# Patient Record
Sex: MALE | Race: WHITE | NOT HISPANIC OR LATINO | Employment: FULL TIME | URBAN - METROPOLITAN AREA
[De-identification: names, ages, dates, MRNs, and addresses within clinical notes are randomized per-mention and may not be internally consistent; named-entity substitution may affect disease eponyms.]

---

## 2018-09-20 ENCOUNTER — OFFICE VISIT (OUTPATIENT)
Dept: INTERNAL MEDICINE CLINIC | Facility: CLINIC | Age: 58
End: 2018-09-20
Payer: COMMERCIAL

## 2018-09-20 VITALS
OXYGEN SATURATION: 98 % | BODY MASS INDEX: 24.75 KG/M2 | WEIGHT: 145 LBS | DIASTOLIC BLOOD PRESSURE: 86 MMHG | HEART RATE: 74 BPM | RESPIRATION RATE: 18 BRPM | SYSTOLIC BLOOD PRESSURE: 150 MMHG | HEIGHT: 64 IN | TEMPERATURE: 98.4 F

## 2018-09-20 DIAGNOSIS — M62.838 MUSCLE SPASM: Primary | ICD-10-CM

## 2018-09-20 PROCEDURE — 99213 OFFICE O/P EST LOW 20 MIN: CPT | Performed by: INTERNAL MEDICINE

## 2018-09-20 PROCEDURE — 3008F BODY MASS INDEX DOCD: CPT | Performed by: INTERNAL MEDICINE

## 2018-09-20 RX ORDER — CYCLOBENZAPRINE HCL 10 MG
10 TABLET ORAL 3 TIMES DAILY PRN
Qty: 30 TABLET | Refills: 0 | Status: SHIPPED | OUTPATIENT
Start: 2018-09-20 | End: 2022-06-18

## 2018-09-20 RX ORDER — MELOXICAM 15 MG/1
15 TABLET ORAL DAILY
Qty: 14 TABLET | Refills: 0 | Status: SHIPPED | OUTPATIENT
Start: 2018-09-20 | End: 2022-06-18

## 2018-09-24 NOTE — PROGRESS NOTES
Assessment/Plan:       Diagnoses and all orders for this visit:    Muscle spasm  -     cyclobenzaprine (FLEXERIL) 10 mg tablet; Take 1 tablet (10 mg total) by mouth 3 (three) times a day as needed for muscle spasms  -     meloxicam (MOBIC) 15 mg tablet; Take 1 tablet (15 mg total) by mouth daily        No problem-specific Assessment & Plan notes found for this encounter  WE will see how the patient does in the next several weeks  Subjective:      Patient ID: Darleen Gaytan is a 59 y o  male  The patient was seen and examined and noted to have pain in the left shoulder  He notes no injury to the area of his shoulder  He states no change of activity and notes no cause of the pain  The patient states that there are no other issues at this time  The following portions of the patient's history were reviewed and updated as appropriate:   He has a past medical history of Benign essential hypertension (5/8/2014)  ,   does not have any pertinent problems on file  ,   has a past surgical history that includes Hernia repair  ,  family history includes No Known Problems in his father and mother  ,   reports that he has been smoking  He has never used smokeless tobacco  He reports that he drinks alcohol  He reports that he does not use drugs  ,  has No Known Allergies     Current Outpatient Prescriptions   Medication Sig Dispense Refill    cyclobenzaprine (FLEXERIL) 10 mg tablet Take 1 tablet (10 mg total) by mouth 3 (three) times a day as needed for muscle spasms 30 tablet 0    meloxicam (MOBIC) 15 mg tablet Take 1 tablet (15 mg total) by mouth daily 14 tablet 0     No current facility-administered medications for this visit  Review of Systems   Constitutional: Negative for chills, fatigue and fever  HENT: Negative for ear pain, postnasal drip, rhinorrhea, sinus pain and sinus pressure  Respiratory: Negative for choking and shortness of breath      Cardiovascular: Negative for chest pain and palpitations  Gastrointestinal: Negative for abdominal pain, constipation, diarrhea, nausea and vomiting  Musculoskeletal: Positive for back pain  Neurological: Negative  Psychiatric/Behavioral: Negative  Objective:  Vitals:    09/20/18 1149   BP: 150/86   BP Location: Right arm   Patient Position: Sitting   Cuff Size: Large   Pulse: 74   Resp: 18   Temp: 98 4 °F (36 9 °C)   SpO2: 98%   Weight: 65 8 kg (145 lb)   Height: 5' 4" (1 626 m)     Body mass index is 24 89 kg/m²  Physical Exam   Constitutional: He appears well-developed and well-nourished  HENT:   Head: Normocephalic and atraumatic  Neck: Normal range of motion  Neck supple  Cardiovascular: Normal rate, regular rhythm and normal heart sounds  Pulmonary/Chest: No respiratory distress  Abdominal: Soft  Bowel sounds are normal    Musculoskeletal: He exhibits tenderness  Arms:  Nursing note and vitals reviewed

## 2020-02-07 ENCOUNTER — OFFICE VISIT (OUTPATIENT)
Dept: URGENT CARE | Facility: CLINIC | Age: 60
End: 2020-02-07
Payer: COMMERCIAL

## 2020-02-07 VITALS
HEIGHT: 64 IN | OXYGEN SATURATION: 97 % | DIASTOLIC BLOOD PRESSURE: 80 MMHG | BODY MASS INDEX: 25.27 KG/M2 | WEIGHT: 148 LBS | HEART RATE: 79 BPM | RESPIRATION RATE: 16 BRPM | SYSTOLIC BLOOD PRESSURE: 160 MMHG | TEMPERATURE: 97 F

## 2020-02-07 DIAGNOSIS — L25.9 CONTACT DERMATITIS, UNSPECIFIED CONTACT DERMATITIS TYPE, UNSPECIFIED TRIGGER: Primary | ICD-10-CM

## 2020-02-07 PROCEDURE — 99213 OFFICE O/P EST LOW 20 MIN: CPT | Performed by: PHYSICIAN ASSISTANT

## 2020-02-07 PROCEDURE — S9088 SERVICES PROVIDED IN URGENT: HCPCS | Performed by: PHYSICIAN ASSISTANT

## 2020-02-07 RX ORDER — PREDNISONE 10 MG/1
TABLET ORAL
Qty: 26 TABLET | Refills: 0 | Status: SHIPPED | OUTPATIENT
Start: 2020-02-07 | End: 2022-06-18

## 2020-02-08 ENCOUNTER — HOSPITAL ENCOUNTER (EMERGENCY)
Facility: HOSPITAL | Age: 60
Discharge: HOME | End: 2020-02-08
Attending: EMERGENCY MEDICINE
Payer: COMMERCIAL

## 2020-02-08 VITALS
OXYGEN SATURATION: 96 % | TEMPERATURE: 97.5 F | WEIGHT: 150 LBS | BODY MASS INDEX: 25.75 KG/M2 | RESPIRATION RATE: 16 BRPM | DIASTOLIC BLOOD PRESSURE: 87 MMHG | SYSTOLIC BLOOD PRESSURE: 159 MMHG | HEART RATE: 83 BPM

## 2020-02-08 DIAGNOSIS — L42 PITYRIASIS ROSEA: Primary | ICD-10-CM

## 2020-02-08 PROCEDURE — 99283 EMERGENCY DEPT VISIT LOW MDM: CPT | Performed by: EMERGENCY MEDICINE

## 2020-02-08 PROCEDURE — 99282 EMERGENCY DEPT VISIT SF MDM: CPT

## 2020-02-08 NOTE — PROGRESS NOTES
[facility]    NAME: Shoaib Jackson is a 62 y o  male  : 1960    MRN: [id number]  DATE: 2020  TIME: 7:08 PM    Assessment and Plan   Contact dermatitis, unspecified contact dermatitis type, unspecified trigger [L25 9]  1  Contact dermatitis, unspecified contact dermatitis type, unspecified trigger  predniSONE 10 mg tablet       Patient Instructions     Patient Instructions   Prednisone as directed  If not improving over the next 7-10 days, follow up with pcp  Chief Complaint     Chief Complaint   Patient presents with    Rash     bilateral arms x 2 days       History of Present Illness   12-year-old male here with complaint of rash on bilateral arms, upper back  Is not itchy  Denies any new lotions, soaps or detergents  No upper respiratory complaints or cold symptoms  No fever chills  Rash does not itch  Review of Systems   Review of Systems   Constitutional: Negative for chills, fatigue and fever  HENT: Negative for congestion, ear pain, postnasal drip, sinus pressure and sore throat  Respiratory: Negative for cough, shortness of breath and wheezing  Skin: Positive for rash  Neurological: Negative for headaches  All other systems reviewed and are negative        Current Medications     Current Outpatient Medications:     cyclobenzaprine (FLEXERIL) 10 mg tablet, Take 1 tablet (10 mg total) by mouth 3 (three) times a day as needed for muscle spasms (Patient not taking: Reported on 2020), Disp: 30 tablet, Rfl: 0    meloxicam (MOBIC) 15 mg tablet, Take 1 tablet (15 mg total) by mouth daily (Patient not taking: Reported on 2020), Disp: 14 tablet, Rfl: 0    predniSONE 10 mg tablet, Take 3 tabs BID X 2 days, 2 tabs BID X 2 days, 1 tab BID X 2 days, 1 tab daily X 2 days, Disp: 26 tablet, Rfl: 0    Current Allergies     Allergies as of 2020    (No Known Allergies)          The following portions of the patient's history were reviewed and updated as appropriate: allergies, current medications, past family history, past medical history, past social history, past surgical history and problem list    Past Medical History:   Diagnosis Date    Benign essential hypertension 5/8/2014     Past Surgical History:   Procedure Laterality Date    HERNIA REPAIR       Family History   Problem Relation Age of Onset    No Known Problems Mother     No Known Problems Father      Social History     Socioeconomic History    Marital status: /Civil Union     Spouse name: Not on file    Number of children: Not on file    Years of education: Not on file    Highest education level: Not on file   Occupational History    Not on file   Social Needs    Financial resource strain: Not on file    Food insecurity:     Worry: Not on file     Inability: Not on file    Transportation needs:     Medical: Not on file     Non-medical: Not on file   Tobacco Use    Smoking status: Current Every Day Smoker    Smokeless tobacco: Never Used   Substance and Sexual Activity    Alcohol use: Yes    Drug use: No    Sexual activity: Not on file   Lifestyle    Physical activity:     Days per week: Not on file     Minutes per session: Not on file    Stress: Not on file   Relationships    Social connections:     Talks on phone: Not on file     Gets together: Not on file     Attends Islam service: Not on file     Active member of club or organization: Not on file     Attends meetings of clubs or organizations: Not on file     Relationship status: Not on file    Intimate partner violence:     Fear of current or ex partner: Not on file     Emotionally abused: Not on file     Physically abused: Not on file     Forced sexual activity: Not on file   Other Topics Concern    Not on file   Social History Narrative    EMPLOYED         Medications have been verified      Objective   /80   Pulse 79   Temp (!) 97 °F (36 1 °C)   Resp 16   Ht 5' 4" (1 626 m)   Wt 67 1 kg (148 lb)   SpO2 97%   BMI 25 40 kg/m²      Physical Exam   Physical Exam   Constitutional: He appears well-developed and well-nourished  No distress  HENT:   Head: Normocephalic and atraumatic  Right Ear: Tympanic membrane normal    Left Ear: Tympanic membrane normal    Nose: No mucosal edema  Mouth/Throat: Oropharynx is clear and moist    Cardiovascular: Normal rate, regular rhythm and normal heart sounds  Pulmonary/Chest: Effort normal and breath sounds normal  No respiratory distress  Skin: Rash (Erythematous papular rash on bilateral arms and upper back ) noted  Nursing note and vitals reviewed

## 2020-02-09 NOTE — ED PROVIDER NOTES
History  Chief Complaint   Patient presents with    Rash     HPI     Pt presents from home, hx of HTN, c/o a generalized rash that began 2 days ago when the patient first noticed a rash on his b/l arms  The rash was maculopapular, redish, oval and round shaped, blanchable, and not painful or pruritic  He was seen yesterday at an Urgent Care and was diagnosed with contact dermatitis and started on oral prednisone  He states that the rash has since spread to his trunk, b/l arms and b/l legs  The rash spares his face, palms and soles  No other symptoms  Pt denies ha, fevers, cough, cp, sob, n/v/d/c, abd pain, dysuria, focal def or syncope  Prior to Admission Medications   Prescriptions Last Dose Informant Patient Reported? Taking? cyclobenzaprine (FLEXERIL) 10 mg tablet   No No   Sig: Take 1 tablet (10 mg total) by mouth 3 (three) times a day as needed for muscle spasms   Patient not taking: Reported on 2/7/2020   meloxicam (MOBIC) 15 mg tablet   No No   Sig: Take 1 tablet (15 mg total) by mouth daily   Patient not taking: Reported on 2/7/2020   predniSONE 10 mg tablet   No Yes   Sig: Take 3 tabs BID X 2 days, 2 tabs BID X 2 days, 1 tab BID X 2 days, 1 tab daily X 2 days      Facility-Administered Medications: None       Past Medical History:   Diagnosis Date    Benign essential hypertension 5/8/2014       Past Surgical History:   Procedure Laterality Date    HERNIA REPAIR         Family History   Problem Relation Age of Onset    No Known Problems Mother     No Known Problems Father      I have reviewed and agree with the history as documented  Social History     Tobacco Use    Smoking status: Current Every Day Smoker     Packs/day: 0 50    Smokeless tobacco: Never Used   Substance Use Topics    Alcohol use: Yes    Drug use: No        Review of Systems   Constitutional: Negative for activity change, appetite change and fever  HENT: Negative for congestion, nosebleeds and sore throat      Eyes: Negative for photophobia and discharge  Respiratory: Negative for cough, shortness of breath, wheezing and stridor  Cardiovascular: Negative for chest pain  Gastrointestinal: Negative for abdominal pain, constipation, diarrhea, nausea and vomiting  Endocrine: Negative for cold intolerance and polydipsia  Genitourinary: Negative for discharge, hematuria and penile pain  Musculoskeletal: Negative for arthralgias, myalgias and neck stiffness  Skin: Positive for rash  Negative for color change  Allergic/Immunologic: Negative for immunocompromised state  Neurological: Negative for dizziness, seizures and headaches  Hematological: Negative for adenopathy  Psychiatric/Behavioral: Negative for confusion and self-injury  The patient is not nervous/anxious  Physical Exam  Physical Exam   Constitutional: He is oriented to person, place, and time  He appears well-developed and well-nourished  No distress  Well appearing, smiling and pleasant in NAD  HENT:   Head: Normocephalic and atraumatic  Right Ear: External ear normal    Left Ear: External ear normal    Nose: Nose normal    Mouth/Throat: Oropharynx is clear and moist  No oropharyngeal exudate  Eyes: Pupils are equal, round, and reactive to light  Conjunctivae and EOM are normal  Right eye exhibits no discharge  Left eye exhibits no discharge  No scleral icterus  Neck: Normal range of motion  Neck supple  No JVD present  No tracheal deviation present  No thyromegaly present  Cardiovascular: Normal rate, regular rhythm, normal heart sounds and intact distal pulses  Exam reveals no gallop and no friction rub  No murmur heard  Pulmonary/Chest: Breath sounds normal  No stridor  No respiratory distress  He has no wheezes  He has no rales  He exhibits no tenderness  Abdominal: Soft  Bowel sounds are normal  He exhibits no distension and no mass  There is no tenderness  There is no rebound and no guarding     Musculoskeletal: Normal range of motion  He exhibits no edema, tenderness or deformity  Lymphadenopathy:     He has no cervical adenopathy  Neurological: He is alert and oriented to person, place, and time  He has normal reflexes  He displays normal reflexes  No cranial nerve deficit  He exhibits normal muscle tone  Coordination normal    Skin: Skin is warm and dry  Rash noted  He is not diaphoretic  No erythema  No pallor  Diffuse, maculopapular, erythematous rash, oval and round shaped of various sizes  Scaling seen on some of the lesions  There is a single larger lesion on the patient's back  The rash is blanchable  Not tender or pruritic  Spares the mucous membranes, face, palms and soles  Psychiatric: He has a normal mood and affect  His behavior is normal  Judgment and thought content normal    Nursing note and vitals reviewed  Vital Signs  ED Triage Vitals [02/08/20 2141]   Temperature Pulse Respirations Blood Pressure SpO2   97 5 °F (36 4 °C) 83 16 159/87 96 %      Temp src Heart Rate Source Patient Position - Orthostatic VS BP Location FiO2 (%)   -- -- -- -- --      Pain Score       No Pain           Vitals:    02/08/20 2141   BP: 159/87   Pulse: 83         Visual Acuity      ED Medications  Medications - No data to display    Diagnostic Studies  Results Reviewed     None                 No orders to display              Procedures  Procedures         ED Course                               MDM  Number of Diagnoses or Management Options  Pityriasis rosea:   Diagnosis management comments: IMP: pityriasis rosacea versus allergic reaction, post-viral exanthem  Doubt SJS, infectious rash, nec fascitis  Plan: supportive care prn  Pt will stop the prednisone  Pt will f/up w/ his pcp and will see dermatology if symptoms persist   He will return immediately for any worsening         Amount and/or Complexity of Data Reviewed  Decide to obtain previous medical records or to obtain history from someone other than the patient: yes  Review and summarize past medical records: yes  Independent visualization of images, tracings, or specimens: yes    Risk of Complications, Morbidity, and/or Mortality  Presenting problems: high  Diagnostic procedures: low  Management options: low    Patient Progress  Patient progress: improved        Disposition  Final diagnoses:   Pityriasis rosea     Time reflects when diagnosis was documented in both MDM as applicable and the Disposition within this note     Time User Action Codes Description Comment    2/8/2020 10:21 PM Robert Erickson Add [L42] Pityriasis rosea       ED Disposition     ED Disposition Condition Date/Time Comment    Discharge Stable Sat Feb 8, 2020 10:20 PM Selma Kentsubhash discharge to home/self care  Follow-up Information     Follow up With Specialties Details Why [address], DO Internal Medicine Schedule an appointment as soon as possible for a visit in 2 days As needed  Return immediately, If symptoms worsen Priscilla  [phone]            Discharge Medication List as of 2/8/2020 10:26 PM      CONTINUE these medications which have NOT CHANGED    Details   predniSONE 10 mg tablet Take 3 tabs BID X 2 days, 2 tabs BID X 2 days, 1 tab BID X 2 days, 1 tab daily X 2 days, Normal      cyclobenzaprine (FLEXERIL) 10 mg tablet Take 1 tablet (10 mg total) by mouth 3 (three) times a day as needed for muscle spasms, Starting Thu 9/20/2018, Normal      meloxicam (MOBIC) 15 mg tablet Take 1 tablet (15 mg total) by mouth daily, Starting Thu 9/20/2018, Normal           No discharge procedures on file      ED Provider  Electronically Signed by           Yang Mann DO  02/08/20 7361

## 2020-02-09 NOTE — DISCHARGE INSTRUCTIONS
What is pityriasis rosea? Pityriasis rosea is a scaly, reddish-pink skin rash  It is most common in people between the ages of 8and 28years old  However, this rash can affect any age group  Symptoms of pityriasis rosea  In most cases, pityriasis rosea begins with cold-like symptoms  These may include sore throat, fever, aches, nausea, and fatigue  Then, a single, large spot may appear on your chest, stomach, or back  This is called a herald patch or mother patch   It has a raised border and hollow center  Smaller spots will continue to appear in the same area  The spots may be red, scaly, and itchy  What causes pityriasis rosea? Doctors dont know the exact cause of pityriasis rosea  Some doctors believe that a viral infection can cause it  Certain medicines may also be the cause  Pityriasis rosea is not contagious, so people who have this rash cannot spread it to others  How is pityriasis rosea diagnosed? Pityriasis rosea can look like other common skin rashes  Examples are eczema, ringworm, and psoriasis  Your doctor will look for a herald patch and review your symptoms  They may do a blood test or skin biopsy to rule out other issues  Can pityriasis rosea be prevented or avoided? You cannot prevent or avoid this condition  Pityriasis rosea treatment  Treatment cannot cure pityriasis rosea, but medicine can help relieve the itching  Your doctor may suggest antihistamine pills, such as Benadryl  Steroid cream, calamine lotion, or zinc oxide cream can also relieve the itching  Some people may need to take steroid pills to clear up their rash  Let your doctor know if you are pregnant  This may affect your treatment options and require more in-depth monitoring  Living with pityriasis rosea  In most cases, pityriasis rosea goes away on its own and does not come back  It could last 1 to 3 months  Heat can worsen the rash and itching  Try to avoid hot water and temperatures    Contact your doctor if the rash lasts longer than 3 months  Questions to ask your doctor  What is causing my rash? What treatment is best for me? How long do I need to take drgus or use cream?   Is there anything else I can do to relieve my symptoms? How long will the rash last and will it come back?

## 2020-06-12 ENCOUNTER — OFFICE VISIT (OUTPATIENT)
Dept: FAMILY MEDICINE CLINIC | Facility: CLINIC | Age: 60
End: 2020-06-12
Payer: COMMERCIAL

## 2020-06-12 VITALS
OXYGEN SATURATION: 97 % | BODY MASS INDEX: 25.69 KG/M2 | HEIGHT: 63 IN | DIASTOLIC BLOOD PRESSURE: 76 MMHG | TEMPERATURE: 97.9 F | WEIGHT: 145 LBS | SYSTOLIC BLOOD PRESSURE: 139 MMHG | HEART RATE: 86 BPM

## 2020-06-12 DIAGNOSIS — Z11.59 ENCOUNTER FOR HEPATITIS C SCREENING TEST FOR LOW RISK PATIENT: ICD-10-CM

## 2020-06-12 DIAGNOSIS — Z76.89 ENCOUNTER TO ESTABLISH CARE WITH NEW DOCTOR: Primary | ICD-10-CM

## 2020-06-12 DIAGNOSIS — Z11.4 SCREENING FOR HIV WITHOUT PRESENCE OF RISK FACTORS: ICD-10-CM

## 2020-06-12 DIAGNOSIS — R01.1 HEART MURMUR: ICD-10-CM

## 2020-06-12 DIAGNOSIS — Z12.5 SCREENING FOR PROSTATE CANCER: ICD-10-CM

## 2020-06-12 DIAGNOSIS — I10 BENIGN ESSENTIAL HYPERTENSION: ICD-10-CM

## 2020-06-12 DIAGNOSIS — E66.3 OVERWEIGHT: ICD-10-CM

## 2020-06-12 DIAGNOSIS — Z13.31 NEGATIVE DEPRESSION SCREENING: ICD-10-CM

## 2020-06-12 DIAGNOSIS — E78.00 HYPERCHOLESTEROLEMIA: ICD-10-CM

## 2020-06-12 DIAGNOSIS — Z23 ENCOUNTER FOR IMMUNIZATION: ICD-10-CM

## 2020-06-12 PROCEDURE — 99203 OFFICE O/P NEW LOW 30 MIN: CPT | Performed by: FAMILY MEDICINE

## 2020-06-12 PROCEDURE — 90670 PCV13 VACCINE IM: CPT

## 2020-06-12 PROCEDURE — 3075F SYST BP GE 130 - 139MM HG: CPT | Performed by: FAMILY MEDICINE

## 2020-06-12 PROCEDURE — 3078F DIAST BP <80 MM HG: CPT | Performed by: FAMILY MEDICINE

## 2020-06-12 PROCEDURE — 90471 IMMUNIZATION ADMIN: CPT

## 2020-06-12 PROCEDURE — 3008F BODY MASS INDEX DOCD: CPT | Performed by: FAMILY MEDICINE

## 2020-06-24 ENCOUNTER — APPOINTMENT (OUTPATIENT)
Dept: LAB | Facility: HOSPITAL | Age: 60
End: 2020-06-24
Attending: FAMILY MEDICINE
Payer: COMMERCIAL

## 2020-06-24 ENCOUNTER — HOSPITAL ENCOUNTER (OUTPATIENT)
Dept: NON INVASIVE DIAGNOSTICS | Facility: HOSPITAL | Age: 60
Discharge: HOME | End: 2020-06-24
Attending: FAMILY MEDICINE
Payer: COMMERCIAL

## 2020-06-24 DIAGNOSIS — Z11.59 ENCOUNTER FOR HEPATITIS C SCREENING TEST FOR LOW RISK PATIENT: ICD-10-CM

## 2020-06-24 DIAGNOSIS — I10 BENIGN ESSENTIAL HYPERTENSION: ICD-10-CM

## 2020-06-24 DIAGNOSIS — E78.00 HYPERCHOLESTEROLEMIA: ICD-10-CM

## 2020-06-24 DIAGNOSIS — Z12.5 SCREENING FOR PROSTATE CANCER: ICD-10-CM

## 2020-06-24 DIAGNOSIS — R01.1 HEART MURMUR: ICD-10-CM

## 2020-06-24 DIAGNOSIS — Z11.4 SCREENING FOR HIV WITHOUT PRESENCE OF RISK FACTORS: ICD-10-CM

## 2020-06-24 LAB
ALBUMIN SERPL BCP-MCNC: 4.3 G/DL (ref 3.5–5.7)
ALP SERPL-CCNC: 66 U/L (ref 55–165)
ALT SERPL W P-5'-P-CCNC: 7 U/L (ref 7–52)
ANION GAP SERPL CALCULATED.3IONS-SCNC: 7 MMOL/L (ref 4–13)
AST SERPL W P-5'-P-CCNC: 11 U/L (ref 13–39)
BASOPHILS # BLD AUTO: 0 THOUSANDS/ΜL (ref 0–0.1)
BASOPHILS NFR BLD AUTO: 1 % (ref 0–2)
BILIRUB SERPL-MCNC: 0.5 MG/DL (ref 0.2–1)
BUN SERPL-MCNC: 13 MG/DL (ref 7–25)
CALCIUM SERPL-MCNC: 9 MG/DL (ref 8.6–10.5)
CHLORIDE SERPL-SCNC: 106 MMOL/L (ref 98–107)
CHOLEST SERPL-MCNC: 210 MG/DL (ref 0–200)
CO2 SERPL-SCNC: 29 MMOL/L (ref 21–31)
CREAT SERPL-MCNC: 0.73 MG/DL (ref 0.7–1.3)
EOSINOPHIL # BLD AUTO: 0.1 THOUSAND/ΜL (ref 0–0.61)
EOSINOPHIL NFR BLD AUTO: 1 % (ref 0–5)
ERYTHROCYTE [DISTWIDTH] IN BLOOD BY AUTOMATED COUNT: 14.1 % (ref 11.5–14.5)
GFR SERPL CREATININE-BSD FRML MDRD: 100 ML/MIN/1.73SQ M
GLUCOSE P FAST SERPL-MCNC: 83 MG/DL (ref 65–99)
HCT VFR BLD AUTO: 46 % (ref 42–47)
HCV AB SER QL: NORMAL
HDLC SERPL-MCNC: 30 MG/DL
HGB BLD-MCNC: 15.5 G/DL (ref 14–18)
LDLC SERPL CALC-MCNC: 152 MG/DL (ref 0–100)
LYMPHOCYTES # BLD AUTO: 1.5 THOUSANDS/ΜL (ref 0.6–4.47)
LYMPHOCYTES NFR BLD AUTO: 18 % (ref 21–51)
MCH RBC QN AUTO: 28.7 PG (ref 26–34)
MCHC RBC AUTO-ENTMCNC: 33.6 G/DL (ref 31–37)
MCV RBC AUTO: 85 FL (ref 81–99)
MONOCYTES # BLD AUTO: 0.8 THOUSAND/ΜL (ref 0.17–1.22)
MONOCYTES NFR BLD AUTO: 9 % (ref 2–12)
NEUTROPHILS # BLD AUTO: 6.2 THOUSANDS/ΜL (ref 1.4–6.5)
NEUTS SEG NFR BLD AUTO: 71 % (ref 42–75)
NONHDLC SERPL-MCNC: 180 MG/DL
PLATELET # BLD AUTO: 296 THOUSANDS/UL (ref 149–390)
PMV BLD AUTO: 8.2 FL (ref 8.6–11.7)
POTASSIUM SERPL-SCNC: 4.3 MMOL/L (ref 3.5–5.5)
PROT SERPL-MCNC: 6.5 G/DL (ref 6.4–8.9)
PSA SERPL-MCNC: 3.9 NG/ML (ref 0–4)
RBC # BLD AUTO: 5.39 MILLION/UL (ref 4.3–5.9)
SODIUM SERPL-SCNC: 142 MMOL/L (ref 134–143)
TRIGL SERPL-MCNC: 140 MG/DL (ref 44–166)
TSH SERPL DL<=0.05 MIU/L-ACNC: 1.89 UIU/ML (ref 0.45–5.33)
WBC # BLD AUTO: 8.7 THOUSAND/UL (ref 4.8–10.8)

## 2020-06-24 PROCEDURE — 36415 COLL VENOUS BLD VENIPUNCTURE: CPT

## 2020-06-24 PROCEDURE — 93306 TTE W/DOPPLER COMPLETE: CPT | Performed by: INTERNAL MEDICINE

## 2020-06-24 PROCEDURE — 86803 HEPATITIS C AB TEST: CPT

## 2020-06-24 PROCEDURE — 85025 COMPLETE CBC W/AUTO DIFF WBC: CPT

## 2020-06-24 PROCEDURE — 93306 TTE W/DOPPLER COMPLETE: CPT

## 2020-06-24 PROCEDURE — 84443 ASSAY THYROID STIM HORMONE: CPT

## 2020-06-24 PROCEDURE — 80053 COMPREHEN METABOLIC PANEL: CPT

## 2020-06-24 PROCEDURE — G0103 PSA SCREENING: HCPCS

## 2020-06-24 PROCEDURE — 80061 LIPID PANEL: CPT

## 2020-06-24 PROCEDURE — 87389 HIV-1 AG W/HIV-1&-2 AB AG IA: CPT

## 2020-06-26 LAB — HIV 1+2 AB+HIV1 P24 AG SERPL QL IA: NORMAL

## 2020-07-17 ENCOUNTER — OFFICE VISIT (OUTPATIENT)
Dept: FAMILY MEDICINE CLINIC | Facility: CLINIC | Age: 60
End: 2020-07-17
Payer: COMMERCIAL

## 2020-07-17 VITALS
HEIGHT: 63 IN | HEART RATE: 62 BPM | BODY MASS INDEX: 25.91 KG/M2 | WEIGHT: 146.2 LBS | TEMPERATURE: 97.4 F | DIASTOLIC BLOOD PRESSURE: 64 MMHG | OXYGEN SATURATION: 98 % | SYSTOLIC BLOOD PRESSURE: 139 MMHG

## 2020-07-17 DIAGNOSIS — E66.3 OVERWEIGHT WITH BODY MASS INDEX (BMI) OF 26 TO 26.9 IN ADULT: ICD-10-CM

## 2020-07-17 DIAGNOSIS — Z00.00 ANNUAL PHYSICAL EXAM: Primary | ICD-10-CM

## 2020-07-17 DIAGNOSIS — I35.0 AORTIC VALVE STENOSIS, ETIOLOGY OF CARDIAC VALVE DISEASE UNSPECIFIED: ICD-10-CM

## 2020-07-17 DIAGNOSIS — E78.00 HYPERCHOLESTEROLEMIA: ICD-10-CM

## 2020-07-17 DIAGNOSIS — Z13.31 NEGATIVE DEPRESSION SCREENING: ICD-10-CM

## 2020-07-17 PROCEDURE — 99396 PREV VISIT EST AGE 40-64: CPT | Performed by: FAMILY MEDICINE

## 2020-07-17 PROCEDURE — 3075F SYST BP GE 130 - 139MM HG: CPT | Performed by: FAMILY MEDICINE

## 2020-07-17 PROCEDURE — 3008F BODY MASS INDEX DOCD: CPT | Performed by: FAMILY MEDICINE

## 2020-07-17 PROCEDURE — 3078F DIAST BP <80 MM HG: CPT | Performed by: FAMILY MEDICINE

## 2020-07-17 NOTE — PROGRESS NOTES
Assessment/Plan:    No problem-specific Assessment & Plan notes found for this encounter  Diagnoses and all orders for this visit:    Annual physical exam    Hypercholesterolemia  Comments:  pt counseled on diet and exercise  Orders:  -     Lipid panel; Future    Overweight with body mass index (BMI) of 26 to 26 9 in adult    Negative depression screening    Aortic valve stenosis, etiology of cardiac valve disease unspecified  Comments:  mild          PHQ-9 Depression Screening    PHQ-9:    Frequency of the following problems over the past two weeks:       Little interest or pleasure in doing things:  0 - not at all  Feeling down, depressed, or hopeless:  0 - not at all  PHQ-2 Score:  0        BMI Counseling: Body mass index is 26 31 kg/m²  The BMI is above normal  Nutrition recommendations include reducing portion sizes and 3-5 servings of fruits/vegetables daily  Exercise recommendations include moderate aerobic physical activity for 150 minutes/week and exercising 3-5 times per week  Subjective:      Patient ID: Uche Bueno is a 62 y o  male  Pt here for annual physical      The following portions of the patient's history were reviewed and updated as appropriate: allergies, current medications, past family history, past medical history, past social history, past surgical history and problem list     Review of Systems   Constitutional: Negative  Negative for chills, fatigue and fever  HENT: Negative  Eyes: Negative  Respiratory: Negative for shortness of breath and wheezing  Cardiovascular: Negative for chest pain and palpitations  Gastrointestinal: Negative for abdominal pain, blood in stool, constipation, diarrhea, nausea and vomiting  Endocrine: Negative  Genitourinary: Negative for difficulty urinating and dysuria  Musculoskeletal: Negative for arthralgias and myalgias  Skin: Negative  Allergic/Immunologic: Negative      Neurological: Negative for seizures and syncope  Hematological: Negative for adenopathy  Psychiatric/Behavioral: Negative  Objective:    /64   Pulse 62   Temp (!) 97 4 °F (36 3 °C)   Ht 5' 2 5" (1 588 m)   Wt 66 3 kg (146 lb 3 2 oz)   SpO2 98%   BMI 26 31 kg/m²      Physical Exam   Constitutional: He is oriented to person, place, and time  He appears well-developed and well-nourished  No distress  HENT:   Head: Normocephalic and atraumatic  Right Ear: External ear normal    Left Ear: External ear normal    Nose: Nose normal    Mouth/Throat: Oropharynx is clear and moist    Eyes: Pupils are equal, round, and reactive to light  Conjunctivae and EOM are normal  No scleral icterus  Neck: Normal range of motion  Neck supple  Cardiovascular: Normal rate, regular rhythm and normal heart sounds  Exam reveals no gallop and no friction rub  No murmur heard  Pulmonary/Chest: Effort normal and breath sounds normal  No respiratory distress  He has no wheezes  He has no rales  Abdominal: Soft  Bowel sounds are normal  He exhibits no distension and no mass  There is no tenderness  There is no rebound and no guarding  Musculoskeletal: Normal range of motion  He exhibits no edema  Lymphadenopathy:     He has no cervical adenopathy  Neurological: He is alert and oriented to person, place, and time  He has normal reflexes  Skin: Skin is warm and dry  He is not diaphoretic  Psychiatric: He has a normal mood and affect   His behavior is normal  Judgment and thought content normal

## 2021-04-06 ENCOUNTER — IMMUNIZATIONS (OUTPATIENT)
Dept: FAMILY MEDICINE CLINIC | Facility: HOSPITAL | Age: 61
End: 2021-04-06

## 2021-04-06 DIAGNOSIS — Z23 ENCOUNTER FOR IMMUNIZATION: Primary | ICD-10-CM

## 2021-04-06 PROCEDURE — 91301 SARS-COV-2 / COVID-19 MRNA VACCINE (MODERNA) 100 MCG: CPT

## 2021-04-06 PROCEDURE — 0011A SARS-COV-2 / COVID-19 MRNA VACCINE (MODERNA) 100 MCG: CPT

## 2021-05-06 ENCOUNTER — IMMUNIZATIONS (OUTPATIENT)
Dept: FAMILY MEDICINE CLINIC | Facility: HOSPITAL | Age: 61
End: 2021-05-06

## 2021-05-06 DIAGNOSIS — Z23 ENCOUNTER FOR IMMUNIZATION: Primary | ICD-10-CM

## 2021-05-06 PROCEDURE — 0012A SARS-COV-2 / COVID-19 MRNA VACCINE (MODERNA) 100 MCG: CPT

## 2021-05-06 PROCEDURE — 91301 SARS-COV-2 / COVID-19 MRNA VACCINE (MODERNA) 100 MCG: CPT

## 2022-06-15 ENCOUNTER — HOSPITAL ENCOUNTER (EMERGENCY)
Facility: HOSPITAL | Age: 62
Discharge: HOME | End: 2022-06-16
Attending: EMERGENCY MEDICINE
Payer: COMMERCIAL

## 2022-06-15 DIAGNOSIS — R07.89 CHEST PRESSURE: ICD-10-CM

## 2022-06-15 DIAGNOSIS — R00.2 PALPITATIONS: Primary | ICD-10-CM

## 2022-06-15 DIAGNOSIS — R68.89 FLU-LIKE SYMPTOMS: ICD-10-CM

## 2022-06-15 DIAGNOSIS — R00.2 HEART PALPITATIONS: ICD-10-CM

## 2022-06-15 PROCEDURE — 99285 EMERGENCY DEPT VISIT HI MDM: CPT

## 2022-06-15 PROCEDURE — 99285 EMERGENCY DEPT VISIT HI MDM: CPT | Performed by: EMERGENCY MEDICINE

## 2022-06-15 PROCEDURE — 93005 ELECTROCARDIOGRAM TRACING: CPT

## 2022-06-16 ENCOUNTER — APPOINTMENT (EMERGENCY)
Dept: RADIOLOGY | Facility: HOSPITAL | Age: 62
End: 2022-06-16
Payer: COMMERCIAL

## 2022-06-16 VITALS
SYSTOLIC BLOOD PRESSURE: 158 MMHG | TEMPERATURE: 99 F | HEART RATE: 75 BPM | DIASTOLIC BLOOD PRESSURE: 78 MMHG | BODY MASS INDEX: 25.61 KG/M2 | WEIGHT: 150 LBS | OXYGEN SATURATION: 97 % | HEIGHT: 64 IN | RESPIRATION RATE: 18 BRPM

## 2022-06-16 LAB
2HR DELTA HS TROPONIN: -1 NG/L
ALBUMIN SERPL BCP-MCNC: 4.4 G/DL (ref 3.5–5)
ALP SERPL-CCNC: 83 U/L (ref 34–104)
ALT SERPL W P-5'-P-CCNC: 10 U/L (ref 7–52)
ANION GAP SERPL CALCULATED.3IONS-SCNC: 10 MMOL/L (ref 4–13)
AST SERPL W P-5'-P-CCNC: 11 U/L (ref 13–39)
ATRIAL RATE: 74 BPM
ATRIAL RATE: 87 BPM
BACTERIA UR QL AUTO: NORMAL /HPF
BASOPHILS # BLD AUTO: 0.06 THOUSANDS/ΜL (ref 0–0.1)
BASOPHILS NFR BLD AUTO: 1 % (ref 0–1)
BILIRUB SERPL-MCNC: 0.28 MG/DL (ref 0.2–1)
BILIRUB UR QL STRIP: NEGATIVE
BUN SERPL-MCNC: 24 MG/DL (ref 5–25)
CALCIUM SERPL-MCNC: 9.5 MG/DL (ref 8.4–10.2)
CARDIAC TROPONIN I PNL SERPL HS: 10 NG/L
CARDIAC TROPONIN I PNL SERPL HS: 9 NG/L
CHLORIDE SERPL-SCNC: 103 MMOL/L (ref 96–108)
CK SERPL-CCNC: 42 U/L (ref 39–308)
CLARITY UR: CLEAR
CO2 SERPL-SCNC: 23 MMOL/L (ref 21–32)
COLOR UR: ABNORMAL
CREAT SERPL-MCNC: 0.81 MG/DL (ref 0.6–1.3)
EOSINOPHIL # BLD AUTO: 0.29 THOUSAND/ΜL (ref 0–0.61)
EOSINOPHIL NFR BLD AUTO: 3 % (ref 0–6)
ERYTHROCYTE [DISTWIDTH] IN BLOOD BY AUTOMATED COUNT: 13.9 % (ref 11.6–15.1)
FLUAV RNA RESP QL NAA+PROBE: NEGATIVE
FLUBV RNA RESP QL NAA+PROBE: NEGATIVE
GFR SERPL CREATININE-BSD FRML MDRD: 94 ML/MIN/1.73SQ M
GLUCOSE SERPL-MCNC: 92 MG/DL (ref 65–140)
GLUCOSE UR STRIP-MCNC: NEGATIVE MG/DL
HCT VFR BLD AUTO: 45.6 % (ref 36.5–49.3)
HGB BLD-MCNC: 15 G/DL (ref 12–17)
HGB UR QL STRIP.AUTO: ABNORMAL
IMM GRANULOCYTES # BLD AUTO: 0.04 THOUSAND/UL (ref 0–0.2)
IMM GRANULOCYTES NFR BLD AUTO: 0 % (ref 0–2)
KETONES UR STRIP-MCNC: NEGATIVE MG/DL
LACTATE SERPL-SCNC: 0.7 MMOL/L (ref 0.5–2)
LEUKOCYTE ESTERASE UR QL STRIP: NEGATIVE
LIPASE SERPL-CCNC: 12 U/L (ref 11–82)
LYMPHOCYTES # BLD AUTO: 2.82 THOUSANDS/ΜL (ref 0.6–4.47)
LYMPHOCYTES NFR BLD AUTO: 29 % (ref 14–44)
MCH RBC QN AUTO: 28.6 PG (ref 26.8–34.3)
MCHC RBC AUTO-ENTMCNC: 32.9 G/DL (ref 31.4–37.4)
MCV RBC AUTO: 87 FL (ref 82–98)
MONOCYTES # BLD AUTO: 0.88 THOUSAND/ΜL (ref 0.17–1.22)
MONOCYTES NFR BLD AUTO: 9 % (ref 4–12)
NEUTROPHILS # BLD AUTO: 5.49 THOUSANDS/ΜL (ref 1.85–7.62)
NEUTS SEG NFR BLD AUTO: 58 % (ref 43–75)
NITRITE UR QL STRIP: NEGATIVE
NON-SQ EPI CELLS URNS QL MICRO: NORMAL /HPF
NRBC BLD AUTO-RTO: 0 /100 WBCS
P AXIS: 50 DEGREES
P AXIS: 69 DEGREES
PH UR STRIP.AUTO: 6 [PH]
PLATELET # BLD AUTO: 382 THOUSANDS/UL (ref 149–390)
PMV BLD AUTO: 9.1 FL (ref 8.9–12.7)
POTASSIUM SERPL-SCNC: 3.8 MMOL/L (ref 3.5–5.3)
PR INTERVAL: 178 MS
PR INTERVAL: 190 MS
PROCALCITONIN SERPL-MCNC: <0.05 NG/ML
PROT SERPL-MCNC: 6.9 G/DL (ref 6.4–8.4)
PROT UR STRIP-MCNC: NEGATIVE MG/DL
QRS AXIS: -38 DEGREES
QRS AXIS: -53 DEGREES
QRSD INTERVAL: 88 MS
QRSD INTERVAL: 96 MS
QT INTERVAL: 344 MS
QT INTERVAL: 364 MS
QTC INTERVAL: 404 MS
QTC INTERVAL: 413 MS
RBC # BLD AUTO: 5.25 MILLION/UL (ref 3.88–5.62)
RBC #/AREA URNS AUTO: NORMAL /HPF
RSV RNA RESP QL NAA+PROBE: NEGATIVE
SARS-COV-2 RNA RESP QL NAA+PROBE: NEGATIVE
SODIUM SERPL-SCNC: 136 MMOL/L (ref 135–147)
SP GR UR STRIP.AUTO: 1.01 (ref 1–1.03)
T WAVE AXIS: 34 DEGREES
T WAVE AXIS: 56 DEGREES
UROBILINOGEN UR QL STRIP.AUTO: 0.2 E.U./DL
VENTRICULAR RATE: 74 BPM
VENTRICULAR RATE: 87 BPM
WBC # BLD AUTO: 9.58 THOUSAND/UL (ref 4.31–10.16)
WBC #/AREA URNS AUTO: NORMAL /HPF

## 2022-06-16 PROCEDURE — 83605 ASSAY OF LACTIC ACID: CPT | Performed by: EMERGENCY MEDICINE

## 2022-06-16 PROCEDURE — 83690 ASSAY OF LIPASE: CPT | Performed by: EMERGENCY MEDICINE

## 2022-06-16 PROCEDURE — 84145 PROCALCITONIN (PCT): CPT | Performed by: EMERGENCY MEDICINE

## 2022-06-16 PROCEDURE — 87040 BLOOD CULTURE FOR BACTERIA: CPT | Performed by: EMERGENCY MEDICINE

## 2022-06-16 PROCEDURE — 82550 ASSAY OF CK (CPK): CPT | Performed by: EMERGENCY MEDICINE

## 2022-06-16 PROCEDURE — 93005 ELECTROCARDIOGRAM TRACING: CPT

## 2022-06-16 PROCEDURE — 0241U HB NFCT DS VIR RESP RNA 4 TRGT: CPT | Performed by: EMERGENCY MEDICINE

## 2022-06-16 PROCEDURE — 85025 COMPLETE CBC W/AUTO DIFF WBC: CPT | Performed by: EMERGENCY MEDICINE

## 2022-06-16 PROCEDURE — 36415 COLL VENOUS BLD VENIPUNCTURE: CPT | Performed by: EMERGENCY MEDICINE

## 2022-06-16 PROCEDURE — 93010 ELECTROCARDIOGRAM REPORT: CPT | Performed by: INTERNAL MEDICINE

## 2022-06-16 PROCEDURE — 71045 X-RAY EXAM CHEST 1 VIEW: CPT

## 2022-06-16 PROCEDURE — 84484 ASSAY OF TROPONIN QUANT: CPT | Performed by: EMERGENCY MEDICINE

## 2022-06-16 PROCEDURE — 81001 URINALYSIS AUTO W/SCOPE: CPT | Performed by: EMERGENCY MEDICINE

## 2022-06-16 PROCEDURE — 96360 HYDRATION IV INFUSION INIT: CPT

## 2022-06-16 PROCEDURE — 80053 COMPREHEN METABOLIC PANEL: CPT | Performed by: EMERGENCY MEDICINE

## 2022-06-16 PROCEDURE — 96361 HYDRATE IV INFUSION ADD-ON: CPT

## 2022-06-16 RX ADMIN — SODIUM CHLORIDE 1000 ML: 0.9 INJECTION, SOLUTION INTRAVENOUS at 00:08

## 2022-06-16 NOTE — ED PROVIDER NOTES
History  Chief Complaint   Patient presents with    Palpitations     About 2 hours prior to bed pt was was laying down and felt palpatations, no dizziness, and no cardiac hx       59-YEAR-OLD MALE     PMH:  Raynaud's disease  HTN  HLD    Pt came by private car    Chief complaint:    "Mariza Barrera"  "felt my chest jump once and a while"    symptoms started around 9:30, slight associated chest discomfort           SHE HAS NO SOB, PLEURISY, NO DIAPHORESIS    NO RECENT LONG CAR RIDES OR PLAN RIDES  NO H/O PE OR DVT  NO BIRTH CONTROL USE  NO RECENT TRAUMA OR SURGERY  NO HEMOPTYSIS      Reports 1-2 beers daily  DENIES DRUG USE  DENIES STIMULANT USE, DENIES EXCESSIVE CAFFEINE USE      ASSOCIATED SYMPTOMS:  URINARY  SYMPTOMS: THERE IS NO DYSURIA, NO HEMATURIA, NO FREQUENCY  HE DENIES NAUSEA,  DENIES ANY VOMITING  DENIES FEVERS, BUT DOES REPORT CHILLS    DENIES LOOSE STOOLS, NO DIARRHEA  NO BLOODY STOOLS - NOT BLACK OR BLOODY      ALLEVIATING OR EXACERBATING FACTORS:  UNCERTAIN      INTERVENTIONS: NONE      NO OTHER COMPLAINTS       History provided by:  Patient  Palpitations  Palpitations quality:  Regular  Onset quality:  Sudden  Context: not anxiety, not appetite suppressants, not blood loss, not bronchodilators, not caffeine, not dehydration, not illicit drugs, not nicotine and not stimulant use    Relieved by:  Nothing  Worsened by:  Nothing  Ineffective treatments:  None tried  Associated symptoms: chest pain (TIGHTNESS AT TIMES ) and chest pressure    Associated symptoms: no back pain, no cough, no diaphoresis, no dizziness, no hemoptysis, no leg pain, no lower extremity edema, no malaise/fatigue, no nausea, no near-syncope, no numbness, no orthopnea, no shortness of breath, no syncope, no vomiting and no weakness    Risk factors: no hx of PE, no hx of thyroid disease and no hypercoagulable state        Prior to Admission Medications   Prescriptions Last Dose Informant Patient Reported? Taking? cyclobenzaprine (FLEXERIL) 10 mg tablet   No No   Sig: Take 1 tablet (10 mg total) by mouth 3 (three) times a day as needed for muscle spasms   Patient not taking: Reported on 2/7/2020   meloxicam (MOBIC) 15 mg tablet   No No   Sig: Take 1 tablet (15 mg total) by mouth daily   Patient not taking: Reported on 2/7/2020   predniSONE 10 mg tablet   No No   Sig: Take 3 tabs BID X 2 days, 2 tabs BID X 2 days, 1 tab BID X 2 days, 1 tab daily X 2 days   Patient not taking: Reported on 6/12/2020      Facility-Administered Medications: None       Past Medical History:   Diagnosis Date    Benign essential hypertension 5/8/2014       Past Surgical History:   Procedure Laterality Date    HERNIA REPAIR         Family History   Problem Relation Age of Onset    No Known Problems Mother     No Known Problems Father      I have reviewed and agree with the history as documented  E-Cigarette/Vaping     E-Cigarette/Vaping Substances     Social History     Tobacco Use    Smoking status: Current Every Day Smoker     Packs/day: 0 50    Smokeless tobacco: Never Used   Substance Use Topics    Alcohol use: Yes    Drug use: No       Review of Systems   Constitutional: Negative for chills, diaphoresis, fatigue, fever and malaise/fatigue  HENT: Negative for congestion, drooling, ear discharge, ear pain, facial swelling, postnasal drip, rhinorrhea, sinus pressure, sinus pain, sneezing, sore throat, trouble swallowing and voice change  Respiratory: Negative for cough, hemoptysis, shortness of breath, wheezing and stridor  Cardiovascular: Positive for chest pain (TIGHTNESS AT TIMES )  Negative for palpitations, orthopnea, leg swelling, syncope and near-syncope  Gastrointestinal: Negative for abdominal pain, blood in stool, diarrhea, nausea and vomiting  Genitourinary: Negative for difficulty urinating, dysuria, flank pain, frequency and hematuria     Musculoskeletal: Negative for arthralgias, back pain, gait problem, joint swelling, myalgias, neck pain and neck stiffness  Skin: Negative for rash and wound  Neurological: Negative for dizziness, syncope, speech difficulty, weakness, light-headedness, numbness and headaches  Hematological: Negative for adenopathy  Does not bruise/bleed easily  All other systems reviewed and are negative  Physical Exam  Physical Exam  Constitutional:       General: He is not in acute distress  Appearance: Normal appearance  He is well-developed  He is not ill-appearing, toxic-appearing or diaphoretic  HENT:      Head: Normocephalic and atraumatic  Right Ear: External ear normal       Left Ear: External ear normal       Nose: Nose normal  No congestion or rhinorrhea  Mouth/Throat:      Pharynx: No oropharyngeal exudate or posterior oropharyngeal erythema  Eyes:      General: No scleral icterus  Right eye: No discharge  Left eye: No discharge  Extraocular Movements: Extraocular movements intact  Conjunctiva/sclera: Conjunctivae normal       Pupils: Pupils are equal, round, and reactive to light  Neck:      Vascular: No JVD  Trachea: No tracheal deviation  Cardiovascular:      Rate and Rhythm: Normal rate and regular rhythm  Pulses: Normal pulses  Heart sounds: Normal heart sounds  No murmur heard  No friction rub  No gallop  Pulmonary:      Effort: Pulmonary effort is normal  No respiratory distress  Breath sounds: Normal breath sounds  No stridor  No wheezing, rhonchi or rales  Chest:      Chest wall: No tenderness  Abdominal:      General: Bowel sounds are normal  There is no distension  Palpations: Abdomen is soft  There is no mass  Tenderness: There is no abdominal tenderness  There is no right CVA tenderness, left CVA tenderness, guarding or rebound  Hernia: No hernia is present  Musculoskeletal:         General: No swelling, tenderness, deformity or signs of injury  Normal range of motion  Cervical back: Normal range of motion and neck supple  No rigidity or tenderness  Right lower leg: No edema  Left lower leg: No edema  Lymphadenopathy:      Cervical: No cervical adenopathy  Skin:     General: Skin is warm  Capillary Refill: Capillary refill takes less than 2 seconds  Coloration: Skin is not jaundiced or pale  Findings: No bruising, erythema, lesion or rash  Neurological:      General: No focal deficit present  Mental Status: He is alert and oriented to person, place, and time  Mental status is at baseline  Cranial Nerves: No cranial nerve deficit  Sensory: No sensory deficit  Motor: No weakness or abnormal muscle tone  Coordination: Coordination normal       Gait: Gait normal    Psychiatric:         Mood and Affect: Mood normal          Behavior: Behavior normal          Thought Content:  Thought content normal          Judgment: Judgment normal          Vital Signs  ED Triage Vitals [06/15/22 2331]   Temperature Pulse Respirations Blood Pressure SpO2   99 °F (37 2 °C) 87 18 (!) 175/86 98 %      Temp Source Heart Rate Source Patient Position - Orthostatic VS BP Location FiO2 (%)   Temporal Monitor Lying Right arm --      Pain Score       No Pain           Vitals:    06/15/22 2331 06/16/22 0243   BP: (!) 175/86 158/78   Pulse: 87 75   Patient Position - Orthostatic VS: Lying Lying         Visual Acuity      ED Medications  Medications   sodium chloride 0 9 % bolus 1,000 mL (0 mL Intravenous Stopped 6/16/22 0317)       Diagnostic Studies  Results Reviewed     Procedure Component Value Units Date/Time    HS Troponin I 2hr [[id number]]  (Normal) Collected: 06/16/22 0236    Lab Status: Final result Specimen: Blood from Arm, Left Updated: 06/16/22 0313     hs TnI 2hr 9 ng/L      Delta 2hr hsTnI -1 ng/L     HS Troponin I 4hr [[id number]]     Lab Status: No result Specimen: Blood     Urine Microscopic [[id number]]  (Normal) Collected: 06/16/22 0115    Lab Status: Final result Specimen: Urine, Clean Catch Updated: 06/16/22 0211     RBC, UA 0-1 /hpf      WBC, UA None Seen /hpf      Epithelial Cells Occasional /hpf      Bacteria, UA None Seen /hpf     UA w Reflex to Microscopic w Reflex to Culture [[id number]]  (Abnormal) Collected: 06/16/22 0115    Lab Status: Final result Specimen: Urine, Clean Catch Updated: 06/16/22 0148     Color, UA Straw     Clarity, UA Clear     Specific Gravity, UA 1 010     pH, UA 6 0     Leukocytes, UA Negative     Nitrite, UA Negative     Protein, UA Negative mg/dl      Glucose, UA Negative mg/dl      Ketones, UA Negative mg/dl      Urobilinogen, UA 0 2 E U /dl      Bilirubin, UA Negative     Blood, UA Trace-Intact    HS Troponin 0hr (reflex protocol) [[id number]]  (Normal) Collected: 06/16/22 0029    Lab Status: Final result Specimen: Blood from Arm, Right Updated: 06/16/22 0128     hs TnI 0hr 10 ng/L     CMP [[id number]]  (Abnormal) Collected: 06/16/22 0003    Lab Status: Final result Specimen: Blood from Arm, Right Updated: 06/16/22 0118     Sodium 136 mmol/L      Potassium 3 8 mmol/L      Chloride 103 mmol/L      CO2 23 mmol/L      ANION GAP 10 mmol/L      BUN 24 mg/dL      Creatinine 0 81 mg/dL      Glucose 92 mg/dL      Calcium 9 5 mg/dL      AST 11 U/L      ALT 10 U/L      Alkaline Phosphatase 83 U/L      Total Protein 6 9 g/dL      Albumin 4 4 g/dL      Total Bilirubin 0 28 mg/dL      eGFR 94 ml/min/1 73sq m     Narrative:      Lucy guidelines for Chronic Kidney Disease (CKD):     Stage 1 with normal or high GFR (GFR > 90 mL/min/1 73 square meters)    Stage 2 Mild CKD (GFR = 60-89 mL/min/1 73 square meters)    Stage 3A Moderate CKD (GFR = 45-59 mL/min/1 73 square meters)    Stage 3B Moderate CKD (GFR = 30-44 mL/min/1 73 square meters)    Stage 4 Severe CKD (GFR = 15-29 mL/min/1 73 square meters)    Stage 5 End Stage CKD (GFR <15 mL/min/1 73 square meters)  Note: GFR calculation is accurate only with a steady state creatinine    Lipase [[id number]]  (Normal) Collected: 06/16/22 0003    Lab Status: Final result Specimen: Blood from Arm, Right Updated: 06/16/22 0118     Lipase 12 u/L     CK Total with Reflex CKMB [[id number]]  (Normal) Collected: 06/16/22 0003    Lab Status: Final result Specimen: Blood from Arm, Right Updated: 06/16/22 0116     Total CK 42 U/L     COVID/FLU/RSV - 2 hour TAT [[id number]]  (Normal) Collected: 06/16/22 0003    Lab Status: Final result Specimen: Nares from Nose Updated: 06/16/22 0116     SARS-CoV-2 Negative     INFLUENZA A PCR Negative     INFLUENZA B PCR Negative     RSV PCR Negative    Narrative:      FOR PEDIATRIC PATIENTS - copy/paste COVID Guidelines URL to browser: [email or web address]  [email or web address]    SARS-CoV-2 assay is a Nucleic Acid Amplification assay intended for the  qualitative detection of nucleic acid from SARS-CoV-2 in nasopharyngeal  swabs  Results are for the presumptive identification of SARS-CoV-2 RNA  Positive results are indicative of infection with SARS-CoV-2, the virus  causing COVID-19, but do not rule out bacterial infection or co-infection  with other viruses  Laboratories within the [place] and its  territories are required to report all positive results to the appropriate  public health authorities  Negative results do not preclude SARS-CoV-2  infection and should not be used as the sole basis for treatment or other  patient management decisions  Negative results must be combined with  clinical observations, patient history, and epidemiological information  This test has not been FDA cleared or approved  This test has been authorized by FDA under an Emergency Use Authorization  (EUA)   This test is only authorized for the duration of time the  declaration that circumstances exist justifying the authorization of the  emergency use of an in vitro diagnostic tests for detection of SARS-CoV-2  virus and/or diagnosis of COVID-19 infection under section 564(b)(1) of  the Act, 21 U  S C  167QIJ-2(X)(7), unless the authorization is terminated  or revoked sooner  The test has been validated but independent review by FDA  and CLIA is pending  Test performed using [employer]pert: This RT-PCR assay targets N2,  a region unique to SARS-CoV-2  A conserved region in the E-gene was chosen  for pan-Sarbecovirus detection which includes SARS-CoV-2  Lactic acid, plasma [[id number]]  (Normal) Collected: 06/16/22 0003    Lab Status: Final result Specimen: Blood from Arm, Right Updated: 06/16/22 0116     LACTIC ACID 0 7 mmol/L     Narrative:      Result may be elevated if tourniquet was used during collection  Procalcitonin [[id number]]  (Normal) Collected: 06/16/22 0003    Lab Status: Final result Specimen: Blood from Arm, Right Updated: 06/16/22 0109     Procalcitonin <0 05 ng/ml     Blood culture #1 [[id number]] Collected: 06/16/22 0003    Lab Status: In process Specimen: Blood from Arm, Right Updated: 06/16/22 0030    Blood culture #2 [[id number]] Collected: 06/16/22 0003    Lab Status:  In process Specimen: Blood from Hand, Right Updated: 06/16/22 0030    CBC and differential [[id number]] Collected: 06/16/22 0003    Lab Status: Final result Specimen: Blood from Arm, Right Updated: 06/16/22 0030     WBC 9 58 Thousand/uL      RBC 5 25 Million/uL      Hemoglobin 15 0 g/dL      Hematocrit 45 6 %      MCV 87 fL      MCH 28 6 pg      MCHC 32 9 g/dL      RDW 13 9 %      MPV 9 1 fL      Platelets 708 Thousands/uL      nRBC 0 /100 WBCs      Neutrophils Relative 58 %      Immat GRANS % 0 %      Lymphocytes Relative 29 %      Monocytes Relative 9 %      Eosinophils Relative 3 %      Basophils Relative 1 %      Neutrophils Absolute 5 49 Thousands/µL      Immature Grans Absolute 0 04 Thousand/uL      Lymphocytes Absolute 2 82 Thousands/µL      Monocytes Absolute 0 88 Thousand/µL      Eosinophils Absolute 0 29 Thousand/µL      Basophils Absolute 0 06 Thousands/µL                  XR chest portable   ED Interpretation by Alison Partida MD (06/16 0037)   COMPARISON: 11 10 11    EXAM PERFORMED/VIEWS:  XR CHEST PA/LAT        FINDINGS:     Cardiomediastinal silhouette appears unremarkable      The lungs are clear  No pneumothorax or pleural effusion      Visualized osseous structures appear unremarkable for the patient's age      IMPRESSION:     No focal consolidation, pleural effusion, or pneumothorax                             Procedures  Procedures         ED Course  ED Course as of 06/16/22 0318   Thu Jun 16, 2022   0036 CBC and differential   0127 Total CK: 42   0127 Procalcitonin: <0 05   0127 LACTIC ACID: 0 7   0127 COVID/FLU/RSV - 2 hour TAT   0127 Lipase: 12   0127 CMP(!)   0151 UA w Reflex to Microscopic w Reflex to Culture(!)   0151 hs TnI 0hr: 10   0239 Patient remains clinically stable  Awaiting delta troponin as well as repeat EKG for further assessment   0239 Repeat EKG is unremarkable   0249 Blood pressure improved  EKG is nonischemic  Delta troponin in progress  If this is normal I will send the patient home with close follow-up   0251 PT Looks quite good  He understands if second troponin is wnl he will be safe to go home    0314 Delta 2hr hsTnI: -1  Pt feels much much better    he has no signs of sepsis, nor life or limb threatening process    I offered further tx, I offered admission, if he felt ill, or her pain was too great, but she declined    he is ready to manage from home    he is very appreciative of her ED care and is ready to manage from home  he understands return precautions    he will f/u w/ PCP tomorrow                                 SBIRT 22yo+    Flowsheet Row Most Recent Value   SBIRT (25 yo +)    In order to provide better care to our patients, we are screening all of our patients for alcohol and drug use  Would it be okay to ask you these screening questions?  Yes Filed at: 06/16/2022 0029   Initial Alcohol Screen: US AUDIT-C     1  How often do you have a drink containing alcohol? 6 Filed at: 06/16/2022 0029   2  How many drinks containing alcohol do you have on a typical day you are drinking? 1 Filed at: 06/16/2022 0029   3a  Male UNDER 65: How often do you have five or more drinks on one occasion? 0 Filed at: 06/16/2022 0029   3b  FEMALE Any Age, or MALE 65+: How often do you have 4 or more drinks on one occassion? 0 Filed at: 06/16/2022 0029   Audit-C Score 7 Filed at: 06/16/2022 5836   Full Alcohol Screen: US AUDIT    4  How often during the last year have you found that you were not able to stop drinking once you had started? 0 Filed at: 06/16/2022 0029   5  How often during past year have you failed to do what was normally expected of you because of drinking? 0 Filed at: 06/16/2022 0029   6  How often in past year have you needed a first drink in the morning to get yourself going after a heavy drinking session? 0 Filed at: 06/16/2022 0029   7  How often in past year have you had feeling of guilt or remorse after drinking? 0 Filed at: 06/16/2022 0029   8  How often in past year have you been unable to remember what happened night before because you had been drinking? 0 Filed at: 06/16/2022 0029   9  Have you or someone else been injured as a result of your drinking? 0 Filed at: 06/16/2022 0029   10  Has a relative, friend, doctor or other health worker been concerned about your drinking and suggested you cut down?  0 Filed at: 06/16/2022 0029   AUDIT Total Score 7 Filed at: 06/16/2022 9024   SONG: How many times in the past year have you    Used an illegal drug or used a prescription medication for non-medical reasons?  Never Filed at: 06/16/2022 0029                    MDM    Disposition  Final diagnoses:   Palpitations   Heart palpitations   Flu-like symptoms   Chest pressure     Time reflects when diagnosis was documented in both MDM as applicable and the Disposition within this note     Time User Action Codes Description Comment    6/16/2022  1:00 AM Satira Jeana Add [R00 2] Palpitations     6/16/2022  1:00 AM Satira Jeana Add [R00 2] Heart palpitations     6/16/2022  1:00 AM Satira Jeana Add [R68 89] Flu-like symptoms     6/16/2022  3:15 AM Satira Jeana Add [R07 89] Chest pressure       ED Disposition     ED Disposition   Discharge    Condition   Stable    Date/Time   Thu Jun 16, 2022  3:15 AM    Comment   Rikki Kramer discharge to home/self care  Follow-up Information     Follow up With Specialties Details Why Contact Info    Cesar Jo DO Family Medicine Call today  [address] [place] [address]  [phone]            Patient's Medications   Discharge Prescriptions    No medications on file       No discharge procedures on file      PDMP Review     None          ED Provider  Electronically Signed by           Mahogany Funk MD  06/16/22 8920

## 2022-06-16 NOTE — ED PROCEDURE NOTE
PROCEDURE  ECG 12 Lead Documentation Only    Date/Time: 6/16/2022 2:40 AM  Performed by: Scar Welsh MD  Authorized by: Scar Welsh MD     Indications / Diagnosis:  Cp   ECG reviewed by me, the ED Provider: yes    Patient location:  ED  Previous ECG:     Comparison to cardiac monitor: Yes    Interpretation:     Interpretation: non-specific    Rate:     ECG rate:  74    ECG rate assessment: normal    Rhythm:     Rhythm: sinus rhythm    Ectopy:     Ectopy: none    QRS:     QRS axis:  Left    QRS intervals:  Normal  Conduction:     Conduction: normal    ST segments:     ST segments:  Non-specific  T waves:     T waves: non-specific           Scar Welsh MD  06/16/22 0240

## 2022-06-18 ENCOUNTER — APPOINTMENT (EMERGENCY)
Dept: RADIOLOGY | Facility: HOSPITAL | Age: 62
End: 2022-06-18
Payer: COMMERCIAL

## 2022-06-18 ENCOUNTER — HOSPITAL ENCOUNTER (EMERGENCY)
Facility: HOSPITAL | Age: 62
Discharge: HOME | End: 2022-06-18
Attending: EMERGENCY MEDICINE
Payer: COMMERCIAL

## 2022-06-18 VITALS
HEART RATE: 71 BPM | OXYGEN SATURATION: 96 % | SYSTOLIC BLOOD PRESSURE: 168 MMHG | HEIGHT: 64 IN | RESPIRATION RATE: 18 BRPM | BODY MASS INDEX: 25.61 KG/M2 | TEMPERATURE: 98.2 F | DIASTOLIC BLOOD PRESSURE: 84 MMHG | WEIGHT: 150 LBS

## 2022-06-18 DIAGNOSIS — R00.2 PALPITATIONS: Primary | ICD-10-CM

## 2022-06-18 LAB
ANION GAP SERPL CALCULATED.3IONS-SCNC: 7 MMOL/L (ref 4–13)
BASOPHILS # BLD AUTO: 0.06 THOUSANDS/ΜL (ref 0–0.1)
BASOPHILS NFR BLD AUTO: 1 % (ref 0–1)
BUN SERPL-MCNC: 17 MG/DL (ref 5–25)
CALCIUM SERPL-MCNC: 8.9 MG/DL (ref 8.4–10.2)
CARDIAC TROPONIN I PNL SERPL HS: 4 NG/L
CHLORIDE SERPL-SCNC: 106 MMOL/L (ref 96–108)
CO2 SERPL-SCNC: 25 MMOL/L (ref 21–32)
CREAT SERPL-MCNC: 0.72 MG/DL (ref 0.6–1.3)
D DIMER PPP FEU-MCNC: 0.31 UG/ML FEU
EOSINOPHIL # BLD AUTO: 0.31 THOUSAND/ΜL (ref 0–0.61)
EOSINOPHIL NFR BLD AUTO: 3 % (ref 0–6)
ERYTHROCYTE [DISTWIDTH] IN BLOOD BY AUTOMATED COUNT: 13.7 % (ref 11.6–15.1)
GFR SERPL CREATININE-BSD FRML MDRD: 99 ML/MIN/1.73SQ M
GLUCOSE SERPL-MCNC: 96 MG/DL (ref 65–140)
HCT VFR BLD AUTO: 44.6 % (ref 36.5–49.3)
HGB BLD-MCNC: 14.5 G/DL (ref 12–17)
IMM GRANULOCYTES # BLD AUTO: 0.02 THOUSAND/UL (ref 0–0.2)
IMM GRANULOCYTES NFR BLD AUTO: 0 % (ref 0–2)
LYMPHOCYTES # BLD AUTO: 2.32 THOUSANDS/ΜL (ref 0.6–4.47)
LYMPHOCYTES NFR BLD AUTO: 24 % (ref 14–44)
MCH RBC QN AUTO: 28.2 PG (ref 26.8–34.3)
MCHC RBC AUTO-ENTMCNC: 32.5 G/DL (ref 31.4–37.4)
MCV RBC AUTO: 87 FL (ref 82–98)
MONOCYTES # BLD AUTO: 0.89 THOUSAND/ΜL (ref 0.17–1.22)
MONOCYTES NFR BLD AUTO: 9 % (ref 4–12)
NEUTROPHILS # BLD AUTO: 5.94 THOUSANDS/ΜL (ref 1.85–7.62)
NEUTS SEG NFR BLD AUTO: 63 % (ref 43–75)
NRBC BLD AUTO-RTO: 0 /100 WBCS
PLATELET # BLD AUTO: 364 THOUSANDS/UL (ref 149–390)
PMV BLD AUTO: 9 FL (ref 8.9–12.7)
POTASSIUM SERPL-SCNC: 3.5 MMOL/L (ref 3.5–5.3)
RBC # BLD AUTO: 5.14 MILLION/UL (ref 3.88–5.62)
SODIUM SERPL-SCNC: 138 MMOL/L (ref 135–147)
WBC # BLD AUTO: 9.54 THOUSAND/UL (ref 4.31–10.16)

## 2022-06-18 PROCEDURE — 80048 BASIC METABOLIC PNL TOTAL CA: CPT | Performed by: EMERGENCY MEDICINE

## 2022-06-18 PROCEDURE — 93005 ELECTROCARDIOGRAM TRACING: CPT

## 2022-06-18 PROCEDURE — 85379 FIBRIN DEGRADATION QUANT: CPT | Performed by: EMERGENCY MEDICINE

## 2022-06-18 PROCEDURE — 99285 EMERGENCY DEPT VISIT HI MDM: CPT | Performed by: EMERGENCY MEDICINE

## 2022-06-18 PROCEDURE — 71045 X-RAY EXAM CHEST 1 VIEW: CPT

## 2022-06-18 PROCEDURE — 99285 EMERGENCY DEPT VISIT HI MDM: CPT

## 2022-06-18 PROCEDURE — 85025 COMPLETE CBC W/AUTO DIFF WBC: CPT | Performed by: EMERGENCY MEDICINE

## 2022-06-18 PROCEDURE — 36415 COLL VENOUS BLD VENIPUNCTURE: CPT | Performed by: EMERGENCY MEDICINE

## 2022-06-18 PROCEDURE — 84484 ASSAY OF TROPONIN QUANT: CPT | Performed by: EMERGENCY MEDICINE

## 2022-06-18 RX ORDER — KETOROLAC TROMETHAMINE 30 MG/ML
15 INJECTION, SOLUTION INTRAMUSCULAR; INTRAVENOUS ONCE
Status: DISCONTINUED | OUTPATIENT
Start: 2022-06-18 | End: 2022-06-19 | Stop reason: HOSPADM

## 2022-06-18 RX ORDER — SODIUM CHLORIDE 9 MG/ML
3 INJECTION INTRAVENOUS
Status: DISCONTINUED | OUTPATIENT
Start: 2022-06-18 | End: 2022-06-19 | Stop reason: HOSPADM

## 2022-06-18 NOTE — Clinical Note
Tam Purdy was seen and treated in our emergency department on 6/18/2022  Diagnosis:     Lee Irene  may return to work on return date  He may return on this date: 06/22/2022         If you have any questions or concerns, please don't hesitate to call        Sonny Francisco MD    ______________________________           _______________          _______________  Hospital Representative                              Date                                Time

## 2022-06-19 LAB
ATRIAL RATE: 68 BPM
P AXIS: 55 DEGREES
PR INTERVAL: 184 MS
QRS AXIS: -43 DEGREES
QRSD INTERVAL: 94 MS
QT INTERVAL: 384 MS
QTC INTERVAL: 408 MS
T WAVE AXIS: 51 DEGREES
VENTRICULAR RATE: 68 BPM

## 2022-06-19 PROCEDURE — 93010 ELECTROCARDIOGRAM REPORT: CPT | Performed by: INTERNAL MEDICINE

## 2022-06-20 ENCOUNTER — HOSPITAL ENCOUNTER (OUTPATIENT)
Dept: NON INVASIVE DIAGNOSTICS | Facility: CLINIC | Age: 62
Discharge: HOME | End: 2022-06-20
Payer: COMMERCIAL

## 2022-06-20 VITALS
DIASTOLIC BLOOD PRESSURE: 84 MMHG | HEART RATE: 71 BPM | RESPIRATION RATE: 16 BRPM | HEIGHT: 64 IN | BODY MASS INDEX: 25.61 KG/M2 | SYSTOLIC BLOOD PRESSURE: 144 MMHG | WEIGHT: 150 LBS | OXYGEN SATURATION: 98 %

## 2022-06-20 DIAGNOSIS — R00.2 PALPITATIONS: ICD-10-CM

## 2022-06-20 PROBLEM — R07.89 OTHER CHEST PAIN: Status: ACTIVE | Noted: 2022-06-20

## 2022-06-20 LAB
MAX HR PERCENT: 87 %
MAX HR: 137 BPM
RATE PRESSURE PRODUCT: NORMAL
SL CV STRESS RECOVERY BP: NORMAL MMHG
SL CV STRESS RECOVERY HR: 88 BPM
SL CV STRESS RECOVERY O2 SAT: 97 %
SL CV STRESS STAGE REACHED: 3
STRESS ANGINA INDEX: 0
STRESS BASELINE BP: NORMAL MMHG
STRESS BASELINE HR: 71 BPM
STRESS O2 SAT REST: 98 %
STRESS PEAK HR: 137 BPM
STRESS POST ESTIMATED WORKLOAD: 10.1 METS
STRESS POST EXERCISE DUR MIN: 9 MIN
STRESS POST EXERCISE DUR SEC: 0 SEC
STRESS POST O2 SAT PEAK: 95 %
STRESS POST PEAK BP: 154 MMHG

## 2022-06-20 PROCEDURE — 93225 XTRNL ECG REC<48 HRS REC: CPT

## 2022-06-20 PROCEDURE — 93016 CV STRESS TEST SUPVJ ONLY: CPT | Performed by: INTERNAL MEDICINE

## 2022-06-20 PROCEDURE — 93018 CV STRESS TEST I&R ONLY: CPT | Performed by: INTERNAL MEDICINE

## 2022-06-20 PROCEDURE — 93226 XTRNL ECG REC<48 HR SCAN A/R: CPT

## 2022-06-20 PROCEDURE — 93017 CV STRESS TEST TRACING ONLY: CPT

## 2022-06-21 LAB
BACTERIA BLD CULT: NORMAL
BACTERIA BLD CULT: NORMAL

## 2022-06-21 NOTE — ED PROVIDER NOTES
History  Chief Complaint   Patient presents with    Palpitations     Can feel skipped beat on Radial pulse check      Patient is a 26-year-old male with history of hypertension that presents for evaluation of palpitations and chest pain  Patient was seen here couple days ago with negative cardiac workup  Patient says that about 5 hours ago he began having constant left-sided chest pain that radiates into his left shoulder blade  It is mild, constant without alleviating exacerbating factors  He also feels palpitations and his pulses skipping around  He denies dyspnea, nausea vomiting or diaphoresis  Exactly similar to the symptoms that had negative workup several days ago  Patient denies PE or DVT risk factors  Denies recent infectious symptoms including cough, rhinorrhea congestion fevers, chills rigors  He has not taken anything for symptoms  None       Past Medical History:   Diagnosis Date    Benign essential hypertension 05/08/2014    Other chest pain     Palpitations        Past Surgical History:   Procedure Laterality Date    HERNIA REPAIR         Family History   Problem Relation Age of Onset    No Known Problems Mother     No Known Problems Father      I have reviewed and agree with the history as documented  E-Cigarette/Vaping    E-Cigarette Use Current Every Day User      E-Cigarette/Vaping Substances     Social History     Tobacco Use    Smoking status: Current Every Day Smoker     Packs/day: 0 25    Smokeless tobacco: Never Used   Vaping Use    Vaping Use: Every day   Substance Use Topics    Alcohol use: Yes    Drug use: No       Review of Systems   Constitutional: Negative for fever  HENT: Negative for sore throat  Eyes: Negative for photophobia  Respiratory: Negative for shortness of breath  Cardiovascular: Positive for chest pain  Gastrointestinal: Negative for abdominal pain  Genitourinary: Negative for dysuria     Musculoskeletal: Negative for back pain    Skin: Negative for rash  Neurological: Negative for light-headedness  Hematological: Negative for adenopathy  Psychiatric/Behavioral: Negative for agitation  All other systems reviewed and are negative  Physical Exam  Physical Exam  Vitals reviewed  Constitutional:       General: He is not in acute distress  Appearance: He is well-developed  HENT:      Head: Normocephalic  Eyes:      Pupils: Pupils are equal, round, and reactive to light  Cardiovascular:      Rate and Rhythm: Normal rate and regular rhythm  Heart sounds: Normal heart sounds  No murmur heard  No friction rub  No gallop  Pulmonary:      Effort: Pulmonary effort is normal       Breath sounds: Normal breath sounds  Abdominal:      General: Bowel sounds are normal  There is no distension  Palpations: Abdomen is soft  Tenderness: There is no abdominal tenderness  There is no guarding  Musculoskeletal:         General: Normal range of motion  Cervical back: Normal range of motion and neck supple  Skin:     Capillary Refill: Capillary refill takes less than 2 seconds  Neurological:      Mental Status: He is alert and oriented to person, place, and time  Cranial Nerves: No cranial nerve deficit  Sensory: No sensory deficit  Motor: No abnormal muscle tone  Psychiatric:         Behavior: Behavior normal          Thought Content:  Thought content normal          Judgment: Judgment normal          Vital Signs  ED Triage Vitals [06/18/22 2125]   Temperature Pulse Respirations Blood Pressure SpO2   98 2 °F (36 8 °C) 79 21 (!) 180/85 97 %      Temp Source Heart Rate Source Patient Position - Orthostatic VS BP Location FiO2 (%)   Oral Monitor Sitting Right arm --      Pain Score       5           Vitals:    06/18/22 2125 06/18/22 2244   BP: (!) 180/85 168/84   Pulse: 79 71   Patient Position - Orthostatic VS: Sitting Sitting         Visual Acuity      ED Medications  Medications - No data to display    Diagnostic Studies  Results Reviewed     Procedure Component Value Units Date/Time    HS Troponin 0hr (reflex protocol) [[id number]]  (Normal) Collected: 06/18/22 2155    Lab Status: Final result Specimen: Blood from Hand, Right Updated: 06/18/22 2229     hs TnI 0hr 4 ng/L     D-dimer, quantitative [[id number]]  (Normal) Collected: 06/18/22 2155    Lab Status: Final result Specimen: Blood from Arm, Right Updated: 06/18/22 2221     D-Dimer, Quant 0 31 ug/ml FEU     Narrative: In the evaluation for possible pulmonary embolism, in the appropriate (Well's Score of 4 or less) patient, the age adjusted d-dimer cutoff for this patient can be calculated as:    Age x 0 01 (in ug/mL) for Age-adjusted D-dimer exclusion threshold for a patient over 50 years      Basic metabolic panel [[id number]] Collected: 06/18/22 2155    Lab Status: Final result Specimen: Blood from Hand, Right Updated: 06/18/22 2220     Sodium 138 mmol/L      Potassium 3 5 mmol/L      Chloride 106 mmol/L      CO2 25 mmol/L      ANION GAP 7 mmol/L      BUN 17 mg/dL      Creatinine 0 72 mg/dL      Glucose 96 mg/dL      Calcium 8 9 mg/dL      eGFR 99 ml/min/1 73sq m     Narrative:      [place] guidelines for Chronic Kidney Disease (CKD):     Stage 1 with normal or high GFR (GFR > 90 mL/min/1 73 square meters)    Stage 2 Mild CKD (GFR = 60-89 mL/min/1 73 square meters)    Stage 3A Moderate CKD (GFR = 45-59 mL/min/1 73 square meters)    Stage 3B Moderate CKD (GFR = 30-44 mL/min/1 73 square meters)    Stage 4 Severe CKD (GFR = 15-29 mL/min/1 73 square meters)    Stage 5 End Stage CKD (GFR <15 mL/min/1 73 square meters)  Note: GFR calculation is accurate only with a steady state creatinine    CBC and differential [[id number]] Collected: 06/18/22 2155    Lab Status: Final result Specimen: Blood from Hand, Right Updated: 06/18/22 2202     WBC 9 54 Thousand/uL      RBC 5 14 Million/uL      Hemoglobin 14 5 g/dL Hematocrit 44 6 %      MCV 87 fL      MCH 28 2 pg      MCHC 32 5 g/dL      RDW 13 7 %      MPV 9 0 fL      Platelets 439 Thousands/uL      nRBC 0 /100 WBCs      Neutrophils Relative 63 %      Immat GRANS % 0 %      Lymphocytes Relative 24 %      Monocytes Relative 9 %      Eosinophils Relative 3 %      Basophils Relative 1 %      Neutrophils Absolute 5 94 Thousands/µL      Immature Grans Absolute 0 02 Thousand/uL      Lymphocytes Absolute 2 32 Thousands/µL      Monocytes Absolute 0 89 Thousand/µL      Eosinophils Absolute 0 31 Thousand/µL      Basophils Absolute 0 06 Thousands/µL                  X-ray chest 1 view portable   Final Result by Landon Luevano MD (06/19 7714)      No acute cardiopulmonary disease  Workstation performed: [id number]                    Procedures  ECG 12 Lead Documentation Only    Date/Time: 6/18/2022 10:19 PM  Performed by: Avel Castillo MD  Authorized by: Avel Castillo MD     ECG reviewed by me, the ED Provider: yes    Patient location:  ED  Previous ECG:     Previous ECG:  Compared to current    Similarity:  No change    Comparison to cardiac monitor: Yes    Interpretation:     Interpretation: normal    Rate:     ECG rate assessment: normal    Rhythm:     Rhythm: sinus rhythm    Ectopy:     Ectopy: none    QRS:     QRS axis:  Left    QRS intervals:  Normal  Conduction:     Conduction: normal    ST segments:     ST segments:  Normal  T waves:     T waves: normal               ED Course                                             MDM  Number of Diagnoses or Management Options  Palpitations  Diagnosis management comments: Patient is a 57-year-old male who presents for evaluation palpitations and chest pain  Patient with again negative workup here  Given follow-up with stress test and Holter monitor was strict return precautions        Disposition  Final diagnoses:   Palpitations     Time reflects when diagnosis was documented in both MDM as applicable and the Disposition within this note     Time User Action Codes Description Comment    6/18/2022 10:42 PM Carlos Sport Add [R00 2] Palpitations       ED Disposition     ED Disposition   Discharge    Condition   Stable    Date/Time   Sat Jun 18, 2022 10:42 PM    Comment   Julianne Kramer discharge to home/self care  Follow-up Information     Follow up With Specialties Details Why Contact Info Additional [address] Emergency Department Emergency Medicine  If symptoms worsen [address]  [place]  [place] [id number]  [facility] Emergency Department, [address] [place] [address] , [facility]    Kaitlynn Tolliver MD Cardiology Schedule an appointment as soon as possible for a visit   [facility]  [address] [place] [address] 3  [address] [facility] [address] [facility]             There are no discharge medications for this patient  Outpatient Discharge Orders   Ambulatory Referral to Cardiology   Standing Status: Future Standing Exp  Date: 06/18/23      Holter monitor   Standing Status: Future Number of Occurrences: 1 Standing Exp  Date: 06/18/26     Stress test only, exercise   Standing Status: Future Number of Occurrences: 1 Standing Exp   Date: 06/18/26       PDMP Review     None          ED Provider  Electronically Signed by           Yue Briones MD  06/20/22 0908

## 2022-06-22 LAB
ARRHY DURING EX: NORMAL
CHEST PAIN STATEMENT: NORMAL
MAX DIASTOLIC BP: 94 MMHG
MAX HEART RATE: 137 BPM
MAX PREDICTED HEART RATE: 157 BPM
MAX. SYSTOLIC BP: 176 MMHG
PROTOCOL NAME: NORMAL
TARGET HR FORMULA: NORMAL
TEST INDICATION: NORMAL
TIME IN EXERCISE PHASE: NORMAL

## 2022-06-27 PROCEDURE — 93227 XTRNL ECG REC<48 HR R&I: CPT | Performed by: INTERNAL MEDICINE

## 2022-06-30 ENCOUNTER — HOSPITAL ENCOUNTER (EMERGENCY)
Facility: HOSPITAL | Age: 62
Discharge: HOME | End: 2022-06-30
Attending: FAMILY MEDICINE
Payer: COMMERCIAL

## 2022-06-30 ENCOUNTER — APPOINTMENT (EMERGENCY)
Dept: RADIOLOGY | Facility: HOSPITAL | Age: 62
End: 2022-06-30
Payer: COMMERCIAL

## 2022-06-30 ENCOUNTER — APPOINTMENT (EMERGENCY)
Dept: NON INVASIVE DIAGNOSTICS | Facility: HOSPITAL | Age: 62
End: 2022-06-30
Payer: COMMERCIAL

## 2022-06-30 VITALS
SYSTOLIC BLOOD PRESSURE: 154 MMHG | RESPIRATION RATE: 18 BRPM | OXYGEN SATURATION: 97 % | HEIGHT: 64 IN | DIASTOLIC BLOOD PRESSURE: 84 MMHG | WEIGHT: 155 LBS | BODY MASS INDEX: 26.46 KG/M2 | HEART RATE: 70 BPM | TEMPERATURE: 98.2 F

## 2022-06-30 DIAGNOSIS — R07.9 CHEST PAIN: Primary | ICD-10-CM

## 2022-06-30 LAB
2HR DELTA HS TROPONIN: 0 NG/L
4HR DELTA HS TROPONIN: 0 NG/L
ALBUMIN SERPL BCP-MCNC: 4.4 G/DL (ref 3.5–5)
ALP SERPL-CCNC: 84 U/L (ref 34–104)
ALT SERPL W P-5'-P-CCNC: 13 U/L (ref 7–52)
ANION GAP SERPL CALCULATED.3IONS-SCNC: 9 MMOL/L (ref 4–13)
AORTIC ROOT: 2.9 CM
AORTIC VALVE MEAN VELOCITY: 22.1 M/S
APICAL FOUR CHAMBER EJECTION FRACTION: 79 %
ASCENDING AORTA: 3.2 CM
AST SERPL W P-5'-P-CCNC: 15 U/L (ref 13–39)
AV AREA BY CONTINUOUS VTI: 1.3 CM2
AV AREA PEAK VELOCITY: 1.2 CM2
AV LVOT MEAN GRADIENT: 4 MMHG
AV LVOT PEAK GRADIENT: 5 MMHG
AV MEAN GRADIENT: 21 MMHG
AV PEAK GRADIENT: 34 MMHG
AV REGURGITATION PRESSURE HALF TIME: 538 MS
AV VALVE AREA: 1.29 CM2
AV VELOCITY RATIO: 0.39
BASOPHILS # BLD AUTO: 0.04 THOUSANDS/ΜL (ref 0–0.1)
BASOPHILS NFR BLD AUTO: 0 % (ref 0–1)
BILIRUB SERPL-MCNC: 0.7 MG/DL (ref 0.2–1)
BNP SERPL-MCNC: 51 PG/ML (ref 0–100)
BUN SERPL-MCNC: 14 MG/DL (ref 5–25)
CALCIUM SERPL-MCNC: 9.2 MG/DL (ref 8.4–10.2)
CARDIAC TROPONIN I PNL SERPL HS: 3 NG/L
CHLORIDE SERPL-SCNC: 102 MMOL/L (ref 96–108)
CO2 SERPL-SCNC: 25 MMOL/L (ref 21–32)
CREAT SERPL-MCNC: 0.79 MG/DL (ref 0.6–1.3)
DOP CALC AO PEAK VEL: 2.93 M/S
DOP CALC AO VTI: 68.15 CM
DOP CALC LVOT AREA: 3.14 CM2
DOP CALC LVOT DIAMETER: 2 CM
DOP CALC LVOT PEAK VEL VTI: 28.03 CM
DOP CALC LVOT PEAK VEL: 1.14 M/S
DOP CALC LVOT STROKE INDEX: 52.3 ML/M2
DOP CALC LVOT STROKE VOLUME: 88.01 CM3
E WAVE DECELERATION TIME: 200 MS
EOSINOPHIL # BLD AUTO: 0.21 THOUSAND/ΜL (ref 0–0.61)
EOSINOPHIL NFR BLD AUTO: 2 % (ref 0–6)
ERYTHROCYTE [DISTWIDTH] IN BLOOD BY AUTOMATED COUNT: 14 % (ref 11.6–15.1)
FRACTIONAL SHORTENING: 44 % (ref 28–44)
GFR SERPL CREATININE-BSD FRML MDRD: 95 ML/MIN/1.73SQ M
GLUCOSE SERPL-MCNC: 91 MG/DL (ref 65–140)
HCT VFR BLD AUTO: 45.6 % (ref 36.5–49.3)
HGB BLD-MCNC: 15.1 G/DL (ref 12–17)
IMM GRANULOCYTES # BLD AUTO: 0.02 THOUSAND/UL (ref 0–0.2)
IMM GRANULOCYTES NFR BLD AUTO: 0 % (ref 0–2)
INTERVENTRICULAR SEPTUM IN DIASTOLE (PARASTERNAL SHORT AXIS VIEW): 1.3 CM
INTERVENTRICULAR SEPTUM: 1.3 CM (ref 0.6–1.1)
LAAS-AP4: 13.6 CM2
LEFT ATRIUM SIZE: 3.9 CM
LEFT INTERNAL DIMENSION IN SYSTOLE: 2.4 CM (ref 2.1–4)
LEFT VENTRICULAR INTERNAL DIMENSION IN DIASTOLE: 4.3 CM (ref 3.5–6)
LEFT VENTRICULAR POSTERIOR WALL IN END DIASTOLE: 0.9 CM
LEFT VENTRICULAR STROKE VOLUME: 66 ML
LVSV (TEICH): 66 ML
LYMPHOCYTES # BLD AUTO: 2.09 THOUSANDS/ΜL (ref 0.6–4.47)
LYMPHOCYTES NFR BLD AUTO: 21 % (ref 14–44)
MCH RBC QN AUTO: 28.3 PG (ref 26.8–34.3)
MCHC RBC AUTO-ENTMCNC: 33.1 G/DL (ref 31.4–37.4)
MCV RBC AUTO: 86 FL (ref 82–98)
MONOCYTES # BLD AUTO: 0.96 THOUSAND/ΜL (ref 0.17–1.22)
MONOCYTES NFR BLD AUTO: 10 % (ref 4–12)
MV E'TISSUE VEL-SEP: 8 CM/S
MV PEAK A VEL: 1.03 M/S
MV PEAK E VEL: 87 CM/S
MV STENOSIS PRESSURE HALF TIME: 58 MS
MV VALVE AREA P 1/2 METHOD: 3.79 CM2
NEUTROPHILS # BLD AUTO: 6.46 THOUSANDS/ΜL (ref 1.85–7.62)
NEUTS SEG NFR BLD AUTO: 67 % (ref 43–75)
NRBC BLD AUTO-RTO: 0 /100 WBCS
PLATELET # BLD AUTO: 316 THOUSANDS/UL (ref 149–390)
PMV BLD AUTO: 9.2 FL (ref 8.9–12.7)
POTASSIUM SERPL-SCNC: 4.2 MMOL/L (ref 3.5–5.3)
PROT SERPL-MCNC: 7.2 G/DL (ref 6.4–8.4)
RBC # BLD AUTO: 5.33 MILLION/UL (ref 3.88–5.62)
RIGHT VENTRICLE ID DIMENSION: 3.5 CM
SL CV AV DECELERATION TIME RETROGRADE: 1856 MS
SL CV AV PEAK GRADIENT RETROGRADE: 94 MMHG
SL CV LV EF: 65
SL CV PED ECHO LEFT VENTRICLE DIASTOLIC VOLUME (MOD BIPLANE) 2D: 85 ML
SL CV PED ECHO LEFT VENTRICLE SYSTOLIC VOLUME (MOD BIPLANE) 2D: 19 ML
SODIUM SERPL-SCNC: 136 MMOL/L (ref 135–147)
WBC # BLD AUTO: 9.78 THOUSAND/UL (ref 4.31–10.16)

## 2022-06-30 PROCEDURE — 99284 EMERGENCY DEPT VISIT MOD MDM: CPT | Performed by: PHYSICIAN ASSISTANT

## 2022-06-30 PROCEDURE — 71045 X-RAY EXAM CHEST 1 VIEW: CPT

## 2022-06-30 PROCEDURE — 85025 COMPLETE CBC W/AUTO DIFF WBC: CPT

## 2022-06-30 PROCEDURE — 93005 ELECTROCARDIOGRAM TRACING: CPT

## 2022-06-30 PROCEDURE — 93306 TTE W/DOPPLER COMPLETE: CPT

## 2022-06-30 PROCEDURE — 93306 TTE W/DOPPLER COMPLETE: CPT | Performed by: INTERNAL MEDICINE

## 2022-06-30 PROCEDURE — 83880 ASSAY OF NATRIURETIC PEPTIDE: CPT | Performed by: PHYSICIAN ASSISTANT

## 2022-06-30 PROCEDURE — 80053 COMPREHEN METABOLIC PANEL: CPT

## 2022-06-30 PROCEDURE — 99285 EMERGENCY DEPT VISIT HI MDM: CPT

## 2022-06-30 PROCEDURE — 36415 COLL VENOUS BLD VENIPUNCTURE: CPT

## 2022-06-30 PROCEDURE — 84484 ASSAY OF TROPONIN QUANT: CPT

## 2022-06-30 RX ORDER — ASPIRIN 325 MG
325 TABLET ORAL ONCE
Status: COMPLETED | OUTPATIENT
Start: 2022-06-30 | End: 2022-06-30

## 2022-06-30 RX ORDER — AMLODIPINE BESYLATE 5 MG/1
5 TABLET ORAL DAILY
Qty: 30 TABLET | Refills: 0 | Status: SHIPPED | OUTPATIENT
Start: 2022-06-30 | End: 2022-07-05

## 2022-06-30 RX ORDER — NITROGLYCERIN 0.4 MG/1
0.4 TABLET SUBLINGUAL ONCE
Status: COMPLETED | OUTPATIENT
Start: 2022-06-30 | End: 2022-06-30

## 2022-06-30 RX ADMIN — NITROGLYCERIN 0.4 MG: 0.4 TABLET SUBLINGUAL at 12:06

## 2022-06-30 RX ADMIN — ASPIRIN 325 MG: 325 TABLET ORAL at 12:05

## 2022-06-30 NOTE — ED PROVIDER NOTES
History  Chief Complaint   Patient presents with    Chest Pain     Has been coming and going since 0130 last night with radiation down left shoulder and elbow  73-year-old male presents emerged department seeing evaluation for intermittent chest pains that been ongoing since last night  Patient states today he had a sudden onset chest pain while at work  Works as a   He states that he was not doing anything requiring significant exertion  He reports chest pain as a heaviness that made it feel lightheaded  He denies diaphoresis or shortness of breath  He states that he sat down when the chest pain began  He states that some of the pain does radiate down the left shoulder and arm  He is otherwise well-appearing in no acute distress  He states he has ongoing chest discomfort while in the emergency department at time of evaluation  He is accompanied by family  He has not taken any medications prior to arrival   Patient was recently seen and evaluated for chest pain in the emergency department and recently had a stress test and Holter monitor study which were unremarkable for dangerous arrhythmias  Allergies reviewed          None       Past Medical History:   Diagnosis Date    Benign essential hypertension 05/08/2014    Other chest pain     Palpitations        Past Surgical History:   Procedure Laterality Date    HERNIA REPAIR         Family History   Problem Relation Age of Onset    No Known Problems Mother     No Known Problems Father      I have reviewed and agree with the history as documented  E-Cigarette/Vaping    E-Cigarette Use Current Every Day User      E-Cigarette/Vaping Substances     Social History     Tobacco Use    Smoking status: Current Every Day Smoker     Packs/day: 0 25    Smokeless tobacco: Never Used   Vaping Use    Vaping Use: Every day   Substance Use Topics    Alcohol use:  Yes    Drug use: No       Review of Systems   Constitutional: Negative for chills, fatigue and fever  HENT: Negative for congestion, ear pain, rhinorrhea, sinus pressure, sneezing and sore throat  Eyes: Negative for pain and discharge  Respiratory: Negative for cough, choking, chest tightness, shortness of breath and wheezing  Cardiovascular: Positive for chest pain  Negative for palpitations  Gastrointestinal: Negative for abdominal pain, constipation, diarrhea, nausea and vomiting  Genitourinary: Negative for difficulty urinating and dysuria  Musculoskeletal: Negative for back pain, gait problem, neck pain and neck stiffness  Neurological: Negative for dizziness, light-headedness and headaches  All other systems reviewed and are negative  Physical Exam  Physical Exam  Vitals and nursing note reviewed  Constitutional:       General: He is not in acute distress  Appearance: He is well-developed  He is not ill-appearing  HENT:      Head: Normocephalic and atraumatic  Right Ear: External ear normal       Left Ear: External ear normal       Nose: Nose normal    Eyes:      Pupils: Pupils are equal, round, and reactive to light  Cardiovascular:      Rate and Rhythm: Normal rate and regular rhythm  Heart sounds: Normal heart sounds  No murmur heard  No friction rub  No gallop  Pulmonary:      Effort: Pulmonary effort is normal  No respiratory distress  Breath sounds: Normal breath sounds  No stridor  No wheezing or rales  Abdominal:      General: Bowel sounds are normal  There is no distension  Palpations: Abdomen is soft  Tenderness: There is no abdominal tenderness  There is no guarding  Musculoskeletal:         General: No tenderness  Normal range of motion  Cervical back: Normal range of motion and neck supple  Skin:     General: Skin is warm  Capillary Refill: Capillary refill takes less than 2 seconds  Neurological:      Mental Status: He is alert and oriented to person, place, and time     Psychiatric: Behavior: Behavior is cooperative           Vital Signs  ED Triage Vitals [06/30/22 1126]   Temperature Pulse Respirations Blood Pressure SpO2   98 2 °F (36 8 °C) 72 18 166/81 97 %      Temp Source Heart Rate Source Patient Position - Orthostatic VS BP Location FiO2 (%)   Oral Monitor Sitting Left arm --      Pain Score       7           Vitals:    06/30/22 1545 06/30/22 1600 06/30/22 1615 06/30/22 1630   BP: 161/86 166/83 160/86 154/84   Pulse: 66 66 67 70   Patient Position - Orthostatic VS:             Visual Acuity  Visual Acuity    Flowsheet Row Most Recent Value   L Pupil Size (mm) 3   R Pupil Size (mm) 3          ED Medications  Medications   aspirin tablet 325 mg (325 mg Oral Given 6/30/22 1205)   nitroglycerin (NITROSTAT) SL tablet 0 4 mg (0 4 mg Sublingual Given 6/30/22 1206)       Diagnostic Studies  Results Reviewed     Procedure Component Value Units Date/Time    HS Troponin I 4hr [[id number]]  (Normal) Collected: 06/30/22 1556    Lab Status: Final result Specimen: Blood from Arm, Left Updated: 06/30/22 1630     hs TnI 4hr 3 ng/L      Delta 4hr hsTnI 0 ng/L     HS Troponin I 2hr [[id number]]  (Normal) Collected: 06/30/22 1334    Lab Status: Final result Specimen: Blood from Arm, Left Updated: 06/30/22 1407     hs TnI 2hr 3 ng/L      Delta 2hr hsTnI 0 ng/L     B-Type Natriuretic Peptide(BNP), AN, CA, EA Campuses Only [[id number]]  (Normal) Collected: 06/30/22 1137    Lab Status: Final result Specimen: Blood from Arm, Right Updated: 06/30/22 1328     BNP 51 pg/mL     HS Troponin 0hr (reflex protocol) [[id number]]  (Normal) Collected: 06/30/22 1137    Lab Status: Final result Specimen: Blood from Arm, Right Updated: 06/30/22 1208     hs TnI 0hr 3 ng/L     Comprehensive metabolic panel [[id number]] Collected: 06/30/22 1137    Lab Status: Final result Specimen: Blood from Arm, Right Updated: 06/30/22 1200     Sodium 136 mmol/L      Potassium 4 2 mmol/L      Chloride 102 mmol/L      CO2 25 mmol/L      ANION GAP 9 mmol/L      BUN 14 mg/dL      Creatinine 0 79 mg/dL      Glucose 91 mg/dL      Calcium 9 2 mg/dL      AST 15 U/L      ALT 13 U/L      Alkaline Phosphatase 84 U/L      Total Protein 7 2 g/dL      Albumin 4 4 g/dL      Total Bilirubin 0 70 mg/dL      eGFR 95 ml/min/1 73sq m     Narrative:      National Kid[employer] Disease Foundation guidelines for Chronic Kidney Disease (CKD):     Stage 1 with normal or high GFR (GFR > 90 mL/min/1 73 square meters)    Stage 2 Mild CKD (GFR = 60-89 mL/min/1 73 square meters)    Stage 3A Moderate CKD (GFR = 45-59 mL/min/1 73 square meters)    Stage 3B Moderate CKD (GFR = 30-44 mL/min/1 73 square meters)    Stage 4 Severe CKD (GFR = 15-29 mL/min/1 73 square meters)    Stage 5 End Stage CKD (GFR <15 mL/min/1 73 square meters)  Note: GFR calculation is accurate only with a steady state creatinine    CBC and differential [[id number]] Collected: 06/30/22 1137    Lab Status: Final result Specimen: Blood from Arm, Right Updated: 06/30/22 1143     WBC 9 78 Thousand/uL      RBC 5 33 Million/uL      Hemoglobin 15 1 g/dL      Hematocrit 45 6 %      MCV 86 fL      MCH 28 3 pg      MCHC 33 1 g/dL      RDW 14 0 %      MPV 9 2 fL      Platelets 165 Thousands/uL      nRBC 0 /100 WBCs      Neutrophils Relative 67 %      Immat GRANS % 0 %      Lymphocytes Relative 21 %      Monocytes Relative 10 %      Eosinophils Relative 2 %      Basophils Relative 0 %      Neutrophils Absolute 6 46 Thousands/µL      Immature Grans Absolute 0 02 Thousand/uL      Lymphocytes Absolute 2 09 Thousands/µL      Monocytes Absolute 0 96 Thousand/µL      Eosinophils Absolute 0 21 Thousand/µL      Basophils Absolute 0 04 Thousands/µL                  XR chest 1 view portable   Final Result by Kaitlin Mcqueen MD (06/30 1237)      No acute cardiopulmonary disease                    Workstation performed: [id number]                    Procedures  Procedures         ED Course  ED Course as of 06/30/22 1731   Thu Jun 30, 2022   1239 Pain and htn resolved with nitro               HEART Risk Score    Flowsheet Row Most Recent Value   Heart Score Risk Calculator    History 1 Filed at: 06/30/2022 1602   ECG 0 Filed at: 06/30/2022 1602   Age 1 Filed at: 06/30/2022 1602   Risk Factors 1 Filed at: 06/30/2022 1602   Troponin 0 Filed at: 06/30/2022 1602   HEART Score 3 Filed at: 06/30/2022 1602                        SBIRT 20yo+    Flowsheet Row Most Recent Value   SBIRT (23 yo +)    In order to provide better care to our patients, we are screening all of our patients for alcohol and drug use  Would it be okay to ask you these screening questions? Yes Filed at: 06/30/2022 1228   Initial Alcohol Screen: US AUDIT-C     1  How often do you have a drink containing alcohol? 3 Filed at: 06/30/2022 1228   2  How many drinks containing alcohol do you have on a typical day you are drinking? 1 Filed at: 06/30/2022 1228   3a  Male UNDER 65: How often do you have five or more drinks on one occasion? 0 Filed at: 06/30/2022 1228   3b  FEMALE Any Age, or MALE 65+: How often do you have 4 or more drinks on one occassion? 0 Filed at: 06/30/2022 1228   Audit-C Score 4 Filed at: 06/30/2022 1228   SONG: How many times in the past year have you    Used an illegal drug or used a prescription medication for non-medical reasons? Never Filed at: 06/30/2022 1228                    MDM  Number of Diagnoses or Management Options  Chest pain  Diagnosis management comments: Case discussed with cardiology  Some concern for recurrent chest pain  Recent workups reviewed with on-call cardiologist Dr Deborah Prater  Patient chest pain did improve and resolve while in the emergency department  Possibly related to nitro use however the patient's hypertension did return after nitro without return of chest pain  Multiple delta troponins were negative  No EKG changes observed  Amlodipine was initiated at the recommendation of Cardiology    Patient does have an upcoming follow-up appointment with his PCP  Cardiology referral placed  Patient educated regarding their diagnosis and given return and follow-up instructions  Patient was advised to returned to the ED with worsening symptoms or concerns  Patient is understanding of and in agreement with the treatment plan  There are no questions at the time of discharge  Amount and/or Complexity of Data Reviewed  Clinical lab tests: ordered and reviewed  Tests in the radiology section of CPT®: ordered and reviewed    Risk of Complications, Morbidity, and/or Mortality  Presenting problems: moderate  Diagnostic procedures: low  Management options: low    Patient Progress  Patient progress: stable      Disposition  Final diagnoses:   Chest pain     Time reflects when diagnosis was documented in both MDM as applicable and the Disposition within this note     Time User Action Codes Description Comment    6/30/2022  4:31 PM Caden Monk Add [R07 9] Chest pain       ED Disposition     ED Disposition   Discharge    Condition   Stable    Date/Time   Thu Jun 30, 2022  4:31 PM    Comment   True Kramer discharge to home/self care  Follow-up Information     Follow up With Specialties Details Why Contact Alicja Bassett DO Family Medicine   [address] [place] [address]  [phone]            Discharge Medication List as of 6/30/2022  4:37 PM      START taking these medications    Details   amLODIPine (NORVASC) 5 mg tablet Take 1 tablet (5 mg total) by mouth daily, Starting Thu 6/30/2022, Normal                 PDMP Review     None          ED Provider  Electronically Signed by           Kaleigh Mcclain PA-C  06/30/22 1822

## 2022-07-01 LAB
ATRIAL RATE: 65 BPM
ATRIAL RATE: 70 BPM
ATRIAL RATE: 70 BPM
P AXIS: 48 DEGREES
P AXIS: 51 DEGREES
P AXIS: 57 DEGREES
PR INTERVAL: 158 MS
PR INTERVAL: 166 MS
PR INTERVAL: 174 MS
QRS AXIS: -39 DEGREES
QRS AXIS: -39 DEGREES
QRS AXIS: -52 DEGREES
QRSD INTERVAL: 84 MS
QRSD INTERVAL: 88 MS
QRSD INTERVAL: 98 MS
QT INTERVAL: 384 MS
QT INTERVAL: 396 MS
QT INTERVAL: 400 MS
QTC INTERVAL: 414 MS
QTC INTERVAL: 416 MS
QTC INTERVAL: 427 MS
T WAVE AXIS: 36 DEGREES
T WAVE AXIS: 48 DEGREES
T WAVE AXIS: 56 DEGREES
VENTRICULAR RATE: 65 BPM
VENTRICULAR RATE: 70 BPM
VENTRICULAR RATE: 70 BPM

## 2022-07-01 PROCEDURE — 93010 ELECTROCARDIOGRAM REPORT: CPT | Performed by: INTERNAL MEDICINE

## 2022-07-05 ENCOUNTER — OFFICE VISIT (OUTPATIENT)
Dept: FAMILY MEDICINE CLINIC | Facility: CLINIC | Age: 62
End: 2022-07-05
Payer: COMMERCIAL

## 2022-07-05 VITALS
SYSTOLIC BLOOD PRESSURE: 140 MMHG | HEART RATE: 90 BPM | DIASTOLIC BLOOD PRESSURE: 64 MMHG | TEMPERATURE: 97.7 F | BODY MASS INDEX: 25.57 KG/M2 | WEIGHT: 149.8 LBS | OXYGEN SATURATION: 98 % | HEIGHT: 64 IN

## 2022-07-05 DIAGNOSIS — E78.00 HYPERCHOLESTEROLEMIA: ICD-10-CM

## 2022-07-05 DIAGNOSIS — E66.3 OVERWEIGHT WITH BODY MASS INDEX (BMI) OF 26 TO 26.9 IN ADULT: ICD-10-CM

## 2022-07-05 DIAGNOSIS — I35.0 AORTIC VALVE STENOSIS, ETIOLOGY OF CARDIAC VALVE DISEASE UNSPECIFIED: ICD-10-CM

## 2022-07-05 DIAGNOSIS — I10 BENIGN ESSENTIAL HYPERTENSION: Chronic | ICD-10-CM

## 2022-07-05 DIAGNOSIS — R07.89 OTHER CHEST PAIN: Primary | ICD-10-CM

## 2022-07-05 PROCEDURE — 99213 OFFICE O/P EST LOW 20 MIN: CPT

## 2022-07-05 RX ORDER — LISINOPRIL 20 MG/1
20 TABLET ORAL DAILY
Qty: 30 TABLET | Refills: 0 | Status: SHIPPED | OUTPATIENT
Start: 2022-07-05 | End: 2022-08-06

## 2022-07-05 RX ORDER — ASPIRIN 81 MG/1
81 TABLET, CHEWABLE ORAL DAILY
COMMUNITY

## 2022-07-05 RX ORDER — OMEPRAZOLE 20 MG/1
20 CAPSULE, DELAYED RELEASE ORAL DAILY
Qty: 30 CAPSULE | Refills: 0 | Status: SHIPPED | OUTPATIENT
Start: 2022-07-05 | End: 2022-07-28

## 2022-07-05 RX ORDER — OMEPRAZOLE 40 MG/1
40 CAPSULE, DELAYED RELEASE ORAL DAILY
Qty: 30 CAPSULE | Refills: 0 | Status: SHIPPED | OUTPATIENT
Start: 2022-07-05 | End: 2022-07-05

## 2022-07-05 NOTE — PROGRESS NOTES
Assessment/Plan:  60 Y/O M with a PMh of HTN presented to the office today f/u regarding chronic LT substernal CP for the past few months  Stress/holter negative  Patient to be seen by cardiology in 3 wks  anti htn med switched to acei , and initiated low dose omeprazole to f/u in 2 wks with labs for annual and recheck  Otherwise await Cardiology recs regarding AS   No problem-specific Assessment & Plan notes found for this encounter  Diagnoses and all orders for this visit:    Other chest pain  Comments:  ED visit 6/30/22: Trop,cxr,bnp (-)   stress test/holter 6/21-22 : ( - )  f/u with labs in 2 wks   annual in 2wks  Orders:  -     omeprazole (PriLOSEC) 20 mg delayed release capsule; Take 1 capsule (20 mg total) by mouth daily    Benign essential hypertension  Comments:  stable  medications changed  f/u with labs in 2 wks  Orders:  -     TSH, 3rd generation with Free T4 reflex; Future  -     Lipid panel; Future  -     PSA, Total Screen; Future  -     Discontinue: omeprazole (PriLOSEC) 40 MG capsule; Take 1 capsule (40 mg total) by mouth daily  -     lisinopril (ZESTRIL) 20 mg tablet; Take 1 tablet (20 mg total) by mouth daily    Aortic valve stenosis, etiology of cardiac valve disease unspecified  Comments:  echo 6/30: mixed reg/sten  mild-mod  case to be discussed with cardio     Hypercholesterolemia  Comments:  f/u with labs  Orders:  -     TSH, 3rd generation with Free T4 reflex; Future  -     Lipid panel; Future  -     PSA, Total Screen; Future  -     Discontinue: omeprazole (PriLOSEC) 40 MG capsule; Take 1 capsule (40 mg total) by mouth daily  -     omeprazole (PriLOSEC) 20 mg delayed release capsule; Take 1 capsule (20 mg total) by mouth daily    Overweight with body mass index (BMI) of 26 to 26 9 in adult  Comments:  diet and exercise counseling  Orders:  -     omeprazole (PriLOSEC) 20 mg delayed release capsule;  Take 1 capsule (20 mg total) by mouth daily    Other orders  -     aspirin 81 mg chewable tablet; Chew 81 mg daily          PHQ-2/9 Depression Screening    Little interest or pleasure in doing things: 0 - not at all  Feeling down, depressed, or hopeless: 0 - not at all  PHQ-2 Score: 0  PHQ-2 Interpretation: Negative depression screen            Subjective:      Patient ID: Uche Bueno is a 59 y o  male  58 Y/O M with a PMh of HTN presented to the office today f/u regarding chronic LT substernal CP for the past few months  Ofnote: holter monitor/Stress test on 6/20/22: negative, no acute changes  Echo: G1DD, mild-mod AS   EF: 65 %         Chest Pain   This is a recurrent problem  Episode onset: about 1 month  The onset quality is sudden  The problem occurs intermittently  The problem has been unchanged  The pain is at a severity of 3/10  The pain is mild  The quality of the pain is described as dull  The pain does not radiate  Associated symptoms include a cough and dizziness  Pertinent negatives include no abdominal pain, back pain, headaches, nausea, palpitations, PND, shortness of breath, sputum production, syncope, vomiting or weakness  The cough has no precipitants  The cough is non-productive  The cough is relieved by rest  The cough is worsened by activity  He has tried analgesics and nitroglycerin for the symptoms  Risk factors include alcohol intake, being elderly, lack of exercise, male gender, obesity, sedentary lifestyle and smoking/tobacco exposure  Prior diagnostic workup includes stress echo  The following portions of the patient's history were reviewed and updated as appropriate: allergies  Review of Systems   Constitutional: Negative for activity change and appetite change  HENT: Negative for congestion, postnasal drip, rhinorrhea, sinus pressure and sinus pain  Respiratory: Positive for cough  Negative for sputum production and shortness of breath  Cardiovascular: Positive for chest pain  Negative for palpitations, syncope and PND  Gastrointestinal: Negative for abdominal pain, nausea and vomiting  Genitourinary: Negative for dysuria, flank pain and frequency  Musculoskeletal: Negative for back pain  Neurological: Positive for dizziness and light-headedness  Negative for weakness and headaches  Objective:    /64 (BP Location: Left arm, Patient Position: Sitting)   Pulse 90   Temp 97 7 °F (36 5 °C) (Tympanic)   Ht 5' 4" (1 626 m)   Wt 67 9 kg (149 lb 12 8 oz)   SpO2 98%   BMI 25 71 kg/m²      Physical Exam  Constitutional:       Appearance: Normal appearance  HENT:      Head: Normocephalic and atraumatic  Mouth/Throat:      Mouth: Mucous membranes are moist       Pharynx: Oropharynx is clear  Eyes:      Conjunctiva/sclera: Conjunctivae normal       Pupils: Pupils are equal, round, and reactive to light  Cardiovascular:      Rate and Rhythm: Normal rate and regular rhythm  Pulses: Normal pulses  Carotid pulses are 2+ on the right side and 2+ on the left side  Radial pulses are 2+ on the right side and 2+ on the left side  Heart sounds: S1 normal and S2 normal  Murmur (aortic radiating to the Carotid area) heard  Systolic murmur is present with a grade of 3/6  Gallop present  S3 sounds present  Pulmonary:      Effort: Pulmonary effort is normal  No respiratory distress  Breath sounds: Normal breath sounds  No wheezing, rhonchi or rales  Musculoskeletal:      Right lower leg: No edema  Left lower leg: No edema  Skin:     Capillary Refill: Capillary refill takes less than 2 seconds  Neurological:      General: No focal deficit present  Mental Status: He is alert  Mental status is at baseline     Psychiatric:         Mood and Affect: Mood normal          Behavior: Behavior normal

## 2022-07-11 ENCOUNTER — CONSULT (OUTPATIENT)
Dept: CARDIOLOGY CLINIC | Facility: CLINIC | Age: 62
End: 2022-07-11
Payer: COMMERCIAL

## 2022-07-11 VITALS
DIASTOLIC BLOOD PRESSURE: 70 MMHG | BODY MASS INDEX: 25.27 KG/M2 | SYSTOLIC BLOOD PRESSURE: 130 MMHG | HEART RATE: 77 BPM | HEIGHT: 64 IN | WEIGHT: 148 LBS

## 2022-07-11 DIAGNOSIS — R06.09 DYSPNEA ON EXERTION: ICD-10-CM

## 2022-07-11 DIAGNOSIS — I35.0 AORTIC VALVE STENOSIS, ETIOLOGY OF CARDIAC VALVE DISEASE UNSPECIFIED: ICD-10-CM

## 2022-07-11 DIAGNOSIS — R07.9 CHEST PAIN, UNSPECIFIED TYPE: Primary | ICD-10-CM

## 2022-07-11 DIAGNOSIS — I10 BENIGN ESSENTIAL HYPERTENSION: ICD-10-CM

## 2022-07-11 DIAGNOSIS — Z72.0 TOBACCO ABUSE: ICD-10-CM

## 2022-07-11 DIAGNOSIS — E78.5 HYPERLIPIDEMIA, UNSPECIFIED HYPERLIPIDEMIA TYPE: ICD-10-CM

## 2022-07-11 PROCEDURE — 99244 OFF/OP CNSLTJ NEW/EST MOD 40: CPT | Performed by: INTERNAL MEDICINE

## 2022-07-11 PROCEDURE — 93000 ELECTROCARDIOGRAM COMPLETE: CPT | Performed by: INTERNAL MEDICINE

## 2022-07-11 RX ORDER — ATORVASTATIN CALCIUM 20 MG/1
20 TABLET, FILM COATED ORAL DAILY
Qty: 30 TABLET | Refills: 3 | Status: SHIPPED | OUTPATIENT
Start: 2022-07-11

## 2022-07-11 NOTE — H&P (VIEW-ONLY)
Cardiology Consultation     Clara Miranda  [id number]  1960  [facility] [address] [address]      1  Chest pain, unspecified type  POCT ECG   2  Tobacco abuse     3  Aortic valve stenosis, etiology of cardiac valve disease unspecified     4  Benign essential hypertension         Discussion/Summary:  1  Chest pain on exertion  2  Dyspnea on exertion  3  Hypertension  4  Hyperlipidemia  5  Current tobacco use  6  Aortic stenosis    -EKG performed in the office today shows sinus rhythm heart rate 77 beats per minute with inferior infarct age indeterminate  -transthoracic echocardiogram 06/30/2022 showing left ventricular systolic function normal, estimated LVEF 65% with grade 1 diastolic dysfunction, mild to moderate aortic regurgitation, mild-to-moderate aortic stenosis with aortic velocity increased in the setting of stenosis increased flow with mild mitral regurgitation and mild pulmonic regurgitation   -in the setting of progressive exertional symptoms along with multiple cardiac risk factors despite negative stress testing will have patient undergo cardiac catheterization for definitive diagnosis    -patient on lisinopril 20 mg daily  -will follow-up most recent lipid panel which is pending however based on prior results in elevated ASCVD risk will initiate patient on atorvastatin 20 mg daily at this time with uptitration as patient tolerates  -patient will monitor home blood pressure readings and bring log with him to next office visit  -patient counseled on dietary lifestyle modifications including following a low-salt, low-fat, heart healthy diet with tobacco cessation  -patient will follow-up with Dr Zuri Anton post cardiac cath  -patient counseled if he were to have any warning or alarm type symptoms he is to seek emergency medical care immediately  History of Present Illness:  - patient is a 23-year-old male with current tobacco use, hypertension, hyperlipidemia, and documented aortic stenosis who presents today after multiple emergency room visits for chest discomfort most recently 06/30/2022   -the patient notes that he has had issues in the past with musculoskeletal pain from work however states that these symptoms are different  He states they have been slowly progressive over the last several weeks and now can happen with only mild exertion  He notes that they are relieved with rest and that he has become too much more fatigued with physical activity than previous  While currently in the office he denies chest pain, palpitations, lightheadedness or dizziness, loss of consciousness or shortness of breath he states that even doing something as simple doing yd work which couple of months ago would not have caused him to have the symptoms he has occurrence of them     -patient currently smokes 5 cigarettes per day, 6-7 beer per week alcohol use, and denies any illicit drug use   -patient denies any known family history of heart disease that he is aware of      Patient Active Problem List   Diagnosis    Benign essential hypertension    ED (erectile dysfunction) of non-organic origin    Hypercholesterolemia    Raynaud's phenomenon    Peripheral vascular disease ([facility] [address] )    Peripheral neuropathy    Pityriasis rosea    Negative depression screening    Overweight with body mass index (BMI) of 26 to 26 9 in adult    Aortic valve stenosis    Other chest pain    Palpitations     Past Medical History:   Diagnosis Date    Benign essential hypertension 05/08/2014    Other chest pain     Palpitations      Social History     Socioeconomic History    Marital status: /Civil Union     Spouse name: Not on file    Number of children: Not on file    Years of education: Not on file    Highest education level: Not on file   Occupational History    Not on file   Tobacco Use    Smoking status: Current Every Day Smoker     Packs/day: 0 25    Smokeless tobacco: Never Used   Vaping Use    Vaping Use: Every day   Substance and Sexual Activity    Alcohol use:  Yes    Drug use: No    Sexual activity: Not on file   Other Topics Concern    Not on file   Social History Narrative    EMPLOYED         Social Determinants of Health     Financial Resource Strain: Not on file   Food Insecurity: Not on file   Transportation Needs: Not on file   Physical Activity: Not on file   Stress: Not on file   Social Connections: Not on file   Intimate Partner Violence: Not on file   Housing Stability: Not on file      Family History   Problem Relation Age of Onset    No Known Problems Mother     No Known Problems Father      Past Surgical History:   Procedure Laterality Date    HERNIA REPAIR         Current Outpatient Medications:     aspirin 81 mg chewable tablet, Chew 81 mg daily, Disp: , Rfl:     lisinopril (ZESTRIL) 20 mg tablet, Take 1 tablet (20 mg total) by mouth daily, Disp: 30 tablet, Rfl: 0    omeprazole (PriLOSEC) 20 mg delayed release capsule, Take 1 capsule (20 mg total) by mouth daily, Disp: 30 capsule, Rfl: 0  No Known Allergies      Labs:  Admission on 06/30/2022, Discharged on 06/30/2022   Component Date Value    WBC 06/30/2022 9 78     RBC 06/30/2022 5 33     Hemoglobin 06/30/2022 15 1     Hematocrit 06/30/2022 45 6     MCV 06/30/2022 86     MCH 06/30/2022 28 3     MCHC 06/30/2022 33 1     RDW 06/30/2022 14 0     MPV 06/30/2022 9 2     Platelets 40/23/9561 316     nRBC 06/30/2022 0     Neutrophils Relative 06/30/2022 67     Immat GRANS % 06/30/2022 0     Lymphocytes Relative 06/30/2022 21     Monocytes Relative 06/30/2022 10     Eosinophils Relative 06/30/2022 2     Basophils Relative 06/30/2022 0     Neutrophils Absolute 06/30/2022 6 46     Immature Grans Absolute 06/30/2022 0 02     Lymphocytes Absolute 06/30/2022 2 09     Monocytes Absolute 06/30/2022 0 96     Eosinophils Absolute 06/30/2022 0 21     Basophils Absolute 06/30/2022 0 04     Sodium 06/30/2022 136     Potassium 06/30/2022 4 2     Chloride 06/30/2022 102     CO2 06/30/2022 25     ANION GAP 06/30/2022 9     BUN 06/30/2022 14     Creatinine 06/30/2022 0 79     Glucose 06/30/2022 91     Calcium 06/30/2022 9 2     AST 06/30/2022 15     ALT 06/30/2022 13     Alkaline Phosphatase 06/30/2022 84     Total Protein 06/30/2022 7 2     Albumin 06/30/2022 4 4     Total Bilirubin 06/30/2022 0 70     eGFR 06/30/2022 95     hs TnI 0hr 06/30/2022 3     hs TnI 2hr 06/30/2022 3     Delta 2hr hsTnI 06/30/2022 0     hs TnI 4hr 06/30/2022 3     Delta 4hr hsTnI 06/30/2022 0     AV area peak michoacano 06/30/2022 1 2     AV peak gradient 06/30/2022 94     LA size 06/30/2022 3 9     Aortic valve mean veloci* 06/30/2022 22 10     LVPWd 06/30/2022 0 90     Left Atrium Area-systoli* 06/30/2022 13 6     MV E' Tissue Velocity Se* 06/30/2022 8     IVSd 06/30/2022 3 77     LV DIASTOLIC VOLUME (MOD* 99/94/2031 85     LEFT VENTRICLE SYSTOLIC * 84/03/3244 19     Left ventricular stroke * 06/30/2022 66 00     AV Deceleration Time 06/30/2022 1,856     A4C EF 06/30/2022 79     LVIDd 06/30/2022 4 30     IVS 06/30/2022 1 3     LVIDS 06/30/2022 2 40     FS 06/30/2022 44     Asc Ao 06/30/2022 3 2     Ao root 06/30/2022 2 90     RVID d 06/30/2022 3 5     LVOT mn grad 06/30/2022 4 0     AV area by cont VTI 06/30/2022 1 3     AV regurgitation pressur* 06/30/2022 538     AV mean gradient 06/30/2022 21     AV LVOT peak gradient 06/30/2022 5     MV valve area p 1/2 meth* 06/30/2022 3 79     E wave deceleration time 06/30/2022 200     LVOT diameter 06/30/2022 2 0     LVOT peak michoacano 06/30/2022 1 14     LVOT peak VTI 06/30/2022 28 03     Ao peak [place] retrograde 06/30/2022 2 93     Ao VTI 06/30/2022 68 15     LVOT stroke volume 06/30/2022 88 01     AV peak gradient 06/30/2022 34     MV Peak E Kevin 06/30/2022 87     MV Peak A Kevin 06/30/2022 1 03     MV stenosis pressure 1/2* 06/30/2022 58     LVOT SI 06/30/2022 52 30     LVSV, 2D 06/30/2022 66     LVOT area 06/30/2022 3 14     AV Velocity Ratio 06/30/2022 0 39     AV valve area 06/30/2022 1 29     LV EF 06/30/2022 65     BNP 06/30/2022 51     Ventricular Rate 06/30/2022 70     Atrial Rate 06/30/2022 70     NV Interval 06/30/2022 158     QRSD Interval 06/30/2022 98     QT Interval 06/30/2022 384     QTC Interval 06/30/2022 414     P Axis 06/30/2022 51     QRS Axis 06/30/2022 -52     T Wave Axis 06/30/2022 56     Ventricular Rate 06/30/2022 65     Atrial Rate 06/30/2022 65     NV Interval 06/30/2022 174     QRSD Interval 06/30/2022 84     QT Interval 06/30/2022 400     QTC Interval 06/30/2022 416     P Axis 06/30/2022 57     QRS Axis 06/30/2022 -39     T Wave Axis 06/30/2022 48     Ventricular Rate 06/30/2022 70     Atrial Rate 06/30/2022 70     NV Interval 06/30/2022 166     QRSD Interval 06/30/2022 88     QT Interval 06/30/2022 396     QTC Interval 06/30/2022 427     P Axis 06/30/2022 48     QRS Axis 06/30/2022 -44     T Wave [place] 06/30/2022 [place] [facility] Visit on 06/20/2022   Component Date Value    Baseline HR 06/20/2022 71     Baseline BP 06/20/2022 144/84     O2 sat rest 06/20/2022 98     Stress peak HR 06/20/2022 137     Post peak BP 06/20/2022 154     Rate Pressure Product 06/20/2022 21,098 0     O2 sat peak 06/20/2022 95     Recovery HR 06/20/2022 88     Recovery BP 06/20/2022 130/74     O2 sat recovery 06/20/2022 97     Max HR 06/20/2022 137     Max HR Percent 06/20/2022 87     Exercise duration (min) 06/20/2022 9     Exercise duration (sec) 06/20/2022 0     Estimated workload 06/20/2022 10 1     Angina Index 06/20/2022 0     Stress Stage Reached 06/20/2022 3 0     Protocol Name 06/20/2022 AMPARO     Time In Exercise Phase 06/20/2022 00:09:00     MAX  SYSTOLIC BP 36/58/6962 435     Max Diastolic Bp 48/31/7249 94     Max Heart Rate 06/20/2022 137     Max Predicted Heart Rate 06/20/2022 157     Reason for Termination 06/20/2022                      Value:Target Heart Rate Achieved  Fatigue  Chest discomfort      Test Indication 06/20/2022 Screening for CAD     Target Hr Formular 06/20/2022 (220 - Age)*85%     Arrhy During Ex 06/20/2022 ventricular premature beats-isolated     Chest Pain Statement 06/20/2022 none    Admission on 06/18/2022, Discharged on 06/18/2022   Component Date Value    WBC 06/18/2022 9 54     RBC 06/18/2022 5 14     Hemoglobin 06/18/2022 14 5     Hematocrit 06/18/2022 44 6     MCV 06/18/2022 87     MCH 06/18/2022 28 2     MCHC 06/18/2022 32 5     RDW 06/18/2022 13 7     MPV 06/18/2022 9 0     Platelets 76/19/0903 364     nRBC 06/18/2022 0     Neutrophils Relative 06/18/2022 63     Immat GRANS % 06/18/2022 0     Lymphocytes Relative 06/18/2022 24     Monocytes Relative 06/18/2022 9     Eosinophils Relative 06/18/2022 3     Basophils Relative 06/18/2022 1     Neutrophils Absolute 06/18/2022 5 94     Immature Grans Absolute 06/18/2022 0 02     Lymphocytes Absolute 06/18/2022 2 32     Monocytes Absolute 06/18/2022 0 89     Eosinophils Absolute 06/18/2022 0 31     Basophils Absolute 06/18/2022 0 06     Sodium 06/18/2022 138     Potassium 06/18/2022 3 5     Chloride 06/18/2022 106     CO2 06/18/2022 25     ANION GAP 06/18/2022 7     BUN 06/18/2022 17     Creatinine 06/18/2022 0 72     Glucose 06/18/2022 96     Calcium 06/18/2022 8 9     eGFR 06/18/2022 99     hs TnI 0hr 06/18/2022 4     D-Dimer, Quant 06/18/2022 0 31     Ventricular Rate 06/18/2022 68     Atrial Rate 06/18/2022 68     TX Interval 06/18/2022 184     QRSD Interval 06/18/2022 94     QT Interval 06/18/2022 384     QTC Interval 06/18/2022 408     P Axis 06/18/2022 55     QRS Axis 06/18/2022 -37     T Wave Axis 06/18/2022 51    Admission on 06/15/2022, Discharged on 06/16/2022   Component Date Value    SARS-CoV-2 06/16/2022 Negative     INFLUENZA A PCR 06/16/2022 Negative     INFLUENZA B PCR 06/16/2022 Negative     RSV PCR 06/16/2022 Negative     Blood Culture 06/16/2022 No Growth After 5 Days   Blood Culture 06/16/2022 No Growth After 5 Days       Procalcitonin 06/16/2022 <0 05     LACTIC ACID 06/16/2022 0 7     Color, UA 06/16/2022 Straw     Clarity, UA 06/16/2022 Clear     Specific Gravity, UA 06/16/2022 1 010     pH, UA 06/16/2022 6 0     Leukocytes, UA 06/16/2022 Negative     Nitrite, UA 06/16/2022 Negative     Protein, UA 06/16/2022 Negative     Glucose, UA 06/16/2022 Negative     Ketones, UA 06/16/2022 Negative     Urobilinogen, UA 06/16/2022 0 2     Bilirubin, UA 06/16/2022 Negative     Occult Blood, UA 06/16/2022 Trace-Intact (A)    Total CK 06/16/2022 42     WBC 06/16/2022 9 58     RBC 06/16/2022 5 25     Hemoglobin 06/16/2022 15 0     Hematocrit 06/16/2022 45 6     MCV 06/16/2022 87     MCH 06/16/2022 28 6     MCHC 06/16/2022 32 9     RDW 06/16/2022 13 9     MPV 06/16/2022 9 1     Platelets 03/20/5857 382     nRBC 06/16/2022 0     Neutrophils Relative 06/16/2022 58     Immat GRANS % 06/16/2022 0     Lymphocytes Relative 06/16/2022 29     Monocytes Relative 06/16/2022 9     Eosinophils Relative 06/16/2022 3     Basophils Relative 06/16/2022 1     Neutrophils Absolute 06/16/2022 5 49     Immature Grans Absolute 06/16/2022 0 04     Lymphocytes Absolute 06/16/2022 2 82     Monocytes Absolute 06/16/2022 0 88     Eosinophils Absolute 06/16/2022 0 29     Basophils Absolute 06/16/2022 0 06     Sodium 06/16/2022 136     Potassium 06/16/2022 3 8     Chloride 06/16/2022 103     CO2 06/16/2022 23     ANION GAP 06/16/2022 10     BUN 06/16/2022 24     Creatinine 06/16/2022 0 81     Glucose 06/16/2022 92     Calcium 06/16/2022 9 5     AST 06/16/2022 11 (A)    ALT 06/16/2022 10     Alkaline Phosphatase 06/16/2022 83     Total Protein 06/16/2022 6 9     Albumin 06/16/2022 4 4     Total Bilirubin 06/16/2022 0 28     eGFR 06/16/2022 94     Lipase 06/16/2022 12     hs TnI 0hr 06/16/2022 10     hs TnI 2hr 06/16/2022 9     Delta 2hr hsTnI 06/16/2022 -1     RBC, UA 06/16/2022 0-1     WBC, UA 06/16/2022 None Seen     Epithelial Cells 06/16/2022 Occasional     Bacteria, UA 06/16/2022 None Seen     Ventricular Rate 06/16/2022 74     Atrial Rate 06/16/2022 74     HI Interval 06/16/2022 190     QRSD Interval 06/16/2022 88     QT Interval 06/16/2022 364     QTC Interval 06/16/2022 404     P Axis 06/16/2022 50     QRS Axis 06/16/2022 -38     T Wave Axis 06/16/2022 34     Ventricular Rate 06/15/2022 87     Atrial Rate 06/15/2022 87     HI Interval 06/15/2022 178     QRSD Interval 06/15/2022 96     QT Interval 06/15/2022 344     QTC Interval 06/15/2022 413     P Axis 06/15/2022 69     QRS Axis 06/15/2022 -48     T Wave Axis 06/15/2022 56         Imaging: XR chest 1 view portable    Result Date: 6/30/2022  Narrative: CHEST INDICATION:   chest pain  COMPARISON:  None EXAM PERFORMED/VIEWS:  XR CHEST PORTABLE FINDINGS: Cardiomediastinal silhouette appears unremarkable  The lungs are clear  No pneumothorax or pleural effusion  Osseous structures appear within normal limits for patient age  Impression: No acute cardiopulmonary disease  Workstation performed: [id number]     X-ray chest 1 view portable    Result Date: 6/19/2022  Narrative: CHEST INDICATION:   chest pain  COMPARISON:  6/16/2022 EXAM PERFORMED/VIEWS:  XR CHEST PORTABLE FINDINGS: Cardiomediastinal silhouette appears unremarkable  The lungs are clear  No pneumothorax or pleural effusion  Osseous structures appear within normal limits for patient age  Impression: No acute cardiopulmonary disease  Workstation performed: [id number]     XR chest portable    Result Date: 6/16/2022  Narrative: CHEST INDICATION:   CHILLS  COMPARISON:  11/10/2011 EXAM PERFORMED/VIEWS:  XR CHEST PORTABLE FINDINGS: Cardiomediastinal silhouette appears unremarkable  The lungs are clear  No pneumothorax or pleural effusion  Osseous structures appear within normal limits for patient age  Impression: No acute cardiopulmonary disease  Findings concur with the preliminary report by the referring clinician already in PACS and/or our electronic record EPIC  Workstation performed: [id number]     Stress test only, exercise    Result Date: 6/20/2022  Narrative: Shreyas Tian  Stress ECG: Arrhythmias during recovery: rare PVCs  The stress ECG is negative for ischemia after maximal exercise, without reproduction of symptoms  Normal study  Results reviewed with  patient  Stress strip    Result Date: 6/22/2022  Narrative: Confirmed by URIAH BOUCHER ([phone]),  Aniya Beth ([id number]) on 6/22/2022 2:52:54 PM    Echo complete w/ contrast if indicated    Result Date: 6/30/2022  Narrative: Shreyas Tian  Left Ventricle: Left ventricular cavity size is normal  Wall thickness is mildly increased  The left ventricular ejection fraction is 65%  Systolic function is normal  Wall motion is normal  Diastolic function is mildly abnormal, consistent with grade I (abnormal) relaxation    Aortic Valve: There is mild to moderate regurgitation  There is mild to moderate stenosis  The aortic valve velocity is increased in the setting of stenosis and increased flow    Mitral Valve: There is annular calcification  There is mild regurgitation    Pulmonic Valve: There is mild regurgitation  Holter monitor    Result Date: 6/27/2022  Narrative: Indications: Palpitations A Holter Monitor was performed for  48 hours  Impression: The basic mechanism was sinus with rates ranging from  49 beats per minute to 140 beats per minute  The average heart rate was 74 beats per minute  Supraventricular ectopic beats were common totaling 275 beats but there was no atrial fibrillation or significant runs  Ventricular ectopic beats were uncommon totaling 76 beats but there were no runs  There were no significant pauses  No symptoms were noted during the monitoring phase  Jenny Aldana MD       Review of Systems:  Review of Systems   Constitutional: Negative for chills, diaphoresis, fatigue, fever and unexpected weight change  HENT: Negative for trouble swallowing and voice change  Eyes: Negative for pain and redness  Respiratory: Negative for shortness of breath and wheezing  Cardiovascular: Negative for chest pain, palpitations and leg swelling  Gastrointestinal: Negative for abdominal pain, blood in stool, constipation, diarrhea, nausea and vomiting  Genitourinary: Negative for dysuria  Musculoskeletal: Negative for neck pain and neck stiffness  Skin: Negative for rash  Neurological: Negative for dizziness, syncope, light-headedness and headaches  Psychiatric/Behavioral: Negative for agitation and confusion  All other systems reviewed and are negative  Vitals:    07/11/22 1252   BP: 130/70   Pulse: 77   Weight: 67 1 kg (148 lb)   Height: 5' 4" (1 626 m)     Vitals:    07/11/22 1252   Weight: 67 1 kg (148 lb)     Height: 5' 4" (162 6 cm)     Physical Exam:  Physical Exam  Vitals reviewed  Constitutional:       General: He is not in acute distress  Appearance: He is well-developed  He is not diaphoretic  HENT:      Head: Normocephalic and atraumatic  Neck:      Vascular: No JVD  Trachea: No tracheal deviation  Cardiovascular:      Rate and Rhythm: Normal rate and regular rhythm  Heart sounds: Murmur heard  No friction rub  Pulmonary:      Effort: Pulmonary effort is normal  No tachypnea or respiratory distress  Breath sounds: No wheezing or rhonchi  Abdominal:      General: Bowel sounds are normal       Palpations: Abdomen is soft  Tenderness: There is no abdominal tenderness  There is no rebound     Musculoskeletal:      Right lower leg: No edema  Left lower leg: No edema  Skin:     General: Skin is warm and dry  Neurological:      Mental Status: He is alert  Comments: Awake, alert, able to answer questions appropriately, able move extremities bilaterally     Psychiatric:         Mood and Affect: Mood normal          Behavior: Behavior normal

## 2022-07-11 NOTE — PROGRESS NOTES
Cardiology Consultation     Chanel Luciano  [id number]  1960  [facility] [address] [address]      1  Chest pain, unspecified type  POCT ECG   2  Tobacco abuse     3  Aortic valve stenosis, etiology of cardiac valve disease unspecified     4  Benign essential hypertension         Discussion/Summary:  1  Chest pain on exertion  2  Dyspnea on exertion  3  Hypertension  4  Hyperlipidemia  5  Current tobacco use  6  Aortic stenosis    -EKG performed in the office today shows sinus rhythm heart rate 77 beats per minute with inferior infarct age indeterminate  -transthoracic echocardiogram 06/30/2022 showing left ventricular systolic function normal, estimated LVEF 65% with grade 1 diastolic dysfunction, mild to moderate aortic regurgitation, mild-to-moderate aortic stenosis with aortic velocity increased in the setting of stenosis increased flow with mild mitral regurgitation and mild pulmonic regurgitation   -in the setting of progressive exertional symptoms along with multiple cardiac risk factors despite negative stress testing will have patient undergo cardiac catheterization for definitive diagnosis    -patient on lisinopril 20 mg daily  -will follow-up most recent lipid panel which is pending however based on prior results in elevated ASCVD risk will initiate patient on atorvastatin 20 mg daily at this time with uptitration as patient tolerates  -patient will monitor home blood pressure readings and bring log with him to next office visit  -patient counseled on dietary lifestyle modifications including following a low-salt, low-fat, heart healthy diet with tobacco cessation  -patient will follow-up with Dr Mary Veliz post cardiac cath  -patient counseled if he were to have any warning or alarm type symptoms he is to seek emergency medical care immediately  History of Present Illness:  - patient is a 57-year-old male with current tobacco use, hypertension, hyperlipidemia, and documented aortic stenosis who presents today after multiple emergency room visits for chest discomfort most recently 06/30/2022   -the patient notes that he has had issues in the past with musculoskeletal pain from work however states that these symptoms are different  He states they have been slowly progressive over the last several weeks and now can happen with only mild exertion  He notes that they are relieved with rest and that he has become too much more fatigued with physical activity than previous  While currently in the office he denies chest pain, palpitations, lightheadedness or dizziness, loss of consciousness or shortness of breath he states that even doing something as simple doing yd work which couple of months ago would not have caused him to have the symptoms he has occurrence of them     -patient currently smokes 5 cigarettes per day, 6-7 beer per week alcohol use, and denies any illicit drug use   -patient denies any known family history of heart disease that he is aware of      Patient Active Problem List   Diagnosis    Benign essential hypertension    ED (erectile dysfunction) of non-organic origin    Hypercholesterolemia    Raynaud's phenomenon    Peripheral vascular disease ([facility] [address] )    Peripheral neuropathy    Pityriasis rosea    Negative depression screening    Overweight with body mass index (BMI) of 26 to 26 9 in adult    Aortic valve stenosis    Other chest pain    Palpitations     Past Medical History:   Diagnosis Date    Benign essential hypertension 05/08/2014    Other chest pain     Palpitations      Social History     Socioeconomic History    Marital status: /Civil Union     Spouse name: Not on file    Number of children: Not on file    Years of education: Not on file    Highest education level: Not on file   Occupational History    Not on file   Tobacco Use    Smoking status: Current Every Day Smoker     Packs/day: 0 25    Smokeless tobacco: Never Used   Vaping Use    Vaping Use: Every day   Substance and Sexual Activity    Alcohol use:  Yes    Drug use: No    Sexual activity: Not on file   Other Topics Concern    Not on file   Social History Narrative    EMPLOYED         Social Determinants of Health     Financial Resource Strain: Not on file   Food Insecurity: Not on file   Transportation Needs: Not on file   Physical Activity: Not on file   Stress: Not on file   Social Connections: Not on file   Intimate Partner Violence: Not on file   Housing Stability: Not on file      Family History   Problem Relation Age of Onset    No Known Problems Mother     No Known Problems Father      Past Surgical History:   Procedure Laterality Date    HERNIA REPAIR         Current Outpatient Medications:     aspirin 81 mg chewable tablet, Chew 81 mg daily, Disp: , Rfl:     lisinopril (ZESTRIL) 20 mg tablet, Take 1 tablet (20 mg total) by mouth daily, Disp: 30 tablet, Rfl: 0    omeprazole (PriLOSEC) 20 mg delayed release capsule, Take 1 capsule (20 mg total) by mouth daily, Disp: 30 capsule, Rfl: 0  No Known Allergies      Labs:  Admission on 06/30/2022, Discharged on 06/30/2022   Component Date Value    WBC 06/30/2022 9 78     RBC 06/30/2022 5 33     Hemoglobin 06/30/2022 15 1     Hematocrit 06/30/2022 45 6     MCV 06/30/2022 86     MCH 06/30/2022 28 3     MCHC 06/30/2022 33 1     RDW 06/30/2022 14 0     MPV 06/30/2022 9 2     Platelets 37/46/1258 316     nRBC 06/30/2022 0     Neutrophils Relative 06/30/2022 67     Immat GRANS % 06/30/2022 0     Lymphocytes Relative 06/30/2022 21     Monocytes Relative 06/30/2022 10     Eosinophils Relative 06/30/2022 2     Basophils Relative 06/30/2022 0     Neutrophils Absolute 06/30/2022 6 46     Immature Grans Absolute 06/30/2022 0 02     Lymphocytes Absolute 06/30/2022 2 09     Monocytes Absolute 06/30/2022 0 96     Eosinophils Absolute 06/30/2022 0 21     Basophils Absolute 06/30/2022 0 04     Sodium 06/30/2022 136     Potassium 06/30/2022 4 2     Chloride 06/30/2022 102     CO2 06/30/2022 25     ANION GAP 06/30/2022 9     BUN 06/30/2022 14     Creatinine 06/30/2022 0 79     Glucose 06/30/2022 91     Calcium 06/30/2022 9 2     AST 06/30/2022 15     ALT 06/30/2022 13     Alkaline Phosphatase 06/30/2022 84     Total Protein 06/30/2022 7 2     Albumin 06/30/2022 4 4     Total Bilirubin 06/30/2022 0 70     eGFR 06/30/2022 95     hs TnI 0hr 06/30/2022 3     hs TnI 2hr 06/30/2022 3     Delta 2hr hsTnI 06/30/2022 0     hs TnI 4hr 06/30/2022 3     Delta 4hr hsTnI 06/30/2022 0     AV area peak [place]oac[place] 06/30/2022 1 2     AV peak gradient 06/30/2022 94     LA size 06/30/2022 3 9     Aortic valve mean veloci* 06/30/2022 22 10     LVPWd 06/30/2022 0 90     Left Atrium Area-systoli* 06/30/2022 13 6     MV E' Tissue Velocity Se* 06/30/2022 8     IVSd 06/30/2022 4 75     LV DIASTOLIC VOLUME (MOD* 40/42/0119 85     LEFT VENTRICLE SYSTOLIC * 61/22/8709 19     Left ventricular stroke * 06/30/2022 66 00     AV Deceleration Time 06/30/2022 1,856     A4C EF 06/30/2022 79     LVIDd 06/30/2022 4 30     IVS 06/30/2022 1 3     LVIDS 06/30/2022 2 40     FS 06/30/2022 44     Asc Ao 06/30/2022 3 2     Ao root 06/30/2022 2 90     RVID d 06/30/2022 3 5     LVOT mn grad 06/30/2022 4 0     AV area by cont VTI 06/30/2022 1 3     AV regurgitation pressur* 06/30/2022 538     AV mean gradient 06/30/2022 21     AV LVOT peak gradient 06/30/2022 5     MV valve area p 1/2 meth* 06/30/2022 3 79     E wave deceleration time 06/30/2022 200     LVOT diameter 06/30/2022 2 0     LVOT peak michoacano 06/30/2022 1 14     LVOT peak VTI 06/30/2022 28 03     Ao peak [place] retrograde 06/30/2022 2 93     Ao VTI 06/30/2022 68 15     LVOT stroke volume 06/30/2022 88 01     AV peak gradient 06/30/2022 34     MV Peak E Kevin 06/30/2022 87     MV Peak A Kevin 06/30/2022 1 03     MV stenosis pressure 1/2* 06/30/2022 58     LVOT SI 06/30/2022 52 30     LVSV, 2D 06/30/2022 66     LVOT area 06/30/2022 3 14     AV Velocity Ratio 06/30/2022 0 39     AV valve area 06/30/2022 1 29     LV EF 06/30/2022 65     BNP 06/30/2022 51     Ventricular Rate 06/30/2022 70     Atrial Rate 06/30/2022 70     OH Interval 06/30/2022 158     QRSD Interval 06/30/2022 98     QT Interval 06/30/2022 384     QTC Interval 06/30/2022 414     P Axis 06/30/2022 51     QRS Axis 06/30/2022 -52     T Wave Axis 06/30/2022 56     Ventricular Rate 06/30/2022 65     Atrial Rate 06/30/2022 65     OH Interval 06/30/2022 174     QRSD Interval 06/30/2022 84     QT Interval 06/30/2022 400     QTC Interval 06/30/2022 416     P Axis 06/30/2022 57     QRS Axis 06/30/2022 -39     T Wave Axis 06/30/2022 48     Ventricular Rate 06/30/2022 70     Atrial Rate 06/30/2022 70     OH Interval 06/30/2022 166     QRSD Interval 06/30/2022 88     QT Interval 06/30/2022 396     QTC Interval 06/30/2022 427     P Axis 06/30/2022 48     QRS Axis 06/30/2022 -44     T Wave [place] 06/30/2022 [place] [facility] Visit on 06/20/2022   Component Date Value    Baseline HR 06/20/2022 71     Baseline BP 06/20/2022 144/84     O2 sat rest 06/20/2022 98     Stress peak HR 06/20/2022 137     Post peak BP 06/20/2022 154     Rate Pressure Product 06/20/2022 21,098 0     O2 sat peak 06/20/2022 95     Recovery HR 06/20/2022 88     Recovery BP 06/20/2022 130/74     O2 sat recovery 06/20/2022 97     Max HR 06/20/2022 137     Max HR Percent 06/20/2022 87     Exercise duration (min) 06/20/2022 9     Exercise duration (sec) 06/20/2022 0     Estimated workload 06/20/2022 10 1     Angina Index 06/20/2022 0     Stress Stage Reached 06/20/2022 3 0     Protocol Name 06/20/2022 AMPARO     Time In Exercise Phase 06/20/2022 00:09:00     MAX  SYSTOLIC BP 12/77/7986 305     Max Diastolic Bp 41/29/7598 94     Max Heart Rate 06/20/2022 137     Max Predicted Heart Rate 06/20/2022 157     Reason for Termination 06/20/2022                      Value:Target Heart Rate Achieved  Fatigue  Chest discomfort      Test Indication 06/20/2022 Screening for CAD     Target Hr Formular 06/20/2022 (220 - Age)*85%     Arrhy During Ex 06/20/2022 ventricular premature beats-isolated     Chest Pain Statement 06/20/2022 none    Admission on 06/18/2022, Discharged on 06/18/2022   Component Date Value    WBC 06/18/2022 9 54     RBC 06/18/2022 5 14     Hemoglobin 06/18/2022 14 5     Hematocrit 06/18/2022 44 6     MCV 06/18/2022 87     MCH 06/18/2022 28 2     MCHC 06/18/2022 32 5     RDW 06/18/2022 13 7     MPV 06/18/2022 9 0     Platelets 87/57/1728 364     nRBC 06/18/2022 0     Neutrophils Relative 06/18/2022 63     Immat GRANS % 06/18/2022 0     Lymphocytes Relative 06/18/2022 24     Monocytes Relative 06/18/2022 9     Eosinophils Relative 06/18/2022 3     Basophils Relative 06/18/2022 1     Neutrophils Absolute 06/18/2022 5 94     Immature Grans Absolute 06/18/2022 0 02     Lymphocytes Absolute 06/18/2022 2 32     Monocytes Absolute 06/18/2022 0 89     Eosinophils Absolute 06/18/2022 0 31     Basophils Absolute 06/18/2022 0 06     Sodium 06/18/2022 138     Potassium 06/18/2022 3 5     Chloride 06/18/2022 106     CO2 06/18/2022 25     ANION GAP 06/18/2022 7     BUN 06/18/2022 17     Creatinine 06/18/2022 0 72     Glucose 06/18/2022 96     Calcium 06/18/2022 8 9     eGFR 06/18/2022 99     hs TnI 0hr 06/18/2022 4     D-Dimer, Quant 06/18/2022 0 31     Ventricular Rate 06/18/2022 68     Atrial Rate 06/18/2022 68     NC Interval 06/18/2022 184     QRSD Interval 06/18/2022 94     QT Interval 06/18/2022 384     QTC Interval 06/18/2022 408     P Axis 06/18/2022 55     QRS Axis 06/18/2022 -37     T Wave Axis 06/18/2022 51    Admission on 06/15/2022, Discharged on 06/16/2022   Component Date Value    SARS-CoV-2 06/16/2022 Negative     INFLUENZA A PCR 06/16/2022 Negative     INFLUENZA B PCR 06/16/2022 Negative     RSV PCR 06/16/2022 Negative     Blood Culture 06/16/2022 No Growth After 5 Days   Blood Culture 06/16/2022 No Growth After 5 Days       Procalcitonin 06/16/2022 <0 05     LACTIC ACID 06/16/2022 0 7     Color, UA 06/16/2022 Straw     Clarity, UA 06/16/2022 Clear     Specific Gravity, UA 06/16/2022 1 010     pH, UA 06/16/2022 6 0     Leukocytes, UA 06/16/2022 Negative     Nitrite, UA 06/16/2022 Negative     Protein, UA 06/16/2022 Negative     Glucose, UA 06/16/2022 Negative     Ketones, UA 06/16/2022 Negative     Urobilinogen, UA 06/16/2022 0 2     Bilirubin, UA 06/16/2022 Negative     Occult Blood, UA 06/16/2022 Trace-Intact (A)    Total CK 06/16/2022 42     WBC 06/16/2022 9 58     RBC 06/16/2022 5 25     Hemoglobin 06/16/2022 15 0     Hematocrit 06/16/2022 45 6     MCV 06/16/2022 87     MCH 06/16/2022 28 6     MCHC 06/16/2022 32 9     RDW 06/16/2022 13 9     MPV 06/16/2022 9 1     Platelets 23/59/5631 382     nRBC 06/16/2022 0     Neutrophils Relative 06/16/2022 58     Immat GRANS % 06/16/2022 0     Lymphocytes Relative 06/16/2022 29     Monocytes Relative 06/16/2022 9     Eosinophils Relative 06/16/2022 3     Basophils Relative 06/16/2022 1     Neutrophils Absolute 06/16/2022 5 49     Immature Grans Absolute 06/16/2022 0 04     Lymphocytes Absolute 06/16/2022 2 82     Monocytes Absolute 06/16/2022 0 88     Eosinophils Absolute 06/16/2022 0 29     Basophils Absolute 06/16/2022 0 06     Sodium 06/16/2022 136     Potassium 06/16/2022 3 8     Chloride 06/16/2022 103     CO2 06/16/2022 23     ANION GAP 06/16/2022 10     BUN 06/16/2022 24     Creatinine 06/16/2022 0 81     Glucose 06/16/2022 92     Calcium 06/16/2022 9 5     AST 06/16/2022 11 (A)    ALT 06/16/2022 10     Alkaline Phosphatase 06/16/2022 83     Total Protein 06/16/2022 6 9     Albumin 06/16/2022 4 4     Total Bilirubin 06/16/2022 0 28     eGFR 06/16/2022 94     Lipase 06/16/2022 12     hs TnI 0hr 06/16/2022 10     hs TnI 2hr 06/16/2022 9     Delta 2hr hsTnI 06/16/2022 -1     RBC, UA 06/16/2022 0-1     WBC, UA 06/16/2022 None Seen     Epithelial Cells 06/16/2022 Occasional     Bacteria, UA 06/16/2022 None Seen     Ventricular Rate 06/16/2022 74     Atrial Rate 06/16/2022 74     CA Interval 06/16/2022 190     QRSD Interval 06/16/2022 88     QT Interval 06/16/2022 364     QTC Interval 06/16/2022 404     P Axis 06/16/2022 50     QRS Axis 06/16/2022 -38     T Wave Axis 06/16/2022 34     Ventricular Rate 06/15/2022 87     Atrial Rate 06/15/2022 87     CA Interval 06/15/2022 178     QRSD Interval 06/15/2022 96     QT Interval 06/15/2022 344     QTC Interval 06/15/2022 413     P Axis 06/15/2022 69     QRS Axis 06/15/2022 -48     T Wave Axis 06/15/2022 56         Imaging: XR chest 1 view portable    Result Date: 6/30/2022  Narrative: CHEST INDICATION:   chest pain  COMPARISON:  None EXAM PERFORMED/VIEWS:  XR CHEST PORTABLE FINDINGS: Cardiomediastinal silhouette appears unremarkable  The lungs are clear  No pneumothorax or pleural effusion  Osseous structures appear within normal limits for patient age  Impression: No acute cardiopulmonary disease  Workstation performed: [id number]     X-ray chest 1 view portable    Result Date: 6/19/2022  Narrative: CHEST INDICATION:   chest pain  COMPARISON:  6/16/2022 EXAM PERFORMED/VIEWS:  XR CHEST PORTABLE FINDINGS: Cardiomediastinal silhouette appears unremarkable  The lungs are clear  No pneumothorax or pleural effusion  Osseous structures appear within normal limits for patient age  Impression: No acute cardiopulmonary disease  Workstation performed: [id number]     XR chest portable    Result Date: 6/16/2022  Narrative: CHEST INDICATION:   CHILLS  COMPARISON:  11/10/2011 EXAM PERFORMED/VIEWS:  XR CHEST PORTABLE FINDINGS: Cardiomediastinal silhouette appears unremarkable  The lungs are clear  No pneumothorax or pleural effusion  Osseous structures appear within normal limits for patient age  Impression: No acute cardiopulmonary disease  Findings concur with the preliminary report by the referring clinician already in PACS and/or our electronic record EPIC  Workstation performed: [id number]     Stress test only, exercise    Result Date: 6/20/2022  Narrative: [facility]  Stress ECG: Arrhythmias during recovery: rare PVCs  The stress ECG is negative for ischemia after maximal exercise, without reproduction of symptoms  Normal study  Results reviewed with  patient  Stress strip    Result Date: 6/22/2022  Narrative: Confirmed by URIAH BOUCHER ([phone]),  Hilary Phipps ([id number]) on 6/22/2022 2:52:54 PM    Echo complete w/ contrast if indicated    Result Date: 6/30/2022  Narrative: [facility]  Left Ventricle: Left ventricular cavity size is normal  Wall thickness is mildly increased  The left ventricular ejection fraction is 65%  Systolic function is normal  Wall motion is normal  Diastolic function is mildly abnormal, consistent with grade I (abnormal) relaxation    Aortic Valve: There is mild to moderate regurgitation  There is mild to moderate stenosis  The aortic valve velocity is increased in the setting of stenosis and increased flow    Mitral Valve: There is annular calcification  There is mild regurgitation    Pulmonic Valve: There is mild regurgitation  Holter monitor    Result Date: 6/27/2022  Narrative: Indications: Palpitations A Holter Monitor was performed for  48 hours  Impression: The basic mechanism was sinus with rates ranging from  49 beats per minute to 140 beats per minute  The average heart rate was 74 beats per minute  Supraventricular ectopic beats were common totaling 275 beats but there was no atrial fibrillation or significant runs  Ventricular ectopic beats were uncommon totaling 76 beats but there were no runs  There were no significant pauses  No symptoms were noted during the monitoring phase  Tho Brooks MD       Review of Systems:  Review of Systems   Constitutional: Negative for chills, diaphoresis, fatigue, fever and unexpected weight change  HENT: Negative for trouble swallowing and voice change  Eyes: Negative for pain and redness  Respiratory: Negative for shortness of breath and wheezing  Cardiovascular: Negative for chest pain, palpitations and leg swelling  Gastrointestinal: Negative for abdominal pain, blood in stool, constipation, diarrhea, nausea and vomiting  Genitourinary: Negative for dysuria  Musculoskeletal: Negative for neck pain and neck stiffness  Skin: Negative for rash  Neurological: Negative for dizziness, syncope, light-headedness and headaches  Psychiatric/Behavioral: Negative for agitation and confusion  All other systems reviewed and are negative  Vitals:    07/11/22 1252   BP: 130/70   Pulse: 77   Weight: 67 1 kg (148 lb)   Height: 5' 4" (1 626 m)     Vitals:    07/11/22 1252   Weight: 67 1 kg (148 lb)     Height: 5' 4" (162 6 cm)     Physical Exam:  Physical Exam  Vitals reviewed  Constitutional:       General: He is not in acute distress  Appearance: He is well-developed  He is not diaphoretic  HENT:      Head: Normocephalic and atraumatic  Neck:      Vascular: No JVD  Trachea: No tracheal deviation  Cardiovascular:      Rate and Rhythm: Normal rate and regular rhythm  Heart sounds: Murmur heard  No friction rub  Pulmonary:      Effort: Pulmonary effort is normal  No tachypnea or respiratory distress  Breath sounds: No wheezing or rhonchi  Abdominal:      General: Bowel sounds are normal       Palpations: Abdomen is soft  Tenderness: There is no abdominal tenderness  There is no rebound     Musculoskeletal:      Right lower leg: No edema  Left lower leg: No edema  Skin:     General: Skin is warm and dry  Neurological:      Mental Status: He is alert  Comments: Awake, alert, able to answer questions appropriately, able move extremities bilaterally     Psychiatric:         Mood and Affect: Mood normal          Behavior: Behavior normal

## 2022-07-13 ENCOUNTER — TELEPHONE (OUTPATIENT)
Dept: CARDIOLOGY CLINIC | Facility: CLINIC | Age: 62
End: 2022-07-13

## 2022-07-13 NOTE — TELEPHONE ENCOUNTER
Patient scheduled for [facility] on 7/28/22 in [facility] with Dr Geetha Mario  Instructions sent to patient through [address]  Patient had labs drawn 6/30/22  Can I have auth?

## 2022-07-18 ENCOUNTER — APPOINTMENT (OUTPATIENT)
Dept: LAB | Facility: CLINIC | Age: 62
End: 2022-07-18
Payer: COMMERCIAL

## 2022-07-18 DIAGNOSIS — R07.9 CHEST PAIN, UNSPECIFIED TYPE: ICD-10-CM

## 2022-07-18 DIAGNOSIS — I10 BENIGN ESSENTIAL HYPERTENSION: Chronic | ICD-10-CM

## 2022-07-18 DIAGNOSIS — E78.00 HYPERCHOLESTEROLEMIA: ICD-10-CM

## 2022-07-18 DIAGNOSIS — R06.09 DYSPNEA ON EXERTION: ICD-10-CM

## 2022-07-18 LAB
CHOLEST SERPL-MCNC: 162 MG/DL
HDLC SERPL-MCNC: 28 MG/DL
INR PPP: 0.97 (ref 0.84–1.19)
LDLC SERPL CALC-MCNC: 111 MG/DL (ref 0–100)
NONHDLC SERPL-MCNC: 134 MG/DL
PROTHROMBIN TIME: 12.5 SECONDS (ref 11.6–14.5)
PSA SERPL-MCNC: 6.6 NG/ML (ref 0–4)
TRIGL SERPL-MCNC: 117 MG/DL
TSH SERPL DL<=0.05 MIU/L-ACNC: 2.48 UIU/ML (ref 0.45–4.5)

## 2022-07-18 PROCEDURE — 80061 LIPID PANEL: CPT

## 2022-07-18 PROCEDURE — 36415 COLL VENOUS BLD VENIPUNCTURE: CPT

## 2022-07-18 PROCEDURE — 84443 ASSAY THYROID STIM HORMONE: CPT

## 2022-07-18 PROCEDURE — 85610 PROTHROMBIN TIME: CPT

## 2022-07-18 PROCEDURE — G0103 PSA SCREENING: HCPCS

## 2022-07-20 ENCOUNTER — OFFICE VISIT (OUTPATIENT)
Dept: FAMILY MEDICINE CLINIC | Facility: CLINIC | Age: 62
End: 2022-07-20
Payer: COMMERCIAL

## 2022-07-20 VITALS
OXYGEN SATURATION: 98 % | HEART RATE: 74 BPM | BODY MASS INDEX: 25.18 KG/M2 | HEIGHT: 64 IN | DIASTOLIC BLOOD PRESSURE: 68 MMHG | TEMPERATURE: 97.6 F | WEIGHT: 147.5 LBS | SYSTOLIC BLOOD PRESSURE: 110 MMHG

## 2022-07-20 DIAGNOSIS — Z23 ENCOUNTER FOR IMMUNIZATION: ICD-10-CM

## 2022-07-20 DIAGNOSIS — R97.20 ELEVATED PSA: ICD-10-CM

## 2022-07-20 DIAGNOSIS — I35.0 AORTIC VALVE STENOSIS, ETIOLOGY OF CARDIAC VALVE DISEASE UNSPECIFIED: ICD-10-CM

## 2022-07-20 DIAGNOSIS — Z12.11 SCREENING FOR COLON CANCER: ICD-10-CM

## 2022-07-20 DIAGNOSIS — Z00.00 ANNUAL PHYSICAL EXAM: Primary | ICD-10-CM

## 2022-07-20 DIAGNOSIS — I20.9 ANGINA PECTORIS (HCC): ICD-10-CM

## 2022-07-20 DIAGNOSIS — Z71.6 ENCOUNTER FOR SMOKING CESSATION COUNSELING: ICD-10-CM

## 2022-07-20 PROCEDURE — 99396 PREV VISIT EST AGE 40-64: CPT

## 2022-07-20 PROCEDURE — 3074F SYST BP LT 130 MM HG: CPT

## 2022-07-20 PROCEDURE — 3725F SCREEN DEPRESSION PERFORMED: CPT

## 2022-07-20 PROCEDURE — 3078F DIAST BP <80 MM HG: CPT

## 2022-07-20 NOTE — PATIENT INSTRUCTIONS
Wellness Visit for Adults   AMBULATORY CARE:   A wellness visit  is when you see your healthcare provider to get screened for health problems  Your healthcare provider will also give you advice on how to stay healthy  Write down your questions so you remember to ask them  Ask your healthcare provider how often you should have a wellness visit  What happens at a wellness visit:  Your healthcare provider will ask about your health, and your family history of health problems  This includes high blood pressure, heart disease, and cancer  He or she will ask if you have symptoms that concern you, if you smoke, and about your mood  You may also be asked about your intake of medicines, supplements, food, and alcohol  Any of the following may be done:  · Your weight  will be checked  Your height may also be checked so your body mass index (BMI) can be calculated  Your BMI shows if you are at a healthy weight  · Your blood pressure  and heart rate will be checked  Your temperature may also be checked  · Blood and urine tests  may be done  Blood tests may be done to check your cholesterol levels  Abnormal cholesterol levels increase your risk for heart disease and stroke  You may also need a blood or urine test to check for diabetes if you are at increased risk  Urine tests may be done to look for signs of an infection or kidney disease  · A physical exam  includes checking your heartbeat and lungs with a stethoscope  Your healthcare provider may also check your skin to look for sun damage  · Screening tests  may be recommended  A screening test is done to check for diseases that may not cause symptoms  The screening tests you may need depend on your age, gender, family history, and lifestyle habits  For example, colorectal screening may be recommended if you are 48years old or older  Screening tests you need if you are a woman:   · A Pap smear  is used to screen for cervical cancer   Pap smears are usually done every 3 to 5 years depending on your age  You may need them more often if you have had abnormal Pap smear test results in the past  Ask your healthcare provider how often you should have a Pap smear  · A mammogram  is an x-ray of your breasts to screen for breast cancer  Experts recommend mammograms every 2 years starting at age 48 years  You may need a mammogram at age 52 years or younger if you have an increased risk for breast cancer  Talk to your healthcare provider about when you should start having mammograms and how often you need them  Vaccines you may need:   · Get an influenza vaccine  every year  The influenza vaccine protects you from the flu  Several types of viruses cause the flu  The viruses change over time, so new vaccines are made each year  · Get a tetanus-diphtheria (Td) booster vaccine  every 10 years  This vaccine protects you against tetanus and diphtheria  Tetanus is a severe infection that may cause painful muscle spasms and lockjaw  Diphtheria is a severe bacterial infection that causes a thick covering in the back of your mouth and throat  · Get a human papillomavirus (HPV) vaccine  if you are female and aged 23 to 32 or male 23 to 24 and never received it  This vaccine protects you from HPV infection  HPV is the most common infection spread by sexual contact  HPV may also cause vaginal, penile, and anal cancers  · Get a pneumococcal vaccine  if you are aged 72 years or older  The pneumococcal vaccine is an injection given to protect you from pneumococcal disease  Pneumococcal disease is an infection caused by pneumococcal bacteria  The infection may cause pneumonia, meningitis, or an ear infection  · Get a shingles vaccine  if you are 60 or older, even if you have had shingles before  The shingles vaccine is an injection to protect you from the varicella-zoster virus  This is the same virus that causes chickenpox   Shingles is a painful rash that develops in people who had chickenpox or have been exposed to the virus  How to eat healthy:  My Plate is a model for planning healthy meals  It shows the types and amounts of foods that should go on your plate  Fruits and vegetables make up about half of your plate, and grains and protein make up the other half  A serving of dairy is included on the side of your plate  The amount of calories and serving sizes you need depends on your age, gender, weight, and height  Examples of healthy foods are listed below:  · Eat a variety of vegetables  such as dark green, red, and orange vegetables  You can also include canned vegetables low in sodium (salt) and frozen vegetables without added butter or sauces  · Eat a variety of fresh fruits , canned fruit in 100% juice, frozen fruit, and dried fruit  · Include whole grains  At least half of the grains you eat should be whole grains  Examples include whole-wheat bread, wheat pasta, brown rice, and whole-grain cereals such as oatmeal     · Eat a variety of protein foods such as seafood (fish and shellfish), lean meat, and poultry without skin (turkey and chicken)  Examples of lean meats include pork leg, shoulder, or tenderloin, and beef round, sirloin, tenderloin, and extra lean ground beef  Other protein foods include eggs and egg substitutes, beans, peas, soy products, nuts, and seeds  · Choose low-fat dairy products such as skim or 1% milk or low-fat yogurt, cheese, and cottage cheese  · Limit unhealthy fats  such as butter, hard margarine, and shortening  Exercise:  Exercise at least 30 minutes per day on most days of the week  Some examples of exercise include walking, biking, dancing, and swimming  You can also fit in more physical activity by taking the stairs instead of the elevator or parking farther away from stores  Include muscle strengthening activities 2 days each week  Regular exercise provides many health benefits   It helps you manage your weight, and decreases your risk for type 2 diabetes, heart disease, stroke, and high blood pressure  Exercise can also help improve your mood  Ask your healthcare provider about the best exercise plan for you  General health and safety guidelines:   · Do not smoke  Nicotine and other chemicals in cigarettes and cigars can cause lung damage  Ask your healthcare provider for information if you currently smoke and need help to quit  E-cigarettes or smokeless tobacco still contain nicotine  Talk to your healthcare provider before you use these products  · Limit alcohol  A drink of alcohol is 12 ounces of beer, 5 ounces of wine, or 1½ ounces of liquor  · Lose weight, if needed  Being overweight increases your risk of certain health conditions  These include heart disease, high blood pressure, type 2 diabetes, and certain types of cancer  · Protect your skin  Do not sunbathe or use tanning beds  Use sunscreen with a SPF 15 or higher  Apply sunscreen at least 15 minutes before you go outside  Reapply sunscreen every 2 hours  Wear protective clothing, hats, and sunglasses when you are outside  · Drive safely  Always wear your seatbelt  Make sure everyone in your car wears a seatbelt  A seatbelt can save your life if you are in an accident  Do not use your cell phone when you are driving  This could distract you and cause an accident  Pull over if you need to make a call or send a text message  · Practice safe sex  Use latex condoms if are sexually active and have more than one partner  Your healthcare provider may recommend screening tests for sexually transmitted infections (STIs)  · Wear helmets, lifejackets, and protective gear  Always wear a helmet when you ride a bike or motorcycle, go skiing, or play sports that could cause a head injury  Wear protective equipment when you play sports  Wear a lifejacket when you are on a boat or doing water sports      © Copyright [address] 2022 Information is for End User's use only and may not be sold, redistributed or otherwise used for commercial purposes  All illustrations and images included in CareNotes® are the copyrighted property of A D A M [employer]  or [employer]  The above information is an  only  It is not intended as medical advice for individual conditions or treatments  Talk to your doctor, nurse or pharmacist before following any medical regimen to see if it is safe and effective for you  Heart Healthy Diet   AMBULATORY CARE:   A heart healthy diet  is an eating plan low in unhealthy fats and sodium (salt)  The plan is high in healthy fats and fiber  A heart healthy diet helps improve your cholesterol levels and lowers your risk for heart disease and stroke  A dietitian will teach you how to read and understand food labels  Heart healthy diet guidelines to follow:   · Choose foods that contain healthy fats  ? Unsaturated fats  include monounsaturated and polyunsaturated fats  Unsaturated fat is found in foods such as soybean, canola, olive, corn, and safflower oils  It is also found in soft tub margarine that is made with liquid vegetable oil  ? Omega-3 fat  is found in certain fish, such as salmon, tuna, and trout, and in walnuts and flaxseed  Eat fish high in omega-3 fats at least 2 times a week  · Get 20 to 30 grams of fiber each day  Fruits, vegetables, whole-grain foods, and legumes (cooked beans) are good sources of fiber  · Limit or do not have unhealthy fats  ? Cholesterol  is found in animal foods, such as eggs and lobster, and in dairy products made from whole milk  Limit cholesterol to less than 200 mg each day  ? Saturated fat  is found in meats, such as baires and hamburger  It is also found in chicken or turkey skin, whole milk, and butter  Limit saturated fat to less than 7% of your total daily calories  ? Trans fat  is found in packaged foods, such as potato chips and cookies   It is also in hard margarine, some fried foods, and shortening  Do not eat foods that contain trans fats  · Limit sodium as directed  You may be told to limit sodium to 2,000 to 2,300 mg each day  Choose low-sodium or no-salt-added foods  Add little or no salt to food you prepare  Use herbs and spices in place of salt  Include the following in your heart healthy plan:  Ask your dietitian or healthcare provider how many servings to have from each of the following food groups:  · Grains:      ? Whole-wheat breads, cereals, and pastas, and brown rice    ? Low-fat, low-sodium crackers and chips    · Vegetables:      ? Broccoli, green beans, green peas, and spinach    ? Collards, kale, and lima beans    ? Carrots, sweet potatoes, tomatoes, and peppers    ? Canned vegetables with no salt added    · Fruits:      ? Bananas, peaches, pears, and pineapple    ? Grapes, raisins, and dates    ? Oranges, tangerines, grapefruit, orange juice, and grapefruit juice    ? Apricots, mangoes, melons, and papaya    ? Raspberries and strawberries    ? Canned fruit with no added sugar    · Low-fat dairy:      ? Nonfat (skim) milk, 1% milk, and low-fat almond, cashew, or soy milks fortified with calcium    ? Low-fat cheese, regular or frozen yogurt, and cottage cheese    · Meats and proteins:      ? Lean cuts of beef and pork (loin, leg, round), skinless chicken and turkey    ? Legumes, soy products, egg whites, or nuts    Limit or do not include the following in your heart healthy plan:   · Unhealthy fats and oils:      ? Whole or 2% milk, cream cheese, sour cream, or cheese    ? High-fat cuts of beef (T-bone steaks, ribs), chicken or turkey with skin, and organ meats such as liver    ?  Butter, stick margarine, shortening, and cooking oils such as coconut or palm oil    · Foods and liquids high in sodium:      ? Packaged foods, such as frozen dinners, cookies, macaroni and cheese, and cereals with more than 300 mg of sodium per serving    ? Vegetables with added sodium, such as instant potatoes, vegetables with added sauces, or regular canned vegetables    ? Cured or smoked meats, such as hot dogs, baires, and sausage    ? High-sodium ketchup, barbecue sauce, salad dressing, pickles, olives, soy sauce, or miso    · Foods and liquids high in sugar:      ? Candy, cake, cookies, pies, or doughnuts    ? Soft drinks (soda), sports drinks, or sweetened tea    ? Canned or dry mixes for cakes, soups, sauces, or gravies    Other healthy heart guidelines:   · Do not smoke  Nicotine and other chemicals in cigarettes and cigars can cause lung and heart damage  Ask your healthcare provider for information if you currently smoke and need help to quit  E-cigarettes or smokeless tobacco still contain nicotine  Talk to your healthcare provider before you use these products  · Limit or do not drink alcohol as directed  Alcohol can damage your heart and raise your blood pressure  Your healthcare provider may give you specific daily and weekly limits  The general recommended limit is 1 drink a day for women 21 or older and for men 72 or older  Do not have more than 3 drinks in a day or 7 in a week  The recommended limit is 2 drinks a day for men 24to 59years of age  Do not have more than 4 drinks in a day or 14 in a week  A drink of alcohol is 12 ounces of beer, 5 ounces of wine, or 1½ ounces of liquor  · Exercise regularly  Exercise can help you maintain a healthy weight and improve your blood pressure and cholesterol levels  Regular exercise can also decrease your risk for heart problems  Ask your healthcare provider about the best exercise plan for you  Do not start an exercise program without asking your healthcare provider  Follow up with your doctor or cardiologist as directed:  Write down your questions so you remember to ask them during your visits    © Copyright [employer] 2022 Information is for End User's use only and may not be sold, redistributed or otherwise used for commercial purposes  All illustrations and images included in CareNotes® are the copyrighted property of A [employer] A M [employer] Inc  or Salud Stein  The above information is an  only  It is not intended as medical advice for individual conditions or treatments  Talk to your doctor, nurse or pharmacist before following any medical regimen to see if it is safe and effective for you

## 2022-07-20 NOTE — PROGRESS NOTES
ADULT ANNUAL [address] [facility]    NAME: Alvin Quintana  AGE: 59 y o  SEX: male  : 1960     DATE: 2022     Assessment and Plan:     Problem List Items Addressed This Visit        Cardiovascular and Mediastinum    Aortic valve stenosis      Other Visit Diagnoses     Annual physical exam    -  Primary    Angina pectoris [facility])        h/o angina; CAD  f/u with cardiology  neg holter/stress test  planned card cath on    f/u accordingly    Screening for colon cancer        10 year f/u and screening   prior Col  negative 10 yrs ago      Relevant Orders    Cologuard    BMI 25 0-25 9,adult        diet nad exercise counsling    Elevated PSA        PSA 6 6  f/u with urology  imaging order placed     Relevant Orders    US kidney and bladder with pvr    Ambulatory Referral to Urology    PSA, total and free    Encounter for immunization        Encounter for smoking cessation counseling        in the process  down from 1 PD to 4-5 cig  a day  consider starting med on next visit  pt agreed          Immunizations and preventive care screenings were discussed with patient today  Appropriate education was printed on patient's after visit summary  Counseling:  Alcohol/drug use: discussed moderation in alcohol intake, the recommendations for healthy alcohol use, and avoidance of illicit drug use  Dental Health: discussed importance of regular tooth brushing, flossing, and dental visits  · Exercise: the importance of regular exercise/physical activity was discussed  Recommend exercise 3-5 times per week for at least 30 minutes  BMI Counseling: Body mass index is 25 32 kg/m²  The BMI is above normal  Nutrition recommendations include decreasing portion sizes, encouraging healthy choices of fruits and vegetables, consuming healthier snacks and moderation in carbohydrate intake  Exercise recommendations include exercising 3-5 times per week  No pharmacotherapy was ordered  Rationale for BMI follow-up plan is due to patient being overweight or obese  Depression Screening and Follow-up Plan: Patient was screened for depression during today's encounter  They screened negative with a PHQ-2 score of 0  Tobacco Cessation Counseling: Tobacco cessation counseling was provided  Medication options discussed  5 ciggaretes a day for the past 3 wks  Down from 1 pack a day  Started smoking 38 yrs  15 years ago tried to stop smoking ; however failed trial   No meds tried  Will follow up on meds initiation to aid in smoking cessation on next visit      No follow-ups on file  Chief Complaint:     Chief Complaint   Patient presents with    Physical Exam     Annual       History of Present Illness:     Adult Annual Physical   Patient here for a comprehensive physical exam  The patient reports no problems  Diet and Physical Activity  · Diet/Nutrition: well balanced diet  · Exercise: no formal exercise  Depression Screening  PHQ-2/9 Depression Screening    Little interest or pleasure in doing things: 0 - not at all  Feeling down, depressed, or hopeless: 0 - not at all  PHQ-2 Score: 0  PHQ-2 Interpretation: Negative depression screen       General Health  · Sleep: gets 7-8 hours of sleep on average  · Hearing: normal - bilateral   · Vision: no vision problems  · Dental: regular dental visits   Health  · Symptoms include: urinary frequency     Review of Systems:     Review of Systems   Constitutional: Negative for appetite change, chills, fatigue and fever  HENT: Negative for sinus pressure, sinus pain, sore throat and trouble swallowing  Respiratory: Negative for cough, chest tightness, shortness of breath and wheezing  Cardiovascular: Negative for chest pain, palpitations and leg swelling  Gastrointestinal: Negative for abdominal pain, constipation, diarrhea and vomiting  Genitourinary: Positive for frequency   Negative for dysuria, flank pain and hematuria  Neurological: Positive for dizziness and light-headedness  Negative for syncope and weakness  Psychiatric/Behavioral: Negative for agitation and behavioral problems  Past Medical History:     Past Medical History:   Diagnosis Date    Benign essential hypertension 05/08/2014    Other chest pain     Palpitations       Past Surgical History:     Past Surgical History:   Procedure Laterality Date    HERNIA REPAIR        Family History:     Family History   Problem Relation Age of Onset    No Known Problems Mother     No Known Problems Father       Social History:     Social History     Socioeconomic History    Marital status: /Civil Union     Spouse name: None    Number of children: None    Years of education: None    Highest education level: None   Occupational History    None   Tobacco Use    Smoking status: Current Every Day Smoker     Packs/day: 0 25    Smokeless tobacco: Never Used   Vaping Use    Vaping Use: Every day   Substance and Sexual Activity    Alcohol use:  Yes    Drug use: No    Sexual activity: None   Other Topics Concern    None   Social History Narrative    EMPLOYED         Social Determinants of Health     Financial Resource Strain: Not on file   Food Insecurity: Not on file   Transportation Needs: Not on file   Physical Activity: Not on file   Stress: Not on file   Social Connections: Not on file   Intimate Partner Violence: Not on file   Housing Stability: Not on file      Current Medications:     Current Outpatient Medications   Medication Sig Dispense Refill    aspirin 81 mg chewable tablet Chew 81 mg daily      atorvastatin (LIPITOR) 20 mg tablet Take 1 tablet (20 mg total) by mouth daily 30 tablet 3    lisinopril (ZESTRIL) 20 mg tablet Take 1 tablet (20 mg total) by mouth daily 30 tablet 0    omeprazole (PriLOSEC) 20 mg delayed release capsule Take 1 capsule (20 mg total) by mouth daily 30 capsule 0     No current facility-administered medications for this visit  Allergies:     No Known Allergies   Physical Exam:     /68 (BP Location: Left arm, Patient Position: Sitting)   Pulse 74   Temp 97 6 °F (36 4 °C)   Ht 5' 4" (1 626 m)   Wt 66 9 kg (147 lb 8 oz)   SpO2 98%   BMI 25 32 kg/m²      Physical Exam  Constitutional:       Appearance: Normal appearance  HENT:      Head: Normocephalic and atraumatic  Mouth/Throat:      Mouth: Mucous membranes are moist       Pharynx: Oropharynx is clear  Eyes:      Conjunctiva/sclera: Conjunctivae normal       Pupils: Pupils are equal, round, and reactive to light  Cardiovascular:      Rate and Rhythm: Normal rate and regular rhythm  Pulses: Normal pulses  Carotid pulses are 2+ on the right side and 2+ on the left side  Radial pulses are 2+ on the right side and 2+ on the left side  Heart sounds: S1 normal and S2 normal  Murmur (aortic radiating to the Carotid area) heard  Systolic murmur is present with a grade of 3/6  Gallop present  S3 sounds present  Pulmonary:      Effort: Pulmonary effort is normal  No respiratory distress  Breath sounds: Normal breath sounds  No wheezing, rhonchi or rales  Musculoskeletal:      Right lower leg: No edema  Left lower leg: No edema  Skin:     Capillary Refill: Capillary refill takes less than 2 seconds  Neurological:      General: No focal deficit present  Mental Status: He is alert  Mental status is at baseline  Psychiatric:         Mood and Affect: Mood normal          Behavior: Behavior normal    Maureen Pinedo MD  [facility] 10 BMI Counseling: Body mass index is 25 32 kg/m²  The BMI is above normal  Nutrition recommendations include reducing portion sizes, decreasing overall calorie intake, 3-5 servings of fruits/vegetables daily and reducing fast food intake   Exercise recommendations include moderate aerobic physical activity for 150 minutes/week

## 2022-07-28 ENCOUNTER — HOSPITAL ENCOUNTER (OUTPATIENT)
Facility: HOSPITAL | Age: 62
Setting detail: OUTPATIENT SURGERY
Discharge: HOME | End: 2022-07-28
Attending: INTERNAL MEDICINE | Admitting: INTERNAL MEDICINE
Payer: COMMERCIAL

## 2022-07-28 VITALS
RESPIRATION RATE: 22 BRPM | HEART RATE: 64 BPM | HEIGHT: 64 IN | TEMPERATURE: 97.6 F | SYSTOLIC BLOOD PRESSURE: 123 MMHG | OXYGEN SATURATION: 97 % | DIASTOLIC BLOOD PRESSURE: 76 MMHG | WEIGHT: 147.05 LBS | BODY MASS INDEX: 25.1 KG/M2

## 2022-07-28 DIAGNOSIS — R06.09 DYSPNEA ON EXERTION: ICD-10-CM

## 2022-07-28 DIAGNOSIS — R07.9 CHEST PAIN, UNSPECIFIED TYPE: ICD-10-CM

## 2022-07-28 DIAGNOSIS — E66.3 OVERWEIGHT WITH BODY MASS INDEX (BMI) OF 26 TO 26.9 IN ADULT: Primary | ICD-10-CM

## 2022-07-28 DIAGNOSIS — I73.9 PERIPHERAL VASCULAR DISEASE (HCC): Primary | ICD-10-CM

## 2022-07-28 LAB
ANION GAP SERPL CALCULATED.3IONS-SCNC: 9 MMOL/L (ref 4–13)
ATRIAL RATE: 63 BPM
BUN SERPL-MCNC: 16 MG/DL (ref 5–25)
CALCIUM SERPL-MCNC: 8.4 MG/DL (ref 8.3–10.1)
CHLORIDE SERPL-SCNC: 107 MMOL/L (ref 96–108)
CO2 SERPL-SCNC: 25 MMOL/L (ref 21–32)
CREAT SERPL-MCNC: 0.73 MG/DL (ref 0.6–1.3)
GFR SERPL CREATININE-BSD FRML MDRD: 98 ML/MIN/1.73SQ M
GLUCOSE P FAST SERPL-MCNC: 102 MG/DL (ref 65–99)
GLUCOSE SERPL-MCNC: 102 MG/DL (ref 65–140)
KCT BLD-ACNC: 263 SEC (ref 89–137)
P AXIS: 33 DEGREES
POTASSIUM SERPL-SCNC: 3.9 MMOL/L (ref 3.5–5.3)
PR INTERVAL: 180 MS
QRS AXIS: -30 DEGREES
QRSD INTERVAL: 96 MS
QT INTERVAL: 424 MS
QTC INTERVAL: 433 MS
SODIUM SERPL-SCNC: 141 MMOL/L (ref 135–147)
SPECIMEN SOURCE: ABNORMAL
T WAVE AXIS: 27 DEGREES
VENTRICULAR RATE: 63 BPM

## 2022-07-28 PROCEDURE — C1887 CATHETER, GUIDING: HCPCS | Performed by: INTERNAL MEDICINE

## 2022-07-28 PROCEDURE — 93454 CORONARY ARTERY ANGIO S&I: CPT | Performed by: INTERNAL MEDICINE

## 2022-07-28 PROCEDURE — 93571 IV DOP VEL&/PRESS C FLO 1ST: CPT | Performed by: INTERNAL MEDICINE

## 2022-07-28 PROCEDURE — C1874 STENT, COATED/COV W/DEL SYS: HCPCS | Performed by: INTERNAL MEDICINE

## 2022-07-28 PROCEDURE — 93458 L HRT ARTERY/VENTRICLE ANGIO: CPT | Performed by: INTERNAL MEDICINE

## 2022-07-28 PROCEDURE — C1894 INTRO/SHEATH, NON-LASER: HCPCS | Performed by: INTERNAL MEDICINE

## 2022-07-28 PROCEDURE — 93005 ELECTROCARDIOGRAM TRACING: CPT

## 2022-07-28 PROCEDURE — 99152 MOD SED SAME PHYS/QHP 5/>YRS: CPT | Performed by: INTERNAL MEDICINE

## 2022-07-28 PROCEDURE — C1769 GUIDE WIRE: HCPCS | Performed by: INTERNAL MEDICINE

## 2022-07-28 PROCEDURE — 99153 MOD SED SAME PHYS/QHP EA: CPT | Performed by: INTERNAL MEDICINE

## 2022-07-28 PROCEDURE — 93010 ELECTROCARDIOGRAM REPORT: CPT | Performed by: INTERNAL MEDICINE

## 2022-07-28 PROCEDURE — 80048 BASIC METABOLIC PNL TOTAL CA: CPT | Performed by: INTERNAL MEDICINE

## 2022-07-28 PROCEDURE — 93572 IV DOP VEL&/PRESS C FLO EA: CPT | Performed by: INTERNAL MEDICINE

## 2022-07-28 PROCEDURE — 92928 PRQ TCAT PLMT NTRAC ST 1 LES: CPT | Performed by: INTERNAL MEDICINE

## 2022-07-28 PROCEDURE — NC001 PR NO CHARGE: Performed by: INTERNAL MEDICINE

## 2022-07-28 PROCEDURE — C9601 PERC DRUG-EL COR STENT BRAN: HCPCS | Performed by: INTERNAL MEDICINE

## 2022-07-28 PROCEDURE — 85347 COAGULATION TIME ACTIVATED: CPT

## 2022-07-28 PROCEDURE — C9600 PERC DRUG-EL COR STENT SING: HCPCS | Performed by: INTERNAL MEDICINE

## 2022-07-28 DEVICE — IMPLANTABLE DEVICE: Type: IMPLANTABLE DEVICE | Status: FUNCTIONAL

## 2022-07-28 DEVICE — IMPLANTABLE DEVICE: Type: IMPLANTABLE DEVICE | Site: CORONARY | Status: FUNCTIONAL

## 2022-07-28 RX ORDER — ASPIRIN 325 MG
325 TABLET ORAL ONCE
Status: COMPLETED | OUTPATIENT
Start: 2022-07-28 | End: 2022-07-28

## 2022-07-28 RX ORDER — NITROGLYCERIN 20 MG/100ML
INJECTION INTRAVENOUS AS NEEDED
Status: DISCONTINUED | OUTPATIENT
Start: 2022-07-28 | End: 2022-07-28 | Stop reason: HOSPADM

## 2022-07-28 RX ORDER — ASPIRIN 81 MG/1
81 TABLET, CHEWABLE ORAL DAILY
Status: DISCONTINUED | OUTPATIENT
Start: 2022-07-28 | End: 2022-07-28 | Stop reason: HOSPADM

## 2022-07-28 RX ORDER — PRASUGREL 10 MG/1
TABLET, FILM COATED ORAL AS NEEDED
Status: DISCONTINUED | OUTPATIENT
Start: 2022-07-28 | End: 2022-07-28 | Stop reason: HOSPADM

## 2022-07-28 RX ORDER — FENTANYL CITRATE 50 UG/ML
INJECTION, SOLUTION INTRAMUSCULAR; INTRAVENOUS AS NEEDED
Status: DISCONTINUED | OUTPATIENT
Start: 2022-07-28 | End: 2022-07-28 | Stop reason: HOSPADM

## 2022-07-28 RX ORDER — LIDOCAINE HYDROCHLORIDE 10 MG/ML
INJECTION, SOLUTION EPIDURAL; INFILTRATION; INTRACAUDAL; PERINEURAL AS NEEDED
Status: DISCONTINUED | OUTPATIENT
Start: 2022-07-28 | End: 2022-07-28 | Stop reason: HOSPADM

## 2022-07-28 RX ORDER — SODIUM CHLORIDE 9 MG/ML
100 INJECTION, SOLUTION INTRAVENOUS ONCE
Status: COMPLETED | OUTPATIENT
Start: 2022-07-28 | End: 2022-07-28

## 2022-07-28 RX ORDER — PANTOPRAZOLE SODIUM 20 MG/1
20 TABLET, DELAYED RELEASE ORAL DAILY
Qty: 30 TABLET | Refills: 1 | Status: SHIPPED | OUTPATIENT
Start: 2022-07-28 | End: 2022-09-29 | Stop reason: SDUPTHER

## 2022-07-28 RX ORDER — CLOPIDOGREL BISULFATE 75 MG/1
75 TABLET ORAL DAILY
Status: DISCONTINUED | OUTPATIENT
Start: 2022-07-28 | End: 2022-07-28 | Stop reason: HOSPADM

## 2022-07-28 RX ORDER — NITROGLYCERIN 0.4 MG/1
0.4 TABLET SUBLINGUAL
Status: DISCONTINUED | OUTPATIENT
Start: 2022-07-28 | End: 2022-07-28 | Stop reason: HOSPADM

## 2022-07-28 RX ORDER — VERAPAMIL HCL 2.5 MG/ML
AMPUL (ML) INTRAVENOUS AS NEEDED
Status: DISCONTINUED | OUTPATIENT
Start: 2022-07-28 | End: 2022-07-28 | Stop reason: HOSPADM

## 2022-07-28 RX ORDER — HEPARIN SODIUM 1000 [USP'U]/ML
INJECTION, SOLUTION INTRAVENOUS; SUBCUTANEOUS AS NEEDED
Status: DISCONTINUED | OUTPATIENT
Start: 2022-07-28 | End: 2022-07-28 | Stop reason: HOSPADM

## 2022-07-28 RX ORDER — ACETAMINOPHEN 325 MG/1
650 TABLET ORAL EVERY 4 HOURS PRN
Status: DISCONTINUED | OUTPATIENT
Start: 2022-07-28 | End: 2022-07-28 | Stop reason: HOSPADM

## 2022-07-28 RX ORDER — MIDAZOLAM HYDROCHLORIDE 2 MG/2ML
INJECTION, SOLUTION INTRAMUSCULAR; INTRAVENOUS AS NEEDED
Status: DISCONTINUED | OUTPATIENT
Start: 2022-07-28 | End: 2022-07-28 | Stop reason: HOSPADM

## 2022-07-28 RX ORDER — ONDANSETRON 2 MG/ML
4 INJECTION INTRAMUSCULAR; INTRAVENOUS EVERY 6 HOURS PRN
Status: DISCONTINUED | OUTPATIENT
Start: 2022-07-28 | End: 2022-07-28 | Stop reason: HOSPADM

## 2022-07-28 RX ORDER — CLOPIDOGREL BISULFATE 75 MG/1
75 TABLET ORAL DAILY
Qty: 90 TABLET | Refills: 0 | Status: SHIPPED | OUTPATIENT
Start: 2022-07-28 | End: 2022-10-26

## 2022-07-28 RX ADMIN — SODIUM CHLORIDE 100 ML/HR: 0.9 INJECTION, SOLUTION INTRAVENOUS at 06:59

## 2022-07-28 RX ADMIN — CLOPIDOGREL BISULFATE 75 MG: 75 TABLET ORAL at 13:25

## 2022-07-28 RX ADMIN — ASPIRIN 325 MG ORAL TABLET 325 MG: 325 PILL ORAL at 06:37

## 2022-07-28 NOTE — DISCHARGE SUMMARY
INTERNAL MEDICINE RESIDENCY DISCHARGE SUMMARY  Francisco Lambert   59 y o  male  MRN: [id number]  Room/Bed: [facility] CATH *     [address]    Encounter: [id number]    Active Problems:    * No active hospital problems  *      No new Assessment & Plan notes have been filed under this hospital service since the last note was generated  Service: Cardiology      DISCHARGE INFORMATION       Admitting Provider: Ana Ceja MD  Discharge Provider: No att  providers found    Discharge To:  home    Admission Date: 7/28/2022     Discharge Date: 7/28/2022  1:30 PM    Primary Discharge Diagnosis  Active Problems:    * No active hospital problems  *  Resolved Problems:    * No resolved hospital problems  *      Medications   Discharge Medication List as of 7/28/2022  3:53 PM      STOP taking these medications       omeprazole (PriLOSEC) 20 mg delayed release capsule Comments:   Reason for Stopping:             Discharge Medication List as of 7/28/2022  3:53 PM      START taking these medications    Details   clopidogrel (PLAVIX) 75 mg tablet Take 1 tablet (75 mg total) by mouth daily, Starting Thu 7/28/2022, Normal      pantoprazole (PROTONIX) 20 mg tablet Take 1 tablet (20 mg total) by mouth daily, Starting Thu 7/28/2022, Normal           Discharge Medication List as of 7/28/2022  3:53 PM      CONTINUE these medications which have NOT CHANGED    Details   aspirin 81 mg chewable tablet Chew 81 mg daily, Historical Med      atorvastatin (LIPITOR) 20 mg tablet Take 1 tablet (20 mg total) by mouth daily, Starting Mon 7/11/2022, Normal      lisinopril (ZESTRIL) 20 mg tablet Take 1 tablet (20 mg total) by mouth daily, Starting Tue 7/5/2022, Normal             Allergies  No Known Allergies        [address] Course  The patient was admitted for a cardiac cath  He underwent cardiac catheterization with Dr Elizabeth Exon   He was found to have the following:   · 1st Mrg lesion is 50% stenosed  · Prox LAD lesion is 50% stenosed  · 1st Diag lesion is 75% stenosed  · Ost LAD lesion is 30% stenosed  · Prox Cx lesion is 40% stenosed  · Mid RCA lesion is 40% stenosed  · RPDA lesion is 40% stenosed  3v CAD  Moderate AS       The patient received NAVA to the diag  And the LAD lesion  The patient was started on DAPT and the Prilosec was changed to Protonix due to an interaction with the Plavix  The patient was not started on beta blocker and his outpatient cardiologist is slowly up titration his medicines and he will continue to follow up with him  He will get a BMP in two days  Presenting Problem/History of Present Illness  Active Problems:    * No active hospital problems  *  Resolved Problems:    * No resolved hospital problems   *

## 2022-07-28 NOTE — INTERVAL H&P NOTE
/79   Pulse 73   Temp (!) 97 °F (36 1 °C) (Temporal)   Resp 16   Ht 5' 4" (1 626 m)   Wt 66 7 kg (147 lb 0 8 oz)   SpO2 96%   BMI 25 24 kg/m²    Patient re-evaluated   Accept as history and physical     Romelle Gaucher, MD/July 28, 2022/7:25 AM

## 2022-08-03 LAB — COLOGUARD RESULT REPORTABLE: NEGATIVE

## 2022-08-04 ENCOUNTER — TELEPHONE (OUTPATIENT)
Dept: SURGERY | Facility: CLINIC | Age: 62
End: 2022-08-04

## 2022-08-06 DIAGNOSIS — I10 BENIGN ESSENTIAL HYPERTENSION: Chronic | ICD-10-CM

## 2022-08-06 RX ORDER — LISINOPRIL 20 MG/1
TABLET ORAL
Qty: 30 TABLET | Refills: 0 | Status: SHIPPED | OUTPATIENT
Start: 2022-08-06 | End: 2022-09-06

## 2022-08-09 ENCOUNTER — CONSULT (OUTPATIENT)
Dept: CARDIOLOGY CLINIC | Facility: CLINIC | Age: 62
End: 2022-08-09
Payer: COMMERCIAL

## 2022-08-09 VITALS
BODY MASS INDEX: 24.92 KG/M2 | HEART RATE: 80 BPM | WEIGHT: 146 LBS | DIASTOLIC BLOOD PRESSURE: 70 MMHG | HEIGHT: 64 IN | SYSTOLIC BLOOD PRESSURE: 114 MMHG

## 2022-08-09 DIAGNOSIS — I25.10 CORONARY ARTERY DISEASE INVOLVING NATIVE CORONARY ARTERY OF NATIVE HEART WITHOUT ANGINA PECTORIS: Primary | ICD-10-CM

## 2022-08-09 DIAGNOSIS — I10 BENIGN ESSENTIAL HYPERTENSION: ICD-10-CM

## 2022-08-09 DIAGNOSIS — I35.0 NONRHEUMATIC AORTIC VALVE STENOSIS: ICD-10-CM

## 2022-08-09 DIAGNOSIS — R07.9 CHEST PAIN: ICD-10-CM

## 2022-08-09 DIAGNOSIS — R00.2 PALPITATIONS: ICD-10-CM

## 2022-08-09 PROCEDURE — 3074F SYST BP LT 130 MM HG: CPT | Performed by: INTERNAL MEDICINE

## 2022-08-09 PROCEDURE — 3078F DIAST BP <80 MM HG: CPT | Performed by: INTERNAL MEDICINE

## 2022-08-09 PROCEDURE — 93000 ELECTROCARDIOGRAM COMPLETE: CPT | Performed by: INTERNAL MEDICINE

## 2022-08-09 PROCEDURE — 99214 OFFICE O/P EST MOD 30 MIN: CPT | Performed by: INTERNAL MEDICINE

## 2022-08-09 NOTE — PROGRESS NOTES
Patient ID: Salma Ivory is a 59 y o  male  Plan:      Chest pain  Constant  Not improved from stenting  Not related to his CAD  Coronary artery disease involving native coronary artery of native heart without angina pectoris  Stenting of LAD and DIag 7/28/2022  Will continue plavix till next July  Aortic valve stenosis  Mild by exam and echo  Benign essential hypertension  Well controlled  Follow up Plan/Other summary comments:  Return in about 1 year (around 8/9/2023)  Mainly I reassured the patient today that the persistent symptoms are non coronary  We emphasized smoking cessation  HPI:  Patient is seen in follow-up today  He had been seeing 1 of my associates  Because of exertional dyspnea as well  as persistent sense of chest pain he underwent heart catheterization on 07/28/2022  There was a borderline stenosis of the proximal LAD and more significant stenosis of the 1st diagonal   Both vessels were stented  He did not really feel very different when compared to prior  Results for orders placed or performed in visit on 08/09/22   POCT ECG    Impression    NSR  Possible prior inferior wall MI  Left axis  Most recent or relevant cardiac/vascular testing:    TTE 06/30/2022:  Normal LV systolic function  Mild aortic stenosis  Past Surgical History:   Procedure Laterality Date    CARDIAC CATHETERIZATION Left 7/28/2022    Procedure: Cardiac catheterization-left heart catheterization;  Surgeon: Guillermina Gil MD;  Location: [facility]; Service: Cardiology    CARDIAC CATHETERIZATION N/A 7/28/2022    Procedure: Cardiac pci;  Surgeon: Guillermina Gil MD;  Location: [facility];   Service: Cardiology    HERNIA REPAIR       CMP:   Lab Results   Component Value Date    K 3 9 07/28/2022     07/28/2022    CO2 25 07/28/2022    BUN 16 07/28/2022    CREATININE 0 73 07/28/2022    EGFR 98 07/28/2022       Lipid Profile:   Lab Results   Component Value Date    TRIG 117 07/18/2022    HDL 28 (L) 07/18/2022         Review of Systems   10  point ROS  was otherwise non pertinent or negative except as per HPI or as below  Gait: Normal          Objective:     /70   Pulse 80   Ht 5' 4" (1 626 m)   Wt 66 2 kg (146 lb)   BMI 25 06 kg/m²     PHYSICAL EXAM:    General:  Normal appearance in no distress  Eyes:  Anicteric  Oral mucosa:  Moist   Neck:  No JVD  Carotid upstrokes are brisk without bruits  No masses  Chest:  Clear to auscultation  Cardiac:  No palpable PMI  Normal S1 and S2   Grade 1-2 systolic murmur loudest at the base  No gallop or rub  Abdomen:  Soft and nontender  No palpable organomegaly or aortic enlargement  Extremities:  No peripheral edema  Musculoskeletal:  Symmetric  Vascular:  Femoral pulses are brisk without bruits  Popliteal pulses are intact bilaterally  Pedal pulses are intact  Neuro:  Grossly symmetric  Psych:  Alert and oriented x3          Current Outpatient Medications:     aspirin 81 mg chewable tablet, Chew 81 mg daily, Disp: , Rfl:     atorvastatin (LIPITOR) 20 mg tablet, Take 1 tablet (20 mg total) by mouth daily, Disp: 30 tablet, Rfl: 3    clopidogrel (PLAVIX) 75 mg tablet, Take 1 tablet (75 mg total) by mouth daily, Disp: 90 tablet, Rfl: 0    lisinopril (ZESTRIL) 20 mg tablet, take 1 tablet by mouth once daily, Disp: 30 tablet, Rfl: 0    pantoprazole (PROTONIX) 20 mg tablet, Take 1 tablet (20 mg total) by mouth daily, Disp: 30 tablet, Rfl: 1  No Known Allergies  Past Medical History:   Diagnosis Date    Benign essential hypertension 05/08/2014    CAD (coronary artery disease)     s/p NAVA prox LAD and D1 7/28/2022    Coronary artery disease involving native coronary artery of native heart without angina pectoris 8/9/2022    Other chest pain     Palpitations            Social History     Tobacco Use   Smoking Status Current Every Day Smoker    Packs/day: 0 25   Smokeless Tobacco Never Used

## 2022-09-06 DIAGNOSIS — I10 BENIGN ESSENTIAL HYPERTENSION: Chronic | ICD-10-CM

## 2022-09-06 RX ORDER — LISINOPRIL 20 MG/1
TABLET ORAL
Qty: 30 TABLET | Refills: 0 | Status: SHIPPED | OUTPATIENT
Start: 2022-09-06 | End: 2022-09-08 | Stop reason: SDUPTHER

## 2022-09-08 ENCOUNTER — OFFICE VISIT (OUTPATIENT)
Dept: FAMILY MEDICINE CLINIC | Facility: CLINIC | Age: 62
End: 2022-09-08
Payer: COMMERCIAL

## 2022-09-08 VITALS
OXYGEN SATURATION: 99 % | HEART RATE: 85 BPM | BODY MASS INDEX: 24.82 KG/M2 | SYSTOLIC BLOOD PRESSURE: 124 MMHG | HEIGHT: 64 IN | TEMPERATURE: 96.3 F | DIASTOLIC BLOOD PRESSURE: 58 MMHG | WEIGHT: 145.38 LBS

## 2022-09-08 DIAGNOSIS — F17.200 SMOKING: ICD-10-CM

## 2022-09-08 DIAGNOSIS — Z23 ENCOUNTER FOR IMMUNIZATION: ICD-10-CM

## 2022-09-08 DIAGNOSIS — I35.0 AORTIC VALVE STENOSIS, ETIOLOGY OF CARDIAC VALVE DISEASE UNSPECIFIED: ICD-10-CM

## 2022-09-08 DIAGNOSIS — I25.10 CORONARY ARTERY DISEASE INVOLVING NATIVE CORONARY ARTERY OF NATIVE HEART WITHOUT ANGINA PECTORIS: ICD-10-CM

## 2022-09-08 DIAGNOSIS — R07.89 OTHER CHEST PAIN: Primary | ICD-10-CM

## 2022-09-08 DIAGNOSIS — R97.20 ELEVATED PSA: ICD-10-CM

## 2022-09-08 DIAGNOSIS — I10 BENIGN ESSENTIAL HYPERTENSION: Chronic | ICD-10-CM

## 2022-09-08 PROCEDURE — 99214 OFFICE O/P EST MOD 30 MIN: CPT | Performed by: FAMILY MEDICINE

## 2022-09-08 PROCEDURE — 3078F DIAST BP <80 MM HG: CPT | Performed by: FAMILY MEDICINE

## 2022-09-08 PROCEDURE — 90471 IMMUNIZATION ADMIN: CPT

## 2022-09-08 PROCEDURE — 3074F SYST BP LT 130 MM HG: CPT | Performed by: FAMILY MEDICINE

## 2022-09-08 PROCEDURE — 3725F SCREEN DEPRESSION PERFORMED: CPT | Performed by: FAMILY MEDICINE

## 2022-09-08 PROCEDURE — 90677 PCV20 VACCINE IM: CPT

## 2022-09-08 RX ORDER — LISINOPRIL 20 MG/1
20 TABLET ORAL DAILY
Qty: 90 TABLET | Refills: 1 | Status: SHIPPED | OUTPATIENT
Start: 2022-09-08

## 2022-09-08 NOTE — PROGRESS NOTES
Assessment/Plan:    No problem-specific Assessment & Plan notes found for this encounter  Diagnoses and all orders for this visit:    Other chest pain  Comments:  resolved    Benign essential hypertension  Comments:  stable  medications changed  f/u with labs in 2 wks  Orders:  -     lisinopril (ZESTRIL) 20 mg tablet; Take 1 tablet (20 mg total) by mouth daily    Aortic valve stenosis, etiology of cardiac valve disease unspecified    Encounter for immunization  -     Pneumococcal Conjugate Vaccine 20-valent (PCV20)  -     Zoster Vaccine Recombinant IM    Elevated PSA  Comments:  pt counseled on the possibility of prostate cancer  Orders:  -     Ambulatory Referral to Urology; Future    Coronary artery disease involving native coronary artery of native heart without angina pectoris  Comments:  stable follow up with cardiology    Smoking  Comments:  pt counseled to quit smoking          PHQ-2/9 Depression Screening    Little interest or pleasure in doing things: 0 - not at all  Feeling down, depressed, or hopeless: 0 - not at all  PHQ-2 Score: 0  PHQ-2 Interpretation: Negative depression screen            Subjective:      Patient ID: Maggie Lujan is a 59 y o  male  Follow up for CAD, pt had 2 stents placed, pt has no current chest pain, pt is still smoking 4 cig/day and is trying to quit      The following portions of the patient's history were reviewed and updated as appropriate: allergies, current medications, past family history, past medical history, past social history, past surgical history and problem list     Review of Systems   Constitutional: Negative for fatigue  Respiratory: Negative for shortness of breath and wheezing  Cardiovascular: Negative for chest pain and palpitations           Objective:    /58 (BP Location: Left arm, Patient Position: Sitting, Cuff Size: Standard)   Pulse 85   Temp (!) 96 3 °F (35 7 °C) (Tympanic)   Ht 5' 4" (1 626 m)   Wt 65 9 kg (145 lb 6 oz) SpO2 99%   BMI 24 95 kg/m²      Physical Exam  Vitals and nursing note reviewed  Constitutional:       General: He is not in acute distress  Appearance: Normal appearance  He is not ill-appearing, toxic-appearing or diaphoretic  HENT:      Head: Normocephalic and atraumatic  Eyes:      Conjunctiva/sclera: Conjunctivae normal    Cardiovascular:      Rate and Rhythm: Normal rate and regular rhythm  Heart sounds: Normal heart sounds  No murmur heard  Pulmonary:      Effort: Pulmonary effort is normal  No respiratory distress  Breath sounds: Normal breath sounds  No wheezing, rhonchi or rales  Abdominal:      General: There is no distension  Palpations: Abdomen is soft  There is no mass  Tenderness: There is no abdominal tenderness  There is no guarding or rebound  Musculoskeletal:      Cervical back: Normal range of motion and neck supple  No rigidity  Right lower leg: No edema  Left lower leg: No edema  Lymphadenopathy:      Cervical: No cervical adenopathy  Neurological:      Mental Status: He is alert and oriented to person, place, and time  Psychiatric:         Mood and Affect: Mood normal          Behavior: Behavior normal          Thought Content:  Thought content normal          Judgment: Judgment normal

## 2022-09-26 ENCOUNTER — TELEPHONE (OUTPATIENT)
Dept: UROLOGY | Facility: AMBULATORY SURGERY CENTER | Age: 62
End: 2022-09-26

## 2022-09-26 NOTE — TELEPHONE ENCOUNTER
Hello,    Patient is going to be seen at [address] [place] [address] Urology (NPI: [id number]) on 10/10, and is in need of a insurance referral  The diagnosis needed for the visit is R97 20, and the procedure code needed is [id number]  That is everything needed, thank you for your time and help

## 2022-09-29 DIAGNOSIS — E66.3 OVERWEIGHT WITH BODY MASS INDEX (BMI) OF 26 TO 26.9 IN ADULT: ICD-10-CM

## 2022-09-29 RX ORDER — PANTOPRAZOLE SODIUM 20 MG/1
20 TABLET, DELAYED RELEASE ORAL DAILY
Qty: 90 TABLET | Refills: 3 | Status: SHIPPED | OUTPATIENT
Start: 2022-09-29

## 2022-10-10 ENCOUNTER — CONSULT (OUTPATIENT)
Dept: UROLOGY | Facility: CLINIC | Age: 62
End: 2022-10-10
Payer: COMMERCIAL

## 2022-10-10 ENCOUNTER — APPOINTMENT (OUTPATIENT)
Dept: LAB | Facility: HOSPITAL | Age: 62
End: 2022-10-10
Payer: COMMERCIAL

## 2022-10-10 VITALS
DIASTOLIC BLOOD PRESSURE: 60 MMHG | BODY MASS INDEX: 24.75 KG/M2 | HEART RATE: 85 BPM | HEIGHT: 64 IN | WEIGHT: 145 LBS | SYSTOLIC BLOOD PRESSURE: 120 MMHG

## 2022-10-10 DIAGNOSIS — R97.20 ELEVATED PSA: Primary | ICD-10-CM

## 2022-10-10 DIAGNOSIS — N40.0 BENIGN PROSTATIC HYPERPLASIA, UNSPECIFIED WHETHER LOWER URINARY TRACT SYMPTOMS PRESENT: ICD-10-CM

## 2022-10-10 DIAGNOSIS — R31.29 MICROSCOPIC HEMATURIA: ICD-10-CM

## 2022-10-10 LAB
SL AMB  POCT GLUCOSE, UA: ABNORMAL
SL AMB LEUKOCYTE ESTERASE,UA: ABNORMAL
SL AMB POCT BILIRUBIN,UA: ABNORMAL
SL AMB POCT BLOOD,UA: ABNORMAL
SL AMB POCT CLARITY,UA: CLEAR
SL AMB POCT COLOR,UA: YELLOW
SL AMB POCT KETONES,UA: ABNORMAL
SL AMB POCT NITRITE,UA: ABNORMAL
SL AMB POCT PH,UA: 5
SL AMB POCT SPECIFIC GRAVITY,UA: 1.01
SL AMB POCT URINE PROTEIN: ABNORMAL
SL AMB POCT UROBILINOGEN: 0.2

## 2022-10-10 PROCEDURE — 99243 OFF/OP CNSLTJ NEW/EST LOW 30: CPT | Performed by: NURSE PRACTITIONER

## 2022-10-10 PROCEDURE — 81002 URINALYSIS NONAUTO W/O SCOPE: CPT | Performed by: NURSE PRACTITIONER

## 2022-10-10 PROCEDURE — 81001 URINALYSIS AUTO W/SCOPE: CPT | Performed by: NURSE PRACTITIONER

## 2022-10-10 NOTE — PATIENT INSTRUCTIONS
Repeat your PSA blood test in 4 weeks  Do not ride a bicycle, motorcycle, tractor ejaculate for 3 days prior to going for your blood test     Prostate specific antigen measurement   GENERAL INFORMATION:  What is this test?  This test measures the amount of prostate specific antigen (PSA) in blood  This test may be used when benign prostatic hyperplasia (BPH) is suspected  It may also be used to screen for prostate cancer, to help diagnose prostate cancer when it is suspected, and to monitor prostate cancer after treatment  What are other names for this test?  PSA measurement  PSA - Prostate-specific antigen level  PSA - Serum prostate specific antigen level  tPSA measurement - Total prostate specific antigen measurement  What are related tests? Rectal examination  Transrectal biopsy of prostate  Why do I need this test?  Laboratory tests may be done for many reasons  Tests are performed for routine health screenings or if a disease or toxicity is suspected  Lab tests may be used to determine if a medical condition is improving or worsening  Lab tests may also be used to measure the success or failure of a medication or treatment plan  Lab tests may be ordered for professional or legal reasons  You may need this test if you have:   BPH - Benign prostatic hypertrophy  Prostate cancer  When and how often should I have this test?  When and how often laboratory tests are done may depend on many factors  The timing of laboratory tests may rely on the results or completion of other tests, procedures, or treatments  Lab tests may be performed immediately in an emergency, or tests may be delayed as a condition is treated or monitored  A test may be suggested or become necessary when certain signs or symptoms appear  Due to changes in the way your body naturally functions through the course of a day, lab tests may need to be performed at a certain time of day   If you have prepared for a test by changing your food or fluid intake, lab tests may be timed in accordance with those changes  Timing of tests may be based on increased and decreased levels of medications, drugs or other substances in the body  The age or gender of the person being tested may affect when and how often a lab test is required  Chronic or progressive conditions may need ongoing monitoring through the use of lab tests  Conditions that worsen and improve may also need frequent monitoring  Certain tests may be repeated to obtain a series of results, or tests may need to be repeated to confirm or disprove results  Timing and frequency of lab tests may vary if they are performed for professional or legal reasons  How should I get ready for the test?  Before having blood collected, tell the person drawing your blood if you are allergic to latex  Tell the healthcare worker if you have a medical condition or are using a medication or supplement that causes excessive bleeding  Also tell the healthcare worker if you have felt nauseated, lightheaded, or have fainted while having blood drawn in the past   How is the test done? When a blood sample from a vein is needed, a vein in your arm is usually selected  A tourniquet (large rubber strap) may be secured above the vein  The skin over the vein will be cleaned, and a needle will be inserted  You will be asked to hold very still while your blood is collected  Blood will be collected into one or more tubes, and the tourniquet will be removed  When enough blood has been collected, the healthcare worker will take the needle out  How will the test feel? The amount of discomfort you feel will depend on many factors, including your sensitivity to pain  Communicate how you are feeling with the person doing the test  Inform the person doing the test if you feel that you cannot continue with the test   During a blood draw, you may feel mild discomfort at the location where the blood sample is being collected    What should I do after the test?  After a blood sample is collected from your vein, a bandage, cotton ball, or gauze may be placed on the area where the needle was inserted  You may be asked to apply pressure to the area  Avoid strenuous exercise immediately after your blood draw  Contact your healthcare worker if you feel pain or see redness, swelling, or discharge from the puncture site  What are the risks? Blood: During a blood draw, a hematoma (blood-filled bump under the skin) or slight bleeding from the puncture site may occur  After a blood draw, a bruise or infection may occur at the puncture site  The person doing this test may need to perform it more than once  Talk to your healthcare worker if you have any concerns about the risks of this test   What are normal results for this test?  Laboratory test results may vary depending on your age, gender, health history, the method used for the test, and many other factors  If your results are different from the results suggested below, this may not mean that you have a disease  Contact your healthcare worker if you have any questions  The following are considered to be normal results for this test:  Males, ?36years of age: 0-2 ng/mL (0-2 mcg/L) :  Males, >36years of age: 0-4 ng/mL (0-4 mcg/L)  Females: <0 5 ng/mL (<0 5 mcg/L) : What might affect my test results? Drug Therapy Use:  Some medications may affect the results of the test  These medications include:  Finasteride (Oral route, Tablet)  Indium In 111 Capromab Pendetide (Intravenous route, Kit)  Ejaculation and digital rectal exam can cause an insignificant rise in PSA levels while prostate biopsy and cystoscopy can cause substantial increases; PSA testing should be delayed until 3 to 4 weeks after these procedures  Exercise, infection, and some medications can also cause a rise in PSA levels  Finasteride will lower PSA by approximately 50%     What follow up should I do after this test?  Ask your healthcare worker how you will be informed of the test results  You may be asked to call for results, schedule an appointment to discuss results, or notified of results by mail  Follow up care varies depending on many factors related to your test  Sometimes there is no follow up after you have been notified of test results  At other times follow up may be suggested or necessary  Some examples of follow up care include changes to medication or treatment plans, referral to a specialist, more or less frequent monitoring, and additional tests or procedures  Talk with your healthcare worker about any concerns or questions you have regarding follow up care or instructions  Where can I get more information? Related Companies   American U[employer] Association - https://www magana org/  org  National Kidney and Urologic Diseases Information Clearinghouse - http://kidney  niddk nih gov/    CARE AGREEMENT:   You have the right to help plan your care  To help with this plan, you must learn about your health condition and how it may be treated  You can then discuss treatment options with your caregivers  Work with them to decide what care may be used to treat you  You always have the right to refuse treatment  © Copyright [employer] 2021  Information is for End User's use only and may not be sold, redistributed or otherwise used for commercial purposes  The above information is an  only  It is not intended as a substitute for medical advice for your individual conditions or treatments  Talk to your doctor, nurse or pharmacist before following any medical regimen to see if it is safe and effective for you

## 2022-10-10 NOTE — PROGRESS NOTES
Assessment and plan:     Microscopic hematuria  · Send urine microscopic  · Recommend micro hematuria work up for any true microscopic hematuria  · Follow up 1 year, sooner pending results    Elevated PSA  · PSA 6 6  · Denies family history of prostate cancer  · Recheck PSA 4 weeks  · Follow-up 6 months, sooner pending results of PSA    BPH (benign prostatic hyperplasia)  · AUA 13  · Would like to continue watchful waiting at this time  · Follow-up 6 months for recheck          Malou Fuentes    History of Present Illness     Salma Ivory is a 59 y o  new patient presents for elevated PSA  He reports he was having cardiac stents placed and had a prior PSA which was elevated  His prior PSA was 3 92 years ago  His most recent PSA was 6 6  He reports urinary frequency which is dependent on his oral intake  Denies family history of prostate cancer  Gross hematuria:  Denies    Smoking history:  Current daily smoker trying to quit; reports smoking half pack a day for the past    30 years at least    Chemical exposure:   Works as a  in a [employer]    Agent orange exposure:  Denies    Urine dip:  Trace blood    History of kidney stones:  Denies    Prior urological procedures:  Denies    History of pelvic radiation:  Denies    Family history of urothelial or renal cell carcinoma:  Denies    History of recurrent UTI:  Denies      Laboratory     Lab Results   Component Value Date    BUN 16 07/28/2022    CREATININE 0 73 07/28/2022       No components found for: GFR    Lab Results   Component Value Date    CALCIUM 8 4 07/28/2022    K 3 9 07/28/2022    CO2 25 07/28/2022     07/28/2022       Lab Results   Component Value Date    WBC 9 78 06/30/2022    HGB 15 1 06/30/2022    HCT 45 6 06/30/2022    MCV 86 06/30/2022     06/30/2022       Lab Results   Component Value Date    PSA 6 6 (H) 07/18/2022    PSA 3 9 06/24/2020       Recent Results (from the past 1 hour(s))   POCT urine dip    Collection Time: 10/10/22  3:34 PM   Result Value Ref Range    LEUKOCYTE ESTERASE,UA neg     NITRITE,UA neg     SL AMB POCT UROBILINOGEN 0 2     POCT URINE PROTEIN neg      PH,UA 5 0     BLOOD,UA trace     SPECIFIC GRAVITY,UA 1 015     KETONES,UA neg     BILIRUBIN,UA neg     GLUCOSE, UA neg      COLOR,UA yellow     CLARITY,UA clear        @RESULT(URINEMICROSCOPIC)@    @RESULT(URINECULTURE)@    Radiology       Review of Systems     Review of Systems   Constitutional: Negative for activity change, appetite change, chills, fatigue, fever and unexpected weight change  HENT: Negative for facial swelling  Eyes: Negative for discharge  Respiratory: Negative  Negative for cough and shortness of breath  Cardiovascular: Negative for chest pain and leg swelling  Gastrointestinal: Negative  Negative for abdominal distention, abdominal pain, constipation, diarrhea, nausea and vomiting  Endocrine: Negative  Genitourinary: Negative  Negative for decreased urine volume, difficulty urinating, dysuria, enuresis, flank pain, frequency, genital sores, hematuria and urgency  Musculoskeletal: Negative for back pain and myalgias  Skin: Negative for pallor and rash  Allergic/Immunologic: Negative  Negative for immunocompromised state  Neurological: Negative for facial asymmetry and speech difficulty  Psychiatric/Behavioral: Negative for agitation and confusion  AUA SYMPTOM SCORE    Flowsheet Row Most Recent Value   AUA SYMPTOM SCORE    How often have you had a sensation of not emptying your bladder completely after you finished urinating? 0   How often have you had to urinate again less than two hours after you finished urinating? 2   How often have you found you stopped and started again several times when you urinate? 4   How often have you found it difficult to postpone urination? 2   How often have you had a weak urinary stream? 3   How often have you had to push or strain to begin urination?  0   How many times did you most typically get up to urinate from the time you went to bed at night until the time you got up in the morning? 2   Quality of Life: If you were to spend the rest of your life with your urinary condition just the way it is now, how would you feel about that? 3   AUA SYMPTOM SCORE 13                Allergies     No Known Allergies    Physical Exam     Physical Exam  Vitals reviewed  Constitutional:       General: He is not in acute distress  Appearance: Normal appearance  He is normal weight  He is not ill-appearing, toxic-appearing or diaphoretic  HENT:      Head: Normocephalic and atraumatic  Eyes:      General: No scleral icterus  Cardiovascular:      Rate and Rhythm: Normal rate  Pulmonary:      Effort: Pulmonary effort is normal  No respiratory distress  Abdominal:      General: Abdomen is flat  There is no distension  Palpations: Abdomen is soft  Tenderness: There is no abdominal tenderness  There is no guarding or rebound  Genitourinary:     Penis: Circumcised  No hypospadias, erythema, tenderness, discharge, swelling or lesions  Testes:         Right: Mass, tenderness or swelling not present  Right testis is descended  Left: Mass, tenderness or swelling not present  Left testis is descended  Epididymis:      Right: Not inflamed  No tenderness  Left: Not inflamed  No tenderness  Comments: Prostate smooth nontender without appreciable nodules or induration proximally 40 g  Musculoskeletal:         General: No swelling  Cervical back: Normal range of motion  Skin:     General: Skin is warm and dry  Coloration: Skin is not jaundiced or pale  Findings: No rash  Neurological:      General: No focal deficit present  Mental Status: He is alert and oriented to person, place, and time        Gait: Gait normal    Psychiatric:         Mood and Affect: Mood normal          Behavior: Behavior normal          Thought Content: Thought content normal          Judgment: Judgment normal          Vital Signs     Vitals:    10/10/22 1527   BP: 120/60   Pulse: 85   Weight: 65 8 kg (145 lb)   Height: 5' 4" (1 626 m)       Current Medications       Current Outpatient Medications:   •  aspirin 81 mg chewable tablet, Chew 81 mg daily, Disp: , Rfl:   •  atorvastatin (LIPITOR) 20 mg tablet, Take 1 tablet (20 mg total) by mouth daily, Disp: 30 tablet, Rfl: 3  •  clopidogrel (PLAVIX) 75 mg tablet, Take 1 tablet (75 mg total) by mouth daily, Disp: 90 tablet, Rfl: 0  •  lisinopril (ZESTRIL) 20 mg tablet, Take 1 tablet (20 mg total) by mouth daily, Disp: 90 tablet, Rfl: 1  •  pantoprazole (PROTONIX) 20 mg tablet, Take 1 tablet (20 mg total) by mouth daily, Disp: 90 tablet, Rfl: 3    Active Problems     Patient Active Problem List   Diagnosis   • Benign essential hypertension   • ED (erectile dysfunction) of non-organic origin   • Hypercholesterolemia   • Raynaud's phenomenon   • Peripheral vascular disease (HCC)   • Peripheral neuropathy   • Pityriasis rosea   • Negative depression screening   • Overweight with body mass index (BMI) of 26 to 26 9 in adult   • Aortic valve stenosis   • Chest pain   • Palpitations   • Coronary artery disease involving native coronary artery of native heart without angina pectoris   • Elevated PSA   • Microscopic hematuria   • BPH (benign prostatic hyperplasia)       Past Medical History     Past Medical History:   Diagnosis Date   • Benign essential hypertension 05/08/2014   • CAD (coronary artery disease)     s/p NAVA prox LAD and D1 7/28/2022   • Coronary artery disease involving native coronary artery of native heart without angina pectoris 8/9/2022   • Other chest pain    • Palpitations        Surgical History     Past Surgical History:   Procedure Laterality Date   • CARDIAC CATHETERIZATION Left 7/28/2022    Procedure: Cardiac catheterization-left heart catheterization;  Surgeon: Uir Tinsley MD;  Location: [facility]; Service: Cardiology   • CARDIAC CATHETERIZATION N/A 7/28/2022    Procedure: Cardiac pci;  Surgeon: Chalino Stafford MD;  Location: [facility]; Service: Cardiology   • HERNIA REPAIR         Family History     Family History   Problem Relation Age of Onset   • No Known Problems Mother    • No Known Problems Father        Social History     Social History     Social History     Tobacco Use   Smoking Status Current Every Day Smoker   • Packs/day: 0 25   Smokeless Tobacco Never Used       Past Surgical History:   Procedure Laterality Date   • CARDIAC CATHETERIZATION Left 7/28/2022    Procedure: Cardiac catheterization-left heart catheterization;  Surgeon: Chalino Stafford MD;  Location: [facility]; Service: Cardiology   • CARDIAC CATHETERIZATION N/A 7/28/2022    Procedure: Cardiac pci;  Surgeon: Chalino Stafford MD;  Location: [facility]; Service: Cardiology   • HERNIA REPAIR           The following portions of the patient's history were reviewed and updated as appropriate: allergies, current medications, past family history, past medical history, past social history, past surgical history and problem list    Please note :  Voice dictation software has been used to create this document  There may be inadvertent transcription errors      [id number] [id number] Ravi Junior

## 2022-10-10 NOTE — ASSESSMENT & PLAN NOTE
· Send urine microscopic  · Recommend micro hematuria work up for any true microscopic hematuria  · Follow up 1 year, sooner pending results

## 2022-10-10 NOTE — ASSESSMENT & PLAN NOTE
· PSA 6 6  · Denies family history of prostate cancer  · Recheck PSA 4 weeks  · Follow-up 6 months, sooner pending results of PSA

## 2022-10-12 ENCOUNTER — TELEPHONE (OUTPATIENT)
Dept: UROLOGY | Facility: AMBULATORY SURGERY CENTER | Age: 62
End: 2022-10-12

## 2022-10-12 DIAGNOSIS — R31.29 MICROSCOPIC HEMATURIA: Primary | ICD-10-CM

## 2022-10-12 LAB
BACTERIA UR QL AUTO: ABNORMAL /HPF
BILIRUB UR QL STRIP: NEGATIVE
CLARITY UR: CLEAR
COLOR UR: ABNORMAL
GLUCOSE UR STRIP-MCNC: NEGATIVE MG/DL
HGB UR QL STRIP.AUTO: NEGATIVE
KETONES UR STRIP-MCNC: NEGATIVE MG/DL
LEUKOCYTE ESTERASE UR QL STRIP: NEGATIVE
MUCOUS THREADS UR QL AUTO: ABNORMAL
NITRITE UR QL STRIP: NEGATIVE
NON-SQ EPI CELLS URNS QL MICRO: ABNORMAL /HPF
PH UR STRIP.AUTO: 6 [PH]
PROT UR STRIP-MCNC: NEGATIVE MG/DL
RBC #/AREA URNS AUTO: ABNORMAL /HPF
SP GR UR STRIP.AUTO: 1.02 (ref 1–1.03)
UROBILINOGEN UR STRIP-ACNC: <2 MG/DL
WBC #/AREA URNS AUTO: ABNORMAL /HPF

## 2022-10-12 NOTE — TELEPHONE ENCOUNTER
Giovanny Caldera from [facility] have a question about specimen that was not received at the lab     Giovanny Caldera can be reached at [phone] option 1

## 2022-11-08 ENCOUNTER — APPOINTMENT (OUTPATIENT)
Dept: LAB | Facility: CLINIC | Age: 62
End: 2022-11-08

## 2022-11-08 DIAGNOSIS — R31.29 MICROSCOPIC HEMATURIA: ICD-10-CM

## 2022-11-08 DIAGNOSIS — R97.20 ELEVATED PSA: ICD-10-CM

## 2022-11-08 LAB
BACTERIA UR QL AUTO: ABNORMAL /HPF
BILIRUB UR QL STRIP: NEGATIVE
CLARITY UR: CLEAR
COLOR UR: YELLOW
GLUCOSE UR STRIP-MCNC: NEGATIVE MG/DL
HGB UR QL STRIP.AUTO: NEGATIVE
HYALINE CASTS #/AREA URNS LPF: ABNORMAL /LPF
KETONES UR STRIP-MCNC: NEGATIVE MG/DL
LEUKOCYTE ESTERASE UR QL STRIP: NEGATIVE
NITRITE UR QL STRIP: NEGATIVE
NON-SQ EPI CELLS URNS QL MICRO: ABNORMAL /HPF
PH UR STRIP.AUTO: 6.5 [PH]
PROT UR STRIP-MCNC: ABNORMAL MG/DL
PSA SERPL-MCNC: 5.2 NG/ML (ref 0–4)
RBC #/AREA URNS AUTO: ABNORMAL /HPF
SP GR UR STRIP.AUTO: 1.02 (ref 1–1.03)
UROBILINOGEN UR STRIP-ACNC: <2 MG/DL
WBC #/AREA URNS AUTO: ABNORMAL /HPF

## 2022-11-09 DIAGNOSIS — R97.20 ELEVATED PSA: Primary | ICD-10-CM

## 2022-11-09 LAB — BACTERIA UR CULT: ABNORMAL

## 2022-11-14 ENCOUNTER — TELEPHONE (OUTPATIENT)
Dept: UROLOGY | Facility: CLINIC | Age: 62
End: 2022-11-14

## 2022-11-14 NOTE — RESULT ENCOUNTER NOTE
Please call patient and let him know that an MRI was ordered due to his ongoing elevated PSA    I sent a [employer] message last week but he did not read yet

## 2022-11-14 NOTE — TELEPHONE ENCOUNTER
----- Message from [address] [place] [address] sent at 11/14/2022  1:42 PM EST -----  Please call patient and let him know that an MRI was ordered due to his ongoing elevated PSA    I sent a [employer] message last week but he did not read yet

## 2022-11-14 NOTE — TELEPHONE ENCOUNTER
[facility] regarding Zelda's note  Urology number provided for patient

## 2022-12-13 ENCOUNTER — OFFICE VISIT (OUTPATIENT)
Dept: FAMILY MEDICINE CLINIC | Facility: CLINIC | Age: 62
End: 2022-12-13

## 2022-12-13 VITALS
HEART RATE: 78 BPM | SYSTOLIC BLOOD PRESSURE: 120 MMHG | BODY MASS INDEX: 24.75 KG/M2 | OXYGEN SATURATION: 98 % | WEIGHT: 145 LBS | DIASTOLIC BLOOD PRESSURE: 62 MMHG | TEMPERATURE: 97.5 F | HEIGHT: 64 IN

## 2022-12-13 DIAGNOSIS — I25.10 CORONARY ARTERY DISEASE INVOLVING NATIVE CORONARY ARTERY OF NATIVE HEART WITHOUT ANGINA PECTORIS: ICD-10-CM

## 2022-12-13 DIAGNOSIS — Z23 ENCOUNTER FOR IMMUNIZATION: ICD-10-CM

## 2022-12-13 DIAGNOSIS — Z71.6 ENCOUNTER FOR SMOKING CESSATION COUNSELING: Primary | ICD-10-CM

## 2022-12-13 DIAGNOSIS — I10 BENIGN ESSENTIAL HYPERTENSION: ICD-10-CM

## 2022-12-13 DIAGNOSIS — R97.20 ELEVATED PSA: ICD-10-CM

## 2022-12-13 RX ORDER — NICOTINE 21 MG/24HR
1 PATCH, TRANSDERMAL 24 HOURS TRANSDERMAL EVERY 24 HOURS
Qty: 28 PATCH | Refills: 0 | Status: SHIPPED | OUTPATIENT
Start: 2022-12-13

## 2022-12-13 NOTE — PROGRESS NOTES
Assessment/Plan:    No problem-specific Assessment & Plan notes found for this encounter  Diagnoses and all orders for this visit:    Encounter for smoking cessation counseling  Comments:  Darted nicotine patch 14mg   FUP 4 weeks  Hopefully will decrease to 7 mg  Orders:  -     nicotine (NICODERM CQ) 14 mg/24hr TD 24 hr patch; Place 1 patch on the skin every 24 hours    Encounter for immunization    Elevated PSA  Comments: Follows up with urology  Have an MRI prostate scheduled    Benign essential hypertension  Comments:  Vitals in the clinic normal   Follows up with cardiology  Continue current    Coronary artery disease involving native coronary artery of native heart without angina pectoris  Comments: Follows up with cardiology continue current aspirin 81 mg Plavix 75 mg, Lipitor 20 mg, denies CP, SOB, palpitaion HA, syncope, COHEN, near syncope             Subjective:      Patient ID: Mile Umanzor is a 57 y o  male  HPI     Here today for routine follow-up  Patient sees cardiology 8/9/2022 for hypertension, aortic stenosis and coronary artery disease  S/p 2 stents placed on July/28 2022  Currently denies chest pain, shortness of breath, headache, palpitation,  Smokes 4 cigarettes/day trying to quit  Will start nicotine patch  Previously tried medication for smoking cessation, unfortunately he had GI side effect  He is interested to do patch  Patient reports that he lost 10 pounds since July  Additional patient reports that he drinks  2-3 beers on weekends  The following portions of the patient's history were reviewed and updated as appropriate: allergies, current medications, past family history, past medical history, past social history, past surgical history and problem list     Review of Systems   Constitutional: Negative for chills and fever  HENT: Negative for ear pain and sore throat  Eyes: Negative for pain and visual disturbance     Respiratory: Negative for cough and shortness of breath  Cardiovascular: Negative for chest pain and palpitations  Gastrointestinal: Negative for abdominal pain and vomiting  Genitourinary: Negative for dysuria and hematuria  Musculoskeletal: Negative for arthralgias and back pain  Skin: Negative for color change and rash  Neurological: Negative for seizures and syncope  All other systems reviewed and are negative  Objective:      /62 (BP Location: Left arm, Patient Position: Sitting, Cuff Size: Standard)   Pulse 78   Temp 97 5 °F (36 4 °C) (Tympanic)   Ht 5' 4" (1 626 m)   Wt 65 8 kg (145 lb)   SpO2 98%   BMI 24 89 kg/m²          Physical Exam  Vitals reviewed  Constitutional:       General: He is not in acute distress  Appearance: He is not toxic-appearing  HENT:      Head: Normocephalic and atraumatic  Right Ear: Tympanic membrane normal       Left Ear: Tympanic membrane normal       Nose: Nose normal       Mouth/Throat:      Mouth: Mucous membranes are moist    Eyes:      Conjunctiva/sclera: Conjunctivae normal       Pupils: Pupils are equal, round, and reactive to light  Cardiovascular:      Rate and Rhythm: Normal rate and regular rhythm  Pulses: Normal pulses  Heart sounds: Normal heart sounds  Pulmonary:      Effort: Pulmonary effort is normal  No respiratory distress  Breath sounds: Normal breath sounds  No wheezing  Abdominal:      General: Abdomen is flat  Bowel sounds are normal  There is no distension  Palpations: Abdomen is soft  Musculoskeletal:      Right lower leg: No edema  Left lower leg: No edema  Skin:     General: Skin is warm  Capillary Refill: Capillary refill takes less than 2 seconds  Neurological:      Mental Status: He is alert     Psychiatric:         Mood and Affect: Mood normal

## 2022-12-28 ENCOUNTER — HOSPITAL ENCOUNTER (OUTPATIENT)
Dept: RADIOLOGY | Age: 62
Discharge: HOME | End: 2022-12-28

## 2022-12-28 DIAGNOSIS — R97.20 ELEVATED PSA: ICD-10-CM

## 2022-12-28 RX ADMIN — GADOBUTROL 7 ML: 604.72 INJECTION INTRAVENOUS at 09:07

## 2022-12-30 ENCOUNTER — TELEPHONE (OUTPATIENT)
Dept: OTHER | Facility: OTHER | Age: 62
End: 2022-12-30

## 2022-12-30 NOTE — TELEPHONE ENCOUNTER
Called and spoke with patient  Advised that results were not back yet and that we will call once they are back

## 2023-01-03 ENCOUNTER — TELEPHONE (OUTPATIENT)
Dept: UROLOGY | Facility: CLINIC | Age: 63
End: 2023-01-03

## 2023-01-03 NOTE — TELEPHONE ENCOUNTER
Called patient on his primary number to discuss results of his MRI  Got his voicemail and attempted to call his cell phone again I received his voicemail    2 MRI PI-RADS 4 I recommend an MRI fusion biopsy, I asked patient to call back to the office to discuss

## 2023-01-03 NOTE — TELEPHONE ENCOUNTER
Significant Findings  PT managed by [facility]   Radiology Test Results -   Radiology Calling with report update: MRI Prostate    has significant findings please review

## 2023-01-03 NOTE — TELEPHONE ENCOUNTER
IMPRESSION:     1  PI-RADSv2 1 Category 4 -High (clinically significant cancer is likely to be present)  0 5 cm possible lesion in the posterior right peripheral zone at base      2  No extraprostatic tumor, seminal vesicle invasion, pelvic lymphadenopathy, or pelvic osseous metastatic disease      3  Moderate BPH with calculated prostate volume of 69 cc      Prostate gland boundaries and areas of concern for significant prostate cancer were segmented using 3D advanced post-processing on an independent [employer] system workstation with active physician participation  The segmentation was performed should   MR-ultrasound fusion biopsy be required

## 2023-01-04 NOTE — TELEPHONE ENCOUNTER
Per Christian Finders patient on his primary number to discuss results of his MRI  Got his voicemail and attempted to call his cell phone again I received his voicemail    2 MRI PI-RADS 4 I recommend an MRI fusion biopsy, I asked patient to call back to the office to discuss

## 2023-01-04 NOTE — TELEPHONE ENCOUNTER
Patient is aware of MRI results and that an MRI fusion biopsy is recommended  Please contact him at his home number of [phone] or you can reach him at his work number of [phone]

## 2023-01-05 ENCOUNTER — TELEPHONE (OUTPATIENT)
Dept: UROLOGY | Facility: CLINIC | Age: 63
End: 2023-01-05

## 2023-01-05 NOTE — TELEPHONE ENCOUNTER
Called patient again to discuss the results of his MRI of the prostate  He answered his phone today and we reviewed his category for MRI and my recommendations for proceeding with a transperineal prostate biopsy  He is agreeable to proceed with the same  He was already previously requested  he is aware to wait for surgery scheduler to call and set this up

## 2023-01-10 ENCOUNTER — OFFICE VISIT (OUTPATIENT)
Dept: FAMILY MEDICINE CLINIC | Facility: CLINIC | Age: 63
End: 2023-01-10

## 2023-01-10 VITALS
SYSTOLIC BLOOD PRESSURE: 112 MMHG | BODY MASS INDEX: 25.1 KG/M2 | DIASTOLIC BLOOD PRESSURE: 62 MMHG | HEIGHT: 64 IN | TEMPERATURE: 97 F | OXYGEN SATURATION: 97 % | HEART RATE: 83 BPM | WEIGHT: 147 LBS

## 2023-01-10 DIAGNOSIS — Z71.6 ENCOUNTER FOR SMOKING CESSATION COUNSELING: ICD-10-CM

## 2023-01-10 NOTE — PROGRESS NOTES
Assessment/Plan:    No problem-specific Assessment & Plan notes found for this encounter  Diagnoses and all orders for this visit:    Encounter for smoking cessation counseling  Comments:  Darted nicotine patch 14mg   FUP 4 weeks  Hopefully will decrease to 7 mg  Orders:  -     nicotine (NICODERM CQ) 7 mg/24hr TD 24 hr patch; Place 1 patch on the skin every 24 hours          Subjective:      Patient ID: Anna Oro is a 57 y o  male  HPI  Patient here today for smoking cessation follow-up  We started 14 mg nicotine patch  Reports for 2 weeks did not smoke  Will decrease to 7 mg nicotine patch follow-up with patient in 4 weeks  Patient sees cardiology 8/9/2022 for hypertension, aortic stenosis and coronary artery disease  S/p 2 stents placed on July/28 2022  Currently denies chest pain, shortness of breath, headache, palpitation,   use to Smoke 4 cigarettes/day , started nicotine patch    Previously tried medication for smoking cessation, unfortunately he had GI side effect  He is interested to do patch      Patient reports that he lost 10 pounds since July  Additional patient reports that he drinks  2-3 beers on weekends    The following portions of the patient's history were reviewed and updated as appropriate: allergies, current medications, past family history, past medical history, past social history, past surgical history and problem list     Review of Systems   Constitutional: Negative for chills and fever  HENT: Negative for congestion, ear pain and sore throat  Eyes: Negative for pain, itching and visual disturbance  Respiratory: Negative for cough, choking, shortness of breath, wheezing and stridor  Cardiovascular: Negative for chest pain and palpitations  Gastrointestinal: Negative for abdominal pain, constipation, diarrhea, nausea and vomiting  Genitourinary: Negative for decreased urine volume, difficulty urinating, dysuria, flank pain, hematuria and urgency  Skin: Negative for color change and rash  Neurological: Negative for dizziness, seizures, syncope, weakness, numbness and headaches  All other systems reviewed and are negative  Objective:      /62   Pulse 83   Temp (!) 97 °F (36 1 °C) (Tympanic)   Ht 5' 4" (1 626 m)   Wt 66 7 kg (147 lb)   SpO2 97%   BMI 25 23 kg/m²          Physical Exam  Vitals reviewed  Constitutional:       General: He is not in acute distress  Appearance: Normal appearance  He is not toxic-appearing  HENT:      Head: Normocephalic and atraumatic  Right Ear: Tympanic membrane normal       Left Ear: Tympanic membrane normal       Nose: Nose normal       Mouth/Throat:      Mouth: Mucous membranes are moist       Pharynx: No oropharyngeal exudate or posterior oropharyngeal erythema  Eyes:      Extraocular Movements: Extraocular movements intact  Conjunctiva/sclera: Conjunctivae normal       Pupils: Pupils are equal, round, and reactive to light  Cardiovascular:      Rate and Rhythm: Normal rate and regular rhythm  Pulses: Normal pulses  Heart sounds: Normal heart sounds  Pulmonary:      Effort: Pulmonary effort is normal       Breath sounds: Normal breath sounds  Abdominal:      General: Abdomen is flat  Bowel sounds are normal  There is no distension  Palpations: Abdomen is soft  Musculoskeletal:      Right lower leg: No edema  Left lower leg: No edema  Skin:     General: Skin is warm  Capillary Refill: Capillary refill takes less than 2 seconds  Neurological:      General: No focal deficit present  Mental Status: He is alert and oriented to person, place, and time  Psychiatric:         Mood and Affect: Mood normal          Behavior: Behavior normal          Thought Content:  Thought content normal

## 2023-01-19 ENCOUNTER — TELEPHONE (OUTPATIENT)
Dept: OTHER | Facility: OTHER | Age: 63
End: 2023-01-19

## 2023-01-19 NOTE — TELEPHONE ENCOUNTER
Pt wife calling again and this is the second request    patient is still awaiting phone call to schedule biopsy  Gwenette Carrier Gwenette Carrier Please call back to discuss

## 2023-01-20 NOTE — TELEPHONE ENCOUNTER
I spoke to the patient and scheduled his procedure for 2/28/2023 at [facility] with Dr Axel Healy     -instructions given verbally and mailed  -patient aware to be NPO, needs a  and use an enema 1 hour prior to leaving the house morning of procedure  -patient aware to avoid any potentially blood thinning medications 7 days prior  -CBC, CMP, Urine C&S and EKG 2 weeks prior  -PT/PTT 1 day prior  -Nitza BARON - on auth tracker 1/20/2023

## 2023-02-13 ENCOUNTER — TELEPHONE (OUTPATIENT)
Dept: UROLOGY | Facility: MEDICAL CENTER | Age: 63
End: 2023-02-13

## 2023-02-13 NOTE — DISCHARGE INSTRUCTIONS
Vigorous oral fluid intake and limited activity for the next 24 to 48 hours  Report any unusual or excessive bleeding difficulty voiding or fevers  Prostate Biopsy   WHAT YOU NEED TO KNOW:   A prostate biopsy is a procedure to remove samples of tissue from your prostate gland  The prostate is a male sex gland that makes fluid found in semen  It is located just below the bladder  After the samples are removed, they are sent to a lab and tested for cancer  DISCHARGE INSTRUCTIONS:   Seek care immediately if:   You have heavy bleeding from your rectum  You urinate very little or not at all  You have pain from your procedure that gets worse, even after you take pain medicine  Call your urologist or doctor if:   You have a fever or chills  You feel pain or burning when you urinate  Your urine is cloudy or smells bad  You have questions or concerns about your condition or care  Medicines: You may need any of the following:  Antibiotics  help prevent or treat a bacterial infection  Acetaminophen  decreases pain and fever  It is available without a doctor's order  Ask how much to take and how often to take it  Follow directions  Read the labels of all other medicines you are using to see if they also contain acetaminophen, or ask your doctor or pharmacist  Acetaminophen can cause liver damage if not taken correctly  Do not use more than 4 grams (4,000 milligrams) total of acetaminophen in one day  Prescription pain medicine  may be given  Ask your healthcare provider how to take this medicine safely  Some prescription pain medicines contain acetaminophen  Do not take other medicines that contain acetaminophen without talking to your healthcare provider  Too much acetaminophen may cause liver damage  Prescription pain medicine may cause constipation  Ask your healthcare provider how to prevent or treat constipation  Take your medicine as directed    Contact your healthcare provider if you think your medicine is not helping or if you have side effects  Tell him or her if you are allergic to any medicine  Keep a list of the medicines, vitamins, and herbs you take  Include the amounts, and when and why you take them  Bring the list or the pill bottles to follow-up visits  Carry your medicine list with you in case of an emergency  Follow up with your urologist or doctor as directed: You may need to return for more tests or procedures  Write down your questions so you remember to ask them during your visits  © Copyright [employer] 2022 Information is for End User's use only and may not be sold, redistributed or otherwise used for commercial purposes  All illustrations and images included in CareNotes® are the copyrighted property of [employer]  or [employer]  The above information is an  only  It is not intended as medical advice for individual conditions or treatments  Talk to your doctor, nurse or pharmacist before following any medical regimen to see if it is safe and effective for you

## 2023-02-13 NOTE — H&P
H&P Exam - Urology   Leyla Henson 57 y o  male MRN: [id number]  Unit/Bed#:  Encounter: [id number]    Assessment/Plan     Assessment:  Elevated PSA 5 2  PI-RADS 4 prostate lesion on multi parametric MRI  Plan:  More fusion guided transrectal sound guided transperineal needle biopsies of the prostate    History of Present Illness   HPI:  Leyla Henson is a 57 y o  male who presents with an elevated PSA of 6 6 in July 2022 and on repeat in November of 5 2   Multiparametric MRI of the prostate revealed a PI-RADS 4 lesion measuring 0 5 x 0 5 x 0 4 cm in the posterior right peripheral zone at the base  I met the patient in the holding area and discussed the proposed procedure step-by-step and answered all questions and discussed potential complications of false negative biopsies bleeding infection need for catheter possible urinary retention possible need for operative intervention  I performed history and physical and the patient agreed to the procedure providing both verbal and signed informed consent  Review of Systems    Historical Information   Past Medical History:   Diagnosis Date   • Benign essential hypertension 05/08/2014   • CAD (coronary artery disease)     s/p NAVA prox LAD and D1 7/28/2022   • Coronary artery disease involving native coronary artery of native heart without angina pectoris 8/9/2022   • Other chest pain    • Palpitations      Past Surgical History:   Procedure Laterality Date   • CARDIAC CATHETERIZATION Left 7/28/2022    Procedure: Cardiac catheterization-left heart catheterization;  Surgeon: Mohinder Hines MD;  Location: [facility]; Service: Cardiology   • CARDIAC CATHETERIZATION N/A 7/28/2022    Procedure: Cardiac pci;  Surgeon: Mohinder Hines MD;  Location: [facility];   Service: Cardiology   • HERNIA REPAIR       Social History   Social History     Substance and Sexual Activity   Alcohol Use Yes   • Alcohol/week: 2 0 standard drinks   • Types: 2 Cans of beer per week    Comment: 6-8 /week     Social History     Substance and Sexual Activity   Drug Use No     Social History     Tobacco Use   Smoking Status Former   • Packs/day: 0 00   • Types: Cigarettes   • Quit date: 2022   • Years since quittin 1   Smokeless Tobacco Never     Family History:   Family History   Problem Relation Age of Onset   • No Known Problems Mother    • No Known Problems Father        Meds/Allergies   all medications and allergies reviewed  No Known Allergies    Objective   Vitals: There were no vitals taken for this visit  No intake/output data recorded  Invasive Devices     None                 Physical Exam  Vitals reviewed  Constitutional:       General: He is not in acute distress  Appearance: Normal appearance  He is not ill-appearing, toxic-appearing or diaphoretic  HENT:      Head: Normocephalic and atraumatic  Nose: Nose normal    Eyes:      Extraocular Movements: Extraocular movements intact  Cardiovascular:      Rate and Rhythm: Normal rate and regular rhythm  Pulses: Normal pulses  Heart sounds: Normal heart sounds  Pulmonary:      Effort: Pulmonary effort is normal  No respiratory distress  Breath sounds: Normal breath sounds  No wheezing, rhonchi or rales  Abdominal:      General: Bowel sounds are normal  There is no distension  Palpations: Abdomen is soft  Tenderness: There is no abdominal tenderness  There is no guarding or rebound  Musculoskeletal:      Cervical back: Neck supple  Neurological:      Mental Status: He is alert and oriented to person, place, and time  Psychiatric:         Mood and Affect: Mood normal          Behavior: Behavior normal          Judgment: Judgment normal          Lab Results: I have personally reviewed pertinent reports  Imaging: I have personally reviewed pertinent reports     and I have personally reviewed pertinent films in PACS  EKG, Pathology, and Other Studies: I have personally reviewed pertinent reports  and I have personally reviewed pertinent films in PACS  VTE Prophylaxis: Sequential compression device Jerrod Salcedo)     Code Status: Prior  Advance Directive and Living Will:      Power of :    POLST:      Counseling / Coordination of Care  Total floor / unit time spent today 30 minutes  Greater than 50% of total time was spent with the patient and / or family counseling and / or coordination of care  A description of the counseling / coordination of care: Elysia Andre

## 2023-02-14 ENCOUNTER — APPOINTMENT (OUTPATIENT)
Dept: LAB | Facility: CLINIC | Age: 63
End: 2023-02-14

## 2023-02-14 DIAGNOSIS — R93.89 ABNORMAL MRI: ICD-10-CM

## 2023-02-14 DIAGNOSIS — R97.20 ELEVATED PSA: ICD-10-CM

## 2023-02-14 LAB
ALBUMIN SERPL BCP-MCNC: 3.7 G/DL (ref 3.5–5)
ALP SERPL-CCNC: 73 U/L (ref 46–116)
ALT SERPL W P-5'-P-CCNC: 30 U/L (ref 12–78)
ANION GAP SERPL CALCULATED.3IONS-SCNC: 4 MMOL/L (ref 4–13)
AST SERPL W P-5'-P-CCNC: 17 U/L (ref 5–45)
BASOPHILS # BLD AUTO: 0.09 THOUSANDS/ÂΜL (ref 0–0.1)
BASOPHILS NFR BLD AUTO: 1 % (ref 0–1)
BILIRUB SERPL-MCNC: 0.38 MG/DL (ref 0.2–1)
BUN SERPL-MCNC: 19 MG/DL (ref 5–25)
CALCIUM SERPL-MCNC: 8.6 MG/DL (ref 8.3–10.1)
CHLORIDE SERPL-SCNC: 107 MMOL/L (ref 96–108)
CO2 SERPL-SCNC: 28 MMOL/L (ref 21–32)
CREAT SERPL-MCNC: 0.72 MG/DL (ref 0.6–1.3)
EOSINOPHIL # BLD AUTO: 0.28 THOUSAND/ÂΜL (ref 0–0.61)
EOSINOPHIL NFR BLD AUTO: 3 % (ref 0–6)
ERYTHROCYTE [DISTWIDTH] IN BLOOD BY AUTOMATED COUNT: 13.5 % (ref 11.6–15.1)
GFR SERPL CREATININE-BSD FRML MDRD: 98 ML/MIN/1.73SQ M
GLUCOSE P FAST SERPL-MCNC: 91 MG/DL (ref 65–99)
HCT VFR BLD AUTO: 42.9 % (ref 36.5–49.3)
HGB BLD-MCNC: 13.6 G/DL (ref 12–17)
IMM GRANULOCYTES # BLD AUTO: 0.02 THOUSAND/UL (ref 0–0.2)
IMM GRANULOCYTES NFR BLD AUTO: 0 % (ref 0–2)
LYMPHOCYTES # BLD AUTO: 1.83 THOUSANDS/ÂΜL (ref 0.6–4.47)
LYMPHOCYTES NFR BLD AUTO: 20 % (ref 14–44)
MCH RBC QN AUTO: 27.7 PG (ref 26.8–34.3)
MCHC RBC AUTO-ENTMCNC: 31.7 G/DL (ref 31.4–37.4)
MCV RBC AUTO: 87 FL (ref 82–98)
MONOCYTES # BLD AUTO: 0.78 THOUSAND/ÂΜL (ref 0.17–1.22)
MONOCYTES NFR BLD AUTO: 9 % (ref 4–12)
NEUTROPHILS # BLD AUTO: 6.13 THOUSANDS/ÂΜL (ref 1.85–7.62)
NEUTS SEG NFR BLD AUTO: 67 % (ref 43–75)
NRBC BLD AUTO-RTO: 0 /100 WBCS
PLATELET # BLD AUTO: 303 THOUSANDS/UL (ref 149–390)
PMV BLD AUTO: 9.1 FL (ref 8.9–12.7)
POTASSIUM SERPL-SCNC: 3.8 MMOL/L (ref 3.5–5.3)
PROT SERPL-MCNC: 6.4 G/DL (ref 6.4–8.4)
RBC # BLD AUTO: 4.91 MILLION/UL (ref 3.88–5.62)
SODIUM SERPL-SCNC: 139 MMOL/L (ref 135–147)
WBC # BLD AUTO: 9.13 THOUSAND/UL (ref 4.31–10.16)

## 2023-02-15 LAB — BACTERIA UR CULT: NORMAL

## 2023-02-21 ENCOUNTER — TELEPHONE (OUTPATIENT)
Dept: UROLOGY | Facility: AMBULATORY SURGERY CENTER | Age: 63
End: 2023-02-21

## 2023-02-21 NOTE — TELEPHONE ENCOUNTER
Called and spoke to pt stating          I am calling in regards to your prep instructions for your appointment at the [facility] [address] [facility] [address] [place] [address] 20  on 2/28/23 with Dr De Los Santos Mention  under IV Se  We need you to please make sure to stop all blood thinners (including multivitamins) ( Eliquis or Warfarin should be clarified with Cardiology before stopping) 7 day prior to you appointment  Pt should have nothing to eat after midnight the day before the procedure  The pt will need to make sure they have a   Finally the Pt will need to use an enema at least 2 hour prior to the appointment  If you have any questions please call [phone] and ask for Wesley Garcia "    Patient expressed understanding of instructions and has no further questions at this time

## 2023-02-27 ENCOUNTER — APPOINTMENT (OUTPATIENT)
Dept: LAB | Facility: CLINIC | Age: 63
End: 2023-02-27

## 2023-02-27 LAB
APTT PPP: 29 SECONDS (ref 23–37)
INR PPP: 0.9 (ref 0.84–1.19)
PROTHROMBIN TIME: 12.3 SECONDS (ref 11.6–14.5)

## 2023-02-28 ENCOUNTER — ANESTHESIA (OUTPATIENT)
Dept: GASTROENTEROLOGY | Facility: HOSPITAL | Age: 63
End: 2023-02-28

## 2023-02-28 ENCOUNTER — HOSPITAL ENCOUNTER (OUTPATIENT)
Facility: HOSPITAL | Age: 63
Setting detail: OUTPATIENT SURGERY
Discharge: HOME | End: 2023-02-28
Attending: UROLOGY | Admitting: UROLOGY

## 2023-02-28 ENCOUNTER — ANESTHESIA EVENT (OUTPATIENT)
Dept: GASTROENTEROLOGY | Facility: HOSPITAL | Age: 63
End: 2023-02-28

## 2023-02-28 VITALS
OXYGEN SATURATION: 95 % | HEART RATE: 79 BPM | RESPIRATION RATE: 17 BRPM | DIASTOLIC BLOOD PRESSURE: 54 MMHG | SYSTOLIC BLOOD PRESSURE: 103 MMHG | TEMPERATURE: 96.1 F

## 2023-02-28 DIAGNOSIS — R97.20 ELEVATED PSA: ICD-10-CM

## 2023-02-28 DIAGNOSIS — R93.89 ABNORMAL MRI: ICD-10-CM

## 2023-02-28 DIAGNOSIS — R07.9 CHEST PAIN, UNSPECIFIED TYPE: ICD-10-CM

## 2023-02-28 RX ORDER — LIDOCAINE HYDROCHLORIDE AND EPINEPHRINE BITARTRATE 20; .01 MG/ML; MG/ML
INJECTION, SOLUTION SUBCUTANEOUS AS NEEDED
Status: DISCONTINUED | OUTPATIENT
Start: 2023-02-28 | End: 2023-02-28 | Stop reason: HOSPADM

## 2023-02-28 RX ORDER — SODIUM CHLORIDE 9 MG/ML
INJECTION, SOLUTION INTRAVENOUS CONTINUOUS PRN
Status: DISCONTINUED | OUTPATIENT
Start: 2023-02-28 | End: 2023-02-28

## 2023-02-28 RX ORDER — HYDROCODONE BITARTRATE AND ACETAMINOPHEN 5; 325 MG/1; MG/1
1 TABLET ORAL EVERY 6 HOURS PRN
Status: CANCELLED | OUTPATIENT
Start: 2023-02-28

## 2023-02-28 RX ORDER — BUPIVACAINE HYDROCHLORIDE 5 MG/ML
INJECTION, SOLUTION EPIDURAL; INTRACAUDAL AS NEEDED
Status: DISCONTINUED | OUTPATIENT
Start: 2023-02-28 | End: 2023-02-28 | Stop reason: HOSPADM

## 2023-02-28 RX ORDER — PROPOFOL 10 MG/ML
INJECTION, EMULSION INTRAVENOUS AS NEEDED
Status: DISCONTINUED | OUTPATIENT
Start: 2023-02-28 | End: 2023-02-28

## 2023-02-28 RX ADMIN — PROPOFOL 50 MG: 10 INJECTION, EMULSION INTRAVENOUS at 11:50

## 2023-02-28 RX ADMIN — PROPOFOL 50 MG: 10 INJECTION, EMULSION INTRAVENOUS at 11:47

## 2023-02-28 RX ADMIN — PROPOFOL 50 MG: 10 INJECTION, EMULSION INTRAVENOUS at 11:44

## 2023-02-28 RX ADMIN — SODIUM CHLORIDE: 0.9 INJECTION, SOLUTION INTRAVENOUS at 11:56

## 2023-02-28 RX ADMIN — PROPOFOL 50 MG: 10 INJECTION, EMULSION INTRAVENOUS at 11:54

## 2023-02-28 RX ADMIN — SODIUM CHLORIDE: 0.9 INJECTION, SOLUTION INTRAVENOUS at 11:35

## 2023-02-28 RX ADMIN — PROPOFOL 150 MG: 10 INJECTION, EMULSION INTRAVENOUS at 11:41

## 2023-02-28 RX ADMIN — Medication 1000 MG: at 11:14

## 2023-02-28 NOTE — INTERVAL H&P NOTE
H&P reviewed  After examining the patient I find no changes in the patients condition since the H&P had been written      Vitals:    02/28/23 1036   BP: 124/65   Pulse: 71   Resp: 18   Temp: 97 9 °F (36 6 °C)   SpO2: 97%

## 2023-02-28 NOTE — OP NOTE
OPERATIVE REPORT  PATIENT NAME: Jasmina Arce    :  1960  MRN: [id number]  Pt Location: [facility] ROOM 01    SURGERY DATE: 2023    Surgeon(s) and Role:     * Dory De La Fuente MD - Primary    Preop Diagnosis:  Elevated PSA [R97 20]  Abnormal MRI [R93 89]    Post-Op Diagnosis Codes:     * Elevated PSA [R97 20]     * Abnormal MRI [R93 89]    Procedure(s):  TRANSRECTAL MRI FUSION BIOPSY PROSTATE transrectal ultrasound-guided transrectal needle biopsies of the prostate    Specimen(s):  ID Type Source Tests Collected by Time Destination   1 : ECHO #1 Tissue Prostate TISSUE EXAM Dory De La Fuente MD 2023 1147    2 : Right Lateral and Medial Base Tissue Prostate TISSUE EXAM Dory De La Fuente MD 2023 1147    3 : Right Mid Lateral Tissue Prostate TISSUE EXAM Dory De La Fuente MD 2023 1147    4 : Right Mid Medial Tissue Prostate TISSUE EXAM Dory De La Fuente MD 2023 1147    5 : Right Lateral [place] Tissue Prostate TISSUE EXAM Dory De La Fuente MD 2023 1147    6 : Right Medial [place] Tissue Prostate TISSUE EXAM Dory De La Fuente MD 2023 1147    7 : Left Lateral Base Tissue Prostate TISSUE EXAM Dory De La Fuente MD 2023 1147    8 : Left Medial Base Tissue Prostate TISSUE EXAM Dory De La Fuente MD 2023 1147    9 : Left Mid Lateral Tissue Prostate TISSUE EXAM Dory De La Fuente MD 2023 1147    10 : Left Mid Medial Tissue Prostate TISSUE EXAM Dory De La Fuente MD 2023 1147    11 : Left Lateral [place] Tissue Prostate TISSUE EXAM Dory De La Fuente MD 2023 1159    12 : Left Medial [place] Tissue Prostate TISSUE EXAM Dory De La Fuente MD 2023 1159        Estimated Blood Loss:   Minimal    Drains:  * No LDAs found *    Anesthesia Type:   Epidural w/ IV Sedation    Operative Indications:  Elevated PSA [R97 20]  Abnormal MRI [R93 89]       Operative Findings:  See operative note below    Complications:   None    Procedure and Technique:  I met the patient in the holding area and reviewed the procedure step-by-step with him  The patient's insurance will not cover transperineal biopsies so the patient acknowledged that he has decided to proceed with transrectal ultrasound-guided transrectal needle biopsies of the prostate understanding risks and complications of bleeding infection rectal injury false negative biopsies urinary retention potential need for a catheter or operative intervention  The patient had all questions answered and underwent physical and history and provided both verbal and signed informed consent  The patient was taken to the GI unit placed on the examining table in the left lateral decubitus position with knees to chest and after appropriate timeout intravenous sedation was induced and a transrectal ultrasound probe condom covered and lubricated was inserted per anus into the rectal vault with sagittal and longitudinal imaging of the prostate and seminal vesicles taking place  Lidocaine 2% with epinephrine and Marcaine 0 5% in equal amounts was injected at the tips of both seminal vesicles for bilateral pelvic plexus block and at the apices of the prostate for periapical blocks bilaterally  Using a transverse ultrasound sweep MR fusion was accomplished and then 5 biopsy cores were obtained from the region of interest   After biopsies of the region of interest took place biopsies in a sextant fashion were obtained with 2 cores 1 medial and 1 lateral from each sextant for a total of an additional 12 cores  Total number of cores was 17  Pressure was held using the ultrasound probe up against the prostate for 3 minutes for hemostasis and then the probe was removed without any sign of blood  The patient was transferred to the recovery room having tolerated the procedure well in good condition  He recovered uneventfully and was discharged in good condition  He will follow-up with his urologic provider for discussion of biopsy results as scheduled  I was present for the entire procedure and I was present for all critical portions of the procedure    Patient Disposition:  PACU         SIGNATURE: Katelynn Reveles MD  DATE: February 28, 2023  TIME: 12:02 PM

## 2023-03-03 ENCOUNTER — TELEPHONE (OUTPATIENT)
Dept: UROLOGY | Facility: CLINIC | Age: 63
End: 2023-03-03

## 2023-03-03 NOTE — TELEPHONE ENCOUNTER
----- Message from Lashay Soriano MD sent at 3/2/2023 10:00 AM EST -----  Positive biopsy results    Patient should probably be seen a little earlier than scheduled and should be scheduled with a MD for discussion of results  ----- Message -----  From: Lab, Background User  Sent: 3/2/2023   9:51 AM EST  To: Lashay Soriano MD

## 2023-03-03 NOTE — TELEPHONE ENCOUNTER
I spoke with the patient and scheduled him for a discussion with Dr Biggs on 3/16/23 for bx results

## 2023-03-15 ENCOUNTER — TELEPHONE (OUTPATIENT)
Dept: UROLOGY | Facility: MEDICAL CENTER | Age: 63
End: 2023-03-15

## 2023-03-15 DIAGNOSIS — C61 PROSTATE CANCER (HCC): Primary | ICD-10-CM

## 2023-03-15 NOTE — TELEPHONE ENCOUNTER
I spoke to patient, discussed the results of prostate biopsy, Llano score 7 and 6 cancer, see full report  He was aware of the report on my chart  We have appointment tomorrow, but I have decided to get his bone scan before our follow-up appointment  The  pelvis MRI did not show any lymph nodes, so at this point I do not think we need a CT scan, but that may change  So, bone scan ordered, cancel tomorrow's appointment    I will call him with results of bone scan and then decide when we bring him in the office

## 2023-03-15 NOTE — TELEPHONE ENCOUNTER
Called pt to schedule bone scan: no answer  I left message with phone # for [facility] ([phone]) and advised pt to contact them directly to set this up  His appt for tomorrow 3-16 with dr Laura Cheema has been cancelled

## 2023-03-15 NOTE — TELEPHONE ENCOUNTER
----- Message from Gladys Andres MD sent at 3/15/2023  3:20 PM EDT -----   I spoke to patient and discussed his prostate biopsy showing cancer  schedule bone scan at first available  Cancel appointment I have with him for March 16  I will call him with bone scan results, and then we will decide when next appointment is  Malina Alicia, as I discussed, my last appoint will be 215 tomorrow  I spoke to the other elise too

## 2023-03-15 NOTE — TELEPHONE ENCOUNTER
I called wife and left voicemail, I want to speak to patient this afternoon about results and x-ray testing, before our appointment tomorrow    I will call them back

## 2023-03-27 ENCOUNTER — HOSPITAL ENCOUNTER (OUTPATIENT)
Dept: NUCLEAR MEDICINE | Facility: HOSPITAL | Age: 63
Discharge: HOME | End: 2023-03-27

## 2023-03-27 DIAGNOSIS — C61 PROSTATE CANCER (HCC): ICD-10-CM

## 2023-03-29 ENCOUNTER — TELEPHONE (OUTPATIENT)
Dept: UROLOGY | Facility: MEDICAL CENTER | Age: 63
End: 2023-03-29

## 2023-03-29 NOTE — TELEPHONE ENCOUNTER
I left voicemail, bone scan was negative  We will get him in the office in 10 days or so to discuss the treatment options

## 2023-04-05 DIAGNOSIS — I10 BENIGN ESSENTIAL HYPERTENSION: Chronic | ICD-10-CM

## 2023-04-05 RX ORDER — LISINOPRIL 20 MG/1
TABLET ORAL
Qty: 90 TABLET | Refills: 1 | Status: SHIPPED | OUTPATIENT
Start: 2023-04-05

## 2023-04-06 ENCOUNTER — OFFICE VISIT (OUTPATIENT)
Dept: UROLOGY | Facility: MEDICAL CENTER | Age: 63
End: 2023-04-06

## 2023-04-06 VITALS
HEART RATE: 68 BPM | SYSTOLIC BLOOD PRESSURE: 136 MMHG | DIASTOLIC BLOOD PRESSURE: 70 MMHG | OXYGEN SATURATION: 96 % | HEIGHT: 64 IN | BODY MASS INDEX: 25.27 KG/M2 | WEIGHT: 148 LBS

## 2023-04-06 DIAGNOSIS — C61 PROSTATE CANCER (HCC): Primary | ICD-10-CM

## 2023-04-06 NOTE — PROGRESS NOTES
HISTORY:    1 prostate biopsy recently showed adequate with the prostate:  Hal[place] score 6 in 1 area  Jihan 3+4 equal 7 in 3 areas  Jihan 4+3 equal 7 in 1 area  MRI showed nothing outside the prostate and negative lymph nodes    Bone scan negative  PSA was 6 6 July 2022    Minimal voiding symptoms, good stream and control  prostate 69 mL volume         ASSESSMENT / PLAN:    Long talk regarding prostate cancer, his Jihan score, and the  need for definitive treatment  Does not qualify for active surveillance  Prostatectomy, surgical technique, pros and cons discussed    Radiation oncology with [facility], possible hormone therapy, length of treatment, possible side effects  Overall patient is leaning towards radiation, we will start with a consult there  If he still wants further info regarding surgery, we will arrange consult with robotic surgeon  The following portions of the patient's history were reviewed and updated as appropriate: allergies, current medications, past family history, past medical history, past social history, past surgical history and problem list     Review of Systems   All other systems reviewed and are negative  Objective:     Physical Exam  Constitutional:       Appearance: Normal appearance  Neurological:      Mental Status: He is alert             0   Lab Value Date/Time    PSA 5 2 (H) 11/08/2022 0736    PSA 6 6 (H) 07/18/2022 0747    PSA 3 9 06/24/2020 0856   ]  BUN   Date Value Ref Range Status   02/14/2023 19 5 - 25 mg/dL Final     Creatinine   Date Value Ref Range Status   02/14/2023 0 72 0 60 - 1 30 mg/dL Final     Comment:     Standardized to IDMS reference method     No components found for: CBC      Patient Active Problem List   Diagnosis   • Benign essential hypertension   • ED (erectile dysfunction) of non-organic origin   • Hypercholesterolemia   • Raynaud's phenomenon   • Peripheral vascular disease ([address] )   • Peripheral neuropathy   • Pityriasis rosea   • Negative depression screening   • Overweight with body mass index (BMI) of 26 to 26 9 in adult   • Aortic valve stenosis   • Chest pain   • Palpitations   • Coronary artery disease involving native coronary artery of native heart without angina pectoris   • Elevated PSA   • Microscopic hematuria   • BPH (benign prostatic hyperplasia)        Diagnoses and all orders for this visit:    Prostate cancer [facility])  -     Ambulatory referral to Radiation Oncology; Future           Patient ID: Farooq Wilburn is a 57 y o  male  Current Outpatient Medications:   •  aspirin 81 mg chewable tablet, Chew 81 mg daily, Disp: , Rfl:   •  atorvastatin (LIPITOR) 20 mg tablet, take 1 tablet by mouth once daily, Disp: 30 tablet, Rfl: 5  •  clopidogrel (PLAVIX) 75 mg tablet, take 1 tablet by mouth once daily, Disp: 90 tablet, Rfl: 3  •  lisinopril (ZESTRIL) 20 mg tablet, take 1 tablet by mouth once daily, Disp: 90 tablet, Rfl: 1  •  pantoprazole (PROTONIX) 20 mg tablet, Take 1 tablet (20 mg total) by mouth daily, Disp: 90 tablet, Rfl: 3  •  nicotine (NICODERM CQ) 7 mg/24hr TD 24 hr patch, Place 1 patch on the skin every 24 hours (Patient not taking: Reported on 4/6/2023), Disp: 28 patch, Rfl: 1    Past Medical History:   Diagnosis Date   • Benign essential hypertension 05/08/2014   • CAD (coronary artery disease)     s/p NAVA prox LAD and D1 7/28/2022   • Coronary artery disease involving native coronary artery of native heart without angina pectoris 8/9/2022   • Other chest pain    • Palpitations        Past Surgical History:   Procedure Laterality Date   • CARDIAC CATHETERIZATION Left 7/28/2022    Procedure: Cardiac catheterization-left heart catheterization;  Surgeon: Nelson Padilla MD;  Location: [facility]; Service: Cardiology   • CARDIAC CATHETERIZATION N/A 7/28/2022    Procedure: Cardiac pci;  Surgeon: Nelson Padilla MD;  Location: [facility];   Service: Cardiology   • HERNIA REPAIR     • [place] PROSTATE NEEDLE BIOPSY ANY APPROACH N/A 2/28/2023    Procedure: TRANSRECTAL MRI FUSION BIOPSY PROSTATE;  Surgeon: Barbara Butler MD;  Location: [facility];  Service: Urology       Social History

## 2023-04-18 PROBLEM — C61 PROSTATE CANCER (HCC): Status: ACTIVE | Noted: 2023-04-18

## 2023-04-24 ENCOUNTER — TELEPHONE (OUTPATIENT)
Dept: INFUSION CENTER | Facility: CLINIC | Age: 63
End: 2023-04-24

## 2023-05-01 ENCOUNTER — TELEPHONE (OUTPATIENT)
Dept: UROLOGY | Facility: AMBULATORY SURGERY CENTER | Age: 63
End: 2023-05-01

## 2023-05-01 NOTE — TELEPHONE ENCOUNTER
-Spoke to the patient, he scheduled his Surgical Procedure on 05/22/23 at [facility] w/Dr Ila Mckeon  Will call him later in the week with surgical information

## 2023-05-03 ENCOUNTER — PREP FOR PROCEDURE (OUTPATIENT)
Dept: UROLOGY | Facility: AMBULATORY SURGERY CENTER | Age: 63
End: 2023-05-03

## 2023-05-03 DIAGNOSIS — Z01.812 PRE-OPERATIVE LABORATORY EXAMINATION: ICD-10-CM

## 2023-05-03 DIAGNOSIS — Z79.01 LONG TERM (CURRENT) USE OF ANTICOAGULANTS: ICD-10-CM

## 2023-05-03 DIAGNOSIS — Z01.818 PREOPERATIVE EXAMINATION, UNSPECIFIED: ICD-10-CM

## 2023-05-03 DIAGNOSIS — R39.89 SUSPECTED UTI: ICD-10-CM

## 2023-05-03 DIAGNOSIS — C61 PROSTATE CANCER (HCC): Primary | ICD-10-CM

## 2023-05-03 NOTE — TELEPHONE ENCOUNTER
lmom that I am trying to reach pt to go over his surgical packet, to give the office a call and ask for me

## 2023-05-03 NOTE — TELEPHONE ENCOUNTER
Patient called stating he is returning 's call  Please call him after 12 30 pm at his work number below        Patient can be reached at [phone]

## 2023-05-08 ENCOUNTER — CONSULT (OUTPATIENT)
Dept: CARDIOLOGY CLINIC | Facility: CLINIC | Age: 63
End: 2023-05-08

## 2023-05-08 VITALS
HEIGHT: 64 IN | HEART RATE: 80 BPM | BODY MASS INDEX: 25.61 KG/M2 | SYSTOLIC BLOOD PRESSURE: 122 MMHG | DIASTOLIC BLOOD PRESSURE: 66 MMHG | WEIGHT: 150 LBS

## 2023-05-08 DIAGNOSIS — Z01.810 PREOP CARDIOVASCULAR EXAM: ICD-10-CM

## 2023-05-08 DIAGNOSIS — I10 BENIGN ESSENTIAL HYPERTENSION: ICD-10-CM

## 2023-05-08 DIAGNOSIS — I25.10 CORONARY ARTERY DISEASE INVOLVING NATIVE CORONARY ARTERY OF NATIVE HEART WITHOUT ANGINA PECTORIS: ICD-10-CM

## 2023-05-08 DIAGNOSIS — I35.0 NONRHEUMATIC AORTIC VALVE STENOSIS: Primary | ICD-10-CM

## 2023-05-08 NOTE — PROGRESS NOTES
Cardiology Follow up    Alberto Nash  [id number]  1960   [facility] [address] [address]      1  Nonrheumatic aortic valve stenosis        2  Benign essential hypertension        3  Coronary artery disease involving native coronary artery of native heart without angina pectoris        4  Preop cardiovascular exam            Discussion/Summary:  1  Perioperative risk stratification  2  Coronary artery disease with drug-eluting stent placement to proximal LAD and first diagonal branch 7/28/2022  3  Hypertension  4  Hyperlipidemia  5  Former tobacco use    -Transthoracic echocardiogram 6/30/2022 showing left ventricular systolic function normal at 5% with grade 1 diastolic dysfunction, mild to moderate aortic regurgitation with mild to moderate aortic stenosis regurgitation   -Patient can continue aspirin 81 mg daily, atorvastatin 20 mg daily, Plavix 75 mg daily, lisinopril 20 mg daily  -According to the revised cardiac risk index patient is intermediate to high risk for planned operative procedure  Patient understands and is excepting worry as planned  In the setting of revascularization and adequate function patient is appropriate to proceed with procedure at this time   -After discussion with patient he wishes to have procedure performed as soon as possible and understands risks of holding antiplatelet regimen    He wishes to proceed with scheduled prostatectomy for 5/22/2023 and I would recommend that to be reinitiated once cleared by urology shortly after procedure   -We will have patient schedule follow-up with his primary car Dr Sonia Sctot in August 2023  -Patient counseled on the importance of continued tobacco cessation along with monitoring blood pressures at home and continuing a low-sodium and low-fat heart healthy diet with continued physical  -Patient counseled if he were to have any warning or alarm type symptoms he is to seek emergency medical care            History of Present Illness:  -Patient is a 57-year-old male with hypertension, hyperlipidemia, former tobacco use quit January 2023 and aortic stenosis who presents to the office today for perioperative risk stratification prior to laparoscopic prostatectomy for prostate cancer  Patient has known coronary artery disease and underwent drug-eluting stent placement on July 28, 2022 with drug-eluting stent placement to proximal LAD and first diagonal branch  He has been 100% compliant with dual antiplatelet strategy along with antihypertensive regimen since that time has been able to improve his already along with tobacco cessation   -He denies any chest pain, palpitations, lightheadedness or dizziness, loss of consciousness, shortness of breath, lower extremity edema, orthopnea or bendopnea  He states that he can easily walk 4 city blocks on flat surface or up 2 flights of stairs without any chest pain, shortness of breath or anginal equivalent symptoms  He notes blood pressures at home are well controlled on current medical regimen states that overall he feels well      Patient Active Problem List   Diagnosis   • Benign essential hypertension   • ED (erectile dysfunction) of non-organic origin   • Hypercholesterolemia   • Raynaud's phenomenon   • Peripheral vascular disease (HCC)   • Peripheral neuropathy   • Pityriasis rosea   • Negative depression screening   • Overweight with body mass index (BMI) of 26 to 26 9 in adult   • Aortic valve stenosis   • Chest pain   • Palpitations   • Coronary artery disease involving native coronary artery of native heart without angina pectoris   • Elevated PSA   • Microscopic hematuria   • BPH (benign prostatic hyperplasia)   • Prostate cancer [facility])     Past Medical History:   Diagnosis Date   • Benign essential hypertension 05/08/2014   • CAD (coronary artery disease)     s/p NAVA prox LAD and D1 2022   • Coronary artery disease involving native coronary artery of native heart without angina pectoris 2022   • Other chest pain    • Palpitations    • Prostate cancer ([facility] [address] ) 2023     Social History     Socioeconomic History   • Marital status: /Civil Union     Spouse name: Not on file   • Number of children: Not on file   • Years of education: Not on file   • Highest education level: Not on file   Occupational History   • Not on file   Tobacco Use   • Smoking status: Former     Packs/day: 0 50     Types: Cigarettes     Quit date: 2022     Years since quittin 3   • Smokeless tobacco: Never   Vaping Use   • Vaping Use: Never used   Substance and Sexual Activity   • Alcohol use: Yes     Alcohol/week: 2 0 standard drinks     Types: 2 Cans of beer per week     Comment: 6-8 /week   • Drug use: No   • Sexual activity: Not on file   Other Topics Concern   • Not on file   Social History Narrative    EMPLOYED         Social Determinants of Health     Financial Resource Strain: Not on file   Food Insecurity: Not on file   Transportation Needs: Not on file   Physical Activity: Not on file   Stress: Not on file   Social Connections: Not on file   Intimate Partner Violence: Not on file   Housing Stability: Not on file      Family History   Problem Relation Age of Onset   • No Known Problems Mother    • No Known Problems Father      Past Surgical History:   Procedure Laterality Date   • CARDIAC CATHETERIZATION Left 2022    Procedure: Cardiac catheterization-left heart catheterization;  Surgeon: Jigar Ribeiro MD;  Location: [facility]; Service: Cardiology   • CARDIAC CATHETERIZATION N/A 2022    Procedure: Cardiac pci;  Surgeon: Jigar Ribeiro MD;  Location: [facility];   Service: Cardiology   • HERNIA REPAIR     • CO PROSTATE NEEDLE BIOPSY ANY APPROACH N/A 2023    Procedure: TRANSRECTAL MRI FUSION BIOPSY PROSTATE;  Surgeon: Aneudy Estevez MD;  Location: [facility];  Service: Urology       Current Outpatient Medications:   •  aspirin 81 mg chewable tablet, Chew 81 mg daily, Disp: , Rfl:   •  atorvastatin (LIPITOR) 20 mg tablet, take 1 tablet by mouth once daily, Disp: 30 tablet, Rfl: 5  •  clopidogrel (PLAVIX) 75 mg tablet, take 1 tablet by mouth once daily, Disp: 90 tablet, Rfl: 3  •  lisinopril (ZESTRIL) 20 mg tablet, take 1 tablet by mouth once daily, Disp: 90 tablet, Rfl: 1  •  pantoprazole (PROTONIX) 20 mg tablet, Take 1 tablet (20 mg total) by mouth daily, Disp: 90 tablet, Rfl: 3  No Known Allergies      Labs:  No visits with results within 2 Month(s) from this visit     Latest known visit with results is:   Admission on 02/28/2023, Discharged on 02/28/2023   Component Date Value   • Case Report 02/28/2023                      Value:Surgical Pathology Report                         Case: [id number]                                   Authorizing Provider:  Aneudy Estevez MD      Collected:           02/28/2023 1147              Ordering Location:     [facility]      Received:            02/28/2023 [facility]                                                           Pathologist:           Edu Craven MD                                                          Specimens:   A) - Prostate, ECHO #1                                                                               B) - Prostate, Right Lateral and Medial Base                                                        C) - Prostate, Right Mid Lateral                                                                    D) - Prostate, Right Mid Medial                                                                     E) - Prostate, Right Lateral [place]                                                                                             F) - Prostate, Right Medial [place] G) - Prostate, Left Lateral [place]                                                                    H) - Prostate, Left Medial [place]                                                                     I) - Prostate, Left Mid Lateral                                                                     J) - Prostate, Left Mid Medial                                                                      K) - Prostate, Left Lateral [place]                                                                    L) - Prostate, Left Medial [place]                                                           • Final Diagnosis 02/28/2023                      Value: This result contains rich text formatting which cannot be displayed here  • Note 02/28/2023                      Value: This result contains rich text formatting which cannot be displayed here  • Additional Information 02/28/2023                      Value: This result contains rich text formatting which cannot be displayed here  • Gross Description 02/28/2023                      Value: This result contains rich text formatting which cannot be displayed here  Imaging: No results found  Review of Systems:  Review of Systems   Constitutional: Negative for chills, diaphoresis, fatigue and fever  HENT: Negative for trouble swallowing and voice change  Eyes: Negative for pain and redness  Respiratory: Negative for shortness of breath and wheezing  Cardiovascular: Negative for chest pain, palpitations and leg swelling  Gastrointestinal: Negative for abdominal pain, blood in stool, constipation, diarrhea, nausea and vomiting  Genitourinary: Negative for dysuria  Musculoskeletal: Negative for neck pain and neck stiffness  Skin: Negative for rash  Neurological: Negative for dizziness, syncope, light-headedness and headaches  Psychiatric/Behavioral: Negative for agitation and confusion     All other systems "reviewed and are negative  Vitals:    05/08/23 1451   BP: 122/66   Pulse: 80   Weight: 68 kg (150 lb)   Height: 5' 4\" (1 626 m)     Vitals:    05/08/23 1451   Weight: 68 kg (150 lb)     Height: 5' 4\" (162 6 cm)     Physical Exam:  Physical Exam  Vitals reviewed  Constitutional:       General: He is not in acute distress  Appearance: Normal appearance  He is not diaphoretic  HENT:      Head: Normocephalic and atraumatic  Eyes:      General:         Right eye: No discharge  Left eye: No discharge  Neck:      Comments: Trachea midline, No JVD  Cardiovascular:      Rate and Rhythm: Normal rate and regular rhythm  Heart sounds: Murmur (AMANDA) heard  Pulmonary:      Effort: Pulmonary effort is normal  No respiratory distress  Breath sounds: No wheezing  Chest:      Chest wall: No tenderness  Abdominal:      General: Bowel sounds are normal       Palpations: Abdomen is soft  Tenderness: There is no abdominal tenderness  There is no rebound  Musculoskeletal:      Right lower leg: No edema  Left lower leg: No edema  Skin:     General: Skin is warm and dry  Neurological:      Mental Status: He is alert  Comments: Awake, alert, able to answer questions appropriately, able to move extremities bilaterally      Psychiatric:         Mood and Affect: Mood normal          Behavior: Behavior normal        "

## 2023-05-08 NOTE — TELEPHONE ENCOUNTER
-Spoke to the patient, he scheduled his/her Surgical Procedure on 05/22/23 at [facility] w/Dr Jatin Pierce  -He is aware the hospital will call the day prior with the arrival time, that he/she will need a  to & from the hospital, the hospital will call the day prior with the arrival time and to go over information  Hold blood thinning Medications/Aspirin/Ibuprofen/Vitamins/Fish Oil/Phentermine for 7 days prior to surgery, unless otherwise told differently  Nothing to eat or drink after midnight  If you did not receive a Surgical bag from the Urology Office you will need to stop by one of the Urology offices for one, in it will be your special Soap Hibiclens and Spirometer  Bloodwork, Urine Culture and EKG will need to be done at any [facility], [address] [place] [address], [address] [place] [address] or [facility] on 05/08 or 05/09/23        -Bowl Prep:  Purchase A Few Days Prior To Procedure  1 bottle of Miralax (238 g); no prescription needed  4 Dulcolax Laxative Tablets; no prescription needed  Fleets Enema; no prescription needed  64 oz bottle of Gatorade or Crystal Light or another clear liquid of your choice (NOT RED)    -He/She will go for urine culture/labwork/EKG 2 weeks prior      -Emailed surgical packet per patient request  [email or web address]    -Placed on [facility] 7627 05/01/23    -Surgical Consent faxed to PAT/scanned into chart    -Surgical bag with Hibiclens/Spirometer, he will stop at a Urology office to obtain    -cardiac Clearance Faxed to [phone]

## 2023-05-08 NOTE — H&P (VIEW-ONLY)
Cardiology Follow up    Jessica Kay  [id number]  1960  [facility] [address] [address]      1  Nonrheumatic aortic valve stenosis        2  Benign essential hypertension        3  Coronary artery disease involving native coronary artery of native heart without angina pectoris        4  Preop cardiovascular exam            Discussion/Summary:  1  Perioperative risk stratification  2  Coronary artery disease with drug-eluting stent placement to proximal LAD and first diagonal branch 7/28/2022  3  Hypertension  4  Hyperlipidemia  5  Former tobacco use    -Transthoracic echocardiogram 6/30/2022 showing left ventricular systolic function normal at 5% with grade 1 diastolic dysfunction, mild to moderate aortic regurgitation with mild to moderate aortic stenosis regurgitation   -Patient can continue aspirin 81 mg daily, atorvastatin 20 mg daily, Plavix 75 mg daily, lisinopril 20 mg daily  -According to the revised cardiac risk index patient is intermediate to high risk for planned operative procedure  Patient understands and is excepting worry as planned  In the setting of revascularization and adequate function patient is appropriate to proceed with procedure at this time   -After discussion with patient he wishes to have procedure performed as soon as possible and understands risks of holding antiplatelet regimen    He wishes to proceed with scheduled prostatectomy for 5/22/2023 and I would recommend that to be reinitiated once cleared by urology shortly after procedure   -We will have patient schedule follow-up with his primary car Dr Marianne Marx in August 2023  -Patient counseled on the importance of continued tobacco cessation along with monitoring blood pressures at home and continuing a low-sodium and low-fat heart healthy diet with continued physical  -Patient counseled if he were to have any warning or alarm type symptoms he is to seek emergency medical care            History of Present Illness:  -Patient is a 68-year-old male with hypertension, hyperlipidemia, former tobacco use quit January 2023 and aortic stenosis who presents to the office today for perioperative risk stratification prior to laparoscopic prostatectomy for prostate cancer  Patient has known coronary artery disease and underwent drug-eluting stent placement on July 28, 2022 with drug-eluting stent placement to proximal LAD and first diagonal branch  He has been 100% compliant with dual antiplatelet strategy along with antihypertensive regimen since that time has been able to improve his already along with tobacco cessation   -He denies any chest pain, palpitations, lightheadedness or dizziness, loss of consciousness, shortness of breath, lower extremity edema, orthopnea or bendopnea  He states that he can easily walk 4 city blocks on flat surface or up 2 flights of stairs without any chest pain, shortness of breath or anginal equivalent symptoms  He notes blood pressures at home are well controlled on current medical regimen states that overall he feels well      Patient Active Problem List   Diagnosis   • Benign essential hypertension   • ED (erectile dysfunction) of non-organic origin   • Hypercholesterolemia   • Raynaud's phenomenon   • Peripheral vascular disease (HCC)   • Peripheral neuropathy   • Pityriasis rosea   • Negative depression screening   • Overweight with body mass index (BMI) of 26 to 26 9 in adult   • Aortic valve stenosis   • Chest pain   • Palpitations   • Coronary artery disease involving native coronary artery of native heart without angina pectoris   • Elevated PSA   • Microscopic hematuria   • BPH (benign prostatic hyperplasia)   • Prostate cancer [facility])     Past Medical History:   Diagnosis Date   • Benign essential hypertension 05/08/2014   • CAD (coronary artery disease)     s/p NAVA prox LAD and D1 2022   • Coronary artery disease involving native coronary artery of native heart without angina pectoris 2022   • Other chest pain    • Palpitations    • Prostate cancer ([facility] [address] ) 2023     Social History     Socioeconomic History   • Marital status: /Civil Union     Spouse name: Not on file   • Number of children: Not on file   • Years of education: Not on file   • Highest education level: Not on file   Occupational History   • Not on file   Tobacco Use   • Smoking status: Former     Packs/day: 0 50     Types: Cigarettes     Quit date: 2022     Years since quittin 3   • Smokeless tobacco: Never   Vaping Use   • Vaping Use: Never used   Substance and Sexual Activity   • Alcohol use: Yes     Alcohol/week: 2 0 standard drinks     Types: 2 Cans of beer per week     Comment: 6-8 /week   • Drug use: No   • Sexual activity: Not on file   Other Topics Concern   • Not on file   Social History Narrative    EMPLOYED         Social Determinants of Health     Financial Resource Strain: Not on file   Food Insecurity: Not on file   Transportation Needs: Not on file   Physical Activity: Not on file   Stress: Not on file   Social Connections: Not on file   Intimate Partner Violence: Not on file   Housing Stability: Not on file      Family History   Problem Relation Age of Onset   • No Known Problems Mother    • No Known Problems Father      Past Surgical History:   Procedure Laterality Date   • CARDIAC CATHETERIZATION Left 2022    Procedure: Cardiac catheterization-left heart catheterization;  Surgeon: Bhumi Arndt MD;  Location: [facility]; Service: Cardiology   • CARDIAC CATHETERIZATION N/A 2022    Procedure: Cardiac pci;  Surgeon: Bhumi Arndt MD;  Location: [facility];   Service: Cardiology   • HERNIA REPAIR     • CA PROSTATE NEEDLE BIOPSY ANY APPROACH N/A 2023    Procedure: TRANSRECTAL MRI FUSION BIOPSY PROSTATE;  Surgeon: Cris Kuo MD;  Location: [facility];  Service: Urology       Current Outpatient Medications:   •  aspirin 81 mg chewable tablet, Chew 81 mg daily, Disp: , Rfl:   •  atorvastatin (LIPITOR) 20 mg tablet, take 1 tablet by mouth once daily, Disp: 30 tablet, Rfl: 5  •  clopidogrel (PLAVIX) 75 mg tablet, take 1 tablet by mouth once daily, Disp: 90 tablet, Rfl: 3  •  lisinopril (ZESTRIL) 20 mg tablet, take 1 tablet by mouth once daily, Disp: 90 tablet, Rfl: 1  •  pantoprazole (PROTONIX) 20 mg tablet, Take 1 tablet (20 mg total) by mouth daily, Disp: 90 tablet, Rfl: 3  No Known Allergies      Labs:  No visits with results within 2 Month(s) from this visit     Latest known visit with results is:   Admission on 02/28/2023, Discharged on 02/28/2023   Component Date Value   • Case Report 02/28/2023                      Value:Surgical Pathology Report                         Case: [id number]                                   Authorizing Provider:  Cris Kuo MD      Collected:           02/28/2023 1147              Ordering Location:     [address] [place] [address]      Received:            02/28/2023 [facility] Endoscopy                                                           Pathologist:           Penny Prasad MD                                                          Specimens:   A) - Prostate, ECHO #1                                                                               B) - Prostate, Right Lateral and Medial Base                                                        C) - Prostate, Right Mid Lateral                                                                    D) - Prostate, Right Mid Medial                                                                     E) - Prostate, Right Lateral [place]                                                                                             F) - Prostate, Right Medial [place] G) - Prostate, Left Lateral [place]                                                                    H) - Prostate, Left Medial [place]                                                                     I) - Prostate, Left Mid Lateral                                                                     J) - Prostate, Left Mid Medial                                                                      K) - Prostate, Left Lateral [place]                                                                    L) - Prostate, Left Medial [place]                                                           • Final Diagnosis 02/28/2023                      Value: This result contains rich text formatting which cannot be displayed here  • Note 02/28/2023                      Value: This result contains rich text formatting which cannot be displayed here  • Additional Information 02/28/2023                      Value: This result contains rich text formatting which cannot be displayed here  • Gross Description 02/28/2023                      Value: This result contains rich text formatting which cannot be displayed here  Imaging: No results found  Review of Systems:  Review of Systems   Constitutional: Negative for chills, diaphoresis, fatigue and fever  HENT: Negative for trouble swallowing and voice change  Eyes: Negative for pain and redness  Respiratory: Negative for shortness of breath and wheezing  Cardiovascular: Negative for chest pain, palpitations and leg swelling  Gastrointestinal: Negative for abdominal pain, blood in stool, constipation, diarrhea, nausea and vomiting  Genitourinary: Negative for dysuria  Musculoskeletal: Negative for neck pain and neck stiffness  Skin: Negative for rash  Neurological: Negative for dizziness, syncope, light-headedness and headaches  Psychiatric/Behavioral: Negative for agitation and confusion     All other systems "reviewed and are negative  Vitals:    05/08/23 1451   BP: 122/66   Pulse: 80   Weight: 68 kg (150 lb)   Height: 5' 4\" (1 626 m)     Vitals:    05/08/23 1451   Weight: 68 kg (150 lb)     Height: 5' 4\" (162 6 cm)     Physical Exam:  Physical Exam  Vitals reviewed  Constitutional:       General: He is not in acute distress  Appearance: Normal appearance  He is not diaphoretic  HENT:      Head: Normocephalic and atraumatic  Eyes:      General:         Right eye: No discharge  Left eye: No discharge  Neck:      Comments: Trachea midline, No JVD  Cardiovascular:      Rate and Rhythm: Normal rate and regular rhythm  Heart sounds: Murmur (AMANDA) heard  Pulmonary:      Effort: Pulmonary effort is normal  No respiratory distress  Breath sounds: No wheezing  Chest:      Chest wall: No tenderness  Abdominal:      General: Bowel sounds are normal       Palpations: Abdomen is soft  Tenderness: There is no abdominal tenderness  There is no rebound  Musculoskeletal:      Right lower leg: No edema  Left lower leg: No edema  Skin:     General: Skin is warm and dry  Neurological:      Mental Status: He is alert  Comments: Awake, alert, able to answer questions appropriately, able to move extremities bilaterally      Psychiatric:         Mood and Affect: Mood normal          Behavior: Behavior normal        "

## 2023-05-09 ENCOUNTER — ANESTHESIA EVENT (OUTPATIENT)
Dept: PERIOP | Facility: HOSPITAL | Age: 63
End: 2023-05-09

## 2023-05-09 ENCOUNTER — PREP FOR PROCEDURE (OUTPATIENT)
Dept: UROLOGY | Facility: AMBULATORY SURGERY CENTER | Age: 63
End: 2023-05-09

## 2023-05-09 NOTE — TELEPHONE ENCOUNTER
lmom letting pt know he needs to hold his plavix starting 10 days prior to surgery (05/12/23) and to continue his aspirin

## 2023-05-09 NOTE — TELEPHONE ENCOUNTER
MD Enzo Gunter, LYNNE; Tru Elias; P Soc Md/Np    Evon Dumont and Preop team - can we please hold plavix for ten days   Patient can continue ASA thru surgery

## 2023-05-10 ENCOUNTER — LAB REQUISITION (OUTPATIENT)
Dept: LAB | Facility: HOSPITAL | Age: 63
End: 2023-05-10

## 2023-05-10 ENCOUNTER — APPOINTMENT (OUTPATIENT)
Dept: LAB | Facility: CLINIC | Age: 63
End: 2023-05-10

## 2023-05-10 DIAGNOSIS — Z79.01 LONG TERM (CURRENT) USE OF ANTICOAGULANTS: ICD-10-CM

## 2023-05-10 DIAGNOSIS — C61 MALIGNANT NEOPLASM OF PROSTATE (HCC): ICD-10-CM

## 2023-05-10 DIAGNOSIS — R39.89 OTHER SYMPTOMS AND SIGNS INVOLVING THE GENITOURINARY SYSTEM: ICD-10-CM

## 2023-05-10 DIAGNOSIS — Z01.818 ENCOUNTER FOR OTHER PREPROCEDURAL EXAMINATION: ICD-10-CM

## 2023-05-10 DIAGNOSIS — C61 PROSTATE CANCER (HCC): ICD-10-CM

## 2023-05-10 DIAGNOSIS — Z01.818 PREOP TESTING: ICD-10-CM

## 2023-05-10 DIAGNOSIS — R39.89 SUSPECTED UTI: ICD-10-CM

## 2023-05-10 DIAGNOSIS — Z01.818 PREOPERATIVE EXAMINATION, UNSPECIFIED: ICD-10-CM

## 2023-05-10 DIAGNOSIS — Z01.812 PRE-OPERATIVE LABORATORY EXAMINATION: ICD-10-CM

## 2023-05-10 DIAGNOSIS — Z01.812 ENCOUNTER FOR PREPROCEDURAL LABORATORY EXAMINATION: ICD-10-CM

## 2023-05-10 LAB
ABO GROUP BLD: NORMAL
ALBUMIN SERPL BCP-MCNC: 4 G/DL (ref 3.5–5)
ALP SERPL-CCNC: 95 U/L (ref 46–116)
ALT SERPL W P-5'-P-CCNC: 23 U/L (ref 12–78)
ANION GAP SERPL CALCULATED.3IONS-SCNC: 0 MMOL/L (ref 4–13)
APTT PPP: 30 SECONDS (ref 23–37)
AST SERPL W P-5'-P-CCNC: 14 U/L (ref 5–45)
BASOPHILS # BLD AUTO: 0.07 THOUSANDS/ÂΜL (ref 0–0.1)
BASOPHILS NFR BLD AUTO: 1 % (ref 0–1)
BILIRUB SERPL-MCNC: 0.44 MG/DL (ref 0.2–1)
BLD GP AB SCN SERPL QL: NEGATIVE
BUN SERPL-MCNC: 24 MG/DL (ref 5–25)
CALCIUM SERPL-MCNC: 9.1 MG/DL (ref 8.3–10.1)
CHLORIDE SERPL-SCNC: 111 MMOL/L (ref 96–108)
CO2 SERPL-SCNC: 26 MMOL/L (ref 21–32)
CREAT SERPL-MCNC: 0.83 MG/DL (ref 0.6–1.3)
EOSINOPHIL # BLD AUTO: 0.21 THOUSAND/ÂΜL (ref 0–0.61)
EOSINOPHIL NFR BLD AUTO: 3 % (ref 0–6)
ERYTHROCYTE [DISTWIDTH] IN BLOOD BY AUTOMATED COUNT: 13.4 % (ref 11.6–15.1)
EST. AVERAGE GLUCOSE BLD GHB EST-MCNC: 108 MG/DL
GFR SERPL CREATININE-BSD FRML MDRD: 92 ML/MIN/1.73SQ M
GLUCOSE P FAST SERPL-MCNC: 101 MG/DL (ref 65–99)
HBA1C MFR BLD: 5.4 %
HCT VFR BLD AUTO: 43.1 % (ref 36.5–49.3)
HGB BLD-MCNC: 14.2 G/DL (ref 12–17)
IMM GRANULOCYTES # BLD AUTO: 0.03 THOUSAND/UL (ref 0–0.2)
IMM GRANULOCYTES NFR BLD AUTO: 0 % (ref 0–2)
INR PPP: 1.01 (ref 0.84–1.19)
LYMPHOCYTES # BLD AUTO: 1.71 THOUSANDS/ÂΜL (ref 0.6–4.47)
LYMPHOCYTES NFR BLD AUTO: 22 % (ref 14–44)
MCH RBC QN AUTO: 28.4 PG (ref 26.8–34.3)
MCHC RBC AUTO-ENTMCNC: 32.9 G/DL (ref 31.4–37.4)
MCV RBC AUTO: 86 FL (ref 82–98)
MONOCYTES # BLD AUTO: 0.72 THOUSAND/ÂΜL (ref 0.17–1.22)
MONOCYTES NFR BLD AUTO: 9 % (ref 4–12)
NEUTROPHILS # BLD AUTO: 4.89 THOUSANDS/ÂΜL (ref 1.85–7.62)
NEUTS SEG NFR BLD AUTO: 65 % (ref 43–75)
NRBC BLD AUTO-RTO: 0 /100 WBCS
PLATELET # BLD AUTO: 350 THOUSANDS/UL (ref 149–390)
PMV BLD AUTO: 9.5 FL (ref 8.9–12.7)
POTASSIUM SERPL-SCNC: 5.4 MMOL/L (ref 3.5–5.3)
PROT SERPL-MCNC: 7.3 G/DL (ref 6.4–8.4)
PROTHROMBIN TIME: 13.5 SECONDS (ref 11.6–14.5)
RBC # BLD AUTO: 5 MILLION/UL (ref 3.88–5.62)
RH BLD: POSITIVE
SODIUM SERPL-SCNC: 137 MMOL/L (ref 135–147)
SPECIMEN EXPIRATION DATE: NORMAL
WBC # BLD AUTO: 7.63 THOUSAND/UL (ref 4.31–10.16)

## 2023-05-11 LAB — BACTERIA UR CULT: NORMAL

## 2023-05-12 NOTE — PRE-PROCEDURE INSTRUCTIONS
Pre-Surgery Instructions:   Medication Instructions   • aspirin 81 mg chewable tablet Hold day of surgery  • atorvastatin (LIPITOR) 20 mg tablet Take day of surgery  • clopidogrel (PLAVIX) 75 mg tablet Instructions provided by MD Instructed to stop 10 days prior to surgery  • lisinopril (ZESTRIL) 20 mg tablet Hold day of surgery  • pantoprazole (PROTONIX) 20 mg tablet Hold day of surgery  Medication instructions for day surgery reviewed  Please use only a sip of water to take your instructed medications  Avoid all over the counter vitamins, supplements and NSAIDS for one week prior to surgery per anesthesia guidelines  Tylenol is ok to take as needed  You will receive a call one business day prior to surgery with an arrival time and hospital directions  If your surgery is scheduled on a Monday, the hospital will be calling you on the Friday prior to your surgery  If you have not heard from anyone by 8pm, please call the hospital supervisor through the hospital  at [phone]  Olivia Serum [phone])  Do not eat or drink anything after midnight the night before your surgery, including candy, mints, lifesavers, or chewing gum  Do not drink alcohol 24hrs before your surgery  Try not to smoke at least 24hrs before your surgery  Follow the pre surgery showering instructions as listed in the [facility] Surgical Experience Booklet” or otherwise provided by your surgeon's office  Do not shave the surgical area 24 hours before surgery  Do not apply any lotions, creams, including makeup, cologne, deodorant, or perfumes after showering on the day of your surgery  No contact lenses, eye make-up, or artificial eyelashes  Remove nail polish, including gel polish, and any artificial, gel, or acrylic nails if possible  Remove all jewelry including rings and body piercing jewelry  Wear causal clothing that is easy to take on and off  Consider your type of surgery      Keep any valuables, jewelry, piercings at home  Please bring any specially ordered equipment (sling, braces) if indicated  Arrange for a responsible person to drive you to and from the hospital on the day of your surgery  Visitor Guidelines discussed  Call the surgeon's office with any new illnesses, exposures, or additional questions prior to surgery  Please reference your [facility] Surgical Experience Booklet” for additional information to prepare for your upcoming surgery  Patient stated that he has not reviewed the paper work from office  He stated that he will read it over and call if he has any questions  Informed him that there is a bowel prep he needs to take day prior to surgery  Patient was rushing to get off of call  He did say that he will call if he has any questions

## 2023-05-22 ENCOUNTER — ANESTHESIA (OUTPATIENT)
Dept: PERIOP | Facility: HOSPITAL | Age: 63
End: 2023-05-22

## 2023-05-22 ENCOUNTER — HOSPITAL ENCOUNTER (INPATIENT)
Facility: HOSPITAL | Age: 63
LOS: 1 days | Discharge: HOME | End: 2023-05-24
Attending: UROLOGY | Admitting: UROLOGY

## 2023-05-22 DIAGNOSIS — C61 PROSTATE CANCER (HCC): Primary | ICD-10-CM

## 2023-05-22 LAB
ABO GROUP BLD: NORMAL
ANION GAP SERPL CALCULATED.3IONS-SCNC: 7 MMOL/L (ref 4–13)
BUN SERPL-MCNC: 14 MG/DL (ref 5–25)
CALCIUM SERPL-MCNC: 8.8 MG/DL (ref 8.4–10.2)
CHLORIDE SERPL-SCNC: 108 MMOL/L (ref 96–108)
CO2 SERPL-SCNC: 25 MMOL/L (ref 21–32)
CREAT SERPL-MCNC: 0.72 MG/DL (ref 0.6–1.3)
GFR SERPL CREATININE-BSD FRML MDRD: 98 ML/MIN/1.73SQ M
GLUCOSE P FAST SERPL-MCNC: 92 MG/DL (ref 65–99)
GLUCOSE SERPL-MCNC: 92 MG/DL (ref 65–140)
POTASSIUM SERPL-SCNC: 4.3 MMOL/L (ref 3.5–5.3)
RH BLD: POSITIVE
SODIUM SERPL-SCNC: 140 MMOL/L (ref 135–147)

## 2023-05-22 PROCEDURE — 0VT04ZZ RESECTION OF PROSTATE, PERCUTANEOUS ENDOSCOPIC APPROACH: ICD-10-PCS | Performed by: UROLOGY

## 2023-05-22 PROCEDURE — 07BC4ZX EXCISION OF PELVIS LYMPHATIC, PERCUTANEOUS ENDOSCOPIC APPROACH, DIAGNOSTIC: ICD-10-PCS | Performed by: UROLOGY

## 2023-05-22 PROCEDURE — 0VT34ZZ RESECTION OF BILATERAL SEMINAL VESICLES, PERCUTANEOUS ENDOSCOPIC APPROACH: ICD-10-PCS | Performed by: UROLOGY

## 2023-05-22 PROCEDURE — 8E0W4CZ ROBOTIC ASSISTED PROCEDURE OF TRUNK REGION, PERCUTANEOUS ENDOSCOPIC APPROACH: ICD-10-PCS | Performed by: UROLOGY

## 2023-05-22 RX ORDER — ONDANSETRON 2 MG/ML
4 INJECTION INTRAMUSCULAR; INTRAVENOUS EVERY 6 HOURS PRN
Status: DISCONTINUED | OUTPATIENT
Start: 2023-05-22 | End: 2023-05-24 | Stop reason: HOSPADM

## 2023-05-22 RX ORDER — SODIUM CHLORIDE, SODIUM LACTATE, POTASSIUM CHLORIDE, CALCIUM CHLORIDE 600; 310; 30; 20 MG/100ML; MG/100ML; MG/100ML; MG/100ML
75 INJECTION, SOLUTION INTRAVENOUS CONTINUOUS
Status: DISCONTINUED | OUTPATIENT
Start: 2023-05-22 | End: 2023-05-24

## 2023-05-22 RX ORDER — ACETAMINOPHEN 325 MG/1
650 TABLET ORAL EVERY 6 HOURS SCHEDULED
Status: DISCONTINUED | OUTPATIENT
Start: 2023-05-22 | End: 2023-05-24 | Stop reason: HOSPADM

## 2023-05-22 RX ORDER — ATORVASTATIN CALCIUM 20 MG/1
20 TABLET, FILM COATED ORAL DAILY
Status: DISCONTINUED | OUTPATIENT
Start: 2023-05-23 | End: 2023-05-24 | Stop reason: HOSPADM

## 2023-05-22 RX ORDER — PANTOPRAZOLE SODIUM 20 MG/1
20 TABLET, DELAYED RELEASE ORAL DAILY
Status: DISCONTINUED | OUTPATIENT
Start: 2023-05-23 | End: 2023-05-24 | Stop reason: HOSPADM

## 2023-05-22 RX ORDER — GLYCOPYRROLATE 0.2 MG/ML
INJECTION INTRAMUSCULAR; INTRAVENOUS AS NEEDED
Status: DISCONTINUED | OUTPATIENT
Start: 2023-05-22 | End: 2023-05-22

## 2023-05-22 RX ORDER — HYDROMORPHONE HCL/PF 1 MG/ML
0.5 SYRINGE (ML) INJECTION EVERY 2 HOUR PRN
Status: DISCONTINUED | OUTPATIENT
Start: 2023-05-22 | End: 2023-05-24 | Stop reason: HOSPADM

## 2023-05-22 RX ORDER — MEPERIDINE HYDROCHLORIDE 25 MG/ML
12.5 INJECTION INTRAMUSCULAR; INTRAVENOUS; SUBCUTANEOUS
Status: DISCONTINUED | OUTPATIENT
Start: 2023-05-22 | End: 2023-05-22 | Stop reason: HOSPADM

## 2023-05-22 RX ORDER — PROPOFOL 10 MG/ML
INJECTION, EMULSION INTRAVENOUS AS NEEDED
Status: DISCONTINUED | OUTPATIENT
Start: 2023-05-22 | End: 2023-05-22

## 2023-05-22 RX ORDER — MAGNESIUM HYDROXIDE 1200 MG/15ML
LIQUID ORAL AS NEEDED
Status: DISCONTINUED | OUTPATIENT
Start: 2023-05-22 | End: 2023-05-22 | Stop reason: HOSPADM

## 2023-05-22 RX ORDER — BUPIVACAINE HYDROCHLORIDE 5 MG/ML
INJECTION, SOLUTION PERINEURAL AS NEEDED
Status: DISCONTINUED | OUTPATIENT
Start: 2023-05-22 | End: 2023-05-22 | Stop reason: HOSPADM

## 2023-05-22 RX ORDER — LIDOCAINE HYDROCHLORIDE 20 MG/ML
INJECTION, SOLUTION EPIDURAL; INFILTRATION; INTRACAUDAL; PERINEURAL AS NEEDED
Status: DISCONTINUED | OUTPATIENT
Start: 2023-05-22 | End: 2023-05-22

## 2023-05-22 RX ORDER — ONDANSETRON 2 MG/ML
INJECTION INTRAMUSCULAR; INTRAVENOUS AS NEEDED
Status: DISCONTINUED | OUTPATIENT
Start: 2023-05-22 | End: 2023-05-22

## 2023-05-22 RX ORDER — DEXAMETHASONE SODIUM PHOSPHATE 10 MG/ML
INJECTION, SOLUTION INTRAMUSCULAR; INTRAVENOUS AS NEEDED
Status: DISCONTINUED | OUTPATIENT
Start: 2023-05-22 | End: 2023-05-22

## 2023-05-22 RX ORDER — OXYCODONE HYDROCHLORIDE 5 MG/1
5 TABLET ORAL EVERY 4 HOURS PRN
Status: DISCONTINUED | OUTPATIENT
Start: 2023-05-22 | End: 2023-05-24 | Stop reason: HOSPADM

## 2023-05-22 RX ORDER — SENNOSIDES 8.6 MG
1 TABLET ORAL DAILY
Status: DISCONTINUED | OUTPATIENT
Start: 2023-05-23 | End: 2023-05-24 | Stop reason: HOSPADM

## 2023-05-22 RX ORDER — ONDANSETRON 2 MG/ML
4 INJECTION INTRAMUSCULAR; INTRAVENOUS ONCE AS NEEDED
Status: DISCONTINUED | OUTPATIENT
Start: 2023-05-22 | End: 2023-05-22 | Stop reason: HOSPADM

## 2023-05-22 RX ORDER — ENOXAPARIN SODIUM 100 MG/ML
40 INJECTION SUBCUTANEOUS DAILY
Status: DISCONTINUED | OUTPATIENT
Start: 2023-05-23 | End: 2023-05-24 | Stop reason: HOSPADM

## 2023-05-22 RX ORDER — SIMETHICONE 80 MG
80 TABLET,CHEWABLE ORAL 4 TIMES DAILY PRN
Status: DISCONTINUED | OUTPATIENT
Start: 2023-05-22 | End: 2023-05-24 | Stop reason: HOSPADM

## 2023-05-22 RX ORDER — HYDROMORPHONE HCL/PF 1 MG/ML
0.5 SYRINGE (ML) INJECTION
Status: DISCONTINUED | OUTPATIENT
Start: 2023-05-22 | End: 2023-05-22 | Stop reason: HOSPADM

## 2023-05-22 RX ORDER — FENTANYL CITRATE 50 UG/ML
INJECTION, SOLUTION INTRAMUSCULAR; INTRAVENOUS AS NEEDED
Status: DISCONTINUED | OUTPATIENT
Start: 2023-05-22 | End: 2023-05-22

## 2023-05-22 RX ORDER — SODIUM CHLORIDE, SODIUM LACTATE, POTASSIUM CHLORIDE, CALCIUM CHLORIDE 600; 310; 30; 20 MG/100ML; MG/100ML; MG/100ML; MG/100ML
125 INJECTION, SOLUTION INTRAVENOUS CONTINUOUS
Status: DISCONTINUED | OUTPATIENT
Start: 2023-05-22 | End: 2023-05-22

## 2023-05-22 RX ORDER — EPHEDRINE SULFATE 50 MG/ML
INJECTION INTRAVENOUS AS NEEDED
Status: DISCONTINUED | OUTPATIENT
Start: 2023-05-22 | End: 2023-05-22

## 2023-05-22 RX ORDER — FENTANYL CITRATE/PF 50 MCG/ML
25 SYRINGE (ML) INJECTION
Status: DISCONTINUED | OUTPATIENT
Start: 2023-05-22 | End: 2023-05-22 | Stop reason: HOSPADM

## 2023-05-22 RX ORDER — ASPIRIN 81 MG/1
81 TABLET, CHEWABLE ORAL DAILY
Status: DISCONTINUED | OUTPATIENT
Start: 2023-05-23 | End: 2023-05-24 | Stop reason: HOSPADM

## 2023-05-22 RX ORDER — CEFAZOLIN SODIUM 1 G/50ML
1000 SOLUTION INTRAVENOUS ONCE
Status: COMPLETED | OUTPATIENT
Start: 2023-05-22 | End: 2023-05-22

## 2023-05-22 RX ORDER — CEFAZOLIN SODIUM 1 G/50ML
1000 SOLUTION INTRAVENOUS EVERY 8 HOURS
Status: COMPLETED | OUTPATIENT
Start: 2023-05-22 | End: 2023-05-23

## 2023-05-22 RX ORDER — ROCURONIUM BROMIDE 10 MG/ML
INJECTION, SOLUTION INTRAVENOUS AS NEEDED
Status: DISCONTINUED | OUTPATIENT
Start: 2023-05-22 | End: 2023-05-22

## 2023-05-22 RX ORDER — OXYCODONE HYDROCHLORIDE 10 MG/1
10 TABLET ORAL EVERY 4 HOURS PRN
Status: DISCONTINUED | OUTPATIENT
Start: 2023-05-22 | End: 2023-05-24 | Stop reason: HOSPADM

## 2023-05-22 RX ORDER — OXYBUTYNIN CHLORIDE 5 MG/1
5 TABLET, EXTENDED RELEASE ORAL DAILY
Status: DISCONTINUED | OUTPATIENT
Start: 2023-05-23 | End: 2023-05-24 | Stop reason: HOSPADM

## 2023-05-22 RX ORDER — MIDAZOLAM HYDROCHLORIDE 2 MG/2ML
INJECTION, SOLUTION INTRAMUSCULAR; INTRAVENOUS AS NEEDED
Status: DISCONTINUED | OUTPATIENT
Start: 2023-05-22 | End: 2023-05-22

## 2023-05-22 RX ORDER — SODIUM CHLORIDE 9 MG/ML
INJECTION, SOLUTION INTRAVENOUS CONTINUOUS PRN
Status: DISCONTINUED | OUTPATIENT
Start: 2023-05-22 | End: 2023-05-22

## 2023-05-22 RX ADMIN — OXYCODONE HYDROCHLORIDE 10 MG: 10 TABLET ORAL at 18:58

## 2023-05-22 RX ADMIN — EPHEDRINE SULFATE 5 MG: 50 INJECTION, SOLUTION INTRAVENOUS at 13:25

## 2023-05-22 RX ADMIN — MIDAZOLAM 2 MG: 1 INJECTION INTRAMUSCULAR; INTRAVENOUS at 12:46

## 2023-05-22 RX ADMIN — ROCURONIUM BROMIDE 20 MG: 10 INJECTION, SOLUTION INTRAVENOUS at 14:09

## 2023-05-22 RX ADMIN — DEXAMETHASONE SODIUM PHOSPHATE 10 MG: 10 INJECTION INTRAMUSCULAR; INTRAVENOUS at 13:30

## 2023-05-22 RX ADMIN — FENTANYL CITRATE 25 MCG: 50 INJECTION INTRAMUSCULAR; INTRAVENOUS at 16:37

## 2023-05-22 RX ADMIN — FENTANYL CITRATE 25 MCG: 50 INJECTION INTRAMUSCULAR; INTRAVENOUS at 16:48

## 2023-05-22 RX ADMIN — ONDANSETRON 4 MG: 2 INJECTION INTRAMUSCULAR; INTRAVENOUS at 16:26

## 2023-05-22 RX ADMIN — ACETAMINOPHEN 650 MG: 325 TABLET ORAL at 23:07

## 2023-05-22 RX ADMIN — ROCURONIUM BROMIDE 10 MG: 10 INJECTION, SOLUTION INTRAVENOUS at 15:05

## 2023-05-22 RX ADMIN — FENTANYL CITRATE 25 MCG: 50 INJECTION INTRAMUSCULAR; INTRAVENOUS at 15:05

## 2023-05-22 RX ADMIN — LIDOCAINE HYDROCHLORIDE 5 ML: 20 INJECTION, SOLUTION EPIDURAL; INFILTRATION; INTRACAUDAL at 12:56

## 2023-05-22 RX ADMIN — GLYCOPYRROLATE 0.2 MG: 0.2 INJECTION INTRAMUSCULAR; INTRAVENOUS at 13:26

## 2023-05-22 RX ADMIN — ROCURONIUM BROMIDE 50 MG: 10 INJECTION, SOLUTION INTRAVENOUS at 12:56

## 2023-05-22 RX ADMIN — CEFAZOLIN SODIUM 1000 MG: 1 SOLUTION INTRAVENOUS at 16:35

## 2023-05-22 RX ADMIN — FENTANYL CITRATE 25 MCG: 50 INJECTION INTRAMUSCULAR; INTRAVENOUS at 17:48

## 2023-05-22 RX ADMIN — CEFAZOLIN SODIUM 1000 MG: 1 SOLUTION INTRAVENOUS at 23:07

## 2023-05-22 RX ADMIN — ACETAMINOPHEN 650 MG: 325 TABLET ORAL at 18:58

## 2023-05-22 RX ADMIN — CEFAZOLIN SODIUM 1000 MG: 1 SOLUTION INTRAVENOUS at 12:45

## 2023-05-22 RX ADMIN — SODIUM CHLORIDE: 0.9 INJECTION, SOLUTION INTRAVENOUS at 13:03

## 2023-05-22 RX ADMIN — PROPOFOL 200 MG: 10 INJECTION, EMULSION INTRAVENOUS at 12:56

## 2023-05-22 RX ADMIN — FENTANYL CITRATE 25 MCG: 50 INJECTION INTRAMUSCULAR; INTRAVENOUS at 13:47

## 2023-05-22 RX ADMIN — FENTANYL CITRATE 25 MCG: 50 INJECTION INTRAMUSCULAR; INTRAVENOUS at 17:33

## 2023-05-22 RX ADMIN — ROCURONIUM BROMIDE 10 MG: 10 INJECTION, SOLUTION INTRAVENOUS at 15:35

## 2023-05-22 RX ADMIN — FENTANYL CITRATE 25 MCG: 50 INJECTION INTRAMUSCULAR; INTRAVENOUS at 16:29

## 2023-05-22 RX ADMIN — FENTANYL CITRATE 25 MCG: 50 INJECTION INTRAMUSCULAR; INTRAVENOUS at 14:31

## 2023-05-22 RX ADMIN — FENTANYL CITRATE 25 MCG: 50 INJECTION INTRAMUSCULAR; INTRAVENOUS at 16:34

## 2023-05-22 RX ADMIN — SODIUM CHLORIDE, SODIUM LACTATE, POTASSIUM CHLORIDE, AND CALCIUM CHLORIDE 125 ML/HR: .6; .31; .03; .02 INJECTION, SOLUTION INTRAVENOUS at 18:53

## 2023-05-22 RX ADMIN — OXYCODONE HYDROCHLORIDE 10 MG: 10 TABLET ORAL at 23:11

## 2023-05-22 RX ADMIN — FENTANYL CITRATE 25 MCG: 50 INJECTION INTRAMUSCULAR; INTRAVENOUS at 16:23

## 2023-05-22 RX ADMIN — SUGAMMADEX 130 MG: 100 INJECTION, SOLUTION INTRAVENOUS at 16:42

## 2023-05-22 RX ADMIN — PHENYLEPHRINE HYDROCHLORIDE 10 MCG/MIN: 10 INJECTION INTRAVENOUS at 15:58

## 2023-05-22 RX ADMIN — FENTANYL CITRATE 25 MCG: 50 INJECTION INTRAMUSCULAR; INTRAVENOUS at 17:26

## 2023-05-22 RX ADMIN — SODIUM CHLORIDE, SODIUM LACTATE, POTASSIUM CHLORIDE, AND CALCIUM CHLORIDE 125 ML/HR: .6; .31; .03; .02 INJECTION, SOLUTION INTRAVENOUS at 10:51

## 2023-05-22 NOTE — INTERVAL H&P NOTE
H&P reviewed  After examining the patient I find no changes in the patients condition since the H&P had been written  Proceed as scheduled      Vitals:    05/22/23 1022   BP: 139/71   Pulse: 73   Resp: 16   Temp: 97 7 °F (36 5 °C)   SpO2: 99%

## 2023-05-22 NOTE — ANESTHESIA PREPROCEDURE EVALUATION
Procedure:  PROSTATECTOMY RADICAL & PELVIC LYMPH NODE DISSECTION  W/ ROBOT (Abdomen)    Relevant Problems   CARDIO   (+) Aortic valve stenosis   (+) Benign essential hypertension   (+) Chest pain   (+) Coronary artery disease involving native coronary artery of native heart without angina pectoris   (+) Hypercholesterolemia      /RENAL   (+) BPH (benign prostatic hyperplasia)   (+) Prostate cancer (HCC)        Physical Exam    Airway    Mallampati score: II  TM Distance: >3 FB  Neck ROM: full     Dental   No notable dental hx     Cardiovascular  Rhythm: regular, Rate: normal, Murmur,     Pulmonary  Pulmonary exam normal     Other Findings        Anesthesia Plan  ASA Score- 2     Anesthesia Type- general with ASA Monitors  Additional Monitors:   Airway Plan: ETT  Comment: 2D Echo 06/2022    Interpretation Summary       •  Left Ventricle: Left ventricular cavity size is normal  Wall thickness is mildly increased  The left ventricular ejection fraction is 65%  Systolic function is normal  Wall motion is normal  Diastolic function is mildly abnormal, consistent with grade I (abnormal) relaxation  •  Aortic Valve: There is mild to moderate regurgitation  There is mild to moderate stenosis  The aortic valve velocity is increased in the setting of stenosis and increased flow  •  Mitral Valve: There is annular calcification  There is mild regurgitation  •  Pulmonic Valve: There is mild regurgitation      Ao  Calve area 1 29 cm2  Plan Factors-Exercise tolerance (METS): >4 METS  Chart reviewed  EKG reviewed  Existing labs reviewed  Patient summary reviewed  Patient is not a current smoker  Obstructive sleep apnea risk education given perioperatively  Induction- intravenous  Postoperative Plan-     Informed Consent- Anesthetic plan and risks discussed with patient

## 2023-05-22 NOTE — ANESTHESIA POSTPROCEDURE EVALUATION
Post-Op Assessment Note    CV Status:  Stable  Pain Score: 1    Pain management: adequate     Mental Status:  Alert and awake   Hydration Status:  Euvolemic   PONV Controlled:  Controlled   Airway Patency:  Patent      Post Op Vitals Reviewed: Yes      Staff: Anesthesiologist         No notable events documented      /61 (05/22/23 1748)    Temp      Pulse 72 (05/22/23 1748)   Resp 18 (05/22/23 1748)    SpO2 99 % (05/22/23 1748)

## 2023-05-22 NOTE — DISCHARGE INSTR - AVS FIRST PAGE
After surgery, you should be active when at home  You will need to take plenty of rest  No heavy lifting (weight limit is approximately a gallon jug of milk held in each hand ) You can shower but do not submerge in water; no tubs/pools/baths  You can walk up and down stairs  Your incision has disolvable sutures and surgical glue  If there is any concern about the incision (redness, drainage, bleeding) please call your surgeon's office  You can eat and drink whatever you like, but monitor for signs of constipation  You should be having daily bowel movements  Take your stool softeners until your bowel begin to function  If you are still constipated, call your surgeon's office to discuss additional medication  Take oxybutynin as needed for discomfort/bladder spasms with lugo catheter in place    You should hold this medication 24 hours prior to lugo catheter removal

## 2023-05-22 NOTE — OP NOTE
OPERATIVE REPORT  PATIENT NAME: Adalid Hunter    :  1960  MRN: [id number]  Pt Location: [facility] OR ROOM 07    SURGERY DATE: 2023    Surgeon(s) and Role:     * Kristen Reaves MD - Primary     * Bhavesh Zamora PA-C - Assisting    Preop Diagnosis:  Prostate cancer ([facility] [address] ) Mildred Puentes    Post-Op Diagnosis Codes:     * Prostate cancer ([facility] [address] ) Mildred Puentes    Procedure(s):  PROSTATECTOMY RADICAL & PELVIC LYMPH NODE DISSECTION  W/ ROBOT    Specimen(s):  ID Type Source Tests Collected by Time Destination   1 : prostate and seminal vesicles Tissue Prostate TISSUE EXAM Kristen Reaves MD 2023 1356    2 : periprostatic fat and lymph nodes Tissue Soft Tissue, Other TISSUE EXAM Kristen Reaves MD 2023 1506    3 : pelvic lymph nodes Tissue Lymph Node TISSUE EXAM Kristen Reaves MD 2023 1356    4 : left anterior margin of prostate Tissue Soft Tissue, Other TISSUE EXAM Kristen Reaves MD 2023 1557        Estimated Blood Loss:   250 mL    Drains:  Closed/Suction Drain Left Abdomen Bulb 19 Fr  (Active)   Number of days: 0       Urethral Catheter Latex 20 Fr  (Active)   Number of days: 0       [REMOVED] Urethral Catheter Straight-tip;Latex 18 Fr  (Removed)   Number of days: 0       Anesthesia Type:   General    Operative Indications:  Prostate cancer ([facility] [address] ) [C61]      Operative Findings:  1  Large prostate, challenging non-nerve sparing pedicle dissection  2  LEFT pedicle suture ligated with two figure-8 Vicryl  3  Early V-Lock closure of DVC  4  Silk suture in vascular bed of rectourethralis after negative rectal exam  5  Water tight closure 150cc  6  906OF EBL    Complications:   None    Procedure and Technique: Adalid Hunter is a 57 y o  y o  male with a history of Jihan 4+3=7 prostate cancer identified in the prostate  Risk and benefits of da Alis robot-assisted laparoscopic prostatectomy with bilateral pelvic lymph node dissection were discussed and reviewed   Informed consent was obtained and the patient was returned to the operating room on 5/22/2023  After the smooth induction of general endotracheal anesthesia, the patient was placed in a low lithotomy position  Venodyne boots were applied  Pressure points were padded  The patient was secured to the bed with padding, towels, and tape  Intravenous antibiotics were administered  A timeout was performed with all members of the operative team confirming the patient's identity and procedure to be performed  An 8mm supra-umbilical skin incision was made  The skin was elevated  A Veress needle was utilized to create a pneumoperitoneum  A 8 mm Xi camera port was then placed  The patient was placed into steep Trendelenburg  2 additional working robotic ports were placed at the level of the umbilicus and approximately 4 fingerbreadths lateral to the umbilicus  An additional left lower quadrant robotic port was placed and a right mid quadrant 12 mm assistant port was placed  A 5 mm assistant port was placed in the right upper quadrant  The robot was docked  We identified the bladder neck by manipulating the Carr catheter  A posterior peritoneotomy was created behind the bladder and beneath the prostate until denonvilles fascia was identified  I then identified the vasa deferentia  Bilateral vasa deferentia were dissected proximally and transected  I then used each vas as a handle to provide traction to dissect out the ipsilateral seminal vesicle  Both seminal vesicles were dissected to their tip  I then performed exclusive sharp dissection beneath the prostate creating a plane between the prostate and the rectum  The urachal structures were taken down  The bladder was dropped and the space of Retzius was developed  Fibrofatty tissue was cleaned from the dorsum of the prostate and the endopelvic fascia  A large superficial venous complex was identified on the dorsum of the prostate    This was taken with bipolar electro dissection  I then opened the endopelvic fascia bilaterally from base to apex first on the right and then on the left  I dropped the puboprostatic ligaments  I exposed the dorsal venous complex  Muscular fibers were swept off the dorsal venous complex to expose the notch between the DVC and the urethra  The dorsal venous complex was ligated with 0 Vicryl  Next a focused my attention on the prostatic vesical junction  Hot and cold scissor dissection was utilized to create a plane between the bladder and prostate until the Carr catheter was identified in the midline  The balloon was deflated area the catheter was brought into the surgical field and placed on tension  The posterior bladder neck was taken with hot scissor dissection  At this point time, the previously dissected vas deferens and seminal vesicles were elevated anteriorly into the field  Attention was now focused on the vascular pedicles  Both side was taken with bipolar dissection using vessel sealer device along with hot and cold scissor dissection  I discussed with the patient performing a non-nerve sparing approach on both sides  Dissection was then performed from base to apex  On the RIGHT, there was a capsular breach, tissue plane reclaimed laterally  On the LEFT posterior, bleeding from the pedicle was noted but given proximity to rectum, could not be controlled with cautery  Two Vicryl figure-8 sutures ligated  More towards apex, LEFT capsular breach noted and tissue plane reclaimed  The last remaining attachments were the dorsal venous complex and the urethra  The dorsal venous complex was taken with hot monopolar scissor  Due to bleeding, an early 2-0 V-lock was used to further ligate DVC  I then placed the prostate on traction and identified the urethra  The urethra was taken sharply with scissors  The Carr catheter was pulled back and the posterior urethra was taken with sharp scissors as well    The last remaining attachments of the rectourethralis muscle were taken with sharp scissors  At this point this specimen was completely free within the pelvis and placed into an Endo Catch bag  Because of bleeding around the posterior urethra/rectourethralis, a careful rectal exam was performed  No blood noted on the glove and no Blue glove noted in the field  A Silk suture was used to imbricate the rectourethralis above the rectal wall to control bleeding  Attention was focused on performing the pelvic lymph node dissection  I first focused on the right side  The external iliac vein was identified and skeletonized  The pelvic lymph node packet adjacent to the pelvic sidewall was elevated off of the sidewall and dissected free from the surrounding tissue  The obturator nerve was identified and preserved  The lymph node packet was completely dissected and removed from the surgical field and sent for permanent specimen  The same procedure was then repeated on the left side  Lymph node packets were ligated with vessel sealer  I then performed the anastomosis between the bladder and urethra  Jake stitch performed with Vlock  I then started the anastomosis with two 3-0 stratafix sutures placed in an outside in fashion on the bladder neck posteriorly  The anastomosis was performed in an inside out fashion on the urethra and an outside in fashion on the bladder  Once the 2 sutures were placed in either side tension was carefully taken off of the sutures until there was excellent reapproximation of the posterior urethra to the posterior bladder neck  I then completed approximately two thirds of the anastomosis in this fashion  I ultimately locked suture on the bladder side and completed the anastomosis in an outside in fashion on the urethra and in an inside out fashion on the bladder  Prior to completing the anastomosis a new Carr catheter was placed under vision    The anastomosis was completed and the sutures were secured  15 cc were placed into the balloon  The catheter was placed on gentle traction and the bladder was irrigated with over 150 cc of sterile saline with minimal extravasation  A Ty drain was then brought out through the left lower quadrant robotic port under vision  The Endo Catch bag string was brought out through the supra umbilical 8 mm port  The supraumbilical 8 mm port was opened to allow for easy removal of the prostate and seminal vesicles within the Endo Catch bag  The fascia was then reapproximated with 0 Prolene suture  All incisions were irrigated and the skin was closed with 4-0 Monocryl and history  The drain was secured in place with a drain stitch and a drain sponge was placed  The catheter was placed onto gentle traction and secured  Overall the patient tolerated the procedure well and there were no complications  The patient was extubated in the operating room and transferred to the PACU in stable condition at the conclusion of the case  I was present for the entire procedure , A qualified resident physician was not available  and A physician assistant was required during the procedure for retraction, tissue handling, dissection and suturing      Patient Disposition:  PACU         SIGNATURE: Pia Augustine MD  DATE: May 22, 2023  TIME: 4:37 PM

## 2023-05-23 LAB
ANION GAP SERPL CALCULATED.3IONS-SCNC: 6 MMOL/L (ref 4–13)
BUN SERPL-MCNC: 13 MG/DL (ref 5–25)
CALCIUM SERPL-MCNC: 7.8 MG/DL (ref 8.4–10.2)
CHLORIDE SERPL-SCNC: 106 MMOL/L (ref 96–108)
CO2 SERPL-SCNC: 24 MMOL/L (ref 21–32)
CREAT SERPL-MCNC: 0.72 MG/DL (ref 0.6–1.3)
ERYTHROCYTE [DISTWIDTH] IN BLOOD BY AUTOMATED COUNT: 13.4 % (ref 11.6–15.1)
GFR SERPL CREATININE-BSD FRML MDRD: 98 ML/MIN/1.73SQ M
GLUCOSE SERPL-MCNC: 118 MG/DL (ref 65–140)
HCT VFR BLD AUTO: 37.6 % (ref 36.5–49.3)
HGB BLD-MCNC: 12.1 G/DL (ref 12–17)
MCH RBC QN AUTO: 28.2 PG (ref 26.8–34.3)
MCHC RBC AUTO-ENTMCNC: 32.2 G/DL (ref 31.4–37.4)
MCV RBC AUTO: 88 FL (ref 82–98)
PLATELET # BLD AUTO: 263 THOUSANDS/UL (ref 149–390)
PMV BLD AUTO: 10.2 FL (ref 8.9–12.7)
POTASSIUM SERPL-SCNC: 4.2 MMOL/L (ref 3.5–5.3)
RBC # BLD AUTO: 4.29 MILLION/UL (ref 3.88–5.62)
SODIUM SERPL-SCNC: 136 MMOL/L (ref 135–147)
WBC # BLD AUTO: 13.03 THOUSAND/UL (ref 4.31–10.16)

## 2023-05-23 RX ORDER — KETOROLAC TROMETHAMINE 30 MG/ML
15 INJECTION, SOLUTION INTRAMUSCULAR; INTRAVENOUS ONCE
Status: COMPLETED | OUTPATIENT
Start: 2023-05-23 | End: 2023-05-23

## 2023-05-23 RX ORDER — KETOROLAC TROMETHAMINE 30 MG/ML
15 INJECTION, SOLUTION INTRAMUSCULAR; INTRAVENOUS EVERY 6 HOURS SCHEDULED
Status: DISCONTINUED | OUTPATIENT
Start: 2023-05-23 | End: 2023-05-23

## 2023-05-23 RX ADMIN — ACETAMINOPHEN 650 MG: 325 TABLET ORAL at 17:23

## 2023-05-23 RX ADMIN — SENNOSIDES 8.6 MG: 8.6 TABLET, FILM COATED ORAL at 08:54

## 2023-05-23 RX ADMIN — ACETAMINOPHEN 650 MG: 325 TABLET ORAL at 05:45

## 2023-05-23 RX ADMIN — ATORVASTATIN CALCIUM 20 MG: 20 TABLET, FILM COATED ORAL at 08:54

## 2023-05-23 RX ADMIN — SODIUM CHLORIDE, SODIUM LACTATE, POTASSIUM CHLORIDE, AND CALCIUM CHLORIDE 75 ML/HR: .6; .31; .03; .02 INJECTION, SOLUTION INTRAVENOUS at 11:01

## 2023-05-23 RX ADMIN — SODIUM CHLORIDE, SODIUM LACTATE, POTASSIUM CHLORIDE, AND CALCIUM CHLORIDE 125 ML/HR: .6; .31; .03; .02 INJECTION, SOLUTION INTRAVENOUS at 00:10

## 2023-05-23 RX ADMIN — OXYCODONE HYDROCHLORIDE 10 MG: 10 TABLET ORAL at 19:27

## 2023-05-23 RX ADMIN — KETOROLAC TROMETHAMINE 15 MG: 30 INJECTION, SOLUTION INTRAMUSCULAR; INTRAVENOUS at 11:52

## 2023-05-23 RX ADMIN — ACETAMINOPHEN 650 MG: 325 TABLET ORAL at 23:15

## 2023-05-23 RX ADMIN — ASPIRIN 81 MG 81 MG: 81 TABLET ORAL at 08:54

## 2023-05-23 RX ADMIN — ENOXAPARIN SODIUM 40 MG: 100 INJECTION SUBCUTANEOUS at 08:54

## 2023-05-23 RX ADMIN — SODIUM CHLORIDE, SODIUM LACTATE, POTASSIUM CHLORIDE, AND CALCIUM CHLORIDE 75 ML/HR: .6; .31; .03; .02 INJECTION, SOLUTION INTRAVENOUS at 21:04

## 2023-05-23 RX ADMIN — OXYBUTYNIN CHLORIDE 5 MG: 5 TABLET, EXTENDED RELEASE ORAL at 08:54

## 2023-05-23 RX ADMIN — PANTOPRAZOLE SODIUM 20 MG: 20 TABLET, DELAYED RELEASE ORAL at 08:54

## 2023-05-23 RX ADMIN — CEFAZOLIN SODIUM 1000 MG: 1 SOLUTION INTRAVENOUS at 07:45

## 2023-05-23 RX ADMIN — OXYCODONE HYDROCHLORIDE 5 MG: 5 TABLET ORAL at 09:04

## 2023-05-23 RX ADMIN — ACETAMINOPHEN 650 MG: 325 TABLET ORAL at 11:08

## 2023-05-23 RX ADMIN — OXYCODONE HYDROCHLORIDE 10 MG: 10 TABLET ORAL at 13:49

## 2023-05-23 RX ADMIN — SIMETHICONE 80 MG: 80 TABLET, CHEWABLE ORAL at 11:52

## 2023-05-23 NOTE — PLAN OF CARE
Problem: PAIN - ADULT  Goal: Verbalizes/displays adequate comfort level or baseline comfort level  Description: Interventions:  - Encourage patient to monitor pain and request assistance  - Assess pain using appropriate pain scale  - Administer analgesics based on type and severity of pain and evaluate response  - Implement non-pharmacological measures as appropriate and evaluate response  - Consider cultural and social influences on pain and pain management  - Notify physician/advanced practitioner if interventions unsuccessful or patient reports new pain  Outcome: Progressing     Problem: INFECTION - ADULT  Goal: Absence or prevention of progression during hospitalization  Description: INTERVENTIONS:  - Assess and monitor for signs and symptoms of infection  - Monitor lab/diagnostic results  - Monitor all insertion sites, i e  indwelling lines, tubes, and drains  - Monitor endotracheal if appropriate and nasal secretions for changes in amount and color  - [place] appropriate cooling/warming therapies per order  - Administer medications as ordered  - Instruct and encourage patient and family to use good hand hygiene technique  - Identify and instruct in appropriate isolation precautions for identified infection/condition  Outcome: Progressing     Problem: SKIN/TISSUE INTEGRITY - ADULT  Goal: Incision(s), wounds(s) or drain site(s) healing without S/S of infection  Description: INTERVENTIONS  - Assess and document dressing, incision, wound bed, drain sites and surrounding tissue  - Provide patient and family education  Outcome: Progressing     Problem: GENITOURINARY - ADULT  Goal: Urinary catheter remains patent  Description: INTERVENTIONS:  - Assess patency of urinary catheter  - If patient has a chronic lugo, consider changing catheter if non-functioning  - Follow guidelines for intermittent irrigation of non-functioning urinary catheter  Outcome: Progressing

## 2023-05-23 NOTE — UTILIZATION REVIEW
Initial Clinical Review    Elective    OP    surgical procedure    OP PROCEDURE  5/22 CHANGED TO IP ADMISSION 5/23  NOT MEETING PT GOALS    Age/Sex: 57 y o  male     Surgery Date:    5/22/23    Procedure: PROSTATECTOMY RADICAL & PELVIC LYMPH NODE DISSECTION  W/ ROBOT    Anesthesia:    general    Operative Findings: Large prostate, challenging non-nerve sparing pedicle dissection  2  LEFT pedicle suture ligated with two figure-8 Vicryl  3  Early V-Lock closure of DVC  4  Silk suture in vascular bed of rectourethralis after negative rectal exam  5  Water tight closure 150cc  6  250cc EBL    POD#1 Progress Note:   Continue post op care  Still  With post op pain and given  1 dose  IV  Toradol  On aspirin  For cardiac  Co morbidities and adding lovenox  Needs PT  Has not yet been OOB  Continue  IVF and rate decreased  LLQ  Drain intact, drained 105  Cc overnight, sero sanguinous  Drainage  Carr catheter intact and maintain for  14  Days  5/24  POD  #  2  Continue post op care  Still with moderate abdominal discomfort  SEGUN drain intact  LLQ, draining  35  Cc overnight  serosanguinous drainage  Carr catheter intact and draining clear yellow  Urine  Ambulation encouraged  Has not been OOB  Wait PT  Consult  Monitor labs, hemoglobin  10 9 and  WBC  10 1   = flatus          Admission Orders: Date/Time/Statement:   Admission Orders (From admission, onward)     Ordered        05/23/23 1442  Inpatient Admission  Once                      Orders Placed This Encounter   Procedures   • Inpatient Admission     Standing Status:   Standing     Number of Occurrences:   1     Order Specific Question:   Level of Care     Answer:   Med Surg [16]     Order Specific Question:   Estimated length of stay     Answer:   More than 2 Midnights     Order Specific Question:   Certification     Answer:   I certify that inpatient services are medically necessary for this patient for a duration of greater than two midnights   See "H&P and MD Progress Notes for additional information about the patient's course of treatment       Vital Signs: /59 (BP Location: Left arm)   Pulse 67   Temp 97 9 °F (36 6 °C) (Temporal)   Resp 20   Ht 5' 4\" (1 626 m)   Wt 67 2 kg (148 lb 2 4 oz)   SpO2 95%   BMI 25 43 kg/m²     Pertinent Labs/Diagnostic Test Results:         Results from last 7 days   Lab Units 05/23/23  0459   WBC Thousand/uL 13 03*   HEMOGLOBIN g/dL 12 1   HEMATOCRIT % 37 6   PLATELETS Thousands/uL 263         Results from last 7 days   Lab Units 05/23/23  0459 05/22/23  1203   SODIUM mmol/L 136 140   POTASSIUM mmol/L 4 2 4 3   CHLORIDE mmol/L 106 108   CO2 mmol/L 24 25   ANION GAP mmol/L 6 7   BUN mg/dL 13 14   CREATININE mg/dL 0 72 0 72   EGFR ml/min/1 73sq m 98 98   CALCIUM mg/dL 7 8* 8 8             Results from last 7 days   Lab Units 05/23/23  0459 05/22/23  1203   GLUCOSE RANDOM mg/dL 118 92               Results from last 7 days   Lab Units 05/22/23  1142   UNIT PRODUCT CODE  [id number]  [id number]   UNIT NUMBER  [id number]  [id number]   UNITABO  O  O   UNITRH  POS  POS   CROSSMATCH  Compatible  Compatible   UNIT DISPENSE STATUS  Crossmatched  Crossmatched   UNIT PRODUCT VOL ml 350  350             Diet:    Surgical soft    Mobility:  OOB as  tolerated    DVT Prophylaxis:   SCD'S    Medications/Pain Control:   Scheduled Medications:  acetaminophen, 650 mg, Oral, Q6H BETITO  aspirin, 81 mg, Oral, Daily  atorvastatin, 20 mg, Oral, Daily  enoxaparin, 40 mg, Subcutaneous, Daily  oxybutynin, 5 mg, Oral, Daily  pantoprazole, 20 mg, Oral, Daily  senna, 1 tablet, Oral, Daily      Continuous IV Infusions:  lactated ringers, 75 mL/hr, Intravenous, Continuous      PRN Meds:  HYDROmorphone, 0 5 mg, Intravenous, Q2H PRN  lactated ringers, 1,000 mL, Intravenous, Once PRN   And  lactated ringers, 1,000 mL, Intravenous, Once PRN  ondansetron, 4 mg, Intravenous, Q6H PRN  oxyCODONE, 5 mg, Oral, Q4H PRN   Or  oxyCODONE, 10 mg, Oral, " Q4H PRN  simethicone, 80 mg, Oral, 4x Daily PRN  sodium chloride, 1,000 mL, Intravenous, Once PRN   And  sodium chloride, 1,000 mL, Intravenous, Once PRN        Network Utilization Review Department  ATTENTION: Please call with any questions or concerns to [phone] and carefully listen to the prompts so that you are directed to the right person  All voicemails are confidential   Kimani Pitts all requests for admission clinical reviews, approved or denied determinations and any other requests to dedicated fax number below belonging to the campus where the patient is receiving treatment   List of dedicated fax numbers for the Facilities:  [address] DENIALS (Administrative/Medical Necessity) [phone]   [address] (Maternity/NICU/Pediatrics) [phone]   [address] [phone]   Mónica Khan 77 [phone]   [address] [place] [address] Mar[facility]ndtEstelle [facility] [phone]   [address] [facility] [place] [facility] 134 815 [facility] [phone]

## 2023-05-23 NOTE — PLAN OF CARE
Problem: PAIN - ADULT  Goal: Verbalizes/displays adequate comfort level or baseline comfort level  Description: Interventions:  - Encourage patient to monitor pain and request assistance  - Assess pain using appropriate pain scale  - Administer analgesics based on type and severity of pain and evaluate response  - Implement non-pharmacological measures as appropriate and evaluate response  - Consider cultural and social influences on pain and pain management  - Notify physician/advanced practitioner if interventions unsuccessful or patient reports new pain  Outcome: Progressing       Problem: SAFETY ADULT  Goal: Maintain or return to baseline ADL function  Description: INTERVENTIONS:  -  Assess patient's ability to carry out ADLs; assess patient's baseline for ADL function and identify physical deficits which impact ability to perform ADLs (bathing, care of mouth/teeth, toileting, grooming, dressing, etc )  - Assess/evaluate cause of self-care deficits   - Assess range of motion  - Assess patient's mobility; develop plan if impaired  - Assess patient's need for assistive devices and provide as appropriate  - Encourage maximum independence but intervene and supervise when necessary  - Involve family in performance of ADLs  - Assess for home care needs following discharge   - Consider OT consult to assist with ADL evaluation and planning for discharge  - Provide patient education as appropriate  Outcome: Progressing     Problem: GENITOURINARY - ADULT  Goal: Urinary catheter remains patent  Description: INTERVENTIONS:  - Assess patency of urinary catheter  - If patient has a chronic lugo, consider changing catheter if non-functioning  - Follow guidelines for intermittent irrigation of non-functioning urinary catheter  Outcome: Progressing     Problem: Knowledge Deficit  Goal: Patient/family/caregiver demonstrates understanding of disease process, treatment plan, medications, and discharge instructions  Description: Complete learning assessment and assess knowledge base    Interventions:  - Provide teaching at level of understanding  - Provide teaching via preferred learning methods  Outcome: Progressing     Problem: DISCHARGE PLANNING  Goal: Discharge to home or other facility with appropriate resources  Description: INTERVENTIONS:  - Identify barriers to discharge w/patient and caregiver  - Arrange for needed discharge resources and transportation as appropriate  - Identify discharge learning needs (meds, wound care, etc )  - Arrange for interpretive services to assist at discharge as needed  - Refer to Case Management Department for coordinating discharge planning if the patient needs post-hospital services based on physician/advanced practitioner order or complex needs related to functional status, cognitive ability, or social support system  Outcome: Progressing

## 2023-05-23 NOTE — PHYSICIAN ADVISOR
Current patient class: Outpatient Surgery  The patient is currently on Hospital Day: 2 at [facility]          After review of the relevant documentation, labs, vital signs and test results, the patient is appropriate for INPATIENT ADMISSION  Rationale is as follows: The patient is a 57 yrs Male who presented 5/22/23 for PROSTATECTOMY RADICAL & PELVIC LYMPH NODE DISSECTION  W/ ROBOT  Patient tolerated procedure well  He is awaiting return of bowel function and has not yet been out of bed  PT consult is pending and he remains on IVF  He has required IV Toradol for analgesia  IP seems appropriate given continued management  The patient’s vitals on arrival were   ED Triage Vitals   Temperature Pulse Respirations Blood Pressure SpO2   05/22/23 1022 05/22/23 1022 05/22/23 1022 05/22/23 1022 05/22/23 1022   97 7 °F (36 5 °C) 73 16 139/71 99 %      Temp Source Heart Rate Source Patient Position - Orthostatic VS BP Location FiO2 (%)   05/22/23 1022 05/22/23 1022 05/22/23 1851 05/23/23 0740 --   Temporal Monitor Lying Left arm       Pain Score       05/22/23 1027       No Pain           Past Medical History:   Diagnosis Date   • Benign essential hypertension 05/08/2014   • CAD (coronary artery disease)     s/p NAVA prox LAD and D1 7/28/2022   • Cancer ([facility] [address] )     Prostate   • Coronary artery disease involving native coronary artery of native heart without angina pectoris 08/09/2022   • GERD (gastroesophageal reflux disease)    • Hyperlipidemia    • Hypertension    • Other chest pain    • Palpitations    • Prostate cancer ([facility] [address] ) 04/18/2023     Past Surgical History:   Procedure Laterality Date   • CARDIAC CATHETERIZATION Left 7/28/2022    Procedure: Cardiac catheterization-left heart catheterization;  Surgeon: Dionisio Irby MD;  [facility] CARD[facility] C[facility] LAB;   Service: Cardiology   • CARDIAC CATHETERIZATION N/A 7/28/2022    Procedure: Cardiac pci;  Surgeon: Dionisio Irby MD;  [facility] CARDIAC CATH LAB;   Service: Cardiology   • HERNIA REPAIR     • VT LAPS SURG [id number] RPBIC RAD W/NRV SPARING ROBOT N/A 5/22/2023    Procedure: PROSTATECTOMY RADICAL & PELVIC LYMPH NODE DISSECTION  W/ ROBOT;  Surgeon: Marlen Foley MD;  Location: [facility] Main OR;  Service: Urology   • VT PROSTATE NEEDLE BIOPSY ANY APPROACH N/A 2/28/2023    Procedure: TRANSRECTAL MRI FUSION BIOPSY PROSTATE;  Surgeon: Gregg Burciaga MD;  Location: [facility] Endo;  Service: Urology       The patient was admitted to the hospital at N/A on N/A for the following diagnosis:  Prostate cancer [facility]) Pricilla Grand have been placed to:   None    Vitals:    05/22/23 2151 05/23/23 0300 05/23/23 0740 05/23/23 1108   BP: 142/79 110/56 151/68 120/59   BP Location:   Left arm Left arm   Pulse: 82 87 71 67   Resp: 18 20 20 20   Temp: 98 1 °F (36 7 °C) 97 6 °F (36 4 °C) 97 7 °F (36 5 °C) 97 9 °F (36 6 °C)   TempSrc: Temporal Temporal Temporal Temporal   SpO2: 95% 94% 95% 95%   Weight:       Height:           Most recent labs:    Recent Labs     05/23/23  0459   WBC 13 03*   HGB 12 1   HCT 37 6      K 4 2   CALCIUM 7 8*   BUN 13   CREATININE 0 72       Scheduled Meds:  Current Facility-Administered Medications   Medication Dose Route Frequency Provider Last Rate   • acetaminophen  650 mg Oral Q6H [address] Marlen Foley MD     • aspirin  81 mg Oral Daily Marlen Foley MD     • atorvastatin  20 mg Oral Daily Marlen Foley MD     • enoxaparin  40 mg Subcutaneous Daily Marlen Foley MD     • HYDROmorphone  0 5 mg Intravenous Q2H PRN Marlen Foley MD     • lactated ringers  1,000 mL Intravenous Once PRN Marlen Foley MD      And   • lactated ringers  1,000 mL Intravenous Once PRN Marlen Foley MD     • lactated ringers  75 mL/hr Intravenous Continuous Marlen Foley MD 75 mL/hr (05/23/23 1101)   • ondansetron  4 mg Intravenous Q6H PRN Marlen Foley MD     • oxybutynin  5 mg Oral Daily Lauren Awan MD     • oxyCODONE  5 mg Oral Q4H PRN Lauren Awan MD      Or   • oxyCODONE  10 mg Oral Q4H PRN Lauren Awan MD     • pantoprazole  20 mg Oral Daily Lauren Awan MD     • senna  1 tablet Oral Daily Lauren Awan MD     • simethicone  80 mg Oral 4x Daily PRN Lauren Awan MD     • sodium chloride  1,000 mL Intravenous Once PRN Lauren Awan MD      And   • sodium chloride  1,000 mL Intravenous Once PRN Lauren Awan MD       Continuous Infusions:lactated ringers, 75 mL/hr, Last Rate: 75 mL/hr (05/23/23 1101)      PRN Meds: •  HYDROmorphone  •  lactated ringers **AND** lactated ringers  •  ondansetron  •  oxyCODONE **OR** oxyCODONE  •  simethicone  •  sodium chloride **AND** sodium chloride    Surgical procedures (if appropriate):  Procedure(s):  PROSTATECTOMY RADICAL & PELVIC LYMPH NODE DISSECTION  W/ ROBOT

## 2023-05-23 NOTE — ASSESSMENT & PLAN NOTE
· POD#1 RALP and PLND  · Surgical incision sites clean dry and intact  Abdomen soft  · Carr catheter patent draining clear yellow urine  · Patient will maintain Carr catheter upon discharge x14 days  · LLQ drain patent with serosanguineous drainage  · 105 cc output overnight  · No plans for SEGUN creatinine at this time  · We will remove prior to discharge  · Patient is afebrile and VSS  Leukocytosis of 13 likely reactive secondary to surgical intervention  Creatinine stable at 0 72  Hemoglobin stable at 12  1  · Diet advanced, fluids decreased  · Instructed patient to be up and ambulating as much as possible today  · Continue incentive spirometry  · We will reevaluate this afternoon to determine disposition planning; this afternoon vs tomorrow

## 2023-05-23 NOTE — PROGRESS NOTES
"Progress Note - Urology  Adalid Hunter 1960, 57 y o  male MRN: [id number]    Unit/Bed#: E2 -01 Encounter: [id number]    * Prostate cancer [facility])  Assessment & Plan  · POD#1 RALP and PLND  · Surgical incision sites clean dry and intact  Abdomen soft  · Carr catheter patent draining clear yellow urine  · Patient will maintain Carr catheter upon discharge x14 days  · LLQ drain patent with serosanguineous drainage  · 105 cc output overnight  · No plans for SEGUN creatinine at this time  · We will remove prior to discharge  · Patient is afebrile and VSS  Leukocytosis of 13 likely reactive secondary to surgical intervention  Creatinine stable at 0 72  Hemoglobin stable at 12  1  · Diet advanced, fluids decreased  · Instructed patient to be up and ambulating as much as possible today  · Continue incentive spirometry  · We will reevaluate this afternoon to determine disposition planning; this afternoon vs tomorrow  Subjective:   HPI: Patient reports mild diffuse abdominal pain this morning but feels well  He has not yet ambulated since his procedure  He is tolerating oral diet but denies any flatus or bowel movements  Review of Systems   Constitutional: Negative for chills and fever  HENT: Negative  Respiratory: Negative for cough and shortness of breath  Cardiovascular: Negative for chest pain and palpitations  Gastrointestinal: Positive for abdominal pain  Negative for nausea and vomiting  Genitourinary: Negative for flank pain, hematuria, scrotal swelling, testicular pain and urgency  Musculoskeletal: Negative  Skin: Negative  Neurological: Negative for dizziness and light-headedness  Objective:  Nursing Rounds: Update sent to Heike Krishnamurthy RN   Vitals: Blood pressure 151/68, pulse 71, temperature 97 7 °F (36 5 °C), temperature source Temporal, resp  rate 20, height 5' 4\" (1 626 m), weight 67 2 kg (148 lb 2 4 oz), SpO2 95 %  ,Body mass index is 25 43 " kg/m²  Physical Exam  Vitals reviewed  Constitutional:       General: He is not in acute distress  Appearance: Normal appearance  He is not ill-appearing, toxic-appearing or diaphoretic  HENT:      Head: Normocephalic and atraumatic  Eyes:      Conjunctiva/sclera: Conjunctivae normal    Pulmonary:      Effort: Pulmonary effort is normal  No respiratory distress  Abdominal:      General: There is no distension  Palpations: Abdomen is soft  Tenderness: There is no abdominal tenderness  There is no right CVA tenderness, left CVA tenderness, guarding or rebound  Comments: Surgical incision sites clean dry and intact  LLQ SEGUN drain patent with serosanguinous output  Genitourinary:     Comments: Carr catheter patent draining clear yellow urine  Musculoskeletal:         General: Normal range of motion  Cervical back: Normal range of motion  Skin:     General: Skin is warm and dry  Neurological:      General: No focal deficit present  Mental Status: He is alert and oriented to person, place, and time  Psychiatric:         Mood and Affect: Mood normal          Behavior: Behavior normal          Thought Content: Thought content normal          Judgment: Judgment normal          Imaging:  No new imaging       Labs:  Recent Labs     05/23/23  0459   WBC 13 03*       Recent Labs     05/23/23  0459   HGB 12 1     Recent Labs     05/23/23  0459   HCT 37 6     Recent Labs     05/22/23  1203 05/23/23  0459   CREATININE 0 72 0 72     Urine Culture No Growth <1000 cfu/mL                  Specimen Collected: 05/10/23 07:41 Last Resulted: 05/11/23 08:55              History:    Past Medical History:   Diagnosis Date   • Benign essential hypertension 05/08/2014   • CAD (coronary artery disease)     s/p NAVA prox LAD and D1 7/28/2022   • Cancer [facility])     Prostate   • Coronary artery disease involving native coronary artery of native heart without angina pectoris 08/09/2022   • GERD (gastroesophageal reflux disease)    • Hyperlipidemia    • Hypertension    • Other chest pain    • Palpitations    • Prostate cancer ([facility] [address] ) 2023     Social History     Socioeconomic History   • Marital status: /Civil Union     Spouse name: None   • Number of children: None   • Years of education: None   • Highest education level: None   Occupational History   • None   Tobacco Use   • Smoking status: Former     Packs/day: 0 50     Types: Cigarettes     Quit date: 2022     Years since quittin 4   • Smokeless tobacco: Never   Vaping Use   • Vaping Use: Never used   Substance and Sexual Activity   • Alcohol use: Yes     Alcohol/week: 16 0 standard drinks     Types: 2 Cans of beer, 14 Standard drinks or equivalent per week     Comment: 6-8 /week-   last drank    • Drug use: No   • Sexual activity: None   Other Topics Concern   • None   Social History Narrative    EMPLOYED         Social Determinants of Health     Financial Resource Strain: Not on file   Food Insecurity: Not on file   Transportation Needs: Not on file   Physical Activity: Not on file   Stress: Not on file   Social Connections: Not on file   Intimate Partner Violence: Not on file   Housing Stability: Not on file     Past Surgical History:   Procedure Laterality Date   • CARDIAC CATHETERIZATION Left 2022    Procedure: Cardiac catheterization-left heart catheterization;  Surgeon: Wisam Willard MD;  Location: [facility] CATH LAB; Service: Cardiology   • CARDIAC CATHETERIZATION N/A 2022    Procedure: Cardiac pci;  Surgeon: Wisam Willard MD;  [facility] CARDIAC C[facility];   Service: Cardiology   • HERNIA REPAIR     • WI LAPS SURG [id number] RPBIC RAD W/NRV SPARING ROBOT N/A 2023    Procedure: PROSTATECTOMY RADICAL & PELVIC LYMPH NODE DISSECTION  W/ ROBOT;  Surgeon: Gabriel Chau MD;  Location: [facility];  Service: Urology   • WI PROSTATE NEEDLE BIOPSY ANY APPROACH N/A 2023    Procedure: TRANSRECTAL MRI FUSION BIOPSY PROSTATE;  Surgeon: Mehdi Watkins MD;  Location: [facility] End[facility];  Service: Urology     Family History   Problem Relation Age of Onset   • No Known Problems Mother    • No Known Problems Father        Anuja Rodriguez [place]  Date: 5/23/2023 Time: 11:13 AM

## 2023-05-24 ENCOUNTER — TRANSITIONAL CARE MANAGEMENT (OUTPATIENT)
Dept: FAMILY MEDICINE CLINIC | Facility: CLINIC | Age: 63
End: 2023-05-24

## 2023-05-24 VITALS
RESPIRATION RATE: 20 BRPM | WEIGHT: 148.15 LBS | BODY MASS INDEX: 25.29 KG/M2 | OXYGEN SATURATION: 90 % | HEIGHT: 64 IN | SYSTOLIC BLOOD PRESSURE: 151 MMHG | DIASTOLIC BLOOD PRESSURE: 77 MMHG | HEART RATE: 88 BPM | TEMPERATURE: 98.6 F

## 2023-05-24 DIAGNOSIS — E78.5 HYPERLIPIDEMIA, UNSPECIFIED HYPERLIPIDEMIA TYPE: ICD-10-CM

## 2023-05-24 LAB
ANION GAP SERPL CALCULATED.3IONS-SCNC: 4 MMOL/L (ref 4–13)
BUN SERPL-MCNC: 14 MG/DL (ref 5–25)
CALCIUM SERPL-MCNC: 7.8 MG/DL (ref 8.4–10.2)
CHLORIDE SERPL-SCNC: 106 MMOL/L (ref 96–108)
CO2 SERPL-SCNC: 27 MMOL/L (ref 21–32)
CREAT SERPL-MCNC: 0.8 MG/DL (ref 0.6–1.3)
ERYTHROCYTE [DISTWIDTH] IN BLOOD BY AUTOMATED COUNT: 13.8 % (ref 11.6–15.1)
GFR SERPL CREATININE-BSD FRML MDRD: 94 ML/MIN/1.73SQ M
GLUCOSE SERPL-MCNC: 98 MG/DL (ref 65–140)
HCT VFR BLD AUTO: 33.6 % (ref 36.5–49.3)
HGB BLD-MCNC: 10.9 G/DL (ref 12–17)
MAGNESIUM SERPL-MCNC: 1.9 MG/DL (ref 1.9–2.7)
MCH RBC QN AUTO: 28.5 PG (ref 26.8–34.3)
MCHC RBC AUTO-ENTMCNC: 32.4 G/DL (ref 31.4–37.4)
MCV RBC AUTO: 88 FL (ref 82–98)
PLATELET # BLD AUTO: 240 THOUSANDS/UL (ref 149–390)
PMV BLD AUTO: 9.7 FL (ref 8.9–12.7)
POTASSIUM SERPL-SCNC: 3.7 MMOL/L (ref 3.5–5.3)
RBC # BLD AUTO: 3.82 MILLION/UL (ref 3.88–5.62)
SODIUM SERPL-SCNC: 137 MMOL/L (ref 135–147)
WBC # BLD AUTO: 10.19 THOUSAND/UL (ref 4.31–10.16)

## 2023-05-24 RX ORDER — ATORVASTATIN CALCIUM 20 MG/1
TABLET, FILM COATED ORAL
Qty: 30 TABLET | Refills: 5 | Status: SHIPPED | OUTPATIENT
Start: 2023-05-24

## 2023-05-24 RX ORDER — SENNOSIDES 8.6 MG
8.6 TABLET ORAL DAILY
Qty: 30 TABLET | Refills: 0 | Status: SHIPPED | OUTPATIENT
Start: 2023-05-25

## 2023-05-24 RX ORDER — OXYBUTYNIN CHLORIDE 5 MG/1
5 TABLET, EXTENDED RELEASE ORAL 3 TIMES DAILY PRN
Qty: 30 TABLET | Refills: 0 | Status: SHIPPED | OUTPATIENT
Start: 2023-05-24 | End: 2023-06-08

## 2023-05-24 RX ADMIN — OXYBUTYNIN CHLORIDE 5 MG: 5 TABLET, EXTENDED RELEASE ORAL at 08:11

## 2023-05-24 RX ADMIN — PANTOPRAZOLE SODIUM 20 MG: 20 TABLET, DELAYED RELEASE ORAL at 08:11

## 2023-05-24 RX ADMIN — ACETAMINOPHEN 650 MG: 325 TABLET ORAL at 05:49

## 2023-05-24 RX ADMIN — ENOXAPARIN SODIUM 40 MG: 100 INJECTION SUBCUTANEOUS at 08:11

## 2023-05-24 RX ADMIN — ASPIRIN 81 MG 81 MG: 81 TABLET ORAL at 08:11

## 2023-05-24 RX ADMIN — ACETAMINOPHEN 650 MG: 325 TABLET ORAL at 11:41

## 2023-05-24 RX ADMIN — ATORVASTATIN CALCIUM 20 MG: 20 TABLET, FILM COATED ORAL at 08:11

## 2023-05-24 RX ADMIN — SENNOSIDES 8.6 MG: 8.6 TABLET, FILM COATED ORAL at 08:10

## 2023-05-24 NOTE — NURSING NOTE
Discharge instructions discussed with patient  Patient discharged via wheelchair accompanied by Sapna bermudez

## 2023-05-24 NOTE — DISCHARGE SUMMARY
Discharge Summary    Conor Jenkins 57 y o  male MRN: [id number]  Unit/Bed#: E2 -01 Encounter: [id number]    Admission Date:   Admission Orders (From admission, onward)     Ordered        05/23/23 1442  Inpatient Admission  Once                         Discharge Date: 5/24/2023    Admitting Diagnosis: Prostate cancer [facility]) [facility]    Discharge Diagnosis: Prostate cancer    Medical Problems     Resolved Problems  Date Reviewed: 5/8/2023   None         Attending: Dr Sidney Pereyra      Procedures Performed: Robot-assisted radical prostatectomy and lymph node dissection    Pathology: Pending    Hospital Course: 1 day as patient reported abdominal discomfort and ambulation/physical activity delayed  Condition at Discharge: good patient continues to report abdominal discomfort and mild distention  However, he is ambulating and cleared by physical therapy  Discharge instructions/Information to patient and family:   See after visit summary for information provided to patient and family  Patient encouraged to increase ambulation, maintain Carr catheter to gravity drainage, and continue bowel regimen  Provisions for Follow-Up Care:  See after visit summary for information related to follow-up care and any pertinent home health orders  Disposition: Home          Planned Readmission: No    Discharge Statement   I spent 20 minutes discharging the patient  This time was spent on the day of discharge  I had direct contact with the patient on the day of discharge  Additional documentation is required if more than 30 minutes were spent on discharge  Discharge Medications:  See after visit summary for reconciled discharge medications provided to patient and family

## 2023-05-24 NOTE — UTILIZATION REVIEW
NOTIFICATION OF INPATIENT ADMISSION   AUTHORIZATION REQUEST   SERVICING FACILITY:   [address] [place] [address] [facility]  Tax ID: [id number]  NPI: [id number] ATTENDING PROVIDER:  Attending Name and NPI#: Dangelo Valera Md [[id number]]  Address: [address] [place] [address] [facility]  Phone: [phone]     ADMISSION INFORMATION:  Place of Service: Inpatient [address] [facility]  [address]  Place of Service Code: 21  Inpatient Admission Date/Time: 5/23/23  2:42 PM  Discharge Date/Time: No discharge date for patient encounter  Admitting Diagnosis Code/Description:  Prostate cancer ([facility] [address] ) Josef Palacios     UTILIZATION REVIEW CONTACT:  Chinyere Funes Utilization   Network Utilization Review Department  Phone: [phone]  Fax [phone]  Email: [email or web address]  Contact for approvals/pending authorizations, clinical reviews, and discharge  PHYSICIAN ADVISORY SERVICES:  Medical Necessity Denial & [employer] Review  Phone: [phone]  Fax: [phone]  Email: [email or web address]

## 2023-05-24 NOTE — PLAN OF CARE
Problem: PAIN - ADULT  Goal: Verbalizes/displays adequate comfort level or baseline comfort level  Description: Interventions:  - Encourage patient to monitor pain and request assistance  - Assess pain using appropriate pain scale  - Administer analgesics based on type and severity of pain and evaluate response  - Implement non-pharmacological measures as appropriate and evaluate response  - Consider cultural and social influences on pain and pain management  - Notify physician/advanced practitioner if interventions unsuccessful or patient reports new pain  Outcome: Progressing     Problem: INFECTION - ADULT  Goal: Absence or prevention of progression during hospitalization  Description: INTERVENTIONS:  - Assess and monitor for signs and symptoms of infection  - Monitor lab/diagnostic results  - Monitor all insertion sites, i e  indwelling lines, tubes, and drains  - Monitor endotracheal if appropriate and nasal secretions for changes in amount and color  - [place] appropriate cooling/warming therapies per order  - Administer medications as ordered  - Instruct and encourage patient and family to use good hand hygiene technique  - Identify and instruct in appropriate isolation precautions for identified infection/condition  Outcome: Progressing     Problem: GENITOURINARY - ADULT  Goal: Maintains or returns to baseline urinary function  Description: INTERVENTIONS:  - Assess urinary function  - Encourage oral fluids to ensure adequate hydration if ordered  - Administer IV fluids as ordered to ensure adequate hydration  - Administer ordered medications as needed  - Offer frequent toileting  - Follow urinary retention protocol if ordered  Outcome: Progressing     Problem: SKIN/TISSUE INTEGRITY - ADULT  Goal: Incision(s), wounds(s) or drain site(s) healing without S/S of infection  Description: INTERVENTIONS  - Assess and document dressing, incision, wound bed, drain sites and surrounding tissue  - Provide patient and family education  Outcome: Progressing

## 2023-05-24 NOTE — PROGRESS NOTES
UROLOGY PROGRESS NOTE   Patient Identifiers: Radha Sahni (MRN [id number])  Date of Service: 5/24/2023    Subjective:     24 HR EVENTS:   no significant events  Patient has  complaints of moderate abdominal discomfort          Objective:     VITALS:    Vitals:    05/24/23 0732   BP: 151/77   Pulse: 88   Resp: 20   Temp: 98 6 °F (37 °C)   SpO2: 90%       INS & OUTS:  I/O last 24 hours: In: 2616 7 [P O :720;  I V :1896 7]  Out: 2550 [Urine:2500; Drains:50]    LABS:  Lab Results   Component Value Date    HCT 33 6 (L) 05/24/2023    HGB 10 9 (L) 05/24/2023     05/24/2023    WBC 10 19 (H) 05/24/2023   ]    Lab Results   Component Value Date    BUN 14 05/24/2023    CALCIUM 7 8 (L) 05/24/2023     05/24/2023    CO2 27 05/24/2023    CREATININE 0 80 05/24/2023    K 3 7 05/24/2023   ]    INPATIENT MEDS:    Current Facility-Administered Medications:   •  acetaminophen (TYLENOL) tablet 650 mg, 650 mg, Oral, Q6H [address], Silvia Butler MD, 650 mg at 05/24/23 0549  •  aspirin chewable tablet 81 mg, 81 mg, Oral, Daily, Silvia Butler MD, 81 mg at 05/24/23 1710  •  atorvastatin (LIPITOR) tablet 20 mg, 20 mg, Oral, Daily, Silvia Butler MD, 20 mg at 05/24/23 9677  •  enoxaparin (LOVENOX) subcutaneous injection 40 mg, 40 mg, Subcutaneous, Daily, Silvia Butler MD, 40 mg at 05/24/23 5569  •  HYDROmorphone (DILAUDID) injection 0 5 mg, 0 5 mg, Intravenous, Q2H PRN, Silvia Butler MD  •  ondansetron [facility]) injection 4 mg, 4 mg, Intravenous, Q6H PRN, Silvia Butler MD  •  oxybutynin (DITROPAN-XL) 24 hr tablet 5 mg, 5 mg, Oral, Daily, Silvia Butler MD, 5 mg at 05/24/23 5689  •  oxyCODONE (ROXICODONE) IR tablet 5 mg, 5 mg, Oral, Q4H PRN, 5 mg at 05/23/23 0904 **OR** oxyCODONE (ROXICODONE) immediate release tablet 10 mg, 10 mg, Oral, Q4H PRN, Silvia Butler MD, 10 mg at 05/23/23 1927  •  pantoprazole (PROTONIX) EC tablet 20 mg, 20 mg, Oral, Daily, Silvia Butler MD, 20 mg at 05/24/23 3299  •  senna (SENOKOT) tablet 8 6 mg, 1 tablet, Oral, Daily, Damaris Lima MD, 8 6 mg at 05/24/23 0810  •  simethicone (MYLICON) chewable tablet 80 mg, 80 mg, Oral, 4x Daily PRN, Damaris Lima MD, 80 mg at 05/23/23 1152      Physical Exam:     GEN: alert and oriented x 3    RESP: breathing comfortably with no accessory muscle use, no respiratory distress  CV: pulses palpable, RRR  ABD: softly distended, appropriately tender on palpation, hypoactive bs, -flatus   INCISION: no erythema, induration, or drainage mid abdominal incision and port sites well approximated   EXT: no significant peripheral edema   DRAINS: LLQ SEGUN, serosanguineous  : no suprapubic discomfort or distension  WARD: in place draining clear yellow urine  no clots        Assessment:   58 y/o male POD #2 RALP and PLND  He is reporting moderate abdominal discomfort, abdomen softly distended, hypoactive bs, no flatus, denies any nausea or vomiting, minimal appetite  Ward catheter in place draining clear, yellow urine  LLQ SEGUN drain in place with 35 cc serosanguineous drainage overnight  Afebrile, VSS, WBC trending down, now 10 19, Hgb 10 9, Cr 0 80  Plan:   -Encouraged and reiterated the importance of ambulation  Patient states he has not been out of bed since yesterday afternoon  PT consult pending  Discussed getting OOB and mobilizing with nurse     -Discharge home with ward catheter in place to gravity drainage   -Reviewed post operative care, continue incentive spirometry, pain control   -Remove SEGUN drain prior to discharge home   -Patient scheduled to follow up in the office in 2 weeks  Await PT consult, remove SEGUN drain, and discharge home today  RN, Hannah Jiménez, participated in rounds with AP  Plan of care discussed with patient and RN

## 2023-05-24 NOTE — PLAN OF CARE
Problem: PAIN - ADULT  Goal: Verbalizes/displays adequate comfort level or baseline comfort level  Description: Interventions:  - Encourage patient to monitor pain and request assistance  - Assess pain using appropriate pain scale  - Administer analgesics based on type and severity of pain and evaluate response  - Implement non-pharmacological measures as appropriate and evaluate response  - Consider cultural and social influences on pain and pain management  - Notify physician/advanced practitioner if interventions unsuccessful or patient reports new pain  Outcome: Progressing     Problem: INFECTION - ADULT  Goal: Absence or prevention of progression during hospitalization  Description: INTERVENTIONS:  - Assess and monitor for signs and symptoms of infection  - Monitor lab/diagnostic results  - Monitor all insertion sites, i e  indwelling lines, tubes, and drains  - Monitor endotracheal if appropriate and nasal secretions for changes in amount and color  - [place] appropriate cooling/warming therapies per order  - Administer medications as ordered  - Instruct and encourage patient and family to use good hand hygiene technique  - Identify and instruct in appropriate isolation precautions for identified infection/condition  Outcome: Progressing     Problem: DISCHARGE PLANNING  Goal: Discharge to home or other facility with appropriate resources  Description: INTERVENTIONS:  - Identify barriers to discharge w/patient and caregiver  - Arrange for needed discharge resources and transportation as appropriate  - Identify discharge learning needs (meds, wound care, etc )  - Arrange for interpretive services to assist at discharge as needed  - Refer to Case Management Department for coordinating discharge planning if the patient needs post-hospital services based on physician/advanced practitioner order or complex needs related to functional status, cognitive ability, or social support system  Outcome: Progressing Problem: Knowledge Deficit  Goal: Patient/family/caregiver demonstrates understanding of disease process, treatment plan, medications, and discharge instructions  Description: Complete learning assessment and assess knowledge base    Interventions:  - Provide teaching at level of understanding  - Provide teaching via preferred learning methods  Outcome: Progressing

## 2023-05-24 NOTE — QUICK NOTE
Patient evaluated and cleared by physical therapy  He is OOB and sitting in chair  Abdomen remains softly distended  He tolerated lunch  SEGUN drain removed without any difficulties  Reviewed postoperative care and expectations with patient and family at bedside  Plan for discharge today  Discussed with nurse

## 2023-05-24 NOTE — PHYSICAL THERAPY NOTE
PHYSICAL THERAPY EVALUATION          Patient Name: Dolly ADAMS Date: 5/24/2023    PT EVALUATION    57 y o     [id number]    Prostate cancer ([facility] ) Mindy Zavala    Past Medical History:   Diagnosis Date    Benign essential hypertension 05/08/2014    CAD (coronary artery disease)     s/p NAVA prox LAD and D1 7/28/2022    Cancer ([place] [id number] )     Prostate    Coronary artery disease involving native coronary artery of native heart without angina pectoris 08/09/2022    GERD (gastroesophageal reflux disease)     Hyperlipidemia     Hypertension     Other chest pain     Palpitations     Prostate cancer ([facility] ) 04/18/2023     Past Surgical History:   Procedure Laterality Date    CARDIAC CATHETERIZATION Left 7/28/2022    Procedure: Cardiac catheterization-left heart catheterization;  Surgeon: Wisam Willard MD;  Location: [facility] C[facility]; Service: Cardiology    CARDIAC CATHETERIZATION N/A 7/28/2022    Procedure: Cardiac pci;  Surgeon: Wisam Willard MD;  [facility]: [facility] CARD[facility]AC [facility]; Service: Cardiology    HERNIA REPAIR      SD LAPS SURG [id number] RPBIC RAD W/NRV SPARING ROBOT N/A 5/22/2023    Procedure: PROSTATECTOMY RADICAL & PELVIC LYMPH NODE DISSECTION  W/ ROBOT;  Surgeon: Gabriel Chau MD;  Location: [facility];  Service: Urology    SD PROSTATE NEEDLE BIOPSY ANY APPROACH N/A 2/28/2023    Procedure: TRANSRECTAL MRI FUSION BIOPSY PROSTATE;  Surgeon: Marc Moran MD;  Location: [facility];  Service: Urology        05/24/23 1019   PT Last Visit   PT Visit Date 05/24/23   Note Type   Note type Evaluation   Pain Assessment   Pain Assessment Tool 0-10   Pain Score 6   Pain Location/Orientation Location: Abdomen   Hospital Pain Intervention(s) Repositioned; Ambulation/increased activity; Emotional support   Restrictions/Precautions   Other Precautions Multiple lines;Pain   Home Living   Type of Home Mobile home   Home Layout One level;Stairs to enter without rails   Bathroom Shower/Tub Tub/shower unit Bathroom Toilet Standard   Bathroom Equipment Grab bars in shower; Shower chair;Commode   Home Equipment Walker;Cane   Additional Comments 1 SHILPA   Prior Function   Level of [place] Independent with ADLs; Independent with functional mobility; Independent with IADLS   Lives With Spouse; Son   Edenilson Lessen Help From Family   IADLs Independent with driving; Independent with medication management   Falls in the last 6 months 0   Vocational Full time employment   Comments indep at baseline without an AD  spouse home and able to assist   General   Additional Pertinent History pt admitted 5/22/23 for prostate CA  POD#2 RALP and PLND  up as tolerated orders  PMHx significant for CAD, CA   Cognition   Overall Cognitive Status WFL   Arousal/Participation Cooperative   Orientation Level Oriented X4   Memory Within functional limits   Following Commands Follows all commands and directions without difficulty   RLE Assessment   RLE Assessment WFL   LLE Assessment   LLE Assessment WFL   Coordination   Sensation WFL   Bed Mobility   Supine to Sit 6  Modified independent   Additional items Bedrails; Increased time required   Transfers   Sit to Stand 6  Modified independent   Additional items Increased time required; Other  (RW)   Stand to Sit 6  Modified independent   Additional items Armrests; Increased time required; Other  (RW)   Ambulation/Elevation   Gait pattern Excessively slow   Gait Assistance 6  Modified independent   Additional items Assist x 1   Assistive Device Rolling walker   Distance 200'   Balance   Static Standing Fair +   Dynamic Standing Fair   Ambulatory Fair   Endurance Deficit   Endurance Deficit No   Activity Tolerance   Activity Tolerance Patient tolerated treatment well   Nurse Made Aware yes   Assessment   Prognosis Good   Assessment Yue Barnhart is a 57 y o  male admitted to [facility] on 5/22/2023 for Prostate cancer ([facility] )  POD#1 RALP and PLND   PT was consulted and pt was seen on 5/24/2023 for mobility assessment and d/c planning  Pt presents medium fall risk, multiple lines, acute post op pain  At baseline is indep for ADLs, IADLs and ambulation without an AD  Pt is currently functioning at a modified independent assistance level for bed mobility, transfers and ambulation w RW for comfort  Currently ambulating community distances without difficulty  Pt demonstrated no significant deficits of strength, balance, or endurance  Does not demonstrate need for skilled PT services at this time  Would recommend continued mobilization w nsg/ restorative  At this time PT recommendations for d/c are home  Barriers to Discharge None   Plan   PT Frequency   (d/c PT: maintain on restorative)   Recommendation   PT Discharge Recommendation No rehabilitation needs   AM-PAC Basic Mobility Inpatient   Turning in Flat Bed Without Bedrails 4   Lying on Back to Sitting on Edge of Flat Bed Without Bedrails 4   Moving Bed to Chair 4   Standing Up From Chair Using Arms 4   Walk in Room 4   Climb 3-5 Stairs With Railing 4   Basic Mobility Inpatient Raw Score 24   Basic Mobility Standardized Score 57 68   Highest Level Of Mobility   JH-HLM Goal 8: Walk 250 feet or more   JH-HLM Achieved 8: Walk 250 feet ot more   End of Consult   Patient Position at End of Consult Bedside chair; All needs within reach   History: co - morbidities, multiple lines  Exam: impairments in systems including strength (observed), neuromuscular (balance, gait, transfers, motor function and sensation), am-pac, cognition  Clinical: stable/unpredictable  Complexity: moderate      Enrrique Speaks, PT

## 2023-05-25 NOTE — UTILIZATION REVIEW
NOTIFICATION OF ADMISSION DISCHARGE   This is a Notification of Discharge from [facility]  Please be advised that this patient has been discharge from our facility  Below you will find the admission and discharge date and time including the patient’s disposition  UTILIZATION REVIEW CONTACT:  Namita Maciel MA  Utilization   Network Utilization Review Department  Phone: [phone] x carefully listen to the prompts  All voicemails are confidential   Email: [email or web address]     ADMISSION INFORMATION  PRESENTATION DATE: 5/22/2023  9:57 AM  OBERVATION ADMISSION DATE:   INPATIENT ADMISSION DATE: 5/23/23  2:42 PM   DISCHARGE DATE: 5/24/2023  1:48 PM   DISPOSITION:Home/Self Care    IMPORTANT INFORMATION:  Send all requests for admission clinical reviews, approved or denied determinations and any other requests to dedicated fax number below belonging to the campus where the patient is receiving treatment   List of dedicated fax numbers:  [address] DENIALS (Administrative/Medical Necessity) [phone]   [address] (Maternity/NICU/Pediatrics) [phone]   [facility] [phone]   [place] [address] [phone]   [address] [phone]   [address] [facility] [phone] [address] [facility] [phone]   [address] [place], [place] [address] [address] [phone]   [facility]  [phone]   [address] [facility] [phone]   [address] [facility] [address] [facility] [phone]

## 2023-05-26 ENCOUNTER — NURSE TRIAGE (OUTPATIENT)
Dept: OTHER | Facility: OTHER | Age: 63
End: 2023-05-26

## 2023-05-26 NOTE — TELEPHONE ENCOUNTER
Agree with colace and/or miralax  Limit oxybutynin if no significant catheter issue as this will contribute more constipation  Lots of fluids and fiber diet  Please ensure/eduate do not put anything into the rectum I e  NO suppositories or enemas

## 2023-05-26 NOTE — TELEPHONE ENCOUNTER
"  Reason for Disposition  • Leakage of urine around catheter    Answer Assessment - Initial Assessment Questions  1  SYMPTOMS: \"What symptoms are you concerned about? \"      Patient had prostatectomy, last night started with urge to urinate and having possible spasms  Catheter is draining, but when she had the cramp, it leaks and is leaking through patient's clothing  2  ONSET:  \"When did the symptoms start? \"      Denies  3  FEVER: \"Is there a fever? \" If Yes, ask: \"What is the temperature, how was it measured, and when did it start? \"      Denies  4  ABDOMINAL PAIN: \"Is there any abdominal pain? \" (e g , Scale 1-10; or mild, moderate, severe)      Lower abdomen, rates then as 9/10 when the happen; happens for a couple minutes  5  URINE COLOR: \"What color is the urine? \"  \"Is there blood present in the urine? \" (e g , clear, yellow, cloudy, tea-colored, blood streaks, bright red)      Dark yellow, denies blood  6  ONSET: \"When was the catheter inserted? \"      5/22  7  OTHER SYMPTOMS: \"Do you have any other symptoms? \" (e g , back pain, bad urine odor)       Denies back pain, Denies    Protocols used: URINARY CATHETER SYMPTOMS AND QUESTIONS-ADULT-[place]    "

## 2023-05-26 NOTE — TELEPHONE ENCOUNTER
"Regarding: cramps/ leaking catheter  ----- Message from Kamila Mckee sent at 5/26/2023  8:49 AM EDT -----  \" I had surgery on Monday and now I'm beginning to have cramps and my catheter is leaking  \"    "

## 2023-05-26 NOTE — TELEPHONE ENCOUNTER
Post Op Note    Adalid Hunter is a 57 y o  male s/p PROSTATECTOMY RADICAL & PELVIC LYMPH NODE DISSECTION  W/ ROBOT (Abdomen) performed 5/22/23  Adalid Hunter is a patient of Dr Bhumi Lane  and is seen at the [facility]  office  How would you rate your pain on a scale from 1 to 10, 10 being the worst pain ever? none     Have you had a fever? No     Have your bowel movements been regular? None   Patient has not have bowel movement since before surgery   Taking senna     Do you have any difficulty urinating?  lugo is draining   Dark yellow in color     If the patient has an incision- do you have any redness around the incision or any drainage from the incision? Yes Drain incision   Still has gauze in place     If the patient has a drain- are you having any issues with the drain?  drain was removed before discharge     If the patient has a lugo- are you comfortable caring for your lugo? Yes , patient states was reviewed in hospital before discharge   Is it draining urine?  yes  Patient has the large drainage bag  Do you have any other questions or concerns at this time   Patient is having some urinary leakage  He is going to  oxybutynin today at the pharmacy   Advised patient that oxybutynin can be constipating  Advised to take colace 2 times a day   Can take mirlax if colace is not helping   Reviewed post-op  instructions with patient advised no heavy lifting and straining  Patient is scheduled for appt for fu with Dr Bhumi Lnae on 6/8/23

## 2023-05-26 NOTE — TELEPHONE ENCOUNTER
Called and spoke with Marina Izquierdo  Reviewed provider's recommendations and patient verbalized understanding

## 2023-05-26 NOTE — TELEPHONE ENCOUNTER
Patient called in regarding bladder spasms  Started last when he has the cramping, his cathter leaks  Outside of the spasms, is draining well  Asked patient if he was taking the oxybutynin, stated he never picked it up from the pharmacy  Advised patient to call pharmacy now, since that medication will help with symptoms; advised office will also call back with any further recommendations

## 2023-05-28 ENCOUNTER — NURSE TRIAGE (OUTPATIENT)
Dept: OTHER | Facility: OTHER | Age: 63
End: 2023-05-28

## 2023-05-28 ENCOUNTER — HOSPITAL ENCOUNTER (EMERGENCY)
Facility: HOSPITAL | Age: 63
Discharge: HOME | End: 2023-05-28
Attending: EMERGENCY MEDICINE
Payer: COMMERCIAL

## 2023-05-28 ENCOUNTER — APPOINTMENT (EMERGENCY)
Dept: CT IMAGING | Facility: HOSPITAL | Age: 63
End: 2023-05-28
Payer: COMMERCIAL

## 2023-05-28 VITALS
HEART RATE: 83 BPM | DIASTOLIC BLOOD PRESSURE: 77 MMHG | BODY MASS INDEX: 26.3 KG/M2 | SYSTOLIC BLOOD PRESSURE: 156 MMHG | WEIGHT: 153.22 LBS | TEMPERATURE: 98.6 F | OXYGEN SATURATION: 93 % | RESPIRATION RATE: 16 BRPM

## 2023-05-28 DIAGNOSIS — T83.9XXA FOLEY CATHETER PROBLEM, INITIAL ENCOUNTER (HCC): ICD-10-CM

## 2023-05-28 DIAGNOSIS — G89.18 ACUTE POST-OPERATIVE PAIN: ICD-10-CM

## 2023-05-28 DIAGNOSIS — R31.9 HEMATURIA: Primary | ICD-10-CM

## 2023-05-28 LAB
ABO GROUP BLD BPU: NORMAL
ABO GROUP BLD BPU: NORMAL
ALBUMIN SERPL BCP-MCNC: 3.7 G/DL (ref 3.5–5)
ALP SERPL-CCNC: 71 U/L (ref 34–104)
ALT SERPL W P-5'-P-CCNC: 22 U/L (ref 7–52)
AMORPH URATE CRY URNS QL MICRO: ABNORMAL
ANION GAP SERPL CALCULATED.3IONS-SCNC: 6 MMOL/L (ref 4–13)
APTT PPP: 33 SECONDS (ref 23–37)
AST SERPL W P-5'-P-CCNC: 28 U/L (ref 13–39)
ATRIAL RATE: 84 BPM
BACTERIA UR QL AUTO: ABNORMAL /HPF
BASOPHILS # BLD AUTO: 0.04 THOUSANDS/ÂΜL (ref 0–0.1)
BASOPHILS NFR BLD AUTO: 0 % (ref 0–1)
BILIRUB DIRECT SERPL-MCNC: 0.07 MG/DL (ref 0–0.2)
BILIRUB SERPL-MCNC: 0.96 MG/DL (ref 0.2–1)
BILIRUB UR QL STRIP: NEGATIVE
BPU ID: NORMAL
BPU ID: NORMAL
BUN SERPL-MCNC: 9 MG/DL (ref 5–25)
CALCIUM SERPL-MCNC: 8.6 MG/DL (ref 8.4–10.2)
CARDIAC TROPONIN I PNL SERPL HS: 3 NG/L
CHLORIDE SERPL-SCNC: 103 MMOL/L (ref 96–108)
CLARITY UR: ABNORMAL
CO2 SERPL-SCNC: 27 MMOL/L (ref 21–32)
COLOR UR: ABNORMAL
CREAT SERPL-MCNC: 0.67 MG/DL (ref 0.6–1.3)
CROSSMATCH: NORMAL
CROSSMATCH: NORMAL
EOSINOPHIL # BLD AUTO: 0.17 THOUSAND/ÂΜL (ref 0–0.61)
EOSINOPHIL NFR BLD AUTO: 2 % (ref 0–6)
ERYTHROCYTE [DISTWIDTH] IN BLOOD BY AUTOMATED COUNT: 13.6 % (ref 11.6–15.1)
GFR SERPL CREATININE-BSD FRML MDRD: 101 ML/MIN/1.73SQ M
GLUCOSE SERPL-MCNC: 95 MG/DL (ref 65–140)
GLUCOSE UR STRIP-MCNC: NEGATIVE MG/DL
HCT VFR BLD AUTO: 33.9 % (ref 36.5–49.3)
HGB BLD-MCNC: 10.8 G/DL (ref 12–17)
HGB UR QL STRIP.AUTO: ABNORMAL
IMM GRANULOCYTES # BLD AUTO: 0.06 THOUSAND/UL (ref 0–0.2)
IMM GRANULOCYTES NFR BLD AUTO: 1 % (ref 0–2)
INR PPP: 1.12 (ref 0.84–1.19)
KETONES UR STRIP-MCNC: NEGATIVE MG/DL
LACTATE SERPL-SCNC: 1.3 MMOL/L (ref 0.5–2)
LEUKOCYTE ESTERASE UR QL STRIP: ABNORMAL
LYMPHOCYTES # BLD AUTO: 1.44 THOUSANDS/ÂΜL (ref 0.6–4.47)
LYMPHOCYTES NFR BLD AUTO: 13 % (ref 14–44)
MCH RBC QN AUTO: 27.9 PG (ref 26.8–34.3)
MCHC RBC AUTO-ENTMCNC: 31.9 G/DL (ref 31.4–37.4)
MCV RBC AUTO: 88 FL (ref 82–98)
MONOCYTES # BLD AUTO: 1.05 THOUSAND/ÂΜL (ref 0.17–1.22)
MONOCYTES NFR BLD AUTO: 10 % (ref 4–12)
MUCOUS THREADS UR QL AUTO: ABNORMAL
NEUTROPHILS # BLD AUTO: 8.28 THOUSANDS/ÂΜL (ref 1.85–7.62)
NEUTS SEG NFR BLD AUTO: 74 % (ref 43–75)
NITRITE UR QL STRIP: NEGATIVE
NON-SQ EPI CELLS URNS QL MICRO: ABNORMAL /HPF
NRBC BLD AUTO-RTO: 0 /100 WBCS
P AXIS: 44 DEGREES
PH UR STRIP.AUTO: 7 [PH] (ref 4.5–8)
PLATELET # BLD AUTO: 344 THOUSANDS/UL (ref 149–390)
PMV BLD AUTO: 9 FL (ref 8.9–12.7)
POTASSIUM SERPL-SCNC: 4.4 MMOL/L (ref 3.5–5.3)
PR INTERVAL: 170 MS
PROT SERPL-MCNC: 6.4 G/DL (ref 6.4–8.4)
PROT UR STRIP-MCNC: ABNORMAL MG/DL
PROTHROMBIN TIME: 14.4 SECONDS (ref 11.6–14.5)
QRS AXIS: -6 DEGREES
QRSD INTERVAL: 82 MS
QT INTERVAL: 344 MS
QTC INTERVAL: 406 MS
RBC # BLD AUTO: 3.87 MILLION/UL (ref 3.88–5.62)
RBC #/AREA URNS AUTO: ABNORMAL /HPF
SODIUM SERPL-SCNC: 136 MMOL/L (ref 135–147)
SP GR UR STRIP.AUTO: 1.01 (ref 1–1.03)
T WAVE AXIS: 21 DEGREES
UNIT DISPENSE STATUS: NORMAL
UNIT DISPENSE STATUS: NORMAL
UNIT PRODUCT CODE: NORMAL
UNIT PRODUCT CODE: NORMAL
UNIT PRODUCT VOLUME: 350 ML
UNIT PRODUCT VOLUME: 350 ML
UNIT RH: NORMAL
UNIT RH: NORMAL
UROBILINOGEN UR QL STRIP.AUTO: 0.2 E.U./DL
VENTRICULAR RATE: 84 BPM
WBC # BLD AUTO: 11.04 THOUSAND/UL (ref 4.31–10.16)
WBC #/AREA URNS AUTO: ABNORMAL /HPF

## 2023-05-28 PROCEDURE — 85730 THROMBOPLASTIN TIME PARTIAL: CPT | Performed by: EMERGENCY MEDICINE

## 2023-05-28 PROCEDURE — 99285 EMERGENCY DEPT VISIT HI MDM: CPT | Performed by: EMERGENCY MEDICINE

## 2023-05-28 PROCEDURE — 93010 ELECTROCARDIOGRAM REPORT: CPT | Performed by: INTERNAL MEDICINE

## 2023-05-28 PROCEDURE — 96360 HYDRATION IV INFUSION INIT: CPT

## 2023-05-28 PROCEDURE — 96361 HYDRATE IV INFUSION ADD-ON: CPT

## 2023-05-28 PROCEDURE — 93005 ELECTROCARDIOGRAM TRACING: CPT

## 2023-05-28 PROCEDURE — 36415 COLL VENOUS BLD VENIPUNCTURE: CPT | Performed by: EMERGENCY MEDICINE

## 2023-05-28 PROCEDURE — 80076 HEPATIC FUNCTION PANEL: CPT | Performed by: EMERGENCY MEDICINE

## 2023-05-28 PROCEDURE — 81001 URINALYSIS AUTO W/SCOPE: CPT

## 2023-05-28 PROCEDURE — 84484 ASSAY OF TROPONIN QUANT: CPT | Performed by: EMERGENCY MEDICINE

## 2023-05-28 PROCEDURE — 80048 BASIC METABOLIC PNL TOTAL CA: CPT | Performed by: EMERGENCY MEDICINE

## 2023-05-28 PROCEDURE — 85610 PROTHROMBIN TIME: CPT | Performed by: EMERGENCY MEDICINE

## 2023-05-28 PROCEDURE — 83605 ASSAY OF LACTIC ACID: CPT | Performed by: EMERGENCY MEDICINE

## 2023-05-28 PROCEDURE — 99285 EMERGENCY DEPT VISIT HI MDM: CPT

## 2023-05-28 PROCEDURE — 74177 CT ABD & PELVIS W/CONTRAST: CPT

## 2023-05-28 PROCEDURE — 85025 COMPLETE CBC W/AUTO DIFF WBC: CPT | Performed by: EMERGENCY MEDICINE

## 2023-05-28 RX ORDER — OXYCODONE HYDROCHLORIDE AND ACETAMINOPHEN 5; 325 MG/1; MG/1
1-2 TABLET ORAL EVERY 6 HOURS PRN
Qty: 12 TABLET | Refills: 0 | Status: SHIPPED | OUTPATIENT
Start: 2023-05-28

## 2023-05-28 RX ORDER — OXYCODONE HYDROCHLORIDE AND ACETAMINOPHEN 5; 325 MG/1; MG/1
2 TABLET ORAL ONCE
Status: COMPLETED | OUTPATIENT
Start: 2023-05-28 | End: 2023-05-28

## 2023-05-28 RX ADMIN — OXYCODONE AND ACETAMINOPHEN 2 TABLET: 5; 325 TABLET ORAL at 18:23

## 2023-05-28 RX ADMIN — SODIUM CHLORIDE 1000 ML: 0.9 INJECTION, SOLUTION INTRAVENOUS at 15:13

## 2023-05-28 RX ADMIN — IOHEXOL 100 ML: 350 INJECTION, SOLUTION INTRAVENOUS at 15:57

## 2023-05-28 NOTE — TELEPHONE ENCOUNTER
"Regarding: post - prostatectomy , passing blood clots  ----- Message from Ruth Suero sent at 5/28/2023 11:10 AM EDT -----  \" I had a prostatectomy last Monday and I am still passing blood clots   \"    "

## 2023-05-28 NOTE — ED PROVIDER NOTES
History  Chief Complaint   Patient presents with   • Urinary Catheter Problem     Pt had catheter placed on Monday  Pt noticed bloody urine today  HPI    Prior to Admission Medications   Prescriptions Last Dose Informant Patient Reported? Taking?   aspirin 81 mg chewable tablet  Self Yes No   Sig: Chew 81 mg daily   atorvastatin (LIPITOR) 20 mg tablet   No No   Sig: take 1 tablet by mouth once daily   clopidogrel (PLAVIX) 75 mg tablet  Self No No   Sig: take 1 tablet by mouth once daily   lisinopril (ZESTRIL) 20 mg tablet  Self No No   Sig: take 1 tablet by mouth once daily   oxybutynin (DITROPAN-XL) 5 mg 24 hr tablet   No No   Sig: Take 1 tablet (5 mg total) by mouth 3 (three) times a day as needed (as needed for bladder spasms)   pantoprazole (PROTONIX) 20 mg tablet  Self No No   Sig: Take 1 tablet (20 mg total) by mouth daily   senna (SENOKOT) 8 6 mg   No No   Sig: Take 1 tablet (8 6 mg total) by mouth daily Do not start before May 25, 2023  Facility-Administered Medications: None       Past Medical History:   Diagnosis Date   • Benign essential hypertension 05/08/2014   • CAD (coronary artery disease)     s/p NAVA prox LAD and D1 7/28/2022   • [facility] ([facility] )     Prostate   • Coronary artery disease involving native coronary artery of native heart without angina pectoris 08/09/2022   • GERD (gastroesophageal reflux disease)    • Hyperlipidemia    • Hypertension    • Other chest pain    • Palpitations    • Prostate cancer ([facility] ) 04/18/2023       Past Surgical History:   Procedure Laterality Date   • CARDIAC CATHETERIZATION Left 7/28/2022    Procedure: Cardiac catheterization-left heart catheterization;  Surgeon: Syeda Dickinson MD;  Location: [facility] CARD[facility]ATH LAB; Service: Cardiology   • CARDIAC CATHETERIZATION N/A 7/28/2022    Procedure: Cardiac pci;  Surgeon: Syeda Dickinson MD;  Location: [facility] CARDIAC CATH [facility];   Service: Cardiology   • HERNIA REPAIR     • FL LAPS SURG [id number] RPBIC RAD W/NRV SPARING ROBOT N/A 2023    Procedure: PROSTATECTOMY RADICAL & PELVIC LYMPH NODE DISSECTION  W/ ROBOT;  Surgeon: Pia Augustine MD;  Location: [facility];  Service: Urology   • OH PROSTATE NEEDLE BIOPSY ANY APPROACH N/A 2023    Procedure: TRANSRECTAL MRI FUSION BIOPSY PROSTATE;  Surgeon: Makenna Dumont MD;  Location: [facility];  Service: Urology       Family History   Problem Relation Age of Onset   • No Known Problems Mother    • No Known Problems Father      I have reviewed and agree with the history as documented  E-Cigarette/Vaping   • E-Cigarette Use Never User      E-Cigarette/Vaping Substances   • Nicotine No    • THC No    • CBD No    • Flavoring No    • Other No    • Unknown No      Social History     Tobacco Use   • Smoking status: Former     Packs/day: 0 50     Types: Cigarettes     Quit date: 2022     Years since quittin 4   • Smokeless tobacco: Never   Vaping Use   • Vaping Use: Never used   Substance Use Topics   • Alcohol use:  Yes     Alcohol/week: 16 0 standard drinks of alcohol     Types: 2 Cans of beer, 14 Standard drinks or equivalent per week     Comment: 6-8 /week-   last drank    • Drug use: No       Review of Systems    Physical Exam  Physical Exam    Vital Signs  ED Triage Vitals [23 1336]   Temperature Pulse Respirations Blood Pressure SpO2   98 6 °F (37 °C) (!) 106 16 120/71 96 %      Temp Source Heart Rate Source Patient Position - Orthostatic VS BP Location FiO2 (%)   Oral Monitor Sitting Right arm --      Pain Score       --           Vitals:    23 1336   BP: 120/71   Pulse: (!) 106   Patient Position - Orthostatic VS: Sitting         Visual Acuity      ED Medications  Medications   sodium chloride 0 9 % bolus 1,000 mL (has no administration in time range)       Diagnostic Studies  Results Reviewed     Procedure Component Value Units Date/Time    Basic metabolic panel [[id number]]     Lab Status: No result Specimen: Blood     Hepatic function panel [[id number]]     Lab Status: No result Specimen: Blood     CBC and differential [[id number]]     Lab Status: No result Specimen: Blood     Lactic acid, plasma (w/reflex if result > 2 0) [[id number]]     Lab Status: No result Specimen: Blood     POCT urinalysis dipstick [[id number]]     Lab Status: No result Specimen: Urine     Protime-INR [[id number]]     Lab Status: No result Specimen: Blood     APTT [[id number]]     Lab Status: No result Specimen: Blood     HS Troponin 0hr (reflex protocol) [[id number]]     Lab Status: No result Specimen: Blood                  CT abdomen pelvis with contrast    (Results Pending)              Procedures  Procedures         ED Course                                             MDM    Disposition  Final diagnoses:   None     ED Disposition     None      Follow-up Information    None         Patient's Medications   Discharge Prescriptions    No medications on file       No discharge procedures on file      PDMP Review     None          ED Provider  Electronically Signed by Genitourinary:     Penis: Normal        Testes: Normal       Comments: Some post surgical ecchymosis, circumcised phallus with lugo catheter in place  No sig testicular swelling or tenderness  Musculoskeletal:         General: No swelling  Cervical back: Neck supple  Skin:     General: Skin is warm and dry  Capillary Refill: Capillary refill takes less than 2 seconds  Neurological:      General: No focal deficit present  Mental Status: He is alert and oriented to person, place, and time     Psychiatric:         Mood and Affect: Mood normal          Vital Signs  ED Triage Vitals   Temperature Pulse Respirations Blood Pressure SpO2   05/28/23 1336 05/28/23 1336 05/28/23 1336 05/28/23 1336 05/28/23 1336   98 6 °F (37 °C) (!) 106 16 120/71 96 %      Temp Source Heart Rate Source Patient Position - Orthostatic VS BP Location FiO2 (%)   05/28/23 1336 05/28/23 1336 05/28/23 1336 05/28/23 1336 --   Oral Monitor Sitting Right arm       Pain Score       05/28/23 1515       No Pain           Vitals:    05/28/23 1336 05/28/23 1515 05/28/23 1718   BP: 120/71 151/80 156/77   Pulse: (!) 106 82 83   Patient Position - Orthostatic VS: Sitting  Lying         Visual Acuity      ED Medications  Medications   sodium chloride 0 9 % bolus 1,000 mL (0 mL Intravenous Stopped 5/28/23 1745)   iohexol (OMNIPAQUE) 350 MG/ML injection (SINGLE-DOSE) 100 mL (100 mL Intravenous Given 5/28/23 1557)   oxyCODONE-acetaminophen (PERCOCET) 5-325 mg per tablet 2 tablet (2 tablets Oral Given 5/28/23 1823)       Diagnostic Studies  Results Reviewed     Procedure Component Value Units Date/Time    Urine Microscopic [[id number]]  (Abnormal) Collected: 05/28/23 1526    Lab Status: Final result Specimen: Urine, Indwelling Lugo Catheter Updated: 05/28/23 1604     RBC, UA Innumerable /hpf      WBC, UA 4-10 /hpf      Epithelial Cells None Seen /hpf      Bacteria, UA Occasional /hpf      MUCUS THREADS Occasional     Amorphous Crystals, UA Moderate    Urine Macroscopic, POC [[id number]]  (Abnormal) Collected: 05/28/23 1526    Lab Status: Final result Specimen: Urine Updated: 05/28/23 1527     Color, UA Kamila     Clarity, UA Slightly Cloudy     pH, UA 7 0     Leukocytes, UA Trace     Nitrite, UA Negative     Protein, UA 30 (1+) mg/dl      Glucose, UA Negative mg/dl      Ketones, UA Negative mg/dl      Urobilinogen, UA 0 2 E U /dl      Bilirubin, UA Negative     Occult Blood, UA Large     Specific Stella, UA 1 015    Narrative:      CLINITEK RESULT    Basic metabolic panel [[id number]] Collected: 05/28/23 1440    Lab Status: Final result Specimen: Blood from Arm, Right Updated: 05/28/23 1519     Sodium 136 mmol/L      Potassium 4 4 mmol/L      Chloride 103 mmol/L      CO2 27 mmol/L      ANION GAP 6 mmol/L      BUN 9 mg/dL      Creatinine 0 67 mg/dL      Glucose 95 mg/dL      Calcium 8 6 mg/dL      eGFR 101 ml/min/1 73sq m     Narrative:      [facility] guidelines for Chronic Kidney Disease (CKD):   •  Stage 1 with normal or high GFR (GFR > 90 mL/min/1 73 square meters)  •  Stage 2 Mild CKD (GFR = 60-89 mL/min/1 73 square meters)  •  Stage 3A Moderate CKD (GFR = 45-59 mL/min/1 73 square meters)  •  Stage 3B Moderate CKD (GFR = 30-44 mL/min/1 73 square meters)  •  Stage 4 Severe CKD (GFR = 15-29 mL/min/1 73 square meters)  •  Stage 5 End Stage CKD (GFR <15 mL/min/1 73 square meters)  Note: GFR calculation is accurate only with a steady state creatinine    Hepatic function panel [[id number]]  (Normal) Collected: 05/28/23 1440    Lab Status: Final result Specimen: Blood from Arm, Right Updated: 05/28/23 1519     Total Bilirubin 0 96 mg/dL      Bilirubin, Direct 0 07 mg/dL      Alkaline Phosphatase 71 U/L      AST 28 U/L      ALT 22 U/L      Total Protein 6 4 g/dL      Albumin 3 7 g/dL     HS Troponin 0hr (reflex protocol) [[id number]]  (Normal) Collected: 05/28/23 1440    Lab Status: Final result Specimen: Blood from Arm, Right Updated: 05/28/23 1511     hs TnI 0hr 3 ng/L     Protime-INR [[id number]]  (Normal) Collected: 05/28/23 1440    Lab Status: Final result Specimen: Blood from Arm, Right Updated: 05/28/23 1508     Protime 14 4 seconds      INR 1 12    APTT [[id number]]  (Normal) Collected: 05/28/23 1440    Lab Status: Final result Specimen: Blood from Arm, Right Updated: 05/28/23 1508     PTT 33 seconds     Lactic acid, plasma (w/reflex if result > 2 0) [[id number]]  (Normal) Collected: 05/28/23 1440    Lab Status: Final result Specimen: Blood from Arm, Right Updated: 05/28/23 1508     LACTIC ACID 1 3 mmol/L     Narrative:      Result may be elevated if tourniquet was used during collection  CBC and differential [[id number]]  (Abnormal) Collected: 05/28/23 1440    Lab Status: Final result Specimen: Blood from Arm, Right Updated: 05/28/23 1449     WBC 11 04 Thousand/uL      RBC 3 87 Million/uL      Hemoglobin 10 8 g/dL      Hematocrit 33 9 %      MCV 88 fL      MCH 27 9 pg      MCHC 31 9 g/dL      RDW 13 6 %      MPV 9 0 fL      Platelets 333 Thousands/uL      nRBC 0 /100 WBCs      Neutrophils Relative 74 %      Immat GRANS % 1 %      Lymphocytes Relative 13 %      Monocytes Relative 10 %      Eosinophils Relative 2 %      Basophils Relative 0 %      Neutrophils Absolute 8 28 Thousands/µL      Immature Grans Absolute 0 06 Thousand/uL      Lymphocytes Absolute 1 44 Thousands/µL      Monocytes Absolute 1 05 Thousand/µL      Eosinophils Absolute 0 17 Thousand/µL      Basophils Absolute 0 04 Thousands/µL                  CT abdomen pelvis with contrast   Final Result by Luis Rodriguez MD (05/28 1716)   Addendum (preliminary) 1 of 1 by Luis Rodriguez MD (05/28 1716)   ADDENDUM:      After the initial interpretation of the examination, further clinical    history was provided including that of a lymph node dissection procedure    and some extra surgical manipulation to control bleeding during the    procedure   This extra surgical intervention    above and beyond standard TURP likely accounts for the presence of the    soft tissue gas  Infection is therefore less likely  The patient is    reportedly not unstable or septic appearing on examination by the ER    physician  Final      Moderate sized hematoma in the space of Retzius anterior to the bladder, but also extending probably slightly into the intraperitoneal compartment with some blood seen just inferior to the tip of the cecum  Fairly extensive soft tissue gas in the scrotum and perineal area and thighs and abdominal wall and chest wall  This is more extensive than expected for surgery 6 days ago and is concerning for possible infection  Correlate for any signs or symptoms of    Hossein's gangrene  Carr catheter seems satisfactory in position although the tip of the catheter impinges on the bladder wall and that area of the bladder wall appears slightly thickened and enhanced  The prostate resection bed seems grossly satisfactory  Small fat-containing umbilical hernia and some edema of the abdominal wall nearby  Trace pleural effusions and dependent atelectasis  Numerous renal cysts  Aortic valvular calcification  I personally discussed this study with Sekou Payton on 5/28/2023 4:45 PM                Workstation performed: [id number]                    Procedures  Procedures         ED Course                                             Medical Decision Making  58-year-old male with hematuria status post TURP with lymph node biopsy  He was having fairly extensive hematuria earlier today but it seems to be improving in the emergency department  His Carr catheter is now draining light pink with a few small, scattered clots  Carr was reirrigated and is now putting out fairly clear urine  He is not having significant abdominal pain    He has some mild tenderness around the surgical incisions but otherwise his abdominal exam is nontender/reassuring  Discussed CT findings with patient and with urology  There is some subcutaneous air noted although based on my conversation with the urology team, this is fairly typical at this point in the postop period given the procedure that the patient had done  He is not having fevers  His inflammatory markers are reassuring  His clinical exam and laboratory findings are not consistent with Hossein's gangrene or other NSTIs  Per urology will plan to discharge with outpatient follow-up  Be seen in the outpatient clinic in about a week  We discussed return precautions  Acute post-operative pain: acute illness or injury  Carr catheter problem, initial encounter [facility]): acute illness or injury  Hematuria: acute illness or injury  Amount and/or Complexity of Data Reviewed  External Data Reviewed: notes  Labs: ordered  Decision-making details documented in ED Course  Radiology: ordered  Decision-making details documented in ED Course  Risk  Prescription drug management  Decision regarding hospitalization  Disposition  Final diagnoses:   Hematuria   Carr catheter problem, initial encounter ([facility] )   Acute post-operative pain     Time reflects when diagnosis was documented in both MDM as applicable and the Disposition within this note     Time User Action Codes Description Comment    5/28/2023  6:07 PM Pugh  Add [R31 9] Hematuria     5/28/2023  6:07 PM Pugh  Add Valarie Monteiro  9XXA] Carr catheter problem, [facility] )     5/28/2023  6:08 PM Pugh  Add [G89 18] Acute post-operative pain       ED Disposition     ED Disposition   Discharge    Condition   Stable    Date/Time   Sun May 28, 2023  6:07 PM    Comment   Yuly Kramer discharge to home/self care  Follow-up Information     Follow up With Specialties Details Why Contact Info    Ely Padilla,  Family Medicine   [address] [place] [id number]  [phone]      Carlton Carvalho Marilyn Pedraza MD Urology  Please call office Tuesday morning to set up an appointment this week   [address] [facility]  [phone]            Discharge Medication List as of 5/28/2023  6:20 PM      START taking these medications    Details   oxyCODONE-acetaminophen (Percocet) 5-325 mg per tablet Take 1-2 tablets by mouth every 6 (six) hours as needed for moderate pain for up to 12 doses Max Daily Amount: 8 tablets, Starting Sun 5/28/2023, Normal         CONTINUE these medications which have NOT CHANGED    Details   aspirin 81 mg chewable tablet Chew 81 mg daily, Historical Med      atorvastatin (LIPITOR) 20 mg tablet take 1 tablet by mouth once daily, Normal      clopidogrel (PLAVIX) 75 mg tablet take 1 tablet by mouth once daily, Normal      lisinopril (ZESTRIL) 20 mg tablet take 1 tablet by mouth once daily, Normal      pantoprazole (PROTONIX) 20 mg tablet Take 1 tablet (20 mg total) by mouth daily, Starting u 9/29/2022, Normal      senna (SENOKOT) 8 6 mg Take 1 tablet (8 6 mg total) by mouth daily Do not start before May 25, 2023 , Starting Thu 5/25/2023, Normal      oxybutynin (DITROPAN-XL) 5 mg 24 hr tablet Take 1 tablet (5 mg total) by mouth 3 (three) times a day as needed (as needed for bladder spasms), Starting Wed 5/24/2023, Normal                 PDMP Review     None          ED Provider  Electronically Signed by           Liz Massey MD  06/21/23 [id number]

## 2023-05-28 NOTE — Clinical Note
Radha Sahni was seen and treated in our emergency department on 5/28/2023  Diagnosis:     Bryan Austin  may return to work on return date  He may return on this date: 06/05/2023         If you have any questions or concerns, please don't hesitate to call        Samaria Waters MD    ______________________________           _______________          _______________  Hospital Representative                              Date                                Time

## 2023-05-28 NOTE — DISCHARGE INSTRUCTIONS
Please stop taking your Plavix for a few days until you can be seen by urology  Also please flush your catheter several times a day  If you have any new or worsening symptoms please call your doctor immediately or return to the emergency department

## 2023-05-28 NOTE — TELEPHONE ENCOUNTER
"Spoke with DULCE Gates, she advised patient go to ED  Patient made aware and verbalized understanding  He will go to [facility] CLOS[facility] ED  Placed on arrival board  Reason for Disposition  • [1] Caller has URGENT question AND [2] triager unable to answer question    Answer Assessment - Initial Assessment Questions  1  SYMPTOM: \"What's the main symptom you're concerned about? \" (e g , pain, fever, vomiting)      Blood clots in urine  2  ONSET: \"When did symptoms start? \"      Started yesterday  3  SURGERY: \"What surgery was performed? \"     Prostatectomy  4  DATE of SURGERY: \"When was surgery performed?\"       5 22 23  6  PAIN: \"Is there any pain? \" If Yes, ask: \"How bad is it? \"  (Scale 1-10; or mild, moderate, severe)      8 or 9 /10 occasional bladder spasms  7  FEVER: \"Do you have a fever? \" If Yes, ask: \"What is your temperature, how was it measured, and when did it start? \"      Denies  8  VOMITING: \"Is there any vomiting? \" If yes, ask: \"How many times? \"     Denies  9  BLEEDING: \"Is there any bleeding? \" If Yes, ask: \"How much? \" and \"Where? \"     Blood in urine with clots  10  OTHER SYMPTOMS: \"Do you have any other symptoms? \" (e g , drainage from wound, painful urination, constipation)       Left side of lower abdomen drainage from previous drain site was changed yesterday and was saturated with blood  But, it was not changed until yesterday since Wednesday 5 24 the day the drain was pulled  On Plavix 75mg daily and ASA 81mg daily  Was cleared to restart after surgery  Feels a little nauseous daily, decreased appetite      Protocols used: POST-OP SYMPTOMS AND QUESTIONS-ADULT-    "

## 2023-05-28 NOTE — Clinical Note
Mick Mitchell was seen and treated in our emergency department on 5/28/2023  Diagnosis:     Sofi Gonzalez  may return to work on return date  He may return on this date: 06/05/2023         If you have any questions or concerns, please don't hesitate to call        Floresita Kent MD    ______________________________           _______________          _______________  Hospital Representative                              Date                                Time

## 2023-06-08 ENCOUNTER — OFFICE VISIT (OUTPATIENT)
Dept: UROLOGY | Facility: CLINIC | Age: 63
End: 2023-06-08

## 2023-06-08 VITALS
HEART RATE: 80 BPM | SYSTOLIC BLOOD PRESSURE: 122 MMHG | BODY MASS INDEX: 24.59 KG/M2 | DIASTOLIC BLOOD PRESSURE: 78 MMHG | WEIGHT: 144 LBS | HEIGHT: 64 IN

## 2023-06-08 DIAGNOSIS — N50.1 PELVIC HEMATOMA IN MALE: ICD-10-CM

## 2023-06-08 DIAGNOSIS — G89.18 ACUTE POST-OPERATIVE PAIN: ICD-10-CM

## 2023-06-08 DIAGNOSIS — T83.9XXA FOLEY CATHETER PROBLEM, INITIAL ENCOUNTER (HCC): ICD-10-CM

## 2023-06-08 DIAGNOSIS — C61 PROSTATE CANCER (HCC): Primary | ICD-10-CM

## 2023-06-08 DIAGNOSIS — N39.3 STRESS INCONTINENCE: ICD-10-CM

## 2023-06-08 PROCEDURE — 99024 POSTOP FOLLOW-UP VISIT: CPT | Performed by: UROLOGY

## 2023-06-08 NOTE — PROGRESS NOTES
UROLOGY POSTOP NOTE     CHIEF COMPLAINT   Yoseph Allen is a 57 y o  male with a complaint of   Chief Complaint   Patient presents with   • Follow-up   • Prostate Cancer     Path review       History of Present Illness: Yoseph Allen is a 57 y o  male here for evaluation of recent diagnosis of prostate cancer  Patient known to Dr Otoniel Salazar  Elevated PSA led to multiparametric MRI, PI-RADS level 4 lesion  Patient underwent transperineal fusion biopsy  This demonstrates highest Kyle score 4+3 equal 7 with 5 out of 12 cores positive  Patient presents today with his wife and son for surgical discussion  Patient does have a history of coronary artery disease status post stent placement x2 last July  He is not quite 1 year out from this procedure  On dual antiplatelet therapy  Prior hernia surgery  No other abdominal procedures  Patient is not sexually active, Weston score is 1  Lab Results   Component Value Date    PSA 5 2 (H) 11/08/2022    PSA 6 6 (H) 07/18/2022    PSA 3 9 06/24/2020     Patient returns after surgery, RALP/PLND 5/22/23  Was seen in ER 5/28 with hematuria  Large hematoma noted above bladder, not surprising given some of the intraoperative issues with DVC and rectourethralis vasculature and need for ASA/plavix  Hgb was reasonably stable at time of ER visit  Urinary Score(s)         AUA SYMPTOM SCORE    Flowsheet Row Most Recent Value   AUA SYMPTOM SCORE    How often have you had a sensation of not emptying your bladder completely after you finished urinating? 1   How often have you had to urinate again less than two hours after you finished urinating? 3   How often have you found you stopped and started again several times when you urinate? 1   How often have you found it difficult to postpone urination? 1   How often have you had a weak urinary stream? 0   How often have you had to push or strain to begin urination?  0   How many times did you most typically get up to urinate from the time you went to bed at night until the time you got up in the morning? 5   Quality of Life: If you were to spend the rest of your life with your urinary condition just the way it is now, how would you feel about that? 3   AUA SYMPTOM SCORE 11             Past Medical History:     Past Medical History:   Diagnosis Date   • Benign essential hypertension 05/08/2014   • CAD (coronary artery disease)     s/p NAVA prox LAD and D1 7/28/2022   • Cancer ([facility] )     Prostate   • Coronary artery disease involving native coronary artery of native heart without angina pectoris 08/09/2022   • GERD (gastroesophageal reflux disease)    • Hyperlipidemia    • Hypertension    • Other chest pain    • Palpitations    • [facility]state [facility] ([facility] ) 04/18/2023       PAST SURGICAL HISTORY:     Past Surgical History:   Procedure Laterality Date   • CARDIAC CATHETERIZATION Left 7/28/2022    Procedure: Cardiac catheterization-left heart catheterization;  Surgeon: Deana Burk MD;  Location: [facility]; Service: Cardiology   • CARDIAC CATHETERIZATION N/A 7/28/2022    Procedure: Cardiac pci;  Surgeon: Deana Burk MD;  Location: [facility] CARDIAC [facility];   Service: Cardiology   • HERNIA REPAIR     • MT LAPS SURG [id number] RPBIC RAD W/NRV SPARING ROBOT N/A 5/22/2023    Procedure: PROSTATECTOMY RADICAL & PELVIC LYMPH NODE DISSECTION  W/ ROBOT;  Surgeon: Brett Mena MD;  Location: [facility] OR;  Service: Urology   • MT PROSTATE NEEDLE BIOPSY ANY APPROACH N/A 2/28/2023    Procedure: TRANSRECTAL MRI FUSION BIOPSY PROSTATE;  Surgeon: Sarai Dash MD;  Location: [facility];  Service: Urology       CURRENT MEDICATIONS:     Current Outpatient Medications   Medication Sig Dispense Refill   • aspirin 81 mg chewable tablet Chew 81 mg daily     • atorvastatin (LIPITOR) 20 mg tablet take 1 tablet by mouth once daily 30 tablet 5   • clopidogrel (PLAVIX) 75 mg tablet take 1 tablet by mouth once daily 90 tablet 3   • lisinopril (ZESTRIL) 20 mg tablet take 1 tablet by mouth once daily 90 tablet 1   • oxyCODONE-acetaminophen (Percocet) 5-325 mg per tablet Take 1-2 tablets by mouth every 6 (six) hours as needed for moderate pain for up to 12 doses Max Daily Amount: 8 tablets 12 tablet 0   • pantoprazole (PROTONIX) 20 mg tablet Take 1 tablet (20 mg total) by mouth daily 90 tablet 3   • senna (SENOKOT) 8 6 mg Take 1 tablet (8 6 mg total) by mouth daily Do not start before May 25, 2023  30 tablet 0   • oxybutynin (DITROPAN-XL) 5 mg 24 hr tablet Take 1 tablet (5 mg total) by mouth 3 (three) times a day as needed (as needed for bladder spasms) (Patient not taking: Reported on 2023) 30 tablet 0     No current facility-administered medications for this visit  ALLERGIES:   No Known Allergies    SOCIAL HISTORY:     Social History     Socioeconomic History   • Marital status: /Civil Union     Spouse name: None   • Number of children: None   • Years of education: None   • Highest education level: None   Occupational History   • None   Tobacco Use   • Smoking status: Former     Packs/day: 0 50     Types: Cigarettes     Quit date: 2022     Years since quittin 4   • Smokeless tobacco: Never   Vaping Use   • Vaping Use: Never used   Substance and Sexual Activity   • Alcohol use:  Yes     Alcohol/week: 16 0 standard drinks of alcohol     Types: 2 Cans of beer, 14 Standard drinks or equivalent per week     Comment: 6-8 /week-   last drank    • Drug use: No   • Sexual activity: None   Other Topics Concern   • None   Social History Narrative    EMPLOYED         Social Determinants of Health     Financial Resource Strain: Not on file   Food Insecurity: Not on file   Transportation Needs: Not on file   Physical Activity: Not on file   Stress: Not on file   Social Connections: Not on file   Intimate Partner Violence: Not on file   Housing Stability: Not on file       SOCIAL HISTORY:     Family History   Problem Relation Age of "Onset   • No Known Problems Mother    • No Known Problems Father        REVIEW OF SYSTEMS:     Review of Systems   Constitutional: Negative  HENT: Positive for dental problem  Respiratory: Negative  Cardiovascular: Negative  Gastrointestinal: Negative  Genitourinary: Negative  Musculoskeletal: Negative  Skin: Negative  Psychiatric/Behavioral: Negative  PHYSICAL EXAM:     /78 (BP Location: Left arm, Patient Position: Sitting, Cuff Size: Adult)   Pulse 80   Ht 5' 4\" (1 626 m)   Wt 65 3 kg (144 lb)   BMI 24 72 kg/m²     Physical Exam  Vitals reviewed  HENT:      Head: Normocephalic  Nose: Nose normal       Mouth/Throat:      Mouth: Mucous membranes are moist    Cardiovascular:      Rate and Rhythm: Normal rate  Pulmonary:      Effort: Pulmonary effort is normal    Abdominal:      General: Abdomen is flat  Comments: Hernia incision healed, healing prostatectomy incisions   Genitourinary:     Comments: Urine clear  Musculoskeletal:         General: Normal range of motion  Cervical back: Normal range of motion  Skin:     General: Skin is warm  Neurological:      General: No focal deficit present  Mental Status: He is alert  Psychiatric:         Mood and Affect: Mood normal          LABS:     CBC:   Lab Results   Component Value Date    HCT 33 9 (L) 2023    HGB 10 8 (L) 2023    MCV 88 2023     2023    WBC 11 04 (H) 2023       BMP:   Lab Results   Component Value Date    BUN 9 2023    CALCIUM 8 6 2023     2023    CO2 27 2023    CREATININE 0 67 2023    K 4 4 2023         IMAGIN/28/23  ADDENDUM:     After the initial interpretation of the examination, further clinical history was provided including that of a lymph node dissection procedure and some extra surgical manipulation to control bleeding during the procedure   This extra surgical intervention   above and beyond " standard TURP likely accounts for the presence of the soft tissue gas  Infection is therefore less likely  The patient is reportedly not unstable or septic appearing on examination by the ER physician  Addended by Elvis Sheldon MD on 5/28/2023  5:16 PM     Study Result    Narrative & Impression   CT ABDOMEN AND PELVIS WITH IV CONTRAST     INDICATION:   hematuria s/p TURP      COMPARISON: Prostate MRI from 12/28/2022     TECHNIQUE:  CT examination of the abdomen and pelvis was performed  Multiplanar 2D reformatted images were created from the source data      This examination, like all CT scans performed in the [facility], was performed utilizing techniques to minimize radiation dose exposure, including the use of iterative reconstruction and automated exposure control  Radiation dose length   product (DLP) for this visit:  532 mGy-cm     IV Contrast:  100 mL of iohexol (OMNIPAQUE)  Enteric Contrast:  Enteric contrast was not administered      FINDINGS:     ABDOMEN     LOWER CHEST: There is mild dependent atelectasis in the lung bases  Trace amount of pleural effusion on the right and perhaps also on the left  There is calcification at the aortic valve  There is gas in the wall of the chest     LIVER/BILIARY TREE:  Unremarkable      GALLBLADDER:  No calcified gallstones  No pericholecystic inflammatory change      SPLEEN: Spleen appears within normal limits  There appears to be a small accessory splenule near the tip of the pancreatic tail      PANCREAS:  Unremarkable      ADRENAL GLANDS:  Unremarkable      KIDNEYS/URETERS: There are numerous bilateral benign-appearing renal cysts  There is no hydronephrosis or kidney stone on either side   There is no perinephric fluid         STOMACH AND BOWEL:  Unremarkable      APPENDIX: A normal appendix was visualized      ABDOMINOPELVIC CAVITY: There is a moderate sized hematoma in the space of Retzius as evidenced by slightly lobulated moderate to high density collection measuring 4 4 x 9 9 cm on image 139 series 2 with internal attenuation 56 Hounsfield units  Surrounding this area there is fatty stranding and perhaps small amount of wispy low-density fluid which could be some edema  There seems to be a small amount of blood just inferior to the tip of the cecum as well most evident on image 130 series 2  This   would suggest that the hematoma has traversed the tissue planes from the extraperitoneal compartment to the intraperitoneal compartment  I do not see any blush to suggest active arterial hemorrhage  There is no free air seen within the abdominal cavity      VESSELS:  Unremarkable for patient's age      PELVIS     REPRODUCTIVE ORGANS: Prostate resection has been performed  The prostate bed does not appear to have significant hematoma or other worrisome collection  The penis is unremarkable      There is gas in the scrotum and tracking around the inguinal canals bilaterally and into the soft tissues of the groin and also into the perineal region and the thighs and the lower abdominal wall and upwards to the chest wall, all predominantly along   the interface between the subcutaneous fat and the muscle layer, but also tracking along the intermuscular planes of the left flank  The amount of gas in the soft tissues is unexpected and I would recommend correlating for any evidence of infection      URINARY BLADDER: There is a Carr catheter within the bladder  There appears to be some enhancement of the bladder wall where the tip of the catheter touches the wall  The bladder wall also appears slightly thickened in that region  There is air in the   bladder lumen as well as some urine  There does not seem to be any significant high density fluid in the bladder lumen      ABDOMINAL WALL/INGUINAL REGIONS: As above, moderately extensive soft tissue gas   There is also some edema noted in the anterior abdominal wall midline just above the umbilicus and there is a fat-containing umbilical hernia      OSSEOUS STRUCTURES:  No acute fracture or destructive osseous lesion      IMPRESSION:     Moderate sized hematoma in the space of Retzius anterior to the bladder, but also extending probably slightly into the intraperitoneal compartment with some blood seen just inferior to the tip of the cecum      Fairly extensive soft tissue gas in the scrotum and perineal area and thighs and abdominal wall and chest wall  This is more extensive than expected for surgery 6 days ago and is concerning for possible infection  Correlate for any signs or symptoms of   Hossein's gangrene      Carr catheter seems satisfactory in position although the tip of the catheter impinges on the bladder wall and that area of the bladder wall appears slightly thickened and enhanced      The prostate resection bed seems grossly satisfactory      Small fat-containing umbilical hernia and some edema of the abdominal wall nearby      Trace pleural effusions and dependent atelectasis      Numerous renal cysts  Aortic valvular calcification  PATHOLOGY:     2/28/23  Final Diagnosis   A  Prostate, region of interest #1, needle biopsy:  - Prostatic adenocarcinoma, acinar, not otherwise specified; Jihan grade 4 +3= score of 7 (grade group 3) in 4 of 5 cores involving 20% of needle core tissue and measuring up to 5 mm in length (score 4=50-60%)  - Periprostatic fat invasion: Not identified   - Seminal vesicle invasion: Not identified  - Lymph-vascular invasion: Not identified   - Perineural invasion: Not identified   - Additional Pathologic Findings:  None      B  Prostate, right lateral and medial base, needle biopsy:  - Benign prostate tissue, negative for malignancy      C  Prostate, right mid lateral, needle biopsy:  - Benign prostate tissue, negative for malignancy      D  Prostate, right mid medial, needle biopsy:  - Benign prostate tissue, negative for malignancy      E    Prostate, right lateral apex, needle biopsy:   - Benign prostate tissue, negative for malignancy      F  Prostate, right medial apex, needle biopsy:  - Benign prostate tissue, negative for malignancy      G  Prostate, left lateral base, needle biopsy:  - Prostatic adenocarcinoma, acinar, not otherwise specified; [place] grade 3 +3= score of 6 (grade group 1) in 1 of 1 cores involving 10% of needle core tissue and measuring 2 mm in length  - Periprostatic fat invasion: Not identified   - Seminal vesicle invasion: Not identified  - Lymph-vascular invasion: Not identified   - Perineural invasion: Not identified   - Additional Pathologic Findings:  None      H  Prostate, left medial base, needle biopsy:  - Prostatic adenocarcinoma, acinar, not otherwise specified; [place] grade 3 +4= score of 7 (grade group 2) in 1 of 1 cores involving 50% of needle core tissue and measuring 8 mm in length (score 4 = 5-10%)  - Periprostatic fat invasion: Not identified   - Seminal vesicle invasion: Not identified  - Lymph-vascular invasion: Not identified   - Perineural invasion: Present   - Additional Pathologic Findings:  None      I  Prostate, left mid lateral, needle biopsy:  - Prostatic adenocarcinoma, acinar, not otherwise specified; Jihan grade 3 +4= score of 7 (grade group 2) in 1 of 1 cores involving 40-50% of needle core tissue and measuring 5 mm in length (score 4 = 5-10%)  - Periprostatic fat invasion: Not identified   - Seminal vesicle invasion: Not identified  - Lymph-vascular invasion: Not identified   - Perineural invasion: Not identified   - Additional Pathologic Findings:  High-grade prostatic intraepithelial neoplasia (HGPIN)      J   Prostate, left mid medial, needle biopsy:  - Prostatic adenocarcinoma, acinar, not otherwise specified; Jihan grade 3 +4= score of 7 (grade group 2) in 1 of 1 cores involving 40-50% of needle core tissue and measuring 7 mm in length (score 4 = 5-10%)    - Periprostatic fat invasion: Not "identified   - Seminal vesicle invasion: Not identified  - Lymph-vascular invasion: Not identified   - Perineural invasion: Not identified   - Additional Pathologic Findings:  High-grade prostatic intraepithelial neoplasia (HGPIN)      K  Prostate, left lateral apex, needle biopsy:  -Focal atypical small acinar proliferation, cannot exclude limited low-grade carcinoma      L  Prostate, left medial apex, needle biopsy:  - Benign prostate tissue, negative for malignancy  NOMOGRAM:       PROSTATECTOMY PATHOLOGY:     5/22/23  Final Diagnosis   A  Prostate and seminal vesicles; prostatectomy:       - Prostatic acinar adenocarcinoma, [place] Score 3 + 4 = 7, Grade group 2 (5% pattern 4), see note       - Estimated percentage of prostate involved by carcinoma: 6 - 10%      - Extraprostatic extension (EPE): Not identified      - Urinary bladder neck invasion: Not identified      - Seminal vesicle invasion: Not identified      - Lymphovascular invasion: Not identified      - Perineural invasion: Present      - All margins negative for carcinoma      - Additional findings: High-grade prostatic intraepithelial neoplasia (PIN) and nodular prostatic hyperplasia       - See synoptic report     B  Fibroadipose tissue, \"periprostatic fat and lymph nodes\"; excision:       - Benign fibroadipose tissue with no significant histopathologic abnormalities       - No lymph node tissue identified       - Negative for malignancy      C  Lymph nodes (6), \"pelvic lymph nodes\"; dissection:       - Six lymph nodes, negative for malignancy (0/6)     D  Prostatic tissue, \"left anterior margin of prostate\"; biopsy:       - Benign prostatic tissue       - Negative for malignancy       ASSESSMENT:     57 y o  male  with [id number] (Jihan 3+4=7) negative surgical margins    PLAN:     Pathology review, PSA 3mos  PFPT and continence rehab    "

## 2023-06-25 NOTE — PROGRESS NOTES
"PT Evaluation     Today's date: 2023  Patient name: Cinthia Fay  : 1960  MRN: [id number]  Referring provider: Aleisha Hagen, *  Dx:   Encounter Diagnosis     ICD-10-CM    1  Prostate cancer ([facility] )  C61       2  Stress incontinence  N39 3           Start Time: 1700  Stop Time: 3890  Total time in clinic (min): 55 minutes    Assessment  Assessment details: Nafisa Mcclain is a 57year old male s/p prostatectomy who presents with pelvic floor muscle weakness  These impairments may contribute to current urinary symptoms  Time spent in patient education regarding toileting body mechanics, PFM anatomy, bladder health  Patient could benefit from a trial of PFPT to address presenting impairments and functional limitations and improve overall quality of life  HEP instruction this date  Verbalized and demonstrated understanding  Written handouts provided  Impairments: abnormal or restricted ROM, activity intolerance, impaired physical strength and lacks appropriate home exercise program  Understanding of Dx/Px/POC: good   Prognosis: good    Goals  ST  Patient will demonstrate independence in an ongoing home exercise program that will be ongoing throughout episode of care  2  Patient will complete a two day bladder diary within two weeks  3   Patient will demonstrate proper posture for best voiding within two weeks  LT  Patient will demonstrate use of a functional PFM contraction by performing a pre-contraction (\"knack\") to reduce UI   2   Patient will perform 30 minutes of exercise without urinary leakage  3   Patient will have increased time of 2 hours between voids for increased participation in social and recreational activities within twelve weeks  4   Increased PFM strength to  3/5 at 7 second hold for 10 repetitions within 12 weeks  5  Decrease nocturia to 1 time per night  6  Decrease use of bladder protection by 50% or greater      Plan  Patient would benefit from: skilled physical " therapy  Planned therapy interventions: manual therapy, motor coordination training, patient education, behavior modification, therapeutic activities, therapeutic exercise and home exercise program  Frequency: 1x week  Duration in weeks: 10  Treatment plan discussed with: patient        PT Pelvic Floor Subjective:   History of Present Illness:   5/22/23 prostatectomy  Catheter removed at 17 days 6/8/23  Notes occasional bladder spasm 2-3 times per day but lessening  Otherwise getting better  Walking daily  No other exercise  Employed as  and wishes to RTW  Notes urinary frequency of every 1-1 5 hours and up to 4 times per night  Using 4-5 bladder protective pads in 24 hours  Notes leakage not consistent but seems to be lessening  Quality of life: good    Social Support:     Lives with:  Spouse (child)    Relationship status: /committed    Work status: employed full time ()  Diet and Exercise:      Exercise type: walking    Exercise frequency: daily  Bladder Function:     Voiding Difficulties positive for: urgency and frequent urination      Voiding Difficulties comments:     Voiding frequency: every 1-2 hours    Urinary leakage: urine leakage    Urinary leakage aggravated by: standing up, walking to the bathroom and unknown    Nocturia (episodes per night): 4    Fluid Intake Type: Water, sports drinks and coffee    Intake (ounces): Intake (ounces) comment: 2 cups coffee 20 ounces  Water/gatorade 32ounces  Bowel Function:     Bowel Function comments:  Denies bowel issue    Bowel frequency: daily  Sexual Function:     Sexually Active:  Not sexually active (history ED)  Pain:     No pain reported by patient    Patient Goals:     Patient goals for therapy:  Return to work, improved quality of life and improved bladder or bowel function      Objective     Postural Observations  Seated posture: fair  Standing posture: fair        Neurological Testing     Sensation     Lumbar   Left "  Intact: light touch    Right   Intact: light touch    Active Range of Motion     Lumbar   Flexion:  Restriction level: moderate  Extension:  Restriction level: moderate  Left lateral flexion:  Restriction level: minimal  Right lateral flexion:  Restriction level: minimal    General Comments:      Hip Comments   General tightness with imited flexibility hip musculature  Strength gross 4-/5  Pelvic Floor Exam   Abdominal assessment: Deferred direct PFM examination   PFM activation appreciated at ischial tuberosity  Denies perirenal tenderness      Pelvic Floor Muscle Exam       PERFECT Score   Power right: 3/5  Power left: 3/5  Endurance (seconds to max): 5  Repetitions (before fatigue): 5  Fast flicks (in 10 seconds): 5               Precautions: 5/22/23 prostate surgery, HTN, CAD      Manuals 6/26                                                                Neuro Re-Ed                                       Ther Ex             endurance 5\"/5\"/5x HEP                         Quick contraction 1'/10x  HEP                         Ball with Kegel 3\"/10x  HEP            TB with ER BTB  10  HEP            TB with ER with Kegel nv                         Ther Activity             Bladder health HO            Post prostatectomy ed PP            Gait Training                                       Modalities                                          "

## 2023-06-26 ENCOUNTER — EVALUATION (OUTPATIENT)
Dept: PHYSICAL THERAPY | Age: 63
End: 2023-06-26
Payer: COMMERCIAL

## 2023-06-26 DIAGNOSIS — N39.3 STRESS INCONTINENCE: ICD-10-CM

## 2023-06-26 DIAGNOSIS — C61 PROSTATE CANCER (HCC): Primary | ICD-10-CM

## 2023-06-26 PROCEDURE — 97161 PT EVAL LOW COMPLEX 20 MIN: CPT | Performed by: PHYSICAL THERAPIST

## 2023-06-26 PROCEDURE — 97110 THERAPEUTIC EXERCISES: CPT | Performed by: PHYSICAL THERAPIST

## 2023-07-02 NOTE — PROGRESS NOTES
Daily Note     Today's date: 7/3/2023  Patient name: Maycol Colon  : 1960  MRN: [id number]  Referring provider: Hayder Mackenzie, *  Dx:   Encounter Diagnosis     ICD-10-CM    1. Prostate cancer ([address])  C61       2. Stress incontinence  N39.3                      Subjective: Notes has been doing exercises in the morning. Notes no real change in urinary symptoms. Notes one liner per day. No pad at night and remains dry. Notes he has been doing more at home around the house and yard work. Notes mild low back pain with increased activity. Objective: See treatment diary below      Assessment: Tolerated treatment well. Patient would benefit from continued PT Verbal cues for breath sequencing and exercise technique. Decreased use of bladder protective pads. Increased exercise with good tolerance. Plan: Continue per plan of care.       Precautions: 23 prostate surgery, HTN, CAD      Manuals 6/26 7/3                                                               Neuro Re-Ed                                       Ther Ex             endurance 5"/5"/5x HEP 5"/5"/  10x           LE stretch  10'           Quick contraction 1'/10x  HEP 1"/  2x10                        Ball with Kegel 3"/10x  HEP 3"/  2x10           TB with ER BTB  10  HEP 10x           TB with ER with Kegel nv 10x  HEP                        Segmental bridge  10x  HEP           clam  10x  HEP           Ball heel slides  10x           Core ball press  5"/10x                        Sit to stand Kegel                                                    Ther Activity             Bladder health HO            Post prostatectomy ed PP            Gait Training                                       Modalities

## 2023-07-03 ENCOUNTER — OFFICE VISIT (OUTPATIENT)
Dept: PHYSICAL THERAPY | Age: 63
End: 2023-07-03
Payer: COMMERCIAL

## 2023-07-03 DIAGNOSIS — C61 PROSTATE CANCER (HCC): Primary | ICD-10-CM

## 2023-07-03 DIAGNOSIS — N39.3 STRESS INCONTINENCE: ICD-10-CM

## 2023-07-03 PROCEDURE — 97110 THERAPEUTIC EXERCISES: CPT | Performed by: PHYSICAL THERAPIST

## 2023-07-10 ENCOUNTER — TELEPHONE (OUTPATIENT)
Dept: UROLOGY | Facility: AMBULATORY SURGERY CENTER | Age: 63
End: 2023-07-10

## 2023-07-10 NOTE — TELEPHONE ENCOUNTER
Received incoming call from patient who is asking when he can return to work. Patient works as a . Today, July 10th is 7 weeks post op . Patient able to return to light duty this week and then return next week full time without restrictions.

## 2023-07-10 NOTE — TELEPHONE ENCOUNTER
Patient calling in requesting to speak with clinical staff regarding his release date back to work. Patient had surgical procedure with SERINA on 5/22/23. Call was transferred to office nurse.

## 2023-07-17 ENCOUNTER — OFFICE VISIT (OUTPATIENT)
Dept: PHYSICAL THERAPY | Age: 63
End: 2023-07-17
Payer: COMMERCIAL

## 2023-07-17 DIAGNOSIS — N39.3 STRESS INCONTINENCE: ICD-10-CM

## 2023-07-17 DIAGNOSIS — C61 PROSTATE CANCER (HCC): Primary | ICD-10-CM

## 2023-07-17 PROCEDURE — 97110 THERAPEUTIC EXERCISES: CPT | Performed by: PHYSICAL THERAPIST

## 2023-07-17 NOTE — PROGRESS NOTES
Daily Note     Today's date: 2023  Patient name: Santana Cleveland  : 1960  MRN: [id number]  Referring provider: Brandi Díaz, *  Dx:   Encounter Diagnosis     ICD-10-CM    1. Prostate cancer ([address])  C61       2. Stress incontinence  N39.3                      Subjective: Patient  Denies pain. Notes independent in home exercises. No urinary leakage for over the past week. No longer wearing pads. Returned to work today with 20# lift restriction. Reported fatigue       Objective: See treatment diary below      Assessment: Tolerated treatment well. Patient exhibited good technique with therapeutic exercises Patient relates he feels comfortable with stopping formal PT and continuing with an ongoing HEP. Reviewed HEP with patient. At this time, patient has achieved their maximum functional benefit from skilled physical therapy services and will be discharged to their HEP. Patient is in agreement with the plan of care. As a result, patient is discharged from physical therapy. PT goals met. Reviewed lift and breathing technique to best protect pelvic floor. Encouraged adhering too lift restriction at work.       Plan: Discharge PT     Precautions: 23 prostate surgery, HTN, CAD      Manuals 6/26 7/3 7/17                                                              Neuro Re-Ed                                       Ther Ex             endurance 5"/5"/5x HEP 5"/5"/  10x 10x          LE stretch  10' 10'          Quick contraction 1'/10x  HEP 1"/  2x10 2x10                       Ball with Kegel 3"/10x  HEP 3"/  2x10 3"/  2x10          TB with ER BTB  10  HEP 10x 10x          TB with ER with Kegel nv 10x  HEP 10x                       Segmental bridge  10x  HEP 10x          clam  10x  HEP 10x          Ball heel slides  10x 10x          Core ball press  5"/10x 10x                       Sit to stand Kegel   10x  HEP                                                 Ther Activity             Bladder health HO            Post prostatectomy ed PP            Gait Training                                       Modalities

## 2023-07-24 ENCOUNTER — APPOINTMENT (OUTPATIENT)
Dept: PHYSICAL THERAPY | Age: 63
End: 2023-07-24
Payer: COMMERCIAL

## 2023-07-31 ENCOUNTER — APPOINTMENT (OUTPATIENT)
Dept: PHYSICAL THERAPY | Age: 63
End: 2023-07-31
Payer: COMMERCIAL

## 2023-08-08 ENCOUNTER — OFFICE VISIT (OUTPATIENT)
Dept: CARDIOLOGY CLINIC | Facility: CLINIC | Age: 63
End: 2023-08-08
Payer: COMMERCIAL

## 2023-08-08 VITALS
HEART RATE: 82 BPM | BODY MASS INDEX: 25.61 KG/M2 | DIASTOLIC BLOOD PRESSURE: 80 MMHG | WEIGHT: 150 LBS | SYSTOLIC BLOOD PRESSURE: 140 MMHG | HEIGHT: 64 IN

## 2023-08-08 DIAGNOSIS — E78.00 HYPERCHOLESTEROLEMIA: ICD-10-CM

## 2023-08-08 DIAGNOSIS — I35.0 NONRHEUMATIC AORTIC VALVE STENOSIS: Primary | ICD-10-CM

## 2023-08-08 PROCEDURE — 99214 OFFICE O/P EST MOD 30 MIN: CPT | Performed by: INTERNAL MEDICINE

## 2023-08-08 NOTE — ASSESSMENT & PLAN NOTE
More than 1 year post coronary stenting. No recurrence of symptoms. Okay to discontinue Plavix at this point.

## 2023-08-08 NOTE — PROGRESS NOTES
Patient ID: Leandra Hogue is a 57 y.o. male. Plan:      Coronary artery disease involving native coronary artery of native heart without angina pectoris  More than 1 year post coronary stenting. No recurrence of symptoms. Okay to discontinue Plavix at this point. Hypercholesterolemia  Tolerating statin therapy and this should be continued. Aortic valve stenosis  Moderate by exam.  This will be reassessed by echo and exam in 1 year. Follow up Plan/Other summary comments:  1 year return visit preceded by an echocardiogram unless there are interval symptoms. HPI: Patient seen in follow-up today regarding the above. Since last visit he had prostate surgery for low-grade cancer. He has had no chest pain. There is mild stable exertional dyspnea. He continues to work vigorously. No syncope or near syncope. Because of exertional dyspnea as well  as persistent sense of chest pain he underwent heart catheterization on 07/28/2022. There was a borderline stenosis of the proximal LAD and more significant stenosis of the 1st diagonal.  Both vessels were stented. He did not really feel very different when compared to prior. Most recent or relevant cardiac/vascular testing:     TTE 06/30/2022:  Normal LV systolic function. Mild aortic stenosis. Past Surgical History:   Procedure Laterality Date   • CARDIAC CATHETERIZATION Left 7/28/2022    Procedure: Cardiac catheterization-left heart catheterization;  Surgeon: Elsa Soto MD;  Location: [facility]; Service: Cardiology   • CARDIAC CATHETERIZATION N/A 7/28/2022    Procedure: Cardiac pci;  Surgeon: Elsa Soto MD;  Location: [facility];   Service: Cardiology   • HERNIA REPAIR     • WY LAPS SURG [id number] RPBIC RAD W/NRV SPARING ROBOT N/A 5/22/2023    Procedure: PROSTATECTOMY RADICAL & PELVIC LYMPH NODE DISSECTION  W/ ROBOT;  Surgeon: Heather Hardy MD;  Location: [facility];  Service: Urology   • [place] PROSTATE NEEDLE BIOPSY ANY APPROACH N/A 2/28/2023    Procedure: TRANSRECTAL MRI FUSION BIOPSY PROSTATE;  Surgeon: Sarah Almaraz MD;  Location: [facility];  Service: Urology       Lipid Profile:   Lab Results   Component Value Date    TRIG 117 07/18/2022    HDL 28 (L) 07/18/2022         Review of Systems   10  point ROS  was otherwise non pertinent or negative except as per HPI or as below. Gait: Normal.        Objective:     /80   Pulse 82   Ht 5' 4" (1.626 m)   Wt 68 kg (150 lb)   BMI 25.75 kg/m²     PHYSICAL EXAM:    General:  Normal appearance in no distress. Eyes:  Anicteric. Oral mucosa:  Moist.  Neck:  No JVD. Carotid upstrokes are brisk with transmitted murmur. No masses. Chest:  Clear to auscultation. Cardiac:  No palpable PMI. Normal S1 and S2.  Grade 2-3 systolic murmur loudest at the base. No gallop or rub. Abdomen:  Soft and nontender. No palpable organomegaly or aortic enlargement. Extremities:  No peripheral edema. Musculoskeletal:  Symmetric. Vascular:  Femoral pulses are brisk without bruits. Popliteal pulses are intact bilaterally. Pedal pulses are intact. Neuro:  Grossly symmetric. Psych:  Alert and oriented x3.         Current Outpatient Medications:   •  aspirin 81 mg chewable tablet, Chew 81 mg daily, Disp: , Rfl:   •  atorvastatin (LIPITOR) 20 mg tablet, take 1 tablet by mouth once daily, Disp: 30 tablet, Rfl: 5  •  lisinopril (ZESTRIL) 20 mg tablet, take 1 tablet by mouth once daily, Disp: 90 tablet, Rfl: 1  •  oxyCODONE-acetaminophen (Percocet) 5-325 mg per tablet, Take 1-2 tablets by mouth every 6 (six) hours as needed for moderate pain for up to 12 doses Max Daily Amount: 8 tablets, Disp: 12 tablet, Rfl: 0  •  senna (SENOKOT) 8.6 mg, Take 1 tablet (8.6 mg total) by mouth daily Do not start before May 25, 2023., Disp: 30 tablet, Rfl: 0  No Known Allergies  Past Medical History:   Diagnosis Date   • Benign essential hypertension 05/08/2014   • CAD (coronary artery disease)     s/p NAVA prox LAD and D1 2022   • Cancer [facility])     Prostate   • Coronary artery disease involving native coronary artery of native heart without angina pectoris 2022   • GERD (gastroesophageal reflux disease)    • Hyperlipidemia    • Hypertension    • Other chest pain    • Palpitations    • Prostate cancer ([address] [facility]) 2023           Social History     Tobacco Use   Smoking Status Former   • Packs/day: 0.50   • Types: Cigarettes   • Quit date: 2022   • Years since quittin.6   Smokeless Tobacco Never

## 2023-08-28 ENCOUNTER — TELEPHONE (OUTPATIENT)
Dept: UROLOGY | Facility: CLINIC | Age: 63
End: 2023-08-28

## 2023-08-28 NOTE — TELEPHONE ENCOUNTER
Called patient and left a VM. I informed the patient that his apt on 9/8/23 at 3:15 with Dr. Alysa Dukes needs to be rescheduled due to the provider not being in the office. Informed pt to call our office back to reschedule. none

## 2023-08-30 ENCOUNTER — HOSPITAL ENCOUNTER (EMERGENCY)
Facility: HOSPITAL | Age: 63
Discharge: HOME | End: 2023-08-30
Attending: EMERGENCY MEDICINE | Admitting: EMERGENCY MEDICINE
Payer: OTHER MISCELLANEOUS

## 2023-08-30 ENCOUNTER — APPOINTMENT (EMERGENCY)
Dept: RADIOLOGY | Facility: HOSPITAL | Age: 63
End: 2023-08-30
Payer: OTHER MISCELLANEOUS

## 2023-08-30 VITALS
BODY MASS INDEX: 25.61 KG/M2 | HEART RATE: 65 BPM | TEMPERATURE: 98.2 F | DIASTOLIC BLOOD PRESSURE: 79 MMHG | SYSTOLIC BLOOD PRESSURE: 148 MMHG | RESPIRATION RATE: 22 BRPM | WEIGHT: 150 LBS | HEIGHT: 64 IN | OXYGEN SATURATION: 94 %

## 2023-08-30 DIAGNOSIS — S61.419A HAND LACERATION: Primary | ICD-10-CM

## 2023-08-30 DIAGNOSIS — S60.559A FOREIGN BODY IN HAND: ICD-10-CM

## 2023-08-30 DIAGNOSIS — Z23 NEED FOR TDAP VACCINATION: ICD-10-CM

## 2023-08-30 LAB
ATRIAL RATE: 69 BPM
GLUCOSE SERPL-MCNC: 128 MG/DL (ref 65–140)
P AXIS: 56 DEGREES
PR INTERVAL: 174 MS
QRS AXIS: -37 DEGREES
QRSD INTERVAL: 88 MS
QT INTERVAL: 400 MS
QTC INTERVAL: 428 MS
T WAVE AXIS: 24 DEGREES
VENTRICULAR RATE: 69 BPM

## 2023-08-30 PROCEDURE — 93005 ELECTROCARDIOGRAM TRACING: CPT

## 2023-08-30 PROCEDURE — 99284 EMERGENCY DEPT VISIT MOD MDM: CPT

## 2023-08-30 PROCEDURE — 12001 RPR S/N/AX/GEN/TRNK 2.5CM/<: CPT

## 2023-08-30 PROCEDURE — 90471 IMMUNIZATION ADMIN: CPT

## 2023-08-30 PROCEDURE — 82948 REAGENT STRIP/BLOOD GLUCOSE: CPT

## 2023-08-30 PROCEDURE — 90715 TDAP VACCINE 7 YRS/> IM: CPT

## 2023-08-30 PROCEDURE — 73130 X-RAY EXAM OF HAND: CPT

## 2023-08-30 RX ORDER — CEPHALEXIN 500 MG/1
500 CAPSULE ORAL ONCE
Status: COMPLETED | OUTPATIENT
Start: 2023-08-30 | End: 2023-08-30

## 2023-08-30 RX ORDER — LIDOCAINE HYDROCHLORIDE 10 MG/ML
5 INJECTION, SOLUTION EPIDURAL; INFILTRATION; INTRACAUDAL; PERINEURAL ONCE
Status: COMPLETED | OUTPATIENT
Start: 2023-08-30 | End: 2023-08-30

## 2023-08-30 RX ORDER — CEPHALEXIN 500 MG/1
500 CAPSULE ORAL EVERY 6 HOURS SCHEDULED
Qty: 28 CAPSULE | Refills: 0 | Status: SHIPPED | OUTPATIENT
Start: 2023-08-30 | End: 2023-09-06

## 2023-08-30 RX ORDER — BACITRACIN, NEOMYCIN, POLYMYXIN B 400; 3.5; 5 [USP'U]/G; MG/G; [USP'U]/G
1 OINTMENT TOPICAL ONCE
Status: COMPLETED | OUTPATIENT
Start: 2023-08-30 | End: 2023-08-30

## 2023-08-30 RX ADMIN — CEPHALEXIN 500 MG: 500 CAPSULE ORAL at 12:09

## 2023-08-30 RX ADMIN — BACITRACIN ZINC, NEOMYCIN, POLYMYXIN B 1 SMALL APPLICATION: 400; 3.5; 5 OINTMENT TOPICAL at 11:31

## 2023-08-30 RX ADMIN — TETANUS TOXOID, REDUCED DIPHTHERIA TOXOID AND ACELLULAR PERTUSSIS VACCINE, ADSORBED 0.5 ML: 5; 2.5; 8; 8; 2.5 SUSPENSION INTRAMUSCULAR at 11:31

## 2023-08-30 RX ADMIN — LIDOCAINE HYDROCHLORIDE 5 ML: 10 INJECTION, SOLUTION EPIDURAL; INFILTRATION; INTRACAUDAL; PERINEURAL at 12:06

## 2023-08-30 NOTE — DISCHARGE INSTRUCTIONS
Please try not to get the area wet for approximately 24 hours. When cleaning the area after that you can use soap and water just please do not scrub it clean and do not scrub it dry. Please pat while drying. Please have sutures removed in approximately 7 days. Follow up with Hand Surgery for the foreign body in your hand. Antibiotics were sent to your pharmacy for the foreign body. Return for redness and swelling.

## 2023-08-30 NOTE — ED PROVIDER NOTES
History  Chief Complaint   Patient presents with   • Hand Laceration     Patient injured left hand on air chisel. Unknown on tetanus up to date. Bleeding controlled  / takes aspirin     Patient is a 58-year-old male with past medical history of CAD arriving for evaluation of left hand injury. Patient reports that he was using an air chisel when he accidentally struck his left hand between his thumb and index metacarpals. Patient has full ROM of his thumb and index finger. Patient unsure of last tetanus vaccine. Patient denies ac/ap. Confirmed "air chisel" does not inject air, only uses an air mechanism to power tool. Patient has full ROM of his CMC, MCP, IP, when isolated and in conjunction with each other. Patient has full ROM of index finger of his MCP, PIP, and DIP isolated and in conjunction with each other. Patient arrives with 2 cm laceration to first interdigital space, dorsal aspect. Prior to Admission Medications   Prescriptions Last Dose Informant Patient Reported? Taking?   aspirin 81 mg chewable tablet  Self Yes Yes   Sig: Chew 81 mg daily   atorvastatin (LIPITOR) 20 mg tablet  Self No Yes   Sig: take 1 tablet by mouth once daily   lisinopril (ZESTRIL) 20 mg tablet  Self No Yes   Sig: take 1 tablet by mouth once daily   oxyCODONE-acetaminophen (Percocet) 5-325 mg per tablet Not Taking Self No No   Sig: Take 1-2 tablets by mouth every 6 (six) hours as needed for moderate pain for up to 12 doses Max Daily Amount: 8 tablets   Patient not taking: Reported on 8/30/2023   senna (SENOKOT) 8.6 mg Not Taking Self No No   Sig: Take 1 tablet (8.6 mg total) by mouth daily Do not start before May 25, 2023.    Patient not taking: Reported on 8/30/2023      Facility-Administered Medications: None       Past Medical History:   Diagnosis Date   • Benign essential hypertension 05/08/2014   • CAD (coronary artery disease)     s/p NAVA prox LAD and D1 7/28/2022   • Cancer [facility])     Prostate   • Coronary artery disease involving native coronary artery of native heart without angina pectoris 2022   • GERD (gastroesophageal reflux disease)    • Hyperlipidemia    • Hypertension    • Other chest pain    • Palpitations    • Prostate cancer ([address]) 2023       Past Surgical History:   Procedure Laterality Date   • CARDIAC CATHETERIZATION Left 2022    Procedure: Cardiac catheterization-left heart catheterization;  Surgeon: Ariane Carmichael MD;  [facility]; Service: Cardiology   • CARDIAC CATHETERIZATION N/A 2022    Procedure: Cardiac pci;  Surgeon: Ariane Carmichael MD;  [facility]; Service: Cardiology   • HERNIA REPAIR     • KS LAPS SURG [id number] RPBIC RAD W/NRV SPARING ROBOT N/A 2023    Procedure: PROSTATECTOMY RADICAL & PELVIC LYMPH NODE DISSECTION  W/ ROBOT;  Surgeon: Ayaz Padron MD;  Location: [facility];  Service: Urology   • KS PROSTATE NEEDLE BIOPSY ANY APPROACH N/A 2023    Procedure: TRANSRECTAL MRI FUSION BIOPSY PROSTATE;  Surgeon: Tian Laird MD;  Location: [facility];  Service: Urology       Family History   Problem Relation Age of Onset   • No Known Problems Mother    • No Known Problems Father      I have reviewed and agree with the history as documented. E-Cigarette/Vaping   • E-Cigarette Use Never User      E-Cigarette/Vaping Substances   • Nicotine No    • THC No    • CBD No    • Flavoring No    • Other No    • Unknown No      Social History     Tobacco Use   • Smoking status: Former     Packs/day: 0.50     Types: Cigarettes     Quit date: 2022     Years since quittin.6   • Smokeless tobacco: Never   Vaping Use   • Vaping Use: Never used   Substance Use Topics   • Alcohol use: Yes     Alcohol/week: 16.0 standard drinks of alcohol     Types: 2 Cans of beer, 14 Standard drinks or equivalent per week     Comment: 6-8 /week-   last drank    • Drug use: No       Review of Systems   Constitutional: Negative.     HENT: Negative. Eyes: Negative. Respiratory: Negative. Cardiovascular: Negative. Gastrointestinal: Negative. Endocrine: Negative. Genitourinary: Negative. Musculoskeletal: Negative. Skin: Negative. Allergic/Immunologic: Negative. Neurological: Negative. Hematological: Negative. Psychiatric/Behavioral: Negative. All other systems reviewed and are negative. Physical Exam  Physical Exam  Vitals and nursing note reviewed. Constitutional:       General: He is not in acute distress. Appearance: Normal appearance. He is normal weight. He is not ill-appearing or toxic-appearing. HENT:      Right Ear: External ear normal.      Left Ear: External ear normal.      Nose: Nose normal.      Mouth/Throat:      Pharynx: Oropharynx is clear. Eyes:      Extraocular Movements: Extraocular movements intact. Conjunctiva/sclera: Conjunctivae normal.      Pupils: Pupils are equal, round, and reactive to light. Cardiovascular:      Rate and Rhythm: Normal rate and regular rhythm. Pulses: Normal pulses. Pulmonary:      Effort: Pulmonary effort is normal.   Abdominal:      General: Abdomen is flat. Bowel sounds are normal. There is no distension. Tenderness: There is no left CVA tenderness. Musculoskeletal:      Right wrist: Normal.      Left wrist: Normal.      Right hand: Normal.      Left hand: Laceration and tenderness present. No swelling, deformity or bony tenderness. Normal range of motion. Normal strength. Normal sensation. There is no disruption of two-point discrimination. Normal capillary refill. Normal pulse. Hands:       Cervical back: Normal range of motion and neck supple. Skin:     General: Skin is warm. Capillary Refill: Capillary refill takes less than 2 seconds. Neurological:      General: No focal deficit present. Mental Status: He is alert and oriented to person, place, and time. Mental status is at baseline.    Psychiatric: Mood and Affect: Mood normal.         Behavior: Behavior normal.         Thought Content: Thought content normal.         Judgment: Judgment normal.         Vital Signs  ED Triage Vitals [08/30/23 1113]   Temperature Pulse Respirations Blood Pressure SpO2   98.2 °F (36.8 °C) 89 18 152/75 95 %      Temp Source Heart Rate Source Patient Position - Orthostatic VS BP Location FiO2 (%)   Temporal Monitor Sitting Left arm --      Pain Score       --           Vitals:    08/30/23 1113 08/30/23 1300   BP: 152/75 148/79   Pulse: 89 65   Patient Position - Orthostatic VS: Sitting          Visual Acuity      ED Medications  Medications   tetanus-diphtheria-acellular pertussis (BOOSTRIX) IM injection 0.5 mL (0.5 mL Intramuscular Given 8/30/23 1131)   neomycin-bacitracin-polymyxin b (NEOSPORIN) ointment 1 small application (1 small application Topical Given 8/30/23 1131)   lidocaine (PF) (XYLOCAINE-MPF) 1 % injection 5 mL (5 mL Infiltration Given by Other 8/30/23 1206)   cephalexin (KEFLEX) capsule 500 mg (500 mg Oral Given 8/30/23 1209)       Diagnostic Studies  Results Reviewed     Procedure Component Value Units Date/Time    Fingerstick Glucose (POCT) [[id number]]  (Normal) Collected: 08/30/23 1221    Lab Status: Final result Updated: 08/30/23 1223     POC Glucose 128 mg/dl                  XR hand 3+ views LEFT   Final Result by Brian Asencio MD (08/30 1208)      No acute osseous abnormality. Multiple small metallic foreign bodies at the level of the fifth metacarpal medially and first interdigital space.             Workstation performed: [id number]                    Procedures  Universal Protocol:  Risks and benefits: risks, benefits and alternatives were discussed  Consent given by: patient  Patient understanding: patient states understanding of the procedure being performed  Patient consent: the patient's understanding of the procedure matches consent given  Procedure consent: procedure consent matches procedure scheduled  Required items: required blood products, implants, devices, and special equipment available  Patient identity confirmed: verbally with patient    Laceration repair    Date/Time: 8/30/2023 12:49 PM    Performed by: DULCE Hodge  Authorized by: DULCE Hodge  Body area: upper extremity  Location details: left hand  Laceration length: 2 cm  Foreign body present: foreign body visualized on xray. Tendon involvement: none  Nerve involvement: none  Vascular damage: no  Anesthesia: local infiltration    Anesthesia:  Local Anesthetic: lidocaine 1% without epinephrine  Anesthetic total: 4 mL    Wound Dehiscence:  Superficial Wound Dehiscence: simple closure      Procedure Details:  Preparation: Patient was prepped and draped in the usual sterile fashion. Irrigation solution: saline (sure cleanse)  Irrigation method: syringe  Debridement: none  Degree of undermining: none  Skin closure: Ethilon (4-0)  Number of sutures: 3  Technique: simple  Approximation: close  Approximation difficulty: simple  Patient tolerance: patient tolerated the procedure well with no immediate complications  Comments: Patient's wound was irrigated extensively, no foreign bodies were appreciated and irrigation. Discussed with patient's foreign bodies seen and aware to follow up with hand surgery.      ECG 12 Lead Documentation Only    Date/Time: 8/30/2023 12:20 PM    Performed by: DULCE Hodge  Authorized by: DULCE Hodge    Indications / Diagnosis:  Felt dizzy while irrigating wound  Patient location:  ED  Interpretation:     Interpretation: abnormal    Rate:     ECG rate:  69    ECG rate assessment: normal    Rhythm:     Rhythm: sinus rhythm    Ectopy:     Ectopy: none    QRS:     QRS axis:  Left  Conduction:     Conduction: normal    ST segments:     ST segments:  Normal  T waves:     T waves: normal                       ED Course  ED Course as of 08/30/23 1344   Wed Aug 30, 2023   1241 While irrigating patient's hand wound he became lightheaded and diaphoretic stating that he fell dizzy. Patient laid down. EKG and blood pressure check. BP stable. Patient states did not eat today either. After patient was laid down he said he felt improved. Remainder of suture repair occurred without issue. Discussed with patient that his left hand had a foreign body. Discussed that his wound was probed and irrigated extensively. Discussed that he will need to follow-up with hand surgery. SBIRT 20yo+    Flowsheet Row Most Recent Value   Initial Alcohol Screen: US AUDIT-C     1. How often do you have a drink containing alcohol? 6 Filed at: 08/30/2023 1156   2. How many drinks containing alcohol do you have on a typical day you are drinking? 1 Filed at: 08/30/2023 1156   3a. Male UNDER 65: How often do you have five or more drinks on one occasion? 0 Filed at: 08/30/2023 1156   3b. FEMALE Any Age, or MALE 65+: How often do you have 4 or more drinks on one occassion? 0 Filed at: 08/30/2023 1156   Audit-C Score 7 Filed at: 08/30/2023 1156   Full Alcohol Screen: US AUDIT    4. How often during the last year have you found that you were not able to stop drinking once you had started? 0 Filed at: 08/30/2023 1156   5. How often during past year have you failed to do what was normally expected of you because of drinking? 0 Filed at: 08/30/2023 1156   6. How often in past year have you needed a first drink in the morning to get yourself going after a heavy drinking session? 0 Filed at: 08/30/2023 1156   7. How often in past year have you had feeling of guilt or remorse after drinking? 0 Filed at: 08/30/2023 1156   8. How often in past year have you been unable to remember what happened night before because you had been drinking? 0 Filed at: 08/30/2023 1156   9. Have you or someone else been injured as a result of your drinking? 0 Filed at: 08/30/2023 1156   10.  Has a relative, friend, doctor or other health worker been concerned about your drinking and suggested you cut down?  0 Filed at: 08/30/2023 2100   AUDIT Total Score 7 Filed at: 08/30/2023 1321   SONG: How many times in the past year have you. .. Used an illegal drug or used a prescription medication for non-medical reasons? Never Filed at: 08/30/2023 1669                    Medical Decision Making  DDx: hand fracture, hand laceration, need for updated tetanus   Patient neurovascularly intact at his distal thumb and index finger. Patient has full range of motion of his thumb, and his index finger. Patient denies any pain. Patient has no excessive swelling. Confirm that this is not a tube that causes a concentrated amount of air, but rather uses an air compressor to move the parts of the tool he was using. No concern for pneumatic injury based on his description. Hand xray: Multiple small metallic foreign bodies at the level of the fifth metacarpal medially and first interdigital space. Patient has no injury at the fifth metacarpal, no pain, crepitus, laceration, erythema. First interdigital space foreign body in area of laceration. Patient denies tool breaking, or pieces of part he was working on breaking. Wound explored extensively and irrigated with 500 mL NSS, and with Sure cleanse. No obvious foreign body was irrigated out. Wound explored under procedure light with no obvious foreign body. During this process of irrigating and cleaning his wound, where blood was present, patient had an apparent near vasovagal syncope, but did not actually have syncope during wound cleaning. Patient reporting did not eat to day as well. BG and EKG WNL. Patient reports feels improved and back to baseline. Confirmed with patient the tool slipped and hit his hand and he had no prodrome prior to striking his hand with his tool, and denies any prodrome. Reviewed with patient extensively about the retained foreign body.   Discussed extensively that he needs to follow-up with hand surgery. Patient placed on antibiotics with first dose provided in department. Patient's tetanus updated. Discussed return precautions with the patient. Reviewed reasons to return to ed. Patient verbalized understanding of diagnosis and agreement with discharge plan of care as well as understanding of reasons to return to ed. Amount and/or Complexity of Data Reviewed  Labs: ordered. Radiology: ordered. Risk  OTC drugs. Prescription drug management. Disposition  Final diagnoses:   Hand laceration   Need for Tdap vaccination   Foreign body in hand     Time reflects when diagnosis was documented in both MDM as applicable and the Disposition within this note     Time User Action Codes Description Comment    8/30/2023 11:33 AM Jonathan Brandon Add [X02.772Y] Hand laceration     8/30/2023 11:33 AM Jonathan Brandon Add [Z23] Need for Tdap vaccination     8/30/2023 12:59 PM Artur Bower, [address] [place] [address] Foreign body in hand       ED Disposition     ED Disposition   Discharge    Condition   Stable    Date/Time   Wed Aug 30, 2023 12:57 PM    Comment   Sherren Rule Schleicher discharge to home/self care.                Follow-up Information     Follow up With Specialties Details Why Contact Info Additional Information    Joleen Espino MD Orthopedic Surgery, Hand Surgery Schedule an appointment as soon as possible for a visit  For further evaluation of symptoms [address].  [address] [facility] [address] [facility]  [facility] Emergency Department Emergency Medicine Go to  If symptoms worsen [address] [place] [id number] [phone] [address] [facility] Emergency Department, [address] [facility], [address] [place] [address]          Discharge Medication List as of 8/30/2023  1:03 PM      START taking these medications    Details   cephalexin (KEFLEX) 500 mg capsule Take 1 capsule (500 mg total) by mouth every 6 (six) hours for 7 days, Starting Wed 8/30/2023, Until Wed 9/6/2023, Normal         CONTINUE these medications which have NOT CHANGED    Details   aspirin 81 mg chewable tablet Chew 81 mg daily, Historical Med      atorvastatin (LIPITOR) 20 mg tablet take 1 tablet by mouth once daily, Normal      lisinopril (ZESTRIL) 20 mg tablet take 1 tablet by mouth once daily, Normal      oxyCODONE-acetaminophen (Percocet) 5-325 mg per tablet Take 1-2 tablets by mouth every 6 (six) hours as needed for moderate pain for up to 12 doses Max Daily Amount: 8 tablets, Starting Sun 5/28/2023, Normal      senna (SENOKOT) 8.6 mg Take 1 tablet (8.6 mg total) by mouth daily Do not start before May 25, 2023., Starting Thu 5/25/2023, Normal                 PDMP Review     None          ED Provider  Electronically Signed by           DULCE Merrill  08/30/23 5901

## 2023-08-30 NOTE — ED NOTES
Delay in charting due to patient care. While having wound cleansed by NP, patient became diaphoretic and started to pass out. Patient placed on cardiac monitor, BP and blood sugar checked. Patient resting at this time and wound care initiated.       Santiago Peralta RN  08/30/23 6278

## 2023-09-08 ENCOUNTER — OFFICE VISIT (OUTPATIENT)
Dept: URGENT CARE | Facility: CLINIC | Age: 63
End: 2023-09-08
Payer: COMMERCIAL

## 2023-09-08 VITALS
DIASTOLIC BLOOD PRESSURE: 63 MMHG | RESPIRATION RATE: 18 BRPM | OXYGEN SATURATION: 96 % | HEART RATE: 82 BPM | SYSTOLIC BLOOD PRESSURE: 133 MMHG | TEMPERATURE: 98.4 F

## 2023-09-08 DIAGNOSIS — S61.412A LACERATION OF LEFT HAND WITHOUT FOREIGN BODY, INITIAL ENCOUNTER: Primary | ICD-10-CM

## 2023-09-08 PROCEDURE — S9088 SERVICES PROVIDED IN URGENT: HCPCS | Performed by: PHYSICIAN ASSISTANT

## 2023-09-08 PROCEDURE — 99212 OFFICE O/P EST SF 10 MIN: CPT | Performed by: PHYSICIAN ASSISTANT

## 2023-09-08 NOTE — PROGRESS NOTES
[facility]    NAME: Leo Ribeiro is a 57 y.o. male  : 1960    MRN: [id number]  DATE: 2023  TIME: 4:35 PM    Assessment and Plan   Laceration of left hand without foreign body, initial encounter [S61.412A]  1. Laceration of left hand without foreign body, initial encounter            Patient Instructions     Patient Instructions   Cerave cream/eucerin cream      Chief Complaint     Chief Complaint   Patient presents with   • Suture / Staple Removal     Patient had sutures placed to left hand in between thumb and pointer finger. 3 sutures. No signs of infection. Placed 9 days ago       History of Present Illness   57year old male here for suture removal from left hand. Sutures placed 9 days ago. Edges are approximated. No drainage. No redness. Skin is a little dry. Review of Systems   Review of Systems   Skin: Positive for wound (healing laceration left interdigital space between thumb and index finger). All other systems reviewed and are negative. Current Medications     Current Outpatient Medications:   •  aspirin 81 mg chewable tablet, Chew 81 mg daily, Disp: , Rfl:   •  atorvastatin (LIPITOR) 20 mg tablet, take 1 tablet by mouth once daily, Disp: 30 tablet, Rfl: 5  •  lisinopril (ZESTRIL) 20 mg tablet, take 1 tablet by mouth once daily, Disp: 90 tablet, Rfl: 1  •  oxyCODONE-acetaminophen (Percocet) 5-325 mg per tablet, Take 1-2 tablets by mouth every 6 (six) hours as needed for moderate pain for up to 12 doses Max Daily Amount: 8 tablets (Patient not taking: Reported on 2023), Disp: 12 tablet, Rfl: 0  •  senna (SENOKOT) 8.6 mg, Take 1 tablet (8.6 mg total) by mouth daily Do not start before May 25, 2023.  (Patient not taking: Reported on 2023), Disp: 30 tablet, Rfl: 0    Current Allergies     Allergies as of 2023   • (No Known Allergies)          The following portions of the patient's history were reviewed and updated as appropriate: allergies, current medications, past family history, past medical history, past social history, past surgical history and problem list.   Past Medical History:   Diagnosis Date   • Benign essential hypertension 2014   • CAD (coronary artery disease)     s/p NAVA prox LAD and D1 2022   • Cancer ([address])     Prostate   • Coronary artery disease involving native coronary artery of native heart without angina pectoris 2022   • GERD (gastroesophageal reflux disease)    • Hyperlipidemia    • Hypertension    • Other chest pain    • Palpitations    • Prostate cancer ([address]) 2023     Past Surgical History:   Procedure Laterality Date   • CARDIAC CATHETERIZATION Left 2022    Procedure: Cardiac catheterization-left heart catheterization;  Surgeon: Carlos Longo MD;  [facility]; Service: Cardiology   • CARDIAC CATHETERIZATION N/A 2022    Procedure: Cardiac pci;  Surgeon: Carlos Longo MD;  [facility];   Service: Cardiology   • HERNIA REPAIR     • AR LAPS SURG [id number] RPBIC RAD W/NRV SPARING ROBOT N/A 2023    Procedure: PROSTATECTOMY RADICAL & PELVIC LYMPH NODE DISSECTION  W/ ROBOT;  Surgeon: Noman Vaughan MD;  Location[facility];  Service: Urology   • AR PROSTATE NEEDLE BIOPSY ANY APPROACH N/A 2023    Procedure: TRANSRECTAL MRI FUSION BIOPSY PROSTATE;  Surgeon: Shama Ch MD;  Location:  Endo;  Service: Urology     Family History   Problem Relation Age of Onset   • No Known Problems Mother    • No Known Problems Father      Social History     Socioeconomic History   • Marital status: /Civil Union     Spouse name: Not on file   • Number of children: Not on file   • Years of education: Not on file   • Highest education level: Not on file   Occupational History   • Not on file   Tobacco Use   • Smoking status: Former     Packs/day: 0.50     Types: Cigarettes     Quit date: 2022     Years since quittin.7   • Smokeless tobacco: Never   Vaping Use   • Vaping Use: Never used   Substance and Sexual Activity   • Alcohol use: Yes     Alcohol/week: 16.0 standard drinks of alcohol     Types: 2 Cans of beer, 14 Standard drinks or equivalent per week     Comment: 6-8 /week-   last drank 5/20   • Drug use: No   • Sexual activity: Not on file   Other Topics Concern   • Not on file   Social History Narrative    EMPLOYED         Social Determinants of Health     Financial Resource Strain: Not on file   Food Insecurity: Not on file   Transportation Needs: Not on file   Physical Activity: Not on file   Stress: Not on file   Social Connections: Not on file   Intimate Partner Violence: Not on file   Housing Stability: Not on file     Medications have been verified. Objective   /63   Pulse 82   Temp 98.4 °F (36.9 °C)   Resp 18   SpO2 96%      Physical Exam   Physical Exam  Vitals and nursing note reviewed. Constitutional:       Appearance: Normal appearance. Cardiovascular:      Rate and Rhythm: Normal rate and regular rhythm. Pulses: Normal pulses. Heart sounds: Normal heart sounds. Pulmonary:      Effort: Pulmonary effort is normal.      Breath sounds: Normal breath sounds. Musculoskeletal:      Cervical back: Normal range of motion. Skin:     Comments: Laceration between thumb and index finger in interdigital space. Healing well. Edges approximated. No erythema. No drainage or discharge. Neurological:      Mental Status: He is alert.      3 sutures removed

## 2023-10-04 DIAGNOSIS — I10 BENIGN ESSENTIAL HYPERTENSION: Chronic | ICD-10-CM

## 2023-10-04 RX ORDER — LISINOPRIL 20 MG/1
20 TABLET ORAL DAILY
Qty: 90 TABLET | Refills: 1 | Status: SHIPPED | OUTPATIENT
Start: 2023-10-04

## 2023-11-04 ENCOUNTER — APPOINTMENT (OUTPATIENT)
Dept: LAB | Facility: CLINIC | Age: 63
End: 2023-11-04
Payer: COMMERCIAL

## 2023-11-04 DIAGNOSIS — C61 PROSTATE CANCER (HCC): ICD-10-CM

## 2023-11-04 DIAGNOSIS — N50.1 PELVIC HEMATOMA IN MALE: ICD-10-CM

## 2023-11-04 LAB
ERYTHROCYTE [DISTWIDTH] IN BLOOD BY AUTOMATED COUNT: 14.2 % (ref 11.6–15.1)
HCT VFR BLD AUTO: 41.2 % (ref 36.5–49.3)
HGB BLD-MCNC: 13.8 G/DL (ref 12–17)
MCH RBC QN AUTO: 29.6 PG (ref 26.8–34.3)
MCHC RBC AUTO-ENTMCNC: 33.5 G/DL (ref 31.4–37.4)
MCV RBC AUTO: 88 FL (ref 82–98)
PLATELET # BLD AUTO: 415 THOUSANDS/UL (ref 149–390)
PMV BLD AUTO: 9.6 FL (ref 8.9–12.7)
PSA SERPL-MCNC: 0.01 NG/ML (ref 0–4)
RBC # BLD AUTO: 4.66 MILLION/UL (ref 3.88–5.62)
WBC # BLD AUTO: 11.67 THOUSAND/UL (ref 4.31–10.16)

## 2023-11-04 PROCEDURE — 36415 COLL VENOUS BLD VENIPUNCTURE: CPT

## 2023-11-04 PROCEDURE — 84153 ASSAY OF PSA TOTAL: CPT

## 2023-11-04 PROCEDURE — 85027 COMPLETE CBC AUTOMATED: CPT

## 2023-11-09 ENCOUNTER — TELEPHONE (OUTPATIENT)
Dept: UROLOGY | Facility: CLINIC | Age: 63
End: 2023-11-09

## 2023-11-09 ENCOUNTER — OFFICE VISIT (OUTPATIENT)
Dept: UROLOGY | Facility: CLINIC | Age: 63
End: 2023-11-09
Payer: COMMERCIAL

## 2023-11-09 VITALS
BODY MASS INDEX: 25.1 KG/M2 | SYSTOLIC BLOOD PRESSURE: 142 MMHG | HEIGHT: 64 IN | HEART RATE: 76 BPM | WEIGHT: 147 LBS | DIASTOLIC BLOOD PRESSURE: 76 MMHG

## 2023-11-09 DIAGNOSIS — C61 PROSTATE CANCER (HCC): Primary | ICD-10-CM

## 2023-11-09 PROCEDURE — 99213 OFFICE O/P EST LOW 20 MIN: CPT | Performed by: UROLOGY

## 2023-11-09 NOTE — TELEPHONE ENCOUNTER
Left messages on home and cell phone to call office back and let us know if he could be here for a 2:15 arrival today instead.

## 2023-11-09 NOTE — PROGRESS NOTES
UROLOGY POSTOP NOTE     CHIEF COMPLAINT   Sarah Britton is a 57 y.o. male with a complaint of   Chief Complaint   Patient presents with    Follow-up    Prostate Cancer       History of Present Illness: Sarah Britton is a 57 y.o. male here for evaluation of recent diagnosis of prostate cancer. Patient known to Dr. Rosanna Brooks. Elevated PSA led to multiparametric MRI, PI-RADS level 4 lesion. Patient underwent transperineal fusion biopsy. This demonstrates highest [place] score 4+3 equal 7 with 5 out of 12 cores positive. Patient presents today with his wife and son for surgical discussion. Patient does have a history of coronary artery disease status post stent placement x2 last July. He is not quite 1 year out from this procedure. On dual antiplatelet therapy. Prior hernia surgery. No other abdominal procedures. Patient is not sexually active, [place] score is 1. Lab Results   Component Value Date    PSA 0.01 11/04/2023    PSA 5.2 (H) 11/08/2022    PSA 6.6 (H) 07/18/2022     Patient returns after surgery, RALP/PLND 5/22/23. Was seen in ER 5/28 with hematuria. Large hematoma noted above bladder, not surprising given some of the intraoperative issues with DVC and rectourethralis vasculature and need for ASA/plavix. Patient returns at 6-month interval.  PSA undetectable. Patient has regained complete continence. No urinary complaints. Not interested in discussion of sexual function at this time. Urinary Score(s)     AUA SYMPTOM SCORE      Flowsheet Row Most Recent Value   AUA SYMPTOM SCORE    How often have you had a sensation of not emptying your bladder completely after you finished urinating? 1   How often have you had to urinate again less than two hours after you finished urinating? 3   How often have you found you stopped and started again several times when you urinate? 1   How often have you found it difficult to postpone urination?  1   How often have you had a weak urinary stream? 0   How often have you had to push or strain to begin urination? 0   How many times did you most typically get up to urinate from the time you went to bed at night until the time you got up in the morning? 5   Quality of Life: If you were to spend the rest of your life with your urinary condition just the way it is now, how would you feel about that? 3   AUA SYMPTOM SCORE 11               Past Medical History:     Past Medical History:   Diagnosis Date    Benign essential hypertension 05/08/2014    CAD (coronary artery disease)     s/p NAVA prox LAD and D1 7/28/2022    Cancer ([address] )     Prostate    Coronary artery disease involving native coronary artery of native heart without angina pectoris 08/09/2022    GERD (gastroesophageal reflux disease)     Hyperlipidemia     Hypertension     Other chest pain     Palpitations     Prostate cancer ([address] ) 04/18/2023       PAST SURGICAL HISTORY:     Past Surgical History:   Procedure Laterality Date    CARDIAC CATHETERIZATION Left 7/28/2022    Procedure: Cardiac catheterization-left heart catheterization;  Surgeon: Saira Mckenna MD;  Location: [facility]; Service: Cardiology    CARDIAC CATHETERIZATION N/A 7/28/2022    Procedure: Cardiac pci;  Surgeon: Saira Mckenna MD;  Location: [facility];   Service: Cardiology    HERNIA REPAIR      MS LAPS SURG [id number] RPBIC RAD W/NRV SPARING ROBOT N/A 5/22/2023    Procedure: PROSTATECTOMY RADICAL & PELVIC LYMPH NODE DISSECTION  W/ ROBOT;  Surgeon: Felipa Klein MD;  Location: [facility];  Service: Urology    MS PROSTATE NEEDLE BIOPSY ANY APPROACH N/A 2/28/2023    Procedure: TRANSRECTAL MRI FUSION BIOPSY PROSTATE;  Surgeon: Carlos Varner MD;  Location: [facility];  Service: Urology       CURRENT MEDICATIONS:     Current Outpatient Medications   Medication Sig Dispense Refill    aspirin 81 mg chewable tablet Chew 81 mg daily      atorvastatin (LIPITOR) 20 mg tablet take 1 tablet by mouth once daily 30 tablet 5    lisinopril (ZESTRIL) 20 mg tablet Take 1 tablet (20 mg total) by mouth daily 90 tablet 1    oxyCODONE-acetaminophen (Percocet) 5-325 mg per tablet Take 1-2 tablets by mouth every 6 (six) hours as needed for moderate pain for up to 12 doses Max Daily Amount: 8 tablets (Patient not taking: Reported on 2023) 12 tablet 0    senna (SENOKOT) 8.6 mg Take 1 tablet (8.6 mg total) by mouth daily Do not start before May 25, 2023. (Patient not taking: Reported on 2023) 30 tablet 0     No current facility-administered medications for this visit. ALLERGIES:   No Known Allergies    SOCIAL HISTORY:     Social History     Socioeconomic History    Marital status: /Civil Union     Spouse name: None    Number of children: None    Years of education: None    Highest education level: None   Occupational History    None   Tobacco Use    Smoking status: Former     Packs/day: 0.50     Types: Cigarettes     Quit date: 2022     Years since quittin.8    Smokeless tobacco: Never   Vaping Use    Vaping Use: Never used   Substance and Sexual Activity    Alcohol use: Yes     Alcohol/week: 16.0 standard drinks of alcohol     Types: 2 Cans of beer, 14 Standard drinks or equivalent per week     Comment: 6-8 /week-   last drank     Drug use: No    Sexual activity: None   Other Topics Concern    None   Social History Narrative    EMPLOYED         Social Determinants of Health     Financial Resource Strain: Not on file   Food Insecurity: Not on file   Transportation Needs: Not on file   Physical Activity: Not on file   Stress: Not on file   Social Connections: Not on file   Intimate Partner Violence: Not on file   Housing Stability: Not on file       SOCIAL HISTORY:     Family History   Problem Relation Age of Onset    No Known Problems Mother     No Known Problems Father        REVIEW OF SYSTEMS:     Review of Systems   Constitutional: Negative. Negative for chills and fever.    HENT:  Positive for dental problem. Negative for ear pain and sore throat. Eyes:  Negative for pain and visual disturbance. Respiratory: Negative. Negative for cough and shortness of breath. Cardiovascular: Negative. Negative for chest pain and palpitations. Gastrointestinal: Negative. Negative for abdominal pain and vomiting. Genitourinary: Negative. Negative for dysuria, enuresis and hematuria. Musculoskeletal: Negative. Negative for arthralgias and back pain. Skin: Negative. Negative for color change and rash. Neurological:  Negative for seizures and syncope. Psychiatric/Behavioral: Negative. All other systems reviewed and are negative. PHYSICAL EXAM:     /76 (BP Location: Left arm, Patient Position: Sitting, Cuff Size: Adult)   Pulse 76   Ht 5' 4" (1.626 m)   Wt 66.7 kg (147 lb)   BMI 25.23 kg/m²     Physical Exam  Vitals reviewed. HENT:      Head: Normocephalic. Nose: Nose normal.      Mouth/Throat:      Mouth: Mucous membranes are moist.   Cardiovascular:      Rate and Rhythm: Normal rate. Pulmonary:      Effort: Pulmonary effort is normal.   Abdominal:      General: Abdomen is flat. Comments: Hernia incision healed, healing prostatectomy incisions   Genitourinary:     Comments: Urine clear  Musculoskeletal:         General: Normal range of motion. Cervical back: Normal range of motion. Skin:     General: Skin is warm. Neurological:      General: No focal deficit present. Mental Status: He is alert.    Psychiatric:         Mood and Affect: Mood normal.         LABS:     CBC:   Lab Results   Component Value Date    WBC 11.67 (H) 2023    HGB 13.8 2023    HCT 41.2 2023    MCV 88 2023     (H) 2023       BMP:   Lab Results   Component Value Date    CALCIUM 8.6 2023    K 4.4 2023    CO2 27 2023     2023    BUN 9 2023    CREATININE 0.67 2023         IMAGIN/28/23  ADDENDUM: After the initial interpretation of the examination, further clinical history was provided including that of a lymph node dissection procedure and some extra surgical manipulation to control bleeding during the procedure. This extra surgical intervention   above and beyond standard TURP likely accounts for the presence of the soft tissue gas. Infection is therefore less likely. The patient is reportedly not unstable or septic appearing on examination by the ER physician. Addended by Sky Weeks MD on 5/28/2023  5:16 PM     Study Result    Narrative & Impression   CT ABDOMEN AND PELVIS WITH IV CONTRAST     INDICATION:   hematuria s/p TURP. COMPARISON: Prostate MRI from 12/28/2022     TECHNIQUE:  CT examination of the abdomen and pelvis was performed. Multiplanar 2D reformatted images were created from the source data. This examination, like all CT scans performed in the [facility], was performed utilizing techniques to minimize radiation dose exposure, including the use of iterative reconstruction and automated exposure control. Radiation dose length   product (DLP) for this visit:  532 mGy-cm     IV Contrast:  100 mL of iohexol (OMNIPAQUE)  Enteric Contrast:  Enteric contrast was not administered. FINDINGS:     ABDOMEN     LOWER CHEST: There is mild dependent atelectasis in the lung bases. Trace amount of pleural effusion on the right and perhaps also on the left. There is calcification at the aortic valve. There is gas in the wall of the chest     LIVER/BILIARY TREE:  Unremarkable. GALLBLADDER:  No calcified gallstones. No pericholecystic inflammatory change. SPLEEN: Spleen appears within normal limits. There appears to be a small accessory splenule near the tip of the pancreatic tail. PANCREAS:  Unremarkable. ADRENAL GLANDS:  Unremarkable. KIDNEYS/URETERS: There are numerous bilateral benign-appearing renal cysts.  There is no hydronephrosis or kidney stone on either side. There is no perinephric fluid. STOMACH AND BOWEL:  Unremarkable. APPENDIX: A normal appendix was visualized. ABDOMINOPELVIC CAVITY: There is a moderate sized hematoma in the space of Retzius as evidenced by slightly lobulated moderate to high density collection measuring 4.4 x 9.9 cm on image 139 series 2 with internal attenuation 56 Hounsfield units. Surrounding this area there is fatty stranding and perhaps small amount of wispy low-density fluid which could be some edema. There seems to be a small amount of blood just inferior to the tip of the cecum as well most evident on image 130 series 2. This   would suggest that the hematoma has traversed the tissue planes from the extraperitoneal compartment to the intraperitoneal compartment. I do not see any blush to suggest active arterial hemorrhage. There is no free air seen within the abdominal cavity. VESSELS:  Unremarkable for patient's age. PELVIS     REPRODUCTIVE ORGANS: Prostate resection has been performed. The prostate bed does not appear to have significant hematoma or other worrisome collection. The penis is unremarkable. There is gas in the scrotum and tracking around the inguinal canals bilaterally and into the soft tissues of the groin and also into the perineal region and the thighs and the lower abdominal wall and upwards to the chest wall, all predominantly along   the interface between the subcutaneous fat and the muscle layer, but also tracking along the intermuscular planes of the left flank. The amount of gas in the soft tissues is unexpected and I would recommend correlating for any evidence of infection. URINARY BLADDER: There is a Carr catheter within the bladder. There appears to be some enhancement of the bladder wall where the tip of the catheter touches the wall. The bladder wall also appears slightly thickened in that region.  There is air in the   bladder lumen as well as some urine. There does not seem to be any significant high density fluid in the bladder lumen. ABDOMINAL WALL/INGUINAL REGIONS: As above, moderately extensive soft tissue gas. There is also some edema noted in the anterior abdominal wall midline just above the umbilicus and there is a fat-containing umbilical hernia. OSSEOUS STRUCTURES:  No acute fracture or destructive osseous lesion. IMPRESSION:     Moderate sized hematoma in the space of Retzius anterior to the bladder, but also extending probably slightly into the intraperitoneal compartment with some blood seen just inferior to the tip of the cecum. Fairly extensive soft tissue gas in the scrotum and perineal area and thighs and abdominal wall and chest wall. This is more extensive than expected for surgery 6 days ago and is concerning for possible infection. Correlate for any signs or symptoms of   Hossein's gangrene. Carr catheter seems satisfactory in position although the tip of the catheter impinges on the bladder wall and that area of the bladder wall appears slightly thickened and enhanced. The prostate resection bed seems grossly satisfactory. Small fat-containing umbilical hernia and some edema of the abdominal wall nearby. Trace pleural effusions and dependent atelectasis. Numerous renal cysts. Aortic valvular calcification. PATHOLOGY:     2/28/23  Final Diagnosis   A. Prostate, region of interest #1, needle biopsy:  - Prostatic adenocarcinoma, acinar, not otherwise specified; [place] grade 4 +3= score of 7 (grade group 3) in 4 of 5 cores involving 20% of needle core tissue and measuring up to 5 mm in length (score 4=50-60%). - Periprostatic fat invasion: Not identified.  - Seminal vesicle invasion: Not identified. - Lymph-vascular invasion: Not identified.  - Perineural invasion: Not identified.  - Additional Pathologic Findings:  None.      B.  Prostate, right lateral and medial base, needle biopsy:  - Benign prostate tissue, negative for malignancy. C.  Prostate, right mid lateral, needle biopsy:  - Benign prostate tissue, negative for malignancy. D.  Prostate, right mid medial, needle biopsy:  - Benign prostate tissue, negative for malignancy. E.  Prostate, right lateral apex, needle biopsy:   - Benign prostate tissue, negative for malignancy. F. Prostate, right medial apex, needle biopsy:  - Benign prostate tissue, negative for malignancy. G.  Prostate, left lateral base, needle biopsy:  - Prostatic adenocarcinoma, acinar, not otherwise specified; Ji[place] grade 3 +3= score of 6 (grade group 1) in 1 of 1 cores involving 10% of needle core tissue and measuring 2 mm in length. - Periprostatic fat invasion: Not identified.  - Seminal vesicle invasion: Not identified. - Lymph-vascular invasion: Not identified.  - Perineural invasion: Not identified.  - Additional Pathologic Findings:  None. H.  Prostate, left medial base, needle biopsy:  - Prostatic adenocarcinoma, acinar, not otherwise specified; Jihan grade 3 +4= score of 7 (grade group 2) in 1 of 1 cores involving 50% of needle core tissue and measuring 8 mm in length (score 4 = 5-10%). - Periprostatic fat invasion: Not identified.  - Seminal vesicle invasion: Not identified. - Lymph-vascular invasion: Not identified.  - Perineural invasion: Present.  - Additional Pathologic Findings:  None. I.  Prostate, left mid lateral, needle biopsy:  - Prostatic adenocarcinoma, acinar, not otherwise specified; [place] grade 3 +4= score of 7 (grade group 2) in 1 of 1 cores involving 40-50% of needle core tissue and measuring 5 mm in length (score 4 = 5-10%). - Periprostatic fat invasion: Not identified.  - Seminal vesicle invasion: Not identified. - Lymph-vascular invasion: Not identified.  - Perineural invasion: Not identified.  - Additional Pathologic Findings:  High-grade prostatic intraepithelial neoplasia (HGPIN).      J.  Prostate, left mid medial, needle biopsy:  - Prostatic adenocarcinoma, acinar, not otherwise specified; Jihan grade 3 +4= score of 7 (grade group 2) in 1 of 1 cores involving 40-50% of needle core tissue and measuring 7 mm in length (score 4 = 5-10%). - Periprostatic fat invasion: Not identified.  - Seminal vesicle invasion: Not identified. - Lymph-vascular invasion: Not identified.  - Perineural invasion: Not identified.  - Additional Pathologic Findings:  High-grade prostatic intraepithelial neoplasia (HGPIN). K.  Prostate, left lateral apex, needle biopsy:  -Focal atypical small acinar proliferation, cannot exclude limited low-grade carcinoma. L.  Prostate, left medial apex, needle biopsy:  - Benign prostate tissue, negative for malignancy. NOMOGRAM:       PROSTATECTOMY PATHOLOGY:     5/22/23  Final Diagnosis   A. Prostate and seminal vesicles; prostatectomy:       - Prostatic acinar adenocarcinoma, [place] Score 3 + 4 = 7, Grade group 2 (5% pattern 4), see note       - Estimated percentage of prostate involved by carcinoma: 6 - 10%      - Extraprostatic extension (EPE): Not identified      - Urinary bladder neck invasion: Not identified      - Seminal vesicle invasion: Not identified      - Lymphovascular invasion: Not identified      - Perineural invasion: Present      - All margins negative for carcinoma      - Additional findings: High-grade prostatic intraepithelial neoplasia (PIN) and nodular prostatic hyperplasia       - See synoptic report     B. Fibroadipose tissue, "periprostatic fat and lymph nodes"; excision:       - Benign fibroadipose tissue with no significant histopathologic abnormalities       - No lymph node tissue identified       - Negative for malignancy      C. Lymph nodes (6), "pelvic lymph nodes"; dissection:       - Six lymph nodes, negative for malignancy (0/6)     D.  Prostatic tissue, "left anterior margin of prostate"; biopsy:       - Benign prostatic tissue       - Negative for malignancy       ASSESSMENT:     57 y.o. male  with [id number] (Jihan 3+4=7) negative surgical margins    PLAN:     Stage II disease. Recommend patient get additional PSA in 3 months and again in 6 months. This will be 1 year out from his prostatectomy. Patient would like to be seen locally if there is available office space in the [facility] location. Patient has regained complete continence. No complaints. Patient is not interested in discussion of his erections or erectile function treatment.

## 2024-02-24 ENCOUNTER — APPOINTMENT (OUTPATIENT)
Dept: LAB | Facility: CLINIC | Age: 64
End: 2024-02-24
Payer: COMMERCIAL

## 2024-02-24 DIAGNOSIS — C61 PROSTATE CANCER (HCC): ICD-10-CM

## 2024-02-24 LAB — PSA SERPL-MCNC: 0.02 NG/ML (ref 0–4)

## 2024-02-24 PROCEDURE — 36415 COLL VENOUS BLD VENIPUNCTURE: CPT

## 2024-02-24 PROCEDURE — 84153 ASSAY OF PSA TOTAL: CPT

## 2024-05-11 ENCOUNTER — APPOINTMENT (OUTPATIENT)
Dept: LAB | Facility: CLINIC | Age: 64
End: 2024-05-11
Payer: COMMERCIAL

## 2024-05-11 LAB — PSA SERPL-MCNC: 0.01 NG/ML (ref 0–4)

## 2024-05-11 PROCEDURE — 84153 ASSAY OF PSA TOTAL: CPT

## 2024-05-20 ENCOUNTER — OFFICE VISIT (OUTPATIENT)
Dept: UROLOGY | Facility: CLINIC | Age: 64
End: 2024-05-20
Payer: COMMERCIAL

## 2024-05-20 VITALS
OXYGEN SATURATION: 98 % | BODY MASS INDEX: 24.52 KG/M2 | HEIGHT: 64 IN | TEMPERATURE: 98.4 F | SYSTOLIC BLOOD PRESSURE: 140 MMHG | RESPIRATION RATE: 16 BRPM | HEART RATE: 88 BPM | DIASTOLIC BLOOD PRESSURE: 70 MMHG | WEIGHT: 143.6 LBS

## 2024-05-20 DIAGNOSIS — C61 PROSTATE CANCER (HCC): Primary | ICD-10-CM

## 2024-05-20 PROCEDURE — 99212 OFFICE O/P EST SF 10 MIN: CPT

## 2024-05-20 NOTE — PROGRESS NOTES
"5/20/2024    Chief Complaint   Patient presents with    Follow-up       Assessment and Plan    63 y.o. male manage by Dr. Harmon    1.  Prostate cancer  [id number] [place] 4+3=7  s/p RALP/PLND 5/22/2023  PSA 0.01 (5/11/2024)  Continue with 6 month PSA with follow-up    Interval HPI:    History of Present Illness  Isaac Kramer is a 63 y.o. male here for evaluation of prostate cancer.    Established patient but new to me last seen by Dr. Harmon on 11/9/2023.  Previously known to Dr. Biggs with history of elevated PSA which led to multiparametric MRI, PI-RADS level 4 lesion. Patient underwent transperineal fusion biopsy. This demonstrates highest [place] score 4+3 equal 7 with 5 out of 12 cores positive.  He is status post RALP/PLND 5/22/2023 by Dr. Harmon.  During his last visit with Dr. Harmon he reported no issues with urinary incontinence.  PSA remains undetectable at 0.01 as of 5/11/2024.            Review of Systems   Constitutional:  Negative for chills and fever.   HENT:  Negative for congestion and sore throat.    Respiratory:  Negative for cough and shortness of breath.    Cardiovascular:  Negative for chest pain and leg swelling.   Gastrointestinal:  Negative for abdominal pain, constipation and diarrhea.   Genitourinary:  Negative for difficulty urinating, dysuria, frequency, hematuria and urgency.   Musculoskeletal:  Negative for back pain and gait problem.   Skin:  Negative for wound.   Allergic/Immunologic: Negative for immunocompromised state.   Hematological:  Does not bruise/bleed easily.               Vitals  Vitals:    05/20/24 1424   BP: 140/70   BP Location: Left arm   Patient Position: Sitting   Cuff Size: Adult   Pulse: 88   Resp: 16   Temp: 98.4 °F (36.9 °C)   TempSrc: Temporal   SpO2: 98%   Weight: 65.1 kg (143 lb 9.6 oz)   Height: 5' 4\" (1.626 m)       Physical Exam  Vitals reviewed.   Constitutional:       General: He is not in acute distress.     Appearance: Normal " appearance. He is not ill-appearing or toxic-appearing.   HENT:      Head: Normocephalic and atraumatic.   Eyes:      General: No scleral icterus.     Conjunctiva/sclera: Conjunctivae normal.   Cardiovascular:      Rate and Rhythm: Normal rate.   Pulmonary:      Effort: Pulmonary effort is normal. No respiratory distress.   Abdominal:      Tenderness: There is no right CVA tenderness or left CVA tenderness.      Hernia: No hernia is present.   Musculoskeletal:      Cervical back: Normal range of motion.      Right lower leg: No edema.      Left lower leg: No edema.   Skin:     General: Skin is warm and dry.      Coloration: Skin is not jaundiced or pale.   Neurological:      General: No focal deficit present.      Mental Status: He is alert and oriented to person, place, and time. Mental status is at baseline.      Gait: Gait normal.   Psychiatric:         Mood and Affect: Mood normal.         Behavior: Behavior normal.         Thought Content: Thought content normal.         Judgment: Judgment normal.         Past History  Past Medical History:   Diagnosis Date    Benign essential hypertension 2014    CAD (coronary artery disease)     s/p NAVA prox LAD and D1 2022    Cancer (HCC)     Prostate    Coronary artery disease involving native coronary artery of native heart without angina pectoris 2022    GERD (gastroesophageal reflux disease)     Hyperlipidemia     Hypertension     Other chest pain     Palpitations     Prostate cancer (HCC) 2023     Social History     Socioeconomic History    Marital status: /Civil Union     Spouse name: None    Number of children: None    Years of education: None    Highest education level: None   Occupational History    None   Tobacco Use    Smoking status: Former     Current packs/day: 0.00     Types: Cigarettes     Quit date: 2022     Years since quittin.4    Smokeless tobacco: Never   Vaping Use    Vaping status: Never Used   Substance and  Sexual Activity    Alcohol use: Yes     Alcohol/week: 16.0 standard drinks of alcohol     Types: 2 Cans of beer, 14 Standard drinks or equivalent per week     Comment: 6-8 /week-   last drank     Drug use: No    Sexual activity: None   Other Topics Concern    None   Social History Narrative    EMPLOYED         Social Determinants of Health     Financial Resource Strain: Not on file   Food Insecurity: Not on file   Transportation Needs: Not on file   Physical Activity: Not on file   Stress: Not on file   Social Connections: Not on file   Intimate Partner Violence: Not on file   Housing Stability: Not on file     Social History     Tobacco Use   Smoking Status Former    Current packs/day: 0.00    Types: Cigarettes    Quit date: 2022    Years since quittin.4   Smokeless Tobacco Never     Family History   Problem Relation Age of Onset    No Known Problems Mother     No Known Problems Father        The following portions of the patient's history were reviewed and updated as appropriate allergies, current medications, past medical history, past social history, past surgical history and problem list    Imaging:    Results  No results found for this or any previous visit (from the past 1 hour(s)).]  Lab Results   Component Value Date    PSA 0.01 2024    PSA 0.02 2024    PSA 0.01 2023    PSA 5.2 (H) 2022     Lab Results   Component Value Date    CALCIUM 8.6 2023    K 4.4 2023    CO2 27 2023     2023    BUN 9 2023    CREATININE 0.67 2023     Lab Results   Component Value Date    WBC 11.67 (H) 2023    HGB 13.8 2023    HCT 41.2 2023    MCV 88 2023     (H) 2023       Please Note:  Voice dictation software has been used to create this document. There may be inadvertent transcriptions errors.     DULCE Gimenez 24

## 2024-07-25 DIAGNOSIS — E78.5 HYPERLIPIDEMIA, UNSPECIFIED HYPERLIPIDEMIA TYPE: ICD-10-CM

## 2024-07-25 RX ORDER — ATORVASTATIN CALCIUM 20 MG/1
20 TABLET, FILM COATED ORAL DAILY
Qty: 30 TABLET | Refills: 5 | Status: SHIPPED | OUTPATIENT
Start: 2024-07-25

## 2024-08-14 ENCOUNTER — HOSPITAL ENCOUNTER (OUTPATIENT)
Dept: NON INVASIVE DIAGNOSTICS | Facility: CLINIC | Age: 64
Discharge: HOME | End: 2024-08-14

## 2024-08-16 ENCOUNTER — HOSPITAL ENCOUNTER (OUTPATIENT)
Dept: NON INVASIVE DIAGNOSTICS | Facility: CLINIC | Age: 64
Discharge: HOME | End: 2024-08-16
Payer: COMMERCIAL

## 2024-08-16 VITALS
SYSTOLIC BLOOD PRESSURE: 136 MMHG | WEIGHT: 143 LBS | HEIGHT: 64 IN | DIASTOLIC BLOOD PRESSURE: 84 MMHG | BODY MASS INDEX: 24.41 KG/M2 | HEART RATE: 80 BPM

## 2024-08-16 DIAGNOSIS — I35.0 NONRHEUMATIC AORTIC VALVE STENOSIS: ICD-10-CM

## 2024-08-16 LAB
AORTIC ROOT: 3.3 CM
AORTIC VALVE MEAN VELOCITY: 26.2 M/S
APICAL FOUR CHAMBER EJECTION FRACTION: 74 %
ASCENDING AORTA: 3.4 CM
AV AREA BY CONTINUOUS VTI: 1.3 CM2
AV AREA PEAK VELOCITY: 235.5 CM2
AV LVOT MEAN GRADIENT: 5 MMHG
AV LVOT PEAK GRADIENT: 7 MMHG
AV MEAN GRADIENT: 31 MMHG
AV PEAK GRADIENT: 48 MMHG
AV REGURGITATION PRESSURE HALF TIME: 609 MS
AV VALVE AREA: 1.32 CM2
AV VELOCITY RATIO: 0.39
BSA FOR ECHO PROCEDURE: 1.7 M2
DOP CALC AO PEAK VEL: 3.47 M/S
DOP CALC AO VTI: 79.74 CM
DOP CALC LVOT AREA: 3.46 CM2
DOP CALC LVOT CARDIAC INDEX: 4.94 L/MIN/M2
DOP CALC LVOT CARDIAC OUTPUT: 8.39 L/MIN
DOP CALC LVOT DIAMETER: 2.1 CM
DOP CALC LVOT PEAK VEL VTI: 30.48 CM
DOP CALC LVOT PEAK VEL: 1.36 M/S
DOP CALC LVOT STROKE INDEX: 64.7 ML/M2
DOP CALC LVOT STROKE VOLUME: 110
E WAVE DECELERATION TIME: 278 MS
E/A RATIO: 0.86
FRACTIONAL SHORTENING: 42 (ref 28–44)
INTERVENTRICULAR SEPTUM IN DIASTOLE (PARASTERNAL SHORT AXIS VIEW): 1 CM
INTERVENTRICULAR SEPTUM: 1 CM (ref 0.6–1.1)
LAAS-AP2: 17.3 CM2
LAAS-AP4: 14.7 CM2
LEFT ATRIUM SIZE: 3.9 CM
LEFT ATRIUM VOLUME (MOD BIPLANE): 43 ML
LEFT ATRIUM VOLUME INDEX (MOD BIPLANE): 25.3 ML/M2
LEFT INTERNAL DIMENSION IN SYSTOLE: 2.5 CM (ref 2.1–4)
LEFT VENTRICULAR INTERNAL DIMENSION IN DIASTOLE: 4.3 CM (ref 3.5–6)
LEFT VENTRICULAR POSTERIOR WALL IN END DIASTOLE: 1 CM
LEFT VENTRICULAR STROKE VOLUME: 59 ML
LVSV (TEICH): 59 ML
MV E'TISSUE VEL-SEP: 8 CM/S
MV PEAK A VEL: 1.25 M/S
MV PEAK E VEL: 107 CM/S
MV STENOSIS PRESSURE HALF TIME: 81 MS
MV VALVE AREA P 1/2 METHOD: 2.7
RA PRESSURE ESTIMATED: 8 MMHG
RIGHT ATRIUM AREA SYSTOLE A4C: 14 CM2
RIGHT VENTRICLE ID DIMENSION: 2.8 CM
RV PSP: 33 MMHG
SINOTUBULAR JUNCTION: 2.5 CM
SL CV AV DECELERATION TIME RETROGRADE: 2099 MS
SL CV AV PEAK GRADIENT RETROGRADE: 74 MMHG
SL CV LEFT ATRIUM LENGTH A2C: 5.2 CM
SL CV LV EF: 65
SL CV PED ECHO LEFT VENTRICLE DIASTOLIC VOLUME (MOD BIPLANE) 2D: 81 ML
SL CV PED ECHO LEFT VENTRICLE SYSTOLIC VOLUME (MOD BIPLANE) 2D: 22 ML
SL CV SINUS OF VALSALVA 2D: 3.4 CM
STJ: 2.5 CM
TR MAX PG: 25 MMHG
TR PEAK VELOCITY: 2.5 M/S
TRICUSPID ANNULAR PLANE SYSTOLIC EXCURSION: 2.2 CM
TRICUSPID VALVE PEAK REGURGITATION VELOCITY: 2.49 M/S

## 2024-08-16 PROCEDURE — 93306 TTE W/DOPPLER COMPLETE: CPT

## 2024-08-16 PROCEDURE — 93306 TTE W/DOPPLER COMPLETE: CPT | Performed by: INTERNAL MEDICINE

## 2024-08-19 ENCOUNTER — OFFICE VISIT (OUTPATIENT)
Dept: CARDIOLOGY CLINIC | Facility: CLINIC | Age: 64
End: 2024-08-19
Payer: COMMERCIAL

## 2024-08-19 VITALS
DIASTOLIC BLOOD PRESSURE: 80 MMHG | HEIGHT: 64 IN | BODY MASS INDEX: 23.56 KG/M2 | WEIGHT: 138 LBS | HEART RATE: 81 BPM | SYSTOLIC BLOOD PRESSURE: 140 MMHG

## 2024-08-19 DIAGNOSIS — I35.0 NONRHEUMATIC AORTIC VALVE STENOSIS: Primary | ICD-10-CM

## 2024-08-19 DIAGNOSIS — I10 BENIGN ESSENTIAL HYPERTENSION: ICD-10-CM

## 2024-08-19 DIAGNOSIS — I25.10 CORONARY ARTERY DISEASE INVOLVING NATIVE CORONARY ARTERY OF NATIVE HEART WITHOUT ANGINA PECTORIS: ICD-10-CM

## 2024-08-19 PROCEDURE — 93000 ELECTROCARDIOGRAM COMPLETE: CPT | Performed by: INTERNAL MEDICINE

## 2024-08-19 PROCEDURE — 99214 OFFICE O/P EST MOD 30 MIN: CPT | Performed by: INTERNAL MEDICINE

## 2024-08-19 NOTE — ASSESSMENT & PLAN NOTE
Moderate with some degree of progression from last year.  Will reassess in 1 year.  Not yet at the surgical level.

## 2024-08-19 NOTE — PROGRESS NOTES
Patient ID: Isaac Kramer is a 63 y.o. male.        Plan:      Coronary artery disease involving native coronary artery of native heart without angina pectoris  2022 with stenting of the LAD and D1.  No recurrence of symptoms.       Aortic valve stenosis  Moderate with some degree of progression from last year.  Will reassess in 1 year.  Not yet at the surgical level.    Benign essential hypertension  Adequately controlled on current regimen.       Follow up Plan/Other summary comments:  Return in about 1 year (around 8/26/2025).  Echo to be checked just prior to return.    HPI: Patient seen in follow-up today regarding the above.  Since last visit he has felt reasonably well.  He has been treated for prostate cancer and states that he is now his cancer free.  No angina.  No dyspnea.  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    Because of exertional dyspnea as well  as persistent sense of chest pain he underwent heart catheterization on 07/28/2022.  There was a borderline stenosis of the proximal LAD and more significant stenosis of the 1st diagonal.  Both vessels were stented.  He did not really feel very different when compared to prior.  Results for orders placed or performed in visit on 08/19/24   POCT ECG    Impression    NSR. Left axis. Otherwise WNL.         Most recent or relevant cardiac/vascular testing:    TTE 06/30/2022:  Normal LV systolic function.  Mild aortic stenosis.  TTE 8/16/2024: Nl.. LV fxn.Moderate AS (3.5 m/sec).    Past Surgical History:   Procedure Laterality Date    CARDIAC CATHETERIZATION Left 7/28/2022    Procedure: Cardiac catheterization-left heart catheterization;  Surgeon: Sai Henry MD;  Location: [facility];  Service: Cardiology    CARDIAC CATHETERIZATION N/A 7/28/2022    Procedure: Cardiac pci;  Surgeon: Sai Henry MD;  Location: [facility];  Service: Cardiology    HERNIA REPAIR      [place] LAPS SURG [id number] RPBIC RAD W/NRV SPARING ROBOT N/A  "5/22/2023    Procedure: PROSTATECTOMY RADICAL & PELVIC LYMPH NODE DISSECTION  W/ ROBOT;  Surgeon: Otoniel Harmon MD;  Location: [facility];  Service: Urology    ID PROSTATE NEEDLE BIOPSY ANY APPROACH N/A 2/28/2023    Procedure: TRANSRECTAL MRI FUSION BIOPSY PROSTATE;  Surgeon: Milan Arellano MD;  Location: [facility];  Service: Urology       Lipid Profile: Reviewed      Review of Systems   10  point ROS  was otherwise non pertinent or negative except as per HPI or as below.   Gait:  Normal.        Objective:     /80   Pulse 81   Ht 5' 4\" (1.626 m)   Wt 62.6 kg (138 lb)   BMI 23.69 kg/m²     PHYSICAL EXAM:    General:  Normal appearance in no distress.  Eyes:  Anicteric.  Oral mucosa:  Moist.  Neck:  No JVD. Carotid upstrokes are brisk with transmitted murmur.   No masses.  Chest:  Clear to auscultation.  Cardiac:  No palpable PMI.  Normal S1 and preserved S2.  Grade 3 systolic murmur loudest at the base radiating to the neck.  No gallop or rub.  Abdomen:  Soft and nontender. No palpable organomegaly or aortic enlargement.  Extremities:  No peripheral edema.  Musculoskeletal:  Symmetric.   Vascular:  Femoral pulses are brisk without bruits.  Popliteal pulses are intact bilaterally.   Pedal pulses are intact.  Neuro:  Grossly symmetric.  Psych:  Alert and oriented x3.      Meds reviewed.    Past Medical History:   Diagnosis Date    Benign essential hypertension 05/08/2014    CAD (coronary artery disease)     s/p NAVA prox LAD and D1 7/28/2022    Cancer (HCC)     Prostate    Coronary artery disease involving native coronary artery of native heart without angina pectoris 08/09/2022    GERD (gastroesophageal reflux disease)     Hyperlipidemia     Hypertension     Other chest pain     Palpitations     Prostate cancer (HCC) 04/18/2023           Social History     Tobacco Use   Smoking Status Every Day    Current packs/day: 0.00    Types: Cigarettes    Last attempt to quit: 12/26/2022    Years since " quittin.6   Smokeless Tobacco Never

## 2024-08-27 ENCOUNTER — OFFICE VISIT (OUTPATIENT)
Dept: URGENT CARE | Facility: CLINIC | Age: 64
End: 2024-08-27
Payer: COMMERCIAL

## 2024-08-27 ENCOUNTER — APPOINTMENT (EMERGENCY)
Dept: RADIOLOGY | Facility: HOSPITAL | Age: 64
End: 2024-08-27
Payer: COMMERCIAL

## 2024-08-27 ENCOUNTER — APPOINTMENT (EMERGENCY)
Dept: CT IMAGING | Facility: HOSPITAL | Age: 64
End: 2024-08-27
Payer: COMMERCIAL

## 2024-08-27 ENCOUNTER — HOSPITAL ENCOUNTER (EMERGENCY)
Facility: HOSPITAL | Age: 64
End: 2024-08-28
Attending: STUDENT IN AN ORGANIZED HEALTH CARE EDUCATION/TRAINING PROGRAM | Admitting: STUDENT IN AN ORGANIZED HEALTH CARE EDUCATION/TRAINING PROGRAM
Payer: COMMERCIAL

## 2024-08-27 VITALS
SYSTOLIC BLOOD PRESSURE: 113 MMHG | HEIGHT: 64 IN | OXYGEN SATURATION: 97 % | DIASTOLIC BLOOD PRESSURE: 51 MMHG | BODY MASS INDEX: 23.35 KG/M2 | RESPIRATION RATE: 20 BRPM | HEART RATE: 119 BPM | TEMPERATURE: 100.9 F | WEIGHT: 136.8 LBS

## 2024-08-27 DIAGNOSIS — R10.84 GENERALIZED ABDOMINAL PAIN: Primary | ICD-10-CM

## 2024-08-27 DIAGNOSIS — R82.2 BILIRUBINURIA: ICD-10-CM

## 2024-08-27 DIAGNOSIS — E87.6 HYPOKALEMIA: ICD-10-CM

## 2024-08-27 DIAGNOSIS — R65.20 SEVERE SEPSIS (HCC): Primary | ICD-10-CM

## 2024-08-27 DIAGNOSIS — K82.0 GALLBLADDER OBSTRUCTION: ICD-10-CM

## 2024-08-27 DIAGNOSIS — A41.9 SEVERE SEPSIS (HCC): Primary | ICD-10-CM

## 2024-08-27 DIAGNOSIS — D72.825: ICD-10-CM

## 2024-08-27 DIAGNOSIS — R74.01 TRANSAMINITIS: ICD-10-CM

## 2024-08-27 DIAGNOSIS — K52.9 ENTERITIS: ICD-10-CM

## 2024-08-27 DIAGNOSIS — E80.6 HYPERBILIRUBINEMIA: ICD-10-CM

## 2024-08-27 DIAGNOSIS — K86.89 PANCREATIC MASS: ICD-10-CM

## 2024-08-27 DIAGNOSIS — R79.89 ELEVATED PROCALCITONIN: ICD-10-CM

## 2024-08-27 DIAGNOSIS — K82.8 ENLARGED GALLBLADDER: ICD-10-CM

## 2024-08-27 LAB
ALBUMIN SERPL BCG-MCNC: 3.8 G/DL (ref 3.5–5)
ALP SERPL-CCNC: 1122 U/L (ref 34–104)
ALT SERPL W P-5'-P-CCNC: 191 U/L (ref 7–52)
ANION GAP SERPL CALCULATED.3IONS-SCNC: 13 MMOL/L (ref 4–13)
APTT PPP: 28 SECONDS (ref 23–34)
AST SERPL W P-5'-P-CCNC: 304 U/L (ref 13–39)
BASOPHILS # BLD MANUAL: 0 THOUSAND/UL (ref 0–0.1)
BASOPHILS NFR MAR MANUAL: 0 % (ref 0–1)
BILIRUB DIRECT SERPL-MCNC: 2.45 MG/DL (ref 0–0.2)
BILIRUB SERPL-MCNC: 3.81 MG/DL (ref 0.2–1)
BILIRUB UR QL STRIP: ABNORMAL
BUN SERPL-MCNC: 24 MG/DL (ref 5–25)
CALCIUM SERPL-MCNC: 8.8 MG/DL (ref 8.4–10.2)
CHLORIDE SERPL-SCNC: 105 MMOL/L (ref 96–108)
CLARITY UR: CLEAR
CO2 SERPL-SCNC: 18 MMOL/L (ref 21–32)
COLOR UR: YELLOW
CREAT SERPL-MCNC: 1.13 MG/DL (ref 0.6–1.3)
EOSINOPHIL # BLD MANUAL: 0.17 THOUSAND/UL (ref 0–0.4)
EOSINOPHIL NFR BLD MANUAL: 1 % (ref 0–6)
ERYTHROCYTE [DISTWIDTH] IN BLOOD BY AUTOMATED COUNT: 15.1 % (ref 11.6–15.1)
GFR SERPL CREATININE-BSD FRML MDRD: 67 ML/MIN/1.73SQ M
GLUCOSE SERPL-MCNC: 109 MG/DL (ref 65–140)
GLUCOSE UR STRIP-MCNC: NEGATIVE MG/DL
HCT VFR BLD AUTO: 40.2 % (ref 36.5–49.3)
HGB BLD-MCNC: 12.9 G/DL (ref 12–17)
HGB UR QL STRIP.AUTO: NEGATIVE
INR PPP: 1.16 (ref 0.85–1.19)
KETONES UR STRIP-MCNC: NEGATIVE MG/DL
LACTATE SERPL-SCNC: 1.4 MMOL/L (ref 0.5–2)
LEUKOCYTE ESTERASE UR QL STRIP: NEGATIVE
LIPASE SERPL-CCNC: 63 U/L (ref 11–82)
LYMPHOCYTES # BLD AUTO: 0.66 THOUSAND/UL (ref 0.6–4.47)
LYMPHOCYTES # BLD AUTO: 4 % (ref 14–44)
MCH RBC QN AUTO: 27.9 PG (ref 26.8–34.3)
MCHC RBC AUTO-ENTMCNC: 32.1 G/DL (ref 31.4–37.4)
MCV RBC AUTO: 87 FL (ref 82–98)
MONOCYTES # BLD AUTO: 0.17 THOUSAND/UL (ref 0–1.22)
MONOCYTES NFR BLD: 1 % (ref 4–12)
NEUTROPHILS # BLD MANUAL: 15.61 THOUSAND/UL (ref 1.85–7.62)
NEUTS BAND NFR BLD MANUAL: 10 % (ref 0–8)
NEUTS SEG NFR BLD AUTO: 84 % (ref 43–75)
NITRITE UR QL STRIP: NEGATIVE
PH UR STRIP.AUTO: 6 [PH]
PLATELET # BLD AUTO: 296 THOUSANDS/UL (ref 149–390)
PLATELET BLD QL SMEAR: ADEQUATE
PMV BLD AUTO: 9.9 FL (ref 8.9–12.7)
POTASSIUM SERPL-SCNC: 3.4 MMOL/L (ref 3.5–5.3)
PROCALCITONIN SERPL-MCNC: 2.92 NG/ML
PROT SERPL-MCNC: 6.5 G/DL (ref 6.4–8.4)
PROT UR STRIP-MCNC: NEGATIVE MG/DL
PROTHROMBIN TIME: 15.3 SECONDS (ref 12.3–15)
RBC # BLD AUTO: 4.63 MILLION/UL (ref 3.88–5.62)
RBC MORPH BLD: NORMAL
SL AMB  POCT GLUCOSE, UA: ABNORMAL
SL AMB LEUKOCYTE ESTERASE,UA: ABNORMAL
SL AMB POCT BILIRUBIN,UA: ABNORMAL
SL AMB POCT BLOOD,UA: ABNORMAL
SL AMB POCT CLARITY,UA: ABNORMAL
SL AMB POCT COLOR,UA: ABNORMAL
SL AMB POCT KETONES,UA: ABNORMAL
SL AMB POCT NITRITE,UA: ABNORMAL
SL AMB POCT PH,UA: 6
SL AMB POCT SPECIFIC GRAVITY,UA: 1
SL AMB POCT URINE PROTEIN: 300
SL AMB POCT UROBILINOGEN: 8
SODIUM SERPL-SCNC: 136 MMOL/L (ref 135–147)
SP GR UR STRIP.AUTO: <=1.005
UROBILINOGEN UR QL STRIP.AUTO: 0.2 E.U./DL
WBC # BLD AUTO: 16.61 THOUSAND/UL (ref 4.31–10.16)

## 2024-08-27 PROCEDURE — 81003 URINALYSIS AUTO W/O SCOPE: CPT

## 2024-08-27 PROCEDURE — 36415 COLL VENOUS BLD VENIPUNCTURE: CPT

## 2024-08-27 PROCEDURE — 84145 PROCALCITONIN (PCT): CPT

## 2024-08-27 PROCEDURE — 87186 SC STD MICRODIL/AGAR DIL: CPT

## 2024-08-27 PROCEDURE — 85007 BL SMEAR W/DIFF WBC COUNT: CPT

## 2024-08-27 PROCEDURE — 81002 URINALYSIS NONAUTO W/O SCOPE: CPT | Performed by: PHYSICIAN ASSISTANT

## 2024-08-27 PROCEDURE — 96367 TX/PROPH/DG ADDL SEQ IV INF: CPT

## 2024-08-27 PROCEDURE — S9088 SERVICES PROVIDED IN URGENT: HCPCS | Performed by: PHYSICIAN ASSISTANT

## 2024-08-27 PROCEDURE — 99214 OFFICE O/P EST MOD 30 MIN: CPT | Performed by: PHYSICIAN ASSISTANT

## 2024-08-27 PROCEDURE — 85610 PROTHROMBIN TIME: CPT

## 2024-08-27 PROCEDURE — 87086 URINE CULTURE/COLONY COUNT: CPT | Performed by: PHYSICIAN ASSISTANT

## 2024-08-27 PROCEDURE — 99285 EMERGENCY DEPT VISIT HI MDM: CPT

## 2024-08-27 PROCEDURE — 85027 COMPLETE CBC AUTOMATED: CPT

## 2024-08-27 PROCEDURE — 82248 BILIRUBIN DIRECT: CPT

## 2024-08-27 PROCEDURE — 74177 CT ABD & PELVIS W/CONTRAST: CPT

## 2024-08-27 PROCEDURE — 87077 CULTURE AEROBIC IDENTIFY: CPT

## 2024-08-27 PROCEDURE — 85730 THROMBOPLASTIN TIME PARTIAL: CPT

## 2024-08-27 PROCEDURE — 96375 TX/PRO/DX INJ NEW DRUG ADDON: CPT

## 2024-08-27 PROCEDURE — 96366 THER/PROPH/DIAG IV INF ADDON: CPT

## 2024-08-27 PROCEDURE — 93005 ELECTROCARDIOGRAM TRACING: CPT

## 2024-08-27 PROCEDURE — 87040 BLOOD CULTURE FOR BACTERIA: CPT

## 2024-08-27 PROCEDURE — 87154 CUL TYP ID BLD PTHGN 6+ TRGT: CPT

## 2024-08-27 PROCEDURE — 96365 THER/PROPH/DIAG IV INF INIT: CPT

## 2024-08-27 PROCEDURE — 83690 ASSAY OF LIPASE: CPT

## 2024-08-27 PROCEDURE — 83605 ASSAY OF LACTIC ACID: CPT

## 2024-08-27 PROCEDURE — 80053 COMPREHEN METABOLIC PANEL: CPT

## 2024-08-27 PROCEDURE — 71045 X-RAY EXAM CHEST 1 VIEW: CPT

## 2024-08-27 RX ORDER — SODIUM CHLORIDE, SODIUM GLUCONATE, SODIUM ACETATE, POTASSIUM CHLORIDE, MAGNESIUM CHLORIDE, SODIUM PHOSPHATE, DIBASIC, AND POTASSIUM PHOSPHATE .53; .5; .37; .037; .03; .012; .00082 G/100ML; G/100ML; G/100ML; G/100ML; G/100ML; G/100ML; G/100ML
1000 INJECTION, SOLUTION INTRAVENOUS ONCE
Status: COMPLETED | OUTPATIENT
Start: 2024-08-27 | End: 2024-08-27

## 2024-08-27 RX ORDER — METRONIDAZOLE 500 MG/100ML
500 INJECTION, SOLUTION INTRAVENOUS EVERY 8 HOURS
Status: DISCONTINUED | OUTPATIENT
Start: 2024-08-27 | End: 2024-08-28 | Stop reason: HOSPADM

## 2024-08-27 RX ORDER — ACETAMINOPHEN 325 MG/1
975 TABLET ORAL ONCE
Status: DISCONTINUED | OUTPATIENT
Start: 2024-08-27 | End: 2024-08-27 | Stop reason: CLARIF

## 2024-08-27 RX ORDER — CEFTRIAXONE 2 G/50ML
2000 INJECTION, SOLUTION INTRAVENOUS ONCE
Status: COMPLETED | OUTPATIENT
Start: 2024-08-27 | End: 2024-08-27

## 2024-08-27 RX ORDER — KETOROLAC TROMETHAMINE 30 MG/ML
15 INJECTION, SOLUTION INTRAMUSCULAR; INTRAVENOUS ONCE
Status: COMPLETED | OUTPATIENT
Start: 2024-08-27 | End: 2024-08-27

## 2024-08-27 RX ORDER — ONDANSETRON 2 MG/ML
4 INJECTION INTRAMUSCULAR; INTRAVENOUS ONCE
Status: DISCONTINUED | OUTPATIENT
Start: 2024-08-27 | End: 2024-08-28 | Stop reason: HOSPADM

## 2024-08-27 RX ORDER — IBUPROFEN 400 MG/1
800 TABLET, FILM COATED ORAL ONCE
Status: COMPLETED | OUTPATIENT
Start: 2024-08-27 | End: 2024-08-27

## 2024-08-27 RX ORDER — POTASSIUM CHLORIDE 1500 MG/1
40 TABLET, EXTENDED RELEASE ORAL ONCE
Status: COMPLETED | OUTPATIENT
Start: 2024-08-27 | End: 2024-08-27

## 2024-08-27 RX ADMIN — METRONIDAZOLE 500 MG: 500 INJECTION, SOLUTION INTRAVENOUS at 21:58

## 2024-08-27 RX ADMIN — POTASSIUM CHLORIDE 40 MEQ: 1500 TABLET, EXTENDED RELEASE ORAL at 18:11

## 2024-08-27 RX ADMIN — IBUPROFEN 800 MG: 400 TABLET, FILM COATED ORAL at 20:40

## 2024-08-27 RX ADMIN — SODIUM CHLORIDE, SODIUM GLUCONATE, SODIUM ACETATE, POTASSIUM CHLORIDE, MAGNESIUM CHLORIDE, SODIUM PHOSPHATE, DIBASIC, AND POTASSIUM PHOSPHATE 1000 ML: .53; .5; .37; .037; .03; .012; .00082 INJECTION, SOLUTION INTRAVENOUS at 17:24

## 2024-08-27 RX ADMIN — KETOROLAC TROMETHAMINE 15 MG: 30 INJECTION, SOLUTION INTRAMUSCULAR at 17:30

## 2024-08-27 RX ADMIN — CEFTRIAXONE 2000 MG: 2 INJECTION, SOLUTION INTRAVENOUS at 18:13

## 2024-08-27 RX ADMIN — ACETAMINOPHEN 975 MG: 325 TABLET ORAL at 16:32

## 2024-08-27 RX ADMIN — IOHEXOL 90 ML: 350 INJECTION, SOLUTION INTRAVENOUS at 18:03

## 2024-08-27 RX ADMIN — SODIUM CHLORIDE, SODIUM GLUCONATE, SODIUM ACETATE, POTASSIUM CHLORIDE, MAGNESIUM CHLORIDE, SODIUM PHOSPHATE, DIBASIC, AND POTASSIUM PHOSPHATE 1000 ML: .53; .5; .37; .037; .03; .012; .00082 INJECTION, SOLUTION INTRAVENOUS at 18:12

## 2024-08-27 NOTE — SEPSIS NOTE
"  Sepsis Note   Isaac Kramer 63 y.o. male MRN: [id number]  Unit/Bed#: ED 22 Encounter: [id number]       Initial Sepsis Screening       Row Name 08/27/24 1745                Is the patient's history suggestive of a new or worsening infection? Yes (Proceed)  -BS        Suspected source of infection acute abdominal infection  -BS        Indicate SIRS criteria Hyperthemia > 38.3C (100.9F) OR Hypothermia <36C (96.8F);Tachycardia > 90 bpm;Tachypnea > 20 resp per min;Leukocytosis (WBC > 66270 IJL) OR Leukopenia (WBC <4000 IJL) OR Bandemia (WBC >10% bands)  -BS        Are two or more of the above signs & symptoms of infection both present and new to the patient? Yes (Proceed)  -BS        Assess for evidence of organ dysfunction: Are any of the below criteria present within 6 hours of suspected infection and SIRS criteria that are NOT considered to be chronic conditions? Bilirubin > 2.0  -BS        Date of presentation of severe sepsis 08/27/24  -BS        Time of presentation of severe sepsis 1648  -BS        Sepsis Note: Click \"NEXT\" below (NOT \"close\") to generate sepsis note based on above information. YES (proceed by clicking \"NEXT\")  -BS                  User Key  (r) = Recorded By, (t) = Taken By, (c) = Cosigned By      Initials Name Provider Type    BRITTANY DuronC Physician Assistant                        There is no height or weight on file to calculate BMI.  Wt Readings from Last 1 Encounters:   08/27/24 62.1 kg (136 lb 12.8 oz)     IBW (Ideal Body Weight): 59.2 kg    Ideal body weight: 59.2 kg (130 lb 8.2 oz)  Adjusted ideal body weight: 60.3 kg (133 lb 0.4 oz)    "

## 2024-08-27 NOTE — ED PROVIDER NOTES
History  Chief Complaint   Patient presents with    Flank Pain     Patient is a 63-year-old male with relevant past medical history of hypertension, CAD, prostate cancer, GERD, hyperlipidemia, hypertension, and palpitations presenting with left flank pain x 1 day. He works as a  and was at work today and started with chills around 1:30 PM and then got dizzy and nauseous as he continued to work. He reports decreased urination and darker urine since yesterday. He started with left flank pain today. He rates the left flank pain at 3/10 nonradiating dull pain. He reported abdominal pain earlier but no longer has that. He presented the [facility] prior to here and they gave him Tylenol 975 mg and advised him to go to the emergency room with concerns for renal or biliary issue. No history of kidney stones. He denies headache, current dizziness, lightheadedness, numbness, tingling, visual changes, chest pain, shortness of breath, abdominal pain, vomiting, constipation, diarrhea, or dysuria.          History provided by:  Patient and relative (Son)   used: No    Flank Pain  Associated symptoms: chills, fatigue, fever and nausea    Associated symptoms: no chest pain, no constipation, no cough, no diarrhea, no dysuria, no hematuria, no shortness of breath, no sore throat and no vomiting        Prior to Admission Medications   Prescriptions Last Dose Informant Patient Reported? Taking?   aspirin 81 mg chewable tablet  Self Yes No   Sig: Chew 81 mg daily   atorvastatin (LIPITOR) 20 mg tablet   No No   Sig: Take 1 tablet (20 mg total) by mouth daily   lisinopril (ZESTRIL) 20 mg tablet  Self No No   Sig: Take 1 tablet (20 mg total) by mouth daily   Patient not taking: Reported on 8/19/2024   oxyCODONE-acetaminophen (Percocet) 5-325 mg per tablet  Self No No   Sig: Take 1-2 tablets by mouth every 6 (six) hours as needed for moderate pain for up to 12 doses Max Daily Amount: 8 tablets   Patient not taking:  Reported on 8/30/2023   senna (SENOKOT) 8.6 mg  Self No No   Sig: Take 1 tablet (8.6 mg total) by mouth daily Do not start before May 25, 2023.   Patient not taking: Reported on 8/30/2023      Facility-Administered Medications Last Administration Doses Remaining   acetaminophen (TYLENOL) tablet 975 mg 8/27/2024  4:32 PM 0          Past Medical History:   Diagnosis Date    Benign essential hypertension 05/08/2014    CAD (coronary artery disease)     s/p NAVA prox LAD and D1 7/28/2022    Cancer (HCC)     Prostate    Coronary artery disease involving native coronary artery of native heart without angina pectoris 08/09/2022    GERD (gastroesophageal reflux disease)     Hyperlipidemia     Hypertension     Other chest pain     Palpitations     Prostate cancer (HCC) 04/18/2023       Past Surgical History:   Procedure Laterality Date    CARDIAC CATHETERIZATION Left 7/28/2022    Procedure: Cardiac catheterization-left heart catheterization;  Surgeon: Sai Henry MD;  Location: [facility];  Service: Cardiology    CARDIAC CATHETERIZATION N/A 7/28/2022    Procedure: Cardiac pci;  Surgeon: Sai Henry MD;  Location: [facility];  Service: Cardiology    HERNIA REPAIR      OH LAPS SURG [id number] RPBIC RAD W/NRV SPARING ROBOT N/A 5/22/2023    Procedure: PROSTATECTOMY RADICAL & PELVIC LYMPH NODE DISSECTION  W/ ROBOT;  Surgeon: Otoniel Harmon MD;  Location: [facility];  Service: Urology    OH PROSTATE NEEDLE BIOPSY ANY APPROACH N/A 2/28/2023    Procedure: TRANSRECTAL MRI FUSION BIOPSY PROSTATE;  Surgeon: Milan Arellano MD;  Location: [facility];  Service: Urology       Family History   Problem Relation Age of Onset    No Known Problems Mother     No Known Problems Father      I have reviewed and agree with the history as documented.    E-Cigarette/Vaping    E-Cigarette Use Never User      E-Cigarette/Vaping Substances    Nicotine No     THC No     CBD No     Flavoring No     Other No     Unknown No       Social History     Tobacco Use    Smoking status: Every Day     Current packs/day: 0.00     Types: Cigarettes     Last attempt to quit: 2022     Years since quittin.6    Smokeless tobacco: Never   Vaping Use    Vaping status: Never Used   Substance Use Topics    Alcohol use: Not Currently    Drug use: No       Review of Systems   Constitutional:  Positive for chills, fatigue and fever.   HENT:  Negative for congestion, ear pain, rhinorrhea and sore throat.    Eyes:  Negative for pain and visual disturbance.   Respiratory:  Negative for cough and shortness of breath.    Cardiovascular:  Negative for chest pain and palpitations.   Gastrointestinal:  Positive for nausea. Negative for abdominal pain, constipation, diarrhea and vomiting.   Genitourinary:  Positive for decreased urine volume and flank pain. Negative for dysuria, frequency, hematuria and urgency.   Musculoskeletal:  Negative for arthralgias and back pain.   Skin:  Negative for color change and rash.   Neurological:  Negative for dizziness, seizures, syncope, light-headedness and headaches.   Psychiatric/Behavioral:  Negative for agitation and confusion.        Physical Exam  Physical Exam  Vitals and nursing note reviewed.   Constitutional:       General: He is not in acute distress.     Appearance: He is well-developed. He is ill-appearing and diaphoretic.   HENT:      Head: Normocephalic and atraumatic.   Eyes:      Conjunctiva/sclera: Conjunctivae normal.   Cardiovascular:      Rate and Rhythm: Regular rhythm. Tachycardia present.      Heart sounds: No murmur heard.  Pulmonary:      Effort: Pulmonary effort is normal. Tachypnea present. No respiratory distress.      Breath sounds: Normal breath sounds. No wheezing, rhonchi or rales.   Abdominal:      General: A surgical scar is present.      Palpations: Abdomen is soft.      Tenderness: There is abdominal tenderness in the right upper quadrant. There is left CVA tenderness. There is no  right CVA tenderness, guarding or rebound.   Musculoskeletal:         General: No swelling.      Cervical back: Neck supple.   Skin:     General: Skin is warm.      Capillary Refill: Capillary refill takes less than 2 seconds.      Coloration: Skin is jaundiced.   Neurological:      General: No focal deficit present.      Mental Status: He is alert and oriented to person, place, and time.      GCS: GCS eye subscore is 4. GCS verbal subscore is 5. GCS motor subscore is 6.   Psychiatric:         Mood and Affect: Mood normal.         Vital Signs  ED Triage Vitals [08/27/24 1656]   Temperature Pulse Respirations Blood Pressure SpO2   (!) 103 °F (39.4 °C) (!) 109 20 129/62 94 %      Temp Source Heart Rate Source Patient Position - Orthostatic VS BP Location FiO2 (%)   Oral Monitor Sitting Left arm --      Pain Score       3           Vitals:    08/27/24 1815 08/27/24 1830 08/27/24 1915 08/27/24 2045   BP: 113/59 110/60 115/60 119/64   Pulse: 94 93 92 84   Patient Position - Orthostatic VS:  Lying           Visual Acuity      ED Medications  Medications   ondansetron (ZOFRAN) injection 4 mg (4 mg Intravenous Not Given 8/27/24 1731)   metroNIDAZOLE (FLAGYL) IVPB (premix) 500 mg 100 mL (has no administration in time range)   multi-electrolyte (PLASMALYTE-A/ISOLYTE-S PH 7.4) IV solution 1,000 mL (0 mL Intravenous Stopped 8/27/24 1754)     Followed by   multi-electrolyte (PLASMALYTE-A/ISOLYTE-S PH 7.4) IV solution 1,000 mL (0 mL Intravenous Stopped 8/27/24 1842)   ketorolac (TORADOL) injection 15 mg (15 mg Intravenous Given 8/27/24 1730)   cefTRIAXone (ROCEPHIN) IVPB (premix in dextrose) 2,000 mg 50 mL (0 mg Intravenous Stopped 8/27/24 1843)   potassium chloride (Klor-Con M20) CR tablet 40 mEq (40 mEq Oral Given 8/27/24 1811)   iohexol (OMNIPAQUE) 350 MG/ML injection (MULTI-DOSE) 90 mL (90 mL Intravenous Given 8/27/24 1803)   ibuprofen (MOTRIN) tablet 800 mg (800 mg Oral Given 8/27/24 2040)       Diagnostic Studies  Results  Reviewed       Procedure Component Value Units Date/Time    Bilirubin, direct [[id number]]  (Abnormal) Collected: 08/27/24 1716    Lab Status: Final result Specimen: Blood from Arm, Right Updated: 08/27/24 1923     Bilirubin, Direct 2.45 mg/dL     UA w Reflex to Microscopic w Reflex to Culture [[id number]]  (Abnormal) Collected: 08/27/24 1813    Lab Status: Final result Specimen: Urine, Other Updated: 08/27/24 1821     Color, UA Yellow     Clarity, UA Clear     Specific Gravity, UA <=1.005     pH, UA 6.0     Leukocytes, UA Negative     Nitrite, UA Negative     Protein, UA Negative mg/dl      Glucose, UA Negative mg/dl      Ketones, UA Negative mg/dl      Urobilinogen, UA 0.2 E.U./dl      Bilirubin, UA 1+     Occult Blood, UA Negative    RBC Morphology Reflex Test [[id number]] Collected: 08/27/24 1716    Lab Status: Final result Specimen: Blood from Arm, Right Updated: 08/27/24 1801    Procalcitonin [[id number]]  (Abnormal) Collected: 08/27/24 1716    Lab Status: Final result Specimen: Blood from Arm, Right Updated: 08/27/24 1749     Procalcitonin 2.92 ng/ml     Hepatic function panel [[id number]]     Lab Status: No result Specimen: Blood     Comprehensive metabolic panel [[id number]]  (Abnormal) Collected: 08/27/24 1716    Lab Status: Final result Specimen: Blood from Arm, Right Updated: 08/27/24 1739     Sodium 136 mmol/L      Potassium 3.4 mmol/L      Chloride 105 mmol/L      CO2 18 mmol/L      ANION GAP 13 mmol/L      BUN 24 mg/dL      Creatinine 1.13 mg/dL      Glucose 109 mg/dL      Calcium 8.8 mg/dL       U/L       U/L      Alkaline Phosphatase 1,122 U/L      Total Protein 6.5 g/dL      Albumin 3.8 g/dL      Total Bilirubin 3.81 mg/dL      eGFR 67 ml/min/1.73sq m     Narrative:      National Kid[employer] Disease Foundation guidelines for Chronic Kidney Disease (CKD):     Stage 1 with normal or high GFR (GFR > 90 mL/min/1.73 square meters)    Stage 2 Mild CKD (GFR = 60-89 mL/min/1.73 square meters)    Stage  3A Moderate CKD (GFR = 45-59 mL/min/1.73 square meters)    Stage 3B Moderate CKD (GFR = 30-44 mL/min/1.73 square meters)    Stage 4 Severe CKD (GFR = 15-29 mL/min/1.73 square meters)    Stage 5 End Stage CKD (GFR <15 mL/min/1.73 square meters)  Note: GFR calculation is accurate only with a steady state creatinine    Lipase [[id number]]  (Normal) Collected: 08/27/24 1716    Lab Status: Final result Specimen: Blood from Arm, Right Updated: 08/27/24 1739     Lipase 63 u/L     Lactic acid [[id number]]  (Normal) Collected: 08/27/24 1716    Lab Status: Final result Specimen: Blood from Arm, Right Updated: 08/27/24 1738     LACTIC ACID 1.4 mmol/L     Narrative:      Result may be elevated if tourniquet was used during collection.    Protime-INR [[id number]]  (Abnormal) Collected: 08/27/24 1716    Lab Status: Final result Specimen: Blood from Arm, Right Updated: 08/27/24 1738     Protime 15.3 seconds      INR 1.16    Narrative:      INR Therapeutic Range    Indication                                             INR Range      Atrial Fibrillation                                               2.0-3.0  Hypercoagulable State                                    2.0.2.3  Left Ventricular Asist Device                            2.0-3.0  Mechanical Heart Valve                                  -    Aortic(with afib, MI, embolism, HF, LA enlargement,    and/or coagulopathy)                                     2.0-3.0 (2.5-3.5)     Mitral                                                             2.5-3.5  Prosthetic/Bioprosthetic Heart Valve               2.0-3.0  Venous thromboembolism (VTE: VT, PE        2.0-3.0    APTT [[id number]]  (Normal) Collected: 08/27/24 1716    Lab Status: Final result Specimen: Blood from Arm, Right Updated: 08/27/24 1738     PTT 28 seconds     Manual Differential(PHLEBS Do Not Order) [[id number]]  (Abnormal) Collected: 08/27/24 1716    Lab Status: Final result Specimen: Blood from Arm, Right Updated: 08/27/24  1737     Segmented % 84 %      Bands % 10 %      Lymphocytes % 4 %      Monocytes % 1 %      Eosinophils % 1 %      Basophils % 0 %      Absolute Neutrophils 15.61 Thousand/uL      Absolute Lymphocytes 0.66 Thousand/uL      Absolute Monocytes 0.17 Thousand/uL      Absolute Eosinophils 0.17 Thousand/uL      Absolute Basophils 0.00 Thousand/uL      Total Counted --     RBC Morphology Normal     Platelet Estimate Adequate    CBC and differential [[id number]]  (Abnormal) Collected: 08/27/24 1716    Lab Status: Final result Specimen: Blood from Arm, Right Updated: 08/27/24 1737     WBC 16.61 Thousand/uL      RBC 4.63 Million/uL      Hemoglobin 12.9 g/dL      Hematocrit 40.2 %      MCV 87 fL      MCH 27.9 pg      MCHC 32.1 g/dL      RDW 15.1 %      MPV 9.9 fL      Platelets 296 Thousands/uL     Narrative:      This is an appended report.  These results have been appended to a previously verified report.    Blood culture #1 [[id number]] Collected: 08/27/24 1716    Lab Status: In process Specimen: Blood from Arm, Right Updated: 08/27/24 1721    Blood culture #2 [[id number]] Collected: 08/27/24 1716    Lab Status: In process Specimen: Blood from Arm, Right Updated: 08/27/24 1721                   CT Abdomen pelvis with contrast   Final Result by Niall Deal MD (1960)      Mass in the lower pancreatic head region obstructing the CBD and pancreatic duct measuring 2.8 x 2.7 cm on image 2/78 suspicious for neoplasm. Origin of tumor could also be cholangiocarcinoma or duodenal carcinoma given the location.      Gallbladder distention due to biliary obstruction.      Nondilated fluid-filled loops of small bowel in the mid to lower abdomen suggesting an enteritis.         Workstation performed: [id number]         XR chest portable   ED Interpretation by Bear Pineda PA-C (08/27 1732)   No acute cardiopulmonary disease.                 Procedures  ECG 12 Lead Documentation Only    Date/Time: 8/27/2024 5:29  PM    Performed by: Bear Pineda PA-C  Authorized by: Bear Pineda PA-C    Indications / Diagnosis:  Sepsis  ECG reviewed by me, the ED Provider: yes    Patient location:  ED  Previous ECG:     Comparison to cardiac monitor: Yes    Interpretation:     Interpretation: abnormal    Rate:     ECG rate:  104    ECG rate assessment: tachycardic    Rhythm:     Rhythm: sinus tachycardia    Ectopy:     Ectopy: none    QRS:     QRS axis:  Left    QRS intervals:  Normal  Conduction:     Conduction: normal    ST segments:     ST segments:  Non-specific  T waves:     T waves: normal    Other findings:     Other findings: LAE    CriticalCare Time    Date/Time: 8/27/2024 5:00 PM    Performed by: Bear Pineda PA-C  Authorized by: Bear Pineda PA-C    Critical care provider statement:     Critical care time (minutes):  60    Critical care time was exclusive of:  Separately billable procedures and treating other patients and teaching time    Critical care was necessary to treat or prevent imminent or life-threatening deterioration of the following conditions:  Sepsis    Critical care was time spent personally by me on the following activities:  Blood draw for specimens, obtaining history from patient or surrogate, development of treatment plan with patient or surrogate, discussions with consultants, discussions with primary provider, examination of patient, evaluation of patient's response to treatment, interpretation of cardiac output measurements, ordering and performing treatments and interventions, ordering and review of laboratory studies, ordering and review of radiographic studies, re-evaluation of patient's condition and review of old charts    I assumed direction of critical care for this patient from another provider in my specialty: no    Comments:      IV fluids and IV antibiotics for severe sepsis.           ED Course  ED Course as of 08/27/24 2144   Tue Aug 27, 2024   1658 Temperature(!): 103 °F  (39.4 °C)  Fever and tachycardia. Other vital signs WNL. Ordered sepsis labs and called sepsis alert.   1726 WBC(!): 16.61  Significant leukocytosis. Concerns for abdominal versus renal infection. Will give Rocephin 2 g IV for early coverage. No anemia or platelet abnormality.   1738 Bands %(!): 10   1742 Potassium(!): 3.4  Mild hypokalemia. Will replete with oral potassium.   1742 ALK PHOS(!): 1,122  Marked transaminitis.   1743 Total Bilirubin(!): 3.81  Hyperbilirubinemia. Ordered hepatic function panel.   1743 LIPASE: 63   1743 LACTIC ACID: 1.4  Lactate WNL.   1754 Procalcitonin(!): 2.92  Procalcitonin elevated. Awaiting abdominal CT.   1823 UA w Reflex to Microscopic w Reflex to Culture(!)  Negative for UTI.   1841 Went over results thus far with patient. Tenderness with deep palpation to RUQ. Awaiting CT results.   2002 CT Abdomen pelvis with contrast  IMPRESSION:  Mass in the lower pancreatic head region obstructing the CBD and pancreatic duct measuring 2.8 x 2.7 cm on image 2/78 suspicious for neoplasm. Origin of tumor could also be cholangiocarcinoma or duodenal carcinoma given the location.  Gallbladder distention due to biliary obstruction.  Nondilated fluid-filled loops of small bowel in the mid to lower abdomen suggesting an enteritis.   2009 SC sent to general surgery and GI for their recommendations.   2011 Went over results with patient and son. Will give Motrin for hot and cold sweats.   2016 Per general surgery and GI, SLA or SLB are fine under medicine as he likely needs EUS versus Ercp versus stent and GI and surgical onc consult.    ADT 21 placed.    2031 Spoke to PACs on the phone and they will try to get in contact with critical care from [place].   2041 Critical care physician said given hemodynamic stability and normal lactate, he should be good for SD2.  PACs will call back with [facility] physician.                              Initial Sepsis Screening       Row Name 08/27/24 2818              "   Is the patient's history suggestive of a new or worsening infection? Yes (Proceed)  -BS        Suspected source of infection acute abdominal infection  -BS        Indicate SIRS criteria Hyperthemia > 38.3C (100.9F) OR Hypothermia <36C (96.8F);Tachycardia > 90 bpm;Tachypnea > 20 resp per min;Leukocytosis (WBC > 61499 IJL) OR Leukopenia (WBC <4000 IJL) OR Bandemia (WBC >10% bands)  -BS        Are two or more of the above signs & symptoms of infection both present and new to the patient? Yes (Proceed)  -BS        Assess for evidence of organ dysfunction: Are any of the below criteria present within 6 hours of suspected infection and SIRS criteria that are NOT considered to be chronic conditions? Bilirubin > 2.0  -BS        Date of presentation of severe sepsis 08/27/24  -BS        Time of presentation of severe sepsis 1648  -BS        Sepsis Note: Click \"NEXT\" below (NOT \"close\") to generate sepsis note based on above information. YES (proceed by clicking \"NEXT\")  -BS                  User Key  (r) = Recorded By, (t) = Taken By, (c) = Cosigned By      Initials Name Provider Type    BS Bear Pineda PA-C Physician Assistant                    SBIRT 20yo+      Flowsheet Row Most Recent Value   Initial Alcohol Screen: US AUDIT-C     1. How often do you have a drink containing alcohol? 0 Filed at: 08/27/2024 1657   2. How many drinks containing alcohol do you have on a typical day you are drinking?  0 Filed at: 08/27/2024 1657   3a. Male UNDER 65: How often do you have five or more drinks on one occasion? 0 Filed at: 08/27/2024 1657   3b. FEMALE Any Age, or MALE 65+: How often do you have 4 or more drinks on one occassion? 0 Filed at: 08/27/2024 1657   Audit-C Score 0 Filed at: 08/27/2024 1657   SONG: How many times in the past year have you...    Used an illegal drug or used a prescription medication for non-medical reasons? Never Filed at: 08/27/2024 1657                      Medical Decision Making  Patient is a " ill-appearing 63-year-old male presenting after referral from the urgent care for left flank pain and concerns for a renal or biliary issue.  Patient was tachycardic, tachypneic, and febrile on initial examination so sepsis alert was called and sepsis labs were ordered. Will get abdominal labs including CBC, CMP, and lipase and CT abdomen/pelvis w IV contrast to evaluate for acute abdominal pathology. Chest x-ray as part of the sepsis panel. Will evaluate urine for UTI with UA. He met criteria for severe sepsis. Normal lactate.  Brief focused differential diagnosis: sepsis, ureterolithiasis, obstructing stone, UTI, diverticulitis, GB disease, cholangitis, liver disease, obstruction, cancer  See ED course for interpretation of labs, imaging, and further medical decision making.   IV fluids 30cc/kg for sepsis fluids. Toradol for his flank pain and fever and Zofran for his nausea. He felt better after this. Initially ordered IV Rocephin for broad-spectrum sepsis coverage. Later ordered IV Flagyl for abdominal coverage given sepsis with possible duct and GB obstruction. See ED course for conversations with her GI and general surgery specialists. They recommended transfer for surgical oncology, GI, and possible IR services and ERCP. Critical care said he sounds stable for SD2 given hemodynamic stability. He was accepted to [facility] in [place].  Dispo: Patient will be transferred to [facility] under stepdown 2 for further workup and management. Patient signed out to Dr. Rojas with transfer paperwork completed and given to nurse. He will be going ALS with a monitor.     Amount and/or Complexity of Data Reviewed  External Data Reviewed: labs, radiology and notes.  Labs: ordered. Decision-making details documented in ED Course.  Radiology: ordered and independent interpretation performed. Decision-making details documented in ED Course.  ECG/medicine tests: ordered and independent interpretation  performed.    Risk  Prescription drug management.                 Disposition  Final diagnoses:   Severe sepsis (HCC)   Pancreatic mass   Enlarged gallbladder   Enteritis   Gallbladder obstruction   Increased bands   Elevated procalcitonin   Transaminitis   Hyperbilirubinemia   Hypokalemia     Time reflects when diagnosis was documented in both MDM as applicable and the Disposition within this note       Time User Action Codes Description Comment    8/27/2024  5:47 PM Bear Pineda Add [A41.9,  R65.20] Severe sepsis (HCC)     8/27/2024  8:02 PM Bear Pineda Add [K86.89] Pancreatic mass     8/27/2024  8:04 PM Bear Pineda Add [K82.8] Enlarged gallbladder     8/27/2024  8:05 PM Bear Pineda Add [K52.9] Enteritis     8/27/2024  8:32 PM Bear Pineda Add [K82.0] Gallbladder obstruction     8/27/2024  8:32 PM Bear Pineda Add [D72.825] Increased bands     8/27/2024  8:33 PM Bear Pineda Add [R79.89] Elevated procalcitonin     8/27/2024  8:33 PM Bear Pineda Add [R74.01] Transaminitis     8/27/2024  8:33 PM Bear Pineda Add [E80.6] Hyperbilirubinemia     8/27/2024  8:33 PM Bear Pineda Add [E87.6] Hypokalemia           ED Disposition       ED Disposition   Transfer to Another Facility-In Network    Condition   --    Date/Time   Tue Aug 27, 2024 2114    Comment   Isaca Kramer should be transferred out to [facility].               MD Documentation      Flowsheet Row Most Recent Value   Patient Condition The patient has been stabilized such that within reasonable medical probability, no material deterioration of the patient condition or the condition of the unborn child(belkis) is likely to result from the transfer   Reason for Transfer Level of Care needed not available at this facility   Benefits of Transfer Specialized equipment and/or services available at the receiving facility (Include comment)________________________  [Surgical oncology, GI]   Risks of Transfer  Potential for delay in receiving treatment, Potential deterioration of medical condition, Loss of IV, Increased discomfort during transfer   Accepting Physician Dr. Rodriguez   Accepting Facility Name, [facility]   Sending MD Dr. Elroy Rojas   Provider Certification General risk, such as traffic hazards, adverse weather conditions, rough terrain or turbulence, possible failure of equipment (including vehicle or aircraft), or consequences of actions of persons outside the control of the transport personnel, Unanticipated needs of medical equipment and personnel during transport, Risk of worsening condition, The possibility of a transport vehicle being unavailable          RN Documentation      Flowsheet Row Most Recent Value   Accepting Facility Name, [facility]          Follow-up Information    None         Patient's Medications   Discharge Prescriptions    No medications on file       No discharge procedures on file.    PDMP Review       None            ED Provider  Electronically Signed by             Bear Pineda PA-C  08/27/24 3512

## 2024-08-27 NOTE — PROGRESS NOTES
[facility]        NAME: Isaac Kramer is a 63 y.o. male  : 1960    MRN: [id number]  DATE: 2024  TIME: 4:31 PM    Assessment and Plan   Generalized abdominal pain [R10.84]  1. Generalized abdominal pain  POCT urine dip    Urine culture    acetaminophen (TYLENOL) tablet 975 mg    Transfer to other facility      2. Bilirubinuria  acetaminophen (TYLENOL) tablet 975 mg    Transfer to other facility            Patient Instructions       Follow up with PCP in 3-5 days.  Proceed to  ER if symptoms worsen.    If tests have been performed at [facility], our office will contact you with results if changes need to be made to the care plan discussed with you at the visit.  [facility]    Chief Complaint     Chief Complaint   Patient presents with    Abdominal Pain     Says stomach ache since today reporting slight diarrhea and dark urine     Fever     Low grade temp starting today, no covid test done or exposure          History of Present Illness       Patient is a 63 year old male presenting to [facility] with dizziness, abdominal discomfort and dark urine.  Patient reports symptoms began today.  Pt feels very lightheaded and sick to his stomach.  Pt has current temp of 100.9.  Urine has been very dark and decreased output.  Pt has not taken Tylenol or Motrin.    Abdominal Pain  This is a new problem. The current episode started today. The onset quality is gradual. The problem occurs constantly. The problem has been gradually worsening. The pain is located in the epigastric region and periumbilical region. Associated symptoms include a fever, headaches and nausea. Pertinent negatives include no arthralgias, dysuria, hematuria or vomiting.   Fever  Associated symptoms include abdominal pain, chills, a fever, headaches and nausea. Pertinent negatives include no arthralgias, chest pain, coughing, rash, sore throat or vomiting.       Review of Systems    Review of Systems   Constitutional:  Positive for chills and fever.   HENT:  Negative for ear pain and sore throat.    Eyes:  Negative for pain and visual disturbance.   Respiratory:  Negative for cough and shortness of breath.    Cardiovascular:  Negative for chest pain and palpitations.   Gastrointestinal:  Positive for abdominal pain and nausea. Negative for vomiting.   Genitourinary:  Negative for dysuria and hematuria.   Musculoskeletal:  Negative for arthralgias and back pain.   Skin:  Negative for color change and rash.   Neurological:  Positive for dizziness and headaches. Negative for seizures and syncope.   All other systems reviewed and are negative.        Current Medications       Current Outpatient Medications:     aspirin 81 mg chewable tablet, Chew 81 mg daily, Disp: , Rfl:     atorvastatin (LIPITOR) 20 mg tablet, Take 1 tablet (20 mg total) by mouth daily, Disp: 30 tablet, Rfl: 5    lisinopril (ZESTRIL) 20 mg tablet, Take 1 tablet (20 mg total) by mouth daily (Patient not taking: Reported on 8/19/2024), Disp: 90 tablet, Rfl: 1    oxyCODONE-acetaminophen (Percocet) 5-325 mg per tablet, Take 1-2 tablets by mouth every 6 (six) hours as needed for moderate pain for up to 12 doses Max Daily Amount: 8 tablets (Patient not taking: Reported on 8/30/2023), Disp: 12 tablet, Rfl: 0    senna (SENOKOT) 8.6 mg, Take 1 tablet (8.6 mg total) by mouth daily Do not start before May 25, 2023. (Patient not taking: Reported on 8/30/2023), Disp: 30 tablet, Rfl: 0    Current Facility-Administered Medications:     acetaminophen (TYLENOL) tablet 975 mg, 975 mg, Oral, Once,     Current Allergies     Allergies as of 08/27/2024    (No Known Allergies)            The following portions of the patient's history were reviewed and updated as appropriate: allergies, current medications, past family history, past medical history, past social history, past surgical history and problem list.     Past Medical History:   Diagnosis  "Date    Benign essential hypertension 05/08/2014    CAD (coronary artery disease)     s/p NAVA prox LAD and D1 7/28/2022    Cancer (HCC)     Prostate    Coronary artery disease involving native coronary artery of native heart without angina pectoris 08/09/2022    GERD (gastroesophageal reflux disease)     Hyperlipidemia     Hypertension     Other chest pain     Palpitations     Prostate cancer (HCC) 04/18/2023       Past Surgical History:   Procedure Laterality Date    CARDIAC CATHETERIZATION Left 7/28/2022    Procedure: Cardiac catheterization-left heart catheterization;  Surgeon: Sai Henry MD;  Location: [facility];  Service: Cardiology    CARDIAC CATHETERIZATION N/A 7/28/2022    Procedure: Cardiac pci;  Surgeon: Sai Henry MD;  Location: [facility];  Service: Cardiology    HERNIA REPAIR      NY LAPS SURG [id number] RPBIC RAD W/NRV SPARING ROBOT N/A 5/22/2023    Procedure: PROSTATECTOMY RADICAL & PELVIC LYMPH NODE DISSECTION  W/ ROBOT;  Surgeon: Otoniel Harmon MD;  Location: [facility];  Service: Urology    NY PROSTATE NEEDLE BIOPSY ANY APPROACH N/A 2/28/2023    Procedure: TRANSRECTAL MRI FUSION BIOPSY PROSTATE;  Surgeon: Milan Arellano MD;  Location: [facility];  Service: Urology       Family History   Problem Relation Age of Onset    No Known Problems Mother     No Known Problems Father          Medications have been verified.        Objective   /51   Pulse (!) 119   Temp (!) 100.9 °F (38.3 °C)   Resp 20   Ht 5' 4\" (1.626 m)   Wt 62.1 kg (136 lb 12.8 oz)   SpO2 97%   BMI 23.48 kg/m²   No LMP for male patient.       Physical Exam     Physical Exam  Constitutional:       Appearance: Normal appearance. He is normal weight.   HENT:      Head: Normocephalic and atraumatic.      Nose: Nose normal.      Mouth/Throat:      Mouth: Mucous membranes are dry.   Eyes:      Extraocular Movements: Extraocular movements intact.      Conjunctiva/sclera: Conjunctivae normal.      " Pupils: Pupils are equal, round, and reactive to light.   Cardiovascular:      Rate and Rhythm: Regular rhythm. Tachycardia present.      Heart sounds: No murmur heard.     No friction rub. No gallop.   Pulmonary:      Effort: Pulmonary effort is normal.      Breath sounds: No wheezing, rhonchi or rales.   Abdominal:      Tenderness: There is no abdominal tenderness. There is no guarding or rebound.      Hernia: No hernia is present.   Musculoskeletal:         General: Normal range of motion.      Cervical back: Normal range of motion and neck supple.   Skin:     General: Skin is warm and dry.   Neurological:      General: No focal deficit present.      Mental Status: He is alert and oriented to person, place, and time.   Psychiatric:         Mood and Affect: Mood normal.         Behavior: Behavior normal.

## 2024-08-28 ENCOUNTER — APPOINTMENT (INPATIENT)
Dept: CT IMAGING | Facility: HOSPITAL | Age: 64
DRG: 435 | End: 2024-08-28
Payer: MEDICARE

## 2024-08-28 ENCOUNTER — HOSPITAL ENCOUNTER (INPATIENT)
Facility: HOSPITAL | Age: 64
LOS: 3 days | Discharge: HOME | DRG: 435 | End: 2024-08-31
Attending: STUDENT IN AN ORGANIZED HEALTH CARE EDUCATION/TRAINING PROGRAM | Admitting: INTERNAL MEDICINE
Payer: MEDICARE

## 2024-08-28 VITALS
SYSTOLIC BLOOD PRESSURE: 156 MMHG | RESPIRATION RATE: 17 BRPM | OXYGEN SATURATION: 97 % | TEMPERATURE: 97.8 F | DIASTOLIC BLOOD PRESSURE: 77 MMHG | HEART RATE: 64 BPM

## 2024-08-28 DIAGNOSIS — I10 BENIGN ESSENTIAL HYPERTENSION: ICD-10-CM

## 2024-08-28 DIAGNOSIS — R79.89 ELEVATED LFTS: ICD-10-CM

## 2024-08-28 DIAGNOSIS — K83.09 ACUTE OBSTRUCTIVE CHOLANGITIS: ICD-10-CM

## 2024-08-28 DIAGNOSIS — K86.89 PANCREATIC MASS: ICD-10-CM

## 2024-08-28 DIAGNOSIS — C22.1 CHOLANGIOCARCINOMA (HCC): Primary | ICD-10-CM

## 2024-08-28 PROBLEM — Z13.31 NEGATIVE DEPRESSION SCREENING: Status: RESOLVED | Noted: 2020-06-12 | Resolved: 2024-08-28

## 2024-08-28 PROBLEM — R97.20 ELEVATED PSA: Status: RESOLVED | Noted: 2022-10-10 | Resolved: 2024-08-28

## 2024-08-28 PROBLEM — L42 PITYRIASIS ROSEA: Status: RESOLVED | Noted: 2020-02-08 | Resolved: 2024-08-28

## 2024-08-28 PROBLEM — R65.10 SIRS (SYSTEMIC INFLAMMATORY RESPONSE SYNDROME) (HCC): Status: ACTIVE | Noted: 2024-08-28

## 2024-08-28 PROBLEM — R07.9 CHEST PAIN: Status: RESOLVED | Noted: 2022-06-20 | Resolved: 2024-08-28

## 2024-08-28 PROBLEM — R00.2 PALPITATIONS: Status: RESOLVED | Noted: 2022-06-20 | Resolved: 2024-08-28

## 2024-08-28 PROBLEM — K52.9 ENTERITIS: Status: ACTIVE | Noted: 2024-08-28

## 2024-08-28 LAB
ALBUMIN SERPL BCG-MCNC: 3.3 G/DL (ref 3.5–5)
ALP SERPL-CCNC: 882 U/L (ref 34–104)
ALT SERPL W P-5'-P-CCNC: 143 U/L (ref 7–52)
ANION GAP SERPL CALCULATED.3IONS-SCNC: 8 MMOL/L (ref 4–13)
AST SERPL W P-5'-P-CCNC: 154 U/L (ref 13–39)
ATRIAL RATE: 104 BPM
BACTERIA UR CULT: NORMAL
BASOPHILS # BLD AUTO: 0.06 THOUSANDS/ÂΜL (ref 0–0.1)
BASOPHILS NFR BLD AUTO: 1 % (ref 0–1)
BILIRUB SERPL-MCNC: 3.55 MG/DL (ref 0.2–1)
BUN SERPL-MCNC: 22 MG/DL (ref 5–25)
CALCIUM ALBUM COR SERPL-MCNC: 8.7 MG/DL (ref 8.3–10.1)
CALCIUM SERPL-MCNC: 8.1 MG/DL (ref 8.4–10.2)
CEA SERPL-MCNC: 0.7 NG/ML (ref 0–3)
CHLORIDE SERPL-SCNC: 109 MMOL/L (ref 96–108)
CO2 SERPL-SCNC: 22 MMOL/L (ref 21–32)
CREAT SERPL-MCNC: 0.76 MG/DL (ref 0.6–1.3)
EOSINOPHIL # BLD AUTO: 0.47 THOUSAND/ÂΜL (ref 0–0.61)
EOSINOPHIL NFR BLD AUTO: 4 % (ref 0–6)
ERYTHROCYTE [DISTWIDTH] IN BLOOD BY AUTOMATED COUNT: 15.2 % (ref 11.6–15.1)
GFR SERPL CREATININE-BSD FRML MDRD: 95 ML/MIN/1.73SQ M
GLUCOSE SERPL-MCNC: 88 MG/DL (ref 65–140)
HCT VFR BLD AUTO: 35.1 % (ref 36.5–49.3)
HGB BLD-MCNC: 11.5 G/DL (ref 12–17)
IMM GRANULOCYTES # BLD AUTO: 0.06 THOUSAND/UL (ref 0–0.2)
IMM GRANULOCYTES NFR BLD AUTO: 1 % (ref 0–2)
INR PPP: 1.27 (ref 0.85–1.19)
LIPASE SERPL-CCNC: 26 U/L (ref 11–82)
LYMPHOCYTES # BLD AUTO: 0.87 THOUSANDS/ÂΜL (ref 0.6–4.47)
LYMPHOCYTES NFR BLD AUTO: 7 % (ref 14–44)
MCH RBC QN AUTO: 27.8 PG (ref 26.8–34.3)
MCHC RBC AUTO-ENTMCNC: 32.8 G/DL (ref 31.4–37.4)
MCV RBC AUTO: 85 FL (ref 82–98)
MONOCYTES # BLD AUTO: 0.56 THOUSAND/ÂΜL (ref 0.17–1.22)
MONOCYTES NFR BLD AUTO: 4 % (ref 4–12)
NEUTROPHILS # BLD AUTO: 11.13 THOUSANDS/ÂΜL (ref 1.85–7.62)
NEUTS SEG NFR BLD AUTO: 83 % (ref 43–75)
NRBC BLD AUTO-RTO: 0 /100 WBCS
P AXIS: 51 DEGREES
PLATELET # BLD AUTO: 247 THOUSANDS/UL (ref 149–390)
PMV BLD AUTO: 10.5 FL (ref 8.9–12.7)
POTASSIUM SERPL-SCNC: 3.7 MMOL/L (ref 3.5–5.3)
PR INTERVAL: 168 MS
PROCALCITONIN SERPL-MCNC: 22.18 NG/ML
PROT SERPL-MCNC: 5.6 G/DL (ref 6.4–8.4)
PROTHROMBIN TIME: 16.1 SECONDS (ref 12.3–15)
QRS AXIS: -53 DEGREES
QRSD INTERVAL: 86 MS
QT INTERVAL: 324 MS
QTC INTERVAL: 426 MS
RBC # BLD AUTO: 4.14 MILLION/UL (ref 3.88–5.62)
SODIUM SERPL-SCNC: 139 MMOL/L (ref 135–147)
T WAVE AXIS: 37 DEGREES
VENTRICULAR RATE: 104 BPM
WBC # BLD AUTO: 13.15 THOUSAND/UL (ref 4.31–10.16)

## 2024-08-28 PROCEDURE — 99222 1ST HOSP IP/OBS MODERATE 55: CPT | Performed by: INTERNAL MEDICINE

## 2024-08-28 PROCEDURE — 85610 PROTHROMBIN TIME: CPT

## 2024-08-28 PROCEDURE — 83690 ASSAY OF LIPASE: CPT

## 2024-08-28 PROCEDURE — 93010 ELECTROCARDIOGRAM REPORT: CPT | Performed by: INTERNAL MEDICINE

## 2024-08-28 PROCEDURE — NC001 PR NO CHARGE: Performed by: STUDENT IN AN ORGANIZED HEALTH CARE EDUCATION/TRAINING PROGRAM

## 2024-08-28 PROCEDURE — 82378 CARCINOEMBRYONIC ANTIGEN: CPT

## 2024-08-28 PROCEDURE — 99223 1ST HOSP IP/OBS HIGH 75: CPT | Performed by: INTERNAL MEDICINE

## 2024-08-28 PROCEDURE — 71250 CT THORAX DX C-: CPT

## 2024-08-28 PROCEDURE — 84145 PROCALCITONIN (PCT): CPT

## 2024-08-28 PROCEDURE — 80053 COMPREHEN METABOLIC PANEL: CPT

## 2024-08-28 PROCEDURE — 85025 COMPLETE CBC W/AUTO DIFF WBC: CPT

## 2024-08-28 PROCEDURE — 86301 IMMUNOASSAY TUMOR CA 19-9: CPT

## 2024-08-28 RX ORDER — ONDANSETRON 2 MG/ML
4 INJECTION INTRAMUSCULAR; INTRAVENOUS EVERY 6 HOURS PRN
Status: DISCONTINUED | OUTPATIENT
Start: 2024-08-28 | End: 2024-08-31 | Stop reason: HOSPADM

## 2024-08-28 RX ORDER — ENOXAPARIN SODIUM 100 MG/ML
40 INJECTION SUBCUTANEOUS DAILY
Status: DISCONTINUED | OUTPATIENT
Start: 2024-08-28 | End: 2024-08-29

## 2024-08-28 RX ORDER — POLYETHYLENE GLYCOL 3350 17 G/17G
17 POWDER, FOR SOLUTION ORAL DAILY
Status: DISCONTINUED | OUTPATIENT
Start: 2024-08-28 | End: 2024-08-31 | Stop reason: HOSPADM

## 2024-08-28 RX ORDER — ACETAMINOPHEN 325 MG/1
650 TABLET ORAL EVERY 6 HOURS PRN
Status: DISCONTINUED | OUTPATIENT
Start: 2024-08-28 | End: 2024-08-31 | Stop reason: HOSPADM

## 2024-08-28 RX ORDER — ASPIRIN 81 MG/1
81 TABLET, CHEWABLE ORAL DAILY
Status: DISCONTINUED | OUTPATIENT
Start: 2024-08-28 | End: 2024-08-31 | Stop reason: HOSPADM

## 2024-08-28 RX ORDER — OXYCODONE HYDROCHLORIDE 5 MG/1
5 TABLET ORAL EVERY 6 HOURS PRN
Status: DISCONTINUED | OUTPATIENT
Start: 2024-08-28 | End: 2024-08-28

## 2024-08-28 RX ORDER — HYDROMORPHONE HCL/PF 1 MG/ML
0.5 SYRINGE (ML) INJECTION EVERY 4 HOURS PRN
Status: DISCONTINUED | OUTPATIENT
Start: 2024-08-28 | End: 2024-08-31 | Stop reason: HOSPADM

## 2024-08-28 RX ORDER — OXYCODONE HYDROCHLORIDE 10 MG/1
10 TABLET ORAL EVERY 4 HOURS PRN
Status: DISCONTINUED | OUTPATIENT
Start: 2024-08-28 | End: 2024-08-31 | Stop reason: HOSPADM

## 2024-08-28 RX ORDER — POTASSIUM CHLORIDE 14.9 MG/ML
20 INJECTION INTRAVENOUS ONCE
Status: COMPLETED | OUTPATIENT
Start: 2024-08-28 | End: 2024-08-28

## 2024-08-28 RX ORDER — MAGNESIUM HYDROXIDE/ALUMINUM HYDROXICE/SIMETHICONE 120; 1200; 1200 MG/30ML; MG/30ML; MG/30ML
30 SUSPENSION ORAL EVERY 6 HOURS PRN
Status: DISCONTINUED | OUTPATIENT
Start: 2024-08-28 | End: 2024-08-31 | Stop reason: HOSPADM

## 2024-08-28 RX ORDER — SODIUM CHLORIDE 9 MG/ML
100 INJECTION, SOLUTION INTRAVENOUS CONTINUOUS
Status: DISCONTINUED | OUTPATIENT
Start: 2024-08-29 | End: 2024-08-30

## 2024-08-28 RX ORDER — NICOTINE 21 MG/24HR
14 PATCH, TRANSDERMAL 24 HOURS TRANSDERMAL DAILY
Status: DISCONTINUED | OUTPATIENT
Start: 2024-08-28 | End: 2024-08-28

## 2024-08-28 RX ORDER — OXYCODONE HYDROCHLORIDE 5 MG/1
5 TABLET ORAL EVERY 4 HOURS PRN
Status: DISCONTINUED | OUTPATIENT
Start: 2024-08-28 | End: 2024-08-31 | Stop reason: HOSPADM

## 2024-08-28 RX ADMIN — ASPIRIN 81 MG CHEWABLE TABLET 81 MG: 81 TABLET CHEWABLE at 08:17

## 2024-08-28 RX ADMIN — POTASSIUM CHLORIDE 20 MEQ: 14.9 INJECTION, SOLUTION INTRAVENOUS at 08:38

## 2024-08-28 RX ADMIN — ENOXAPARIN SODIUM 40 MG: 40 INJECTION SUBCUTANEOUS at 08:17

## 2024-08-28 RX ADMIN — PIPERACILLIN SODIUM AND TAZOBACTAM SODIUM 4.5 G: 36; 4.5 INJECTION, POWDER, LYOPHILIZED, FOR SOLUTION INTRAVENOUS at 22:58

## 2024-08-28 RX ADMIN — PIPERACILLIN SODIUM AND TAZOBACTAM SODIUM 4.5 G: 36; 4.5 INJECTION, POWDER, LYOPHILIZED, FOR SOLUTION INTRAVENOUS at 05:38

## 2024-08-28 RX ADMIN — SODIUM CHLORIDE 100 ML/HR: 0.9 INJECTION, SOLUTION INTRAVENOUS at 23:02

## 2024-08-28 RX ADMIN — PIPERACILLIN SODIUM AND TAZOBACTAM SODIUM 4.5 G: 36; 4.5 INJECTION, POWDER, LYOPHILIZED, FOR SOLUTION INTRAVENOUS at 14:11

## 2024-08-28 RX ADMIN — PIPERACILLIN SODIUM AND TAZOBACTAM SODIUM 4.5 G: 36; 4.5 INJECTION, POWDER, LYOPHILIZED, FOR SOLUTION INTRAVENOUS at 03:09

## 2024-08-28 NOTE — ASSESSMENT & PLAN NOTE
No GI complaints reported  CT abd/pelvis reviewed nondilated fluid-filled loops of small bowel in mid/lower abdomen suggesting enteritis     Plan:  NPO pending possible procedures, but advance diet as tolerated  Supportive care

## 2024-08-28 NOTE — ASSESSMENT & PLAN NOTE
SIRS present fever, tachycardia, leukocytosis. Procal 2.92, lactate 1.4  Sources: biliary, enteritis    Plan:  IVF: 30cc/kg in ED, hemodynamics intact monitor need for further fluids   ABX d1: ceftriaxone/flagyl in ED, continue with Zosyn  Trend infectious markers, follow culture data

## 2024-08-28 NOTE — PLAN OF CARE
Problem: PAIN - ADULT  Goal: Verbalizes/displays adequate comfort level or baseline comfort level  Description: Interventions:  - Encourage patient to monitor pain and request assistance  - Assess pain using appropriate pain scale  - Administer analgesics based on type and severity of pain and evaluate response  - Implement non-pharmacological measures as appropriate and evaluate response  - Consider cultural and social influences on pain and pain management  - Notify physician/advanced practitioner if interventions unsuccessful or patient reports new pain  Outcome: Progressing     Problem: INFECTION - ADULT  Goal: Absence or prevention of progression during hospitalization  Description: INTERVENTIONS:  - Assess and monitor for signs and symptoms of infection  - Monitor lab/diagnostic results  - Monitor all insertion sites, i.e. indwelling lines, tubes, and drains  - Monitor endotracheal if appropriate and nasal secretions for changes in amount and color  - [place] appropriate cooling/warming therapies per order  - Administer medications as ordered  - Instruct and encourage patient and family to use good hand hygiene technique  - Identify and instruct in appropriate isolation precautions for identified infection/condition  Outcome: Progressing  Goal: Absence of fever/infection during neutropenic period  Description: INTERVENTIONS:  - Monitor WBC    Outcome: Progressing     Problem: SAFETY ADULT  Goal: Patient will remain free of falls  Description: INTERVENTIONS:  - Educate patient/family on patient safety including physical limitations  - Instruct patient to call for assistance with activity   - Consult OT/PT to assist with strengthening/mobility   - Keep Call bell within reach  - Keep bed low and locked with side rails adjusted as appropriate  - Keep care items and personal belongings within reach  - Initiate and maintain comfort rounds  - Make Fall Risk Sign visible to staff  - Offer Toileting every 2 Hours,  in advance of need  - Initiate/Maintain bed alarm  - Obtain necessary fall risk management equipment: In place   - Apply yellow socks and bracelet for high fall risk patients  - Consider moving patient to room near nurses station  Outcome: Progressing  Goal: Maintain or return to baseline ADL function  Description: INTERVENTIONS:  -  Assess patient's ability to carry out ADLs; assess patient's baseline for ADL function and identify physical deficits which impact ability to perform ADLs (bathing, care of mouth/teeth, toileting, grooming, dressing, etc.)  - Assess/evaluate cause of self-care deficits   - Assess range of motion  - Assess patient's mobility; develop plan if impaired  - Assess patient's need for assistive devices and provide as appropriate  - Encourage maximum independence but intervene and supervise when necessary  - Involve family in performance of ADLs  - Assess for home care needs following discharge   - Consider OT consult to assist with ADL evaluation and planning for discharge  - Provide patient education as appropriate  Outcome: Progressing  Goal: Maintains/Returns to pre admission functional level  Description: INTERVENTIONS:  - Perform AM-PAC 6 Click Basic Mobility/ Daily Activity assessment daily.  - Set and communicate daily mobility goal to care team and patient/family/caregiver.   - Collaborate with rehabilitation services on mobility goals if consulted  - Perform Range of Motion 3 times a day.  - Reposition patient every 2 hours.  - Dangle patient 3 times a day  - Stand patient 3 times a day  - Ambulate patient 3 times a day  - Out of bed to chair 3 times a day   - Out of bed for meals 3 times a day  - Out of bed for toileting  - Record patient progress and toleration of activity level   Outcome: Progressing     Problem: DISCHARGE PLANNING  Goal: Discharge to home or other facility with appropriate resources  Description: INTERVENTIONS:  - Identify barriers to discharge w/patient and  caregiver  - Arrange for needed discharge resources and transportation as appropriate  - Identify discharge learning needs (meds, wound care, etc.)  - Arrange for interpretive services to assist at discharge as needed  - Refer to Case Management Department for coordinating discharge planning if the patient needs post-hospital services based on physician/advanced practitioner order or complex needs related to functional status, cognitive ability, or social support system  Outcome: Progressing     Problem: Knowledge Deficit  Goal: Patient/family/caregiver demonstrates understanding of disease process, treatment plan, medications, and discharge instructions  Description: Complete learning assessment and assess knowledge base.  Interventions:  - Provide teaching at level of understanding  - Provide teaching via preferred learning methods  Outcome: Progressing

## 2024-08-28 NOTE — ED CARE HANDOFF
Emergency Department Sign Out Note        Sign out and transfer of care from Bear Pineda PA-C. See Separate Emergency Department note.     The patient, Isaac Kramer, was evaluated by the previous provider for flank pain.    Workup Completed:  Labs  CT    ED Course / Workup Pending (followup):  Transfer                                  ED Course as of 08/27/24 2121   Tue Aug 27, 2024   2120 SO: flank pain, septic, pancreatic mass with obstruction, ERCP vs stent placement, going to [place], [facility]     Procedures  Medical Decision Making  Patient is a 63-year-old male who presents to the emergency department secondary to flank pain.  Patient was evaluated by the previous shift and found to be meeting sepsis criteria.  She was found to have a pancreatic mass on imaging with obstruction and was recommended ERCP versus stent placement.  He was pending transfer to [facility] at the time of signout.    Amount and/or Complexity of Data Reviewed  Labs: ordered.  Radiology: ordered and independent interpretation performed.    Risk  Prescription drug management.            Disposition  Final diagnoses:   Severe sepsis (HCC)   Pancreatic mass   Enlarged gallbladder   Enteritis   Gallbladder obstruction   Increased bands   Elevated procalcitonin   Transaminitis   Hyperbilirubinemia   Hypokalemia     Time reflects when diagnosis was documented in both MDM as applicable and the Disposition within this note       Time User Action Codes Description Comment    8/27/2024  5:47 PM Bear Pineda Add [A41.9,  R65.20] Severe sepsis (HCC)     8/27/2024  8:02 PM Bear Pineda Add [K86.89] Pancreatic mass     8/27/2024  8:04 PM Bear Pineda Add [K82.8] Enlarged gallbladder     8/27/2024  8:05 PM Bear Pineda Add [K52.9] Enteritis     8/27/2024  8:32 PM Bear Pineda Add [K82.0] Gallbladder obstruction     8/27/2024  8:32 PM Bear Pineda Add [D72.825] Increased bands     8/27/2024  8:33 PM Edwin  Bear Add [R79.89] Elevated procalcitonin     8/27/2024  8:33 PM Bear Pineda Add [R74.01] Transaminitis     8/27/2024  8:33 PM Bear Pineda Add [E80.6] Hyperbilirubinemia     8/27/2024  8:33 PM Bear Pineda Add [E87.6] Hypokalemia           ED Disposition       ED Disposition   Transfer to Another Facility-In Network    Condition   --    Date/Time   Tue Aug 27, 2024  9:14 PM    Comment   Isaac Kramer should be transferred out to [facility].               MD Documentation      Flowsheet Row Most Recent Value   Patient Condition The patient has been stabilized such that within reasonable medical probability, no material deterioration of the patient condition or the condition of the unborn child(belkis) is likely to result from the transfer   Reason for Transfer Level of Care needed not available at this facility   Benefits of Transfer Specialized equipment and/or services available at the receiving facility (Include comment)________________________  [Surgical oncology, GI]   Risks of Transfer Potential for delay in receiving treatment, Potential deterioration of medical condition, Loss of IV, Increased discomfort during transfer   Accepting Physician Dr. Rodriguez   Accept[facility]   Sending MD Dr. BATISTA Documentation      Flowsheet Row Most Recent Value   Accepting Facility Name, [facility]          Follow-up Information    None       Patient's Medications   Discharge Prescriptions    No medications on file     No discharge procedures on file.       ED Provider  Electronically Signed by     Bobo Rojas DO  08/28/24 0051

## 2024-08-28 NOTE — ASSESSMENT & PLAN NOTE
New mass in pancreatic/biliary region not seen on CT imaging on 05/28/2023. Patient reports 14lb unintentional weight loss this past year. Hx of prostate cancer    Plan:  Check Ca 19-9, CEA  GI, surgical oncology consulted

## 2024-08-28 NOTE — CONSULTS
Consultation - Surgical Oncology  : General Surgery Resident role  Isaac Kramer 63 y.o. male MRN: [id number]  Unit/Bed#: E4 -01 Encounter: [id number]        Assessment:  63 y.o. year old male with history of HTN, CAD, prostate cancer, tobacco use who presented with flank and abdominal pain with CT findings of mass in lower pancreatic head region. Patient has an improving tbili of 3.53 and is currently hemodynamically stable.     ECOG score: 0, patient is able to walk a quarter mile without difficulties, climb multiple flights of stairs, and work at his job as a  without difficulties.     Plan:  -Recommend outpatient follow up with surgical oncology  -Will follow up tumor markers  -Will follow up GI plans for possible ERCP, possible stent, possible EUS with biopsy.   -Will obtain CT chest for staging.   -Rest of care per primary team    HPI:  Isaac Kramer is a 63 y.o. male with a history of HTN, CAD, prostate cancer, tobacco use, HLD who presented originally to [facility] ED for evaluation of abdominal pain, chills, dizziness, and nausea.     At that time, he reported darker urine and decreased urine output. His abdominal pain were described as dull and achy and were present for approximately 1 day before presentation. He reports that the pain has been intermittent since then and is at times a 7-8/10. He also reported associated chills while working at his job as a . Denies vomiting, fevers, chest pain, shortness of breath, dysuria, constipation. Has had some episodes of diarrhea. Patient does report a 14lb unintentional weight loss over the past year but does not endorse any changes in his appetite. Does report night sweats.     Patient had a CT completed on 8/27 which demonstrated a mass in the lower pancreatic head region that is obstructing the CBD and pancreatic duct measuring 2.8 x 2.7cm that is suspicious for neoplasm. 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 Patient also had gallbladder distention secondary to biliary obstruction. Labs on admission significant for Alk phos 1122, , , Tbili 3.81, lipase 63. Transaminitis and alk phos have improved from yesterday. Most recent Tbili 3.55. Procal 22.18. CEA and CA 19-9 pending.    For his previous history, he has a history of prostate cancer which was removed in May 2023. He denies any other abdominal surgeries or other cancer history. He does not remember if he has any family history of cancer. He is a current smoker and smokes 4 cigarettes daily. Drinks approximately 6 drinks per week.     Does have a previous history of an LAD stent placed by cardiology in 2022. Has been doing well since then with no issues. ECHO completed on 8/16 demonstrated LV EF of 65% with mild aortic regurg and moderate aortic stenosis. Mild regurgitation of the mitral valve.     Physical Exam  Constitutional:       Appearance: Normal appearance.   HENT:      Head: Normocephalic and atraumatic.      Right Ear: External ear normal.      Left Ear: External ear normal.      Nose: Nose normal.   Eyes:      General: No scleral icterus.     Conjunctiva/sclera: Conjunctivae normal.   Cardiovascular:      Rate and Rhythm: Normal rate.      Comments: Appears well perfused  Pulmonary:      Effort: Pulmonary effort is normal. No respiratory distress.   Abdominal:      General: Abdomen is flat. There is no distension.      Palpations: Abdomen is soft.      Tenderness: There is abdominal tenderness (minimal tenderness in the epigastric region).   Musculoskeletal:         General: Normal range of motion.   Skin:     General: Skin is warm and dry.      Coloration: Skin is not jaundiced.   Neurological:      General: No focal deficit present.      Mental Status: He is alert and oriented to person, place, and time.   Psychiatric:         Mood and Affect: Mood normal.         Behavior: Behavior normal.       "      Review of Systems   Constitutional:  Positive for unexpected weight change. Negative for appetite change, fatigue and fever.   HENT:  Negative for trouble swallowing.    Eyes:  Negative for visual disturbance.   Respiratory:  Negative for shortness of breath.    Cardiovascular:  Negative for chest pain.   Gastrointestinal:  Positive for abdominal pain, diarrhea and nausea. Negative for blood in stool, constipation and vomiting.   Genitourinary:  Negative for difficulty urinating.   Musculoskeletal:  Negative for joint swelling.   Skin:  Negative for rash.   Neurological:  Negative for light-headedness.   Psychiatric/Behavioral:  Negative for behavioral problems.         Objective       No intake or output data in the 24 hours ending 08/28/24 0804    First Vitals:   Blood Pressure: 135/65 (08/28/24 0217)  Pulse: 61 (08/28/24 0217)  Temperature: 97.5 °F (36.4 °C) (08/28/24 0217)  Temp Source: Temporal (08/28/24 0217)  Respirations: 16 (08/28/24 0217)  Height: 5' 4\" (162.6 cm) (08/28/24 0217)  Weight - Scale: 62.4 kg (137 lb 9.1 oz) (08/28/24 0217)  SpO2: 98 % (08/28/24 0217)    Current Vitals:   Blood Pressure: 122/63 (08/28/24 0743)  Pulse: 60 (08/28/24 0743)  Temperature: (!) 97.2 °F (36.2 °C) (08/28/24 0743)  Temp Source: Temporal (08/28/24 0743)  Respirations: 17 (08/28/24 0743)  Height: 5' 4\" (162.6 cm) (08/28/24 0217)  Weight - Scale: 62.4 kg (137 lb 9.1 oz) (08/28/24 0217)  SpO2: 98 % (08/28/24 0743)    Invasive Devices       Peripheral Intravenous Line  Duration             Peripheral IV 08/27/24 Right Antecubital <1 day    Peripheral IV 08/27/24 Right Hand <1 day                    Imaging: I have personally reviewed pertinent reports.      No results found.    EKG, Pathology, and Other Studies: I have personally reviewed pertinent reports.    VTE Pharmacologic Prophylaxis: Enoxaparin (Lovenox)  VTE Mechanical Prophylaxis: sequential compression device    Historical Information   Past Medical History: "   Diagnosis Date    Benign essential hypertension 2014    CAD (coronary artery disease)     s/p NAVA prox LAD and D1 2022    Cancer (HCC)     Prostate    Coronary artery disease involving native coronary artery of native heart without angina pectoris 2022    Enteritis 2024    GERD (gastroesophageal reflux disease)     Hyperlipidemia     Hypertension     Other chest pain     Palpitations     Prostate cancer (HCC) 2023     Past Surgical History:   Procedure Laterality Date    CARDIAC CATHETERIZATION Left 2022    Procedure: Cardiac catheterization-left heart catheterization;  Surgeon: Sai Henry MD;  Location: [facility];  Service: Cardiology    CARDIAC CATHETERIZATION N/A 2022    Procedure: Cardiac pci;  Surgeon: Sai Henry MD;  Location: [facility];  Service: Cardiology    HERNIA REPAIR      NC LAPS SURG [id number] RPBIC RAD W/NRV SPARING ROBOT N/A 2023    Procedure: PROSTATECTOMY RADICAL & PELVIC LYMPH NODE DISSECTION  W/ ROBOT;  Surgeon: Otoniel Harmon MD;  Location: [facility];  Service: Urology    NC PROSTATE NEEDLE BIOPSY ANY APPROACH N/A 2023    Procedure: TRANSRECTAL MRI FUSION BIOPSY PROSTATE;  Surgeon: Milan Arellano MD;  Location: [facility];  Service: Urology     Social History   Social History     Substance and Sexual Activity   Alcohol Use Not Currently     Social History     Substance and Sexual Activity   Drug Use No     Social History     Tobacco Use   Smoking Status Every Day    Current packs/day: 0.00    Types: Cigarettes    Last attempt to quit: 2022    Years since quittin.6   Smokeless Tobacco Never     Family History   Problem Relation Age of Onset    No Known Problems Mother     No Known Problems Father        Meds/Allergies   all current active meds have been reviewed, current meds:   Current Facility-Administered Medications   Medication Dose Route Frequency    acetaminophen (TYLENOL) tablet 650 mg  650 mg  Oral Q6H PRN    aluminum-magnesium hydroxide-simethicone (MAALOX) oral suspension 30 mL  30 mL Oral Q6H PRN    aspirin chewable tablet 81 mg  81 mg Oral Daily    enoxaparin (LOVENOX) subcutaneous injection 40 mg  40 mg Subcutaneous Daily    naloxone (NARCAN) 0.04 mg/mL syringe 0.04 mg  0.04 mg Intravenous Q1MIN PRN    ondansetron (ZOFRAN) injection 4 mg  4 mg Intravenous Q6H PRN    oxyCODONE (ROXICODONE) IR tablet 5 mg  5 mg Oral Q6H PRN    oxyCODONE (ROXICODONE) split tablet 2.5 mg  2.5 mg Oral Q6H PRN    piperacillin-tazobactam (ZOSYN) 4.5 g in sodium chloride 0.9 % 100 mL IVPB (EXTENDED INFUSION)  4.5 g Intravenous Q8H    polyethylene glycol (MIRALAX) packet 17 g  17 g Oral Daily    and PTA meds:   Prior to Admission Medications   Prescriptions Last Dose Informant Patient Reported? Taking?   aspirin 81 mg chewable tablet 8/27/2024 Self Yes Yes   Sig: Chew 81 mg daily   atorvastatin (LIPITOR) 20 mg tablet 8/27/2024  No Yes   Sig: Take 1 tablet (20 mg total) by mouth daily   lisinopril (ZESTRIL) 20 mg tablet Not Taking Self No No   Sig: Take 1 tablet (20 mg total) by mouth daily   Patient not taking: Reported on 8/19/2024   oxyCODONE-acetaminophen (Percocet) 5-325 mg per tablet Not Taking Self No No   Sig: Take 1-2 tablets by mouth every 6 (six) hours as needed for moderate pain for up to 12 doses Max Daily Amount: 8 tablets   Patient not taking: Reported on 8/30/2023   senna (SENOKOT) 8.6 mg Not Taking Self No No   Sig: Take 1 tablet (8.6 mg total) by mouth daily Do not start before May 25, 2023.   Patient not taking: Reported on 8/30/2023      Facility-Administered Medications Last Administration Doses Remaining   acetaminophen (TYLENOL) tablet 975 mg 8/27/2024  4:32 PM 0        No Known Allergies    Lab Results: I have personally reviewed pertinent lab results.  , CBC:   Lab Results   Component Value Date    WBC 13.15 (H) 08/28/2024    HGB 11.5 (L) 08/28/2024    HCT 35.1 (L) 08/28/2024    MCV 85 08/28/2024      08/28/2024    RBC 4.14 08/28/2024    MCH 27.8 08/28/2024    MCHC 32.8 08/28/2024    RDW 15.2 (H) 08/28/2024    MPV 10.5 08/28/2024    NRBC 0 08/28/2024   , CMP:   Lab Results   Component Value Date    SODIUM 139 08/28/2024    K 3.7 08/28/2024     (H) 08/28/2024    CO2 22 08/28/2024    BUN 22 08/28/2024    CREATININE 0.76 08/28/2024    CALCIUM 8.1 (L) 08/28/2024     (H) 08/28/2024     (H) 08/28/2024    ALKPHOS 882 (H) 08/28/2024    EGFR 95 08/28/2024       Counseling / Coordination of Care  Total floor / unit time spent today 25 minutes.  Greater than 50% of total time was spent with the patient and / or family counseling and / or coordination of care.    Inpatient Consult to Surgical Oncology  Consult performed by: Paul Vences MD  Consult ordered by: CINDA Enamorado MD  8/28/2024 8:04 AM

## 2024-08-28 NOTE — EMTALA/ACUTE CARE TRANSFER
[facility] EMERGENCY DEPARTMENT  [address] [place] [address]  Dept: [phone]      EMTALA TRANSFER CONSENT    NAME Isaac DEJESUS 1960                              MRN [id number]    I have been informed of my rights regarding examination, treatment, and transfer   by Dr. Rojas    Benefits: Specialized equipment and/or services available at the receiving facility (Include comment)________________________ (Surgical oncology, GI)    Risks: Potential for delay in receiving treatment, Potential deterioration of medical condition, Loss of IV, Increased discomfort during transfer      Consent for Transfer:  I acknowledge that my medical condition has been evaluated and explained to me by the emergency department physician or other qualified medical person and/or my attending physician, who has recommended that I be transferred to the service of  Accepting Physician: Dr. Rodriguez at Accepting Facility Name, City & State : [facility]. The above potential benefits of such transfer, the potential risks associated with such transfer, and the probable risks of not being transferred have been explained to me, and I fully understand them.  The doctor has explained that, in my case, the benefits of transfer outweigh the risks.  I agree to be transferred.    I authorize the performance of emergency medical procedures and treatments upon me in both transit and upon arrival at the receiving facility.  Additionally, I authorize the release of any and all medical records to the receiving facility and request they be transported with me, if possible.  I understand that the safest mode of transportation during a medical emergency is an ambulance and that the Hospital advocates the use of this mode of transport. Risks of traveling to the receiving facility by car, including absence of medical control, life sustaining equipment, such as oxygen, and medical  personnel has been explained to me and I fully understand them.    (SKYLA CORRECT BOX BELOW)  [  ]  I consent to the stated transfer and to be transported by ambulance/helicopter.  [  ]  I consent to the stated transfer, but refuse transportation by ambulance and accept full responsibility for my transportation by car.  I understand the risks of non-ambulance transfers and I exonerate the Hospital and its staff from any deterioration in my condition that results from this refusal.    X___________________________________________    DATE  24  TIME________  Signature of patient or legally responsible individual signing on patient behalf           RELATIONSHIP TO PATIENT_________________________          Provider Certification    NAME Isaac Kramer                                         1960                              MRN [id number]    A medical screening exam was performed on the above named patient.  Based on the examination:    Condition Necessitating Transfer The primary encounter diagnosis was Severe sepsis (HCC). Diagnoses of Pancreatic mass, Enlarged gallbladder, Enteritis, Gallbladder obstruction, Increased bands, Elevated procalcitonin, Transaminitis, Hyperbilirubinemia, and Hypokalemia were also pertinent to this visit.    Patient Condition: The patient has been stabilized such that within reasonable medical probability, no material deterioration of the patient condition or the condition of the unborn child(belkis) is likely to result from the transfer    Reason for Transfer: Level of Care needed not available at this facility    Transfer Requirements: Facility SLA   Space available and qualified personnel available for treatment as acknowledged by    Agreed to accept transfer and to provide appropriate medical treatment as acknowledged by       Dr. Rodriguez  Appropriate medical records of the examination and treatment of the patient are provided at the time of transfer   STAFF INITIAL  WHEN COMPLETED _______  Transfer will be performed by qualified personnel from    and appropriate transfer equipment as required, including the use of necessary and appropriate life support measures.    Provider Certification: I have examined the patient and explained the following risks and benefits of being transferred/refusing transfer to the patient/family:  General risk, such as traffic hazards, adverse weather conditions, rough terrain or turbulence, possible failure of equipment (including vehicle or aircraft), or consequences of actions of persons outside the control of the transport personnel, Unanticipated needs of medical equipment and personnel during transport, Risk of worsening condition, The possibility of a transport vehicle being unavailable      Based on these reasonable risks and benefits to the patient and/or the unborn child(belkis), and based upon the information available at the time of the patient’s examination, I certify that the medical benefits reasonably to be expected from the provision of appropriate medical treatments at another medical facility outweigh the increasing risks, if any, to the individual’s medical condition, and in the case of labor to the unborn child, from effecting the transfer.    X____________________________________________ DATE 08/27/24        TIME_______      ORIGINAL - SEND TO MEDICAL RECORDS   COPY - SEND WITH PATIENT DURING TRANSFER

## 2024-08-28 NOTE — CASE MANAGEMENT
Case Management Assessment & Discharge Planning Note    Patient name Isaac Kramer  Location [facility] 4 /E4 -* MRN [id number]  : 1960 Date 2024       Current Admission Date: 2024  Current Admission Diagnosis:Acute obstructive cholangitis   Patient Active Problem List    Diagnosis Date Noted Date Diagnosed    Pancreatic mass 2024     Elevated LFTs 2024     Biliary sepsis 2024     Acute obstructive cholangitis 2024     Enteritis 2024     Prostate cancer (HCC) 2023     Microscopic hematuria 10/10/2022     BPH (benign prostatic hyperplasia) 10/10/2022     Coronary artery disease involving native coronary artery of native heart without angina pectoris 2022     Overweight with body mass index (BMI) of 26 to 26.9 in adult 2020     Aortic valve stenosis 2020     ED (erectile dysfunction) of non-organic origin 2015     Peripheral neuropathy 2014     Peripheral vascular disease (HCC) 2014     Benign essential hypertension 2014     Hypercholesterolemia 2013       LOS (days): 0  Geometric Mean LOS (GMLOS) (days):   Days to GMLOS:     OBJECTIVE:    Risk of Unplanned Readmission Score: 11.23         Current admission status: Inpatient       Preferred Pharmacy:   [id number] - [place] - [address]  [address]  [place] [address]  Phone: [phone] Fax: [phone]    Primary Care Provider: Pablo Perez DO    Primary Insurance: MEDICARE  Secondary Insurance: JHONNY ROSALES    ASSESSMENT:  Active Health Care Proxies    There are no active Health Care Proxies on file.       Advance Directives  Does patient have a Health Care POA?: Yes  Does patient have Advance Directives?: Yes  Advance Directives: Power of  for health care, Power of  for finance  Primary Contact: Jacque Kramer (Spouse)  [phone] (Mobile)         Readmission  Root Cause  30 Day Readmission: No    Patient Information  Admitted from:: Home  Mental Status: Alert  During Assessment patient was accompanied by: Not accompanied during assessment  Assessment information provided by:: Patient  Primary Caregiver: Self  Support Systems: Self, Spouse/significant other, Son  County of Residence: [facility] do you live in?: [place]  Home entry access options. Select all that apply.: No steps to enter home  Type of Current Residence: Travel Trailer/ Mobile Home  Living Arrangements: Lives w/ Spouse/significant other, Lives w/ Son  Is patient a ?: No    Activities of Daily Living Prior to Admission  Functional Status: Independent  Completes ADLs independently?: Yes  Ambulates independently?: Yes  Does patient use assisted devices?: No  Does patient currently own DME?: No  Does patient have a history of Outpatient Therapy (PT/OT)?: Yes  Does the patient have a history of Short-Term Rehab?: Yes (After boosted cancer 2023 cannot recall where)  Does patient have a history of HHC?: No  Does patient currently have HHC?: No         Patient Information Continued  Income Source: Employed  Does patient have prescription coverage?: Yes  Does patient receive dialysis treatments?: No  Does patient have a history of substance abuse?: No  Does patient have a history of Mental Health Diagnosis?: No    PHQ 2/9 Screening   Reviewed PHQ 2/9 Depression Screening Score?: No    Means of Transportation  Means of Transport to Appts:: Drives Self      Social Determinants of Health (SDOH)      Flowsheet Row Most Recent Value   Housing Stability    In the last 12 months, was there a time when you were not able to pay the mortgage or rent on time? N   In the past 12 months, how many times have you moved where you were living? 0   At any time in the past 12 months, were you homeless or living in a shelter (including now)? N   Transportation Needs    In the past 12 months, has lack of transportation  kept you from medical appointments or from getting medications? no   In the past 12 months, has lack of transportation kept you from meetings, work, or from getting things needed for daily living? No   Food Insecurity    Within the past 12 months, you worried that your food would run out before you got the money to buy more. Never true   Within the past 12 months, the food you bought just didn't last and you didn't have money to get more. Never true   Utilities    In the past 12 months has the electric, gas, oil, or water company threatened to shut off services in your home? No            DISCHARGE DETAILS:    Discharge planning discussed with:: Pt           Were Treatment Team discharge recommendations reviewed with patient/caregiver?: Yes  Did patient/caregiver verbalize understanding of patient care needs?: Yes            Requested Home Health Care         Is the patient interested in HHC at discharge?: No    DME Referral Provided  Referral made for DME?: No              Treatment Team Recommendation: Home  Discharge Destination Plan:: Home            Additional Comments: Assesment completed at bedside with pt. No needs identified at this time. Pt has not seen PCP in over a year and was encouraged to schedule follow up after he is discharged. Powerset message sent to PCP's office requesting TCM apt once pt is dced. CM continues following thru dc.

## 2024-08-28 NOTE — H&P
[facility]  H&P  Name: Isaac Kramer 63 y.o. male I MRN: [id number]  Unit/Bed#: E4 -01 I Date of Admission: 8/28/2024   Date of Service: 8/28/2024 I Hospital Day: 0      Assessment & Plan   * Acute obstructive cholangitis  Assessment & Plan  Concerning presentation for cholangitis given sepsis, bilirubinemia  CT abd/pelvis reviewed: new mass in lower pancreatic head obstructing CBD/pancreatic duct 2.8x2.7cm suspicious for neoplasm, gallbladder distension    Plan:  NPO pending need for procedures in AM  ABX: ceftriaxone/flagyl in ED, continue with Zosyn  Trend LFTs  Multimodal analgesia   GI, surgical oncology consulted     Enteritis  Assessment & Plan  No GI complaints reported  CT abd/pelvis reviewed nondilated fluid-filled loops of small bowel in mid/lower abdomen suggesting enteritis     Plan:  NPO pending possible procedures, but advance diet as tolerated  Supportive care     Biliary sepsis  Assessment & Plan  SIRS present fever, tachycardia, leukocytosis. Procal 2.92, lactate 1.4  Sources: biliary, enteritis    Plan:  IVF: 30cc/kg in ED, hemodynamics intact monitor need for further fluids   ABX d1: ceftriaxone/flagyl in ED, continue with Zosyn  Trend infectious markers, follow culture data     Elevated LFTs  Assessment & Plan  Obstructive pattern   , , alkphos 1122.     Plan:  Trend     Pancreatic mass  Assessment & Plan  New mass in pancreatic/biliary region not seen on CT imaging on 05/28/2023. Patient reports 14lb unintentional weight loss this past year. Hx of prostate cancer    Plan:  Check Ca 19-9, CEA  GI, surgical oncology consulted     Coronary artery disease involving native coronary artery of native heart without angina pectoris  Assessment & Plan  [facility] 07/2022, 3v CAD, s/p stenting    Plan:  Continue ASA, statin, GDMT    Benign essential hypertension  Assessment & Plan  Elevated  Outpatient regimen: none, previously on lisinopril    Plan:  Monitor  while inpatient     VTE Pharmacologic Prophylaxis: VTE Score: 3 Moderate Risk (Score 3-4) - Pharmacological DVT Prophylaxis Ordered: enoxaparin (Lovenox).  Code Status: Level 1 - Full Code   Discussion with family:     Anticipated Length of Stay: Patient will be admitted on an inpatient basis with an anticipated length of stay of greater than 2 midnights secondary to sepsis.    Total Time Spent on Date of Encounter in care of patient:  mins. This time was spent on one or more of the following: performing physical exam; counseling and coordination of care; obtaining or reviewing history; documenting in the medical record; reviewing/ordering tests, medications or procedures; communicating with other healthcare professionals and discussing with patient's family/caregivers.    Chief Complaint: abnormal labs    History of Present Illness:  Isaac Kramer is a 63 y.o. male with a PMH of HTN, CAD, prostate cancer, tobacco use, HLD who presents with abnormal labs.   History obtained from patient, chart review and discussion with ED CINDA. Patient originally presented to [facility] ED for evaluation of flank pain, chills, dizzy, nausea. Reports darker urine and decreased urine since yesterday. He mainly is having some flank/abdominal pain, dull and achy pains. No nausea/vomiting/diarrhea. No dysuria. Otherwise in his normal state of health prior to today. Works as . Reports compliance with prescribed medications. Does smoke cigarettes, denies habitual alcohol use or illicit drug use. History of prostate cancer/removal. Reports ~14lb weight loss this past year.     Review of Systems:  Review of Systems   Constitutional:  Positive for chills, fever and unexpected weight change.   Respiratory:  Negative for shortness of breath.    Cardiovascular:  Negative for chest pain and palpitations.   Gastrointestinal:  Positive for abdominal pain. Negative for diarrhea, nausea and vomiting.   Neurological:  Negative for syncope.    All other systems reviewed and are negative.      Past Medical and Surgical History:   Past Medical History:   Diagnosis Date    Benign essential hypertension 05/08/2014    CAD (coronary artery disease)     s/p NAVA prox LAD and D1 7/28/2022    Cancer (HCC)     Prostate    Coronary artery disease involving native coronary artery of native heart without angina pectoris 08/09/2022    Enteritis 8/28/2024    GERD (gastroesophageal reflux disease)     Hyperlipidemia     Hypertension     Other chest pain     Palpitations     Prostate cancer (HCC) 04/18/2023       Past Surgical History:   Procedure Laterality Date    CARDIAC CATHETERIZATION Left 7/28/2022    Procedure: Cardiac catheterization-left heart catheterization;  Surgeon: Sai Henry MD;  [facility]: [facility];  Service: Cardiology    CARDIAC CATHETERIZATION N/A 7/28/2022    Procedure: Cardiac pci;  Surgeon: Sai Henry MD;  [facility]: [facility];  Service: Cardiology    HERNIA REPAIR      IN LAPS SURG [id number] RPBIC RAD W/NRV SPARING ROBOT N/A 5/22/2023    Procedure: PROSTATECTOMY RADICAL & PELVIC LYMPH NODE DISSECTION  W/ ROBOT;  Surgeon: Otoniel Harmon MD;  Location: [facility];  Service: Urology    IN PROSTATE NEEDLE BIOPSY ANY APPROACH N/A 2/28/2023    Procedure: TRANSRECTAL MRI FUSION BIOPSY PROSTATE;  Surgeon: Milan Arellano MD;  [facility]: [facility];  Service: Urology       Meds/Allergies:  Prior to Admission medications    Medication Sig Start Date End Date Taking? Authorizing Provider   aspirin 81 mg chewable tablet Chew 81 mg daily    Historical Provider, MD   atorvastatin (LIPITOR) 20 mg tablet Take 1 tablet (20 mg total) by mouth daily 7/25/24   Rick Robledo MD   lisinopril (ZESTRIL) 20 mg tablet Take 1 tablet (20 mg total) by mouth daily  Patient not taking: Reported on 8/19/2024 10/4/23   Pablo Posadas DO   oxyCODONE-acetaminophen (Percocet) 5-325 mg per tablet Take 1-2 tablets by mouth every 6 (six)  "hours as needed for moderate pain for up to 12 doses Max Daily Amount: 8 tablets  Patient not taking: Reported on 2023   Breezy Law MD   senna (SENOKOT) 8.6 mg Take 1 tablet (8.6 mg total) by mouth daily Do not start before May 25, 2023.  Patient not taking: Reported on 2023   DULCE Schmidt     I have reviewed home medications with patient personally.    Allergies: No Known Allergies    Social History:  Marital Status: /Civil Union   Occupation:   Patient Pre-hospital Living Situation: Home  Patient Pre-hospital Level of Mobility: walks  Patient Pre-hospital Diet Restrictions:   Substance Use History:   Social History     Substance and Sexual Activity   Alcohol Use Not Currently     Social History     Tobacco Use   Smoking Status Every Day    Current packs/day: 0.00    Types: Cigarettes    Last attempt to quit: 2022    Years since quittin.6   Smokeless Tobacco Never     Social History     Substance and Sexual Activity   Drug Use No       Family History:  Family History   Problem Relation Age of Onset    No Known Problems Mother     No Known Problems Father        Physical Exam:     Vitals:   Blood Pressure: 135/65 (24)  Pulse: 61 (24)  Temperature: 97.5 °F (36.4 °C) (24)  Temp Source: Temporal (24)  Respirations: 16 (24)  Height: 5' 4\" (162.6 cm) (24)  SpO2: 98 % (24)    Physical Exam  Vitals and nursing note reviewed.   Constitutional:       General: He is not in acute distress.     Appearance: He is well-developed.   HENT:      Head: Normocephalic and atraumatic.   Eyes:      General: Scleral icterus present.      Conjunctiva/sclera: Conjunctivae normal.   Cardiovascular:      Rate and Rhythm: Normal rate and regular rhythm.      Heart sounds: No murmur heard.  Pulmonary:      Effort: Pulmonary effort is normal. No respiratory distress.      Breath sounds: Normal breath " sounds.   Abdominal:      Palpations: Abdomen is soft.      Tenderness: There is no abdominal tenderness.   Musculoskeletal:         General: No swelling.      Cervical back: Neck supple.   Skin:     General: Skin is warm and dry.      Capillary Refill: Capillary refill takes less than 2 seconds.   Neurological:      Mental Status: He is alert.   Psychiatric:         Mood and Affect: Mood normal.          Additional Data:     Lab Results:  Results from last 7 days   Lab Units 08/27/24  1716   WBC Thousand/uL 16.61*   HEMOGLOBIN g/dL 12.9   HEMATOCRIT % 40.2   PLATELETS Thousands/uL 296   BANDS PCT % 10*   LYMPHO PCT % 4*   MONO PCT % 1*   EOS PCT % 1     Results from last 7 days   Lab Units 08/27/24  1716   SODIUM mmol/L 136   POTASSIUM mmol/L 3.4*   CHLORIDE mmol/L 105   CO2 mmol/L 18*   BUN mg/dL 24   CREATININE mg/dL 1.13   ANION GAP mmol/L 13   CALCIUM mg/dL 8.8   ALBUMIN g/dL 3.8   TOTAL BILIRUBIN mg/dL 3.81*   ALK PHOS U/L 1,122*   ALT U/L 191*   AST U/L 304*   GLUCOSE RANDOM mg/dL 109     Results from last 7 days   Lab Units 08/27/24  1716   INR  1.16         Lab Results   Component Value Date    HGBA1C 5.4 05/10/2023     Results from last 7 days   Lab Units 08/27/24  1716   LACTIC ACID mmol/L 1.4   PROCALCITONIN ng/ml 2.92*       Lines/Drains:  Invasive Devices       Peripheral Intravenous Line  Duration             Peripheral IV 08/27/24 Right Antecubital <1 day    Peripheral IV 08/27/24 Right Hand <1 day                        Imaging: Reviewed radiology reports from this admission including: abdominal/pelvic CT and Personally reviewed the following imaging: abdominal/pelvic CT  No orders to display       EKG and Other Studies Reviewed on Admission:   EKG: Personally Reviewed. Sinus Tachycardia. .    ** Please Note: This note has been constructed using a voice recognition system. **

## 2024-08-28 NOTE — H&P (VIEW-ONLY)
[facility] - Inpatient Consultation    PATIENT INFORMATION      Isaac Kramer 63 y.o. male MRN: [id number]  Unit/Bed#: E4 -01 Encounter: [id number]  PCP: Pablo Perez DO  Date of Admission:  8/28/2024  Date of Consultation: 08/28/24  Requesting Physician: Juan Bernal DO       ASSESSMENT & PLAN     Isaac Kramer is a 63 y.o. male with PMH significant for htn, CAD, PVD, peripheral neuropathy, AV stenosis, BPH, prostate CA who presented to [facility] on 8/27 for concern of R flank/epigastric pain with darker urine and some diarrhea. Initial evaluation with fever, leukocytosis, hyperbilirubinemia and CT scan notable for panc mass with biliary dilation. Patient transferred to [facility] for advanced endoscopy intervention.    Obstructive Jaundice  Pancreatic Mass  Leukocytosis  Hyperbilirubinemia  Patient presented with intermittent RUQ/epigastric pain over the last few weeks. Acutely worsened yesterday prompting evaluation significant for hyperbilirubinemia, elevated alk phos, leukocytosis, and CAT scan significant for pancreatic mass measuring 2.8 x 2.7 cm with associated PD and intrahepatic/extrahepatic biliary dilation.   Plan for EUS/ERCP today for biopsy and stenting - doubt acute cholangitis given rare to develop this in setting of mass as no nidus for infection.  Currently patient with stable vital signs and no recurrent fever  Would continue IV Zosyn for now until patient's procedure and potential biliary source evaluated  Please keep NPO pending procedures  Hold AC  Continue IV Zosyn  Follow up blood cultures  Further recommendation to follow post-procedure    Addendum: Patient's procedures postponed until tomorrow due to availability of GI lab/OR and need for fluoroscopy/cytology. Will continue to monitor closely overnight and if patient develops any signs of hemodynamic instability will re-eval and consider urgent ERCP this evening.    REASON FOR  CONSULTATION      Obstructive jaundice, panc mass      HISTORY OF PRESENT ILLNESS      Isaac Kramer is a 63 y.o. male with PMH significant for htn, CAD, PVD, peripheral neuropathy, AV stenosis, BPH, prostate CA who presented to [facility] on 8/27 for concern of R flank/epigastric pain with darker urine and some diarrhea. He was evaluated at urgent care and recommended to present to ED for further evaluation.  In the ED, labs were significant for elevated LFTs with , , alk phos 1122, T. bili 3.81 with a procalcitonin of 2.92 and a WBC 16.61.  Patient was also noted to have a fever of 103 on presentation.  Initial CT scan significant for mass in the lower pancreatic head region obstructing the CBD and PD measuring 2.8 x 2.7 cm suspicious for neoplasm suspected pancreatic mass however ampullary adenoma and cholangiocarcinoma was also be considered.  Noted intra and extrahepatic ductal dilation extending to the pancreatic head.  Also noted gallbladder distention due to biliary obstruction.  Ultimately, patient transferred to [facility] for GI evaluation and advanced endoscopy intervention.    At time of my evaluation, patient endorses that he has been having intermittent episodes of significant abdominal pain over the past few weeks.  Over the past few months he has noted significant amount of weight loss approximately 15 pounds.  He has not had a change in his appetite.  He also endorses change in his bowel movements over the past few weeks associated with the abdominal pain. No family history of panc CA that he is aware of. He does consume alcohol regularly (reports 6-7 beers per week) and smokes 4 cigarettes daily (with prior heavier use).     REVIEW OF SYSTEMS     CONSTITUTIONAL: +fevers, +weight loss  HEENT: No earache or tinnitus, denies hearing loss or visual disturbances  CARDIOVASCULAR: No chest pain or palpitations   RESPIRATORY: Denies any cough, hemoptysis, shortness of breath or dyspnea on  exertion  GASTROINTESTINAL: As noted in the History of Present Illness   GENITOURINARY: No problems with urination, denies any hematuria or dysuria  NEUROLOGIC: No dizziness or vertigo, denies headaches   MUSCULOSKELETAL: Denies any muscle or joint pain   SKIN: Denies skin rashes or itching   ENDOCRINE: Denies excessive thirst, denies intolerance to heat or cold  PSYCHOSOCIAL: Denies depression or anxiety, denies any recent memory loss     PAST MEDICAL & SURGICAL HISTORY      Past Medical History:   Diagnosis Date    Benign essential hypertension 05/08/2014    CAD (coronary artery disease)     s/p NAVA prox LAD and D1 7/28/2022    Cancer (HCC)     Prostate    Coronary artery disease involving native coronary artery of native heart without angina pectoris 08/09/2022    Enteritis 8/28/2024    GERD (gastroesophageal reflux disease)     Hyperlipidemia     Hypertension     Other chest pain     Palpitations     Prostate cancer (HCC) 04/18/2023       Past Surgical History:   Procedure Laterality Date    CARDIAC CATHETERIZATION Left 7/28/2022    Procedure: Cardiac catheterization-left heart catheterization;  Surgeon: Sai Henry MD;  [facility]: [facility];  Service: Cardiology    CARDIAC CATHETERIZATION N/A 7/28/2022    Procedure: Cardiac pci;  Surgeon: Sai Henry MD;  [facility];  Service: Cardiology    HERNIA REPAIR      IL LAPS SURG [id number] RPBIC RAD W/NRV SPARING ROBOT N/A 5/22/2023    Procedure: PROSTATECTOMY RADICAL & PELVIC LYMPH NODE DISSECTION  W/ ROBOT;  Surgeon: Otoniel Harmon MD;  Location: [facility];  Service: Urology    IL PROSTATE NEEDLE BIOPSY ANY APPROACH N/A 2/28/2023    Procedure: TRANSRECTAL MRI FUSION BIOPSY PROSTATE;  Surgeon: Milan Arellano MD;  Location: [facility];  Service: Urology       MEDICATIONS & ALLERGIES       Medications:     Facility-Administered Medications Prior to Admission:     [COMPLETED] acetaminophen (TYLENOL) tablet 975 mg    Medications  Prior to Admission:     aspirin 81 mg chewable tablet    atorvastatin (LIPITOR) 20 mg tablet    lisinopril (ZESTRIL) 20 mg tablet    oxyCODONE-acetaminophen (Percocet) 5-325 mg per tablet    senna (SENOKOT) 8.6 mg  Current Facility-Administered Medications   Medication Dose Route Frequency    acetaminophen (TYLENOL) tablet 650 mg  650 mg Oral Q6H PRN    aluminum-magnesium hydroxide-simethicone (MAALOX) oral suspension 30 mL  30 mL Oral Q6H PRN    aspirin chewable tablet 81 mg  81 mg Oral Daily    enoxaparin (LOVENOX) subcutaneous injection 40 mg  40 mg Subcutaneous Daily    HYDROmorphone (DILAUDID) injection 0.5 mg  0.5 mg Intravenous Q4H PRN    naloxone (NARCAN) 0.04 mg/mL syringe 0.04 mg  0.04 mg Intravenous Q1MIN PRN    ondansetron (ZOFRAN) injection 4 mg  4 mg Intravenous Q6H PRN    oxyCODONE (ROXICODONE) IR tablet 5 mg  5 mg Oral Q4H PRN    Or    oxyCODONE (ROXICODONE) immediate release tablet 10 mg  10 mg Oral Q4H PRN    piperacillin-tazobactam (ZOSYN) 4.5 g in sodium chloride 0.9 % 100 mL IVPB (EXTENDED INFUSION)  4.5 g Intravenous Q8H    polyethylene glycol (MIRALAX) packet 17 g  17 g Oral Daily    potassium chloride 20 mEq IVPB (premix)  20 mEq Intravenous Once       Allergies:   No Known Allergies    SOCIAL HISTORY      Social History     Marital Status: /Civil Union    Substance Use History:   Social History     Substance and Sexual Activity   Alcohol Use Not Currently     Social History     Tobacco Use   Smoking Status Every Day    Current packs/day: 0.00    Types: Cigarettes    Last attempt to quit: 2022    Years since quittin.6   Smokeless Tobacco Never     Social History     Substance and Sexual Activity   Drug Use No       FAMILY HISTORY      Family History   Problem Relation Age of Onset    No Known Problems Mother     No Known Problems Father        PHYSICAL EXAM     Objective   Blood pressure 122/63, pulse 60, temperature (!) 97.2 °F (36.2 °C), temperature source Temporal, resp.  "rate 17, height 5' 4\" (1.626 m), weight 62.4 kg (137 lb 9.1 oz), SpO2 98%. Body mass index is 23.61 kg/m².  No intake or output data in the 24 hours ending 08/28/24 0824    General Appearance:   Alert, cooperative, no distress   HEENT:   Normocephalic, atraumatic, +scleral icterus     Neck:   Supple, symmetrical, trachea midline   Lungs:   Equal chest rise, respirations unlabored    Heart:   Regular rate and rhythm   Abdomen:   Soft, RUQ tenderness, non-distended; normal bowel sounds    Rectal:   Deferred    Extremities:   No cyanosis, clubbing or edema    Neuro:   Moves all 4 extremities    Skin:   +jaundice, no rashes, or lesions      ADDITIONAL DATA     Lab Results:     Results from last 7 days   Lab Units 08/28/24  0509   WBC Thousand/uL 13.15*   HEMOGLOBIN g/dL 11.5*   HEMATOCRIT % 35.1*   PLATELETS Thousands/uL 247   SEGS PCT % 83*   LYMPHO PCT % 7*   MONO PCT % 4   EOS PCT % 4     Results from last 7 days   Lab Units 08/28/24  0509   POTASSIUM mmol/L 3.7   CHLORIDE mmol/L 109*   CO2 mmol/L 22   BUN mg/dL 22   CREATININE mg/dL 0.76   CALCIUM mg/dL 8.1*   ALK PHOS U/L 882*   ALT U/L 143*   AST U/L 154*     Results from last 7 days   Lab Units 08/28/24  0509   INR  1.27*       Imaging:    XR chest portable    Result Date: 8/28/2024  Narrative: XR CHEST PORTABLE INDICATION: Sepsis. COMPARISON: Chest radiograph June 30, 2022 FINDINGS: Clear lungs. No pneumothorax or pleural effusion. Normal cardiomediastinal silhouette. Bones are unremarkable for age. Normal upper abdomen.     Impression: No acute cardiopulmonary disease. Workstation performed: [id number]     CT Abdomen pelvis with contrast    Result Date: 8/27/2024  Narrative: CT ABDOMEN AND PELVIS WITH IV CONTRAST INDICATION: Sepsis, left flank pain, left CVA tenderness. COMPARISON: CT scan from 5/28/2023. TECHNIQUE: CT examination of the abdomen and pelvis was performed. Multiplanar 2D reformatted images were created from the source data. This examination, like " all CT scans performed in the [facility], was performed utilizing techniques to minimize radiation dose exposure, including the use of iterative reconstruction and automated exposure control. Radiation dose length product (DLP) for this visit: 616 mGy-cm IV Contrast: 90 mL of iohexol (OMNIPAQUE) Enteric Contrast: Not administered. FINDINGS: ABDOMEN LOWER CHEST: No clinically significant abnormality in the visualized lower chest. LIVER/BILIARY TREE: Intra and extrahepatic ductal dilatation extending to the pancreatic head with there is a mass. No focal liver lesion identified. GALLBLADDER: Distended due to biliary obstruction. No calcified stones. No pericholecystic inflammatory changes. SPLEEN: Unremarkable. PANCREAS: Pancreatic ductal dilatation due to an obstructing mass in the lower pancreatic head at the level of the ampulla measuring 2.8 x 2.7 cm on image 2/78. ADRENAL GLANDS: Unremarkable. KIDNEYS/URETERS: Simple renal cyst(s). Otherwise unremarkable kidneys. No hydronephrosis. STOMACH AND BOWEL: Nondilated fluid-filled loops of small bowel in the mid to lower abdomen suggesting an enteritis. No bowel obstruction. APPENDIX: Normal. ABDOMINOPELVIC CAVITY: No ascites. No pneumoperitoneum. No lymphadenopathy. VESSELS: Unremarkable for patient's age. PELVIS REPRODUCTIVE ORGANS: Unremarkable for patient's age. URINARY BLADDER: Unremarkable. ABDOMINAL WALL/INGUINAL REGIONS: Unremarkable. BONES: No acute fracture or suspicious osseous lesion. Bilateral L5 spondylolysis without evidence of spondylolisthesis. Spinal degenerative changes.     Impression: Mass in the lower pancreatic head region obstructing the CBD and pancreatic duct measuring 2.8 x 2.7 cm on image 2/78 suspicious for neoplasm. Origin of tumor could also be cholangiocarcinoma or duodenal carcinoma given the location. Gallbladder distention due to biliary obstruction. Nondilated fluid-filled loops of small bowel in the mid to lower  abdomen suggesting an enteritis. Workstation performed: [id number]     Echo complete w/ contrast if indicated    Result Date: 8/16/2024  Narrative:   Left Ventricle: Left ventricular cavity size is normal. There is mild concentric hypertrophy. The left ventricular ejection fraction is 65%. Systolic function is normal. Wall motion is normal.   Aortic Valve: The aortic valve is trileaflet. The leaflets are mildly calcified. There is moderately reduced mobility. There is mild regurgitation. There is moderate stenosis. The aortic valve peak velocity is 3.47 m/s. The aortic valve mean gradient is 31 mmHg.   Mitral Valve: There is mild annular calcification. There is mild regurgitation.   Prior study date: 6/30/2022. Mild progression of aortic valve stenosis.       EKG, Pathology, and Other Studies Reviewed on Admission:   EKG: Reviewed      Counseling / Coordination of Care Time: 30 total mins spent in consult. Greater than 50% of total time spent on patient counseling and coordination of care.    Arelis Mccarty DO  Gastroenterology Fellow  [facility]  Division of Gastroenterology and Hepatology  Available on [employer]  ...............................................................................................................................................  ** Please Note: This note is constructed using a voice recognition dictation system. **

## 2024-08-28 NOTE — CONSULTS
[facility] - Inpatient Consultation    PATIENT INFORMATION      Isaac Kramer 63 y.o. male MRN: [id number]  Unit/Bed#: E4 -01 Encounter: [id number]  PCP: Pablo Perez DO  Date of Admission:  8/28/2024  Date of Consultation: 08/28/24  Requesting Physician: Juan Bernal DO       ASSESSMENT & PLAN     Isaac Kramer is a 63 y.o. male with PMH significant for htn, CAD, PVD, peripheral neuropathy, AV stenosis, BPH, prostate CA who presented to [facility] on 8/27 for concern of R flank/epigastric pain with darker urine and some diarrhea. Initial evaluation with fever, leukocytosis, hyperbilirubinemia and CT scan notable for panc mass with biliary dilation. Patient transferred to [facility] for advanced endoscopy intervention.    Obstructive Jaundice  Pancreatic Mass  Leukocytosis  Hyperbilirubinemia  Patient presented with intermittent RUQ/epigastric pain over the last few weeks. Acutely worsened yesterday prompting evaluation significant for hyperbilirubinemia, elevated alk phos, leukocytosis, and CAT scan significant for pancreatic mass measuring 2.8 x 2.7 cm with associated PD and intrahepatic/extrahepatic biliary dilation.   Plan for EUS/ERCP today for biopsy and stenting - doubt acute cholangitis given rare to develop this in setting of mass as no nidus for infection.  Currently patient with stable vital signs and no recurrent fever  Would continue IV Zosyn for now until patient's procedure and potential biliary source evaluated  Please keep NPO pending procedures  Hold AC  Continue IV Zosyn  Follow up blood cultures  Further recommendation to follow post-procedure    Addendum: Patient's procedures postponed until tomorrow due to availability of GI lab/OR and need for fluoroscopy/cytology. Will continue to monitor closely overnight and if patient develops any signs of hemodynamic instability will re-eval and consider urgent ERCP this evening.    REASON FOR  CONSULTATION      Obstructive jaundice, panc mass      HISTORY OF PRESENT ILLNESS      Isaac Kramer is a 63 y.o. male with PMH significant for htn, CAD, PVD, peripheral neuropathy, AV stenosis, BPH, prostate CA who presented to [facility] on 8/27 for concern of R flank/epigastric pain with darker urine and some diarrhea. He was evaluated at urgent care and recommended to present to ED for further evaluation.  In the ED, labs were significant for elevated LFTs with , , alk phos 1122, T. bili 3.81 with a procalcitonin of 2.92 and a WBC 16.61.  Patient was also noted to have a fever of 103 on presentation.  Initial CT scan significant for mass in the lower pancreatic head region obstructing the CBD and PD measuring 2.8 x 2.7 cm suspicious for neoplasm suspected pancreatic mass however ampullary adenoma and cholangiocarcinoma was also be considered.  Noted intra and extrahepatic ductal dilation extending to the pancreatic head.  Also noted gallbladder distention due to biliary obstruction.  Ultimately, patient transferred to [facility] for GI evaluation and advanced endoscopy intervention.    At time of my evaluation, patient endorses that he has been having intermittent episodes of significant abdominal pain over the past few weeks.  Over the past few months he has noted significant amount of weight loss approximately 15 pounds.  He has not had a change in his appetite.  He also endorses change in his bowel movements over the past few weeks associated with the abdominal pain. No family history of panc CA that he is aware of. He does consume alcohol regularly (reports 6-7 beers per week) and smokes 4 cigarettes daily (with prior heavier use).     REVIEW OF SYSTEMS     CONSTITUTIONAL: +fevers, +weight loss  HEENT: No earache or tinnitus, denies hearing loss or visual disturbances  CARDIOVASCULAR: No chest pain or palpitations   RESPIRATORY: Denies any cough, hemoptysis, shortness of breath or dyspnea on  exertion  GASTROINTESTINAL: As noted in the History of Present Illness   GENITOURINARY: No problems with urination, denies any hematuria or dysuria  NEUROLOGIC: No dizziness or vertigo, denies headaches   MUSCULOSKELETAL: Denies any muscle or joint pain   SKIN: Denies skin rashes or itching   ENDOCRINE: Denies excessive thirst, denies intolerance to heat or cold  PSYCHOSOCIAL: Denies depression or anxiety, denies any recent memory loss     PAST MEDICAL & SURGICAL HISTORY      Past Medical History:   Diagnosis Date    Benign essential hypertension 05/08/2014    CAD (coronary artery disease)     s/p NAVA prox LAD and D1 7/28/2022    Cancer (HCC)     Prostate    Coronary artery disease involving native coronary artery of native heart without angina pectoris 08/09/2022    Enteritis 8/28/2024    GERD (gastroesophageal reflux disease)     Hyperlipidemia     Hypertension     Other chest pain     Palpitations     Prostate cancer (HCC) 04/18/2023       Past Surgical History:   Procedure Laterality Date    CARDIAC CATHETERIZATION Left 7/28/2022    Procedure: Cardiac catheterization-left heart catheterization;  Surgeon: Sai Henry MD;  Location: [facility] CARDIAC [facility];  Service: Cardiology    CARDIAC CATHETERIZATION N/A 7/28/2022    Procedure: Cardiac pci;  Surgeon: Sai Henry MD;  Location: [facility] CARDIAC [facility];  Service: Cardiology    HERNIA REPAIR      CO LAPS SURG [id number] RPBIC RAD W/NRV SPARING ROBOT N/A 5/22/2023    Procedure: PROSTATECTOMY RADICAL & PELVIC LYMPH NODE DISSECTION  W/ ROBOT;  Surgeon: Otoinel Harmon MD;  Location: [facility];  Service: Urology    CO PROSTATE NEEDLE BIOPSY ANY APPROACH N/A 2/28/2023    Procedure: TRANSRECTAL MRI FUSION BIOPSY PROSTATE;  Surgeon: Milan Arellano MD;  Location: [facility];  Service: Urology       MEDICATIONS & ALLERGIES       Medications:     Facility-Administered Medications Prior to Admission:     [COMPLETED] acetaminophen (TYLENOL) tablet 975 mg    Medications  Prior to Admission:     aspirin 81 mg chewable tablet    atorvastatin (LIPITOR) 20 mg tablet    lisinopril (ZESTRIL) 20 mg tablet    oxyCODONE-acetaminophen (Percocet) 5-325 mg per tablet    senna (SENOKOT) 8.6 mg  Current Facility-Administered Medications   Medication Dose Route Frequency    acetaminophen (TYLENOL) tablet 650 mg  650 mg Oral Q6H PRN    aluminum-magnesium hydroxide-simethicone (MAALOX) oral suspension 30 mL  30 mL Oral Q6H PRN    aspirin chewable tablet 81 mg  81 mg Oral Daily    enoxaparin (LOVENOX) subcutaneous injection 40 mg  40 mg Subcutaneous Daily    HYDROmorphone (DILAUDID) injection 0.5 mg  0.5 mg Intravenous Q4H PRN    naloxone (NARCAN) 0.04 mg/mL syringe 0.04 mg  0.04 mg Intravenous Q1MIN PRN    ondansetron (ZOFRAN) injection 4 mg  4 mg Intravenous Q6H PRN    oxyCODONE (ROXICODONE) IR tablet 5 mg  5 mg Oral Q4H PRN    Or    oxyCODONE (ROXICODONE) immediate release tablet 10 mg  10 mg Oral Q4H PRN    piperacillin-tazobactam (ZOSYN) 4.5 g in sodium chloride 0.9 % 100 mL IVPB (EXTENDED INFUSION)  4.5 g Intravenous Q8H    polyethylene glycol (MIRALAX) packet 17 g  17 g Oral Daily    potassium chloride 20 mEq IVPB (premix)  20 mEq Intravenous Once       Allergies:   No Known Allergies    SOCIAL HISTORY      Social History     Marital Status: /Civil Union    Substance Use History:   Social History     Substance and Sexual Activity   Alcohol Use Not Currently     Social History     Tobacco Use   Smoking Status Every Day    Current packs/day: 0.00    Types: Cigarettes    Last attempt to quit: 2022    Years since quittin.6   Smokeless Tobacco Never     Social History     Substance and Sexual Activity   Drug Use No       FAMILY HISTORY      Family History   Problem Relation Age of Onset    No Known Problems Mother     No Known Problems Father        PHYSICAL EXAM     Objective   Blood pressure 122/63, pulse 60, temperature (!) 97.2 °F (36.2 °C), temperature source Temporal, resp.  "rate 17, height 5' 4\" (1.626 m), weight 62.4 kg (137 lb 9.1 oz), SpO2 98%. Body mass index is 23.61 kg/m².  No intake or output data in the 24 hours ending 08/28/24 0824    General Appearance:   Alert, cooperative, no distress   HEENT:   Normocephalic, atraumatic, +scleral icterus     Neck:   Supple, symmetrical, trachea midline   Lungs:   Equal chest rise, respirations unlabored    Heart:   Regular rate and rhythm   Abdomen:   Soft, RUQ tenderness, non-distended; normal bowel sounds    Rectal:   Deferred    Extremities:   No cyanosis, clubbing or edema    Neuro:   Moves all 4 extremities    Skin:   +jaundice, no rashes, or lesions      ADDITIONAL DATA     Lab Results:     Results from last 7 days   Lab Units 08/28/24  0509   WBC Thousand/uL 13.15*   HEMOGLOBIN g/dL 11.5*   HEMATOCRIT % 35.1*   PLATELETS Thousands/uL 247   SEGS PCT % 83*   LYMPHO PCT % 7*   MONO PCT % 4   EOS PCT % 4     Results from last 7 days   Lab Units 08/28/24  0509   POTASSIUM mmol/L 3.7   CHLORIDE mmol/L 109*   CO2 mmol/L 22   BUN mg/dL 22   CREATININE mg/dL 0.76   CALCIUM mg/dL 8.1*   ALK PHOS U/L 882*   ALT U/L 143*   AST U/L 154*     Results from last 7 days   Lab Units 08/28/24  0509   INR  1.27*       Imaging:    XR chest portable    Result Date: 8/28/2024  Narrative: XR CHEST PORTABLE INDICATION: Sepsis. COMPARISON: Chest radiograph June 30, 2022 FINDINGS: Clear lungs. No pneumothorax or pleural effusion. Normal cardiomediastinal silhouette. Bones are unremarkable for age. Normal upper abdomen.     Impression: No acute cardiopulmonary disease. Workstation performed: [id number]     CT Abdomen pelvis with contrast    Result Date: 8/27/2024  Narrative: CT ABDOMEN AND PELVIS WITH IV CONTRAST INDICATION: Sepsis, left flank pain, left CVA tenderness. COMPARISON: CT scan from 5/28/2023. TECHNIQUE: CT examination of the abdomen and pelvis was performed. Multiplanar 2D reformatted images were created from the source data. This examination, like " all CT scans performed in the [facility], was performed utilizing techniques to minimize radiation dose exposure, including the use of iterative reconstruction and automated exposure control. Radiation dose length product (DLP) for this visit: 616 mGy-cm IV Contrast: 90 mL of iohexol (OMNIPAQUE) Enteric Contrast: Not administered. FINDINGS: ABDOMEN LOWER CHEST: No clinically significant abnormality in the visualized lower chest. LIVER/BILIARY TREE: Intra and extrahepatic ductal dilatation extending to the pancreatic head with there is a mass. No focal liver lesion identified. GALLBLADDER: Distended due to biliary obstruction. No calcified stones. No pericholecystic inflammatory changes. SPLEEN: Unremarkable. PANCREAS: Pancreatic ductal dilatation due to an obstructing mass in the lower pancreatic head at the level of the ampulla measuring 2.8 x 2.7 cm on image 2/78. ADRENAL GLANDS: Unremarkable. KIDNEYS/URETERS: Simple renal cyst(s). Otherwise unremarkable kidneys. No hydronephrosis. STOMACH AND BOWEL: Nondilated fluid-filled loops of small bowel in the mid to lower abdomen suggesting an enteritis. No bowel obstruction. APPENDIX: Normal. ABDOMINOPELVIC CAVITY: No ascites. No pneumoperitoneum. No lymphadenopathy. VESSELS: Unremarkable for patient's age. PELVIS REPRODUCTIVE ORGANS: Unremarkable for patient's age. URINARY BLADDER: Unremarkable. ABDOMINAL WALL/INGUINAL REGIONS: Unremarkable. BONES: No acute fracture or suspicious osseous lesion. Bilateral L5 spondylolysis without evidence of spondylolisthesis. Spinal degenerative changes.     Impression: Mass in the lower pancreatic head region obstructing the CBD and pancreatic duct measuring 2.8 x 2.7 cm on image 2/78 suspicious for neoplasm. Origin of tumor could also be cholangiocarcinoma or duodenal carcinoma given the location. Gallbladder distention due to biliary obstruction. Nondilated fluid-filled loops of small bowel in the mid to lower  abdomen suggesting an enteritis. Workstation performed: [id number]     Echo complete w/ contrast if indicated    Result Date: 8/16/2024  Narrative:   Left Ventricle: Left ventricular cavity size is normal. There is mild concentric hypertrophy. The left ventricular ejection fraction is 65%. Systolic function is normal. Wall motion is normal.   Aortic Valve: The aortic valve is trileaflet. The leaflets are mildly calcified. There is moderately reduced mobility. There is mild regurgitation. There is moderate stenosis. The aortic valve peak velocity is 3.47 m/s. The aortic valve mean gradient is 31 mmHg.   Mitral Valve: There is mild annular calcification. There is mild regurgitation.   Prior study date: 6/30/2022. Mild progression of aortic valve stenosis.       EKG, Pathology, and Other Studies Reviewed on Admission:   EKG: Reviewed      Counseling / Coordination of Care Time: 30 total mins spent in consult. Greater than 50% of total time spent on patient counseling and coordination of care.    Arelis Mccarty DO  Gastroenterology Fellow  [facility]  Division of Gastroenterology and Hepatology  Available on [place]  ...............................................................................................................................................  ** Please Note: This note is constructed using a voice recognition dictation system. **

## 2024-08-28 NOTE — PLAN OF CARE
Problem: PAIN - ADULT  Goal: Verbalizes/displays adequate comfort level or baseline comfort level  Description: Interventions:  - Encourage patient to monitor pain and request assistance  - Assess pain using appropriate pain scale  - Administer analgesics based on type and severity of pain and evaluate response  - Implement non-pharmacological measures as appropriate and evaluate response  - Consider cultural and social influences on pain and pain management  - Notify physician/advanced practitioner if interventions unsuccessful or patient reports new pain  Outcome: Progressing     Problem: INFECTION - ADULT  Goal: Absence or prevention of progression during hospitalization  Description: INTERVENTIONS:  - Assess and monitor for signs and symptoms of infection  - Monitor lab/diagnostic results  - Monitor all insertion sites, i.e. indwelling lines, tubes, and drains  - Monitor endotracheal if appropriate and nasal secretions for changes in amount and color  - [place] appropriate cooling/warming therapies per order  - Administer medications as ordered  - Instruct and encourage patient and family to use good hand hygiene technique  - Identify and instruct in appropriate isolation precautions for identified infection/condition  Outcome: Progressing  Goal: Absence of fever/infection during neutropenic period  Description: INTERVENTIONS:  - Monitor WBC    Outcome: Progressing     Problem: SAFETY ADULT  Goal: Patient will remain free of falls  Description: INTERVENTIONS:  - Educate patient/family on patient safety including physical limitations  - Instruct patient to call for assistance with activity   - Consult OT/PT to assist with strengthening/mobility   - Keep Call bell within reach  - Keep bed low and locked with side rails adjusted as appropriate  - Keep care items and personal belongings within reach  - Initiate and maintain comfort rounds  - Make Fall Risk Sign visible to staff  - Offer Toileting every 3 Hours,  in advance of need  - Initiate/Maintain bed alarm  - Obtain necessary fall risk management equipment: alarm   - Apply yellow socks and bracelet for high fall risk patients  - Consider moving patient to room near nurses station  Outcome: Progressing  Goal: Maintain or return to baseline ADL function  Description: INTERVENTIONS:  -  Assess patient's ability to carry out ADLs; assess patient's baseline for ADL function and identify physical deficits which impact ability to perform ADLs (bathing, care of mouth/teeth, toileting, grooming, dressing, etc.)  - Assess/evaluate cause of self-care deficits   - Assess range of motion  - Assess patient's mobility; develop plan if impaired  - Assess patient's need for assistive devices and provide as appropriate  - Encourage maximum independence but intervene and supervise when necessary  - Involve family in performance of ADLs  - Assess for home care needs following discharge   - Consider OT consult to assist with ADL evaluation and planning for discharge  - Provide patient education as appropriate  Outcome: Progressing  Goal: Maintains/Returns to pre admission functional level  Description: INTERVENTIONS:  - Perform AM-PAC 6 Click Basic Mobility/ Daily Activity assessment daily.  - Set and communicate daily mobility goal to care team and patient/family/caregiver.   - Collaborate with rehabilitation services on mobility goals if consulted  - Perform Range of Motion 3 times a day.  - Reposition patient every 3 hours.  - Dangle patient 3 times a day  - Stand patient 3 times a day  - Ambulate patient 3 times a day  - Out of bed to chair 3 times a day   - Out of bed for meals 3 times a day  - Out of bed for toileting  - Record patient progress and toleration of activity level   Outcome: Progressing     Problem: DISCHARGE PLANNING  Goal: Discharge to home or other facility with appropriate resources  Description: INTERVENTIONS:  - Identify barriers to discharge w/patient and  caregiver  - Arrange for needed discharge resources and transportation as appropriate  - Identify discharge learning needs (meds, wound care, etc.)  - Arrange for interpretive services to assist at discharge as needed  - Refer to Case Management Department for coordinating discharge planning if the patient needs post-hospital services based on physician/advanced practitioner order or complex needs related to functional status, cognitive ability, or social support system  Outcome: Progressing     Problem: Knowledge Deficit  Goal: Patient/family/caregiver demonstrates understanding of disease process, treatment plan, medications, and discharge instructions  Description: Complete learning assessment and assess knowledge base.  Interventions:  - Provide teaching at level of understanding  - Provide teaching via preferred learning methods  Outcome: Progressing

## 2024-08-28 NOTE — ASSESSMENT & PLAN NOTE
Concerning presentation for cholangitis given sepsis, bilirubinemia  CT abd/pelvis reviewed: new mass in lower pancreatic head obstructing CBD/pancreatic duct 2.8x2.7cm suspicious for neoplasm, gallbladder distension    Plan:  NPO pending need for procedures in AM  ABX: ceftriaxone/flagyl in ED, continue with Zosyn  Trend LFTs  Multimodal analgesia   GI, surgical oncology consulted

## 2024-08-29 ENCOUNTER — ANESTHESIA EVENT (INPATIENT)
Dept: GASTROENTEROLOGY | Facility: HOSPITAL | Age: 64
DRG: 435 | End: 2024-08-29
Payer: MEDICARE

## 2024-08-29 ENCOUNTER — ANESTHESIA (INPATIENT)
Dept: GASTROENTEROLOGY | Facility: HOSPITAL | Age: 64
DRG: 435 | End: 2024-08-29
Payer: MEDICARE

## 2024-08-29 ENCOUNTER — PREP FOR PROCEDURE (OUTPATIENT)
Dept: GASTROENTEROLOGY | Facility: CLINIC | Age: 64
End: 2024-08-29

## 2024-08-29 ENCOUNTER — APPOINTMENT (INPATIENT)
Dept: GASTROENTEROLOGY | Facility: HOSPITAL | Age: 64
DRG: 435 | End: 2024-08-29
Payer: MEDICARE

## 2024-08-29 ENCOUNTER — APPOINTMENT (OUTPATIENT)
Dept: RADIOLOGY | Facility: HOSPITAL | Age: 64
DRG: 435 | End: 2024-08-29
Payer: MEDICARE

## 2024-08-29 DIAGNOSIS — K83.8 AMPULLA OF VATER MASS: ICD-10-CM

## 2024-08-29 DIAGNOSIS — K83.1 BILIARY OBSTRUCTION: Primary | ICD-10-CM

## 2024-08-29 LAB
ALBUMIN SERPL BCG-MCNC: 3.2 G/DL (ref 3.5–5)
ALP SERPL-CCNC: 785 U/L (ref 34–104)
ALT SERPL W P-5'-P-CCNC: 106 U/L (ref 7–52)
ANION GAP SERPL CALCULATED.3IONS-SCNC: 5 MMOL/L (ref 4–13)
AST SERPL W P-5'-P-CCNC: 88 U/L (ref 13–39)
BASOPHILS # BLD AUTO: 0.04 THOUSANDS/ÂΜL (ref 0–0.1)
BASOPHILS NFR BLD AUTO: 0 % (ref 0–1)
BILIRUB SERPL-MCNC: 1.53 MG/DL (ref 0.2–1)
BUN SERPL-MCNC: 17 MG/DL (ref 5–25)
CALCIUM ALBUM COR SERPL-MCNC: 8.8 MG/DL (ref 8.3–10.1)
CALCIUM SERPL-MCNC: 8.2 MG/DL (ref 8.4–10.2)
CANCER AG19-9 SERPL-ACNC: 14 U/ML (ref 0–35)
CHLORIDE SERPL-SCNC: 109 MMOL/L (ref 96–108)
CO2 SERPL-SCNC: 25 MMOL/L (ref 21–32)
CREAT SERPL-MCNC: 0.65 MG/DL (ref 0.6–1.3)
EOSINOPHIL # BLD AUTO: 0.72 THOUSAND/ÂΜL (ref 0–0.61)
EOSINOPHIL NFR BLD AUTO: 8 % (ref 0–6)
ERYTHROCYTE [DISTWIDTH] IN BLOOD BY AUTOMATED COUNT: 15.6 % (ref 11.6–15.1)
GFR SERPL CREATININE-BSD FRML MDRD: 102 ML/MIN/1.73SQ M
GLUCOSE SERPL-MCNC: 99 MG/DL (ref 65–140)
HCT VFR BLD AUTO: 36.4 % (ref 36.5–49.3)
HGB BLD-MCNC: 11.8 G/DL (ref 12–17)
IMM GRANULOCYTES # BLD AUTO: 0.03 THOUSAND/UL (ref 0–0.2)
IMM GRANULOCYTES NFR BLD AUTO: 0 % (ref 0–2)
LYMPHOCYTES # BLD AUTO: 1.37 THOUSANDS/ÂΜL (ref 0.6–4.47)
LYMPHOCYTES NFR BLD AUTO: 15 % (ref 14–44)
MCH RBC QN AUTO: 28.2 PG (ref 26.8–34.3)
MCHC RBC AUTO-ENTMCNC: 32.4 G/DL (ref 31.4–37.4)
MCV RBC AUTO: 87 FL (ref 82–98)
MONOCYTES # BLD AUTO: 0.62 THOUSAND/ÂΜL (ref 0.17–1.22)
MONOCYTES NFR BLD AUTO: 7 % (ref 4–12)
NEUTROPHILS # BLD AUTO: 6.7 THOUSANDS/ÂΜL (ref 1.85–7.62)
NEUTS SEG NFR BLD AUTO: 70 % (ref 43–75)
NRBC BLD AUTO-RTO: 0 /100 WBCS
PLATELET # BLD AUTO: 246 THOUSANDS/UL (ref 149–390)
PMV BLD AUTO: 9.9 FL (ref 8.9–12.7)
POTASSIUM SERPL-SCNC: 4.3 MMOL/L (ref 3.5–5.3)
PROT SERPL-MCNC: 5.6 G/DL (ref 6.4–8.4)
RBC # BLD AUTO: 4.19 MILLION/UL (ref 3.88–5.62)
SODIUM SERPL-SCNC: 139 MMOL/L (ref 135–147)
WBC # BLD AUTO: 9.48 THOUSAND/UL (ref 4.31–10.16)

## 2024-08-29 PROCEDURE — C1889 IMPLANT/INSERT DEVICE, NOC: HCPCS

## 2024-08-29 PROCEDURE — 0F798DZ DILATION OF COMMON BILE DUCT WITH INTRALUMINAL DEVICE, VIA NATURAL OR ARTIFICIAL OPENING ENDOSCOPIC: ICD-10-PCS | Performed by: INTERNAL MEDICINE

## 2024-08-29 PROCEDURE — 99223 1ST HOSP IP/OBS HIGH 75: CPT | Performed by: STUDENT IN AN ORGANIZED HEALTH CARE EDUCATION/TRAINING PROGRAM

## 2024-08-29 PROCEDURE — 99233 SBSQ HOSP IP/OBS HIGH 50: CPT | Performed by: INTERNAL MEDICINE

## 2024-08-29 PROCEDURE — 80053 COMPREHEN METABOLIC PANEL: CPT | Performed by: INTERNAL MEDICINE

## 2024-08-29 PROCEDURE — 0FBC8ZX EXCISION OF AMPULLA OF VATER, VIA NATURAL OR ARTIFICIAL OPENING ENDOSCOPIC, DIAGNOSTIC: ICD-10-PCS | Performed by: INTERNAL MEDICINE

## 2024-08-29 PROCEDURE — 43261 ENDO CHOLANGIOPANCREATOGRAPH: CPT | Performed by: INTERNAL MEDICINE

## 2024-08-29 PROCEDURE — 74328 X-RAY BILE DUCT ENDOSCOPY: CPT

## 2024-08-29 PROCEDURE — 43274 ERCP DUCT STENT PLACEMENT: CPT | Performed by: INTERNAL MEDICINE

## 2024-08-29 PROCEDURE — C2617 STENT, NON-COR, TEM W/O DEL: HCPCS

## 2024-08-29 PROCEDURE — 88342 IMHCHEM/IMCYTCHM 1ST ANTB: CPT | Performed by: PATHOLOGY

## 2024-08-29 PROCEDURE — 85025 COMPLETE CBC W/AUTO DIFF WBC: CPT | Performed by: INTERNAL MEDICINE

## 2024-08-29 PROCEDURE — BF101ZZ FLUOROSCOPY OF BILE DUCTS USING LOW OSMOLAR CONTRAST: ICD-10-PCS | Performed by: INTERNAL MEDICINE

## 2024-08-29 PROCEDURE — 43259 EGD US EXAM DUODENUM/JEJUNUM: CPT | Performed by: INTERNAL MEDICINE

## 2024-08-29 PROCEDURE — 88305 TISSUE EXAM BY PATHOLOGIST: CPT | Performed by: PATHOLOGY

## 2024-08-29 RX ORDER — SODIUM CHLORIDE 9 MG/ML
125 INJECTION, SOLUTION INTRAVENOUS CONTINUOUS
Status: CANCELLED | OUTPATIENT
Start: 2024-08-29

## 2024-08-29 RX ORDER — ONDANSETRON 2 MG/ML
4 INJECTION INTRAMUSCULAR; INTRAVENOUS ONCE AS NEEDED
Status: DISCONTINUED | OUTPATIENT
Start: 2024-08-29 | End: 2024-08-30 | Stop reason: SDUPTHER

## 2024-08-29 RX ORDER — FENTANYL CITRATE 50 UG/ML
INJECTION, SOLUTION INTRAMUSCULAR; INTRAVENOUS AS NEEDED
Status: DISCONTINUED | OUTPATIENT
Start: 2024-08-29 | End: 2024-08-29

## 2024-08-29 RX ORDER — MIDAZOLAM HYDROCHLORIDE 2 MG/2ML
INJECTION, SOLUTION INTRAMUSCULAR; INTRAVENOUS AS NEEDED
Status: DISCONTINUED | OUTPATIENT
Start: 2024-08-29 | End: 2024-08-29

## 2024-08-29 RX ORDER — ROCURONIUM BROMIDE 10 MG/ML
INJECTION, SOLUTION INTRAVENOUS AS NEEDED
Status: DISCONTINUED | OUTPATIENT
Start: 2024-08-29 | End: 2024-08-29

## 2024-08-29 RX ORDER — ALBUTEROL SULFATE 0.83 MG/ML
2.5 SOLUTION RESPIRATORY (INHALATION) ONCE AS NEEDED
Status: DISCONTINUED | OUTPATIENT
Start: 2024-08-29 | End: 2024-08-30 | Stop reason: SDUPTHER

## 2024-08-29 RX ORDER — POTASSIUM CHLORIDE 14.9 MG/ML
20 INJECTION INTRAVENOUS ONCE
Status: COMPLETED | OUTPATIENT
Start: 2024-08-29 | End: 2024-08-29

## 2024-08-29 RX ORDER — PROPOFOL 10 MG/ML
INJECTION, EMULSION INTRAVENOUS AS NEEDED
Status: DISCONTINUED | OUTPATIENT
Start: 2024-08-29 | End: 2024-08-29

## 2024-08-29 RX ORDER — LIDOCAINE HYDROCHLORIDE 20 MG/ML
INJECTION, SOLUTION EPIDURAL; INFILTRATION; INTRACAUDAL; PERINEURAL AS NEEDED
Status: DISCONTINUED | OUTPATIENT
Start: 2024-08-29 | End: 2024-08-29

## 2024-08-29 RX ORDER — HYDRALAZINE HYDROCHLORIDE 20 MG/ML
10 INJECTION INTRAMUSCULAR; INTRAVENOUS EVERY 6 HOURS PRN
Status: DISCONTINUED | OUTPATIENT
Start: 2024-08-29 | End: 2024-08-31 | Stop reason: HOSPADM

## 2024-08-29 RX ADMIN — PIPERACILLIN SODIUM AND TAZOBACTAM SODIUM 4.5 G: 36; 4.5 INJECTION, POWDER, LYOPHILIZED, FOR SOLUTION INTRAVENOUS at 05:38

## 2024-08-29 RX ADMIN — FENTANYL CITRATE 50 MCG: 50 INJECTION INTRAMUSCULAR; INTRAVENOUS at 15:20

## 2024-08-29 RX ADMIN — FENTANYL CITRATE 25 MCG: 50 INJECTION INTRAMUSCULAR; INTRAVENOUS at 15:42

## 2024-08-29 RX ADMIN — NOREPINEPHRINE BITARTRATE 4 MCG/MIN: 1 INJECTION, SOLUTION, CONCENTRATE INTRAVENOUS at 16:09

## 2024-08-29 RX ADMIN — PROPOFOL 150 MG: 10 INJECTION, EMULSION INTRAVENOUS at 15:20

## 2024-08-29 RX ADMIN — ONDANSETRON 4 MG: 2 INJECTION INTRAMUSCULAR; INTRAVENOUS at 15:51

## 2024-08-29 RX ADMIN — IOHEXOL 9 ML: 300 INJECTION, SOLUTION INTRAVENOUS at 16:00

## 2024-08-29 RX ADMIN — ENOXAPARIN SODIUM 40 MG: 40 INJECTION SUBCUTANEOUS at 08:45

## 2024-08-29 RX ADMIN — NOREPINEPHRINE BITARTRATE 10 MCG: 1 INJECTION, SOLUTION, CONCENTRATE INTRAVENOUS at 15:52

## 2024-08-29 RX ADMIN — POLYETHYLENE GLYCOL 3350 17 G: 17 POWDER, FOR SOLUTION ORAL at 08:47

## 2024-08-29 RX ADMIN — NOREPINEPHRINE BITARTRATE 10 MCG: 1 INJECTION, SOLUTION, CONCENTRATE INTRAVENOUS at 15:56

## 2024-08-29 RX ADMIN — FENTANYL CITRATE 25 MCG: 50 INJECTION INTRAMUSCULAR; INTRAVENOUS at 16:01

## 2024-08-29 RX ADMIN — MIDAZOLAM 2 MG: 1 INJECTION INTRAMUSCULAR; INTRAVENOUS at 15:17

## 2024-08-29 RX ADMIN — PIPERACILLIN SODIUM AND TAZOBACTAM SODIUM 4.5 G: 36; 4.5 INJECTION, POWDER, LYOPHILIZED, FOR SOLUTION INTRAVENOUS at 22:37

## 2024-08-29 RX ADMIN — LIDOCAINE HYDROCHLORIDE 60 MG: 20 INJECTION, SOLUTION EPIDURAL; INFILTRATION; INTRACAUDAL at 15:20

## 2024-08-29 RX ADMIN — ROCURONIUM BROMIDE 50 MG: 10 INJECTION, SOLUTION INTRAVENOUS at 15:21

## 2024-08-29 RX ADMIN — SODIUM CHLORIDE 100 ML/HR: 0.9 INJECTION, SOLUTION INTRAVENOUS at 14:13

## 2024-08-29 RX ADMIN — PIPERACILLIN SODIUM AND TAZOBACTAM SODIUM 4.5 G: 36; 4.5 INJECTION, POWDER, LYOPHILIZED, FOR SOLUTION INTRAVENOUS at 14:55

## 2024-08-29 RX ADMIN — ASPIRIN 81 MG CHEWABLE TABLET 81 MG: 81 TABLET CHEWABLE at 08:45

## 2024-08-29 RX ADMIN — POTASSIUM CHLORIDE 20 MEQ: 14.9 INJECTION, SOLUTION INTRAVENOUS at 08:47

## 2024-08-29 NOTE — ASSESSMENT & PLAN NOTE
No GI complaints reported  CT abd/pelvis reviewed nondilated fluid-filled loops of small bowel in mid/lower abdomen suggesting enteritis   Patient is asymptomatic

## 2024-08-29 NOTE — PROGRESS NOTES
[facility]  Progress Note  Name: Isaac Kramer I  MRN: [id number]  Unit/Bed#: E4 -01 I Date of Admission: 8/28/2024   Date of Service: 8/29/2024 I Hospital Day: 1    Assessment & Plan   * Acute obstructive cholangitis  Assessment & Plan  Concerning presentation for cholangitis given sepsis, bilirubinemia  CT abd/pelvis reviewed: new mass in lower pancreatic head obstructing CBD/pancreatic duct 2.8x2.7cm suspicious for neoplasm, gallbladder distension  N.p.o. for ERCP, EUS  Continue Zosyn    Enteritis  Assessment & Plan  No GI complaints reported  CT abd/pelvis reviewed nondilated fluid-filled loops of small bowel in mid/lower abdomen suggesting enteritis   Patient is asymptomatic    Biliary sepsis  Assessment & Plan  SIRS present fever, tachycardia, leukocytosis. Procal 2.92, lactate 1.4  Sources: biliary, enteritis  Continue Zosyn  Follow-up blood cultures    Elevated LFTs  Assessment & Plan  Obstructive pattern   Trend LFTs following ERCP    Pancreatic mass  Assessment & Plan  New mass in pancreatic/biliary region not seen on CT imaging on 05/28/2023. Patient reports 14lb unintentional weight loss this past year. Hx of prostate cancer  GI, surgical oncology consulted  Pending EUS with biopsy    Coronary artery disease involving native coronary artery of native heart without angina pectoris  Assessment & Plan  [facility] 07/2022, 3v CAD, s/p stenting  Continue aspirin, statin    Benign essential hypertension  Assessment & Plan  Hold lisinopril preoperatively  As needed labetalol           VTE Pharmacologic Prophylaxis: Lovenox on hold preoperatively    Patient Centered Rounds:  Patient care rounds were performed with nursing    Discussions with Specialists or Other Care Team Provider: We reviewed case with gastroenterologist    Education and Discussions with Family / Patient: Patient at the bedside    Time Spent for Care: I have spent a total time of 51 minutes on 08/29/24 in caring  for this patient including Diagnostic results, Prognosis, Risks and benefits of tx options, Instructions for management, Patient and family education, Impressions, Counseling / Coordination of care, Documenting in the medical record, Reviewing / ordering tests, medicine, procedures  , and Communicating with other healthcare professionals .      Current Length of Stay: 1 day(s)    Current Patient Status: Inpatient   Certification Statement: The patient will continue to require additional inpatient hospital stay due to due to need for inpatient procedure, IV antibiotics    Discharge Plan: Suspect 24 to 48 hours    Code Status: Level 1 - Full Code      Subjective:   Patient seen and evaluated at bedside.  He feels well.  No complaints other than being a little hungry.    Objective:     Vitals:   Temp (24hrs), Av.7 °F (36.5 °C), Min:97.1 °F (36.2 °C), Max:98.1 °F (36.7 °C)    Temp:  [97.1 °F (36.2 °C)-98.1 °F (36.7 °C)] 98.1 °F (36.7 °C)  HR:  [59-75] 67  Resp:  [15-18] 15  BP: (113-163)/(60-82) 163/78  SpO2:  [96 %-99 %] 98 %  Body mass index is 23.61 kg/m².     Input and Output Summary (last 24 hours):       Intake/Output Summary (Last 24 hours) at 2024 1513  Last data filed at 2024 2302  Gross per 24 hour   Intake 0 ml   Output --   Net 0 ml       Physical Exam:     Physical Exam  Vitals reviewed.   Constitutional:       General: He is not in acute distress.     Appearance: He is well-developed. He is not ill-appearing, toxic-appearing or diaphoretic.   HENT:      Head: Normocephalic and atraumatic.      Mouth/Throat:      Mouth: Mucous membranes are moist.   Eyes:      General: Scleral icterus present.      Extraocular Movements: Extraocular movements intact.   Cardiovascular:      Rate and Rhythm: Normal rate and regular rhythm.      Heart sounds: Normal heart sounds.   Pulmonary:      Effort: Pulmonary effort is normal. No respiratory distress.      Breath sounds: Normal breath sounds. No wheezing or  rales.   Abdominal:      General: There is no distension.      Palpations: Abdomen is soft.      Tenderness: There is no abdominal tenderness. There is no guarding or rebound.   Musculoskeletal:         General: No swelling, tenderness or deformity.   Skin:     General: Skin is warm and dry.      Coloration: Skin is jaundiced.   Neurological:      General: No focal deficit present.      Mental Status: He is alert. Mental status is at baseline.   Psychiatric:         Mood and Affect: Mood normal.         Behavior: Behavior normal.         Thought Content: Thought content normal.         Judgment: Judgment normal.         Additional Data:     Labs: I have reviewed pertinent results     Results from last 7 days   Lab Units 08/29/24  0850 08/28/24  0509 08/27/24  1716   WBC Thousand/uL 9.48   < > 16.61*   HEMOGLOBIN g/dL 11.8*   < > 12.9   HEMATOCRIT % 36.4*   < > 40.2   PLATELETS Thousands/uL 246   < > 296   BANDS PCT %  --   --  10*   SEGS PCT % 70   < >  --    LYMPHO PCT % 15   < > 4*   MONO PCT % 7   < > 1*   EOS PCT % 8*   < > 1    < > = values in this interval not displayed.     Results from last 7 days   Lab Units 08/29/24  0542   SODIUM mmol/L 139   POTASSIUM mmol/L 4.3   CHLORIDE mmol/L 109*   CO2 mmol/L 25   BUN mg/dL 17   CREATININE mg/dL 0.65   ANION GAP mmol/L 5   CALCIUM mg/dL 8.2*   ALBUMIN g/dL 3.2*   TOTAL BILIRUBIN mg/dL 1.53*   ALK PHOS U/L 785*   ALT U/L 106*   AST U/L 88*   GLUCOSE RANDOM mg/dL 99     Results from last 7 days   Lab Units 08/28/24  0509   INR  1.27*             Results from last 7 days   Lab Units 08/28/24  0509 08/27/24  1716   LACTIC ACID mmol/L  --  1.4   PROCALCITONIN ng/ml 22.18* 2.92*         Imaging: I have reviewed pertinent imaging       Recent Cultures (last 7 days):     Results from last 7 days   Lab Units 08/27/24  1716 08/27/24  1633   BLOOD CULTURE  Klebsiella-Enterobacter  group*  --    GRAM STAIN RESULT  Gram negative rods*  Gram negative rods*  --    URINE CULTURE    --  No Growth <1000 cfu/mL       Last 24 Hours Medication List:   Current Facility-Administered Medications   Medication Dose Route Frequency Provider Last Rate    acetaminophen  650 mg Oral Q6H PRN Fredy Luther PA-C      aluminum-magnesium hydroxide-simethicone  30 mL Oral Q6H PRN Fredy Luther PA-C      aspirin  81 mg Oral Daily Fredy Luther PA-C      hydrALAZINE  10 mg Intravenous Q6H PRN Juan Bernal DO      HYDROmorphone  0.5 mg Intravenous Q4H PRN Juan Bernal DO      naloxone  0.04 mg Intravenous Q1MIN PRN Fredy Luther PA-C      ondansetron  4 mg Intravenous Q6H PRN Fredy Luther PA-C      oxyCODONE  5 mg Oral Q4H PRN Juan Bernal DO      Or    oxyCODONE  10 mg Oral Q4H PRN Juan Bernal DO      piperacillin-tazobactam  4.5 g Intravenous Q8H Fredy Luther PA-C 4.5 g (08/29/24 1455)    polyethylene glycol  17 g Oral Daily Fredy Luther PA-C      sodium chloride  100 mL/hr Intravenous Continuous Juan Bernal  mL/hr (08/29/24 1413)        Today, Patient Was Seen By: Juan Bernal DO    ** Please Note: Dictation voice to text software may have been used in the creation of this document. **

## 2024-08-29 NOTE — ANESTHESIA POSTPROCEDURE EVALUATION
Post-Op Assessment Note    CV Status:  Stable    Pain management: adequate       Mental Status:  Alert and awake   Hydration Status:  Euvolemic   PONV Controlled:  Controlled   Airway Patency:  Patent     Post Op Vitals Reviewed: Yes    No anethesia notable event occurred.    Staff: Anesthesiologist               /74 (08/29/24 1715)    Temp (!) 97.2 °F (36.2 °C) (08/29/24 1715)    Pulse 71 (08/29/24 1715)   Resp 16 (08/29/24 1715)    SpO2 98 % (08/29/24 1715)

## 2024-08-29 NOTE — PLAN OF CARE
Problem: PAIN - ADULT  Goal: Verbalizes/displays adequate comfort level or baseline comfort level  Description: Interventions:  - Encourage patient to monitor pain and request assistance  - Assess pain using appropriate pain scale  - Administer analgesics based on type and severity of pain and evaluate response  - Implement non-pharmacological measures as appropriate and evaluate response  - Consider cultural and social influences on pain and pain management  - Notify physician/advanced practitioner if interventions unsuccessful or patient reports new pain  Outcome: Progressing     Problem: INFECTION - ADULT  Goal: Absence or prevention of progression during hospitalization  Description: INTERVENTIONS:  - Assess and monitor for signs and symptoms of infection  - Monitor lab/diagnostic results  - Monitor all insertion sites, i.e. indwelling lines, tubes, and drains  - Monitor endotracheal if appropriate and nasal secretions for changes in amount and color  - [place] appropriate cooling/warming therapies per order  - Administer medications as ordered  - Instruct and encourage patient and family to use good hand hygiene technique  - Identify and instruct in appropriate isolation precautions for identified infection/condition  Outcome: Progressing  Goal: Absence of fever/infection during neutropenic period  Description: INTERVENTIONS:  - Monitor WBC    Outcome: Progressing     Problem: SAFETY ADULT  Goal: Patient will remain free of falls  Description: INTERVENTIONS:  - Educate patient/family on patient safety including physical limitations  - Instruct patient to call for assistance with activity   - Consult OT/PT to assist with strengthening/mobility   - Keep Call bell within reach  - Keep bed low and locked with side rails adjusted as appropriate  - Keep care items and personal belongings within reach  - Initiate and maintain comfort rounds  - Make Fall Risk Sign visible to staff  - Offer Toileting every 2 Hours,  in advance of need  - Initiate/Maintain bed alarm  - Obtain necessary fall risk management equipment: chair alarm  - Apply yellow socks and bracelet for high fall risk patients  - Consider moving patient to room near nurses station  Outcome: Progressing  Goal: Maintain or return to baseline ADL function  Description: INTERVENTIONS:  -  Assess patient's ability to carry out ADLs; assess patient's baseline for ADL function and identify physical deficits which impact ability to perform ADLs (bathing, care of mouth/teeth, toileting, grooming, dressing, etc.)  - Assess/evaluate cause of self-care deficits   - Assess range of motion  - Assess patient's mobility; develop plan if impaired  - Assess patient's need for assistive devices and provide as appropriate  - Encourage maximum independence but intervene and supervise when necessary  - Involve family in performance of ADLs  - Assess for home care needs following discharge   - Consider OT consult to assist with ADL evaluation and planning for discharge  - Provide patient education as appropriate  Outcome: Progressing  Goal: Maintains/Returns to pre admission functional level  Description: INTERVENTIONS:  - Perform AM-PAC 6 Click Basic Mobility/ Daily Activity assessment daily.  - Set and communicate daily mobility goal to care team and patient/family/caregiver.   - Collaborate with rehabilitation services on mobility goals if consulted  - Perform Range of Motion 3 times a day.  - Reposition patient every 2 hours.  - Out of bed for toileting  - Record patient progress and toleration of activity level   Outcome: Progressing     Problem: DISCHARGE PLANNING  Goal: Discharge to home or other facility with appropriate resources  Description: INTERVENTIONS:  - Identify barriers to discharge w/patient and caregiver  - Arrange for needed discharge resources and transportation as appropriate  - Identify discharge learning needs (meds, wound care, etc.)  - Arrange for interpretive  services to assist at discharge as needed  - Refer to Case Management Department for coordinating discharge planning if the patient needs post-hospital services based on physician/advanced practitioner order or complex needs related to functional status, cognitive ability, or social support system  Outcome: Progressing     Problem: Knowledge Deficit  Goal: Patient/family/caregiver demonstrates understanding of disease process, treatment plan, medications, and discharge instructions  Description: Complete learning assessment and assess knowledge base.  Interventions:  - Provide teaching at level of understanding  - Provide teaching via preferred learning methods  Outcome: Progressing

## 2024-08-29 NOTE — UTILIZATION REVIEW
Initial Clinical Review    Admission: Date/Time/Statement:   Admission Orders (From admission, onward)       Ordered        08/28/24 0211  INPATIENT ADMISSION  Once                          Orders Placed This Encounter   Procedures    INPATIENT ADMISSION     Standing Status:   Standing     Number of Occurrences:   1     Order Specific Question:   Level of Care     Answer:   Level 2 Stepdown / HOT [14]     Order Specific Question:   Estimated length of stay     Answer:   More than 2 Midnights     Order Specific Question:   Certification     Answer:   I certify that inpatient services are medically necessary for this patient for a duration of greater than two midnights. See H&P and MD Progress Notes for additional information about the patient's course of treatment.     Initial Presentation: [facility]  63 yomwith past medical history of prostate cancer status post resection, coronary artery disease status post PCI 2022, hypertension who presents to the [facility] ER with flank pain, nausea, dizziness, chills, change in urine color. Patient was found to have new mass in lower pancreatic head obstructing CBD/pancreatic duct 2.8x2.7cm suspicious for neoplasm, gallbladder distension,  obstructive appearing transaminitis.   Transferred to [facility] & admitted to inpatient status for acute obstructive cholangitis for GI evaluation.  Started on IVABT, follow up cultures.     Per oncology: presents with flank and abdominal pain with CT findings of mass in lower pancreatic head region. Patient has an improving tbili of 3.53 and is currently hemodynamically stable.    Plan:  -Recommend outpatient follow up with surgical oncology  -Will follow up tumor markers  -Will follow up GI plans for possible ERCP, possible stent, possible EUS with biopsy.   -Will obtain CT chest for staging    Per GI: obstructive jaundice, pancreatic mass, hyperbilirubinemia.  CT scan concerning for pancreatic cancer or less  likely cholangiocarcinoma or ampullary cancer. The white blood count is elevated at 16.61 and liver enzymes are elevated with alkaline phosphatase of 1122. Cholangitis is unusual in the setting of malignant obstruction of the bile duct but given the white blood count this is a potential concern. Will treat with antibiotics and plan for urgent ERCP within the next 24 hours and EUS with fine-needle aspiration to diagnose pancreatic lesion.       Anticipated Length of Stay/Certification Statement:   Patient will be admitted on an inpatient basis with an anticipated length of stay of greater than 2 midnights secondary to sepsis    Date: 8/29/24   Day 2:   Acute obstructive cholangitis POA. Scleral icterus, jaundiced, denies pain. NPO for ERCP, EUS today per GI. IVABT continued for biliary sepsis, follow up cultures. IV K repletion given.    ED Triage Vitals   Temperature Pulse Respirations Blood Pressure SpO2 Pain Score   08/28/24 0217 08/28/24 0217 08/28/24 0217 08/28/24 0217 08/28/24 0217 08/28/24 0200   97.5 °F (36.4 °C) 61 16 135/65 98 % No Pain     Weight (last 2 days)       Date/Time Weight    08/28/24 0217 62.4 (137.57)            Vital Signs (last 3 days)       Date/Time Temp Pulse Resp BP MAP (mmHg) SpO2 O2 Device Patient Position - Orthostatic VS Ghada Coma Scale Score Pain    08/29/24 1403 98.1 °F (36.7 °C) 67 15 163/78 -- 98 % None (Room air) -- -- No Pain    08/29/24 0737 97.1 °F (36.2 °C) 59 18 152/82 104 96 % None (Room air) Lying -- --    08/29/24 0002 97.5 °F (36.4 °C) 69 18 113/63 83 96 % -- Lying -- --    08/28/24 2020 -- -- -- -- -- -- None (Room air) -- 15 No Pain    08/28/24 1847 98 °F (36.7 °C) 75 18 121/60 83 99 % None (Room air) Sitting -- --    08/28/24 1509 97.6 °F (36.4 °C) 71 18 131/66 91 97 % None (Room air) Lying -- --    08/28/24 1113 97.3 °F (36.3 °C) 63 18 121/62 86 97 % -- Lying -- --    08/28/24 0800 -- -- -- -- -- -- -- -- 15 No Pain    08/28/24 0743 97.2 °F (36.2 °C) 60 17 122/63  88 98 % None (Room air) Lying -- --    08/28/24 0217 97.5 °F (36.4 °C) 61 16 135/65 94 98 % -- Lying -- No Pain    08/28/24 0200 -- -- -- -- -- -- -- -- 15 No Pain              Pertinent Labs/Diagnostic Test Results:   Radiology:  CT chest wo contrast   Final Interpretation by Timothy Young MD (08/28 1525)      No evidence of metastatic disease in the chest.         Resident: Sidney Wahl I, the attending radiologist, have reviewed the images and agree with the final report above.      Workstation performed: [id number]         FL ERCP biliary only    (Results Pending)     Cardiology:  No orders to display     GI:  Endoscopic ultrasonography, GI (Upper)   Final Result by Catia Ramirez MD (08/29 1551)   The esophagus and stomach appeared normal.   Hypoechoic, irregular, lobulated and ulcerated mass measuring 70 mm x 40    mm with poorly defined and irregular margins was visualized in the major    papilla, originating in the mucosa and extending to the muscularis propria   The pancreas appeared atrophic. The parenchyma was visualized in the    entire pancreas. The parenchyma was heterogeneous.         RECOMMENDATION:   Proceed with ERCP                        Catia Ramirez MD           Results from last 7 days   Lab Units 08/29/24  0850 08/28/24  0509 08/27/24  1716   WBC Thousand/uL 9.48 13.15* 16.61*   HEMOGLOBIN g/dL 11.8* 11.5* 12.9   HEMATOCRIT % 36.4* 35.1* 40.2   PLATELETS Thousands/uL 246 247 296   TOTAL NEUT ABS Thousands/µL 6.70 11.13*  --    BANDS PCT %  --   --  10*       Results from last 7 days   Lab Units 08/29/24  0542 08/28/24  0509 08/27/24  1716   SODIUM mmol/L 139 139 136   POTASSIUM mmol/L 4.3 3.7 3.4*   CHLORIDE mmol/L 109* 109* 105   CO2 mmol/L 25 22 18*   ANION GAP mmol/L 5 8 13   BUN mg/dL 17 22 24   CREATININE mg/dL 0.65 0.76 1.13   EGFR ml/min/1.73sq m 102 95 67   CALCIUM mg/dL 8.2* 8.1* 8.8     Results from last 7 days   Lab Units 08/29/24  0542 08/28/24  0505  08/27/24  1716   AST U/L 88* 154* 304*   ALT U/L 106* 143* 191*   ALK PHOS U/L 785* 882* 1,122*   TOTAL PROTEIN g/dL 5.6* 5.6* 6.5   ALBUMIN g/dL 3.2* 3.3* 3.8   TOTAL BILIRUBIN mg/dL 1.53* 3.55* 3.81*   BILIRUBIN DIRECT mg/dL  --   --  2.45*     Results from last 7 days   Lab Units 08/29/24  0542 08/28/24  0509 08/27/24  1716   GLUCOSE RANDOM mg/dL 99 88 109     Results from last 7 days   Lab Units 08/28/24  0509 08/27/24  1716   PROTIME seconds 16.1* 15.3*   INR  1.27* 1.16   PTT seconds  --  28       Results from last 7 days   Lab Units 08/28/24  0509 08/27/24  1716   PROCALCITONIN ng/ml 22.18* 2.92*     Results from last 7 days   Lab Units 08/27/24  1716   LACTIC ACID mmol/L 1.4     Results from last 7 days   Lab Units 08/28/24  0509 08/27/24  1716   LIPASE u/L 26 63     Results from last 7 days   Lab Units 08/28/24  0509   CEA ng/mL 0.7     Results from last 7 days   Lab Units 08/27/24  1813 08/27/24  1619   CLARITY UA  Clear hazy   COLOR UA  Yellow orange/dark   SPEC GRAV UA  <=1.005*  --    PH UA  6.0  --    GLUCOSE UA mg/dl Negative neg   KETONES UA mg/dl Negative trace   BLOOD UA  Negative mod   PROTEIN UA mg/dl Negative 300   NITRITE UA  Negative pos   BILIRUBIN UA  1+*  --    BILIRUBIN UA POC   --  large   UROBILINOGEN UA E.U./dl 0.2 8   LEUKOCYTES UA  Negative trace     Results from last 7 days   Lab Units 08/27/24  1716 08/27/24  1633   BLOOD CULTURE  Klebsiella-Enterobacter  group*  --    GRAM STAIN RESULT  Gram negative rods*  Gram negative rods*  --    URINE CULTURE   --  No Growth <1000 cfu/mL         Past Medical History:   Diagnosis Date    Benign essential hypertension 05/08/2014    CAD (coronary artery disease)     s/p NAVA prox LAD and D1 7/28/2022    Cancer (HCC)     Prostate    Coronary artery disease involving native coronary artery of native heart without angina pectoris 08/09/2022    Enteritis 8/28/2024    GERD (gastroesophageal reflux disease)     Hyperlipidemia     Hypertension     Other  chest pain     Palpitations     Prostate cancer (HCC) 04/18/2023     Present on Admission:   Coronary artery disease involving native coronary artery of native heart without angina pectoris   Benign essential hypertension   Pancreatic mass   Elevated LFTs   Biliary sepsis   Acute obstructive cholangitis   Enteritis   GERD (gastroesophageal reflux disease)      Admitting Diagnosis: Severe sepsis (HCC)  Age/Sex: 63 y.o. male  Admission Orders:  Consult surgical oncology  Scd/foot pumps   Consult GI    Scheduled Medications:  Medications 08/20 08/21 08/22 08/23 08/24 08/25 08/26 08/27 08/28 08/29   acetaminophen (TYLENOL) tablet 975 mg  Dose: 975 mg  Freq: Once Route: PO  Start: 08/27/24 1630 End: 08/27/24 1632           1632          aspirin chewable tablet 81 mg  Dose: 81 mg  Freq: Daily Route: PO  Start: 08/28/24 0900            0817      0845        cefTRIAXone (ROCEPHIN) IVPB (premix in dextrose) 2,000 mg 50 mL  Dose: 2,000 mg  Freq: Once Route: IV  Last Dose: Stopped (08/27/24 1843)  Start: 08/27/24 1745 End: 08/27/24 1843   Admin Instructions:      Order specific questions:              1813     1843          enoxaparin (LOVENOX) subcutaneous injection 40 mg  Dose: 40 mg  Freq: Daily Route: SC  Start: 08/28/24 0900 End: 08/29/24 1512   Admin Instructions:               0817      0845     1512-D/C'd      ibuprofen (MOTRIN) tablet 800 mg  Dose: 800 mg  Freq: Once Route: PO  Start: 08/27/24 2045 End: 08/27/24 2040   Admin Instructions:              2040          ketorolac (TORADOL) injection 15 mg  Dose: 15 mg  Freq: Once Route: IV  Start: 08/27/24 1715 End: 08/27/24 1730   Admin Instructions:              1730          metroNIDAZOLE (FLAGYL) IVPB (premix) 500 mg 100 mL  Dose: 500 mg  Freq: Every 8 hours Route: IV  Last Dose: Stopped (08/27/24 2228)  Start: 08/27/24 2145 End: 08/28/24 0209   Admin Instructions:      Order specific questions:              2158 2228 0209-D/C'd        multi-electrolyte  (PLASMALYTE-A/ISOLYTE-S PH 7.4) IV solution 1,000 mL  Dose: 1,000 mL  Freq: Once Route: IV  Last Dose: Stopped (08/27/24 1754)  Start: 08/27/24 1715 End: 08/27/24 1754   Admin Instructions:              1724 1754          Followed by   multi-electrolyte (PLASMALYTE-A/ISOLYTE-S PH 7.4) IV solution 1,000 mL  Dose: 1,000 mL  Freq: Once Route: IV  Last Dose: Stopped (08/27/24 1842)  Start: 08/27/24 1742 End: 08/27/24 1842   Admin Instructions:              1812 1842          nicotine (NICODERM CQ) 14 mg/24hr TD 24 hr patch 14 mg  Dose: 14 mg  Freq: Daily Route: TD  Start: 08/28/24 0900 End: 08/28/24 0343   Admin Instructions:               5843-D/C'd       ondansetron (ZOFRAN) injection 4 mg  Dose: 4 mg  Freq: Once Route: IV  Start: 08/27/24 1715 End: 08/28/24 0209   Admin Instructions:              [phone]) 7483-D/C'd        piperacillin-tazobactam (ZOSYN) 4.5 g in sodium chloride 0.9 % 100 mL IV LOADING DOSE  Dose: 4.5 g  Freq: Once Route: IV  Last Dose: 4.5 g (08/28/24 0309)  Start: 08/28/24 0230 End: 08/28/24 0339   Admin Instructions:      Order specific questions:               0309         Followed by   piperacillin-tazobactam (ZOSYN) 4.5 g in sodium chloride 0.9 % 100 mL IVPB (EXTENDED INFUSION)  Dose: 4.5 g  Freq: Every 8 hours Route: IV  Last Dose: 4.5 g (08/29/24 1455)  Start: 08/28/24 0619   Admin Instructions:      Order specific questions:               0506     1410     2252      0565 9456     2237        piperacillin-tazobactam (ZOSYN) 4.5 g in sodium chloride 0.9 % 100 mL IVPB  Dose: 4.5 g  Freq: Every 8 hours Route: IV  Start: 08/28/24 0900 End: 08/28/24 0218   Admin Instructions:      Order specific questions:               0212-D/C'd       polyethylene glycol (MIRALAX) packet 17 g  Dose: 17 g  Freq: Daily Route: PO  Start: 08/28/24 0900   Admin Instructions:               0819      0847        potassium chloride (Klor-Con M20) CR tablet 40 mEq  Dose: 40 mEq  Freq: Once Route:  PO  Start: 08/27/24 1745 End: 08/27/24 1811   Admin Instructions:              1811          potassium chloride 20 mEq IVPB (premix)  Dose: 20 mEq  Freq: Once Route: IV  Last Dose: 20 mEq (08/29/24 0847)  Start: 08/29/24 0745 End: 08/29/24 1047   Admin Instructions:                0847        potassium chloride 20 mEq IVPB (premix)  Dose: 20 mEq  Freq: Once Route: IV  Last Dose: 20 mEq (08/28/24 0838)  Start: 08/28/24 0830 End: 08/28/24 1038   Admin Instructions:               0838         Legend:       [id number]/2108/2208/2308/2408/2508/2608/2708/2808/29        Continuous Meds Sorted by Name  for Isaac Kramer as of 08/20/24 through 8/29/24  Legend:       Medications 08/20 08/21 08/22 08/23 08/24 08/25 08/26 08/27 08/28 08/29   norepinephrine (LEVOPHED) 4 mg (STANDARD CONCENTRATION) IV in sodium chloride 0.9% 250 mL  Freq: Continuous PRN Route: IV  Start: 08/29/24 1552             1552        sodium chloride 0.9 % infusion  Rate: 100 mL/hr Dose: 100 mL/hr  Freq: Continuous Route: IV  Last Dose: 100 mL/hr (08/29/24 1509)  Start: 08/29/24 0000            2302      1413     1509        Legend:       [id number]/2008/2108/2208/2308/2408/2508/2608/2708/2808/29        PRN Meds Sorted by Name  for Isaac Kramer as of 08/20/24 through 8/29/24  Legend:       Medications 08/20 08/21 08/22 08/23 08/24 08/25 08/26 08/27 08/28 08/29   acetaminophen (TYLENOL) tablet 650 mg  Dose: 650 mg  Freq: Every 6 hours PRN Route: PO  PRN Reason: mild pain  Indications of Use: FEVER,HEADACHE,MILD PAIN  Start: 08/28/24 0213                aluminum-magnesium hydroxide-simethicone (MAALOX) oral suspension 30 mL  Dose: 30 mL  Freq: Every 6 hours PRN Route: PO  PRN Reasons: indigestion,heartburn  Start: 08/28/24 0213   Admin Instructions:                   fentaNYL injection  Freq: As needed Route: IV  Start: 08/29/24 1520             1520     1542        hydrALAZINE (APRESOLINE) injection 10 mg  Dose: 10 mg  Freq: Every 6  hours PRN Route: IV  PRN Reason: high blood pressure  PRN Comment: SBP > 170  Start: 08/29/24 1511   Admin Instructions:      Order specific questions:                   HYDROmorphone (DILAUDID) injection 0.5 mg  Dose: 0.5 mg  Freq: Every 4 hours PRN Route: IV  PRN Comment: Breakthrough pain  Start: 08/28/24 0818   Admin Instructions:                   iohexol (OMNIPAQUE) 350 MG/ML injection (MULTI-DOSE) 90 mL  Dose: 90 mL  Freq: Once in imaging Route: IV  PRN Reason: contrast  Start: 08/27/24 1803 End: 08/27/24 1803           1803          lidocaine (PF) (XYLOCAINE-MPF) 2 % injection  Freq: As needed Route: IV  Start: 08/29/24 1520             1520        midazolam (VERSED) injection  Freq: As needed Route: IV  Start: 08/29/24 1517             1517        naloxone (NARCAN) 0.04 mg/mL syringe 0.04 mg  Dose: 0.04 mg  Freq: Every 1 Minute as needed Route: IV  PRN Reasons: opioid reversal,respiratory depression  Start: 08/28/24 0215   Admin Instructions:                   norepinephrine (LEVOPHED) 4 mg (STANDARD CONCENTRATION) IV in sodium chloride 0.9% 250 mL  Freq: Continuous PRN Route: IV  Start: 08/29/24 1552             1552        ondansetron (ZOFRAN) injection 4 mg  Dose: 4 mg  Freq: Every 6 hours PRN Route: IV  PRN Reasons: nausea,vomiting  Start: 08/28/24 0213   Admin Instructions:                1551         oxyCODONE (ROXICODONE) IR tablet 5 mg  Dose: 5 mg  Freq: Every 4 hours PRN Route: PO  PRN Reason: moderate pain  Start: 08/28/24 0818   Admin Instructions:                   Or   oxyCODONE (ROXICODONE) immediate release tablet 10 mg  Dose: 10 mg  Freq: Every 4 hours PRN Route: PO  PRN Reason: severe pain  Start: 08/28/24 0818   Admin Instructions:                   oxyCODONE (ROXICODONE) IR tablet 5 mg  Dose: 5 mg  Freq: Every 6 hours PRN Route: PO  PRN Reason: severe pain  Start: 08/28/24 0215 End: 08/28/24 0818   Admin Instructions:               0818-D/C'd       oxyCODONE (ROXICODONE) split tablet 2.5  mg  Dose: 2.5 mg  Freq: Every 6 hours PRN Route: PO  PRN Reason: moderate pain  Start: 08/28/24 0214 End: 08/28/24 0818   Admin Instructions:               0818-D/C'd       propofol (DIPRIVAN) 200 MG/20ML bolus injection  Freq: As needed Route: IV  Start: 08/29/24 1520             1520        ROCuronium (ZEMURON) injection  Freq: As needed Route: IV  Start: 08/29/24 1521             1521        Legend:       [id number]/2008/2108/2208/2308/2408/2508/2608/2708/2808/29        Network Utilization Review Department  ATTENTION: Please call with any questions or concerns to [phone] and carefully listen to the prompts so that you are directed to the right person. All voicemails are confidential.   For Discharge needs, contact Care Management DC Support Team at [phone] opt. 2  Send all requests for admission clinical reviews, approved or denied determinations and any other requests to dedicated fax number below belonging to the [place] where the patient is receiving treatment. List of dedicated fax numbers for the Facilities:  FACILITY NAME UR FAX NUMBER   ADMISSION DENIALS (Administrative/Medical Necessity) [phone]   DISCHARGE SUPPORT TEAM (NETWORK) [phone]   PARENT CHILD HEALTH (Maternity/NICU/Pediatrics) [phone]   [facility] [phone]   [facility] [phone]   [facility] [phone]   [facility] [phone]   [facility] [phone]   [facility] [phone]   [facility] [phone]   [facility] [phone]   [facility] [phone]   [facility] [phone]   [facility] [phone]   [facility] [phone]

## 2024-08-29 NOTE — ASSESSMENT & PLAN NOTE
SIRS present fever, tachycardia, leukocytosis. Procal 2.92, lactate 1.4  Sources: biliary, enteritis  Continue Zosyn  Follow-up blood cultures

## 2024-08-29 NOTE — ASSESSMENT & PLAN NOTE
New mass in pancreatic/biliary region not seen on CT imaging on 05/28/2023. Patient reports 14lb unintentional weight loss this past year. Hx of prostate cancer  GI, surgical oncology consulted  Pending EUS with biopsy

## 2024-08-29 NOTE — ANESTHESIA PREPROCEDURE EVALUATION
Procedure:  ENDOSCOPIC ULTRASOUND (UPPER)  ERCP    Relevant Problems   CARDIO   (+) Aortic valve stenosis (Moderate on 8/2024 echo)   (+) Benign essential hypertension   (+) Coronary artery disease involving native coronary artery of native heart without angina pectoris   (+) Hypercholesterolemia   (+) Peripheral vascular disease (HCC)      GI/HEPATIC   (+) GERD (gastroesophageal reflux disease)      /RENAL   (+) BPH (benign prostatic hyperplasia)   (+) Prostate cancer (HCC)      Digestive   (+) Biliary sepsis (Gram negative rods in blood cx 8/27)      Neurology/Sleep   (+) Peripheral neuropathy      Other   (+) Elevated LFTs   (+) Pancreatic mass   8/2024 Echo:    Left Ventricle: Left ventricular cavity size is normal. There is mild concentric hypertrophy. The left ventricular ejection fraction is 65%. Systolic function is normal. Wall motion is normal.    Aortic Valve: The aortic valve is trileaflet. The leaflets are mildly calcified. There is moderately reduced mobility. There is mild regurgitation. There is moderate stenosis. The aortic valve peak velocity is 3.47 m/s. The aortic valve mean gradient is 31 mmHg.    Mitral Valve: There is mild annular calcification. There is mild regurgitation.    Prior study date: 6/30/2022. Mild progression of aortic valve stenosis.     Physical Exam    Airway    Mallampati score: II  TM Distance: <3 FB       Dental        Cardiovascular  Rhythm: regular, Rate: normal    Pulmonary   Breath sounds clear to auscultation    Other Findings        Anesthesia Plan  ASA Score- 3     Anesthesia Type- general with ASA Monitors.         Additional Monitors:     Airway Plan: ETT.           Plan Factors-Exercise tolerance (METS): >4 METS.    Chart reviewed.   Existing labs reviewed.           Obstructive sleep apnea risk education given perioperatively.        Induction- intravenous.    Postoperative Plan- Plan for postoperative opioid use.     Perioperative Resuscitation Plan - Level 1 -  Full Code.       Informed Consent- Anesthetic plan and risks discussed with patient.

## 2024-08-29 NOTE — ASSESSMENT & PLAN NOTE
Concerning presentation for cholangitis given sepsis, bilirubinemia  CT abd/pelvis reviewed: new mass in lower pancreatic head obstructing CBD/pancreatic duct 2.8x2.7cm suspicious for neoplasm, gallbladder distension  N.p.o. for ERCP, EUS  Continue Zosyn

## 2024-08-30 PROBLEM — N17.9 AKI (ACUTE KIDNEY INJURY) (HCC): Status: ACTIVE | Noted: 2024-08-30

## 2024-08-30 LAB
ALBUMIN SERPL BCG-MCNC: 3.3 G/DL (ref 3.5–5)
ALP SERPL-CCNC: 773 U/L (ref 34–104)
ALT SERPL W P-5'-P-CCNC: 87 U/L (ref 7–52)
ANION GAP SERPL CALCULATED.3IONS-SCNC: 5 MMOL/L (ref 4–13)
AST SERPL W P-5'-P-CCNC: 55 U/L (ref 13–39)
BACTERIA BLD CULT: ABNORMAL
BASOPHILS # BLD AUTO: 0.07 THOUSANDS/ÂΜL (ref 0–0.1)
BASOPHILS NFR BLD AUTO: 1 % (ref 0–1)
BILIRUB SERPL-MCNC: 1.28 MG/DL (ref 0.2–1)
BUN SERPL-MCNC: 10 MG/DL (ref 5–25)
CALCIUM ALBUM COR SERPL-MCNC: 8.9 MG/DL (ref 8.3–10.1)
CALCIUM SERPL-MCNC: 8.3 MG/DL (ref 8.4–10.2)
CHLORIDE SERPL-SCNC: 109 MMOL/L (ref 96–108)
CO2 SERPL-SCNC: 25 MMOL/L (ref 21–32)
CREAT SERPL-MCNC: 0.72 MG/DL (ref 0.6–1.3)
EOSINOPHIL # BLD AUTO: 0.6 THOUSAND/ÂΜL (ref 0–0.61)
EOSINOPHIL NFR BLD AUTO: 7 % (ref 0–6)
ERYTHROCYTE [DISTWIDTH] IN BLOOD BY AUTOMATED COUNT: 15.5 % (ref 11.6–15.1)
GFR SERPL CREATININE-BSD FRML MDRD: 97 ML/MIN/1.73SQ M
GLUCOSE SERPL-MCNC: 105 MG/DL (ref 65–140)
GLUCOSE SERPL-MCNC: 107 MG/DL (ref 65–140)
GLUCOSE SERPL-MCNC: 109 MG/DL (ref 65–140)
GLUCOSE SERPL-MCNC: 122 MG/DL (ref 65–140)
GLUCOSE SERPL-MCNC: 96 MG/DL (ref 65–140)
GRAM STN SPEC: ABNORMAL
HCT VFR BLD AUTO: 34.4 % (ref 36.5–49.3)
HGB BLD-MCNC: 11.4 G/DL (ref 12–17)
IMM GRANULOCYTES # BLD AUTO: 0.03 THOUSAND/UL (ref 0–0.2)
IMM GRANULOCYTES NFR BLD AUTO: 0 % (ref 0–2)
KLEBSIELLA SP DNA BLD POS QL NAA+NON-PRB: DETECTED
LYMPHOCYTES # BLD AUTO: 1.66 THOUSANDS/ÂΜL (ref 0.6–4.47)
LYMPHOCYTES NFR BLD AUTO: 20 % (ref 14–44)
MCH RBC QN AUTO: 28.3 PG (ref 26.8–34.3)
MCHC RBC AUTO-ENTMCNC: 33.1 G/DL (ref 31.4–37.4)
MCV RBC AUTO: 85 FL (ref 82–98)
MONOCYTES # BLD AUTO: 0.59 THOUSAND/ÂΜL (ref 0.17–1.22)
MONOCYTES NFR BLD AUTO: 7 % (ref 4–12)
NEUTROPHILS # BLD AUTO: 5.5 THOUSANDS/ÂΜL (ref 1.85–7.62)
NEUTS SEG NFR BLD AUTO: 65 % (ref 43–75)
NRBC BLD AUTO-RTO: 0 /100 WBCS
PLATELET # BLD AUTO: 251 THOUSANDS/UL (ref 149–390)
PMV BLD AUTO: 10.9 FL (ref 8.9–12.7)
POTASSIUM SERPL-SCNC: 4.2 MMOL/L (ref 3.5–5.3)
PROT SERPL-MCNC: 5.8 G/DL (ref 6.4–8.4)
RBC # BLD AUTO: 4.03 MILLION/UL (ref 3.88–5.62)
SODIUM SERPL-SCNC: 139 MMOL/L (ref 135–147)
WBC # BLD AUTO: 8.45 THOUSAND/UL (ref 4.31–10.16)

## 2024-08-30 PROCEDURE — 99233 SBSQ HOSP IP/OBS HIGH 50: CPT | Performed by: INTERNAL MEDICINE

## 2024-08-30 PROCEDURE — 80053 COMPREHEN METABOLIC PANEL: CPT | Performed by: INTERNAL MEDICINE

## 2024-08-30 PROCEDURE — 85025 COMPLETE CBC W/AUTO DIFF WBC: CPT | Performed by: INTERNAL MEDICINE

## 2024-08-30 PROCEDURE — 82948 REAGENT STRIP/BLOOD GLUCOSE: CPT

## 2024-08-30 PROCEDURE — NC001 PR NO CHARGE: Performed by: STUDENT IN AN ORGANIZED HEALTH CARE EDUCATION/TRAINING PROGRAM

## 2024-08-30 PROCEDURE — 99232 SBSQ HOSP IP/OBS MODERATE 35: CPT | Performed by: INTERNAL MEDICINE

## 2024-08-30 RX ORDER — LISINOPRIL 20 MG/1
20 TABLET ORAL DAILY
Status: DISCONTINUED | OUTPATIENT
Start: 2024-08-31 | End: 2024-08-31 | Stop reason: HOSPADM

## 2024-08-30 RX ORDER — ENOXAPARIN SODIUM 100 MG/ML
40 INJECTION SUBCUTANEOUS
Status: DISCONTINUED | OUTPATIENT
Start: 2024-08-31 | End: 2024-08-31 | Stop reason: HOSPADM

## 2024-08-30 RX ADMIN — POLYETHYLENE GLYCOL 3350 17 G: 17 POWDER, FOR SOLUTION ORAL at 09:28

## 2024-08-30 RX ADMIN — SODIUM CHLORIDE 100 ML/HR: 0.9 INJECTION, SOLUTION INTRAVENOUS at 06:07

## 2024-08-30 RX ADMIN — PIPERACILLIN SODIUM AND TAZOBACTAM SODIUM 4.5 G: 36; 4.5 INJECTION, POWDER, LYOPHILIZED, FOR SOLUTION INTRAVENOUS at 23:13

## 2024-08-30 RX ADMIN — PIPERACILLIN SODIUM AND TAZOBACTAM SODIUM 4.5 G: 36; 4.5 INJECTION, POWDER, LYOPHILIZED, FOR SOLUTION INTRAVENOUS at 15:07

## 2024-08-30 RX ADMIN — PIPERACILLIN SODIUM AND TAZOBACTAM SODIUM 4.5 G: 36; 4.5 INJECTION, POWDER, LYOPHILIZED, FOR SOLUTION INTRAVENOUS at 06:07

## 2024-08-30 RX ADMIN — ASPIRIN 81 MG CHEWABLE TABLET 81 MG: 81 TABLET CHEWABLE at 09:28

## 2024-08-30 NOTE — ASSESSMENT & PLAN NOTE
New mass in pancreatic/biliary region not seen on CT imaging on 05/28/2023. Patient reports 14lb unintentional weight loss this past year. Hx of prostate cancer  GI, surgical oncology consulted  Status post ERCP with stent placement, biopsy  Outpatient follow-up with GI and surgical oncology

## 2024-08-30 NOTE — ASSESSMENT & PLAN NOTE
Concerning presentation for cholangitis given sepsis, bilirubinemia  CT abd/pelvis reviewed: new mass in lower pancreatic head obstructing CBD/pancreatic duct 2.8x2.7cm suspicious for neoplasm, gallbladder distension  Status post ERCP with stent placement  Continue Zosyn  Follow-up blood cultures and discharge pending sensitivities

## 2024-08-30 NOTE — PROGRESS NOTES
Critical access hospital  Progress Note  Name: Isaac Kramer I  MRN: [id number]  Unit/Bed#: E4 -01 I Date of Admission: 8/28/2024   Date of Service: 8/30/2024 I Hospital Day: 2    Assessment & Plan   * Acute obstructive cholangitis  Assessment & Plan  Concerning presentation for cholangitis given sepsis, bilirubinemia  CT abd/pelvis reviewed: new mass in lower pancreatic head obstructing CBD/pancreatic duct 2.8x2.7cm suspicious for neoplasm, gallbladder distension  Status post ERCP with stent placement  Continue Zosyn  Follow-up blood cultures and discharge pending sensitivities    MOOKIE (acute kidney injury) (HCC)  Assessment & Plan  POA, now resolved   Suspected secondary to biliary sepsis, home ACE inhibitor  Now back to baseline    Biliary sepsis  Assessment & Plan  SIRS present fever, tachycardia, leukocytosis. Procal 2.92, lactate 1.4  Sources: biliary, enteritis  Continue Zosyn  Follow-up blood cultures    Elevated LFTs  Assessment & Plan  Obstructive pattern   Improving following ERCP    Pancreatic mass  Assessment & Plan  New mass in pancreatic/biliary region not seen on CT imaging on 05/28/2023. Patient reports 14lb unintentional weight loss this past year. Hx of prostate cancer  GI, surgical oncology consulted  Status post ERCP with stent placement, biopsy  Outpatient follow-up with GI and surgical oncology    Coronary artery disease involving native coronary artery of native heart without angina pectoris  Assessment & Plan  [facility] 07/2022, 3v CAD, s/p stenting  Continue aspirin, statin    Benign essential hypertension  Assessment & Plan  Resume lisinopril           VTE Pharmacologic Prophylaxis: Lovenox    Patient Centered Rounds:  Patient care rounds were performed with nursing    Discussions with Specialists or Other Care Team Provider: Independently reviewed case with GI    Education and Discussions with Family / Patient: Patient    Time Spent for Care: I have spent a total time of  51 minutes on 24 in caring for this patient including Diagnostic results, Risks and benefits of tx options, Patient and family education, Importance of tx compliance, Impressions, Counseling / Coordination of care, Documenting in the medical record, Reviewing / ordering tests, medicine, procedures  , and Communicating with other healthcare professionals .      Current Length of Stay: 2 day(s)    Current Patient Status: Inpatient   Certification Statement: The patient will continue to require additional inpatient hospital stay due to management of bacteremia    Discharge Plan: As long as patient doing well tomorrow, will discharge home on oral antibiotics pending sensitivities    Code Status: Level 1 - Full Code      Subjective:   Patient seen and evaluated at bedside.  Discussed concern for resistant organisms on blood cultures.  Patient is feeling well.  Tolerating diet without issue.    Objective:     Vitals:   Temp (24hrs), Av.1 °F (36.7 °C), Min:97.2 °F (36.2 °C), Max:98.7 °F (37.1 °C)    Temp:  [97.2 °F (36.2 °C)-98.7 °F (37.1 °C)] 98.7 °F (37.1 °C)  HR:  [58-72] 58  Resp:  [15-20] 18  BP: (128-161)/(64-79) 153/74  SpO2:  [95 %-98 %] 97 %  Body mass index is 23.61 kg/m².     Input and Output Summary (last 24 hours):       Intake/Output Summary (Last 24 hours) at 2024 1638  Last data filed at 2024 1643  Gross per 24 hour   Intake 800 ml   Output --   Net 800 ml       Physical Exam:     Physical Exam  Vitals reviewed.   Constitutional:       General: He is not in acute distress.     Appearance: He is well-developed. He is not ill-appearing, toxic-appearing or diaphoretic.   HENT:      Head: Normocephalic and atraumatic.      Mouth/Throat:      Mouth: Mucous membranes are moist.   Eyes:      General: Scleral icterus present.      Extraocular Movements: Extraocular movements intact.   Cardiovascular:      Rate and Rhythm: Normal rate and regular rhythm.      Heart sounds: Normal heart sounds.    Pulmonary:      Effort: Pulmonary effort is normal. No respiratory distress.      Breath sounds: Normal breath sounds. No wheezing or rales.   Abdominal:      General: There is no distension.      Palpations: Abdomen is soft.      Tenderness: There is no abdominal tenderness. There is no guarding or rebound.   Musculoskeletal:         General: No swelling, tenderness or deformity.   Skin:     General: Skin is warm and dry.      Coloration: Skin is jaundiced.   Neurological:      General: No focal deficit present.      Mental Status: He is alert. Mental status is at baseline.   Psychiatric:         Mood and Affect: Mood normal.         Behavior: Behavior normal.         Thought Content: Thought content normal.         Judgment: Judgment normal.         Additional Data:     Labs: I have reviewed pertinent results     Results from last 7 days   Lab Units 08/30/24  0540 08/28/24  0509 08/27/24  1716   WBC Thousand/uL 8.45   < > 16.61*   HEMOGLOBIN g/dL 11.4*   < > 12.9   HEMATOCRIT % 34.4*   < > 40.2   PLATELETS Thousands/uL 251   < > 296   BANDS PCT %  --   --  10*   SEGS PCT % 65   < >  --    LYMPHO PCT % 20   < > 4*   MONO PCT % 7   < > 1*   EOS PCT % 7*   < > 1    < > = values in this interval not displayed.     Results from last 7 days   Lab Units 08/30/24  0540   SODIUM mmol/L 139   POTASSIUM mmol/L 4.2   CHLORIDE mmol/L 109*   CO2 mmol/L 25   BUN mg/dL 10   CREATININE mg/dL 0.72   ANION GAP mmol/L 5   CALCIUM mg/dL 8.3*   ALBUMIN g/dL 3.3*   TOTAL BILIRUBIN mg/dL 1.28*   ALK PHOS U/L 773*   ALT U/L 87*   AST U/L 55*   GLUCOSE RANDOM mg/dL 96     Results from last 7 days   Lab Units 08/28/24  0509   INR  1.27*     Results from last 7 days   Lab Units 08/30/24  1044 08/30/24  0720   POC GLUCOSE mg/dl 105 109         Results from last 7 days   Lab Units 08/28/24  0509 08/27/24  1716   LACTIC ACID mmol/L  --  1.4   PROCALCITONIN ng/ml 22.18* 2.92*         Imaging: I have reviewed pertinent imaging       Recent  Cultures (last 7 days):     Results from last 7 days   Lab Units 08/27/24  1716 08/27/24  1633   BLOOD CULTURE  Klebsiella variicola*  Klebsiella variicola*  --    GRAM STAIN RESULT  Gram negative rods*  Gram negative rods*  --    URINE CULTURE   --  No Growth <1000 cfu/mL       Last 24 Hours Medication List:   Current Facility-Administered Medications   Medication Dose Route Frequency Provider Last Rate    acetaminophen  650 mg Oral Q6H PRN Fredy Luther PA-C      aluminum-magnesium hydroxide-simethicone  30 mL Oral Q6H PRN Fredy Luther PA-C      aspirin  81 mg Oral Daily Fredy Luther PA-C      hydrALAZINE  10 mg Intravenous Q6H PRN Juan Bernal DO      HYDROmorphone  0.5 mg Intravenous Q4H PRN Juan Bernal DO      naloxone  0.04 mg Intravenous Q1MIN PRN Fredy Luther PA-C      ondansetron  4 mg Intravenous Q6H PRN Fredy Luther PA-C      oxyCODONE  5 mg Oral Q4H PRN Juan Bernal DO      Or    oxyCODONE  10 mg Oral Q4H PRN Juan Bernal DO      piperacillin-tazobactam  4.5 g Intravenous Q8H Fredy Luther PA-C 4.5 g (08/30/24 1507)    polyethylene glycol  17 g Oral Daily Fredy Luther PA-C      sodium chloride  100 mL/hr Intravenous Continuous Juan Bernal  mL/hr (08/30/24 0607)        Today, Patient Was Seen By: Juan Bernal DO    ** Please Note: Dictation voice to text software may have been used in the creation of this document. **

## 2024-08-30 NOTE — ASSESSMENT & PLAN NOTE
POA, now resolved   Suspected secondary to biliary sepsis, home ACE inhibitor  Now back to baseline

## 2024-08-30 NOTE — PROGRESS NOTES
"Progress Note - Surgical Oncology  Isaac Kramer 63 y.o. male MRN: [id number]  Unit/Bed#: E4 -01 Encounter: [id number]    Assessment:  63 y.o. year old male with history of HTN, CAD, prostate cancer, tobacco use who presented with flank and abdominal pain with CT findings of mass in pancreatic head region. Patient had EUS with biopsy completed with GI. Patient is currently hemodynamically stable.     Plan:  -Recommend outpatient follow up with Dr. Ac in Surgical Oncology to review results of biopsy.  -Will follow up pathology results  -Rest of care per primary team    Subjective/Objective     Subjective: No acute events overnight. Pain is well controlled. Denies N/V, fevers, chills, CP, SOB. Tolerating diet. Voiding well. Had BM yesterday.    Objective:     Blood pressure 128/69, pulse 64, temperature 98.2 °F (36.8 °C), temperature source Temporal, resp. rate 16, height 5' 4\" (1.626 m), weight 62.4 kg (137 lb 9.1 oz), SpO2 96%.,Body mass index is 23.61 kg/m².      Intake/Output Summary (Last 24 hours) at 8/29/2024 2353  Last data filed at 8/29/2024 1643  Gross per 24 hour   Intake 800 ml   Output --   Net 800 ml       Invasive Devices       Peripheral Intravenous Line  Duration             Peripheral IV 08/27/24 Right Antecubital 2 days                    Physical Exam: General: well-developed, well nourished male in NAD  Skin: Warm, dry, anicteric.  HEENT: Normocephalic, atraumatic.   CV: regular rate, grossly well perfused  Pulm: no increased WOB, symmetric chest rise/fall with respirations  Abd: Soft, mild tenderness in epigastric region, non-distended  MSK: Symmetric, no edema, no tenderness, no deformities observed  Neuro: AOx3, GCS 15     Lab, Imaging and other studies:I have personally reviewed pertinent lab results.      "

## 2024-08-30 NOTE — PLAN OF CARE
Problem: PAIN - ADULT  Goal: Verbalizes/displays adequate comfort level or baseline comfort level  Description: Interventions:  - Encourage patient to monitor pain and request assistance  - Assess pain using appropriate pain scale  - Administer analgesics based on type and severity of pain and evaluate response  - Implement non-pharmacological measures as appropriate and evaluate response  - Consider cultural and social influences on pain and pain management  - Notify physician/advanced practitioner if interventions unsuccessful or patient reports new pain  Outcome: Progressing     Problem: INFECTION - ADULT  Goal: Absence or prevention of progression during hospitalization  Description: INTERVENTIONS:  - Assess and monitor for signs and symptoms of infection  - Monitor lab/diagnostic results  - Monitor all insertion sites, i.e. indwelling lines, tubes, and drains  - Monitor endotracheal if appropriate and nasal secretions for changes in amount and color  - [place] appropriate cooling/warming therapies per order  - Administer medications as ordered  - Instruct and encourage patient and family to use good hand hygiene technique  - Identify and instruct in appropriate isolation precautions for identified infection/condition  Outcome: Progressing  Goal: Absence of fever/infection during neutropenic period  Description: INTERVENTIONS:  - Monitor WBC    Outcome: Progressing    Goal: Maintain or return to baseline ADL function  Description: INTERVENTIONS:  -  Assess patient's ability to carry out ADLs; assess patient's baseline for ADL function and identify physical deficits which impact ability to perform ADLs (bathing, care of mouth/teeth, toileting, grooming, dressing, etc.)  - Assess/evaluate cause of self-care deficits   - Assess range of motion  - Assess patient's mobility; develop plan if impaired  - Assess patient's need for assistive devices and provide as appropriate  - Encourage maximum independence but  intervene and supervise when necessary  - Involve family in performance of ADLs  - Assess for home care needs following discharge   - Consider OT consult to assist with ADL evaluation and planning for discharge  - Provide patient education as appropriate  Outcome: Progressing  Goal: Maintains/Returns to pre admission functional level  Description: INTERVENTIONS:  - Perform AM-PAC 6 Click Basic Mobility/ Daily Activity assessment daily.  - Set and communicate daily mobility goal to care team and patient/family/caregiver.   - Collaborate with rehabilitation services on mobility goals if consulted  - Perform Range of Motion 3 times a day.  - Out of bed for toileting  - Record patient progress and toleration of activity level   Outcome: Progressing     Problem: DISCHARGE PLANNING  Goal: Discharge to home or other facility with appropriate resources  Description: INTERVENTIONS:  - Identify barriers to discharge w/patient and caregiver  - Arrange for needed discharge resources and transportation as appropriate  - Identify discharge learning needs (meds, wound care, etc.)  - Arrange for interpretive services to assist at discharge as needed  - Refer to Case Management Department for coordinating discharge planning if the patient needs post-hospital services based on physician/advanced practitioner order or complex needs related to functional status, cognitive ability, or social support system  Outcome: Progressing     Problem: Knowledge Deficit  Goal: Patient/family/caregiver demonstrates understanding of disease process, treatment plan, medications, and discharge instructions  Description: Complete learning assessment and assess knowledge base.  Interventions:  - Provide teaching at level of understanding  - Provide teaching via preferred learning methods  Outcome: Progressing

## 2024-08-30 NOTE — CASE MANAGEMENT
Case Management Discharge Planning Note    Patient name Isaac Kramer  [facility] /[facility] -* MRN [id number]  : 1960 Date 2024       Current Admission Date: 2024  Current Admission Diagnosis:Acute obstructive cholangitis   Patient Active Problem List    Diagnosis Date Noted Date Diagnosed    GERD (gastroesophageal reflux disease)      Pancreatic mass 2024     Elevated LFTs 2024     Biliary sepsis 2024     Acute obstructive cholangitis 2024     Enteritis 2024     Prostate cancer (HCC) 2023     Microscopic hematuria 10/10/2022     BPH (benign prostatic hyperplasia) 10/10/2022     Coronary artery disease involving native coronary artery of native heart without angina pectoris 2022     Overweight with body mass index (BMI) of 26 to 26.9 in adult 2020     Aortic valve stenosis 2020     ED (erectile dysfunction) of non-organic origin 2015     Peripheral neuropathy 2014     Peripheral vascular disease (HCC) 2014     Benign essential hypertension 2014     Hypercholesterolemia 2013       LOS (days): 2  Geometric Mean LOS (GMLOS) (days): 4.4  Days to GMLOS:1.9     OBJECTIVE:  Risk of Unplanned Readmission Score: 13.19         Current admission status: Inpatient   Preferred Pharmacy:   [id number] - [place] - [address]  [address]  [place] [id number]  Phone: [phone] Fax: [phone]    Primary Care Provider: Pablo Perez DO    Primary Insurance: MEDICARE  Secondary Insurance: JHONNY ROSALES    DISCHARGE DETAILS:    Discharge planning discussed with:: Patient  Freedom of Choice: Yes     CM contacted family/caregiver?: No- see comments (Declined)  Were Treatment Team discharge recommendations reviewed with patient/caregiver?: Yes  Did patient/caregiver verbalize understanding of patient care needs?: Yes  Were patient/caregiver advised of  the risks associated with not following Treatment Team discharge recommendations?: Yes         Requested Home Health Care         Is the patient interested in HHC at discharge?: No    DME Referral Provided  Referral made for DME?: No         Would you like to participate in our [facility] service program?  : No - Declined    Treatment Team Recommendation: Home  Discharge Destination Plan:: Home  Transport at Discharge : Family                             IMM Given (Date):: 08/30/24  IMM Given to:: Patient        IMM reviewed with patient, patient agrees with discharge determination.    CM met with patient at bedside, patient hopeful to DC home tomorrow.  Patient does not identify any CM needs at this time, CM department remains available.

## 2024-08-30 NOTE — PROGRESS NOTES
[facility] - Inpatient Progress Note    PATIENT INFORMATION      Isaac Kramer 63 y.o. male MRN: [id number]  Unit/Bed#: E4 -01 Encounter: [id number]    ASSESSMENT & PLAN   Isaac Kramer is a 63 y.o. male with PMH significant for htn, CAD, PVD, peripheral neuropathy, AV stenosis, BPH, prostate CA who presented to [facility] on 8/27 for concern of R flank/epigastric pain with darker urine and some diarrhea. Initial evaluation with fever, leukocytosis, hyperbilirubinemia and CT scan notable for panc mass with biliary dilation. Patient transferred to [facility] for advanced endoscopy intervention.     Obstructive Jaundice  Pancreatic Mass  Leukocytosis  Hyperbilirubinemia  Patient presented with intermittent RUQ/epigastric pain over the last few weeks. Acutely worsened yesterday prompting evaluation significant for hyperbilirubinemia, elevated alk phos, leukocytosis, and CAT scan significant for pancreatic mass measuring 2.8 x 2.7 cm with associated PD and intrahepatic/extrahepatic biliary dilation. Blood cultures positive for Klebsiella Variicola on zosyn. Now s/p EUS/ERCP notable for large ampullary mass with biopsies obtained, and plastic CBD stent placement.  Follow up biopsy results  Patient will require repeat ERCP in 2-3 months - will arrange outpatient repeat procedure  Surg onc following - appreciate recommendations  Continue antibiotics for underlying bacteremia/suspected biliary source  Advance to regular diet today  Once pathology reviewed, will connect patient with onc nurse navigation team for coordination of care in outpatient setting  Okay for discharge from GI perspective with appropriate antibiotic therapy for treatment of bacteremia  No further inpatient GI recommendations - GI will sign off but remain available for questions.  Please reach out to on-call provider with additional questions/concerns or changes in clinical status      SUBJECTIVE     Patient seen and  "evaluated at bedside. Reports feeling better today. Minimal epigastric discomfort, but no pain and tolerated PO diet.     MEDICATIONS & ALLERGIES       Medications:     Facility-Administered Medications Prior to Admission:     [COMPLETED] acetaminophen (TYLENOL) tablet 975 mg    Medications Prior to Admission:     aspirin 81 mg chewable tablet    atorvastatin (LIPITOR) 20 mg tablet    lisinopril (ZESTRIL) 20 mg tablet    oxyCODONE-acetaminophen (Percocet) 5-325 mg per tablet    senna (SENOKOT) 8.6 mg  Current Facility-Administered Medications   Medication Dose Route Frequency    acetaminophen (TYLENOL) tablet 650 mg  650 mg Oral Q6H PRN    albuterol inhalation solution 2.5 mg  2.5 mg Nebulization Once PRN    aluminum-magnesium hydroxide-simethicone (MAALOX) oral suspension 30 mL  30 mL Oral Q6H PRN    aspirin chewable tablet 81 mg  81 mg Oral Daily    hydrALAZINE (APRESOLINE) injection 10 mg  10 mg Intravenous Q6H PRN    HYDROmorphone (DILAUDID) injection 0.5 mg  0.5 mg Intravenous Q4H PRN    naloxone (NARCAN) 0.04 mg/mL syringe 0.04 mg  0.04 mg Intravenous Q1MIN PRN    ondansetron (ZOFRAN) injection 4 mg  4 mg Intravenous Q6H PRN    ondansetron (ZOFRAN) injection 4 mg  4 mg Intravenous Once PRN    oxyCODONE (ROXICODONE) IR tablet 5 mg  5 mg Oral Q4H PRN    Or    oxyCODONE (ROXICODONE) immediate release tablet 10 mg  10 mg Oral Q4H PRN    piperacillin-tazobactam (ZOSYN) 4.5 g in sodium chloride 0.9 % 100 mL IVPB (EXTENDED INFUSION)  4.5 g Intravenous Q8H    polyethylene glycol (MIRALAX) packet 17 g  17 g Oral Daily    sodium chloride 0.9 % infusion  100 mL/hr Intravenous Continuous       Allergies:   No Known Allergies    PHYSICAL EXAM     Objective   Blood pressure 152/79, pulse 62, temperature 98.1 °F (36.7 °C), temperature source Temporal, resp. rate 18, height 5' 4\" (1.626 m), weight 62.4 kg (137 lb 9.1 oz), SpO2 96%. Body mass index is 23.61 kg/m².    Intake/Output Summary (Last 24 hours) at 8/30/2024 " 0733  Last data filed at 8/29/2024 1643  Gross per 24 hour   Intake 800 ml   Output --   Net 800 ml       General Appearance:   Alert, cooperative, no distress   HEENT:   Normocephalic, atraumatic, anicteric     Neck:   Supple, symmetrical, trachea midline   Lungs:   Equal chest rise, respirations unlabored    Heart:   Regular rate and rhythm   Abdomen:   Soft, no tenderness, non-distended; normal bowel sounds; no masses, no organomegaly    Rectal:   Deferred    Extremities:   No cyanosis, clubbing or edema    Neuro:   Moves all 4 extremities    Skin:   No jaundice, rashes, or lesions      ADDITIONAL DATA     Lab Results:     Results from last 7 days   Lab Units 08/30/24  0540   WBC Thousand/uL 8.45   HEMOGLOBIN g/dL 11.4*   HEMATOCRIT % 34.4*   PLATELETS Thousands/uL 251   SEGS PCT % 65   LYMPHO PCT % 20   MONO PCT % 7   EOS PCT % 7*     Results from last 7 days   Lab Units 08/30/24  0540   POTASSIUM mmol/L 4.2   CHLORIDE mmol/L 109*   CO2 mmol/L 25   BUN mg/dL 10   CREATININE mg/dL 0.72   CALCIUM mg/dL 8.3*   ALK PHOS U/L 773*   ALT U/L 87*   AST U/L 55*     Results from last 7 days   Lab Units 08/28/24  0509   INR  1.27*       Imaging:    ERCP    Result Date: 8/29/2024  Narrative: Table formatting from the original result was not included. [facility] [address] [facility] [address] [phone] DATE OF SERVICE: 8/29/24 PHYSICIAN(S): Attending: Catia Ramirez MD Fellow: No Staff Documented INDICATION: Pancreatic mass, Elevated LFTs, Acute obstructive cholangitis POST-OP DIAGNOSIS: See the impression below. PREPROCEDURE: Informed consent was obtained for the procedure, including sedation.  Risks of perforation, hemorrhage, adverse drug reaction and aspiration were discussed. The patient was placed in the left lateral decubitus position. Patient was explained about the risks and benefits of the procedure. Risks including but not limited to bleeding, infection, and perforation  "were explained in detail. Also explained about less than 100% sensitivity with the exam and other alternatives. PROCEDURE: ERCP DETAILS OF PROCEDURE: Patient was taken to the procedure room where a time out was performed to confirm correct patient and correct procedure. The patient underwent general anesthesia, which was administered by an anesthesia professional. The patient's blood pressure, heart rate, level of consciousness, respirations, oxygen, ECG and ETCO2 were monitored throughout the procedure. The scope was introduced through the mouth and advanced to the second part of the duodenum. Clinical intention was achieved. The patient's estimated blood loss was minimal (<5 mL). The procedure was not difficult. The patient tolerated the procedure well. There were no apparent adverse events. ANESTHESIA INFORMATION: ASA: III Anesthesia Type: General MEDICATIONS: No administrations occurring from 1511 to 1635 on 08/29/24 FINDINGS: Single malignant-appearing, fungating, invasive, multilobular and ulcerated mass measuring 70 mm x 40 mm was visualized in the major papilla, covering one half of the circumference Performed forceps biopsies in the major papilla. A sample was sent for histology analysis. The common bile duct was deeply cannulated using a traction sphincterotome with 0.025\" straight guidewire. Cannulation was difficult due to large mass causing obliteration of the ampulla and altered anatomy. Contrast was injected to confirm location in the common bile duct and obtain a cholangiogram. Generalized dilation was observed in the distal common bile duct, mid common bile duct and proximal common bile duct with a maximal diameter of 14 mm. Tapering to the level of the ampulla was noted.  Biliary tree was otherwise unremarkable.  Sphincterotomy was not performed today due to the patient having received Lovenox on the day of the procedure. One 10 Fr x 9 cm double flanged straight plastic stent was placed " successfully in the common bile duct SPECIMENS: ID Type Source Tests Collected by Time Destination 1 : ampulla mass bx Tissue Ampulla of Vater TISSUE EXAM Catia Ramirez MD 8/29/2024  4:19 PM       Impression: Single malignant-appearing, fungating, invasive, multilobular and ulcerated mass measuring 70 mm x 40 mm was visualized in the major papilla, covering one half of the circumference Performed forceps biopsies in the major papilla. A sample was sent for histology analysis. The common bile duct was deeply cannulated. Cannulation was difficult due to large mass causing obliteration of the ampulla and altered anatomy. Generalized dilation was observed in the distal common bile duct, mid common bile duct and proximal common bile duct with a maximal diameter of 14 mm One 10 Fr x 9 cm double flanged straight stent was placed in the common bile duct RECOMMENDATION: Await pathology results Surgical oncology consultation. Clear liquids only today; advanced diet as tolerated thereafter. Repeat ERCP in 2-3 months for stent exchange.    Catia Ramirez MD     Endoscopic ultrasonography, GI (Upper)    Result Date: 8/29/2024  Narrative: Table formatting from the original result was not included. [facility] [address] [facility] [address] [phone] DATE OF SERVICE: 8/29/24 PHYSICIAN(S): Attending: Catia Ramirez MD Fellow: No Staff Documented INDICATION: Pancreatic mass, Elevated LFTs, Acute obstructive cholangitis POST-OP DIAGNOSIS: See the impression below. PREPROCEDURE: Informed consent was obtained for the procedure, including sedation.  Risks of perforation, hemorrhage, adverse drug reaction and aspiration were discussed. The patient was placed in the left lateral decubitus position. Patient was explained about the risks and benefits of the procedure. Risks including but not limited to bleeding, infection, and perforation were explained in detail. Also explained about less than  100% sensitivity with the exam and other alternatives. PROCEDURE: EUS UPPER DETAILS OF PROCEDURE: Patient was taken to the procedure room where a time out was performed to confirm correct patient and correct procedure. The patient underwent general anesthesia, which was administered by an anesthesia professional. The patient's blood pressure, heart rate, oxygen, respirations, level of consciousness, ECG and ETCO2 were monitored throughout the procedure. The gastroscope and linear scope were introduced through the mouth and advanced to the second part of the duodenum. Retroflexion was performed in the fundus. The patient's estimated blood loss was minimal (<5 mL). The procedure was not difficult. The patient tolerated the procedure well. There were no apparent adverse events. ANESTHESIA INFORMATION: ASA: III Anesthesia Type: General MEDICATIONS: No administrations occurring from 1511 to 1548 on 08/29/24 FINDINGS: The esophagus and stomach appeared normal. Hypoechoic, irregular, lobulated and ulcerated mass measuring 70 mm x 40 mm with poorly defined and irregular margins was visualized in the major papilla, originating in the mucosa and extending to the muscularis propria. The examined portion of the duodenum otherwise did not show obvious endoscopic or endosonographic abnormalities. The pancreas appeared atrophic. The parenchyma was visualized in the entire pancreas. The parenchyma was heterogeneous. Both the CBD and PD were markedly dilatated. SPECIMENS: * No specimens in log *      Impression: The esophagus and stomach appeared normal. Hypoechoic, irregular, lobulated and ulcerated mass measuring 70 mm x 40 mm with poorly defined and irregular margins was visualized in the major papilla, originating in the mucosa and extending to the muscularis propria The pancreas appeared atrophic. The parenchyma was visualized in the entire pancreas. The parenchyma was heterogeneous. RECOMMENDATION: Proceed with ERCP   Catia  BHUMIKA Ramirez MD     CT chest wo contrast    Result Date: 8/28/2024  Narrative: CT CHEST WITHOUT IV CONTRAST INDICATION: Staging from pancreatic mass. COMPARISON: CT abdomen pelvis 8/27/2024 TECHNIQUE: CT examination of the chest was performed without intravenous contrast. Multiplanar 2D reformatted images were created from the source data. This examination, like all CT scans performed in the [facility], was performed utilizing techniques to minimize radiation dose exposure, including the use of iterative reconstruction and automated exposure control. Radiation dose length product (DLP) for this visit: 271 mGy-cm FINDINGS: LUNGS: Mild bilateral dependent atelectasis. No consolidation. No suspicious pulmonary nodules. Right upper lobe granuloma (series 3 image 90). There is no tracheal or endobronchial lesion. PLEURA: Unremarkable. HEART/GREAT VESSELS: Mild coronary artery calcifications. LAD stent. Aortic valve calcifications. Normal heart size. No thoracic aortic aneurysm. MEDIASTINUM AND BOBBI: No mediastinal or hilar lymphadenopathy. CHEST WALL AND LOWER NECK: Unremarkable. VISUALIZED STRUCTURES IN THE UPPER ABDOMEN: Biliary obstruction secondary to pancreatic head mass again demonstrated. Bilateral renal cysts. OSSEOUS STRUCTURES: Spinal degenerative changes are noted. No acute fracture or destructive osseous lesion.     Impression: No evidence of metastatic disease in the chest. Resident: Sidney Wahl I, the attending radiologist, have reviewed the images and agree with the final report above. Workstation performed: [id number]     XR chest portable    Result Date: 8/28/2024  Narrative: XR CHEST PORTABLE INDICATION: Sepsis. COMPARISON: Chest radiograph June 30, 2022 FINDINGS: Clear lungs. No pneumothorax or pleural effusion. Normal cardiomediastinal silhouette. Bones are unremarkable for age. Normal upper abdomen.     Impression: No acute cardiopulmonary disease. Workstation performed: [id number]      CT Abdomen pelvis with contrast    Result Date: 8/27/2024  Narrative: CT ABDOMEN AND PELVIS WITH IV CONTRAST INDICATION: Sepsis, left flank pain, left CVA tenderness. COMPARISON: CT scan from 5/28/2023. TECHNIQUE: CT examination of the abdomen and pelvis was performed. Multiplanar 2D reformatted images were created from the source data. This examination, like all CT scans performed in the [facility], was performed utilizing techniques to minimize radiation dose exposure, including the use of iterative reconstruction and automated exposure control. Radiation dose length product (DLP) for this visit: 616 mGy-cm IV Contrast: 90 mL of iohexol (OMNIPAQUE) Enteric Contrast: Not administered. FINDINGS: ABDOMEN LOWER CHEST: No clinically significant abnormality in the visualized lower chest. LIVER/BILIARY TREE: Intra and extrahepatic ductal dilatation extending to the pancreatic head with there is a mass. No focal liver lesion identified. GALLBLADDER: Distended due to biliary obstruction. No calcified stones. No pericholecystic inflammatory changes. SPLEEN: Unremarkable. PANCREAS: Pancreatic ductal dilatation due to an obstructing mass in the lower pancreatic head at the level of the ampulla measuring 2.8 x 2.7 cm on image 2/78. ADRENAL GLANDS: Unremarkable. KIDNEYS/URETERS: Simple renal cyst(s). Otherwise unremarkable kidneys. No hydronephrosis. STOMACH AND BOWEL: Nondilated fluid-filled loops of small bowel in the mid to lower abdomen suggesting an enteritis. No bowel obstruction. APPENDIX: Normal. ABDOMINOPELVIC CAVITY: No ascites. No pneumoperitoneum. No lymphadenopathy. VESSELS: Unremarkable for patient's age. PELVIS REPRODUCTIVE ORGANS: Unremarkable for patient's age. URINARY BLADDER: Unremarkable. ABDOMINAL WALL/INGUINAL REGIONS: Unremarkable. BONES: No acute fracture or suspicious osseous lesion. Bilateral L5 spondylolysis without evidence of spondylolisthesis. Spinal degenerative changes.      Impression: Mass in the lower pancreatic head region obstructing the CBD and pancreatic duct measuring 2.8 x 2.7 cm on image 2/78 suspicious for neoplasm. Origin of tumor could also be cholangiocarcinoma or duodenal carcinoma given the location. Gallbladder distention due to biliary obstruction. Nondilated fluid-filled loops of small bowel in the mid to lower abdomen suggesting an enteritis. Workstation performed: [id number]     Echo complete w/ contrast if indicated    Result Date: 8/16/2024  Narrative:   Left Ventricle: Left ventricular cavity size is normal. There is mild concentric hypertrophy. The left ventricular ejection fraction is 65%. Systolic function is normal. Wall motion is normal.   Aortic Valve: The aortic valve is trileaflet. The leaflets are mildly calcified. There is moderately reduced mobility. There is mild regurgitation. There is moderate stenosis. The aortic valve peak velocity is 3.47 m/s. The aortic valve mean gradient is 31 mmHg.   Mitral Valve: There is mild annular calcification. There is mild regurgitation.   Prior study date: 6/30/2022. Mild progression of aortic valve stenosis.       EKG, Pathology, and Other Studies Reviewed on Admission:   EKG: Reviewed      Counseling / Coordination of Care Time: 30 total mins spent n consult. Greater than 50% of total time spent on patient counseling and coordination of care.    Arelis Mccarty DO  Gastroenterology Fellow  [facility]  Division of Gastroenterology and Hepatology  Available on [employer]  ...............................................................................................................................................  ** Please Note: This note is constructed using a voice recognition dictation system. **

## 2024-08-31 VITALS
DIASTOLIC BLOOD PRESSURE: 74 MMHG | BODY MASS INDEX: 23.49 KG/M2 | SYSTOLIC BLOOD PRESSURE: 159 MMHG | OXYGEN SATURATION: 96 % | HEIGHT: 64 IN | HEART RATE: 63 BPM | RESPIRATION RATE: 18 BRPM | WEIGHT: 137.57 LBS | TEMPERATURE: 98.2 F

## 2024-08-31 LAB
ALBUMIN SERPL BCG-MCNC: 3.3 G/DL (ref 3.5–5)
ALP SERPL-CCNC: 725 U/L (ref 34–104)
ALT SERPL W P-5'-P-CCNC: 68 U/L (ref 7–52)
ANION GAP SERPL CALCULATED.3IONS-SCNC: 6 MMOL/L (ref 4–13)
AST SERPL W P-5'-P-CCNC: 35 U/L (ref 13–39)
BACTERIA BLD CULT: ABNORMAL
BASOPHILS # BLD AUTO: 0.07 THOUSANDS/ÂΜL (ref 0–0.1)
BASOPHILS NFR BLD AUTO: 1 % (ref 0–1)
BILIRUB SERPL-MCNC: 0.99 MG/DL (ref 0.2–1)
BUN SERPL-MCNC: 12 MG/DL (ref 5–25)
CALCIUM ALBUM COR SERPL-MCNC: 9.1 MG/DL (ref 8.3–10.1)
CALCIUM SERPL-MCNC: 8.5 MG/DL (ref 8.4–10.2)
CHLORIDE SERPL-SCNC: 109 MMOL/L (ref 96–108)
CO2 SERPL-SCNC: 25 MMOL/L (ref 21–32)
CREAT SERPL-MCNC: 0.7 MG/DL (ref 0.6–1.3)
EOSINOPHIL # BLD AUTO: 0.57 THOUSAND/ÂΜL (ref 0–0.61)
EOSINOPHIL NFR BLD AUTO: 7 % (ref 0–6)
ERYTHROCYTE [DISTWIDTH] IN BLOOD BY AUTOMATED COUNT: 15.5 % (ref 11.6–15.1)
GFR SERPL CREATININE-BSD FRML MDRD: 99 ML/MIN/1.73SQ M
GLUCOSE SERPL-MCNC: 101 MG/DL (ref 65–140)
GRAM STN SPEC: ABNORMAL
HCT VFR BLD AUTO: 36.1 % (ref 36.5–49.3)
HGB BLD-MCNC: 11.9 G/DL (ref 12–17)
IMM GRANULOCYTES # BLD AUTO: 0.03 THOUSAND/UL (ref 0–0.2)
IMM GRANULOCYTES NFR BLD AUTO: 0 % (ref 0–2)
LYMPHOCYTES # BLD AUTO: 1.57 THOUSANDS/ÂΜL (ref 0.6–4.47)
LYMPHOCYTES NFR BLD AUTO: 18 % (ref 14–44)
MCH RBC QN AUTO: 27.6 PG (ref 26.8–34.3)
MCHC RBC AUTO-ENTMCNC: 33 G/DL (ref 31.4–37.4)
MCV RBC AUTO: 84 FL (ref 82–98)
MONOCYTES # BLD AUTO: 0.56 THOUSAND/ÂΜL (ref 0.17–1.22)
MONOCYTES NFR BLD AUTO: 7 % (ref 4–12)
NEUTROPHILS # BLD AUTO: 5.8 THOUSANDS/ÂΜL (ref 1.85–7.62)
NEUTS SEG NFR BLD AUTO: 67 % (ref 43–75)
NRBC BLD AUTO-RTO: 0 /100 WBCS
PLATELET # BLD AUTO: 267 THOUSANDS/UL (ref 149–390)
PMV BLD AUTO: 10.3 FL (ref 8.9–12.7)
POTASSIUM SERPL-SCNC: 4.2 MMOL/L (ref 3.5–5.3)
PROT SERPL-MCNC: 5.9 G/DL (ref 6.4–8.4)
RBC # BLD AUTO: 4.31 MILLION/UL (ref 3.88–5.62)
SODIUM SERPL-SCNC: 140 MMOL/L (ref 135–147)
WBC # BLD AUTO: 8.6 THOUSAND/UL (ref 4.31–10.16)

## 2024-08-31 PROCEDURE — 99239 HOSP IP/OBS DSCHRG MGMT >30: CPT | Performed by: INTERNAL MEDICINE

## 2024-08-31 PROCEDURE — 80053 COMPREHEN METABOLIC PANEL: CPT | Performed by: INTERNAL MEDICINE

## 2024-08-31 PROCEDURE — 85025 COMPLETE CBC W/AUTO DIFF WBC: CPT | Performed by: INTERNAL MEDICINE

## 2024-08-31 RX ORDER — LISINOPRIL 20 MG/1
20 TABLET ORAL DAILY
Qty: 30 TABLET | Refills: 0 | Status: SHIPPED | OUTPATIENT
Start: 2024-09-01

## 2024-08-31 RX ADMIN — LISINOPRIL 20 MG: 20 TABLET ORAL at 09:17

## 2024-08-31 RX ADMIN — ASPIRIN 81 MG CHEWABLE TABLET 81 MG: 81 TABLET CHEWABLE at 09:17

## 2024-08-31 RX ADMIN — PIPERACILLIN SODIUM AND TAZOBACTAM SODIUM 4.5 G: 36; 4.5 INJECTION, POWDER, LYOPHILIZED, FOR SOLUTION INTRAVENOUS at 06:29

## 2024-08-31 RX ADMIN — ENOXAPARIN SODIUM 40 MG: 40 INJECTION SUBCUTANEOUS at 09:17

## 2024-08-31 NOTE — DISCHARGE INSTR - AVS FIRST PAGE
Dear Isaac Kramer,     [facility]  It is our hope that we were always able to exceed the expected standards for your care during your stay.  You were hospitalized due to new pancreatic head lesion concerning for pancreatic cancer and obstruction of the bile ducts requiring stent placement and biopsy.  You were cared for on the 4th floor under the service of Juan Bernal DO with the [facility] who covers for your primary care physician (PCP), Pablo Perez DO, while you were hospitalized.  If you have any questions or concerns related to this hospitalization, you may contact us at .  For follow up as well as medication refills, we recommend that you follow up with your primary care physician.  A registered nurse will reach out to you by phone within a few days after your discharge to answer any additional questions that you may have after going home.  However, at this time we provide for you here, the most important instructions / recommendations at discharge:     Notable Medication Adjustments -   Augmentin x 7 days  Refilled home lisinopril  Testing Required after Discharge -   None, follow-up with surgical oncology to discuss results of biopsy  ** Please contact your PCP to request testing orders for any of the testing recommended here **  Important Follow Up Information -   Please see your primary doctor in 1 to 2 weeks for hospital follow-up  Set up appointment with surgical oncology and GI  Please review this entire after visit summary as additional general instructions including medication list, appointments, activity, diet, any pertinent wound care, and other additional recommendations from your care team that may be provided for you.      Sincerely,     Juan Bernal DO

## 2024-08-31 NOTE — ASSESSMENT & PLAN NOTE
CERTIFICATE OF WORK    9/20/2017      Re: Cheyanne Diamond        This is to certify that Cheyanne Diamond has been under my care from 09/20/17   and is excused from any missed work from 9/16/17-today. She may return to work tomorrow, 9/21/17.        Sincerely,       Ashleigh Chacko M.D.   [address] [address] [place] [address]  Ph:  [phone]  Fax: [phone]         No GI complaints reported  CT abd/pelvis reviewed nondilated fluid-filled loops of small bowel in mid/lower abdomen suggesting enteritis   Patient is asymptomatic

## 2024-08-31 NOTE — ASSESSMENT & PLAN NOTE
Concerning presentation for cholangitis given sepsis, bilirubinemia  CT abd/pelvis reviewed: new mass in lower pancreatic head obstructing CBD/pancreatic duct 2.8x2.7cm suspicious for neoplasm, gallbladder distension  Status post ERCP with stent placement  Patient stable afebrile without leukocytosis on Zosyn  Transition to Augmentin

## 2024-08-31 NOTE — DISCHARGE SUMMARY
[facility]  Discharge- Isaac Kramer 1960, 63 y.o. male MRN: [id number]  Unit/Bed#: E4 -01 Encounter: [id number]  Primary Care Provider: Pablo Perez DO   Date and time admitted to hospital: 8/28/2024  2:09 AM    * Acute obstructive cholangitis  Assessment & Plan  Concerning presentation for cholangitis given sepsis, bilirubinemia  CT abd/pelvis reviewed: new mass in lower pancreatic head obstructing CBD/pancreatic duct 2.8x2.7cm suspicious for neoplasm, gallbladder distension  Status post ERCP with stent placement  Patient stable afebrile without leukocytosis on Zosyn  Transition to Augmentin    MOOKIE (acute kidney injury) (HCC)  Assessment & Plan  POA, now resolved   Suspected secondary to biliary sepsis, home ACE inhibitor  Now back to baseline    Enteritis  Assessment & Plan  No GI complaints reported  CT abd/pelvis reviewed nondilated fluid-filled loops of small bowel in mid/lower abdomen suggesting enteritis   Patient is asymptomatic    Biliary sepsis  Assessment & Plan  SIRS present fever, tachycardia, leukocytosis. Procal 2.92, lactate 1.4  Sources: biliary, enteritis  Blood cultures with Klebsiella  Discharged on additional 7 days of Augmentin    Elevated LFTs  Assessment & Plan  Obstructive pattern   Improving following ERCP    Pancreatic mass  Assessment & Plan  New mass in pancreatic/biliary region not seen on CT imaging on 05/28/2023. Patient reports 14lb unintentional weight loss this past year. Hx of prostate cancer  GI, surgical oncology consulted  Status post ERCP with stent placement, biopsy  Outpatient follow-up with GI and surgical oncology    Coronary artery disease involving native coronary artery of native heart without angina pectoris  Assessment & Plan  [facility] 07/2022, 3v CAD, s/p stenting  Continue aspirin, statin    Benign essential hypertension  Assessment & Plan  Resume lisinopril      Discharging Physician / Practitioner: Juan  DO Dolores  PCP: Pablo Perez DO  Admission Date:   Admission Orders (From admission, onward)       Ordered        08/28/24 0211  INPATIENT ADMISSION  Once                          Discharge Date: 08/31/24    Medical Problems       Resolved Problems  Date Reviewed: 8/28/2024   None         Consultations During Hospital Stay:   IP CONSULT TO SURGICAL ONCOLOGY  IP CONSULT TO GASTROENTEROLOGY    Procedures Performed: ERCP EUS w/ bx     Significant Findings / Test Results:     ERCP    Result Date: 8/29/2024  Impression: Single malignant-appearing, fungating, invasive, multilobular and ulcerated mass measuring 70 mm x 40 mm was visualized in the major papilla, covering one half of the circumference Performed forceps biopsies in the major papilla. A sample was sent for histology analysis. The common bile duct was deeply cannulated. Cannulation was difficult due to large mass causing obliteration of the ampulla and altered anatomy. Generalized dilation was observed in the distal common bile duct, mid common bile duct and proximal common bile duct with a maximal diameter of 14 mm One 10 Fr x 9 cm double flanged straight stent was placed in the common bile duct RECOMMENDATION: Await pathology results Surgical oncology consultation. Clear liquids only today; advanced diet as tolerated thereafter. Repeat ERCP in 2-3 months for stent exchange.    Catia Ramirez MD     Endoscopic ultrasonography, GI (Upper)    Result Date: 8/29/2024  Impression: The esophagus and stomach appeared normal. Hypoechoic, irregular, lobulated and ulcerated mass measuring 70 mm x 40 mm with poorly defined and irregular margins was visualized in the major papilla, originating in the mucosa and extending to the muscularis propria The pancreas appeared atrophic. The parenchyma was visualized in the entire pancreas. The parenchyma was heterogeneous. RECOMMENDATION: Proceed with ERCP   Catia Ramirez MD     CT chest wo  "contrast    Result Date: 8/28/2024  Impression: No evidence of metastatic disease in the chest. Resident: Sidney Wahl I, the attending radiologist, have reviewed the images and agree with the final report above. Workstation performed: [id number]       Incidental Findings: Above     Test Results Pending at Discharge (will require follow up): None     Outpatient Tests Requested: None    Reason for Admission: Flank pain    Hospital Course:     Isaac Kramer is a 63 y.o. male with past medical history of prostate cancer status post resection, coronary artery disease status post PCI 2022, hypertension who presents to the hospital with flank pain, nausea, dizziness, chills, change in urine color. Patient was found to have new pancreatic head lesion on CT and obstructive appearing transaminitis. Patient was transferred [facility] for evaluation by GI.  Patient underwent ERCP with stent placement and EUS with biopsy.  Patient was found to have Klebsiella bacteremia and remained stable on IV Zosyn and ultimately transition to Augmentin.    Please see above list of diagnoses and related plan for additional information.     Condition at Discharge: stable     Discharge Day Visit / Exam:     Subjective: Patient seen and evaluated.  Feels well.    Vitals: Blood Pressure: 159/74 (08/31/24 0716)  Pulse: 63 (08/31/24 0716)  Temperature: 98.2 °F (36.8 °C) (08/31/24 0716)  Temp Source: Temporal (08/31/24 0716)  Respirations: 18 (08/31/24 0716)  Height: 5' 4\" (162.6 cm) (08/28/24 0217)  Weight - Scale: 62.4 kg (137 lb 9.1 oz) (08/28/24 0217)  SpO2: 96 % (08/31/24 0716)  Exam:   Physical Exam  Vitals reviewed.   Constitutional:       General: He is not in acute distress.     Appearance: He is well-developed. He is not ill-appearing, toxic-appearing or diaphoretic.   HENT:      Head: Normocephalic and atraumatic.      Mouth/Throat:      Mouth: Mucous membranes are moist.   Eyes:      General: No scleral icterus.     " Extraocular Movements: Extraocular movements intact.   Cardiovascular:      Rate and Rhythm: Normal rate and regular rhythm.      Heart sounds: Normal heart sounds.   Pulmonary:      Effort: Pulmonary effort is normal. No respiratory distress.      Breath sounds: Normal breath sounds. No wheezing or rales.   Abdominal:      General: There is no distension.      Palpations: Abdomen is soft.      Tenderness: There is no abdominal tenderness. There is no guarding or rebound.   Musculoskeletal:         General: No swelling, tenderness or deformity.   Skin:     General: Skin is warm and dry.      Coloration: Skin is not jaundiced.   Neurological:      General: No focal deficit present.      Mental Status: He is alert. Mental status is at baseline.   Psychiatric:         Mood and Affect: Mood normal.         Behavior: Behavior normal.         Thought Content: Thought content normal.         Judgment: Judgment normal.           Discharge instructions/Information to patient and family:   See after visit summary for information provided to patient and family.      Provisions for Follow-Up Care:  See after visit summary for information related to follow-up care and any pertinent home health orders.      Disposition:     Home     Discharge Statement:  I spent 60 minutes discharging the patient. This time was spent on the day of discharge. I had direct contact with the patient on the day of discharge. Greater than 50% of the total time was spent examining patient, answering all patient questions, arranging and discussing plan of care with patient as well as directly providing post-discharge instructions.  Additional time then spent on discharge activities.    Discharge Medications:  See after visit summary for reconciled discharge medications provided to patient and family.      ** Please Note: This note has been constructed using a voice recognition system **

## 2024-08-31 NOTE — ASSESSMENT & PLAN NOTE
SIRS present fever, tachycardia, leukocytosis. Procal 2.92, lactate 1.4  Sources: biliary, enteritis  Blood cultures with Klebsiella  Discharged on additional 7 days of Augmentin

## 2024-08-31 NOTE — RESTORATIVE TECHNICIAN NOTE
Restorative Technician Note      Patient Name: Isaac Kramer     Restorative Tech Visit Date: 08/31/24  Note Type: Mobility  Patient Position Upon Consult: Seated edge of bed  Activity Performed: Ambulated  Assistive Device: Crutches  Patient Position at End of Consult: All needs within reach; Seated edge of bed        ns

## 2024-08-31 NOTE — PLAN OF CARE
Problem: PAIN - ADULT  Goal: Verbalizes/displays adequate comfort level or baseline comfort level  Description: Interventions:  - Encourage patient to monitor pain and request assistance  - Assess pain using appropriate pain scale  - Administer analgesics based on type and severity of pain and evaluate response  - Implement non-pharmacological measures as appropriate and evaluate response  - Consider cultural and social influences on pain and pain management  - Notify physician/advanced practitioner if interventions unsuccessful or patient reports new pain  Outcome: Progressing     Problem: INFECTION - ADULT  Goal: Absence or prevention of progression during hospitalization  Description: INTERVENTIONS:  - Assess and monitor for signs and symptoms of infection  - Monitor lab/diagnostic results  - Monitor all insertion sites, i.e. indwelling lines, tubes, and drains  - Monitor endotracheal if appropriate and nasal secretions for changes in amount and color  - [place] appropriate cooling/warming therapies per order  - Administer medications as ordered  - Instruct and encourage patient and family to use good hand hygiene technique  - Identify and instruct in appropriate isolation precautions for identified infection/condition  Outcome: Progressing  Goal: Absence of fever/infection during neutropenic period  Description: INTERVENTIONS:  - Monitor WBC    Outcome: Progressing     Problem: SAFETY ADULT  Goal: Patient will remain free of falls  Description: INTERVENTIONS:  - Educate patient/family on patient safety including physical limitations  - Instruct patient to call for assistance with activity   - Consult OT/PT to assist with strengthening/mobility   - Keep Call bell within reach  - Keep bed low and locked with side rails adjusted as appropriate  - Keep care items and personal belongings within reach  - Initiate and maintain comfort rounds  - Make Fall Risk Sign visible to staff  - Offer Toileting every 2 Hours,  in advance of need  - Initiate/Maintain bed alarm  - Obtain necessary fall risk management equipment: call bell  - Apply yellow socks and bracelet for high fall risk patients  - Consider moving patient to room near nurses station  Outcome: Progressing  Goal: Maintain or return to baseline ADL function  Description: INTERVENTIONS:  -  Assess patient's ability to carry out ADLs; assess patient's baseline for ADL function and identify physical deficits which impact ability to perform ADLs (bathing, care of mouth/teeth, toileting, grooming, dressing, etc.)  - Assess/evaluate cause of self-care deficits   - Assess range of motion  - Assess patient's mobility; develop plan if impaired  - Assess patient's need for assistive devices and provide as appropriate  - Encourage maximum independence but intervene and supervise when necessary  - Involve family in performance of ADLs  - Assess for home care needs following discharge   - Consider OT consult to assist with ADL evaluation and planning for discharge  - Provide patient education as appropriate  Outcome: Progressing  Goal: Maintains/Returns to pre admission functional level  Description: INTERVENTIONS:  - Perform AM-PAC 6 Click Basic Mobility/ Daily Activity assessment daily.  - Set and communicate daily mobility goal to care team and patient/family/caregiver.   - Collaborate with rehabilitation services on mobility goals if consulted  - Perform Range of Motion 3 times a day.  - Reposition patient every 2 hours.  - Dangle patient 3 times a day  - Stand patient 3 times a day  - Ambulate patient 3 times a day  - Out of bed to chair 3 times a day   - Out of bed for meals 3  Problem: DISCHARGE PLANNING  Goal: Discharge to home or other facility with appropriate resources  Description: INTERVENTIONS:  - Identify barriers to discharge w/patient and caregiver  - Arrange for needed discharge resources and transportation as appropriate  - Identify discharge learning needs (meds,  wound care, etc.)  - Arrange for interpretive services to assist at discharge as needed  - Refer to Case Management Department for coordinating discharge planning if the patient needs post-hospital services based on physician/advanced practitioner order or complex needs related to functional status, cognitive ability, or social support system  Outcome: Progressing     Problem: Knowledge Deficit  Goal: Patient/family/caregiver demonstrates understanding of disease process, treatment plan, medications, and discharge instructions  Description: Complete learning assessment and assess knowledge base.  Interventions:  - Provide teaching at level of understanding  - Provide teaching via preferred learning methods  Outcome: Progressing    times a day  - Out of bed for toileting  - Record patient progress and toleration of activity level   Outcome: Progressing

## 2024-09-01 NOTE — RESULT ENCOUNTER NOTE
Patient transferred to [facility] for sepsis. Discharged on Augmentin which is susceptible.

## 2024-09-03 ENCOUNTER — TELEPHONE (OUTPATIENT)
Dept: GASTROENTEROLOGY | Facility: CLINIC | Age: 64
End: 2024-09-03

## 2024-09-03 ENCOUNTER — DOCUMENTATION (OUTPATIENT)
Dept: HEMATOLOGY ONCOLOGY | Facility: CLINIC | Age: 64
End: 2024-09-03

## 2024-09-03 ENCOUNTER — PATIENT OUTREACH (OUTPATIENT)
Dept: HEMATOLOGY ONCOLOGY | Facility: CLINIC | Age: 64
End: 2024-09-03

## 2024-09-03 ENCOUNTER — TRANSITIONAL CARE MANAGEMENT (OUTPATIENT)
Dept: FAMILY MEDICINE CLINIC | Facility: CLINIC | Age: 64
End: 2024-09-03

## 2024-09-03 ENCOUNTER — TELEPHONE (OUTPATIENT)
Dept: FAMILY MEDICINE CLINIC | Facility: CLINIC | Age: 64
End: 2024-09-03

## 2024-09-03 DIAGNOSIS — C22.1 CHOLANGIOCARCINOMA (HCC): Primary | ICD-10-CM

## 2024-09-03 NOTE — PROGRESS NOTES
Phone outreach to the patient, I introduced myself and explained my service to him.  I explained that I am calling in response to a referral placed for surgical and medical oncology.  We discussed his scheduled appointment with Dr. Ac for 9/9 and I explained that a  will be calling within 24/48 hours to arrange an appointment within 7 day with med onc.  He confirmed understanding and thanked me.

## 2024-09-03 NOTE — PROGRESS NOTES
All records needed are in patients chart. No records retrieval needed at this time.     Referral received/ Chart reviewed for work up completed     Imaging completed:    [] PET/CT   [] MRI   [] CT   [] US   [] Mammo   [] Bone scan   [] N/A    Pathology completed:    Date:   Location:   [x]N/A    Additional records needed:   [] Genomic report   [] Genetic testing results   [] Office Note   [] Procedure/ Operative note   [] Lab results   [] N/A      [] Radiation Oncology records retrieval needed (PN to route to rad/onc clerical pool once scheduled)  Date:  Location:        Chart rvw'd on 9/3/24    Referring provider-  Dr. Juan Bernal  Referral to Surgical Oncology    EUS date-  8/29/24      Impression-The esophagus and stomach appeared normal.  Hypoechoic, irregular, lobulated and ulcerated mass measuring 70 mm x 40 mm with poorly defined and irregular margins was visualized in the major papilla, originating in the mucosa and extending to the muscularis propria  The pancreas appeared atrophic. The parenchyma was visualized in the entire pancreas. The parenchyma was heterogeneous.      Pathology-    Final Diagnosis   A. 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     Component  Ref Range & Units 8/28/24  5:09 AM   CA 19-9  0 - 35 U/mL 14         Component  Ref Range & Units 8/28/24  5:09 AM   CEA  0.0 - 3.0 ng/mL 0.7     Imaging-  8/28/24 - CT CHEST WITHOUT IV CONTRAST     IMPRESSION:     No evidence of metastatic disease in the chest.      Imaging- 8/27/24 - CT ABDOMEN AND PELVIS WITH IV CONTRAST     IMPRESSION:     Mass in the lower pancreatic head region obstructing the CBD and pancreatic duct measuring 2.8 x 2.7 cm on image 2/78 suspicious for neoplasm. Origin of tumor could also  be cholangiocarcinoma or duodenal carcinoma given the location.     Gallbladder distention due to biliary obstruction.     Nondilated fluid-filled loops of small bowel in the mid to lower abdomen suggesting an enteritis.    Scheduled Appointments-     9/9/24 - Dr. Ac

## 2024-09-03 NOTE — TELEPHONE ENCOUNTER
Patient called back to schedule. There were only 20 minute slots open with all providers within the next 14 days from patients discharge.     I used a 20 minute TCM slot with Dr. Posadas on 9/12 at 11pm. If that is not ok please let me know.

## 2024-09-04 ENCOUNTER — TELEPHONE (OUTPATIENT)
Dept: ENDOCRINOLOGY | Facility: CLINIC | Age: 64
End: 2024-09-04

## 2024-09-04 ENCOUNTER — CONSULT (OUTPATIENT)
Dept: GASTROENTEROLOGY | Facility: CLINIC | Age: 64
End: 2024-09-04
Payer: MEDICARE

## 2024-09-04 VITALS
DIASTOLIC BLOOD PRESSURE: 60 MMHG | OXYGEN SATURATION: 98 % | WEIGHT: 137 LBS | HEIGHT: 64 IN | BODY MASS INDEX: 23.39 KG/M2 | TEMPERATURE: 97.6 F | SYSTOLIC BLOOD PRESSURE: 122 MMHG | HEART RATE: 83 BPM | RESPIRATION RATE: 16 BRPM

## 2024-09-04 DIAGNOSIS — C22.1 CHOLANGIOCARCINOMA (HCC): ICD-10-CM

## 2024-09-04 DIAGNOSIS — R79.89 ELEVATED LFTS: ICD-10-CM

## 2024-09-04 DIAGNOSIS — K83.1 OBSTRUCTIVE JAUNDICE: Primary | ICD-10-CM

## 2024-09-04 PROCEDURE — 99213 OFFICE O/P EST LOW 20 MIN: CPT | Performed by: PHYSICIAN ASSISTANT

## 2024-09-04 NOTE — TELEPHONE ENCOUNTER
PT needs to be scheduled for an EUS in early November with Ana Luisa Ramirez. Please reach out to pt.

## 2024-09-04 NOTE — UTILIZATION REVIEW
NOTIFICATION OF ADMISSION DISCHARGE   This is a Notification of Discharge from [facility]. Please be advised that this patient has been discharge from our facility. Below you will find the admission and discharge date and time including the patient’s disposition.   UTILIZATION REVIEW CONTACT:  Peg Taylor MA  Utilization   Network Utilization Review Department  Phone: [phone] x carefully listen to the prompts. All voicemails are confidential.  Email: [email or web address]     ADMISSION INFORMATION  PRESENTATION DATE: 8/28/2024  2:09 AM  OBERVATION ADMISSION DATE: N/A  INPATIENT ADMISSION DATE: 8/28/24  2:09 AM   DISCHARGE DATE: 8/31/2024 12:40 PM   DISPOSITION:Home/Self Care    Network Utilization Review Department  ATTENTION: Please call with any questions or concerns to [phone] and carefully listen to the prompts so that you are directed to the right person. All voicemails are confidential.   For Discharge needs, contact Care Management DC Support Team at [phone] opt. 2  Send all requests for admission clinical reviews, approved or denied determinations and any other requests to dedicated fax number below belonging to the campus where the patient is receiving treatment. List of dedicated fax numbers for the Facilities:  FACILITY NAME UR FAX NUMBER   ADMISSION DENIALS (Administrative/Medical Necessity) [phone]   DISCHARGE SUPPORT TEAM ([facility]) [phone]   PARENT CHILD HEALTH (Maternity/NICU/Pediatrics) [phone]   [facility] [phone]   [facility] [phone]   [facility] [phone]   [facility] [phone]   [facility] [phone]   [facility] [phone]   General acute hospital [phone]   [facility]  [phone]   [facility] [phone]   [facility] [phone]   [facility] [phone]   [facility], [place]) [phone]

## 2024-09-04 NOTE — PROGRESS NOTES
[facility] - Outpatient Follow-up Note  Isaac Kramer 63 y.o. male MRN: [id number]  Encounter: [id number]    ASSESSMENT AND PLAN:      1. Obstructive jaundice  2. Ampullary mass   3. Elevated LFTs     Patient admitted to the hospital with abdominal pain, dark urine, fatigue, CT with pancreatic mass and biliary dilatation.  Blood cultures with Klebsiella.  EUS/ERCP was lobulated ulcerated mass at the major papilla with a dilated bile duct, s/p plastic stent placement.  Follow-up on results of tissue biopsy.  Trend LFTs.  Continue to monitor for recurrence of abdominal pain, jaundice, fever.  Complete course of Augmentin.  Agree with medical oncology and surgical oncology consultations.  Repeat ERCP in 2 to 3 months for stent exchange.  Patient prefers [place] location.    - Ambulatory Referral to Gastroenterology    We will follow up as needed with this pt, or after repeat EUS.  ______________________________________________________________________    SUBJECTIVE: Patient is a 63 y.o. male who presents today for follow-up regarding hospitalization for pancreaticobiliary mass.  Past medical history significant for AV stenosis, CAD, history of prostate cancer.    Patient was admitted in 08/2024.  Abdominal pain, dark urine, chills and fatigue.  CT showed pancreatic mass and biliary dilatation.  Blood cultures were positive for Klebsiella.  He underwent EUS/ERCP on 8/30/2024 which demonstrated a lobulated ulcerated mass in the major papilla, ERCP showed malignant appearing mass in the major papilla with a dilated bile duct, s/p plastic biliary stent.  He was discharged with a course of Augmentin.    09/04/24:     Patient is here with his son.  He is recovering from his cessation.  He has twinges of upper abdominal pain that seem to occur at random, typically in the right upper quadrant.  Not necessarily related to oral intake.  Does not last long.  No significant, nausea, vomiting,  dysphagia.  Appetite is okay.  Has lost 11 pounds over the past 6 to 12 months.  No fevers, chills, or night sweats since discharge from hospital.    Having formed brown stool just about daily.  No BRBPR or melena.  No acholic stool.    NSAIDs: none  Etoh: social   Tobacco: quit      08/2024: Hb 11.9, CBD 84, platelets 267, BUN 12, creatinine 0.7, AST 35, ALT 68, , albumin 3.3, T. bili 0.99, CEA 0.7, CA 19-9 14    Endoscopic history:   EUS/ERCP: 08/2024: Hypoechoic, irregular, lobulated and ulcerated mass measuring 70 mm x 40 mm with poorly defined and irregular margins was visualized in the major papilla, covering one half the circumference, originating in the mucosa and extending to the muscularis propria; pancreas appeared atrophic. The parenchyma was visualized in the entire pancreas. The parenchyma was heterogeneous. Generalized dilation was observed in the distal common bile duct, mid common bile duct and proximal common bile duct with a maximal diameter of 14 mm. Tapering to the level of the ampulla was noted. One 10 Fr x 9 cm double flanged straight plastic stent was placed successfully in the common bile duct   Colon: 2011    Review of Systems   Otherwise Per HPI    Historical Information   Past Medical History:   Diagnosis Date    Benign essential hypertension 05/08/2014    CAD (coronary artery disease)     s/p NAVA prox LAD and D1 7/28/2022    Cancer (HCC)     Prostate    Coronary artery disease involving native coronary artery of native heart without angina pectoris 08/09/2022    Enteritis 8/28/2024    GERD (gastroesophageal reflux disease)     Hyperlipidemia     Hypertension     Other chest pain     Palpitations     Prostate cancer (HCC) 04/18/2023     Past Surgical History:   Procedure Laterality Date    CARDIAC CATHETERIZATION Left 7/28/2022    Procedure: Cardiac catheterization-left heart catheterization;  Surgeon: Sai Henry MD;  Location: [facility];  Service: Cardiology     "CARDIAC CATHETERIZATION N/A 2022    Procedure: Cardiac pci;  Surgeon: Sai Henry MD;  Location: [facility];  Service: Cardiology    HERNIA REPAIR      GA LAPS SURG [id number] RPBIC RAD W/NRV SPARING ROBOT N/A 2023    Procedure: PROSTATECTOMY RADICAL & PELVIC LYMPH NODE DISSECTION  W/ ROBOT;  Surgeon: Otoniel Harmon MD;  Location: [facility];  Service: Urology    GA PROSTATE NEEDLE BIOPSY ANY APPROACH N/A 2023    Procedure: TRANSRECTAL MRI FUSION BIOPSY PROSTATE;  Surgeon: Milan Arellano MD;  Location: [facility];  Service: Urology     Social History   Social History     Substance and Sexual Activity   Alcohol Use Not Currently     Social History     Substance and Sexual Activity   Drug Use No     Social History     Tobacco Use   Smoking Status Every Day    Current packs/day: 0.00    Types: Cigarettes    Last attempt to quit: 2022    Years since quittin.6   Smokeless Tobacco Never     Family History   Problem Relation Age of Onset    No Known Problems Mother     No Known Problems Father      Meds/Allergies       Current Outpatient Medications:     amoxicillin-clavulanate (AUGMENTIN) 875-125 mg per tablet    aspirin 81 mg chewable tablet    atorvastatin (LIPITOR) 20 mg tablet    lisinopril (ZESTRIL) 20 mg tablet    No Known Allergies    Objective     Blood pressure 122/60, pulse 83, temperature 97.6 °F (36.4 °C), temperature source Temporal, resp. rate 16, height 5' 4\" (1.626 m), weight 62.1 kg (137 lb), SpO2 98%. Body mass index is 23.52 kg/m².    Physical Exam  Vitals and nursing note reviewed.   Constitutional:       General: He is not in acute distress.     Appearance: He is well-developed.   HENT:      Head: Normocephalic and atraumatic.   Eyes:      General: No scleral icterus.     Conjunctiva/sclera: Conjunctivae normal.   Cardiovascular:      Rate and Rhythm: Normal rate.      Heart sounds: Murmur heard.   Pulmonary:      Effort: Pulmonary effort is normal. No " respiratory distress.      Breath sounds: Normal breath sounds.   Abdominal:      General: Bowel sounds are normal. There is no distension.      Palpations: Abdomen is soft.      Tenderness: There is no abdominal tenderness. There is no guarding or rebound.   Skin:     General: Skin is warm and dry.      Coloration: Skin is not jaundiced.   Neurological:      General: No focal deficit present.      Mental Status: He is alert.   Psychiatric:         Mood and Affect: Mood normal.       Lab Results:   No visits with results within 1 Day(s) from this visit.   Latest known visit with results is:   Admission on 08/28/2024, Discharged on 08/31/2024   Component Date Value    Procalcitonin 08/28/2024 22.18 (H)     Sodium 08/28/2024 139     Potassium 08/28/2024 3.7     Chloride 08/28/2024 109 (H)     CO2 08/28/2024 22     ANION GAP 08/28/2024 8     BUN 08/28/2024 22     Creatinine 08/28/2024 0.76     Glucose 08/28/2024 88     Calcium 08/28/2024 8.1 (L)     Corrected Calcium 08/28/2024 8.7     AST 08/28/2024 154 (H)     ALT 08/28/2024 143 (H)     Alkaline Phosphatase 08/28/2024 882 (H)     Total Protein 08/28/2024 5.6 (L)     Albumin 08/28/2024 3.3 (L)     Total Bilirubin 08/28/2024 3.55 (H)     eGFR 08/28/2024 95     WBC 08/28/2024 13.15 (H)     RBC 08/28/2024 4.14     Hemoglobin 08/28/2024 11.5 (L)     Hematocrit 08/28/2024 35.1 (L)     MCV 08/28/2024 85     MCH 08/28/2024 27.8     MCHC 08/28/2024 32.8     RDW 08/28/2024 15.2 (H)     MPV 08/28/2024 10.5     Platelets 08/28/2024 247     nRBC 08/28/2024 0     Segmented % 08/28/2024 83 (H)     Immature Grans % 08/28/2024 1     Lymphocytes % 08/28/2024 7 (L)     Monocytes % 08/28/2024 4     Eosinophils Relative 08/28/2024 4     Basophils Relative 08/28/2024 1     Absolute Neutrophils 08/28/2024 11.13 (H)     Absolute Immature Grans 08/28/2024 0.06     Absolute Lymphocytes 08/28/2024 0.87     Absolute Monocytes 08/28/2024 0.56     Eosinophils Absolute 08/28/2024 0.47      Basophils Absolute 08/28/2024 0.06     Protime 08/28/2024 16.1 (H)     INR 08/28/2024 1.27 (H)     CEA 08/28/2024 0.7     CA 19-9 08/28/2024 14     Lipase 08/28/2024 26     WBC 08/29/2024 9.48     RBC 08/29/2024 4.19     Hemoglobin 08/29/2024 11.8 (L)     Hematocrit 08/29/2024 36.4 (L)     MCV 08/29/2024 87     MCH 08/29/2024 28.2     MCHC 08/29/2024 32.4     RDW 08/29/2024 15.6 (H)     MPV 08/29/2024 9.9     Platelets 08/29/2024 246     nRBC 08/29/2024 0     Segmented % 08/29/2024 70     Immature Grans % 08/29/2024 0     Lymphocytes % 08/29/2024 15     Monocytes % 08/29/2024 7     Eosinophils Relative 08/29/2024 8 (H)     Basophils Relative 08/29/2024 0     Absolute Neutrophils 08/29/2024 6.70     Absolute Immature Grans 08/29/2024 0.03     Absolute Lymphocytes 08/29/2024 1.37     Absolute Monocytes 08/29/2024 0.62     Eosinophils Absolute 08/29/2024 0.72 (H)     Basophils Absolute 08/29/2024 0.04     Sodium 08/29/2024 139     Potassium 08/29/2024 4.3     Chloride 08/29/2024 109 (H)     CO2 08/29/2024 25     ANION GAP 08/29/2024 5     BUN 08/29/2024 17     Creatinine 08/29/2024 0.65     Glucose 08/29/2024 99     Calcium 08/29/2024 8.2 (L)     Corrected Calcium 08/29/2024 8.8     AST 08/29/2024 88 (H)     ALT 08/29/2024 106 (H)     Alkaline Phosphatase 08/29/2024 785 (H)     Total Protein 08/29/2024 5.6 (L)     Albumin 08/29/2024 3.2 (L)     Total Bilirubin 08/29/2024 1.53 (H)     eGFR 08/29/2024 102     WBC 08/30/2024 8.45     RBC 08/30/2024 4.03     Hemoglobin 08/30/2024 11.4 (L)     Hematocrit 08/30/2024 34.4 (L)     MCV 08/30/2024 85     MCH 08/30/2024 28.3     MCHC 08/30/2024 33.1     RDW 08/30/2024 15.5 (H)     MPV 08/30/2024 10.9     Platelets 08/30/2024 251     nRBC 08/30/2024 0     Segmented % 08/30/2024 65     Immature Grans % 08/30/2024 0     Lymphocytes % 08/30/2024 20     Monocytes % 08/30/2024 7     Eosinophils Relative 08/30/2024 7 (H)     Basophils Relative 08/30/2024 1     Absolute Neutrophils  08/30/2024 5.50     Absolute Immature Grans 08/30/2024 0.03     Absolute Lymphocytes 08/30/2024 1.66     Absolute Monocytes 08/30/2024 0.59     Eosinophils Absolute 08/30/2024 0.60     Basophils Absolute 08/30/2024 0.07     Sodium 08/30/2024 139     Potassium 08/30/2024 4.2     Chloride 08/30/2024 109 (H)     CO2 08/30/2024 25     ANION GAP 08/30/2024 5     BUN 08/30/2024 10     Creatinine 08/30/2024 0.72     Glucose 08/30/2024 96     Calcium 08/30/2024 8.3 (L)     Corrected Calcium 08/30/2024 8.9     AST 08/30/2024 55 (H)     ALT 08/30/2024 87 (H)     Alkaline Phosphatase 08/30/2024 773 (H)     Total Protein 08/30/2024 5.8 (L)     Albumin 08/30/2024 3.3 (L)     Total Bilirubin 08/30/2024 1.28 (H)     eGFR 08/30/2024 97     POC Glucose 08/30/2024 109     POC Glucose 08/30/2024 105     POC Glucose 08/30/2024 107     POC Glucose 08/30/2024 122     WBC 08/31/2024 8.60     RBC 08/31/2024 4.31     Hemoglobin 08/31/2024 11.9 (L)     Hematocrit 08/31/2024 36.1 (L)     MCV 08/31/2024 84     MCH 08/31/2024 27.6     MCHC 08/31/2024 33.0     RDW 08/31/2024 15.5 (H)     MPV 08/31/2024 10.3     Platelets 08/31/2024 267     nRBC 08/31/2024 0     Segmented % 08/31/2024 67     Immature Grans % 08/31/2024 0     Lymphocytes % 08/31/2024 18     Monocytes % 08/31/2024 7     Eosinophils Relative 08/31/2024 7 (H)     Basophils Relative 08/31/2024 1     Absolute Neutrophils 08/31/2024 5.80     Absolute Immature Grans 08/31/2024 0.03     Absolute Lymphocytes 08/31/2024 1.57     Absolute Monocytes 08/31/2024 0.56     Eosinophils Absolute 08/31/2024 0.57     Basophils Absolute 08/31/2024 0.07     Sodium 08/31/2024 140     Potassium 08/31/2024 4.2     Chloride 08/31/2024 109 (H)     CO2 08/31/2024 25     ANION GAP 08/31/2024 6     BUN 08/31/2024 12     Creatinine 08/31/2024 0.70     Glucose 08/31/2024 101     Calcium 08/31/2024 8.5     Corrected Calcium 08/31/2024 9.1     AST 08/31/2024 35     ALT 08/31/2024 68 (H)     Alkaline Phosphatase  08/31/2024 725 (H)     Total Protein 08/31/2024 5.9 (L)     Albumin 08/31/2024 3.3 (L)     Total Bilirubin 08/31/2024 0.99     eGFR 08/31/2024 99        Radiology Results:   ERCP    Result Date: 8/29/2024  Narrative: Table formatting from the original result was not included. [facility] [address] [address] [phone] DATE OF SERVICE: 8/29/24 PHYSICIAN(S): Attending: Catia Ramirez MD Fellow: No Staff Documented INDICATION: Pancreatic mass, Elevated LFTs, Acute obstructive cholangitis POST-OP DIAGNOSIS: See the impression below. PREPROCEDURE: Informed consent was obtained for the procedure, including sedation.  Risks of perforation, hemorrhage, adverse drug reaction and aspiration were discussed. The patient was placed in the left lateral decubitus position. Patient was explained about the risks and benefits of the procedure. Risks including but not limited to bleeding, infection, and perforation were explained in detail. Also explained about less than 100% sensitivity with the exam and other alternatives. PROCEDURE: ERCP DETAILS OF PROCEDURE: Patient was taken to the procedure room where a time out was performed to confirm correct patient and correct procedure. The patient underwent general anesthesia, which was administered by an anesthesia professional. The patient's blood pressure, heart rate, level of consciousness, respirations, oxygen, ECG and ETCO2 were monitored throughout the procedure. The scope was introduced through the mouth and advanced to the second part of the duodenum. Clinical intention was achieved. The patient's estimated blood loss was minimal (<5 mL). The procedure was not difficult. The patient tolerated the procedure well. There were no apparent adverse events. ANESTHESIA INFORMATION: ASA: III Anesthesia Type: General MEDICATIONS: No administrations occurring from 1511 to 1635 on 08/29/24 FINDINGS: Single malignant-appearing, fungating,  "invasive, multilobular and ulcerated mass measuring 70 mm x 40 mm was visualized in the major papilla, covering one half of the circumference Performed forceps biopsies in the major papilla. A sample was sent for histology analysis. The common bile duct was deeply cannulated using a traction sphincterotome with 0.025\" straight guidewire. Cannulation was difficult due to large mass causing obliteration of the ampulla and altered anatomy. Contrast was injected to confirm location in the common bile duct and obtain a cholangiogram. Generalized dilation was observed in the distal common bile duct, mid common bile duct and proximal common bile duct with a maximal diameter of 14 mm. Tapering to the level of the ampulla was noted.  Biliary tree was otherwise unremarkable.  Sphincterotomy was not performed today due to the patient having received Lovenox on the day of the procedure. One 10 Fr x 9 cm double flanged straight plastic stent was placed successfully in the common bile duct SPECIMENS: ID Type Source Tests Collected by Time Destination 1 : ampulla mass bx Tissue Ampulla of Vater TISSUE EXAM Catia Ramirez MD 8/29/2024  4:19 PM       Impression: Single malignant-appearing, fungating, invasive, multilobular and ulcerated mass measuring 70 mm x 40 mm was visualized in the major papilla, covering one half of the circumference Performed forceps biopsies in the major papilla. A sample was sent for histology analysis. The common bile duct was deeply cannulated. Cannulation was difficult due to large mass causing obliteration of the ampulla and altered anatomy. Generalized dilation was observed in the distal common bile duct, mid common bile duct and proximal common bile duct with a maximal diameter of 14 mm One 10 Fr x 9 cm double flanged straight stent was placed in the common bile duct RECOMMENDATION: Await pathology results Surgical oncology consultation. Clear liquids only today; advanced diet as tolerated " thereafter. Repeat ERCP in 2-3 months for stent exchange.    Catia Ramirez MD     Endoscopic ultrasonography, GI (Upper)    Result Date: 8/29/2024  Narrative: Table formatting from the original result was not included. [facility] [address] [address] [phone] DATE OF SERVICE: 8/29/24 PHYSICIAN(S): Attending: Catia Ramirez MD Fellow: No Staff Documented INDICATION: Pancreatic mass, Elevated LFTs, Acute obstructive cholangitis POST-OP DIAGNOSIS: See the impression below. PREPROCEDURE: Informed consent was obtained for the procedure, including sedation.  Risks of perforation, hemorrhage, adverse drug reaction and aspiration were discussed. The patient was placed in the left lateral decubitus position. Patient was explained about the risks and benefits of the procedure. Risks including but not limited to bleeding, infection, and perforation were explained in detail. Also explained about less than 100% sensitivity with the exam and other alternatives. PROCEDURE: EUS UPPER DETAILS OF PROCEDURE: Patient was taken to the procedure room where a time out was performed to confirm correct patient and correct procedure. The patient underwent general anesthesia, which was administered by an anesthesia professional. The patient's blood pressure, heart rate, oxygen, respirations, level of consciousness, ECG and ETCO2 were monitored throughout the procedure. The gastroscope and linear scope were introduced through the mouth and advanced to the second part of the duodenum. Retroflexion was performed in the fundus. The patient's estimated blood loss was minimal (<5 mL). The procedure was not difficult. The patient tolerated the procedure well. There were no apparent adverse events. ANESTHESIA INFORMATION: ASA: III Anesthesia Type: General MEDICATIONS: No administrations occurring from 1511 to 1548 on 08/29/24 FINDINGS: The esophagus and stomach appeared normal. Hypoechoic,  irregular, lobulated and ulcerated mass measuring 70 mm x 40 mm with poorly defined and irregular margins was visualized in the major papilla, originating in the mucosa and extending to the muscularis propria. The examined portion of the duodenum otherwise did not show obvious endoscopic or endosonographic abnormalities. The pancreas appeared atrophic. The parenchyma was visualized in the entire pancreas. The parenchyma was heterogeneous. Both the CBD and PD were markedly dilatated. SPECIMENS: * No specimens in log *      Impression: The esophagus and stomach appeared normal. Hypoechoic, irregular, lobulated and ulcerated mass measuring 70 mm x 40 mm with poorly defined and irregular margins was visualized in the major papilla, originating in the mucosa and extending to the muscularis propria The pancreas appeared atrophic. The parenchyma was visualized in the entire pancreas. The parenchyma was heterogeneous. RECOMMENDATION: Proceed with ERCP   Catia Ramirez MD     CT chest wo contrast    Result Date: 8/28/2024  Narrative: CT CHEST WITHOUT IV CONTRAST INDICATION: Staging from pancreatic mass. COMPARISON: CT abdomen pelvis 8/27/2024 TECHNIQUE: CT examination of the chest was performed without intravenous contrast. Multiplanar 2D reformatted images were created from the source data. This examination, like all CT scans performed in the [facility], was performed utilizing techniques to minimize radiation dose exposure, including the use of iterative reconstruction and automated exposure control. Radiation dose length product (DLP) for this visit: 271 mGy-cm FINDINGS: LUNGS: Mild bilateral dependent atelectasis. No consolidation. No suspicious pulmonary nodules. Right upper lobe granuloma (series 3 image 90). There is no tracheal or endobronchial lesion. PLEURA: Unremarkable. HEART/GREAT VESSELS: Mild coronary artery calcifications. LAD stent. Aortic valve calcifications. Normal heart size. No  thoracic aortic aneurysm. MEDIASTINUM AND BOBBI: No mediastinal or hilar lymphadenopathy. CHEST WALL AND LOWER NECK: Unremarkable. VISUALIZED STRUCTURES IN THE UPPER ABDOMEN: Biliary obstruction secondary to pancreatic head mass again demonstrated. Bilateral renal cysts. OSSEOUS STRUCTURES: Spinal degenerative changes are noted. No acute fracture or destructive osseous lesion.     Impression: No evidence of metastatic disease in the chest. Resident: Sidney Wahl I, the attending radiologist, have reviewed the images and agree with the final report above. Workstation performed: [id number]     XR chest portable    Result Date: 8/28/2024  Narrative: XR CHEST PORTABLE INDICATION: Sepsis. COMPARISON: Chest radiograph June 30, 2022 FINDINGS: Clear lungs. No pneumothorax or pleural effusion. Normal cardiomediastinal silhouette. Bones are unremarkable for age. Normal upper abdomen.     Impression: No acute cardiopulmonary disease. Workstation performed: [id number]     CT Abdomen pelvis with contrast    Result Date: 8/27/2024  Narrative: CT ABDOMEN AND PELVIS WITH IV CONTRAST INDICATION: Sepsis, left flank pain, left CVA tenderness. COMPARISON: CT scan from 5/28/2023. TECHNIQUE: CT examination of the abdomen and pelvis was performed. Multiplanar 2D reformatted images were created from the source data. This examination, like all CT scans performed in the [facility], was performed utilizing techniques to minimize radiation dose exposure, including the use of iterative reconstruction and automated exposure control. Radiation dose length product (DLP) for this visit: 616 mGy-cm IV Contrast: 90 mL of iohexol (OMNIPAQUE) Enteric Contrast: Not administered. FINDINGS: ABDOMEN LOWER CHEST: No clinically significant abnormality in the visualized lower chest. LIVER/BILIARY TREE: Intra and extrahepatic ductal dilatation extending to the pancreatic head with there is a mass. No focal liver lesion identified.  GALLBLADDER: Distended due to biliary obstruction. No calcified stones. No pericholecystic inflammatory changes. SPLEEN: Unremarkable. PANCREAS: Pancreatic ductal dilatation due to an obstructing mass in the lower pancreatic head at the level of the ampulla measuring 2.8 x 2.7 cm on image 2/78. ADRENAL GLANDS: Unremarkable. KIDNEYS/URETERS: Simple renal cyst(s). Otherwise unremarkable kidneys. No hydronephrosis. STOMACH AND BOWEL: Nondilated fluid-filled loops of small bowel in the mid to lower abdomen suggesting an enteritis. No bowel obstruction. APPENDIX: Normal. ABDOMINOPELVIC CAVITY: No ascites. No pneumoperitoneum. No lymphadenopathy. VESSELS: Unremarkable for patient's age. PELVIS REPRODUCTIVE ORGANS: Unremarkable for patient's age. URINARY BLADDER: Unremarkable. ABDOMINAL WALL/INGUINAL REGIONS: Unremarkable. BONES: No acute fracture or suspicious osseous lesion. Bilateral L5 spondylolysis without evidence of spondylolisthesis. Spinal degenerative changes.     Impression: Mass in the lower pancreatic head region obstructing the CBD and pancreatic duct measuring 2.8 x 2.7 cm on image 2/78 suspicious for neoplasm. Origin of tumor could also be cholangiocarcinoma or duodenal carcinoma given the location. Gallbladder distention due to biliary obstruction. Nondilated fluid-filled loops of small bowel in the mid to lower abdomen suggesting an enteritis. Workstation performed: [id number]     Echo complete w/ contrast if indicated    Result Date: 8/16/2024  Narrative:   Left Ventricle: Left ventricular cavity size is normal. There is mild concentric hypertrophy. The left ventricular ejection fraction is 65%. Systolic function is normal. Wall motion is normal.   Aortic Valve: The aortic valve is trileaflet. The leaflets are mildly calcified. There is moderately reduced mobility. There is mild regurgitation. There is moderate stenosis. The aortic valve peak velocity is 3.47 m/s. The aortic valve mean gradient is 31  mmHg.   Mitral Valve: There is mild annular calcification. There is mild regurgitation.   Prior study date: 6/30/2022. Mild progression of aortic valve stenosis.       Guillermina Aguilera PA-C    **Please note:  Dictation voice to text software may have been used in the creation of this record.  Occasional wrong word or “sound alike” substitutions may have occurred due to the inherent limitations of voice recognition software.  Read the chart carefully and recognize, using context, where substitutions have occurred.**

## 2024-09-05 ENCOUNTER — TELEPHONE (OUTPATIENT)
Age: 64
End: 2024-09-05

## 2024-09-05 NOTE — TELEPHONE ENCOUNTER
I called Isaac in response to a referral that was received for patient to establish care with Medical Oncology    Outreach was made to schedule a consultation.    A consultation was scheduled for patient during this call. Patient is scheduled on 10/2/24 at 9:00am with Dr Colby at the [facility]     Per clinical review, patient should be scheduled within 7 days. A message will be sent to the Medical Oncology Leadership pool in regards to patient's appointment being scheduled outside of scheduling guidelines.  Patient requested to be seen at the [place] office.

## 2024-09-06 PROCEDURE — 88305 TISSUE EXAM BY PATHOLOGIST: CPT | Performed by: PATHOLOGY

## 2024-09-06 PROCEDURE — 88342 IMHCHEM/IMCYTCHM 1ST ANTB: CPT | Performed by: PATHOLOGY

## 2024-09-06 NOTE — INTERVAL H&P NOTE
H&P reviewed. After examining the patient I find no changes in the patients condition since the H&P had been written.    Vitals:    08/29/24 1403   BP: 163/78   Pulse: 67   Resp: 15   Temp: 98.1 °F (36.7 °C)   SpO2: 98%      Patient completed Session 33 of Phase II Monitored Cardiac Rehabilitation. Please see Media Tab for scanned in Exercise Session Report.

## 2024-09-09 ENCOUNTER — OFFICE VISIT (OUTPATIENT)
Dept: SURGICAL ONCOLOGY | Facility: CLINIC | Age: 64
End: 2024-09-09
Payer: MEDICARE

## 2024-09-09 ENCOUNTER — TELEPHONE (OUTPATIENT)
Dept: SURGICAL ONCOLOGY | Facility: CLINIC | Age: 64
End: 2024-09-09

## 2024-09-09 VITALS
TEMPERATURE: 97.8 F | DIASTOLIC BLOOD PRESSURE: 56 MMHG | WEIGHT: 134.4 LBS | HEART RATE: 71 BPM | HEIGHT: 64 IN | BODY MASS INDEX: 22.94 KG/M2 | OXYGEN SATURATION: 100 % | SYSTOLIC BLOOD PRESSURE: 100 MMHG

## 2024-09-09 DIAGNOSIS — I25.10 CORONARY ARTERY DISEASE INVOLVING NATIVE CORONARY ARTERY OF NATIVE HEART WITHOUT ANGINA PECTORIS: ICD-10-CM

## 2024-09-09 DIAGNOSIS — K86.89 PANCREATIC MASS: Primary | ICD-10-CM

## 2024-09-09 DIAGNOSIS — D47.9 NEOPLASM OF UNCERTAIN BEHAVIOR OF LYMPHOID, HEMATOPOIETIC AND RELATED TISSUE, UNSPECIFIED (HCC): ICD-10-CM

## 2024-09-09 DIAGNOSIS — R79.9 ABNORMAL FINDING OF BLOOD CHEMISTRY, UNSPECIFIED: ICD-10-CM

## 2024-09-09 PROCEDURE — 99205 OFFICE O/P NEW HI 60 MIN: CPT | Performed by: STUDENT IN AN ORGANIZED HEALTH CARE EDUCATION/TRAINING PROGRAM

## 2024-09-09 RX ORDER — TAZOBACTAM SODIUM AND PIPERACILLIN SODIUM 375; 3 MG/50ML; G/50ML
3.38 INJECTION, SOLUTION INTRAVENOUS EVERY 6 HOURS
Qty: 50 ML | Refills: 0 | Status: SHIPPED | OUTPATIENT
Start: 2024-09-09 | End: 2024-09-10

## 2024-09-09 NOTE — PROGRESS NOTES
SURGICAL ONCOLOGY CONSULT    Isaac DUGAN [place]  1960  [id number]  [facility]– [facility] [address] [id number] [phone]      ASSESSMENT AND PLAN    1. 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  I discussed with the patient and his wife and son today that given the biopsy results, I am recommending resection.  I discussed pancreaticoduodenectomy in detail including the hospital course, surgical details, and postoperative course.  The risks were explained including bleeding, infection, recurrence, need for further surgery, wound complications, adjacent organ injury, pancreas and biliary fistula, worsening of diabetes, sepsis, mi, DVT, stroke, pulmonary embolism, and death.  The patient and his family understand these risks and would like to proceed.  All questions were answered today.    Orders:  -     Incentive spirometry; Standing  -     Insert and maintain IV line; Standing  -     Void On-Call to O.R.; Standing  -     Place sequential compression device; Standing  -     Case request operating room: WHIPPLE PROCEDURE/PANCREATICO-DUODENECTOMY; Standing  -     Type and screen; Future  -     HEMOGLOBIN A1C W/ EAG ESTIMATION; Future  -     APTT; Future  -     Protime-INR; Future  -     Nursing communication Please give pre-op Carbohydrate drink to patient 2-4 hours prior to surgery (Drink is provided by [facility]); Standing  -     Prepare Leukoreduced RBC: 2 Units; Future  -     piperacillin-tazobactam (Zosyn) 3.375 g; Inject 50 mL (3.375 g total) into a catheter in a vein over 30 minutes at 100 mL/hr every 6 (six) hours for 1 dose  -     Case request operating room: WHIPPLE PROCEDURE/PANCREATICO-DUODENECTOMY  2. Abnormal finding of blood chemistry, unspecified  -     HEMOGLOBIN A1C W/ EAG ESTIMATION; Future  3.  Neoplasm of uncertain behavior of lymphoid, hematopoietic and related tissue, unspecified (HCC)  -     APTT; Future  -     Protime-INR; Future  4. Coronary artery disease involving native coronary artery of native heart without angina pectoris  Assessment & Plan:  Patient has no current cardiac symptoms and recently saw his cardiologist.  Given the somewhat urgent nature of the need for surgery, I will reach out to his cardiologist to discuss the need for additional workup and perioperative risk assessment        NEW VISIT DATA    Oncology History Overview Note   Presents for discussion of RT for recently diagnosed Fossil 7 (4+3) prostate cancer.  Referred by Dr. Biggs    Lab Results   Component Value Date    PSA 5.2 (H) 11/08/2022    PSA 6.6 (H) 07/18/2022    PSA 3.9 06/24/2020      10/10/22  Urology  Seen in consult for elevated PSA  F/U PSA recommended    12/28/21  MRI Prostate  IMPRESSION:   1. PI-RADSv2.1 Category 4 -High (clinically significant cancer is likely to be present). 0.5 cm possible lesion in the posterior right peripheral zone at base.   2. No extraprostatic tumor, seminal vesicle invasion, pelvic lymphadenopathy, or pelvic osseous metastatic disease.   3. Moderate BPH with calculated prostate volume of 69 cc.    2/28/23  Tissue Exam  A.  Prostate, region of interest #1, needle biopsy:  - Prostatic adenocarcinoma, acinar, not otherwise specified; [place] grade 4 +3= score of 7 (grade group 3) in 4 of 5 cores involving 20% of needle core tissue and measuring up to 5 mm in length (score 4=50-60%).  - Periprostatic fat invasion: Not identified.  - Seminal vesicle invasion: Not identified.  - Lymph-vascular invasion: Not identified.  - Perineural invasion: Not identified.  - Additional Pathologic Findings:  None.     B.  Prostate, right lateral and medial base, needle biopsy:  - Benign prostate tissue, negative for malignancy.     C.  Prostate, right mid lateral, needle biopsy:  - Benign prostate  tissue, negative for malignancy.     D.  Prostate, right mid medial, needle biopsy:  - Benign prostate tissue, negative for malignancy.     E.  Prostate, right lateral apex, needle biopsy:   - Benign prostate tissue, negative for malignancy.     F. Prostate, right medial apex, needle biopsy:  - Benign prostate tissue, negative for malignancy.     G.  Prostate, left lateral base, needle biopsy:  - Prostatic adenocarcinoma, acinar, not otherwise specified; [place] grade 3 +3= score of 6 (grade group 1) in 1 of 1 cores involving 10% of needle core tissue and measuring 2 mm in length.  - Periprostatic fat invasion: Not identified.  - Seminal vesicle invasion: Not identified.  - Lymph-vascular invasion: Not identified.  - Perineural invasion: Not identified.  - Additional Pathologic Findings:  None.     H.  Prostate, left medial base, needle biopsy:  - Prostatic adenocarcinoma, acinar, not otherwise specified; [place] grade 3 +4= score of 7 (grade group 2) in 1 of 1 cores involving 50% of needle core tissue and measuring 8 mm in length (score 4 = 5-10%).  - Periprostatic fat invasion: Not identified.  - Seminal vesicle invasion: Not identified.  - Lymph-vascular invasion: Not identified.  - Perineural invasion: Present.  - Additional Pathologic Findings:  None.     I.  Prostate, left mid lateral, needle biopsy:  - Prostatic adenocarcinoma, acinar, not otherwise specified; [place] grade 3 +4= score of 7 (grade group 2) in 1 of 1 cores involving 40-50% of needle core tissue and measuring 5 mm in length (score 4 = 5-10%).  - Periprostatic fat invasion: Not identified.  - Seminal vesicle invasion: Not identified.  - Lymph-vascular invasion: Not identified.  - Perineural invasion: Not identified.  - Additional Pathologic Findings:  High-grade prostatic intraepithelial neoplasia (HGPIN).     J.  Prostate, left mid medial, needle biopsy:  - Prostatic adenocarcinoma, acinar, not otherwise specified; [place] grade 3 +4= score of  7 (grade group 2) in 1 of 1 cores involving 40-50% of needle core tissue and measuring 7 mm in length (score 4 = 5-10%).  - Periprostatic fat invasion: Not identified.  - Seminal vesicle invasion: Not identified.  - Lymph-vascular invasion: Not identified.  - Perineural invasion: Not identified.  - Additional Pathologic Findings:  High-grade prostatic intraepithelial neoplasia (HGPIN).     K.  Prostate, left lateral apex, needle biopsy:  -Focal atypical small acinar proliferation, cannot exclude limited low-grade carcinoma.     L.  Prostate, left medial apex, needle biopsy:  - Benign prostate tissue, negative for malignancy.    3/27/23  Bone Scan  IMPRESSION:   1.  No scintigraphic evidence of osseous metastasis    4/6/23  Dr. Biggs  Does not qualify for active surveillance.  RT and surgery discussed.  Leaning towards RT will arrange for consult    4/20/23  Dr. Harmon  Desires surgical intervention    Upcoming:       Prostate cancer (HCC)   2/28/2023 Biopsy    A.  Prostate, region of interest #1, needle biopsy:  - Prostatic adenocarcinoma, acinar, not otherwise specified; [place] grade 4 +3= score of 7 (grade group 3) in 4 of 5 cores involving 20% of needle core tissue and measuring up to 5 mm in length (score 4=50-60%).  - Periprostatic fat invasion: Not identified.  - Seminal vesicle invasion: Not identified.  - Lymph-vascular invasion: Not identified.  - Perineural invasion: Not identified.  - Additional Pathologic Findings:  None.     B.  Prostate, right lateral and medial base, needle biopsy:  - Benign prostate tissue, negative for malignancy.     C.  Prostate, right mid lateral, needle biopsy:  - Benign prostate tissue, negative for malignancy.     D.  Prostate, right mid medial, needle biopsy:  - Benign prostate tissue, negative for malignancy.     E.  Prostate, right lateral apex, needle biopsy:   - Benign prostate tissue, negative for malignancy.     F. Prostate, right medial apex, needle biopsy:  -  Benign prostate tissue, negative for malignancy.     G.  Prostate, left lateral base, needle biopsy:  - Prostatic adenocarcinoma, acinar, not otherwise specified; [place] grade 3 +3= score of 6 (grade group 1) in 1 of 1 cores involving 10% of needle core tissue and measuring 2 mm in length.  - Periprostatic fat invasion: Not identified.  - Seminal vesicle invasion: Not identified.  - Lymph-vascular invasion: Not identified.  - Perineural invasion: Not identified.  - Additional Pathologic Findings:  None.     H.  Prostate, left medial base, needle biopsy:  - Prostatic adenocarcinoma, acinar, not otherwise specified; [place] grade 3 +4= score of 7 (grade group 2) in 1 of 1 cores involving 50% of needle core tissue and measuring 8 mm in length (score 4 = 5-10%).  - Periprostatic fat invasion: Not identified.  - Seminal vesicle invasion: Not identified.  - Lymph-vascular invasion: Not identified.  - Perineural invasion: Present.  - Additional Pathologic Findings:  None.     I.  Prostate, left mid lateral, needle biopsy:  - Prostatic adenocarcinoma, acinar, not otherwise specified; [place] grade 3 +4= score of 7 (grade group 2) in 1 of 1 cores involving 40-50% of needle core tissue and measuring 5 mm in length (score 4 = 5-10%).  - Periprostatic fat invasion: Not identified.  - Seminal vesicle invasion: Not identified.  - Lymph-vascular invasion: Not identified.  - Perineural invasion: Not identified.  - Additional Pathologic Findings:  High-grade prostatic intraepithelial neoplasia (HGPIN).     J.  Prostate, left mid medial, needle biopsy:  - Prostatic adenocarcinoma, acinar, not otherwise specified; [place] grade 3 +4= score of 7 (grade group 2) in 1 of 1 cores involving 40-50% of needle core tissue and measuring 7 mm in length (score 4 = 5-10%).  - Periprostatic fat invasion: Not identified.  - Seminal vesicle invasion: Not identified.  - Lymph-vascular invasion: Not identified.  - Perineural invasion: Not  identified.  - Additional Pathologic Findings:  High-grade prostatic intraepithelial neoplasia (HGPIN).     K.  Prostate, left lateral apex, needle biopsy:  -Focal atypical small acinar proliferation, cannot exclude limited low-grade carcinoma.     L.  Prostate, left medial apex, needle biopsy:  - Benign prostate tissue, negative for malignancy.     2/28/2023 Initial Diagnosis    Prostate cancer (HCC)         History of Present Illness:   Patient seen today in hospital follow-up.  Briefly, he presented to the hospital with signs of obstructive jaundice and sepsis-like symptoms.  This was ultimately determined to be due to cholangitis as the patient demonstrated a obstructive duodenal/ambulatory mass.  The patient underwent EUS and ERCP with stent placement and relief of obstructive jaundice.  This demonstrated near 7 cm malignant appearing mass and FNA demonstrated high-grade dysplasia.  The patient states today he is doing fairly well.  He has not no recurrence of fever or jaundice like symptoms.  He is eating well with no change in bowel habits.  He has no significant hepatobiliary history.  He does have a history of a PCI with dual antiplatelet therapy in 2022.  He recently saw his cardiologist, who felt that he was doing quite well.  He does have a valve insufficiency but this was felt to not need additional workup at this time.  The patient denies any significant shortness of breath or any chest pain.  He is active as he acts as a .  He has a history of prostate cancer with prostatectomy; recent PSA has been undetectable.    Review of Systems  Complete ROS Surg Onc:   Constitutional: The patient denies new or recent history of general fatigue, no recent weight loss, no change in appetite.   Eyes: No complaints of visual problems, RESOLVING scleral icterus.   ENT: no complaints of ear pain, no hoarseness, no difficulty swallowing,  no tinnitus and no new masses in head, oral cavity, or neck.    Cardiovascular: No complaints of chest pain, no palpitations, no ankle edema.   Respiratory: No complaints of shortness of breath, no cough.   Gastrointestinal: No complaints of jaundice, no bloody stools, no pale stools.   Genitourinary: No complaints of dysuria, no hematuria, no nocturia, no frequent urination, no urethral discharge.   Musculoskeletal: No complaints of weakness, paralysis, joint stiffness or arthralgias.  Integumentary: No complaints of rash, no new lesions.   Neurological: No complaints of convulsions, no seizures, no dizziness.   Hematologic/Lymphatic: No complaints of easy bruising.   Endocrine:  No hot or cold intolerance.  No polydipsia, polyphagia, or polyuria.  Allergy/immunology:  No environmental allergies.  No food allergies.  Not immunocompromised.  Skin:  No pallor or rash.  No wound.      Patient Active Problem List   Diagnosis    Benign essential hypertension    ED (erectile dysfunction) of non-organic origin    Hypercholesterolemia    Peripheral vascular disease (HCC)    Peripheral neuropathy    Overweight with body mass index (BMI) of 26 to 26.9 in adult    Aortic valve stenosis    Coronary artery disease involving native coronary artery of native heart without angina pectoris    Microscopic hematuria    BPH (benign prostatic hyperplasia)    Prostate cancer (HCC)    Pancreatic mass    Elevated LFTs    Biliary sepsis    Acute obstructive cholangitis    Enteritis    GERD (gastroesophageal reflux disease)    MOOKIE (acute kidney injury) (HCC)     Past Medical History:   Diagnosis Date    Benign essential hypertension 05/08/2014    CAD (coronary artery disease)     s/p NAVA prox LAD and D1 7/28/2022    Cancer (HCC)     Prostate    Coronary artery disease involving native coronary artery of native heart without angina pectoris 08/09/2022    Enteritis 8/28/2024    GERD (gastroesophageal reflux disease)     Hyperlipidemia     Hypertension     Other chest pain     Palpitations     Prostate  cancer (HCC) 2023     Past Surgical History:   Procedure Laterality Date    CARDIAC CATHETERIZATION Left 2022    Procedure: Cardiac catheterization-left heart catheterization;  Surgeon: Sai Henry MD;  Location: [facility];  Service: Cardiology    CARDIAC CATHETERIZATION N/A 2022    Procedure: Cardiac pci;  Surgeon: Sai Henry MD;  Location: [facility];  Service: Cardiology    HERNIA REPAIR      SC LAPS SURG [id number] RPBIC RAD W/NRV SPARING ROBOT N/A 2023    Procedure: PROSTATECTOMY RADICAL & PELVIC LYMPH NODE DISSECTION  W/ ROBOT;  Surgeon: Otoniel Harmon MD;  Location: [facility];  Service: Urology    SC PROSTATE NEEDLE BIOPSY ANY APPROACH N/A 2023    Procedure: TRANSRECTAL MRI FUSION BIOPSY PROSTATE;  Surgeon: Milan Arellano MD;  Location: [facility];  Service: Urology     Family History   Problem Relation Age of Onset    No Known Problems Mother     No Known Problems Father      Social History     Socioeconomic History    Marital status: /Civil Union     Spouse name: Not on file    Number of children: Not on file    Years of education: Not on file    Highest education level: Not on file   Occupational History    Not on file   Tobacco Use    Smoking status: Every Day     Current packs/day: 0.00     Types: Cigarettes     Last attempt to quit: 2022     Years since quittin.7    Smokeless tobacco: Never   Vaping Use    Vaping status: Never Used   Substance and Sexual Activity    Alcohol use: Not Currently    Drug use: No    Sexual activity: Not on file   Other Topics Concern    Not on file   Social History Narrative    EMPLOYED         Social Determinants of Health     Financial Resource Strain: Not on file   Food Insecurity: No Food Insecurity (2024)    Hunger Vital Sign     Worried About Running Out of Food in the Last Year: Never true     Ran Out of Food in the Last Year: Never true   Transportation Needs: No Transportation  Needs (8/28/2024)    PRAPARE - Transportation     Lack of Transportation (Medical): No     Lack of Transportation (Non-Medical): No   Physical Activity: Not on file   Stress: Not on file   Social Connections: Unknown (6/18/2024)    Received from [email or web address]     How often do you feel lonely or isolated from those around you? (Adult - for ages 18 years and over): Not on file   Intimate Partner Violence: Not on file   Housing Stability: Low Risk  (8/28/2024)    Housing Stability Vital Sign     Unable to Pay for Housing in the Last Year: No     Number of Times Moved in the Last Year: 0     Homeless in the Last Year: No       Current Outpatient Medications:     aspirin 81 mg chewable tablet, Chew 81 mg daily, Disp: , Rfl:     atorvastatin (LIPITOR) 20 mg tablet, Take 1 tablet (20 mg total) by mouth daily, Disp: 30 tablet, Rfl: 5    lisinopril (ZESTRIL) 20 mg tablet, Take 1 tablet (20 mg total) by mouth daily, Disp: 30 tablet, Rfl: 0    piperacillin-tazobactam (Zosyn) 3.375 g, Inject 50 mL (3.375 g total) into a catheter in a vein over 30 minutes at 100 mL/hr every 6 (six) hours for 1 dose, Disp: 50 mL, Rfl: 0  No Known Allergies      Vitals:    09/09/24 1033   BP: 100/56   Pulse: 71   Temp: 97.8 °F (36.6 °C)   SpO2: 100%       Physical Exam   Constitutional: General appearance: The Patient is well-developed and well-nourished who appears the stated age in no acute distress. Patient is pleasant and talkative.     HEENT:  Normocephalic.  Sclerae are anicteric. Mucous membranes are moist. Neck is supple without adenopathy. No JVD.     Chest: The lungs are clear to auscultation.     Cardiac: Heart is regular rate.     Abdomen: Abdomen is soft, non-tender, non-distended and without masses.     Extremities: There is no clubbing or cyanosis. There is no edema.  Symmetric.  Neuro: Grossly nonfocal. Gait is normal.     Lymphatic: No evidence of cervical adenopathy bilaterally.   No evidence of axillary  adenopathy bilaterally. No evidence of inguinal adenopathy bilaterally.     Skin: Warm, anicteric.    Psych:  Patient is pleasant and talkative.      Imaging  FL ERCP biliary only    Result Date: 9/4/2024  Narrative: ERCP INDICATION:  K83.09: Other cholangitis. COMPARISON: CT of the abdomen and pelvis from 8/27/2024. IMAGES: 3 FLUOROSCOPY TIME: 3 minutes and 5 seconds CONTRAST: 9 mL of iohexol (OMNIPAQUE) FINDINGS: Fluoroscopic guidance was provided for performance of ERCP by the technologist. The interpreting radiologist was not present for the procedure. The provided images demonstrate a guidewire in the CBD with contrast material opacifying a dilated CBD and some of the intrahepatic bile ducts. A plastic stent was then placed into the CBD.     Impression: Fluoroscopic guidance for ERCP. Please see procedure report for full details. Workstation performed: [id number]     ERCP    Result Date: 8/29/2024  Narrative: Table formatting from the original result was not included. [facility] [address] [address] [phone] DATE OF SERVICE: 8/29/24 PHYSICIAN(S): Attending: Catia Ramirez MD Fellow: No Staff Documented INDICATION: Pancreatic mass, Elevated LFTs, Acute obstructive cholangitis POST-OP DIAGNOSIS: See the impression below. PREPROCEDURE: Informed consent was obtained for the procedure, including sedation.  Risks of perforation, hemorrhage, adverse drug reaction and aspiration were discussed. The patient was placed in the left lateral decubitus position. Patient was explained about the risks and benefits of the procedure. Risks including but not limited to bleeding, infection, and perforation were explained in detail. Also explained about less than 100% sensitivity with the exam and other alternatives. PROCEDURE: ERCP DETAILS OF PROCEDURE: Patient was taken to the procedure room where a time out was performed to confirm correct patient and correct procedure. The  "patient underwent general anesthesia, which was administered by an anesthesia professional. The patient's blood pressure, heart rate, level of consciousness, respirations, oxygen, ECG and ETCO2 were monitored throughout the procedure. The scope was introduced through the mouth and advanced to the second part of the duodenum. Clinical intention was achieved. The patient's estimated blood loss was minimal (<5 mL). The procedure was not difficult. The patient tolerated the procedure well. There were no apparent adverse events. ANESTHESIA INFORMATION: ASA: III Anesthesia Type: General MEDICATIONS: No administrations occurring from 1511 to 1635 on 08/29/24 FINDINGS: Single malignant-appearing, fungating, invasive, multilobular and ulcerated mass measuring 70 mm x 40 mm was visualized in the major papilla, covering one half of the circumference Performed forceps biopsies in the major papilla. A sample was sent for histology analysis. The common bile duct was deeply cannulated using a traction sphincterotome with 0.025\" straight guidewire. Cannulation was difficult due to large mass causing obliteration of the ampulla and altered anatomy. Contrast was injected to confirm location in the common bile duct and obtain a cholangiogram. Generalized dilation was observed in the distal common bile duct, mid common bile duct and proximal common bile duct with a maximal diameter of 14 mm. Tapering to the level of the ampulla was noted.  Biliary tree was otherwise unremarkable.  Sphincterotomy was not performed today due to the patient having received Lovenox on the day of the procedure. One 10 Fr x 9 cm double flanged straight plastic stent was placed successfully in the common bile duct SPECIMENS: ID Type Source Tests Collected by Time Destination 1 : ampulla mass bx Tissue Ampulla of Vater TISSUE EXAM Catia Ramirez MD 8/29/2024  4:19 PM       Impression: Single malignant-appearing, fungating, invasive, multilobular and " ulcerated mass measuring 70 mm x 40 mm was visualized in the major papilla, covering one half of the circumference Performed forceps biopsies in the major papilla. A sample was sent for histology analysis. The common bile duct was deeply cannulated. Cannulation was difficult due to large mass causing obliteration of the ampulla and altered anatomy. Generalized dilation was observed in the distal common bile duct, mid common bile duct and proximal common bile duct with a maximal diameter of 14 mm One 10 Fr x 9 cm double flanged straight stent was placed in the common bile duct RECOMMENDATION: Await pathology results Surgical oncology consultation. Clear liquids only today; advanced diet as tolerated thereafter. Repeat ERCP in 2-3 months for stent exchange.    Catia Ramirez MD     Endoscopic ultrasonography, GI (Upper)    Result Date: 8/29/2024  Narrative: Table formatting from the original result was not included. [facility] [address] [address] [phone] DATE OF SERVICE: 8/29/24 PHYSICIAN(S): Attending: Catia Ramirez MD Fellow: No Staff Documented INDICATION: Pancreatic mass, Elevated LFTs, Acute obstructive cholangitis POST-OP DIAGNOSIS: See the impression below. PREPROCEDURE: Informed consent was obtained for the procedure, including sedation.  Risks of perforation, hemorrhage, adverse drug reaction and aspiration were discussed. The patient was placed in the left lateral decubitus position. Patient was explained about the risks and benefits of the procedure. Risks including but not limited to bleeding, infection, and perforation were explained in detail. Also explained about less than 100% sensitivity with the exam and other alternatives. PROCEDURE: EUS UPPER DETAILS OF PROCEDURE: Patient was taken to the procedure room where a time out was performed to confirm correct patient and correct procedure. The patient underwent general anesthesia, which was  administered by an anesthesia professional. The patient's blood pressure, heart rate, oxygen, respirations, level of consciousness, ECG and ETCO2 were monitored throughout the procedure. The gastroscope and linear scope were introduced through the mouth and advanced to the second part of the duodenum. Retroflexion was performed in the fundus. The patient's estimated blood loss was minimal (<5 mL). The procedure was not difficult. The patient tolerated the procedure well. There were no apparent adverse events. ANESTHESIA INFORMATION: ASA: III Anesthesia Type: General MEDICATIONS: No administrations occurring from 1511 to 1548 on 08/29/24 FINDINGS: The esophagus and stomach appeared normal. Hypoechoic, irregular, lobulated and ulcerated mass measuring 70 mm x 40 mm with poorly defined and irregular margins was visualized in the major papilla, originating in the mucosa and extending to the muscularis propria. The examined portion of the duodenum otherwise did not show obvious endoscopic or endosonographic abnormalities. The pancreas appeared atrophic. The parenchyma was visualized in the entire pancreas. The parenchyma was heterogeneous. Both the CBD and PD were markedly dilatated. SPECIMENS: * No specimens in log *      Impression: The esophagus and stomach appeared normal. Hypoechoic, irregular, lobulated and ulcerated mass measuring 70 mm x 40 mm with poorly defined and irregular margins was visualized in the major papilla, originating in the mucosa and extending to the muscularis propria The pancreas appeared atrophic. The parenchyma was visualized in the entire pancreas. The parenchyma was heterogeneous. RECOMMENDATION: Proceed with ERCP   Catia Ramirez MD     CT chest wo contrast    Result Date: 8/28/2024  Narrative: CT CHEST WITHOUT IV CONTRAST INDICATION: Staging from pancreatic mass. COMPARISON: CT abdomen pelvis 8/27/2024 TECHNIQUE: CT examination of the chest was performed without intravenous contrast.  Multiplanar 2D reformatted images were created from the source data. This examination, like all CT scans performed in the [facility], was performed utilizing techniques to minimize radiation dose exposure, including the use of iterative reconstruction and automated exposure control. Radiation dose length product (DLP) for this visit: 271 mGy-cm FINDINGS: LUNGS: Mild bilateral dependent atelectasis. No consolidation. No suspicious pulmonary nodules. Right upper lobe granuloma (series 3 image 90). There is no tracheal or endobronchial lesion. PLEURA: Unremarkable. HEART/GREAT VESSELS: Mild coronary artery calcifications. LAD stent. Aortic valve calcifications. Normal heart size. No thoracic aortic aneurysm. MEDIASTINUM AND BOBBI: No mediastinal or hilar lymphadenopathy. CHEST WALL AND LOWER NECK: Unremarkable. VISUALIZED STRUCTURES IN THE UPPER ABDOMEN: Biliary obstruction secondary to pancreatic head mass again demonstrated. Bilateral renal cysts. OSSEOUS STRUCTURES: Spinal degenerative changes are noted. No acute fracture or destructive osseous lesion.     Impression: No evidence of metastatic disease in the chest. Resident: Sidney Wahl I, the attending radiologist, have reviewed the images and agree with the final report above. Workstation performed: [id number]     XR chest portable    Result Date: 8/28/2024  Narrative: XR CHEST PORTABLE INDICATION: Sepsis. COMPARISON: Chest radiograph June 30, 2022 FINDINGS: Clear lungs. No pneumothorax or pleural effusion. Normal cardiomediastinal silhouette. Bones are unremarkable for age. Normal upper abdomen.     Impression: No acute cardiopulmonary disease. Workstation performed: [id number]     CT Abdomen pelvis with contrast    Result Date: 8/27/2024  Narrative: CT ABDOMEN AND PELVIS WITH IV CONTRAST INDICATION: Sepsis, left flank pain, left CVA tenderness. COMPARISON: CT scan from 5/28/2023. TECHNIQUE: CT examination of the abdomen and pelvis was  performed. Multiplanar 2D reformatted images were created from the source data. This examination, like all CT scans performed in the [facility], was performed utilizing techniques to minimize radiation dose exposure, including the use of iterative reconstruction and automated exposure control. Radiation dose length product (DLP) for this visit: 616 mGy-cm IV Contrast: 90 mL of iohexol (OMNIPAQUE) Enteric Contrast: Not administered. FINDINGS: ABDOMEN LOWER CHEST: No clinically significant abnormality in the visualized lower chest. LIVER/BILIARY TREE: Intra and extrahepatic ductal dilatation extending to the pancreatic head with there is a mass. No focal liver lesion identified. GALLBLADDER: Distended due to biliary obstruction. No calcified stones. No pericholecystic inflammatory changes. SPLEEN: Unremarkable. PANCREAS: Pancreatic ductal dilatation due to an obstructing mass in the lower pancreatic head at the level of the ampulla measuring 2.8 x 2.7 cm on image 2/78. ADRENAL GLANDS: Unremarkable. KIDNEYS/URETERS: Simple renal cyst(s). Otherwise unremarkable kidneys. No hydronephrosis. STOMACH AND BOWEL: Nondilated fluid-filled loops of small bowel in the mid to lower abdomen suggesting an enteritis. No bowel obstruction. APPENDIX: Normal. ABDOMINOPELVIC CAVITY: No ascites. No pneumoperitoneum. No lymphadenopathy. VESSELS: Unremarkable for patient's age. PELVIS REPRODUCTIVE ORGANS: Unremarkable for patient's age. URINARY BLADDER: Unremarkable. ABDOMINAL WALL/INGUINAL REGIONS: Unremarkable. BONES: No acute fracture or suspicious osseous lesion. Bilateral L5 spondylolysis without evidence of spondylolisthesis. Spinal degenerative changes.     Impression: Mass in the lower pancreatic head region obstructing the CBD and pancreatic duct measuring 2.8 x 2.7 cm on image 2/78 suspicious for neoplasm. Origin of tumor could also be cholangiocarcinoma or duodenal carcinoma given the location. Gallbladder  distention due to biliary obstruction. Nondilated fluid-filled loops of small bowel in the mid to lower abdomen suggesting an enteritis. Workstation performed: [id number]     Echo complete w/ contrast if indicated    Result Date: 8/16/2024  Narrative:   Left Ventricle: Left ventricular cavity size is normal. There is mild concentric hypertrophy. The left ventricular ejection fraction is 65%. Systolic function is normal. Wall motion is normal.   Aortic Valve: The aortic valve is trileaflet. The leaflets are mildly calcified. There is moderately reduced mobility. There is mild regurgitation. There is moderate stenosis. The aortic valve peak velocity is 3.47 m/s. The aortic valve mean gradient is 31 mmHg.   Mitral Valve: There is mild annular calcification. There is mild regurgitation.   Prior study date: 6/30/2022. Mild progression of aortic valve stenosis.         I personally reviewed and interpreted the available history, laboratory and imaging data, including: hospital course, labs, CT x 2, labs, ERCP/EUS and path. Discussed with Dr Ramirez

## 2024-09-09 NOTE — LETTER
September 9, 2024     Catia Ramirez MD  [address] [facility] [address]    Patient: Isaac Kramer   YOB: 1960   Date of Visit: 9/9/2024       Dear Dr. Ramirez:    Thank you for referring Isaac Kramer to me for evaluation. Below are my notes for this consultation.    If you have questions, please do not hesitate to call me. I look forward to following your patient along with you.         Sincerely,        Marika Ac MD        CC: No Recipients    Marika Ac MD  9/9/2024 11:36 AM  Sign when Signing Visit  SURGICAL ONCOLOGY CONSULT    Isaac Kramer  1960  [id number]  [facility]  [address] [place] [id number]  [phone]      ASSESSMENT AND PLAN    1. 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  I discussed with the patient and his wife and son today that given the biopsy results, I am recommending resection.  I discussed pancreaticoduodenectomy in detail including the hospital course, surgical details, and postoperative course.  The risks were explained including bleeding, infection, recurrence, need for further surgery, wound complications, adjacent organ injury, pancreas and biliary fistula, worsening of diabetes, sepsis, mi, DVT, stroke, pulmonary embolism, and death.  The patient and his family understand these risks and would like to proceed.  All questions were answered today.    Orders:  -     Incentive spirometry; Standing  -     Insert and maintain IV line; Standing  -     Void On-Call to O.R.; Standing  -     Place sequential compression device; Standing  -     Case request operating room: WHIPPLE PROCEDURE/PANCREATICO-DUODENECTOMY; Standing  -     Type and screen; Future  -     HEMOGLOBIN A1C W/ EAG ESTIMATION; Future  -     APTT; Future  -     Protime-INR; Future  -     Nursing  communication Please give pre-op Carbohydrate drink to patient 2-4 hours prior to surgery (Drink is provided by [facility]); Standing  -     Prepare Leukoreduced RBC: 2 Units; Future  -     piperacillin-tazobactam (Zosyn) 3.375 g; Inject 50 mL (3.375 g total) into a catheter in a vein over 30 minutes at 100 mL/hr every 6 (six) hours for 1 dose  -     Case request operating room: WHIPPLE PROCEDURE/PANCREATICO-DUODENECTOMY  2. Abnormal finding of blood chemistry, unspecified  -     HEMOGLOBIN A1C W/ EAG ESTIMATION; Future  3. Neoplasm of uncertain behavior of lymphoid, hematopoietic and related tissue, unspecified (HCC)  -     APTT; Future  -     Protime-INR; Future  4. Coronary artery disease involving native coronary artery of native heart without angina pectoris  Assessment & Plan:  Patient has no current cardiac symptoms and recently saw his cardiologist.  Given the somewhat urgent nature of the need for surgery, I will reach out to his cardiologist to discuss the need for additional workup and perioperative risk assessment        NEW VISIT DATA    Oncology History Overview Note   Presents for discussion of RT for recently diagnosed Jihan 7 (4+3) prostate cancer.  Referred by Dr. Biggs    Lab Results   Component Value Date    PSA 5.2 (H) 11/08/2022    PSA 6.6 (H) 07/18/2022    PSA 3.9 06/24/2020      10/10/22  Urology  Seen in consult for elevated PSA  F/U PSA recommended    12/28/21  MRI Prostate  IMPRESSION:   1. PI-RADSv2.1 Category 4 -High (clinically significant cancer is likely to be present). 0.5 cm possible lesion in the posterior right peripheral zone at base.   2. No extraprostatic tumor, seminal vesicle invasion, pelvic lymphadenopathy, or pelvic osseous metastatic disease.   3. Moderate BPH with calculated prostate volume of 69 cc.    2/28/23  Tissue Exam  A.  Prostate, region of interest #1, needle biopsy:  - Prostatic adenocarcinoma, acinar, not otherwise specified; Jihan grade 4  +3= score of 7 (grade group 3) in 4 of 5 cores involving 20% of needle core tissue and measuring up to 5 mm in length (score 4=50-60%).  - Periprostatic fat invasion: Not identified.  - Seminal vesicle invasion: Not identified.  - Lymph-vascular invasion: Not identified.  - Perineural invasion: Not identified.  - Additional Pathologic Findings:  None.     B.  Prostate, right lateral and medial base, needle biopsy:  - Benign prostate tissue, negative for malignancy.     C.  Prostate, right mid lateral, needle biopsy:  - Benign prostate tissue, negative for malignancy.     D.  Prostate, right mid medial, needle biopsy:  - Benign prostate tissue, negative for malignancy.     E.  Prostate, right lateral apex, needle biopsy:   - Benign prostate tissue, negative for malignancy.     F. Prostate, right medial apex, needle biopsy:  - Benign prostate tissue, negative for malignancy.     G.  Prostate, left lateral base, needle biopsy:  - Prostatic adenocarcinoma, acinar, not otherwise specified; Jihan grade 3 +3= score of 6 (grade group 1) in 1 of 1 cores involving 10% of needle core tissue and measuring 2 mm in length.  - Periprostatic fat invasion: Not identified.  - Seminal vesicle invasion: Not identified.  - Lymph-vascular invasion: Not identified.  - Perineural invasion: Not identified.  - Additional Pathologic Findings:  None.     H.  Prostate, left medial base, needle biopsy:  - Prostatic adenocarcinoma, acinar, not otherwise specified; Walker grade 3 +4= score of 7 (grade group 2) in 1 of 1 cores involving 50% of needle core tissue and measuring 8 mm in length (score 4 = 5-10%).  - Periprostatic fat invasion: Not identified.  - Seminal vesicle invasion: Not identified.  - Lymph-vascular invasion: Not identified.  - Perineural invasion: Present.  - Additional Pathologic Findings:  None.     I.  Prostate, left mid lateral, needle biopsy:  - Prostatic adenocarcinoma, acinar, not otherwise specified; Walker grade 3  +4= score of 7 (grade group 2) in 1 of 1 cores involving 40-50% of needle core tissue and measuring 5 mm in length (score 4 = 5-10%).  - Periprostatic fat invasion: Not identified.  - Seminal vesicle invasion: Not identified.  - Lymph-vascular invasion: Not identified.  - Perineural invasion: Not identified.  - Additional Pathologic Findings:  High-grade prostatic intraepithelial neoplasia (HGPIN).     J.  Prostate, left mid medial, needle biopsy:  - Prostatic adenocarcinoma, acinar, not otherwise specified; Marshall grade 3 +4= score of 7 (grade group 2) in 1 of 1 cores involving 40-50% of needle core tissue and measuring 7 mm in length (score 4 = 5-10%).  - Periprostatic fat invasion: Not identified.  - Seminal vesicle invasion: Not identified.  - Lymph-vascular invasion: Not identified.  - Perineural invasion: Not identified.  - Additional Pathologic Findings:  High-grade prostatic intraepithelial neoplasia (HGPIN).     K.  Prostate, left lateral apex, needle biopsy:  -Focal atypical small acinar proliferation, cannot exclude limited low-grade carcinoma.     L.  Prostate, left medial apex, needle biopsy:  - Benign prostate tissue, negative for malignancy.    3/27/23  Bone Scan  IMPRESSION:   1.  No scintigraphic evidence of osseous metastasis    4/6/23  Dr. Biggs  Does not qualify for active surveillance.  RT and surgery discussed.  Leaning towards RT will arrange for consult    4/20/23  Dr. Harmon  Desires surgical intervention    Upcoming:       Prostate cancer (HCC)   2/28/2023 Biopsy    A.  Prostate, region of interest #1, needle biopsy:  - Prostatic adenocarcinoma, acinar, not otherwise specified; Jihan grade 4 +3= score of 7 (grade group 3) in 4 of 5 cores involving 20% of needle core tissue and measuring up to 5 mm in length (score 4=50-60%).  - Periprostatic fat invasion: Not identified.  - Seminal vesicle invasion: Not identified.  - Lymph-vascular invasion: Not identified.  - Perineural invasion:  Not identified.  - Additional Pathologic Findings:  None.     B.  Prostate, right lateral and medial base, needle biopsy:  - Benign prostate tissue, negative for malignancy.     C.  Prostate, right mid lateral, needle biopsy:  - Benign prostate tissue, negative for malignancy.     D.  Prostate, right mid medial, needle biopsy:  - Benign prostate tissue, negative for malignancy.     E.  Prostate, right lateral apex, needle biopsy:   - Benign prostate tissue, negative for malignancy.     F. Prostate, right medial apex, needle biopsy:  - Benign prostate tissue, negative for malignancy.     G.  Prostate, left lateral base, needle biopsy:  - Prostatic adenocarcinoma, acinar, not otherwise specified; Jihan grade 3 +3= score of 6 (grade group 1) in 1 of 1 cores involving 10% of needle core tissue and measuring 2 mm in length.  - Periprostatic fat invasion: Not identified.  - Seminal vesicle invasion: Not identified.  - Lymph-vascular invasion: Not identified.  - Perineural invasion: Not identified.  - Additional Pathologic Findings:  None.     H.  Prostate, left medial base, needle biopsy:  - Prostatic adenocarcinoma, acinar, not otherwise specified; Jihan grade 3 +4= score of 7 (grade group 2) in 1 of 1 cores involving 50% of needle core tissue and measuring 8 mm in length (score 4 = 5-10%).  - Periprostatic fat invasion: Not identified.  - Seminal vesicle invasion: Not identified.  - Lymph-vascular invasion: Not identified.  - Perineural invasion: Present.  - Additional Pathologic Findings:  None.     I.  Prostate, left mid lateral, needle biopsy:  - Prostatic adenocarcinoma, acinar, not otherwise specified; Jihan grade 3 +4= score of 7 (grade group 2) in 1 of 1 cores involving 40-50% of needle core tissue and measuring 5 mm in length (score 4 = 5-10%).  - Periprostatic fat invasion: Not identified.  - Seminal vesicle invasion: Not identified.  - Lymph-vascular invasion: Not identified.  - Perineural invasion: Not  identified.  - Additional Pathologic Findings:  High-grade prostatic intraepithelial neoplasia (HGPIN).     J.  Prostate, left mid medial, needle biopsy:  - Prostatic adenocarcinoma, acinar, not otherwise specified; Jihan grade 3 +4= score of 7 (grade group 2) in 1 of 1 cores involving 40-50% of needle core tissue and measuring 7 mm in length (score 4 = 5-10%).  - Periprostatic fat invasion: Not identified.  - Seminal vesicle invasion: Not identified.  - Lymph-vascular invasion: Not identified.  - Perineural invasion: Not identified.  - Additional Pathologic Findings:  High-grade prostatic intraepithelial neoplasia (HGPIN).     K.  Prostate, left lateral apex, needle biopsy:  -Focal atypical small acinar proliferation, cannot exclude limited low-grade carcinoma.     L.  Prostate, left medial apex, needle biopsy:  - Benign prostate tissue, negative for malignancy.     2/28/2023 Initial Diagnosis    Prostate cancer (HCC)         History of Present Illness:   Patient seen today in hospital follow-up.  Briefly, he presented to the hospital with signs of obstructive jaundice and sepsis-like symptoms.  This was ultimately determined to be due to cholangitis as the patient demonstrated a obstructive duodenal/ambulatory mass.  The patient underwent EUS and ERCP with stent placement and relief of obstructive jaundice.  This demonstrated near 7 cm malignant appearing mass and FNA demonstrated high-grade dysplasia.  The patient states today he is doing fairly well.  He has not no recurrence of fever or jaundice like symptoms.  He is eating well with no change in bowel habits.  He has no significant hepatobiliary history.  He does have a history of a PCI with dual antiplatelet therapy in 2022.  He recently saw his cardiologist, who felt that he was doing quite well.  He does have a valve insufficiency but this was felt to not need additional workup at this time.  The patient denies any significant shortness of breath or any  chest pain.  He is active as he acts as a .  He has a history of prostate cancer with prostatectomy; recent PSA has been undetectable.    Review of Systems  Complete ROS Surg Onc:   Constitutional: The patient denies new or recent history of general fatigue, no recent weight loss, no change in appetite.   Eyes: No complaints of visual problems, RESOLVING scleral icterus.   ENT: no complaints of ear pain, no hoarseness, no difficulty swallowing,  no tinnitus and no new masses in head, oral cavity, or neck.   Cardiovascular: No complaints of chest pain, no palpitations, no ankle edema.   Respiratory: No complaints of shortness of breath, no cough.   Gastrointestinal: No complaints of jaundice, no bloody stools, no pale stools.   Genitourinary: No complaints of dysuria, no hematuria, no nocturia, no frequent urination, no urethral discharge.   Musculoskeletal: No complaints of weakness, paralysis, joint stiffness or arthralgias.  Integumentary: No complaints of rash, no new lesions.   Neurological: No complaints of convulsions, no seizures, no dizziness.   Hematologic/Lymphatic: No complaints of easy bruising.   Endocrine:  No hot or cold intolerance.  No polydipsia, polyphagia, or polyuria.  Allergy/immunology:  No environmental allergies.  No food allergies.  Not immunocompromised.  Skin:  No pallor or rash.  No wound.      Patient Active Problem List   Diagnosis   • Benign essential hypertension   • ED (erectile dysfunction) of non-organic origin   • Hypercholesterolemia   • Peripheral vascular disease (HCC)   • Peripheral neuropathy   • Overweight with body mass index (BMI) of 26 to 26.9 in adult   • Aortic valve stenosis   • Coronary artery disease involving native coronary artery of native heart without angina pectoris   • Microscopic hematuria   • BPH (benign prostatic hyperplasia)   • Prostate cancer (HCC)   • Pancreatic mass   • Elevated LFTs   • Biliary sepsis   • Acute obstructive cholangitis   •  Enteritis   • GERD (gastroesophageal reflux disease)   • MOOKIE (acute kidney injury) (HCC)     Past Medical History:   Diagnosis Date   • Benign essential hypertension 2014   • CAD (coronary artery disease)     s/p NAVA prox LAD and D1 2022   • Cancer (HCC)     Prostate   • Coronary artery disease involving native coronary artery of native heart without angina pectoris 2022   • Enteritis 2024   • GERD (gastroesophageal reflux disease)    • Hyperlipidemia    • Hypertension    • Other chest pain    • Palpitations    • Prostate cancer (HCC) 2023     Past Surgical History:   Procedure Laterality Date   • CARDIAC CATHETERIZATION Left 2022    Procedure: Cardiac catheterization-left heart catheterization;  Surgeon: Sai Henry MD;  Location: [facility];  Service: Cardiology   • CARDIAC CATHETERIZATION N/A 2022    Procedure: Cardiac pci;  Surgeon: Sai Henry MD;  Location: [facility];  Service: Cardiology   • HERNIA REPAIR     • ID LAPS SURG [id number] RPBIC RAD W/NRV SPARING ROBOT N/A 2023    Procedure: PROSTATECTOMY RADICAL & PELVIC LYMPH NODE DISSECTION  W/ ROBOT;  Surgeon: Otoniel Harmon MD;  Location: [facility];  Service: Urology   • ID PROSTATE NEEDLE BIOPSY ANY APPROACH N/A 2023    Procedure: TRANSRECTAL MRI FUSION BIOPSY PROSTATE;  Surgeon: Milan Arellano MD;  Location: [facility];  Service: Urology     Family History   Problem Relation Age of Onset   • No Known Problems Mother    • No Known Problems Father      Social History     Socioeconomic History   • Marital status: /Civil Union     Spouse name: Not on file   • Number of children: Not on file   • Years of education: Not on file   • Highest education level: Not on file   Occupational History   • Not on file   Tobacco Use   • Smoking status: Every Day     Current packs/day: 0.00     Types: Cigarettes     Last attempt to quit: 2022     Years since quittin.7   • Smokeless  tobacco: Never   Vaping Use   • Vaping status: Never Used   Substance and Sexual Activity   • Alcohol use: Not Currently   • Drug use: No   • Sexual activity: Not on file   Other Topics Concern   • Not on file   Social History Narrative    EMPLOYED         Social Determinants of Health     Financial Resource Strain: Not on file   Food Insecurity: No Food Insecurity (8/28/2024)    Hunger Vital Sign    • Worried About Running Out of Food in the Last Year: Never true    • Ran Out of Food in the Last Year: Never true   Transportation Needs: No Transportation Needs (8/28/2024)    PRAPARE - Transportation    • Lack of Transportation (Medical): No    • Lack of Transportation (Non-Medical): No   Physical Activity: Not on file   Stress: Not on file   Social Connections: Unknown (6/18/2024)    Received from [employer]    Social Connections    • How often do you feel lonely or isolated from those around you? (Adult - for ages 18 years and over): Not on file   Intimate Partner Violence: Not on file   Housing Stability: Low Risk  (8/28/2024)    Housing Stability Vital Sign    • Unable to Pay for Housing in the Last Year: No    • Number of Times Moved in the Last Year: 0    • Homeless in the Last Year: No       Current Outpatient Medications:   •  aspirin 81 mg chewable tablet, Chew 81 mg daily, Disp: , Rfl:   •  atorvastatin (LIPITOR) 20 mg tablet, Take 1 tablet (20 mg total) by mouth daily, Disp: 30 tablet, Rfl: 5  •  lisinopril (ZESTRIL) 20 mg tablet, Take 1 tablet (20 mg total) by mouth daily, Disp: 30 tablet, Rfl: 0  •  piperacillin-tazobactam (Zosyn) 3.375 g, Inject 50 mL (3.375 g total) into a catheter in a vein over 30 minutes at 100 mL/hr every 6 (six) hours for 1 dose, Disp: 50 mL, Rfl: 0  No Known Allergies      Vitals:    09/09/24 1033   BP: 100/56   Pulse: 71   Temp: 97.8 °F (36.6 °C)   SpO2: 100%       Physical Exam   Constitutional: General appearance: The Patient is well-developed and well-nourished who  appears the stated age in no acute distress. Patient is pleasant and talkative.     HEENT:  Normocephalic.  Sclerae are anicteric. Mucous membranes are moist. Neck is supple without adenopathy. No JVD.     Chest: The lungs are clear to auscultation.     Cardiac: Heart is regular rate.     Abdomen: Abdomen is soft, non-tender, non-distended and without masses.     Extremities: There is no clubbing or cyanosis. There is no edema.  Symmetric.  Neuro: Grossly nonfocal. Gait is normal.     Lymphatic: No evidence of cervical adenopathy bilaterally.   No evidence of axillary adenopathy bilaterally. No evidence of inguinal adenopathy bilaterally.     Skin: Warm, anicteric.    Psych:  Patient is pleasant and talkative.      Imaging  FL ERCP biliary only    Result Date: 9/4/2024  Narrative: ERCP INDICATION:  K83.09: Other cholangitis. COMPARISON: CT of the abdomen and pelvis from 8/27/2024. IMAGES: 3 FLUOROSCOPY TIME: 3 minutes and 5 seconds CONTRAST: 9 mL of iohexol (OMNIPAQUE) FINDINGS: Fluoroscopic guidance was provided for performance of ERCP by the technologist. The interpreting radiologist was not present for the procedure. The provided images demonstrate a guidewire in the CBD with contrast material opacifying a dilated CBD and some of the intrahepatic bile ducts. A plastic stent was then placed into the CBD.     Impression: Fluoroscopic guidance for ERCP. Please see procedure report for full details. Workstation performed: [id number]     ERCP    Result Date: 8/29/2024  Narrative: Table formatting from the original result was not included. [facility] [address] [facility] [address] [phone] DATE OF SERVICE: 8/29/24 PHYSICIAN(S): Attending: Catia Ramirez MD Fellow: No Staff Documented INDICATION: Pancreatic mass, Elevated LFTs, Acute obstructive cholangitis POST-OP DIAGNOSIS: See the impression below. PREPROCEDURE: Informed consent was obtained for the procedure, including  "sedation.  Risks of perforation, hemorrhage, adverse drug reaction and aspiration were discussed. The patient was placed in the left lateral decubitus position. Patient was explained about the risks and benefits of the procedure. Risks including but not limited to bleeding, infection, and perforation were explained in detail. Also explained about less than 100% sensitivity with the exam and other alternatives. PROCEDURE: ERCP DETAILS OF PROCEDURE: Patient was taken to the procedure room where a time out was performed to confirm correct patient and correct procedure. The patient underwent general anesthesia, which was administered by an anesthesia professional. The patient's blood pressure, heart rate, level of consciousness, respirations, oxygen, ECG and ETCO2 were monitored throughout the procedure. The scope was introduced through the mouth and advanced to the second part of the duodenum. Clinical intention was achieved. The patient's estimated blood loss was minimal (<5 mL). The procedure was not difficult. The patient tolerated the procedure well. There were no apparent adverse events. ANESTHESIA INFORMATION: ASA: III Anesthesia Type: General MEDICATIONS: No administrations occurring from 1511 to 1635 on 08/29/24 FINDINGS: Single malignant-appearing, fungating, invasive, multilobular and ulcerated mass measuring 70 mm x 40 mm was visualized in the major papilla, covering one half of the circumference Performed forceps biopsies in the major papilla. A sample was sent for histology analysis. The common bile duct was deeply cannulated using a traction sphincterotome with 0.025\" straight guidewire. Cannulation was difficult due to large mass causing obliteration of the ampulla and altered anatomy. Contrast was injected to confirm location in the common bile duct and obtain a cholangiogram. Generalized dilation was observed in the distal common bile duct, mid common bile duct and proximal common bile duct with a " maximal diameter of 14 mm. Tapering to the level of the ampulla was noted.  Biliary tree was otherwise unremarkable.  Sphincterotomy was not performed today due to the patient having received Lovenox on the day of the procedure. One 10 Fr x 9 cm double flanged straight plastic stent was placed successfully in the common bile duct SPECIMENS: ID Type Source Tests Collected by Time Destination 1 : ampulla mass bx Tissue Ampulla of Vater TISSUE EXAM Catia Ramirez MD 8/29/2024  4:19 PM       Impression: Single malignant-appearing, fungating, invasive, multilobular and ulcerated mass measuring 70 mm x 40 mm was visualized in the major papilla, covering one half of the circumference Performed forceps biopsies in the major papilla. A sample was sent for histology analysis. The common bile duct was deeply cannulated. Cannulation was difficult due to large mass causing obliteration of the ampulla and altered anatomy. Generalized dilation was observed in the distal common bile duct, mid common bile duct and proximal common bile duct with a maximal diameter of 14 mm One 10 Fr x 9 cm double flanged straight stent was placed in the common bile duct RECOMMENDATION: Await pathology results Surgical oncology consultation. Clear liquids only today; advanced diet as tolerated thereafter. Repeat ERCP in 2-3 months for stent exchange.    Catia Ramirez MD     Endoscopic ultrasonography, GI (Upper)    Result Date: 8/29/2024  Narrative: Table formatting from the original result was not included. [facility] [address] [facility] [address] [phone] DATE OF SERVICE: 8/29/24 PHYSICIAN(S): Attending: Catia Ramirez MD Fellow: No Staff Documented INDICATION: Pancreatic mass, Elevated LFTs, Acute obstructive cholangitis POST-OP DIAGNOSIS: See the impression below. PREPROCEDURE: Informed consent was obtained for the procedure, including sedation.  Risks of perforation, hemorrhage, adverse drug  reaction and aspiration were discussed. The patient was placed in the left lateral decubitus position. Patient was explained about the risks and benefits of the procedure. Risks including but not limited to bleeding, infection, and perforation were explained in detail. Also explained about less than 100% sensitivity with the exam and other alternatives. PROCEDURE: EUS UPPER DETAILS OF PROCEDURE: Patient was taken to the procedure room where a time out was performed to confirm correct patient and correct procedure. The patient underwent general anesthesia, which was administered by an anesthesia professional. The patient's blood pressure, heart rate, oxygen, respirations, level of consciousness, ECG and ETCO2 were monitored throughout the procedure. The gastroscope and linear scope were introduced through the mouth and advanced to the second part of the duodenum. Retroflexion was performed in the fundus. The patient's estimated blood loss was minimal (<5 mL). The procedure was not difficult. The patient tolerated the procedure well. There were no apparent adverse events. ANESTHESIA INFORMATION: ASA: III Anesthesia Type: General MEDICATIONS: No administrations occurring from 1511 to 1548 on 08/29/24 FINDINGS: The esophagus and stomach appeared normal. Hypoechoic, irregular, lobulated and ulcerated mass measuring 70 mm x 40 mm with poorly defined and irregular margins was visualized in the major papilla, originating in the mucosa and extending to the muscularis propria. The examined portion of the duodenum otherwise did not show obvious endoscopic or endosonographic abnormalities. The pancreas appeared atrophic. The parenchyma was visualized in the entire pancreas. The parenchyma was heterogeneous. Both the CBD and PD were markedly dilatated. SPECIMENS: * No specimens in log *      Impression: The esophagus and stomach appeared normal. Hypoechoic, irregular, lobulated and ulcerated mass measuring 70 mm x 40 mm with  poorly defined and irregular margins was visualized in the major papilla, originating in the mucosa and extending to the muscularis propria The pancreas appeared atrophic. The parenchyma was visualized in the entire pancreas. The parenchyma was heterogeneous. RECOMMENDATION: Proceed with ERCP   Catia Ramirez MD     CT chest wo contrast    Result Date: 8/28/2024  Narrative: CT CHEST WITHOUT IV CONTRAST INDICATION: Staging from pancreatic mass. COMPARISON: CT abdomen pelvis 8/27/2024 TECHNIQUE: CT examination of the chest was performed without intravenous contrast. Multiplanar 2D reformatted images were created from the source data. This examination, like all CT scans performed in the [facility], was performed utilizing techniques to minimize radiation dose exposure, including the use of iterative reconstruction and automated exposure control. Radiation dose length product (DLP) for this visit: 271 mGy-cm FINDINGS: LUNGS: Mild bilateral dependent atelectasis. No consolidation. No suspicious pulmonary nodules. Right upper lobe granuloma (series 3 image 90). There is no tracheal or endobronchial lesion. PLEURA: Unremarkable. HEART/GREAT VESSELS: Mild coronary artery calcifications. LAD stent. Aortic valve calcifications. Normal heart size. No thoracic aortic aneurysm. MEDIASTINUM AND BOBBI: No mediastinal or hilar lymphadenopathy. CHEST WALL AND LOWER NECK: Unremarkable. VISUALIZED STRUCTURES IN THE UPPER ABDOMEN: Biliary obstruction secondary to pancreatic head mass again demonstrated. Bilateral renal cysts. OSSEOUS STRUCTURES: Spinal degenerative changes are noted. No acute fracture or destructive osseous lesion.     Impression: No evidence of metastatic disease in the chest. Resident: Sidney Wahl I, the attending radiologist, have reviewed the images and agree with the final report above. Workstation performed: [id number]     XR chest portable    Result Date: 8/28/2024  Narrative: XR CHEST  PORTABLE INDICATION: Sepsis. COMPARISON: Chest radiograph June 30, 2022 FINDINGS: Clear lungs. No pneumothorax or pleural effusion. Normal cardiomediastinal silhouette. Bones are unremarkable for age. Normal upper abdomen.     Impression: No acute cardiopulmonary disease. Workstation performed: [id number]     CT Abdomen pelvis with contrast    Result Date: 8/27/2024  Narrative: CT ABDOMEN AND PELVIS WITH IV CONTRAST INDICATION: Sepsis, left flank pain, left CVA tenderness. COMPARISON: CT scan from 5/28/2023. TECHNIQUE: CT examination of the abdomen and pelvis was performed. Multiplanar 2D reformatted images were created from the source data. This examination, like all CT scans performed in the [facility], was performed utilizing techniques to minimize radiation dose exposure, including the use of iterative reconstruction and automated exposure control. Radiation dose length product (DLP) for this visit: 616 mGy-cm IV Contrast: 90 mL of iohexol (OMNIPAQUE) Enteric Contrast: Not administered. FINDINGS: ABDOMEN LOWER CHEST: No clinically significant abnormality in the visualized lower chest. LIVER/BILIARY TREE: Intra and extrahepatic ductal dilatation extending to the pancreatic head with there is a mass. No focal liver lesion identified. GALLBLADDER: Distended due to biliary obstruction. No calcified stones. No pericholecystic inflammatory changes. SPLEEN: Unremarkable. PANCREAS: Pancreatic ductal dilatation due to an obstructing mass in the lower pancreatic head at the level of the ampulla measuring 2.8 x 2.7 cm on image 2/78. ADRENAL GLANDS: Unremarkable. KIDNEYS/URETERS: Simple renal cyst(s). Otherwise unremarkable kidneys. No hydronephrosis. STOMACH AND BOWEL: Nondilated fluid-filled loops of small bowel in the mid to lower abdomen suggesting an enteritis. No bowel obstruction. APPENDIX: Normal. ABDOMINOPELVIC CAVITY: No ascites. No pneumoperitoneum. No lymphadenopathy. VESSELS: Unremarkable for  patient's age. PELVIS REPRODUCTIVE ORGANS: Unremarkable for patient's age. URINARY BLADDER: Unremarkable. ABDOMINAL WALL/INGUINAL REGIONS: Unremarkable. BONES: No acute fracture or suspicious osseous lesion. Bilateral L5 spondylolysis without evidence of spondylolisthesis. Spinal degenerative changes.     Impression: Mass in the lower pancreatic head region obstructing the CBD and pancreatic duct measuring 2.8 x 2.7 cm on image 2/78 suspicious for neoplasm. Origin of tumor could also be cholangiocarcinoma or duodenal carcinoma given the location. Gallbladder distention due to biliary obstruction. Nondilated fluid-filled loops of small bowel in the mid to lower abdomen suggesting an enteritis. Workstation performed: [id number]     Echo complete w/ contrast if indicated    Result Date: 8/16/2024  Narrative: •  Left Ventricle: Left ventricular cavity size is normal. There is mild concentric hypertrophy. The left ventricular ejection fraction is 65%. Systolic function is normal. Wall motion is normal. •  Aortic Valve: The aortic valve is trileaflet. The leaflets are mildly calcified. There is moderately reduced mobility. There is mild regurgitation. There is moderate stenosis. The aortic valve peak velocity is 3.47 m/s. The aortic valve mean gradient is 31 mmHg. •  Mitral Valve: There is mild annular calcification. There is mild regurgitation. •  Prior study date: 6/30/2022. Mild progression of aortic valve stenosis.         I personally reviewed and interpreted the available history, laboratory and imaging data, including: hospital course, labs, CT x 2, labs, ERCP/EUS and path. Discussed with Dr Ramirez

## 2024-09-09 NOTE — H&P (VIEW-ONLY)
SURGICAL ONCOLOGY CONSULT    Isaac DUGAN Armstrong  1960  [id number]  [facility] [address] [id number] [phone]      ASSESSMENT AND PLAN    1. 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  I discussed with the patient and his wife and son today that given the biopsy results, I am recommending resection.  I discussed pancreaticoduodenectomy in detail including the hospital course, surgical details, and postoperative course.  The risks were explained including bleeding, infection, recurrence, need for further surgery, wound complications, adjacent organ injury, pancreas and biliary fistula, worsening of diabetes, sepsis, mi, DVT, stroke, pulmonary embolism, and death.  The patient and his family understand these risks and would like to proceed.  All questions were answered today.    Orders:  -     Incentive spirometry; Standing  -     Insert and maintain IV line; Standing  -     Void On-Call to O.R.; Standing  -     Place sequential compression device; Standing  -     Case request operating room: WHIPPLE PROCEDURE/PANCREATICO-DUODENECTOMY; Standing  -     Type and screen; Future  -     HEMOGLOBIN A1C W/ EAG ESTIMATION; Future  -     APTT; Future  -     Protime-INR; Future  -     Nursing communication Please give pre-op Carbohydrate drink to patient 2-4 hours prior to surgery (Drink is provided by [facility]); Standing  -     Prepare Leukoreduced RBC: 2 Units; Future  -     piperacillin-tazobactam (Zosyn) 3.375 g; Inject 50 mL (3.375 g total) into a catheter in a vein over 30 minutes at 100 mL/hr every 6 (six) hours for 1 dose  -     Case request operating room: WHIPPLE PROCEDURE/PANCREATICO-DUODENECTOMY  2. Abnormal finding of blood chemistry, unspecified  -     HEMOGLOBIN A1C W/ EAG ESTIMATION; Future  3.  Neoplasm of uncertain behavior of lymphoid, hematopoietic and related tissue, unspecified (HCC)  -     APTT; Future  -     Protime-INR; Future  4. Coronary artery disease involving native coronary artery of native heart without angina pectoris  Assessment & Plan:  Patient has no current cardiac symptoms and recently saw his cardiologist.  Given the somewhat urgent nature of the need for surgery, I will reach out to his cardiologist to discuss the need for additional workup and perioperative risk assessment        NEW VISIT DATA    Oncology History Overview Note   Presents for discussion of RT for recently diagnosed [place] 7 (4+3) prostate cancer.  Referred by Dr. Biggs    Lab Results   Component Value Date    PSA 5.2 (H) 11/08/2022    PSA 6.6 (H) 07/18/2022    PSA 3.9 06/24/2020      10/10/22  Urology  Seen in consult for elevated PSA  F/U PSA recommended    12/28/21  MRI Prostate  IMPRESSION:   1. PI-RADSv2.1 Category 4 -High (clinically significant cancer is likely to be present). 0.5 cm possible lesion in the posterior right peripheral zone at base.   2. No extraprostatic tumor, seminal vesicle invasion, pelvic lymphadenopathy, or pelvic osseous metastatic disease.   3. Moderate BPH with calculated prostate volume of 69 cc.    2/28/23  Tissue Exam  A.  Prostate, region of interest #1, needle biopsy:  - Prostatic adenocarcinoma, acinar, not otherwise specified; [place] grade 4 +3= score of 7 (grade group 3) in 4 of 5 cores involving 20% of needle core tissue and measuring up to 5 mm in length (score 4=50-60%).  - Periprostatic fat invasion: Not identified.  - Seminal vesicle invasion: Not identified.  - Lymph-vascular invasion: Not identified.  - Perineural invasion: Not identified.  - Additional Pathologic Findings:  None.     B.  Prostate, right lateral and medial base, needle biopsy:  - Benign prostate tissue, negative for malignancy.     C.  Prostate, right mid lateral, needle biopsy:  - Benign prostate  tissue, negative for malignancy.     D.  Prostate, right mid medial, needle biopsy:  - Benign prostate tissue, negative for malignancy.     E.  Prostate, right lateral apex, needle biopsy:   - Benign prostate tissue, negative for malignancy.     F. Prostate, right medial apex, needle biopsy:  - Benign prostate tissue, negative for malignancy.     G.  Prostate, left lateral base, needle biopsy:  - Prostatic adenocarcinoma, acinar, not otherwise specified; [place] grade 3 +3= score of 6 (grade group 1) in 1 of 1 cores involving 10% of needle core tissue and measuring 2 mm in length.  - Periprostatic fat invasion: Not identified.  - Seminal vesicle invasion: Not identified.  - Lymph-vascular invasion: Not identified.  - Perineural invasion: Not identified.  - Additional Pathologic Findings:  None.     H.  Prostate, left medial base, needle biopsy:  - Prostatic adenocarcinoma, acinar, not otherwise specified; [place] grade 3 +4= score of 7 (grade group 2) in 1 of 1 cores involving 50% of needle core tissue and measuring 8 mm in length (score 4 = 5-10%).  - Periprostatic fat invasion: Not identified.  - Seminal vesicle invasion: Not identified.  - Lymph-vascular invasion: Not identified.  - Perineural invasion: Present.  - Additional Pathologic Findings:  None.     I.  Prostate, left mid lateral, needle biopsy:  - Prostatic adenocarcinoma, acinar, not otherwise specified; [place] grade 3 +4= score of 7 (grade group 2) in 1 of 1 cores involving 40-50% of needle core tissue and measuring 5 mm in length (score 4 = 5-10%).  - Periprostatic fat invasion: Not identified.  - Seminal vesicle invasion: Not identified.  - Lymph-vascular invasion: Not identified.  - Perineural invasion: Not identified.  - Additional Pathologic Findings:  High-grade prostatic intraepithelial neoplasia (HGPIN).     J.  Prostate, left mid medial, needle biopsy:  - Prostatic adenocarcinoma, acinar, not otherwise specified; [place] grade 3 +4= score of  7 (grade group 2) in 1 of 1 cores involving 40-50% of needle core tissue and measuring 7 mm in length (score 4 = 5-10%).  - Periprostatic fat invasion: Not identified.  - Seminal vesicle invasion: Not identified.  - Lymph-vascular invasion: Not identified.  - Perineural invasion: Not identified.  - Additional Pathologic Findings:  High-grade prostatic intraepithelial neoplasia (HGPIN).     K.  Prostate, left lateral apex, needle biopsy:  -Focal atypical small acinar proliferation, cannot exclude limited low-grade carcinoma.     L.  Prostate, left medial apex, needle biopsy:  - Benign prostate tissue, negative for malignancy.    3/27/23  Bone Scan  IMPRESSION:   1.  No scintigraphic evidence of osseous metastasis    4/6/23  Dr. Biggs  Does not qualify for active surveillance.  RT and surgery discussed.  Leaning towards RT will arrange for consult    4/20/23  Dr. Harmon  Desires surgical intervention    Upcoming:       Prostate cancer (HCC)   2/28/2023 Biopsy    A.  Prostate, region of interest #1, needle biopsy:  - Prostatic adenocarcinoma, acinar, not otherwise specified; [place] grade 4 +3= score of 7 (grade group 3) in 4 of 5 cores involving 20% of needle core tissue and measuring up to 5 mm in length (score 4=50-60%).  - Periprostatic fat invasion: Not identified.  - Seminal vesicle invasion: Not identified.  - Lymph-vascular invasion: Not identified.  - Perineural invasion: Not identified.  - Additional Pathologic Findings:  None.     B.  Prostate, right lateral and medial base, needle biopsy:  - Benign prostate tissue, negative for malignancy.     C.  Prostate, right mid lateral, needle biopsy:  - Benign prostate tissue, negative for malignancy.     D.  Prostate, right mid medial, needle biopsy:  - Benign prostate tissue, negative for malignancy.     E.  Prostate, right lateral apex, needle biopsy:   - Benign prostate tissue, negative for malignancy.     F. Prostate, right medial apex, needle biopsy:  -  Benign prostate tissue, negative for malignancy.     G.  Prostate, left lateral base, needle biopsy:  - Prostatic adenocarcinoma, acinar, not otherwise specified; [place] grade 3 +3= score of 6 (grade group 1) in 1 of 1 cores involving 10% of needle core tissue and measuring 2 mm in length.  - Periprostatic fat invasion: Not identified.  - Seminal vesicle invasion: Not identified.  - Lymph-vascular invasion: Not identified.  - Perineural invasion: Not identified.  - Additional Pathologic Findings:  None.     H.  Prostate, left medial base, needle biopsy:  - Prostatic adenocarcinoma, acinar, not otherwise specified; [place] grade 3 +4= score of 7 (grade group 2) in 1 of 1 cores involving 50% of needle core tissue and measuring 8 mm in length (score 4 = 5-10%).  - Periprostatic fat invasion: Not identified.  - Seminal vesicle invasion: Not identified.  - Lymph-vascular invasion: Not identified.  - Perineural invasion: Present.  - Additional Pathologic Findings:  None.     I.  Prostate, left mid lateral, needle biopsy:  - Prostatic adenocarcinoma, acinar, not otherwise specified; [place] grade 3 +4= score of 7 (grade group 2) in 1 of 1 cores involving 40-50% of needle core tissue and measuring 5 mm in length (score 4 = 5-10%).  - Periprostatic fat invasion: Not identified.  - Seminal vesicle invasion: Not identified.  - Lymph-vascular invasion: Not identified.  - Perineural invasion: Not identified.  - Additional Pathologic Findings:  High-grade prostatic intraepithelial neoplasia (HGPIN).     J.  Prostate, left mid medial, needle biopsy:  - Prostatic adenocarcinoma, acinar, not otherwise specified; [place] grade 3 +4= score of 7 (grade group 2) in 1 of 1 cores involving 40-50% of needle core tissue and measuring 7 mm in length (score 4 = 5-10%).  - Periprostatic fat invasion: Not identified.  - Seminal vesicle invasion: Not identified.  - Lymph-vascular invasion: Not identified.  - Perineural invasion: Not  identified.  - Additional Pathologic Findings:  High-grade prostatic intraepithelial neoplasia (HGPIN).     K.  Prostate, left lateral apex, needle biopsy:  -Focal atypical small acinar proliferation, cannot exclude limited low-grade carcinoma.     L.  Prostate, left medial apex, needle biopsy:  - Benign prostate tissue, negative for malignancy.     2/28/2023 Initial Diagnosis    Prostate cancer (HCC)         History of Present Illness:   Patient seen today in hospital follow-up.  Briefly, he presented to the hospital with signs of obstructive jaundice and sepsis-like symptoms.  This was ultimately determined to be due to cholangitis as the patient demonstrated a obstructive duodenal/ambulatory mass.  The patient underwent EUS and ERCP with stent placement and relief of obstructive jaundice.  This demonstrated near 7 cm malignant appearing mass and FNA demonstrated high-grade dysplasia.  The patient states today he is doing fairly well.  He has not no recurrence of fever or jaundice like symptoms.  He is eating well with no change in bowel habits.  He has no significant hepatobiliary history.  He does have a history of a PCI with dual antiplatelet therapy in 2022.  He recently saw his cardiologist, who felt that he was doing quite well.  He does have a valve insufficiency but this was felt to not need additional workup at this time.  The patient denies any significant shortness of breath or any chest pain.  He is active as he acts as a .  He has a history of prostate cancer with prostatectomy; recent PSA has been undetectable.    Review of Systems  Complete ROS Surg Onc:   Constitutional: The patient denies new or recent history of general fatigue, no recent weight loss, no change in appetite.   Eyes: No complaints of visual problems, RESOLVING scleral icterus.   ENT: no complaints of ear pain, no hoarseness, no difficulty swallowing,  no tinnitus and no new masses in head, oral cavity, or neck.    Cardiovascular: No complaints of chest pain, no palpitations, no ankle edema.   Respiratory: No complaints of shortness of breath, no cough.   Gastrointestinal: No complaints of jaundice, no bloody stools, no pale stools.   Genitourinary: No complaints of dysuria, no hematuria, no nocturia, no frequent urination, no urethral discharge.   Musculoskeletal: No complaints of weakness, paralysis, joint stiffness or arthralgias.  Integumentary: No complaints of rash, no new lesions.   Neurological: No complaints of convulsions, no seizures, no dizziness.   Hematologic/Lymphatic: No complaints of easy bruising.   Endocrine:  No hot or cold intolerance.  No polydipsia, polyphagia, or polyuria.  Allergy/immunology:  No environmental allergies.  No food allergies.  Not immunocompromised.  Skin:  No pallor or rash.  No wound.      Patient Active Problem List   Diagnosis    Benign essential hypertension    ED (erectile dysfunction) of non-organic origin    Hypercholesterolemia    Peripheral vascular disease (HCC)    Peripheral neuropathy    Overweight with body mass index (BMI) of 26 to 26.9 in adult    Aortic valve stenosis    Coronary artery disease involving native coronary artery of native heart without angina pectoris    Microscopic hematuria    BPH (benign prostatic hyperplasia)    Prostate cancer (HCC)    Pancreatic mass    Elevated LFTs    Biliary sepsis    Acute obstructive cholangitis    Enteritis    GERD (gastroesophageal reflux disease)    MOOKIE (acute kidney injury) (HCC)     Past Medical History:   Diagnosis Date    Benign essential hypertension 05/08/2014    CAD (coronary artery disease)     s/p NAVA prox LAD and D1 7/28/2022    Cancer (HCC)     Prostate    Coronary artery disease involving native coronary artery of native heart without angina pectoris 08/09/2022    Enteritis 8/28/2024    GERD (gastroesophageal reflux disease)     Hyperlipidemia     Hypertension     Other chest pain     Palpitations     Prostate  cancer (HCC) 2023     Past Surgical History:   Procedure Laterality Date    CARDIAC CATHETERIZATION Left 2022    Procedure: Cardiac catheterization-left heart catheterization;  Surgeon: Sai Henry MD;  Location: [facility];  Service: Cardiology    CARDIAC CATHETERIZATION N/A 2022    Procedure: Cardiac pci;  Surgeon: Sai Henry MD;  Location: [facility];  Service: Cardiology    HERNIA REPAIR      HI LAPS SURG [id number] RPBIC RAD W/NRV SPARING ROBOT N/A 2023    Procedure: PROSTATECTOMY RADICAL & PELVIC LYMPH NODE DISSECTION  W/ ROBOT;  Surgeon: Otoniel Harmon MD;  Location: [facility];  Service: Urology    HI PROSTATE NEEDLE BIOPSY ANY APPROACH N/A 2023    Procedure: TRANSRECTAL MRI FUSION BIOPSY PROSTATE;  Surgeon: Milan Arellano MD;  Location: [facility];  Service: Urology     Family History   Problem Relation Age of Onset    No Known Problems Mother     No Known Problems Father      Social History     Socioeconomic History    Marital status: /Civil Union     Spouse name: Not on file    Number of children: Not on file    Years of education: Not on file    Highest education level: Not on file   Occupational History    Not on file   Tobacco Use    Smoking status: Every Day     Current packs/day: 0.00     Types: Cigarettes     Last attempt to quit: 2022     Years since quittin.7    Smokeless tobacco: Never   Vaping Use    Vaping status: Never Used   Substance and Sexual Activity    Alcohol use: Not Currently    Drug use: No    Sexual activity: Not on file   Other Topics Concern    Not on file   Social History Narrative    EMPLOYED         Social Determinants of Health     Financial Resource Strain: Not on file   Food Insecurity: No Food Insecurity (2024)    Hunger Vital Sign     Worried About Running Out of Food in the Last Year: Never true     Ran Out of Food in the Last Year: Never true   Transportation Needs: No Transportation  Needs (8/28/2024)    PRAPARE - Transportation     Lack of Transportation (Medical): No     Lack of Transportation (Non-Medical): No   Physical Activity: Not on file   Stress: Not on file   Social Connections: Unknown (6/18/2024)    Received from [facility]     How often do you feel lonely or isolated from those around you? (Adult - for ages 18 years and over): Not on file   Intimate Partner Violence: Not on file   Housing Stability: Low Risk  (8/28/2024)    Housing Stability Vital Sign     Unable to Pay for Housing in the Last Year: No     Number of Times Moved in the Last Year: 0     Homeless in the Last Year: No       Current Outpatient Medications:     aspirin 81 mg chewable tablet, Chew 81 mg daily, Disp: , Rfl:     atorvastatin (LIPITOR) 20 mg tablet, Take 1 tablet (20 mg total) by mouth daily, Disp: 30 tablet, Rfl: 5    lisinopril (ZESTRIL) 20 mg tablet, Take 1 tablet (20 mg total) by mouth daily, Disp: 30 tablet, Rfl: 0    piperacillin-tazobactam (Zosyn) 3.375 g, Inject 50 mL (3.375 g total) into a catheter in a vein over 30 minutes at 100 mL/hr every 6 (six) hours for 1 dose, Disp: 50 mL, Rfl: 0  No Known Allergies      Vitals:    09/09/24 1033   BP: 100/56   Pulse: 71   Temp: 97.8 °F (36.6 °C)   SpO2: 100%       Physical Exam   Constitutional: General appearance: The Patient is well-developed and well-nourished who appears the stated age in no acute distress. Patient is pleasant and talkative.     HEENT:  Normocephalic.  Sclerae are anicteric. Mucous membranes are moist. Neck is supple without adenopathy. No JVD.     Chest: The lungs are clear to auscultation.     Cardiac: Heart is regular rate.     Abdomen: Abdomen is soft, non-tender, non-distended and without masses.     Extremities: There is no clubbing or cyanosis. There is no edema.  Symmetric.  Neuro: Grossly nonfocal. Gait is normal.     Lymphatic: No evidence of cervical adenopathy bilaterally.   No evidence of axillary  adenopathy bilaterally. No evidence of inguinal adenopathy bilaterally.     Skin: Warm, anicteric.    Psych:  Patient is pleasant and talkative.      Imaging  FL ERCP biliary only    Result Date: 9/4/2024  Narrative: ERCP INDICATION:  K83.09: Other cholangitis. COMPARISON: CT of the abdomen and pelvis from 8/27/2024. IMAGES: 3 FLUOROSCOPY TIME: 3 minutes and 5 seconds CONTRAST: 9 mL of iohexol (OMNIPAQUE) FINDINGS: Fluoroscopic guidance was provided for performance of ERCP by the technologist. The interpreting radiologist was not present for the procedure. The provided images demonstrate a guidewire in the CBD with contrast material opacifying a dilated CBD and some of the intrahepatic bile ducts. A plastic stent was then placed into the CBD.     Impression: Fluoroscopic guidance for ERCP. Please see procedure report for full details. Workstation performed: [id number]     ERCP    Result Date: 8/29/2024  Narrative: Table formatting from the original result was not included. [facility] [address] [facility] [address] [phone] DATE OF SERVICE: 8/29/24 PHYSICIAN(S): Attending: Catia Ramirez MD Fellow: No Staff Documented INDICATION: Pancreatic mass, Elevated LFTs, Acute obstructive cholangitis POST-OP DIAGNOSIS: See the impression below. PREPROCEDURE: Informed consent was obtained for the procedure, including sedation.  Risks of perforation, hemorrhage, adverse drug reaction and aspiration were discussed. The patient was placed in the left lateral decubitus position. Patient was explained about the risks and benefits of the procedure. Risks including but not limited to bleeding, infection, and perforation were explained in detail. Also explained about less than 100% sensitivity with the exam and other alternatives. PROCEDURE: ERCP DETAILS OF PROCEDURE: Patient was taken to the procedure room where a time out was performed to confirm correct patient and correct procedure. The  "patient underwent general anesthesia, which was administered by an anesthesia professional. The patient's blood pressure, heart rate, level of consciousness, respirations, oxygen, ECG and ETCO2 were monitored throughout the procedure. The scope was introduced through the mouth and advanced to the second part of the duodenum. Clinical intention was achieved. The patient's estimated blood loss was minimal (<5 mL). The procedure was not difficult. The patient tolerated the procedure well. There were no apparent adverse events. ANESTHESIA INFORMATION: ASA: III Anesthesia Type: General MEDICATIONS: No administrations occurring from 1511 to 1635 on 08/29/24 FINDINGS: Single malignant-appearing, fungating, invasive, multilobular and ulcerated mass measuring 70 mm x 40 mm was visualized in the major papilla, covering one half of the circumference Performed forceps biopsies in the major papilla. A sample was sent for histology analysis. The common bile duct was deeply cannulated using a traction sphincterotome with 0.025\" straight guidewire. Cannulation was difficult due to large mass causing obliteration of the ampulla and altered anatomy. Contrast was injected to confirm location in the common bile duct and obtain a cholangiogram. Generalized dilation was observed in the distal common bile duct, mid common bile duct and proximal common bile duct with a maximal diameter of 14 mm. Tapering to the level of the ampulla was noted.  Biliary tree was otherwise unremarkable.  Sphincterotomy was not performed today due to the patient having received Lovenox on the day of the procedure. One 10 Fr x 9 cm double flanged straight plastic stent was placed successfully in the common bile duct SPECIMENS: ID Type Source Tests Collected by Time Destination 1 : ampulla mass bx Tissue Ampulla of Vater TISSUE EXAM Catia Ramirez MD 8/29/2024  4:19 PM       Impression: Single malignant-appearing, fungating, invasive, multilobular and " ulcerated mass measuring 70 mm x 40 mm was visualized in the major papilla, covering one half of the circumference Performed forceps biopsies in the major papilla. A sample was sent for histology analysis. The common bile duct was deeply cannulated. Cannulation was difficult due to large mass causing obliteration of the ampulla and altered anatomy. Generalized dilation was observed in the distal common bile duct, mid common bile duct and proximal common bile duct with a maximal diameter of 14 mm One 10 Fr x 9 cm double flanged straight stent was placed in the common bile duct RECOMMENDATION: Await pathology results Surgical oncology consultation. Clear liquids only today; advanced diet as tolerated thereafter. Repeat ERCP in 2-3 months for stent exchange.    Catia Ramirez MD     Endoscopic ultrasonography, GI (Upper)    Result Date: 8/29/2024  Narrative: Table formatting from the original result was not included. [facility] [address] [facility] [address] [phone] DATE OF SERVICE: 8/29/24 PHYSICIAN(S): Attending: Catia Ramirez MD Fellow: No Staff Documented INDICATION: Pancreatic mass, Elevated LFTs, Acute obstructive cholangitis POST-OP DIAGNOSIS: See the impression below. PREPROCEDURE: Informed consent was obtained for the procedure, including sedation.  Risks of perforation, hemorrhage, adverse drug reaction and aspiration were discussed. The patient was placed in the left lateral decubitus position. Patient was explained about the risks and benefits of the procedure. Risks including but not limited to bleeding, infection, and perforation were explained in detail. Also explained about less than 100% sensitivity with the exam and other alternatives. PROCEDURE: EUS UPPER DETAILS OF PROCEDURE: Patient was taken to the procedure room where a time out was performed to confirm correct patient and correct procedure. The patient underwent general anesthesia, which was  administered by an anesthesia professional. The patient's blood pressure, heart rate, oxygen, respirations, level of consciousness, ECG and ETCO2 were monitored throughout the procedure. The gastroscope and linear scope were introduced through the mouth and advanced to the second part of the duodenum. Retroflexion was performed in the fundus. The patient's estimated blood loss was minimal (<5 mL). The procedure was not difficult. The patient tolerated the procedure well. There were no apparent adverse events. ANESTHESIA INFORMATION: ASA: III Anesthesia Type: General MEDICATIONS: No administrations occurring from 1511 to 1548 on 08/29/24 FINDINGS: The esophagus and stomach appeared normal. Hypoechoic, irregular, lobulated and ulcerated mass measuring 70 mm x 40 mm with poorly defined and irregular margins was visualized in the major papilla, originating in the mucosa and extending to the muscularis propria. The examined portion of the duodenum otherwise did not show obvious endoscopic or endosonographic abnormalities. The pancreas appeared atrophic. The parenchyma was visualized in the entire pancreas. The parenchyma was heterogeneous. Both the CBD and PD were markedly dilatated. SPECIMENS: * No specimens in log *      Impression: The esophagus and stomach appeared normal. Hypoechoic, irregular, lobulated and ulcerated mass measuring 70 mm x 40 mm with poorly defined and irregular margins was visualized in the major papilla, originating in the mucosa and extending to the muscularis propria The pancreas appeared atrophic. The parenchyma was visualized in the entire pancreas. The parenchyma was heterogeneous. RECOMMENDATION: Proceed with ERCP   Catia Ramirez MD     CT chest wo contrast    Result Date: 8/28/2024  Narrative: CT CHEST WITHOUT IV CONTRAST INDICATION: Staging from pancreatic mass. COMPARISON: CT abdomen pelvis 8/27/2024 TECHNIQUE: CT examination of the chest was performed without intravenous contrast.  Multiplanar 2D reformatted images were created from the source data. This examination, like all CT scans performed in the [facility], was performed utilizing techniques to minimize radiation dose exposure, including the use of iterative reconstruction and automated exposure control. Radiation dose length product (DLP) for this visit: 271 mGy-cm FINDINGS: LUNGS: Mild bilateral dependent atelectasis. No consolidation. No suspicious pulmonary nodules. Right upper lobe granuloma (series 3 image 90). There is no tracheal or endobronchial lesion. PLEURA: Unremarkable. HEART/GREAT VESSELS: Mild coronary artery calcifications. LAD stent. Aortic valve calcifications. Normal heart size. No thoracic aortic aneurysm. MEDIASTINUM AND BOBBI: No mediastinal or hilar lymphadenopathy. CHEST WALL AND LOWER NECK: Unremarkable. VISUALIZED STRUCTURES IN THE UPPER ABDOMEN: Biliary obstruction secondary to pancreatic head mass again demonstrated. Bilateral renal cysts. OSSEOUS STRUCTURES: Spinal degenerative changes are noted. No acute fracture or destructive osseous lesion.     Impression: No evidence of metastatic disease in the chest. Resident: Sidney Wahl I, the attending radiologist, have reviewed the images and agree with the final report above. Workstation performed: [id number]     XR chest portable    Result Date: 8/28/2024  Narrative: XR CHEST PORTABLE INDICATION: Sepsis. COMPARISON: Chest radiograph June 30, 2022 FINDINGS: Clear lungs. No pneumothorax or pleural effusion. Normal cardiomediastinal silhouette. Bones are unremarkable for age. Normal upper abdomen.     Impression: No acute cardiopulmonary disease. Workstation performed: [id number]     CT Abdomen pelvis with contrast    Result Date: 8/27/2024  Narrative: CT ABDOMEN AND PELVIS WITH IV CONTRAST INDICATION: Sepsis, left flank pain, left CVA tenderness. COMPARISON: CT scan from 5/28/2023. TECHNIQUE: CT examination of the abdomen and pelvis was  performed. Multiplanar 2D reformatted images were created from the source data. This examination, like all CT scans performed in the [facility], was performed utilizing techniques to minimize radiation dose exposure, including the use of iterative reconstruction and automated exposure control. Radiation dose length product (DLP) for this visit: 616 mGy-cm IV Contrast: 90 mL of iohexol (OMNIPAQUE) Enteric Contrast: Not administered. FINDINGS: ABDOMEN LOWER CHEST: No clinically significant abnormality in the visualized lower chest. LIVER/BILIARY TREE: Intra and extrahepatic ductal dilatation extending to the pancreatic head with there is a mass. No focal liver lesion identified. GALLBLADDER: Distended due to biliary obstruction. No calcified stones. No pericholecystic inflammatory changes. SPLEEN: Unremarkable. PANCREAS: Pancreatic ductal dilatation due to an obstructing mass in the lower pancreatic head at the level of the ampulla measuring 2.8 x 2.7 cm on image 2/78. ADRENAL GLANDS: Unremarkable. KIDNEYS/URETERS: Simple renal cyst(s). Otherwise unremarkable kidneys. No hydronephrosis. STOMACH AND BOWEL: Nondilated fluid-filled loops of small bowel in the mid to lower abdomen suggesting an enteritis. No bowel obstruction. APPENDIX: Normal. ABDOMINOPELVIC CAVITY: No ascites. No pneumoperitoneum. No lymphadenopathy. VESSELS: Unremarkable for patient's age. PELVIS REPRODUCTIVE ORGANS: Unremarkable for patient's age. URINARY BLADDER: Unremarkable. ABDOMINAL WALL/INGUINAL REGIONS: Unremarkable. BONES: No acute fracture or suspicious osseous lesion. Bilateral L5 spondylolysis without evidence of spondylolisthesis. Spinal degenerative changes.     Impression: Mass in the lower pancreatic head region obstructing the CBD and pancreatic duct measuring 2.8 x 2.7 cm on image 2/78 suspicious for neoplasm. Origin of tumor could also be cholangiocarcinoma or duodenal carcinoma given the location. Gallbladder  distention due to biliary obstruction. Nondilated fluid-filled loops of small bowel in the mid to lower abdomen suggesting an enteritis. Workstation performed: [id number]     Echo complete w/ contrast if indicated    Result Date: 8/16/2024  Narrative:   Left Ventricle: Left ventricular cavity size is normal. There is mild concentric hypertrophy. The left ventricular ejection fraction is 65%. Systolic function is normal. Wall motion is normal.   Aortic Valve: The aortic valve is trileaflet. The leaflets are mildly calcified. There is moderately reduced mobility. There is mild regurgitation. There is moderate stenosis. The aortic valve peak velocity is 3.47 m/s. The aortic valve mean gradient is 31 mmHg.   Mitral Valve: There is mild annular calcification. There is mild regurgitation.   Prior study date: 6/30/2022. Mild progression of aortic valve stenosis.         I personally reviewed and interpreted the available history, laboratory and imaging data, including: hospital course, labs, CT x 2, labs, ERCP/EUS and path. Discussed with Dr Ramirez

## 2024-09-09 NOTE — TELEPHONE ENCOUNTER
I called and spoke with patient in regards to surgery. I let him know that I know Dr Ac had mentioned surgery on Monday 9/16/24 but we are unable to make that happen for him. I explained that I was able to get a sooner date for him for this Thursday 9/12/24 at our [place] office. Patient was agreeable to this and is aware to get his labs done as soon as possible. Patient was appreciative of the call.

## 2024-09-09 NOTE — ASSESSMENT & PLAN NOTE
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  I discussed with the patient and his wife and son today that given the biopsy results, I am recommending resection.  I discussed pancreaticoduodenectomy in detail including the hospital course, surgical details, and postoperative course.  The risks were explained including bleeding, infection, recurrence, need for further surgery, wound complications, adjacent organ injury, pancreas and biliary fistula, worsening of diabetes, sepsis, mi, DVT, stroke, pulmonary embolism, and death.  The patient and his family understand these risks and would like to proceed.  All questions were answered today.

## 2024-09-09 NOTE — ASSESSMENT & PLAN NOTE
Patient has no current cardiac symptoms and recently saw his cardiologist.  Given the somewhat urgent nature of the need for surgery, I will reach out to his cardiologist to discuss the need for additional workup and perioperative risk assessment

## 2024-09-10 ENCOUNTER — APPOINTMENT (OUTPATIENT)
Dept: LAB | Facility: CLINIC | Age: 64
DRG: 405 | End: 2024-09-10
Payer: MEDICARE

## 2024-09-10 ENCOUNTER — LAB REQUISITION (OUTPATIENT)
Dept: LAB | Facility: HOSPITAL | Age: 64
DRG: 405 | End: 2024-09-10
Payer: MEDICARE

## 2024-09-10 ENCOUNTER — ANESTHESIA EVENT (OUTPATIENT)
Dept: PERIOP | Facility: HOSPITAL | Age: 64
DRG: 405 | End: 2024-09-10
Payer: MEDICARE

## 2024-09-10 DIAGNOSIS — D47.9 NEOPLASM OF UNCERTAIN BEHAVIOR OF LYMPHOID, HEMATOPOIETIC AND RELATED TISSUE, UNSPECIFIED (HCC): ICD-10-CM

## 2024-09-10 DIAGNOSIS — Z01.818 ENCOUNTER FOR OTHER PREPROCEDURAL EXAMINATION: ICD-10-CM

## 2024-09-10 DIAGNOSIS — R79.9 ABNORMAL FINDING OF BLOOD CHEMISTRY, UNSPECIFIED: ICD-10-CM

## 2024-09-10 DIAGNOSIS — Z01.818 PRE-OP EXAM: ICD-10-CM

## 2024-09-10 DIAGNOSIS — K86.89 PANCREATIC MASS: ICD-10-CM

## 2024-09-10 LAB
ABO GROUP BLD: NORMAL
APTT PPP: 32 SECONDS (ref 23–34)
BLD GP AB SCN SERPL QL: NEGATIVE
INR PPP: 1 (ref 0.85–1.19)
PROTHROMBIN TIME: 13.5 SECONDS (ref 12.3–15)
RH BLD: POSITIVE
SPECIMEN EXPIRATION DATE: NORMAL

## 2024-09-10 PROCEDURE — 85610 PROTHROMBIN TIME: CPT

## 2024-09-10 PROCEDURE — 86850 RBC ANTIBODY SCREEN: CPT | Performed by: STUDENT IN AN ORGANIZED HEALTH CARE EDUCATION/TRAINING PROGRAM

## 2024-09-10 PROCEDURE — 83036 HEMOGLOBIN GLYCOSYLATED A1C: CPT

## 2024-09-10 PROCEDURE — 86901 BLOOD TYPING SEROLOGIC RH(D): CPT | Performed by: STUDENT IN AN ORGANIZED HEALTH CARE EDUCATION/TRAINING PROGRAM

## 2024-09-10 PROCEDURE — 86900 BLOOD TYPING SEROLOGIC ABO: CPT | Performed by: STUDENT IN AN ORGANIZED HEALTH CARE EDUCATION/TRAINING PROGRAM

## 2024-09-10 PROCEDURE — 85730 THROMBOPLASTIN TIME PARTIAL: CPT

## 2024-09-10 PROCEDURE — 36415 COLL VENOUS BLD VENIPUNCTURE: CPT

## 2024-09-10 NOTE — PRE-PROCEDURE INSTRUCTIONS
Pre-Surgery Instructions:   Medication Instructions    aspirin 81 mg chewable tablet Stop taking 1 day prior to surgery.    atorvastatin (LIPITOR) 20 mg tablet Take night before surgery    lisinopril (ZESTRIL) 20 mg tablet Stop taking 1 day prior to surgery.    Medication instructions for day surgery reviewed. Please use only a sip of water to take your instructed medications. Avoid all over the counter vitamins, supplements and NSAIDS for one week prior to surgery per anesthesia guidelines. Tylenol is ok to take as needed.     You will receive a call one business day prior to surgery with an arrival time and hospital directions. If your surgery is scheduled on a Monday, the hospital will be calling you on the Friday prior to your surgery. If you have not heard from anyone by 8pm, please call the hospital supervisor through the hospital  at [phone]. ([place] [phone] or [place] [phone]).    Do not eat or drink anything after midnight the night before your surgery, including candy, mints, lifesavers, or chewing gum. Do not drink alcohol 24hrs before your surgery. Try not to smoke at least 24hrs before your surgery.       Follow the pre surgery showering instructions as listed in the “My Surgical Experience Booklet” or otherwise provided by your surgeon's office. Do not use a blade to shave the surgical area 1 week before surgery. It is okay to use a clean electric clippers up to 24 hours before surgery. Do not apply any lotions, creams, including makeup, cologne, deodorant, or perfumes after showering on the day of your surgery. Do not use dry shampoo, hair spray, hair gel, or any type of hair products.     No contact lenses, eye make-up, or artificial eyelashes. Remove nail polish, including gel polish, and any artificial, gel, or acrylic nails if possible. Remove all jewelry including rings and body piercing jewelry.     Wear causal clothing that is easy to take on and off. Consider your  type of surgery.    Keep any valuables, jewelry, piercings at home. Please bring any specially ordered equipment (sling, braces) if indicated.    Arrange for a responsible person to drive you to and from the hospital on the day of your surgery. Please confirm the visitor policy for the day of your procedure when you receive your phone call with an arrival time.     Call the surgeon's office with any new illnesses, exposures, or additional questions prior to surgery.    Please reference your “My Surgical Experience Booklet” for additional information to prepare for your upcoming surgery.    Pt instructed to stop nsaids and supplements prior to surgery. Pt verbalized understanding of shower, med and pre-op ensure instructions.

## 2024-09-11 LAB
EST. AVERAGE GLUCOSE BLD GHB EST-MCNC: 117 MG/DL
HBA1C MFR BLD: 5.7 %

## 2024-09-12 ENCOUNTER — HOSPITAL ENCOUNTER (INPATIENT)
Facility: HOSPITAL | Age: 64
LOS: 7 days | Discharge: HOME HEALTH | DRG: 405 | End: 2024-09-19
Attending: STUDENT IN AN ORGANIZED HEALTH CARE EDUCATION/TRAINING PROGRAM | Admitting: STUDENT IN AN ORGANIZED HEALTH CARE EDUCATION/TRAINING PROGRAM
Payer: MEDICARE

## 2024-09-12 ENCOUNTER — ANESTHESIA (OUTPATIENT)
Dept: PERIOP | Facility: HOSPITAL | Age: 64
DRG: 405 | End: 2024-09-12
Payer: MEDICARE

## 2024-09-12 DIAGNOSIS — K86.89 PANCREATIC MASS: Primary | ICD-10-CM

## 2024-09-12 LAB
ALBUMIN SERPL BCG-MCNC: 2.8 G/DL (ref 3.5–5)
ALP SERPL-CCNC: 101 U/L (ref 34–104)
ALT SERPL W P-5'-P-CCNC: 260 U/L (ref 7–52)
ANION GAP SERPL CALCULATED.3IONS-SCNC: 10 MMOL/L (ref 4–13)
ANION GAP SERPL CALCULATED.3IONS-SCNC: 8 MMOL/L (ref 4–13)
AST SERPL W P-5'-P-CCNC: 198 U/L (ref 13–39)
BASE EXCESS BLDA CALC-SCNC: -10 MMOL/L
BASE EXCESS BLDA CALC-SCNC: -10.6 MMOL/L
BASE EXCESS BLDA CALC-SCNC: -11 MMOL/L (ref -2–3)
BASE EXCESS BLDA CALC-SCNC: -12.8 MMOL/L
BASE EXCESS BLDA CALC-SCNC: -5 MMOL/L (ref -2–3)
BASE EXCESS BLDA CALC-SCNC: -9 MMOL/L (ref -2–3)
BASOPHILS # BLD MANUAL: 0 THOUSAND/UL (ref 0–0.1)
BASOPHILS NFR MAR MANUAL: 0 % (ref 0–1)
BILIRUB DIRECT SERPL-MCNC: 0.25 MG/DL (ref 0–0.2)
BILIRUB SERPL-MCNC: 0.6 MG/DL (ref 0.2–1)
BUN SERPL-MCNC: 14 MG/DL (ref 5–25)
BUN SERPL-MCNC: 14 MG/DL (ref 5–25)
CA-I BLD-SCNC: 1.05 MMOL/L (ref 1.12–1.32)
CA-I BLD-SCNC: 1.08 MMOL/L (ref 1.12–1.32)
CA-I BLD-SCNC: 1.13 MMOL/L (ref 1.12–1.32)
CALCIUM SERPL-MCNC: 6 MG/DL (ref 8.4–10.2)
CALCIUM SERPL-MCNC: 7.3 MG/DL (ref 8.4–10.2)
CHLORIDE SERPL-SCNC: 117 MMOL/L (ref 96–108)
CHLORIDE SERPL-SCNC: 118 MMOL/L (ref 96–108)
CO2 SERPL-SCNC: 13 MMOL/L (ref 21–32)
CO2 SERPL-SCNC: 15 MMOL/L (ref 21–32)
CREAT SERPL-MCNC: 0.6 MG/DL (ref 0.6–1.3)
CREAT SERPL-MCNC: 0.64 MG/DL (ref 0.6–1.3)
EOSINOPHIL # BLD MANUAL: 0 THOUSAND/UL (ref 0–0.4)
EOSINOPHIL NFR BLD MANUAL: 0 % (ref 0–6)
ERYTHROCYTE [DISTWIDTH] IN BLOOD BY AUTOMATED COUNT: 14.6 % (ref 11.6–15.1)
ERYTHROCYTE [DISTWIDTH] IN BLOOD BY AUTOMATED COUNT: 14.6 % (ref 11.6–15.1)
GFR SERPL CREATININE-BSD FRML MDRD: 102 ML/MIN/1.73SQ M
GFR SERPL CREATININE-BSD FRML MDRD: 105 ML/MIN/1.73SQ M
GLUCOSE SERPL-MCNC: 145 MG/DL (ref 65–140)
GLUCOSE SERPL-MCNC: 153 MG/DL (ref 65–140)
GLUCOSE SERPL-MCNC: 154 MG/DL (ref 65–140)
GLUCOSE SERPL-MCNC: 157 MG/DL (ref 65–140)
GLUCOSE SERPL-MCNC: 168 MG/DL (ref 65–140)
HCO3 BLDA-SCNC: 12.4 MMOL/L (ref 22–28)
HCO3 BLDA-SCNC: 14.8 MMOL/L (ref 22–28)
HCO3 BLDA-SCNC: 15 MMOL/L (ref 22–28)
HCO3 BLDA-SCNC: 15.8 MMOL/L (ref 22–28)
HCO3 BLDA-SCNC: 17.9 MMOL/L (ref 22–28)
HCO3 BLDA-SCNC: 20.8 MMOL/L (ref 22–28)
HCT VFR BLD AUTO: 25.5 % (ref 36.5–49.3)
HCT VFR BLD AUTO: 26.6 % (ref 36.5–49.3)
HCT VFR BLD AUTO: 31.7 % (ref 36.5–49.3)
HCT VFR BLD CALC: 23 % (ref 36.5–49.3)
HCT VFR BLD CALC: 27 % (ref 36.5–49.3)
HCT VFR BLD CALC: 29 % (ref 36.5–49.3)
HGB BLD-MCNC: 10.1 G/DL (ref 12–17)
HGB BLD-MCNC: 8.1 G/DL (ref 12–17)
HGB BLD-MCNC: 8.5 G/DL (ref 12–17)
HGB BLDA-MCNC: 7.8 G/DL (ref 12–17)
HGB BLDA-MCNC: 9.2 G/DL (ref 12–17)
HGB BLDA-MCNC: 9.9 G/DL (ref 12–17)
LACTATE SERPL-SCNC: 2.7 MMOL/L (ref 0.5–2)
LACTATE SERPL-SCNC: 3.8 MMOL/L (ref 0.5–2)
LACTATE SERPL-SCNC: 3.8 MMOL/L (ref 0.5–2)
LACTATE SERPL-SCNC: 4.6 MMOL/L (ref 0.5–2)
LYMPHOCYTES # BLD AUTO: 0.35 THOUSAND/UL (ref 0.6–4.47)
LYMPHOCYTES # BLD AUTO: 1 % (ref 14–44)
MAGNESIUM SERPL-MCNC: 1.4 MG/DL (ref 1.9–2.7)
MCH RBC QN AUTO: 28.4 PG (ref 26.8–34.3)
MCH RBC QN AUTO: 28.5 PG (ref 26.8–34.3)
MCHC RBC AUTO-ENTMCNC: 31.9 G/DL (ref 31.4–37.4)
MCHC RBC AUTO-ENTMCNC: 32 G/DL (ref 31.4–37.4)
MCV RBC AUTO: 89 FL (ref 82–98)
MCV RBC AUTO: 89 FL (ref 82–98)
MONOCYTES # BLD AUTO: 0.35 THOUSAND/UL (ref 0–1.22)
MONOCYTES NFR BLD: 2 % (ref 4–12)
NASAL CANNULA: 3
NASAL CANNULA: 3
NEUTROPHILS # BLD MANUAL: 16.88 THOUSAND/UL (ref 1.85–7.62)
NEUTS BAND NFR BLD MANUAL: 5 % (ref 0–8)
NEUTS SEG NFR BLD AUTO: 91 % (ref 43–75)
O2 CT BLDA-SCNC: 12.6 ML/DL (ref 16–23)
O2 CT BLDA-SCNC: 13.7 ML/DL (ref 16–23)
O2 CT BLDA-SCNC: 15.2 ML/DL (ref 16–23)
OXYHGB MFR BLDA: 96.1 % (ref 94–97)
OXYHGB MFR BLDA: 96.1 % (ref 94–97)
OXYHGB MFR BLDA: 97.7 % (ref 94–97)
PCO2 BLD: 17 MMOL/L (ref 21–32)
PCO2 BLD: 19 MMOL/L (ref 21–32)
PCO2 BLD: 22 MMOL/L (ref 21–32)
PCO2 BLD: 37.2 MM HG (ref 36–44)
PCO2 BLD: 42 MM HG (ref 36–44)
PCO2 BLD: 42.5 MM HG (ref 36–44)
PCO2 BLDA: 27 MM HG (ref 36–44)
PCO2 BLDA: 30 MM HG (ref 36–44)
PCO2 BLDA: 31.1 MM HG (ref 36–44)
PH BLD: 7.23 [PH] (ref 7.35–7.45)
PH BLD: 7.23 [PH] (ref 7.35–7.45)
PH BLD: 7.3 [PH] (ref 7.35–7.45)
PH BLDA: 7.28 [PH] (ref 7.35–7.45)
PH BLDA: 7.29 [PH] (ref 7.35–7.45)
PH BLDA: 7.32 [PH] (ref 7.35–7.45)
PHOSPHATE SERPL-MCNC: 3.7 MG/DL (ref 2.3–4.1)
PLATELET # BLD AUTO: 305 THOUSANDS/UL (ref 149–390)
PLATELET # BLD AUTO: 350 THOUSANDS/UL (ref 149–390)
PLATELET BLD QL SMEAR: ADEQUATE
PMV BLD AUTO: 9.4 FL (ref 8.9–12.7)
PMV BLD AUTO: 9.8 FL (ref 8.9–12.7)
PO2 BLD: 186 MM HG (ref 75–129)
PO2 BLD: 188 MM HG (ref 75–129)
PO2 BLD: 196 MM HG (ref 75–129)
PO2 BLDA: 101.4 MM HG (ref 75–129)
PO2 BLDA: 137.7 MM HG (ref 75–129)
PO2 BLDA: 96.6 MM HG (ref 75–129)
POTASSIUM BLD-SCNC: 3.8 MMOL/L (ref 3.5–5.3)
POTASSIUM BLD-SCNC: 3.9 MMOL/L (ref 3.5–5.3)
POTASSIUM BLD-SCNC: 4 MMOL/L (ref 3.5–5.3)
POTASSIUM SERPL-SCNC: 4 MMOL/L (ref 3.5–5.3)
POTASSIUM SERPL-SCNC: 4.3 MMOL/L (ref 3.5–5.3)
PROT SERPL-MCNC: 3.9 G/DL (ref 6.4–8.4)
RBC # BLD AUTO: 2.99 MILLION/UL (ref 3.88–5.62)
RBC # BLD AUTO: 3.55 MILLION/UL (ref 3.88–5.62)
RBC MORPH BLD: NORMAL
SAO2 % BLD FROM PO2: 100 % (ref 60–85)
SAO2 % BLD FROM PO2: 100 % (ref 60–85)
SAO2 % BLD FROM PO2: 99 % (ref 60–85)
SODIUM BLD-SCNC: 141 MMOL/L (ref 136–145)
SODIUM BLD-SCNC: 142 MMOL/L (ref 136–145)
SODIUM BLD-SCNC: 144 MMOL/L (ref 136–145)
SODIUM SERPL-SCNC: 140 MMOL/L (ref 135–147)
SODIUM SERPL-SCNC: 141 MMOL/L (ref 135–147)
SPECIMEN SOURCE: ABNORMAL
VARIANT LYMPHS # BLD AUTO: 1 %
WBC # BLD AUTO: 17.58 THOUSAND/UL (ref 4.31–10.16)
WBC # BLD AUTO: 19.45 THOUSAND/UL (ref 4.31–10.16)

## 2024-09-12 PROCEDURE — 87102 FUNGUS ISOLATION CULTURE: CPT | Performed by: STUDENT IN AN ORGANIZED HEALTH CARE EDUCATION/TRAINING PROGRAM

## 2024-09-12 PROCEDURE — 85018 HEMOGLOBIN: CPT

## 2024-09-12 PROCEDURE — 82805 BLOOD GASES W/O2 SATURATION: CPT

## 2024-09-12 PROCEDURE — 88309 TISSUE EXAM BY PATHOLOGIST: CPT | Performed by: PATHOLOGY

## 2024-09-12 PROCEDURE — 83735 ASSAY OF MAGNESIUM: CPT | Performed by: STUDENT IN AN ORGANIZED HEALTH CARE EDUCATION/TRAINING PROGRAM

## 2024-09-12 PROCEDURE — 88341 IMHCHEM/IMCYTCHM EA ADD ANTB: CPT | Performed by: PATHOLOGY

## 2024-09-12 PROCEDURE — 99222 1ST HOSP IP/OBS MODERATE 55: CPT | Performed by: STUDENT IN AN ORGANIZED HEALTH CARE EDUCATION/TRAINING PROGRAM

## 2024-09-12 PROCEDURE — 86920 COMPATIBILITY TEST SPIN: CPT

## 2024-09-12 PROCEDURE — 87075 CULTR BACTERIA EXCEPT BLOOD: CPT | Performed by: STUDENT IN AN ORGANIZED HEALTH CARE EDUCATION/TRAINING PROGRAM

## 2024-09-12 PROCEDURE — 80048 BASIC METABOLIC PNL TOTAL CA: CPT | Performed by: NURSE ANESTHETIST, CERTIFIED REGISTERED

## 2024-09-12 PROCEDURE — 87186 SC STD MICRODIL/AGAR DIL: CPT | Performed by: STUDENT IN AN ORGANIZED HEALTH CARE EDUCATION/TRAINING PROGRAM

## 2024-09-12 PROCEDURE — 85027 COMPLETE CBC AUTOMATED: CPT | Performed by: STUDENT IN AN ORGANIZED HEALTH CARE EDUCATION/TRAINING PROGRAM

## 2024-09-12 PROCEDURE — 0FB90ZZ EXCISION OF COMMON BILE DUCT, OPEN APPROACH: ICD-10-PCS | Performed by: STUDENT IN AN ORGANIZED HEALTH CARE EDUCATION/TRAINING PROGRAM

## 2024-09-12 PROCEDURE — 82803 BLOOD GASES ANY COMBINATION: CPT

## 2024-09-12 PROCEDURE — 88304 TISSUE EXAM BY PATHOLOGIST: CPT | Performed by: PATHOLOGY

## 2024-09-12 PROCEDURE — 85014 HEMATOCRIT: CPT

## 2024-09-12 PROCEDURE — 48150 PARTIAL REMOVAL OF PANCREAS: CPT | Performed by: STUDENT IN AN ORGANIZED HEALTH CARE EDUCATION/TRAINING PROGRAM

## 2024-09-12 PROCEDURE — 83605 ASSAY OF LACTIC ACID: CPT | Performed by: STUDENT IN AN ORGANIZED HEALTH CARE EDUCATION/TRAINING PROGRAM

## 2024-09-12 PROCEDURE — 0D9670Z DRAINAGE OF STOMACH WITH DRAINAGE DEVICE, VIA NATURAL OR ARTIFICIAL OPENING: ICD-10-PCS | Performed by: STUDENT IN AN ORGANIZED HEALTH CARE EDUCATION/TRAINING PROGRAM

## 2024-09-12 PROCEDURE — 80048 BASIC METABOLIC PNL TOTAL CA: CPT | Performed by: STUDENT IN AN ORGANIZED HEALTH CARE EDUCATION/TRAINING PROGRAM

## 2024-09-12 PROCEDURE — 85007 BL SMEAR W/DIFF WBC COUNT: CPT | Performed by: NURSE ANESTHETIST, CERTIFIED REGISTERED

## 2024-09-12 PROCEDURE — 0DB90ZZ EXCISION OF DUODENUM, OPEN APPROACH: ICD-10-PCS | Performed by: STUDENT IN AN ORGANIZED HEALTH CARE EDUCATION/TRAINING PROGRAM

## 2024-09-12 PROCEDURE — 85027 COMPLETE CBC AUTOMATED: CPT | Performed by: NURSE ANESTHETIST, CERTIFIED REGISTERED

## 2024-09-12 PROCEDURE — 82805 BLOOD GASES W/O2 SATURATION: CPT | Performed by: STUDENT IN AN ORGANIZED HEALTH CARE EDUCATION/TRAINING PROGRAM

## 2024-09-12 PROCEDURE — 88331 PATH CONSLTJ SURG 1 BLK 1SPC: CPT | Performed by: PATHOLOGY

## 2024-09-12 PROCEDURE — 3E0R3BZ INTRODUCTION OF ANESTHETIC AGENT INTO SPINAL CANAL, PERCUTANEOUS APPROACH: ICD-10-PCS | Performed by: STUDENT IN AN ORGANIZED HEALTH CARE EDUCATION/TRAINING PROGRAM

## 2024-09-12 PROCEDURE — 88342 IMHCHEM/IMCYTCHM 1ST ANTB: CPT | Performed by: PATHOLOGY

## 2024-09-12 PROCEDURE — 88305 TISSUE EXAM BY PATHOLOGIST: CPT | Performed by: PATHOLOGY

## 2024-09-12 PROCEDURE — 0FT40ZZ RESECTION OF GALLBLADDER, OPEN APPROACH: ICD-10-PCS | Performed by: STUDENT IN AN ORGANIZED HEALTH CARE EDUCATION/TRAINING PROGRAM

## 2024-09-12 PROCEDURE — 82330 ASSAY OF CALCIUM: CPT

## 2024-09-12 PROCEDURE — 84295 ASSAY OF SERUM SODIUM: CPT

## 2024-09-12 PROCEDURE — 0FBG0ZZ EXCISION OF PANCREAS, OPEN APPROACH: ICD-10-PCS | Performed by: STUDENT IN AN ORGANIZED HEALTH CARE EDUCATION/TRAINING PROGRAM

## 2024-09-12 PROCEDURE — 88307 TISSUE EXAM BY PATHOLOGIST: CPT | Performed by: PATHOLOGY

## 2024-09-12 PROCEDURE — 87070 CULTURE OTHR SPECIMN AEROBIC: CPT | Performed by: STUDENT IN AN ORGANIZED HEALTH CARE EDUCATION/TRAINING PROGRAM

## 2024-09-12 PROCEDURE — 84100 ASSAY OF PHOSPHORUS: CPT | Performed by: STUDENT IN AN ORGANIZED HEALTH CARE EDUCATION/TRAINING PROGRAM

## 2024-09-12 PROCEDURE — 87077 CULTURE AEROBIC IDENTIFY: CPT | Performed by: STUDENT IN AN ORGANIZED HEALTH CARE EDUCATION/TRAINING PROGRAM

## 2024-09-12 PROCEDURE — 83605 ASSAY OF LACTIC ACID: CPT

## 2024-09-12 PROCEDURE — 80076 HEPATIC FUNCTION PANEL: CPT

## 2024-09-12 PROCEDURE — 82947 ASSAY GLUCOSE BLOOD QUANT: CPT

## 2024-09-12 PROCEDURE — 84132 ASSAY OF SERUM POTASSIUM: CPT

## 2024-09-12 PROCEDURE — 87205 SMEAR GRAM STAIN: CPT | Performed by: STUDENT IN AN ORGANIZED HEALTH CARE EDUCATION/TRAINING PROGRAM

## 2024-09-12 RX ORDER — DEXAMETHASONE SODIUM PHOSPHATE 10 MG/ML
INJECTION, SOLUTION INTRAMUSCULAR; INTRAVENOUS AS NEEDED
Status: DISCONTINUED | OUTPATIENT
Start: 2024-09-12 | End: 2024-09-12

## 2024-09-12 RX ORDER — ROPIVACAINE HYDROCHLORIDE 2 MG/ML
INJECTION, SOLUTION EPIDURAL; INFILTRATION; PERINEURAL AS NEEDED
Status: DISCONTINUED | OUTPATIENT
Start: 2024-09-12 | End: 2024-09-12

## 2024-09-12 RX ORDER — SODIUM CHLORIDE 9 MG/ML
INJECTION, SOLUTION INTRAVENOUS CONTINUOUS PRN
Status: DISCONTINUED | OUTPATIENT
Start: 2024-09-12 | End: 2024-09-12

## 2024-09-12 RX ORDER — ALBUMIN, HUMAN INJ 5% 5 %
50 SOLUTION INTRAVENOUS ONCE
Status: COMPLETED | OUTPATIENT
Start: 2024-09-12 | End: 2024-09-13

## 2024-09-12 RX ORDER — SODIUM CHLORIDE, SODIUM LACTATE, POTASSIUM CHLORIDE, CALCIUM CHLORIDE 600; 310; 30; 20 MG/100ML; MG/100ML; MG/100ML; MG/100ML
INJECTION, SOLUTION INTRAVENOUS CONTINUOUS PRN
Status: DISCONTINUED | OUTPATIENT
Start: 2024-09-12 | End: 2024-09-12

## 2024-09-12 RX ORDER — PANTOPRAZOLE SODIUM 40 MG/10ML
40 INJECTION, POWDER, LYOPHILIZED, FOR SOLUTION INTRAVENOUS
Status: DISCONTINUED | OUTPATIENT
Start: 2024-09-13 | End: 2024-09-19

## 2024-09-12 RX ORDER — FENTANYL CITRATE 50 UG/ML
INJECTION, SOLUTION INTRAMUSCULAR; INTRAVENOUS AS NEEDED
Status: DISCONTINUED | OUTPATIENT
Start: 2024-09-12 | End: 2024-09-12

## 2024-09-12 RX ORDER — SODIUM CHLORIDE, SODIUM GLUCONATE, SODIUM ACETATE, POTASSIUM CHLORIDE, MAGNESIUM CHLORIDE, SODIUM PHOSPHATE, DIBASIC, AND POTASSIUM PHOSPHATE .53; .5; .37; .037; .03; .012; .00082 G/100ML; G/100ML; G/100ML; G/100ML; G/100ML; G/100ML; G/100ML
1000 INJECTION, SOLUTION INTRAVENOUS ONCE
Status: COMPLETED | OUTPATIENT
Start: 2024-09-12 | End: 2024-09-13

## 2024-09-12 RX ORDER — SODIUM CHLORIDE, SODIUM LACTATE, POTASSIUM CHLORIDE, CALCIUM CHLORIDE 600; 310; 30; 20 MG/100ML; MG/100ML; MG/100ML; MG/100ML
125 INJECTION, SOLUTION INTRAVENOUS CONTINUOUS
Status: DISCONTINUED | OUTPATIENT
Start: 2024-09-12 | End: 2024-09-12

## 2024-09-12 RX ORDER — HYDROMORPHONE HCL/PF 1 MG/ML
SYRINGE (ML) INJECTION AS NEEDED
Status: DISCONTINUED | OUTPATIENT
Start: 2024-09-12 | End: 2024-09-12

## 2024-09-12 RX ORDER — MAGNESIUM HYDROXIDE 1200 MG/15ML
LIQUID ORAL AS NEEDED
Status: DISCONTINUED | OUTPATIENT
Start: 2024-09-12 | End: 2024-09-12 | Stop reason: HOSPADM

## 2024-09-12 RX ORDER — LABETALOL HYDROCHLORIDE 5 MG/ML
10 INJECTION, SOLUTION INTRAVENOUS EVERY 6 HOURS PRN
Status: DISCONTINUED | OUTPATIENT
Start: 2024-09-12 | End: 2024-09-19 | Stop reason: HOSPADM

## 2024-09-12 RX ORDER — PROPOFOL 10 MG/ML
INJECTION, EMULSION INTRAVENOUS AS NEEDED
Status: DISCONTINUED | OUTPATIENT
Start: 2024-09-12 | End: 2024-09-12

## 2024-09-12 RX ORDER — HEPARIN SODIUM 5000 [USP'U]/ML
INJECTION, SOLUTION INTRAVENOUS; SUBCUTANEOUS AS NEEDED
Status: DISCONTINUED | OUTPATIENT
Start: 2024-09-12 | End: 2024-09-12

## 2024-09-12 RX ORDER — ALBUMIN, HUMAN INJ 5% 5 %
SOLUTION INTRAVENOUS CONTINUOUS PRN
Status: DISCONTINUED | OUTPATIENT
Start: 2024-09-12 | End: 2024-09-12

## 2024-09-12 RX ORDER — ONDANSETRON 2 MG/ML
INJECTION INTRAMUSCULAR; INTRAVENOUS AS NEEDED
Status: DISCONTINUED | OUTPATIENT
Start: 2024-09-12 | End: 2024-09-12

## 2024-09-12 RX ORDER — MAGNESIUM SULFATE HEPTAHYDRATE 40 MG/ML
4 INJECTION, SOLUTION INTRAVENOUS ONCE
Status: COMPLETED | OUTPATIENT
Start: 2024-09-12 | End: 2024-09-13

## 2024-09-12 RX ORDER — LIDOCAINE HYDROCHLORIDE 10 MG/ML
INJECTION, SOLUTION EPIDURAL; INFILTRATION; INTRACAUDAL; PERINEURAL AS NEEDED
Status: DISCONTINUED | OUTPATIENT
Start: 2024-09-12 | End: 2024-09-12

## 2024-09-12 RX ORDER — SODIUM CHLORIDE, SODIUM GLUCONATE, SODIUM ACETATE, POTASSIUM CHLORIDE, MAGNESIUM CHLORIDE, SODIUM PHOSPHATE, DIBASIC, AND POTASSIUM PHOSPHATE .53; .5; .37; .037; .03; .012; .00082 G/100ML; G/100ML; G/100ML; G/100ML; G/100ML; G/100ML; G/100ML
125 INJECTION, SOLUTION INTRAVENOUS CONTINUOUS
Status: DISCONTINUED | OUTPATIENT
Start: 2024-09-12 | End: 2024-09-16

## 2024-09-12 RX ORDER — HEPARIN SODIUM 5000 [USP'U]/ML
5000 INJECTION, SOLUTION INTRAVENOUS; SUBCUTANEOUS EVERY 8 HOURS SCHEDULED
Status: DISCONTINUED | OUTPATIENT
Start: 2024-09-13 | End: 2024-09-13

## 2024-09-12 RX ORDER — LIDOCAINE HYDROCHLORIDE AND EPINEPHRINE 15; 5 MG/ML; UG/ML
INJECTION, SOLUTION EPIDURAL
Status: COMPLETED | OUTPATIENT
Start: 2024-09-12 | End: 2024-09-12

## 2024-09-12 RX ORDER — PHENYLEPHRINE HCL IN 0.9% NACL 1 MG/10 ML
SYRINGE (ML) INTRAVENOUS AS NEEDED
Status: DISCONTINUED | OUTPATIENT
Start: 2024-09-12 | End: 2024-09-12

## 2024-09-12 RX ORDER — SODIUM CHLORIDE, SODIUM GLUCONATE, SODIUM ACETATE, POTASSIUM CHLORIDE, MAGNESIUM CHLORIDE, SODIUM PHOSPHATE, DIBASIC, AND POTASSIUM PHOSPHATE .53; .5; .37; .037; .03; .012; .00082 G/100ML; G/100ML; G/100ML; G/100ML; G/100ML; G/100ML; G/100ML
1000 INJECTION, SOLUTION INTRAVENOUS ONCE
Status: COMPLETED | OUTPATIENT
Start: 2024-09-12 | End: 2024-09-12

## 2024-09-12 RX ORDER — MIDAZOLAM HYDROCHLORIDE 2 MG/2ML
INJECTION, SOLUTION INTRAMUSCULAR; INTRAVENOUS AS NEEDED
Status: DISCONTINUED | OUTPATIENT
Start: 2024-09-12 | End: 2024-09-12

## 2024-09-12 RX ORDER — ROCURONIUM BROMIDE 10 MG/ML
INJECTION, SOLUTION INTRAVENOUS AS NEEDED
Status: DISCONTINUED | OUTPATIENT
Start: 2024-09-12 | End: 2024-09-12

## 2024-09-12 RX ORDER — CALCIUM CHLORIDE 100 MG/ML
INJECTION INTRAVENOUS; INTRAVENTRICULAR AS NEEDED
Status: DISCONTINUED | OUTPATIENT
Start: 2024-09-12 | End: 2024-09-12

## 2024-09-12 RX ORDER — ALBUMIN, HUMAN INJ 5% 5 %
25 SOLUTION INTRAVENOUS ONCE
Status: DISCONTINUED | OUTPATIENT
Start: 2024-09-12 | End: 2024-09-12

## 2024-09-12 RX ADMIN — ALBUMIN (HUMAN): 12.5 INJECTION, SOLUTION INTRAVENOUS at 10:41

## 2024-09-12 RX ADMIN — Medication 100 MCG: at 10:16

## 2024-09-12 RX ADMIN — PIPERACILLIN SODIUM AND TAZOBACTAM SODIUM 4.5 G: 36; 4.5 INJECTION, POWDER, LYOPHILIZED, FOR SOLUTION INTRAVENOUS at 08:34

## 2024-09-12 RX ADMIN — ROCURONIUM BROMIDE 10 MG: 50 INJECTION INTRAVENOUS at 15:48

## 2024-09-12 RX ADMIN — ROCURONIUM BROMIDE 20 MG: 50 INJECTION INTRAVENOUS at 14:06

## 2024-09-12 RX ADMIN — MIDAZOLAM 2 MG: 1 INJECTION INTRAMUSCULAR; INTRAVENOUS at 07:40

## 2024-09-12 RX ADMIN — ROCURONIUM BROMIDE 20 MG: 50 INJECTION INTRAVENOUS at 08:39

## 2024-09-12 RX ADMIN — ROCURONIUM BROMIDE 10 MG: 50 INJECTION INTRAVENOUS at 15:05

## 2024-09-12 RX ADMIN — LIDOCAINE HYDROCHLORIDE AND EPINEPHRINE 3 ML: 15; 5 INJECTION, SOLUTION EPIDURAL at 07:42

## 2024-09-12 RX ADMIN — Medication 100 MCG: at 10:44

## 2024-09-12 RX ADMIN — Medication 150 MCG: at 08:23

## 2024-09-12 RX ADMIN — HYDROMORPHONE HYDROCHLORIDE 0.5 MG: 1 INJECTION, SOLUTION INTRAMUSCULAR; INTRAVENOUS; SUBCUTANEOUS at 15:21

## 2024-09-12 RX ADMIN — SODIUM CHLORIDE, SODIUM LACTATE, POTASSIUM CHLORIDE, AND CALCIUM CHLORIDE 125 ML/HR: .6; .31; .03; .02 INJECTION, SOLUTION INTRAVENOUS at 17:21

## 2024-09-12 RX ADMIN — ALBUMIN (HUMAN): 12.5 INJECTION, SOLUTION INTRAVENOUS at 12:30

## 2024-09-12 RX ADMIN — ALBUMIN (HUMAN) 50 G: 12.5 INJECTION, SOLUTION INTRAVENOUS at 23:37

## 2024-09-12 RX ADMIN — ROCURONIUM BROMIDE 50 MG: 50 INJECTION INTRAVENOUS at 08:05

## 2024-09-12 RX ADMIN — ALBUMIN (HUMAN): 12.5 INJECTION, SOLUTION INTRAVENOUS at 14:58

## 2024-09-12 RX ADMIN — ROPIVACAINE HYDROCHLORIDE 3 ML: 2 INJECTION EPIDURAL; INFILTRATION; PERINEURAL at 15:39

## 2024-09-12 RX ADMIN — PIPERACILLIN SODIUM AND TAZOBACTAM SODIUM 4.5 G: 36; 4.5 INJECTION, POWDER, LYOPHILIZED, FOR SOLUTION INTRAVENOUS at 14:27

## 2024-09-12 RX ADMIN — HYDROMORPHONE HYDROCHLORIDE 0.5 MG: 1 INJECTION, SOLUTION INTRAMUSCULAR; INTRAVENOUS; SUBCUTANEOUS at 16:24

## 2024-09-12 RX ADMIN — FENTANYL CITRATE 50 MCG: 50 INJECTION INTRAMUSCULAR; INTRAVENOUS at 08:37

## 2024-09-12 RX ADMIN — SODIUM CHLORIDE, SODIUM GLUCONATE, SODIUM ACETATE, POTASSIUM CHLORIDE, MAGNESIUM CHLORIDE, SODIUM PHOSPHATE, DIBASIC, AND POTASSIUM PHOSPHATE 125 ML/HR: .53; .5; .37; .037; .03; .012; .00082 INJECTION, SOLUTION INTRAVENOUS at 17:38

## 2024-09-12 RX ADMIN — Medication 100 MCG: at 08:56

## 2024-09-12 RX ADMIN — SODIUM CHLORIDE, SODIUM GLUCONATE, SODIUM ACETATE, POTASSIUM CHLORIDE, MAGNESIUM CHLORIDE, SODIUM PHOSPHATE, DIBASIC, AND POTASSIUM PHOSPHATE 1000 ML: .53; .5; .37; .037; .03; .012; .00082 INJECTION, SOLUTION INTRAVENOUS at 18:30

## 2024-09-12 RX ADMIN — ONDANSETRON 4 MG: 2 INJECTION INTRAMUSCULAR; INTRAVENOUS at 16:22

## 2024-09-12 RX ADMIN — HEPARIN SODIUM 5000 UNITS: 5000 INJECTION INTRAVENOUS; SUBCUTANEOUS at 08:46

## 2024-09-12 RX ADMIN — ROCURONIUM BROMIDE 20 MG: 50 INJECTION INTRAVENOUS at 12:44

## 2024-09-12 RX ADMIN — LABETALOL HYDROCHLORIDE 10 MG: 5 INJECTION, SOLUTION INTRAVENOUS at 19:05

## 2024-09-12 RX ADMIN — ROCURONIUM BROMIDE 30 MG: 50 INJECTION INTRAVENOUS at 10:39

## 2024-09-12 RX ADMIN — CALCIUM CHLORIDE 1 G: 100 INJECTION INTRAVENOUS; INTRAVENTRICULAR at 16:08

## 2024-09-12 RX ADMIN — MAGNESIUM SULFATE HEPTAHYDRATE 4 G: 40 INJECTION, SOLUTION INTRAVENOUS at 18:21

## 2024-09-12 RX ADMIN — Medication 100 MCG: at 13:56

## 2024-09-12 RX ADMIN — Medication 100 MCG: at 09:14

## 2024-09-12 RX ADMIN — ROPIVACAINE HYDROCHLORIDE: 5 INJECTION EPIDURAL; INFILTRATION; PERINEURAL at 18:11

## 2024-09-12 RX ADMIN — SODIUM CHLORIDE: 0.9 INJECTION, SOLUTION INTRAVENOUS at 08:11

## 2024-09-12 RX ADMIN — ROPIVACAINE HYDROCHLORIDE 3 ML: 2 INJECTION EPIDURAL; INFILTRATION; PERINEURAL at 16:18

## 2024-09-12 RX ADMIN — PIPERACILLIN SODIUM AND TAZOBACTAM SODIUM 4.5 G: 36; 4.5 INJECTION, POWDER, LYOPHILIZED, FOR SOLUTION INTRAVENOUS at 10:33

## 2024-09-12 RX ADMIN — DEXAMETHASONE SODIUM PHOSPHATE 5 MG: 10 INJECTION, SOLUTION INTRAMUSCULAR; INTRAVENOUS at 08:36

## 2024-09-12 RX ADMIN — PIPERACILLIN SODIUM AND TAZOBACTAM SODIUM 4.5 G: 36; 4.5 INJECTION, POWDER, LYOPHILIZED, FOR SOLUTION INTRAVENOUS at 12:33

## 2024-09-12 RX ADMIN — PHENYLEPHRINE HYDROCHLORIDE 50 MCG/MIN: 10 INJECTION INTRAVENOUS at 08:23

## 2024-09-12 RX ADMIN — SUGAMMADEX 200 MG: 100 INJECTION, SOLUTION INTRAVENOUS at 16:15

## 2024-09-12 RX ADMIN — FENTANYL CITRATE 50 MCG: 50 INJECTION INTRAMUSCULAR; INTRAVENOUS at 08:04

## 2024-09-12 RX ADMIN — PIPERACILLIN SODIUM AND TAZOBACTAM SODIUM 4.5 G: 36; 4.5 INJECTION, POWDER, LYOPHILIZED, FOR SOLUTION INTRAVENOUS at 16:19

## 2024-09-12 RX ADMIN — PROPOFOL 200 MG: 10 INJECTION, EMULSION INTRAVENOUS at 08:04

## 2024-09-12 RX ADMIN — SODIUM CHLORIDE, SODIUM GLUCONATE, SODIUM ACETATE, POTASSIUM CHLORIDE, MAGNESIUM CHLORIDE, SODIUM PHOSPHATE, DIBASIC, AND POTASSIUM PHOSPHATE 1000 ML: .53; .5; .37; .037; .03; .012; .00082 INJECTION, SOLUTION INTRAVENOUS at 20:55

## 2024-09-12 RX ADMIN — SODIUM CHLORIDE, SODIUM LACTATE, POTASSIUM CHLORIDE, AND CALCIUM CHLORIDE: .6; .31; .03; .02 INJECTION, SOLUTION INTRAVENOUS at 07:57

## 2024-09-12 NOTE — ANESTHESIA PREPROCEDURE EVALUATION
Procedure:  WHIPPLE PROCEDURE/PANCREATICO-DUODENECTOMY (Abdomen)    Relevant Problems   CARDIO   (+) Aortic valve stenosis   (+) Benign essential hypertension   (+) Coronary artery disease involving native coronary artery of native heart without angina pectoris   (+) Hypercholesterolemia      GI/HEPATIC   (+) GERD (gastroesophageal reflux disease)      /RENAL   (+) MOOKIE (acute kidney injury) (HCC)   (+) BPH (benign prostatic hyperplasia)   (+) Prostate cancer (HCC)      Interpretation Summary         Left Ventricle: Left ventricular cavity size is normal. There is mild concentric hypertrophy. The left ventricular ejection fraction is 65%. Systolic function is normal. Wall motion is normal.    Aortic Valve: The aortic valve is trileaflet. The leaflets are mildly calcified. There is moderately reduced mobility. There is mild regurgitation. There is moderate stenosis. The aortic valve peak velocity is 3.47 m/s. The aortic valve mean gradient is 31 mmHg.    Mitral Valve: There is mild annular calcification. There is mild regurgitation.    Prior study date: 6/30/2022. Mild progression of aortic valve stenosis.  Physical Exam    Airway    Mallampati score: II  TM Distance: >3 FB  Neck ROM: full     Dental       Cardiovascular  Rhythm: regular, No weak pulses    Pulmonary   No stridor    Other Findings        Anesthesia Plan  ASA Score- 3     Anesthesia Type- general with ASA Monitors.         Additional Monitors:     Airway Plan: ETT.           Plan Factors-    Chart reviewed. EKG reviewed.  Existing labs reviewed. Patient summary reviewed.          Obstructive sleep apnea risk education given perioperatively.        Induction- intravenous.    Postoperative Plan- Plan for postoperative opioid use. Planned trial extubation    Perioperative Resuscitation Plan - Level 1 - Full Code.       Informed Consent- Anesthetic plan and risks discussed with patient.  I personally reviewed this patient with the CRNA. Discussed and  agreed on the Anesthesia Plan with the CRNA..

## 2024-09-12 NOTE — INTERVAL H&P NOTE
H&P reviewed. After examining the patient I find no changes in the patients condition since the H&P had been written.    Vitals:    09/12/24 0634   BP: 118/63   Pulse: 71   Resp: 18   Temp: 97.8 °F (36.6 °C)   SpO2: 99%

## 2024-09-12 NOTE — INTERIM OP NOTE
WHIPPLE PROCEDURE/PANCREATICO-DUODENECTOMY  Postoperative Note  PATIENT NAME: Isaac Kramer  : 1960  MRN: [id number]  [facility] OR ROOM 08    Surgery Date: 2024    Preop Diagnosis:  Pancreatic mass [K86.89]    Post-Op Diagnosis Codes:     * Pancreatic mass [K86.89]    Procedure(s) (LRB):  WHIPPLE PROCEDURE/PANCREATICO-DUODENECTOMY (N/A)    Surgeons and Role:     * Marika Ac MD - Primary     * Sidney Collins MD - Assisting    Specimens:  ID Type Source Tests Collected by Time Destination   1 : Gallbladder Tissue Gallbladder TISSUE EXAM Marika Ac MD 2024 0941    2 : Portal Lymph Node Tissue Lymph Node TISSUE EXAM Marika Ac MD 2024 1037    3 : Whipple; SEE COMMENTS Tissue Pancreas TISSUE EXAM Marika Ac MD 2024 1215    4 : Portacaval node Tissue Lymph Node TISSUE EXAM Marika Ac MD 2024 1312    5 : Additional Uncinate Tissue Pancreas TISSUE EXAM Marika Ac MD 2024 1348    A : Bile duct Tissue Common Bile Duct ANAEROBIC CULTURE AND GRAM STAIN, FUNGAL CULTURE, CULTURE, TISSUE AND GRAM STAIN Marika Ac MD 2024 1041        Estimated Blood Loss:   Minimal    Anesthesia Type:   General w/ Epidural     Findings:   Uncinate margin with atypical ducts; remaining RP tissue dissected off SMA and sent for routine analysis. Hepatic and Panc ducts negative for carcinoma on frozen. Otherwise routine whipple.     Complications:   None      SIGNATURE: Marika Ac MD   DATE: 2024   TIME: 4:22 PM

## 2024-09-12 NOTE — ANESTHESIA POSTPROCEDURE EVALUATION
Post-Op Assessment Note    CV Status:  Stable  Pain Score: 0    Pain management: adequate       Mental Status:  Awake and alert   Hydration Status:  Stable   PONV Controlled:  None   Airway Patency:  Patent     Post Op Vitals Reviewed: Yes    No anethesia notable event occurred.    Staff: CRNA               BP   154/68   Temp   98   Pulse  84   Resp   20   SpO2   98

## 2024-09-12 NOTE — CONSULTS
Consultation - Critical Care/ICU   Name: Isaac Kramer 63 y.o. male I MRN: [id number]  Unit/Bed#: MICU 04 I Date of Admission: 9/12/2024   Date of Service: 9/12/2024 I Hospital Day: 0   Consults  Physician Requesting Evaluation: Marika Ac MD   Reason for Evaluation / Principal Problem: post-op whipple        Assessment & Plan     Neuro:   Diagnosis: post-op analgesia  Analgesia: PCEA  Regulate sleep/wake cycle   Delirium precautions  CAM-ICU daily  Trend neuro exam     CV:   Diagnosis: history of HTN, CAD  Hold home meds, may require prn antihypertensives  Monitor on telemetry   Home regimen: asa 81mg daily, lipitor 20mg daily, lisinopril 20mg daily    Pulm:  No active issues  Pulmonary toileting, incentive spirometry    GI:   Diagnosis: pancreatic mass, POD #0 s/p whipple  NGT to LIWS, strict npo  Monitor SEGUN drain output  Care per surgical oncology team  GI ppx: Pantoprazole IV   Bowel regimen: None currently    :   No active issues  Baseline Cr: 0.6-0.7   Current Cr: 0.60   Carr in place for strict I/O   Trend BMP     F/E/N:   F: isolyte 125cc/hr  E: Replete electrolytes for goal K >4, Phos >3, Mg >2  N: NPO    Heme/Onc:   Diagnosis: ABLA   Trend Hgb and Plts  VTE ppx: Heparin and SCDs    Endo:   No acute issues  BG goal 140-180     ID:   No active issues  Trend fever curve and WBC count     MSK/Skin:   Diagnosis: surgical incision care  Care per primary surgical team  PT/OT   Q2h turning and repositioning     Lines/Tubes/Drains:   A line day 1    Disposition: Critical care    History of Present Illness   Isaac Kramer is a 63 y.o. with pmhx of CAD, HTN, prostate cancer, gerd, HLD who presents with to surgical ICU after whipple for pancreatic mass. He currently has no complaints, states pain is well controlled. He denies difficulties breathing, chest pain, fevers, chills, abdominal pain.     History obtained from chart review and the patient.  Review of Systems: See HPI for Review of  Systems    Historical Information   Past Medical History:  2014: Benign essential hypertension  No date: CAD (coronary artery disease)      Comment:  s/p NAVA prox LAD and D1 2022  No date: Cancer (HCC)      Comment:  Prostate  2022: Coronary artery disease involving native coronary artery   of native heart without angina pectoris  2024: Enteritis  No date: GERD (gastroesophageal reflux disease)  No date: Hyperlipidemia  No date: Hypertension  No date: Other chest pain  No date: Palpitations  2023: Prostate cancer (HCC)  No date: Sleep apnea Past Surgical History:  2022: CARDIAC CATHETERIZATION; Left      Comment:  Procedure: Cardiac catheterization-left heart                catheterization;  Surgeon: Sai Henry MD;  Location:                [facility];  Service: Cardiology  2022: CARDIAC CATHETERIZATION; N/A      Comment:  Procedure: Cardiac pci;  Surgeon: Sai Henry MD;                 Location: [facility];  Service: Cardiology  No date: HERNIA REPAIR  2023: SC LAPS SURG [id number] RPBIC RAD W/NRV SPARING ROBOT; N/A      Comment:  Procedure: PROSTATECTOMY RADICAL & PELVIC LYMPH NODE                DISSECTION  W/ ROBOT;  Surgeon: Otoniel Harmon MD;               Location: [facility];  Service: Urology  2023: SC PROSTATE NEEDLE BIOPSY ANY APPROACH; N/A      Comment:  Procedure: TRANSRECTAL MRI FUSION BIOPSY PROSTATE;                 Surgeon: Milan Arellano MD;  Location: [facility];                 Service: Urology   Current Outpatient Medications   Medication Instructions    aspirin 81 mg, Oral, Daily    atorvastatin (LIPITOR) 20 mg, Oral, Daily    lisinopril (ZESTRIL) 20 mg, Oral, Daily    No Known Allergies   Social History     Tobacco Use    Smoking status: Former     Current packs/day: 0.00     Types: Cigarettes     Quit date: 2022     Years since quittin.7    Smokeless tobacco: Never   Vaping Use    Vaping status: Never  Used   Substance Use Topics    Alcohol use: Not Currently    Drug use: Never    Family History   Problem Relation Age of Onset    No Known Problems Mother     No Known Problems Father           Objective                          Vitals I/O      Most Recent Min/Max in 24hrs   Temp 97.8 °F (36.6 °C) Temp  Min: 97.8 °F (36.6 °C)  Max: 97.8 °F (36.6 °C)   Pulse 71 Pulse  Min: 71  Max: 71   Resp 18 Resp  Min: 18  Max: 18   /63 BP  Min: 118/63  Max: 118/63   O2 Sat 99 % SpO2  Min: 99 %  Max: 99 %      Intake/Output Summary (Last 24 hours) at 9/12/2024 1722  Last data filed at 9/12/2024 1630  Gross per 24 hour   Intake 61823 ml   Output 855 ml   Net 9495 ml       Diet NPO    Invasive Monitoring   Arterial Line  [place] BP    No data recorded   MAP    No data recorded           Physical Exam   Physical Exam  Vitals and nursing note reviewed.   Eyes:      Extraocular Movements: Extraocular movements intact.      Conjunctiva/sclera: Conjunctivae normal.      Pupils: Pupils are equal, round, and reactive to light.   Skin:     General: Skin is warm.      Capillary Refill: Capillary refill takes less than 2 seconds.   HENT:      Head: Normocephalic and atraumatic.      Mouth/Throat:      Mouth: Mucous membranes are moist.   Cardiovascular:      Rate and Rhythm: Normal rate and regular rhythm.      Pulses: Normal pulses.   Musculoskeletal:         General: Normal range of motion.      Right lower leg: No edema.      Left lower leg: No edema.   Abdominal: General: There is no distension.      Palpations: Abdomen is soft.      Tenderness: There is abdominal tenderness. There is no guarding.      Comments: Surgical incision with dressing in place, clean without strikethrough. SEGUN drain with serosanguineous output. Appropriate abdominal tenderness.    Constitutional:       General: He is not in acute distress.     Appearance: He is well-developed and well-nourished. He is not ill-appearing or toxic-appearing.   Pulmonary:       Effort: Pulmonary effort is normal. No accessory muscle usage, respiratory distress or accessory muscle usage.      Breath sounds: Normal breath sounds.   Neurological:      General: No focal deficit present.      Mental Status: He is alert and oriented to person, place and time.      Cranial Nerves: No facial asymmetry.      Sensory: No sensory deficit.   Genitourinary/Anorectal:  Carr present.        Diagnostic Studies        Lab Results: I have reviewed the following results: CBC/BMP:   .     09/12/24  1427 09/12/24  1538 09/12/24  1538 09/12/24  1730   WBC  --  17.58*   < > 19.45*   HGB 7.8* 8.5*   < > 10.1*   HCT 23* 26.6*   < > 31.7*   PLT  --  305   < > 350   BANDSPCT  --  5  --   --    SODIUM  --  141  --   --    K  --  4.0  --   --    CL  --  118*  --   --    CO2 17* 15*  --   --    BUN  --  14  --   --    CREATININE  --  0.60  --   --    GLUC  --  154*  --   --    CAIONIZED 1.05*  --   --   --     < > = values in this interval not displayed.    , LFTs: No new results in last 24 hours. , Lactic Acid:   .     09/12/24  1313   LACTICACID 2.7*    , ABG: No new results in last 24 hours.      Medications:  Scheduled PRN   [START ON 9/13/2024] heparin (porcine), 5,000 Units, Q8H BETITO  [START ON 9/13/2024] pantoprazole, 40 mg, Q24H BETITO          Continuous    lactated ringers, 125 mL/hr, Last Rate: 125 mL/hr (09/12/24 1721)  ropivacaine 0.1% and fentaNYL 2 mcg/mL,          Labs:   CBC    Recent Labs     09/12/24  1427 09/12/24  1538   WBC  --  17.58*   HGB 7.8* 8.5*   HCT 23* 26.6*   PLT  --  305   BANDSPCT  --  5     BMP    Recent Labs     09/12/24  1427 09/12/24  1538   SODIUM  --  141   K  --  4.0   CL  --  118*   CO2 17* 15*   AGAP  --  8   BUN  --  14   CREATININE  --  0.60   CALCIUM  --  6.0*       Coags    No recent results     Additional Electrolytes  Recent Labs     09/12/24  1225 09/12/24  1427   CAIONIZED 1.08* 1.05*          Blood Gas    No recent results  No recent results LFTs  No recent results     Infectious  No recent results  Glucose  Recent Labs     09/12/24  1538   GLUC 154*

## 2024-09-12 NOTE — QUICK NOTE
"Post-Op Check    Assessment:  63M with pancreatic mass s/p 9/12 whipple.     Subjective:  Pt's pain is controlled. Denies nausea, chest pain, SOB.    Objective:  General: NAD  CV: nl rate  Lungs: nl wob. No resp distress.  ABD: Soft, mildly distended, minimally tender, incisions CDI. NGT with scant bilious output. SEGUN with scant ss output.  Extrem: No CCE  Carr    Patient Vitals for the past 24 hrs:   BP Temp Temp src Pulse Resp SpO2 Height Weight   09/12/24 1900 160/77 98.1 °F (36.7 °C) -- 92 (!) 25 99 % -- --   09/12/24 1813 161/78 97.9 °F (36.6 °C) Bladder 83 20 99 % 5' 4\" (1.626 m) 68 kg (149 lb 14.6 oz)   09/12/24 1800 -- 97.9 °F (36.6 °C) -- 76 18 100 % -- --   09/12/24 0634 118/63 97.8 °F (36.6 °C) Temporal 71 18 99 % 5' 4\" (1.626 m) 61.7 kg (136 lb)       "

## 2024-09-12 NOTE — PROGRESS NOTES
"Progress Note - Oncology-Surgical   Name: Isaac Kramer 63 y.o. male I MRN: [id number]  Unit/Bed#: MICU 04 I Date of Admission: 9/12/2024   Date of Service: 9/13/2024 I Hospital Day: 1     Assessment & Plan  Pancreatic mass  S/p 9/12 whipple.    Plan:  - 1u pRBC, f/u TEG  - discuss CTA  - maintain NPO/NGT, IVF  - maintain Carr for now  - continue PCEA  - DVT ppx, IS, OOB/ambulate    Subjective/Objective   NAOE. Reports mild lower abdominal pain. Had nausea, x1 emesis (small phlegm) overnight. No BM, no flatus.    Physical Exam:  General: NAD  CV: nl rate  Lungs: nl wob. No resp distress.  ABD: Soft, moderately distended, lower tenderness, CDI. NGT with ~200cc bilious output. SEGUN with 560cc dark ss/sang output.  Extrem: No CCE  Carr with 2640cc clear yellow UOP    Patient Vitals for the past 24 hrs:   BP Temp Temp src Pulse Resp SpO2 Height Weight   09/13/24 0500 92/54 99.7 °F (37.6 °C) -- 88 22 100 % -- --   09/13/24 0400 115/56 99.7 °F (37.6 °C) -- 100 (!) 39 98 % -- --   09/13/24 0300 111/55 99.9 °F (37.7 °C) -- (!) 110 (!) 41 96 % -- --   09/13/24 0200 129/58 99.9 °F (37.7 °C) -- (!) 110 (!) 37 98 % -- --   09/13/24 0100 100/54 99.7 °F (37.6 °C) -- 84 19 99 % -- --   09/13/24 0000 102/53 99.7 °F (37.6 °C) -- 93 (!) 30 98 % -- --   09/12/24 2300 (!) 88/52 99.9 °F (37.7 °C) -- 81 (!) 29 98 % -- --   09/12/24 2255 (!) 76/47 99.7 °F (37.6 °C) -- 86 (!) 26 97 % -- --   09/12/24 2200 91/52 99.3 °F (37.4 °C) -- 73 19 96 % -- --   09/12/24 2100 101/55 99.1 °F (37.3 °C) -- 68 19 96 % -- --   09/12/24 2000 113/55 98.8 °F (37.1 °C) -- 72 20 97 % -- --   09/12/24 1957 -- 98.8 °F (37.1 °C) Bladder -- -- 97 % -- --   09/12/24 1900 160/77 98.1 °F (36.7 °C) -- 92 (!) 25 99 % -- --   09/12/24 1813 161/78 97.9 °F (36.6 °C) Bladder 83 20 99 % 5' 4\" (1.626 m) 68 kg (149 lb 14.6 oz)   09/12/24 1800 -- 97.9 °F (36.6 °C) -- 76 18 100 % -- --   09/12/24 0634 118/63 97.8 °F (36.6 °C) Temporal 71 18 99 % 5' 4\" (1.626 m) 61.7 kg (136 " lb)       I/O         09/10 0701  09/11 0700 09/11 0701 09/12 0700 09/12 0701 09/13 0700    I.V. (mL/kg)   9200 (149.1)    IV Piggyback   1150    Total Intake(mL/kg)   35025 (167.7)    Urine (mL/kg/hr)   785 (1.2)    Drains   70    Total Output   855    Net   +[phone]                   Recent Labs     09/10/24  0820 09/12/24  1030 09/12/24  1730 09/12/24  1849 09/12/24 2010 09/12/24  2334 09/13/24  0051 09/13/24  0434   WBC  --    < > 19.45*  --   --   --   --  9.90   HGB  --    < > 10.1*  --   --  8.1*  --  6.8*   PLT  --    < > 350  --   --   --   --  222   SODIUM  --    < > 140  --   --   --   --  139   K  --    < > 4.3  --   --   --   --  4.1   CL  --    < > 117*  --   --   --   --  113*   CO2  --    < > 13*  --   --   --   --  17*   BUN  --    < > 14  --   --   --   --  11   CREATININE  --    < > 0.64  --   --   --   --  0.61   GLUC  --    < > 157*  --   --   --   --  118   CALCIUM  --    < > 7.3*  --   --   --   --  6.9*   MG  --   --  1.4*  --   --   --   --  2.5   PHOS  --   --  3.7  --   --   --   --  2.8   AST  --   --   --  198*  --   --   --  129*   ALT  --   --   --  260*  --   --   --  170*   ALKPHOS  --   --   --  101  --   --   --  72   TBILI  --   --   --  0.60  --   --   --  0.66   EGFR  --    < > 102  --   --   --   --  104   PTT 32  --   --   --   --   --   --   --    INR 1.00  --   --   --   --   --   --   --    LACTICACID  --    < > 3.8*  --    < >  --  4.6* 3.7*    < > = values in this interval not displayed.     Lab Results   Component Value Date    BLOODCX Klebsiella variicola (A) 08/27/2024    BLOODCX Klebsiella variicola (A) 08/27/2024    URINECX No Growth <1000 cfu/mL 08/27/2024    LEUKOCYTESUR Negative 08/27/2024    LEUKOCYTESUR trace 08/27/2024

## 2024-09-12 NOTE — ASSESSMENT & PLAN NOTE
S/p 9/12 jodie.    Plan:  - 1u pRBC, f/u TEG  - discuss CTA  - maintain NPO/NGT, IVF  - maintain Carr for now  - continue PCEA  - DVT ppx, IS, OOB/ambulate

## 2024-09-12 NOTE — ANESTHESIA PROCEDURE NOTES
Epidural Block    Patient location during procedure: holding area  Reason for block: procedure for pain  Staffing  Performed by: Imer Mcfadden MD  Authorized by: Imer Mcfadden MD    Preanesthetic Checklist  Completed: patient identified, IV checked, site marked, risks and benefits discussed, surgical consent, monitors and equipment checked, pre-op evaluation and timeout performed  Epidural  Patient position: sitting  Prep: ChloraPrep  Sedation Level: no sedation  Patient monitoring: frequent blood pressure checks, continuous pulse oximetry and heart rate  Approach: right paramedian  Location: thoracic, T8-9  Injection technique: CARISSA saline  Needle  Needle type: Tuohy   Needle gauge: 18 G  Needle insertion depth: 6 cm  Catheter type: multi-orifice  Catheter size: 20 G  Catheter at skin depth: 11 cm  Catheter securement method: stabilization device and clear occlusive dressing  Test dose: negativelidocaine-epinephrine (XYLOCAINE-MPF/EPINEPHRINE) 1.5 %-1:200,000 injection 3 mL - Epidural   3 mL - 9/12/2024 7:42:00 AM  Assessment  Sensory level: T10  Number of attempts: 1negative aspiration for CSF, negative aspiration for heme and no paresthesia on injection  patient tolerated the procedure well with no immediate complications

## 2024-09-13 ENCOUNTER — ANESTHESIA EVENT (INPATIENT)
Dept: RADIOLOGY | Facility: HOSPITAL | Age: 64
DRG: 405 | End: 2024-09-13
Payer: MEDICARE

## 2024-09-13 ENCOUNTER — APPOINTMENT (INPATIENT)
Dept: RADIOLOGY | Facility: HOSPITAL | Age: 64
DRG: 405 | End: 2024-09-13
Attending: RADIOLOGY
Payer: MEDICARE

## 2024-09-13 ENCOUNTER — ANESTHESIA (INPATIENT)
Dept: RADIOLOGY | Facility: HOSPITAL | Age: 64
DRG: 405 | End: 2024-09-13
Payer: MEDICARE

## 2024-09-13 ENCOUNTER — APPOINTMENT (INPATIENT)
Dept: RADIOLOGY | Facility: HOSPITAL | Age: 64
DRG: 405 | End: 2024-09-13
Payer: MEDICARE

## 2024-09-13 LAB
ALBUMIN SERPL BCG-MCNC: 3.2 G/DL (ref 3.5–5)
ALP SERPL-CCNC: 72 U/L (ref 34–104)
ALT SERPL W P-5'-P-CCNC: 170 U/L (ref 7–52)
ANION GAP SERPL CALCULATED.3IONS-SCNC: 5 MMOL/L (ref 4–13)
ANION GAP SERPL CALCULATED.3IONS-SCNC: 9 MMOL/L (ref 4–13)
AST SERPL W P-5'-P-CCNC: 129 U/L (ref 13–39)
ATRIAL RATE: 101 BPM
BASE EXCESS BLDA CALC-SCNC: -2.9 MMOL/L
BASE EXCESS BLDA CALC-SCNC: -5.2 MMOL/L
BASE EXCESS BLDA CALC-SCNC: -9.1 MMOL/L
BASOPHILS # BLD AUTO: 0.01 THOUSANDS/ΜL (ref 0–0.1)
BASOPHILS # BLD AUTO: 0.01 THOUSANDS/ΜL (ref 0–0.1)
BASOPHILS NFR BLD AUTO: 0 % (ref 0–1)
BASOPHILS NFR BLD AUTO: 0 % (ref 0–1)
BILIRUB SERPL-MCNC: 0.66 MG/DL (ref 0.2–1)
BUN SERPL-MCNC: 11 MG/DL (ref 5–25)
BUN SERPL-MCNC: 12 MG/DL (ref 5–25)
CA-I BLD-SCNC: 1.05 MMOL/L (ref 1.12–1.32)
CALCIUM ALBUM COR SERPL-MCNC: 7.5 MG/DL (ref 8.3–10.1)
CALCIUM SERPL-MCNC: 6.9 MG/DL (ref 8.4–10.2)
CALCIUM SERPL-MCNC: 7.3 MG/DL (ref 8.4–10.2)
CFFMA (FUNCTIONAL FIBRINOGEN MAX AMPLITUDE): 16.7 MM (ref 15–32)
CFFMA (FUNCTIONAL FIBRINOGEN MAX AMPLITUDE): 20.3 MM (ref 15–32)
CHLORIDE SERPL-SCNC: 110 MMOL/L (ref 96–108)
CHLORIDE SERPL-SCNC: 113 MMOL/L (ref 96–108)
CKLY30: 0.7 % (ref 0–2.6)
CKLY30: 1.3 % (ref 0–2.6)
CKR(REACTION TIME): 5.2 MIN (ref 4.6–9.1)
CKR(REACTION TIME): 7.7 MIN (ref 4.6–9.1)
CO2 SERPL-SCNC: 17 MMOL/L (ref 21–32)
CO2 SERPL-SCNC: 23 MMOL/L (ref 21–32)
CREAT SERPL-MCNC: 0.57 MG/DL (ref 0.6–1.3)
CREAT SERPL-MCNC: 0.61 MG/DL (ref 0.6–1.3)
CRTMA(RAPIDTEG MAX AMPLITUDE): 60.6 MM (ref 52–70)
CRTMA(RAPIDTEG MAX AMPLITUDE): 63.3 MM (ref 52–70)
EOSINOPHIL # BLD AUTO: 0.01 THOUSAND/ΜL (ref 0–0.61)
EOSINOPHIL # BLD AUTO: 0.01 THOUSAND/ΜL (ref 0–0.61)
EOSINOPHIL NFR BLD AUTO: 0 % (ref 0–6)
EOSINOPHIL NFR BLD AUTO: 0 % (ref 0–6)
ERYTHROCYTE [DISTWIDTH] IN BLOOD BY AUTOMATED COUNT: 14.1 % (ref 11.6–15.1)
ERYTHROCYTE [DISTWIDTH] IN BLOOD BY AUTOMATED COUNT: 14.4 % (ref 11.6–15.1)
GFR SERPL CREATININE-BSD FRML MDRD: 104 ML/MIN/1.73SQ M
GFR SERPL CREATININE-BSD FRML MDRD: 107 ML/MIN/1.73SQ M
GLUCOSE SERPL-MCNC: 118 MG/DL (ref 65–140)
GLUCOSE SERPL-MCNC: 132 MG/DL (ref 65–140)
HCO3 BLDA-SCNC: 15.3 MMOL/L (ref 22–28)
HCO3 BLDA-SCNC: 18.8 MMOL/L (ref 22–28)
HCO3 BLDA-SCNC: 21.7 MMOL/L (ref 22–28)
HCT VFR BLD AUTO: 21.1 % (ref 36.5–49.3)
HCT VFR BLD AUTO: 23.7 % (ref 36.5–49.3)
HGB BLD-MCNC: 6.8 G/DL (ref 12–17)
HGB BLD-MCNC: 7.8 G/DL (ref 12–17)
HGB BLD-MCNC: 8.1 G/DL (ref 12–17)
HGB BLD-MCNC: 8.3 G/DL (ref 12–17)
IMM GRANULOCYTES # BLD AUTO: 0.03 THOUSAND/UL (ref 0–0.2)
IMM GRANULOCYTES # BLD AUTO: 0.05 THOUSAND/UL (ref 0–0.2)
IMM GRANULOCYTES NFR BLD AUTO: 0 % (ref 0–2)
IMM GRANULOCYTES NFR BLD AUTO: 1 % (ref 0–2)
INR PPP: 1.68 (ref 0.85–1.19)
LACTATE SERPL-SCNC: 2.1 MMOL/L (ref 0.5–2)
LACTATE SERPL-SCNC: 2.1 MMOL/L (ref 0.5–2)
LACTATE SERPL-SCNC: 2.3 MMOL/L (ref 0.5–2)
LACTATE SERPL-SCNC: 2.5 MMOL/L (ref 0.5–2)
LACTATE SERPL-SCNC: 3.2 MMOL/L (ref 0.5–2)
LACTATE SERPL-SCNC: 3.7 MMOL/L (ref 0.5–2)
LACTATE SERPL-SCNC: 4.6 MMOL/L (ref 0.5–2)
LYMPHOCYTES # BLD AUTO: 0.59 THOUSANDS/ΜL (ref 0.6–4.47)
LYMPHOCYTES # BLD AUTO: 1.4 THOUSANDS/ΜL (ref 0.6–4.47)
LYMPHOCYTES NFR BLD AUTO: 14 % (ref 14–44)
LYMPHOCYTES NFR BLD AUTO: 6 % (ref 14–44)
MAGNESIUM SERPL-MCNC: 2.2 MG/DL (ref 1.9–2.7)
MAGNESIUM SERPL-MCNC: 2.5 MG/DL (ref 1.9–2.7)
MCH RBC QN AUTO: 28.3 PG (ref 26.8–34.3)
MCH RBC QN AUTO: 30 PG (ref 26.8–34.3)
MCHC RBC AUTO-ENTMCNC: 32.2 G/DL (ref 31.4–37.4)
MCHC RBC AUTO-ENTMCNC: 34.2 G/DL (ref 31.4–37.4)
MCV RBC AUTO: 88 FL (ref 82–98)
MCV RBC AUTO: 88 FL (ref 82–98)
MONOCYTES # BLD AUTO: 0.44 THOUSAND/ΜL (ref 0.17–1.22)
MONOCYTES # BLD AUTO: 0.62 THOUSAND/ΜL (ref 0.17–1.22)
MONOCYTES NFR BLD AUTO: 5 % (ref 4–12)
MONOCYTES NFR BLD AUTO: 6 % (ref 4–12)
NASAL CANNULA: 3
NASAL CANNULA: 6
NEUTROPHILS # BLD AUTO: 7.81 THOUSANDS/ΜL (ref 1.85–7.62)
NEUTROPHILS # BLD AUTO: 8.29 THOUSANDS/ΜL (ref 1.85–7.62)
NEUTS SEG NFR BLD AUTO: 79 % (ref 43–75)
NEUTS SEG NFR BLD AUTO: 89 % (ref 43–75)
NRBC BLD AUTO-RTO: 0 /100 WBCS
NRBC BLD AUTO-RTO: 0 /100 WBCS
O2 CT BLDA-SCNC: 10.3 ML/DL (ref 16–23)
O2 CT BLDA-SCNC: 10.8 ML/DL (ref 16–23)
O2 CT BLDA-SCNC: 11.9 ML/DL (ref 16–23)
OXYHGB MFR BLDA: 95.9 % (ref 94–97)
OXYHGB MFR BLDA: 97 % (ref 94–97)
OXYHGB MFR BLDA: 97.8 % (ref 94–97)
P AXIS: 42 DEGREES
PCO2 BLDA: 27.7 MM HG (ref 36–44)
PCO2 BLDA: 30.7 MM HG (ref 36–44)
PCO2 BLDA: 36.7 MM HG (ref 36–44)
PH BLDA: 7.36 [PH] (ref 7.35–7.45)
PH BLDA: 7.39 [PH] (ref 7.35–7.45)
PH BLDA: 7.41 [PH] (ref 7.35–7.45)
PHOSPHATE SERPL-MCNC: 2.8 MG/DL (ref 2.3–4.1)
PHOSPHATE SERPL-MCNC: 3.9 MG/DL (ref 2.3–4.1)
PLATELET # BLD AUTO: 178 THOUSANDS/UL (ref 149–390)
PLATELET # BLD AUTO: 222 THOUSANDS/UL (ref 149–390)
PMV BLD AUTO: 9.6 FL (ref 8.9–12.7)
PMV BLD AUTO: 9.6 FL (ref 8.9–12.7)
PO2 BLDA: 114.2 MM HG (ref 75–129)
PO2 BLDA: 150.8 MM HG (ref 75–129)
PO2 BLDA: 97.8 MM HG (ref 75–129)
POTASSIUM SERPL-SCNC: 4.1 MMOL/L (ref 3.5–5.3)
POTASSIUM SERPL-SCNC: 4.2 MMOL/L (ref 3.5–5.3)
PR INTERVAL: 180 MS
PROT SERPL-MCNC: 4.1 G/DL (ref 6.4–8.4)
PROTHROMBIN TIME: 20 SECONDS (ref 12.3–15)
QRS AXIS: -11 DEGREES
QRSD INTERVAL: 84 MS
QT INTERVAL: 330 MS
QTC INTERVAL: 427 MS
RBC # BLD AUTO: 2.4 MILLION/UL (ref 3.88–5.62)
RBC # BLD AUTO: 2.7 MILLION/UL (ref 3.88–5.62)
SODIUM SERPL-SCNC: 138 MMOL/L (ref 135–147)
SODIUM SERPL-SCNC: 139 MMOL/L (ref 135–147)
SPECIMEN SOURCE: ABNORMAL
T WAVE AXIS: 23 DEGREES
VENTRICULAR RATE: 101 BPM
WBC # BLD AUTO: 9.37 THOUSAND/UL (ref 4.31–10.16)
WBC # BLD AUTO: 9.9 THOUSAND/UL (ref 4.31–10.16)

## 2024-09-13 PROCEDURE — 82330 ASSAY OF CALCIUM: CPT

## 2024-09-13 PROCEDURE — 80053 COMPREHEN METABOLIC PANEL: CPT

## 2024-09-13 PROCEDURE — 36247 INS CATH ABD/L-EXT ART 3RD: CPT | Performed by: RADIOLOGY

## 2024-09-13 PROCEDURE — C1769 GUIDE WIRE: HCPCS

## 2024-09-13 PROCEDURE — 82805 BLOOD GASES W/O2 SATURATION: CPT

## 2024-09-13 PROCEDURE — P9016 RBC LEUKOCYTES REDUCED: HCPCS

## 2024-09-13 PROCEDURE — 99223 1ST HOSP IP/OBS HIGH 75: CPT | Performed by: STUDENT IN AN ORGANIZED HEALTH CARE EDUCATION/TRAINING PROGRAM

## 2024-09-13 PROCEDURE — C1894 INTRO/SHEATH, NON-LASER: HCPCS

## 2024-09-13 PROCEDURE — 85018 HEMOGLOBIN: CPT | Performed by: PHYSICIAN ASSISTANT

## 2024-09-13 PROCEDURE — 83605 ASSAY OF LACTIC ACID: CPT

## 2024-09-13 PROCEDURE — C1760 CLOSURE DEV, VASC: HCPCS

## 2024-09-13 PROCEDURE — 85384 FIBRINOGEN ACTIVITY: CPT

## 2024-09-13 PROCEDURE — 99024 POSTOP FOLLOW-UP VISIT: CPT | Performed by: STUDENT IN AN ORGANIZED HEALTH CARE EDUCATION/TRAINING PROGRAM

## 2024-09-13 PROCEDURE — 04LK4DZ OCCLUSION OF RIGHT FEMORAL ARTERY WITH INTRALUMINAL DEVICE, PERCUTANEOUS ENDOSCOPIC APPROACH: ICD-10-PCS | Performed by: STUDENT IN AN ORGANIZED HEALTH CARE EDUCATION/TRAINING PROGRAM

## 2024-09-13 PROCEDURE — 30233N1 TRANSFUSION OF NONAUTOLOGOUS RED BLOOD CELLS INTO PERIPHERAL VEIN, PERCUTANEOUS APPROACH: ICD-10-PCS | Performed by: STUDENT IN AN ORGANIZED HEALTH CARE EDUCATION/TRAINING PROGRAM

## 2024-09-13 PROCEDURE — 85576 BLOOD PLATELET AGGREGATION: CPT | Performed by: STUDENT IN AN ORGANIZED HEALTH CARE EDUCATION/TRAINING PROGRAM

## 2024-09-13 PROCEDURE — C1887 CATHETER, GUIDING: HCPCS

## 2024-09-13 PROCEDURE — 37244 VASC EMBOLIZE/OCCLUDE BLEED: CPT | Performed by: RADIOLOGY

## 2024-09-13 PROCEDURE — 37242 VASC EMBOLIZE/OCCLUDE ARTERY: CPT

## 2024-09-13 PROCEDURE — 85025 COMPLETE CBC W/AUTO DIFF WBC: CPT

## 2024-09-13 PROCEDURE — 76937 US GUIDE VASCULAR ACCESS: CPT | Performed by: RADIOLOGY

## 2024-09-13 PROCEDURE — 83605 ASSAY OF LACTIC ACID: CPT | Performed by: STUDENT IN AN ORGANIZED HEALTH CARE EDUCATION/TRAINING PROGRAM

## 2024-09-13 PROCEDURE — 83735 ASSAY OF MAGNESIUM: CPT

## 2024-09-13 PROCEDURE — 84100 ASSAY OF PHOSPHORUS: CPT

## 2024-09-13 PROCEDURE — 80048 BASIC METABOLIC PNL TOTAL CA: CPT

## 2024-09-13 PROCEDURE — 85347 COAGULATION TIME ACTIVATED: CPT

## 2024-09-13 PROCEDURE — 85397 CLOTTING FUNCT ACTIVITY: CPT

## 2024-09-13 PROCEDURE — 93010 ELECTROCARDIOGRAM REPORT: CPT | Performed by: INTERNAL MEDICINE

## 2024-09-13 PROCEDURE — 85347 COAGULATION TIME ACTIVATED: CPT | Performed by: STUDENT IN AN ORGANIZED HEALTH CARE EDUCATION/TRAINING PROGRAM

## 2024-09-13 PROCEDURE — 99291 CRITICAL CARE FIRST HOUR: CPT | Performed by: STUDENT IN AN ORGANIZED HEALTH CARE EDUCATION/TRAINING PROGRAM

## 2024-09-13 PROCEDURE — 71045 X-RAY EXAM CHEST 1 VIEW: CPT

## 2024-09-13 PROCEDURE — 93005 ELECTROCARDIOGRAM TRACING: CPT

## 2024-09-13 PROCEDURE — 74174 CTA ABD&PLVS W/CONTRAST: CPT

## 2024-09-13 PROCEDURE — 85576 BLOOD PLATELET AGGREGATION: CPT

## 2024-09-13 PROCEDURE — 85397 CLOTTING FUNCT ACTIVITY: CPT | Performed by: STUDENT IN AN ORGANIZED HEALTH CARE EDUCATION/TRAINING PROGRAM

## 2024-09-13 PROCEDURE — 85610 PROTHROMBIN TIME: CPT

## 2024-09-13 PROCEDURE — NC001 PR NO CHARGE: Performed by: RADIOLOGY

## 2024-09-13 PROCEDURE — 76937 US GUIDE VASCULAR ACCESS: CPT

## 2024-09-13 PROCEDURE — 85018 HEMOGLOBIN: CPT | Performed by: STUDENT IN AN ORGANIZED HEALTH CARE EDUCATION/TRAINING PROGRAM

## 2024-09-13 PROCEDURE — 85384 FIBRINOGEN ACTIVITY: CPT | Performed by: STUDENT IN AN ORGANIZED HEALTH CARE EDUCATION/TRAINING PROGRAM

## 2024-09-13 RX ORDER — DEXAMETHASONE SODIUM PHOSPHATE 10 MG/ML
INJECTION, SOLUTION INTRAMUSCULAR; INTRAVENOUS AS NEEDED
Status: DISCONTINUED | OUTPATIENT
Start: 2024-09-13 | End: 2024-09-13

## 2024-09-13 RX ORDER — ACETAMINOPHEN 10 MG/ML
1000 INJECTION, SOLUTION INTRAVENOUS 3 TIMES DAILY
Status: DISCONTINUED | OUTPATIENT
Start: 2024-09-13 | End: 2024-09-17

## 2024-09-13 RX ORDER — ONDANSETRON 2 MG/ML
INJECTION INTRAMUSCULAR; INTRAVENOUS AS NEEDED
Status: DISCONTINUED | OUTPATIENT
Start: 2024-09-13 | End: 2024-09-13

## 2024-09-13 RX ORDER — MIDAZOLAM HYDROCHLORIDE 2 MG/2ML
INJECTION, SOLUTION INTRAMUSCULAR; INTRAVENOUS AS NEEDED
Status: DISCONTINUED | OUTPATIENT
Start: 2024-09-13 | End: 2024-09-13

## 2024-09-13 RX ORDER — ROCURONIUM BROMIDE 10 MG/ML
INJECTION, SOLUTION INTRAVENOUS AS NEEDED
Status: DISCONTINUED | OUTPATIENT
Start: 2024-09-13 | End: 2024-09-13

## 2024-09-13 RX ORDER — IODIXANOL 320 MG/ML
200 INJECTION, SOLUTION INTRAVASCULAR
Status: COMPLETED | OUTPATIENT
Start: 2024-09-13 | End: 2024-09-13

## 2024-09-13 RX ORDER — KETAMINE HYDROCHLORIDE 100 MG/ML
INJECTION, SOLUTION INTRAMUSCULAR; INTRAVENOUS AS NEEDED
Status: DISCONTINUED | OUTPATIENT
Start: 2024-09-13 | End: 2024-09-13

## 2024-09-13 RX ORDER — CALCIUM GLUCONATE 20 MG/ML
2 INJECTION, SOLUTION INTRAVENOUS ONCE
Status: COMPLETED | OUTPATIENT
Start: 2024-09-13 | End: 2024-09-13

## 2024-09-13 RX ORDER — ONDANSETRON 2 MG/ML
4 INJECTION INTRAMUSCULAR; INTRAVENOUS EVERY 6 HOURS PRN
Status: DISCONTINUED | OUTPATIENT
Start: 2024-09-13 | End: 2024-09-19 | Stop reason: HOSPADM

## 2024-09-13 RX ORDER — HYDROMORPHONE HCL/PF 1 MG/ML
0.5 SYRINGE (ML) INJECTION ONCE
Status: DISCONTINUED | OUTPATIENT
Start: 2024-09-13 | End: 2024-09-13

## 2024-09-13 RX ORDER — SODIUM CHLORIDE, SODIUM GLUCONATE, SODIUM ACETATE, POTASSIUM CHLORIDE, MAGNESIUM CHLORIDE, SODIUM PHOSPHATE, DIBASIC, AND POTASSIUM PHOSPHATE .53; .5; .37; .037; .03; .012; .00082 G/100ML; G/100ML; G/100ML; G/100ML; G/100ML; G/100ML; G/100ML
1000 INJECTION, SOLUTION INTRAVENOUS ONCE
Status: COMPLETED | OUTPATIENT
Start: 2024-09-13 | End: 2024-09-13

## 2024-09-13 RX ORDER — SODIUM CHLORIDE, SODIUM LACTATE, POTASSIUM CHLORIDE, CALCIUM CHLORIDE 600; 310; 30; 20 MG/100ML; MG/100ML; MG/100ML; MG/100ML
INJECTION, SOLUTION INTRAVENOUS CONTINUOUS PRN
Status: DISCONTINUED | OUTPATIENT
Start: 2024-09-13 | End: 2024-09-13

## 2024-09-13 RX ORDER — ALBUMIN, HUMAN INJ 5% 5 %
12.5 SOLUTION INTRAVENOUS ONCE
Status: COMPLETED | OUTPATIENT
Start: 2024-09-13 | End: 2024-09-13

## 2024-09-13 RX ORDER — FENTANYL CITRATE 50 UG/ML
INJECTION, SOLUTION INTRAMUSCULAR; INTRAVENOUS AS NEEDED
Status: DISCONTINUED | OUTPATIENT
Start: 2024-09-13 | End: 2024-09-13

## 2024-09-13 RX ORDER — SUCCINYLCHOLINE/SOD CL,ISO/PF 100 MG/5ML
SYRINGE (ML) INTRAVENOUS AS NEEDED
Status: DISCONTINUED | OUTPATIENT
Start: 2024-09-13 | End: 2024-09-13

## 2024-09-13 RX ORDER — LIDOCAINE WITH 8.4% SOD BICARB 0.9%(10ML)
SYRINGE (ML) INJECTION AS NEEDED
Status: COMPLETED | OUTPATIENT
Start: 2024-09-13 | End: 2024-09-13

## 2024-09-13 RX ORDER — HYDROMORPHONE HCL/PF 1 MG/ML
0.5 SYRINGE (ML) INJECTION EVERY 2 HOUR PRN
Status: DISCONTINUED | OUTPATIENT
Start: 2024-09-13 | End: 2024-09-19 | Stop reason: HOSPADM

## 2024-09-13 RX ADMIN — SODIUM CHLORIDE, SODIUM GLUCONATE, SODIUM ACETATE, POTASSIUM CHLORIDE, MAGNESIUM CHLORIDE, SODIUM PHOSPHATE, DIBASIC, AND POTASSIUM PHOSPHATE 1000 ML: .53; .5; .37; .037; .03; .012; .00082 INJECTION, SOLUTION INTRAVENOUS at 01:29

## 2024-09-13 RX ADMIN — PIPERACILLIN SODIUM AND TAZOBACTAM SODIUM 4.5 G: 36; 4.5 INJECTION, POWDER, LYOPHILIZED, FOR SOLUTION INTRAVENOUS at 20:40

## 2024-09-13 RX ADMIN — IOHEXOL 75 ML: 350 INJECTION, SOLUTION INTRAVENOUS at 07:37

## 2024-09-13 RX ADMIN — ACETAMINOPHEN 1000 MG: 10 INJECTION INTRAVENOUS at 20:12

## 2024-09-13 RX ADMIN — ALBUMIN (HUMAN) 12.5 G: 12.5 INJECTION, SOLUTION INTRAVENOUS at 05:50

## 2024-09-13 RX ADMIN — MIDAZOLAM 2 MG: 1 INJECTION INTRAMUSCULAR; INTRAVENOUS at 08:41

## 2024-09-13 RX ADMIN — Medication: at 20:35

## 2024-09-13 RX ADMIN — ROCURONIUM BROMIDE 30 MG: 10 INJECTION, SOLUTION INTRAVENOUS at 08:56

## 2024-09-13 RX ADMIN — SUGAMMADEX 200 MG: 100 INJECTION, SOLUTION INTRAVENOUS at 09:43

## 2024-09-13 RX ADMIN — ONDANSETRON 4 MG: 2 INJECTION INTRAMUSCULAR; INTRAVENOUS at 08:50

## 2024-09-13 RX ADMIN — SODIUM CHLORIDE, SODIUM LACTATE, POTASSIUM CHLORIDE, AND CALCIUM CHLORIDE: .6; .31; .03; .02 INJECTION, SOLUTION INTRAVENOUS at 09:43

## 2024-09-13 RX ADMIN — PIPERACILLIN SODIUM AND TAZOBACTAM SODIUM 4.5 G: 36; 4.5 INJECTION, POWDER, LYOPHILIZED, FOR SOLUTION INTRAVENOUS at 12:20

## 2024-09-13 RX ADMIN — CALCIUM GLUCONATE 2 G: 20 INJECTION, SOLUTION INTRAVENOUS at 05:22

## 2024-09-13 RX ADMIN — Medication 100 MG: at 08:41

## 2024-09-13 RX ADMIN — PANTOPRAZOLE SODIUM 40 MG: 40 INJECTION, POWDER, FOR SOLUTION INTRAVENOUS at 10:31

## 2024-09-13 RX ADMIN — SODIUM PHOSPHATE, MONOBASIC, MONOHYDRATE AND SODIUM PHOSPHATE, DIBASIC, ANHYDROUS 21 MMOL: 276; 142 INJECTION, SOLUTION INTRAVENOUS at 06:10

## 2024-09-13 RX ADMIN — PHENYLEPHRINE HYDROCHLORIDE 60 MCG/MIN: 10 INJECTION INTRAVENOUS at 08:39

## 2024-09-13 RX ADMIN — ROCURONIUM BROMIDE 20 MG: 10 INJECTION, SOLUTION INTRAVENOUS at 08:55

## 2024-09-13 RX ADMIN — DEXAMETHASONE SODIUM PHOSPHATE 10 MG: 10 INJECTION, SOLUTION INTRAMUSCULAR; INTRAVENOUS at 08:50

## 2024-09-13 RX ADMIN — KETAMINE HYDROCHLORIDE 50 MG: 100 INJECTION, SOLUTION, CONCENTRATE INTRAMUSCULAR; INTRAVENOUS at 08:41

## 2024-09-13 RX ADMIN — FENTANYL CITRATE 100 MCG: 50 INJECTION INTRAMUSCULAR; INTRAVENOUS at 08:41

## 2024-09-13 RX ADMIN — HEPARIN SODIUM 5000 UNITS: 5000 INJECTION INTRAVENOUS; SUBCUTANEOUS at 01:28

## 2024-09-13 RX ADMIN — IODIXANOL 81 ML: 320 INJECTION, SOLUTION INTRAVASCULAR at 10:17

## 2024-09-13 RX ADMIN — CALCIUM GLUCONATE 2 G: 20 INJECTION, SOLUTION INTRAVENOUS at 12:19

## 2024-09-13 RX ADMIN — ACETAMINOPHEN 1000 MG: 10 INJECTION INTRAVENOUS at 16:09

## 2024-09-13 RX ADMIN — SODIUM CHLORIDE, SODIUM LACTATE, POTASSIUM CHLORIDE, AND CALCIUM CHLORIDE: .6; .31; .03; .02 INJECTION, SOLUTION INTRAVENOUS at 08:30

## 2024-09-13 RX ADMIN — Medication: at 08:07

## 2024-09-13 RX ADMIN — ACETAMINOPHEN 1000 MG: 10 INJECTION INTRAVENOUS at 10:30

## 2024-09-13 RX ADMIN — SODIUM CHLORIDE, SODIUM GLUCONATE, SODIUM ACETATE, POTASSIUM CHLORIDE, MAGNESIUM CHLORIDE, SODIUM PHOSPHATE, DIBASIC, AND POTASSIUM PHOSPHATE 125 ML/HR: .53; .5; .37; .037; .03; .012; .00082 INJECTION, SOLUTION INTRAVENOUS at 02:20

## 2024-09-13 RX ADMIN — Medication 10 ML: at 08:49

## 2024-09-13 NOTE — CONSULTS
Consultation - Acute Pain   Name: Isaac Kramer 63 y.o. male I MRN: [id number]  Unit/Bed#: MICU 04 I Date of Admission: 9/12/2024   Date of Service: 9/13/2024 I Hospital Day: 1   Inpatient consult to Acute Pain Service  Consult performed by: Mary Pittman MD  Consult ordered by: Imer Mcfadden MD        Physician Requesting Evaluation: Marika Ac MD   Reason for Evaluation / Principal Problem: Post operative Pain    Assessment & Plan  Coronary artery disease involving native coronary artery of native heart without angina pectoris  Limited use of NSAIDs in patients with CAD  Pancreatic mass  S/p Whipple 9/12    - Received Thoracic Epidural preoperatively   - currently 6/3/10/3   - further room for increases if vitals allow  - IV Dilaudid PRN breakthrough pain  - IV Tylenol while NPO  - t/c IV Robaxin if pain remains uncontrolled after epidural increase    - IS OOB PT as able  - Bowel regimen when appropriate to prevent OIC      APS will continue to follow. Please contact Acute Pain Service - via [email or web address] from [phone] with additional questions or concerns. See [employer] or [employer] for additional contacts and after hours information.     History of Present Illness    HPI: Isaac Kramer is a 63 y.o. year old male who presents with s/p Whipple on 9/12.  Pt received a thoracic epidural preoperatively.  Today, being taken to IR for embolization for ongoing bleed postop.  Pt with hypotension through the night, PCEA turned on and off several times to allow for stabilziation of pressures.      This morning, bp's stable, epidural increased, pt went to IR without issue.  Upon return, pain controlled.  Denies n/v/pruritus/LE weakness.    Current pain location(s): Pain Score: 0  Pain Location/Orientation: Location: Abdomen  Pain Scale: Pain Assessment Tool: 0-10        Review of Systems  I have reviewed the patient's PMH, PSH, Social History, Family History, Meds, and Allergies    Objective      Temp:   [97.9 °F (36.6 °C)-99.9 °F (37.7 °C)] 99.1 °F (37.3 °C)  HR:  [] 84  Resp:  [18-48] 28  BP: ()/(47-78) 120/59  Arterial Line BP: ()/(33-67) 143/50  Physical Exam  Vitals and nursing note reviewed.   Constitutional:       General: He is in acute distress.      Appearance: He is ill-appearing.   HENT:      Head: Normocephalic and atraumatic.      Mouth/Throat:      Mouth: Mucous membranes are dry.   Eyes:      Extraocular Movements: Extraocular movements intact.   Cardiovascular:      Rate and Rhythm: Normal rate.   Chest:      Chest wall: No tenderness.   Abdominal:      General: There is no distension.      Palpations: Abdomen is soft.      Tenderness: There is abdominal tenderness.   Musculoskeletal:         General: No swelling.   Skin:     General: Skin is warm and dry.   Neurological:      Mental Status: He is alert and oriented to person, place, and time.   Psychiatric:         Mood and Affect: Mood normal.         Behavior: Behavior normal.      Epidural: Site clean/dry/intact, no surrounding erythema/edema/induration, infusion functioning appropriately      Lab Results: I have reviewed the following results:   Estimated Creatinine Clearance: 108.2 mL/min (A) (by C-G formula based on SCr of 0.57 mg/dL (L)).  Lab Results   Component Value Date    WBC 9.37 09/13/2024    HGB 8.1 (L) 09/13/2024    HCT 23.7 (L) 09/13/2024     09/13/2024         Component Value Date/Time    K 4.2 09/13/2024 1110     (H) 09/13/2024 1110    CO2 23 09/13/2024 1110    CO2 17 (L) 09/12/2024 1427    BUN 12 09/13/2024 1110    CREATININE 0.57 (L) 09/13/2024 1110         Component Value Date/Time    CALCIUM 7.3 (L) 09/13/2024 1110    ALKPHOS 72 09/13/2024 0434     (H) 09/13/2024 0434     (H) 09/13/2024 0434    TP 4.1 (L) 09/13/2024 0434    ALB 3.2 (L) 09/13/2024 0434       Imaging Review: No pertinent imaging studies reviewed.  Other Studies: No additional pertinent studies  reviewed.    Administrative Statements   I have spent a total time of 20   minutes in caring for this patient on the day of the visit/encounter including Risks and benefits of tx options, Instructions for management, Patient and family education, Importance of tx compliance, Risk factor reductions, Impressions, Counseling / Coordination of care, Documenting in the medical record, Reviewing / ordering tests, medicine, procedures  , Obtaining or reviewing history  , and Communicating with other healthcare professionals .

## 2024-09-13 NOTE — PROGRESS NOTES
Progress Note - Trauma   Name: Isaac Kramer 63 y.o. male I MRN: [id number]  Unit/Bed#: MICU 04 I Date of Admission: 9/12/2024   Date of Service: 9/13/2024 I Hospital Day: 1     Assessment & Plan      Assessment & Plan   Neuro:   Diagnosis: post-op analgesia  Analgesia: PCEA  Regulate sleep/wake cycle   Delirium precautions  CAM-ICU daily  Trend neuro exam      CV:   Diagnosis: history of HTN, CAD  Hold home meds, may require prn antihypertensives  Monitor on telemetry   Home regimen (holding): asa 81mg daily, lipitor 20mg daily, lisinopril 20mg daily  Hypotension requirng 4L IVF and 1u pRBCs running  CT stat to evaluate for intra-abdominal bleed     Pulm:  No active issues  Pulmonary toileting, incentive spirometry     GI:   Diagnosis: pancreatic mass, POD #1 s/p whipple  NGT to LIWS, strict npo  Monitor SEGUN drain output  Care per surgical oncology team  GI ppx: Pantoprazole IV   Bowel regimen: None currently     :   No active issues  Baseline Cr: 0.6-0.7   Current Cr: 0.61  Carr in place for strict I/O   Trend BMP      F/E/N:   F: isolyte 125cc/hr  E: Replete electrolytes for goal K >4, Phos >3, Mg >2  N: NPO     Heme/Onc:   Diagnosis: ABLA   Transfusing 1u pRBCs currently  Trend h/h  VTE ppx: SCDS, holding heparin in the setting of possible active bleeding     Endo:   No acute issues  BG goal 140-180      ID:   No active issues  Trend fever curve and WBC count      MSK/Skin:   Diagnosis: surgical incision care  Care per primary surgical team  PT/OT   Q2h turning and repositioning      Lines/Tubes/Drains:   A line day 1       ICU Core Measures     A: Assess, Prevent, and Manage Pain Has pain been assessed? Yes  Need for changes to pain regimen? No   B: Both SAT/SAT  N/A   C: Choice of Sedation RASS Goal: 0 Alert and Calm  Need for changes to sedation or analgesia regimen? No   D: Delirium CAM-ICU: Negative   E: Early Mobility  Plan for early mobility? Yes   F: Family Engagement Plan for family engagement  today? Yes       Review of Invasive Devices:    Lilly Plan: Continue for accurate I/O monitoring for 48 hours    Tina Plan: Keep arterial line for hemodynamic monitoring    Prophylaxis:  VTE VTE covered by:  heparin (porcine), Subcutaneous, 5,000 Units at 09/13/24 0128       Stress Ulcer  covered bypantoprazole (PROTONIX) injection 40 mg [[id number]]         24 Hour Events   24hr events:   -s/p whipple  -hypotension requiring IVF resuscitation and 1u pRBCs ordered    Subjective   Review of Systems: See HPI for Review of Systems    Objective                          Vitals I/O      Most Recent Min/Max in 24hrs   Temp 99.1 °F (37.3 °C) Temp  Min: 97.9 °F (36.6 °C)  Max: 99.9 °F (37.7 °C)   Pulse 91 Pulse  Min: 68  Max: 110   Resp 22 Resp  Min: 18  Max: 41   /55 BP  Min: 76/47  Max: 161/78   O2 Sat 93 % SpO2  Min: 93 %  Max: 100 %      Intake/Output Summary (Last 24 hours) at 9/13/2024 0706  Last data filed at 9/13/2024 0633  Gross per 24 hour   Intake 71032.34 ml   Output 3280 ml   Net 66751.34 ml       Diet NPO    Invasive Monitoring   Arterial Line  Tina /34  Arterial Line BP  Min: 93/37  Max: 212/67   MAP (!) 60 mmHg  Arterial Line MAP (mmHg)  Min: 54 mmHg  Max: 109 mmHg           Physical Exam   Physical Exam  Eyes:      Pupils: Pupils are equal, round, and reactive to light.   HENT:      Head: Normocephalic.   Cardiovascular:      Rate and Rhythm: Regular rhythm.   Abdominal:      Comments: Deferred as he was actively being transported to the CT scanner   Constitutional:       Appearance: He is ill-appearing.   Neurological:      Comments: Not assessed          Diagnostic Studies        Lab Results: I have reviewed the following results: CBC/BMP:   .     09/12/24  1427 09/12/24  1538 09/12/24  1730 09/13/24  0434   WBC  --  17.58*   < > 9.90   HGB 7.8* 8.5*   < > 6.8*   HCT 23* 26.6*   < > 21.1*   PLT  --  305   < > 222   BANDSPCT  --  5  --   --    SODIUM  --  141   < > 139   K  --  4.0   < > 4.1    CL  --  118*   < > 113*   CO2 17* 15*   < > 17*   BUN  --  14   < > 11   CREATININE  --  0.60   < > 0.61   GLUC  --  154*   < > 118   CAIONIZED 1.05*  --   --   --    MG  --   --    < > 2.5   PHOS  --   --    < > 2.8    < > = values in this interval not displayed.    , Lactic Acid:   .     09/13/24  0655   LACTICACID 3.2*    , ABG:   .     09/12/24  2229 09/13/24  0051 09/13/24  0434   PHART 7.317* 7.361 7.406   WEU4JVH 30.0* 27.7* 30.7*   PO2ART 96.6 150.8* 114.2   MRS2JTO 15.0* 15.3* 18.8*   BEART -10.0 -9.1 -5.2         Medications:  Scheduled PRN   heparin (porcine), 5,000 Units, Q8H BETITO  pantoprazole, 40 mg, Q24H BETITO  sodium phosphate, 21 mmol, Once      labetalol, 10 mg, Q6H PRN  ondansetron, 4 mg, Q6H PRN       Continuous    multi-electrolyte, 125 mL/hr, Last Rate: 125 mL/hr (09/13/24 0220)  ropivacaine 0.1% and fentaNYL 2 mcg/mL,          Labs:   CBC    Recent Labs     09/12/24  1538 09/12/24  1730 09/12/24  2334 09/13/24  0434   WBC 17.58* 19.45*  --  9.90   HGB 8.5* 10.1* 8.1* 6.8*   HCT 26.6* 31.7* 25.5* 21.1*    350  --  222   BANDSPCT 5  --   --   --      BMP    Recent Labs     09/12/24  1730 09/13/24  0434   SODIUM 140 139   K 4.3 4.1   * 113*   CO2 13* 17*   AGAP 10 9   BUN 14 11   CREATININE 0.64 0.61   CALCIUM 7.3* 6.9*       Coags    No recent results     Additional Electrolytes  Recent Labs     09/12/24  1225 09/12/24  1427 09/12/24  1730 09/13/24  0434   MG  --   --  1.4* 2.5   PHOS  --   --  3.7 2.8   CAIONIZED 1.08* 1.05*  --   --           Blood Gas    Recent Labs     09/13/24  0434   PHART 7.406   STO0EAS 30.7*   PO2ART 114.2   ZES9CQB 18.8*   BEART -5.2   SOURCE Line, Arterial     Recent Labs     09/13/24  0434   SOURCE Line, Arterial    LFTs  Recent Labs     09/12/24  1849 09/13/24  0434   * 170*   * 129*   ALKPHOS 101 72   ALB 2.8* 3.2*   TBILI 0.60 0.66       Infectious  No recent results  Glucose  Recent Labs     09/12/24  1538 09/12/24  1730 09/13/24  0434    GLUC 154* 157* 118

## 2024-09-13 NOTE — UTILIZATION REVIEW
Initial Clinical Review    Elective Inpatient surgical procedure  Age/Sex: 63 y.o. male  Surgery Date: 9/12/2024   Procedure: WHIPPLE PROCEDURE/PANCREATICO-DUODENECTOMY (N/A)   Anesthesia: General w/ Epidural   Operative Findings:   Uncinate margin with atypical ducts; remaining RP tissue dissected off SMA and sent for routine analysis. Hepatic and Panc ducts negative for carcinoma on frozen. Otherwise routine whipple.     POD#1 Progress Note: Pt became hypotensive ovn requiring 4L IVF ans 1 u prbc. CT stat to evaluate for intra-abdominal bleed. Reports mild lower abdominal pain. Had nausea, x1 emesis (small phlegm) overnight. No BM, no flatus. Hgb 6.8 this am.  Trend H&H. maintain NPO/NGT, IVF. Mon SEGUN drain output. IV PPI ppx.  maintain Carr for now. continue PCEA. Mon on tele.     Admission Orders: Date/Time/Statement:   Admission Orders (From admission, onward)       Ordered        09/12/24 1707  Inpatient Admission  Once                          Orders Placed This Encounter   Procedures    Inpatient Admission     Standing Status:   Standing     Number of Occurrences:   1     Order Specific Question:   Level of Care     Answer:   Critical Care [15]     Order Specific Question:   Estimated length of stay     Answer:   More than 2 Midnights     Order Specific Question:   Certification     Answer:   I certify that inpatient services are medically necessary for this patient for a duration of greater than two midnights. See H&P and MD Progress Notes for additional information about the patient's course of treatment.       Vital Signs (last 3 days)       Date/Time Temp Pulse Resp BP MAP (mmHg) Arterial Line BP MAP SpO2 Calculated FIO2 (%) - Nasal Cannula Nasal Cannula O2 Flow Rate (L/min) O2 Device Patient Position - Orthostatic VS [place] Coma Scale Score Pain    09/13/24 0700 99.1 °F (37.3 °C) 93 44 88/50 63 116/35 60 mmHg 91 % -- -- -- -- -- --    09/13/24 0642 99.1 °F (37.3 °C) 91 22 101/55 -- -- -- -- -- -- -- --  -- --    09/13/24 0600 99.5 °F (37.5 °C) 97 41 105/54 75 113/34 60 mmHg 93 % -- -- -- -- -- --    09/13/24 0500 99.7 °F (37.6 °C) 88 22 92/54 69 104/33 54 mmHg 100 % -- -- -- -- -- --    09/13/24 0404 -- -- -- -- -- -- -- -- -- -- -- -- 15 --    09/13/24 0400 99.7 °F (37.6 °C) 100 39 115/56 79 132/43 70 mmHg 98 % -- -- -- -- -- --    09/13/24 0300 99.9 °F (37.7 °C) 110 41 111/55 77 142/47 76 mmHg 96 % -- -- -- -- -- --    09/13/24 0200 99.9 °F (37.7 °C) 110 37 129/58 83 130/48 77 mmHg 98 % -- -- -- -- -- --    09/13/24 0100 99.7 °F (37.6 °C) 84 19 100/54 75 114/38 62 mmHg 99 % -- -- -- -- -- --    09/13/24 0000 99.7 °F (37.6 °C) 93 30 102/53 75 107/39 61 mmHg 98 % 32 3 L/min Nasal cannula -- -- 5    09/12/24 2359 -- -- -- -- -- -- -- -- -- -- -- -- 15 --    09/12/24 2300 99.9 °F (37.7 °C) 81 29 88/52 65 107/37 59 mmHg 98 % -- -- -- -- -- --    09/12/24 2255 99.7 °F (37.6 °C) 86 26 76/47 57 93/37 54 mmHg 97 % -- -- -- -- -- --    09/12/24 2200 99.3 °F (37.4 °C) 73 19 91/52 66 97/36 54 mmHg 96 % -- -- -- -- -- --    09/12/24 2100 99.1 °F (37.3 °C) 68 19 101/55 74 110/46 65 mmHg 96 % -- -- -- -- -- --    09/12/24 2000 98.8 °F (37.1 °C) 72 20 113/55 78 122/46 69 mmHg 97 % -- -- -- -- -- --    09/12/24 1957 98.8 °F (37.1 °C) -- -- -- -- -- -- 97 % 32 3 L/min Nasal cannula -- 15 --    09/12/24 1905 -- -- -- -- -- -- -- -- -- -- -- -- -- 10 - Worst Possible Pain    09/12/24 1900 98.1 °F (36.7 °C) 92 25 160/77 110 202/67 99 mmHg 99 % -- -- -- -- -- --    09/12/24 1813 97.9 °F (36.6 °C) 83 20 161/78 112 212/67 109 mmHg 99 % 32 3 L/min Nasal cannula Lying -- 10 - Worst Possible Pain    09/12/24 1811 -- -- -- -- -- -- -- -- -- -- -- -- -- 10 - Worst Possible Pain    09/12/24 1806 -- -- -- -- -- -- -- -- -- -- -- -- 15 --    09/12/24 1800 97.9 °F (36.6 °C) 76 18 -- -- 197/67 105 mmHg 100 % -- -- -- -- -- --    09/12/24 0634 97.8 °F (36.6 °C) 71 18 118/63 -- -- -- 99 % -- -- None (Room air) -- -- No Pain          Weight (last 2  days)       Date/Time Weight    09/12/24 1813 68 (149.91)    09/12/24 0634 61.7 (136)            Pertinent Labs/Diagnostic Test Results:   Radiology:  CTA abdomen pelvis w wo contrast    (Results Pending)     Cardiology:  ECG 12 lead    by Interface, Ris Results In (09/13 0737)        GI:  No orders to display           Results from last 7 days   Lab Units 09/13/24  0434 09/12/24  2334 09/12/24  1730 09/12/24  1538 09/12/24  1427   WBC Thousand/uL 9.90  --  19.45* 17.58*  --    HEMOGLOBIN g/dL 6.8* 8.1* 10.1* 8.5*  --    I STAT HEMOGLOBIN g/dl  --   --   --   --  7.8*   HEMATOCRIT % 21.1* 25.5* 31.7* 26.6*  --    HEMATOCRIT, ISTAT %  --   --   --   --  23*   PLATELETS Thousands/uL 222  --  350 305  --    TOTAL NEUT ABS Thousands/µL 7.81*  --   --   --   --    BANDS PCT %  --   --   --  5  --          Results from last 7 days   Lab Units 09/13/24  0434 09/12/24  1730 09/12/24  1538 09/12/24  1427 09/12/24  1225 09/12/24  1030   SODIUM mmol/L 139 140 141  --   --   --    POTASSIUM mmol/L 4.1 4.3 4.0  --   --   --    CHLORIDE mmol/L 113* 117* 118*  --   --   --    CO2 mmol/L 17* 13* 15*  --   --   --    CO2, I-STAT mmol/L  --   --   --  17* 19* 22   ANION GAP mmol/L 9 10 8  --   --   --    BUN mg/dL 11 14 14  --   --   --    CREATININE mg/dL 0.61 0.64 0.60  --   --   --    EGFR ml/min/1.73sq m 104 102 105  --   --   --    CALCIUM mg/dL 6.9* 7.3* 6.0*  --   --   --    CALCIUM, IONIZED, ISTAT mmol/L  --   --   --  1.05* 1.08* 1.13   MAGNESIUM mg/dL 2.5 1.4*  --   --   --   --    PHOSPHORUS mg/dL 2.8 3.7  --   --   --   --      Results from last 7 days   Lab Units 09/13/24  0434 09/12/24  1849   AST U/L 129* 198*   ALT U/L 170* 260*   ALK PHOS U/L 72 101   TOTAL PROTEIN g/dL 4.1* 3.9*   ALBUMIN g/dL 3.2* 2.8*   TOTAL BILIRUBIN mg/dL 0.66 0.60   BILIRUBIN DIRECT mg/dL  --  0.25*         Results from last 7 days   Lab Units 09/13/24  0434 09/12/24  1730 09/12/24  1538   GLUCOSE RANDOM mg/dL 118 157* 154*         Results  "from last 7 days   Lab Units 09/10/24  0820   HEMOGLOBIN A1C % 5.7*   EAG mg/dl 117     No results found for: \"BETA-HYDROXYBUTYRATE\"   Results from last 7 days   Lab Units 09/13/24  0434 09/13/24  0051 09/12/24  2229   PH ART  7.406 7.361 7.317*   PCO2 ART mm Hg 30.7* 27.7* 30.0*   PO2 ART mm Hg 114.2 150.8* 96.6   HCO3 ART mmol/L 18.8* 15.3* 15.0*   BASE EXC ART mmol/L -5.2 -9.1 -10.0   O2 CONTENT ART mL/dL 10.3* 10.8* 12.6*   O2 HGB, ARTERIAL % 97.0 97.8* 96.1   ABG SOURCE  Line, Arterial Line, Arterial Line, Arterial         Results from last 7 days   Lab Units 09/12/24  1427 09/12/24  1225 09/12/24  1030   I STAT BASE EXC mmol/L -11* -9* -5*   I STAT O2 SAT % 100* 99* 100*   ISTAT PH ART  7.234* 7.233* 7.302*   I STAT ART PCO2 mm HG 37.2 42.5 42.0   I STAT ART PO2 mm .0* 186.0* 188.0*   I STAT ART HCO3 mmol/L 15.8* 17.9* 20.8*                 Results from last 7 days   Lab Units 09/10/24  0820   PROTIME seconds 13.5   INR  1.00   PTT seconds 32             Results from last 7 days   Lab Units 09/13/24  0655 09/13/24  0434 09/13/24  0051 09/12/24  2229 09/12/24 2010 09/12/24  1730 09/12/24  1313   LACTIC ACID mmol/L 3.2* 3.7* 4.6* 4.6* 3.8* 3.8* 2.7*                         Results from last 7 days   Lab Units 09/13/24  0749 09/13/24  0635   UNIT PRODUCT CODE  M6412O78 [id number]  [id number]   UNIT NUMBER  [id number]  [id number]   UNITABO  O O  O   UNITRH  POS POS  POS   CROSSMATCH  Compatible Compatible  Compatible   UNIT DISPENSE STATUS  Issued Crossmatched  Issued   UNIT PRODUCT VOL mL 500 300  350                                                         Results from last 7 days   Lab Units 09/12/24  1041   GRAM STAIN RESULT  Rare Polys  No bacteria seen                   Diet: NPO  Mobility: OOB; Lumbar Epidural activity order  DVT Prophylaxis: SCD    Medications/Pain Control:   Scheduled Medications:  [Held by provider] heparin (porcine), 5,000 Units, Subcutaneous, Q8H " BETITO  pantoprazole, 40 mg, Intravenous, Q24H BETITO  sodium phosphate, 21 mmol, Intravenous, Once      Continuous IV Infusions:  multi-electrolyte, 125 mL/hr, Intravenous, Continuous  ropivacaine 0.1% and fentaNYL 2 mcg/mL, , Epidural, Continuous      PRN Meds:  labetalol, 10 mg, Intravenous, Q6H PRN 09/12 x 1  ondansetron, 4 mg, Intravenous, Q6H PRN        Network Utilization Review Department  ATTENTION: Please call with any questions or concerns to [phone] and carefully listen to the prompts so that you are directed to the right person. All voicemails are confidential.   For Discharge needs, contact Care Management DC Support Team at [phone] opt. 2  Send all requests for admission clinical reviews, approved or denied determinations and any other requests to dedicated fax number below belonging to the campus where the patient is receiving treatment. List of dedicated fax numbers for the Facilities:  FACILITY NAME UR FAX NUMBER   ADMISSION DENIALS (Administrative/Medical Necessity) [phone]   DISCHARGE SUPPORT TEAM (NETWORK) [phone]   PARENT CHILD HEALTH (Maternity/NICU/Pediatrics) [phone]   [facility] [phone]   [facility] [phone]   [facility] [phone]   [facility] [phone]   [facility] [phone]   [facility] [phone]   [facility] [phone]   [facility] [phone]   [facility] [phone]   Critical access hospital [phone]   [facility] [phone]   [facility] [phone]

## 2024-09-13 NOTE — SEDATION DOCUMENTATION
Arteriogram with embolization performed by Dr Cardona. Procedure tolerated well, anesthesia present throughout the case. Right groin access closed with a Perclose. IR Procedure Bedrest Start Time is 0945.

## 2024-09-13 NOTE — PLAN OF CARE
Problem: Prexisting or High Potential for Compromised Skin Integrity  Goal: Skin integrity is maintained or improved  Description: INTERVENTIONS:  - Identify patients at risk for skin breakdown  - Assess and monitor skin integrity  - Assess and monitor nutrition and hydration status  - Monitor labs   - Assess for incontinence   - Turn and reposition patient  - Assist with mobility/ambulation  - Relieve pressure over bony prominences  - Avoid friction and shearing  - Provide appropriate hygiene as needed including keeping skin clean and dry  - Evaluate need for skin moisturizer/barrier cream  - Collaborate with interdisciplinary team   - Patient/family teaching  - Consider wound care consult   Outcome: Progressing     Problem: PAIN - ADULT  Goal: Verbalizes/displays adequate comfort level or baseline comfort level  Description: Interventions:  - Encourage patient to monitor pain and request assistance  - Assess pain using appropriate pain scale  - Administer analgesics based on type and severity of pain and evaluate response  - Implement non-pharmacological measures as appropriate and evaluate response  - Consider cultural and social influences on pain and pain management  - Notify physician/advanced practitioner if interventions unsuccessful or patient reports new pain  Outcome: Progressing     Problem: INFECTION - ADULT  Goal: Absence or prevention of progression during hospitalization  Description: INTERVENTIONS:  - Assess and monitor for signs and symptoms of infection  - Monitor lab/diagnostic results  - Monitor all insertion sites, i.e. indwelling lines, tubes, and drains  - Monitor endotracheal if appropriate and nasal secretions for changes in amount and color  - [place] appropriate cooling/warming therapies per order  - Administer medications as ordered  - Instruct and encourage patient and family to use good hand hygiene technique  - Identify and instruct in appropriate isolation precautions for  identified infection/condition  Outcome: Progressing  Goal: Absence of fever/infection during neutropenic period  Description: INTERVENTIONS:  - Monitor WBC    Outcome: Progressing     Problem: SAFETY ADULT  Goal: Patient will remain free of falls  Description: INTERVENTIONS:  - Educate patient/family on patient safety including physical limitations  - Instruct patient to call for assistance with activity   - Consult OT/PT to assist with strengthening/mobility   - Keep Call bell within reach  - Keep bed low and locked with side rails adjusted as appropriate  - Keep care items and personal belongings within reach  - Initiate and maintain comfort rounds  - Make Fall Risk Sign visible to staff  - Offer Toileting every 2 Hours, in advance of need  - Initiate/Maintain bed alarm  - Obtain necessary fall risk management equipment  - Apply yellow socks and bracelet for high fall risk patients  - Consider moving patient to room near nurses station  Outcome: Progressing  Goal: Maintain or return to baseline ADL function  Description: INTERVENTIONS:  -  Assess patient's ability to carry out ADLs; assess patient's baseline for ADL function and identify physical deficits which impact ability to perform ADLs (bathing, care of mouth/teeth, toileting, grooming, dressing, etc.)  - Assess/evaluate cause of self-care deficits   - Assess range of motion  - Assess patient's mobility; develop plan if impaired  - Assess patient's need for assistive devices and provide as appropriate  - Encourage maximum independence but intervene and supervise when necessary  - Involve family in performance of ADLs  - Assess for home care needs following discharge   - Consider OT consult to assist with ADL evaluation and planning for discharge  - Provide patient education as appropriate  Outcome: Progressing  Goal: Maintains/Returns to pre admission functional level  Description: INTERVENTIONS:  - Perform AM-PAC 6 Click Basic Mobility/ Daily Activity  assessment daily.  - Set and communicate daily mobility goal to care team and patient/family/caregiver.   - Collaborate with rehabilitation services on mobility goals if consulted  - Perform Range of Motion 3 times a day.  - Record patient progress and toleration of activity level   Outcome: Progressing     Problem: Knowledge Deficit  Goal: Patient/family/caregiver demonstrates understanding of disease process, treatment plan, medications, and discharge instructions  Description: Complete learning assessment and assess knowledge base.  Interventions:  - Provide teaching at level of understanding  - Provide teaching via preferred learning methods  Outcome: Progressing

## 2024-09-13 NOTE — ANESTHESIA PREPROCEDURE EVALUATION
Procedure:  IR EMBOLIZATION (SPECIFY VESSEL OR SITE)    Relevant Problems   CARDIO   (+) Aortic valve stenosis   (+) Benign essential hypertension   (+) Coronary artery disease involving native coronary artery of native heart without angina pectoris   (+) Hypercholesterolemia      GI/HEPATIC   (+) GERD (gastroesophageal reflux disease)      /RENAL   (+) MOOKIE (acute kidney injury) (HCC)   (+) BPH (benign prostatic hyperplasia)   (+) Prostate cancer (HCC)      Lab Results   Component Value Date    SODIUM 139 09/13/2024    K 4.1 09/13/2024     (H) 09/13/2024    CO2 17 (L) 09/13/2024    AGAP 9 09/13/2024    BUN 11 09/13/2024    CREATININE 0.61 09/13/2024    GLUC 118 09/13/2024    GLUF 92 05/22/2023    CALCIUM 6.9 (L) 09/13/2024     (H) 09/13/2024     (H) 09/13/2024    ALKPHOS 72 09/13/2024    TP 4.1 (L) 09/13/2024    TBILI 0.66 09/13/2024    EGFR 104 09/13/2024     Lab Results   Component Value Date    WBC 9.90 09/13/2024    HGB 6.8 (L) 09/13/2024    HCT 21.1 (L) 09/13/2024    MCV 88 09/13/2024     09/13/2024 8/16/24 TTE  Left Ventricle Left ventricular cavity size is normal. There is mild concentric hypertrophy. The left ventricular ejection fraction is 65%. Systolic function is normal. Wall motion is normal. Diastolic function is mildly abnormal, consistent with grade I (abnormal) relaxation.   Right Ventricle Systolic function is normal. Normal tricuspid annular plane systolic excursion (TAPSE) > 1.7 cm.   Left Atrium The atrium is normal in size.   Aortic Valve The aortic valve is trileaflet. The leaflets are mildly calcified. There is moderately reduced mobility. There is mild regurgitation. There is moderate stenosis. The aortic valve peak velocity is 3.47 m/s. The aortic valve mean gradient is 31 mmHg.   Mitral Valve There is mild annular calcification.  There is mild regurgitation. There is no evidence of stenosis.   Tricuspid Valve Tricuspid valve structure is normal. There is  trace regurgitation. There is no evidence of stenosis. The estimated right ventricular systolic pressure is 33.00 mmHg.   Pulmonic Valve Pulmonic valve structure is normal. There is trace regurgitation. There is no evidence of stenosis.   Ascending Aorta The aortic root is normal in size. The sinus of Valsalva is normal in size. The ascending aorta is normal in size.   IVC/SVC The right atrial pressure is estimated at 8.0 mmHg. The inferior vena cava is normal in size.   Pericardium There is no pericardial effusion. The pericardium is normal in appearance.   Pulmonary Veins The pulmonary veins have normal venous flow. Flow pattern is normal.     multi-electrolyte, 125 mL/hr, Last Rate: 125 mL/hr (09/13/24 0220)  ropivacaine 0.1% and fentaNYL 2 mcg/mL,           Physical Exam    Airway    Mallampati score: II  TM Distance: <3 FB  Neck ROM: full     Dental       Cardiovascular      Pulmonary      Other Findings        Anesthesia Plan  ASA Score- 4 Emergent    Anesthesia Type- IV sedation with anesthesia with ASA Monitors.         Additional Monitors: arterial line.    Airway Plan:            Plan Factors-Exercise tolerance (METS): <4 METS.    Chart reviewed. EKG reviewed. Imaging results reviewed. Existing labs reviewed. Patient summary reviewed.                  Induction- intravenous.    Postoperative Plan-     Perioperative Resuscitation Plan - Level 1 - Full Code.       Informed Consent- Anesthetic plan and risks discussed with patient.  I personally reviewed this patient with the CRNA. Discussed and agreed on the Anesthesia Plan with the CRNA..

## 2024-09-13 NOTE — ASSESSMENT & PLAN NOTE
S/p Aashish 9/12    - Received Thoracic Epidural preoperatively   - currently 6/3/10/3   - further room for increases if vitals allow  - IV Dilaudid PRN breakthrough pain  - IV Tylenol while NPO  - t/c IV Robaxin if pain remains uncontrolled after epidural increase    - IS OOB PT as able  - Bowel regimen when appropriate to prevent OIC

## 2024-09-13 NOTE — ANESTHESIA POSTPROCEDURE EVALUATION
Post-Op Assessment Note    CV Status:  Stable    Pain management: adequate       Mental Status:  Awake   Hydration Status:  Euvolemic   PONV Controlled:  Controlled   Airway Patency:  Patent  Two or more mitigation strategies used for obstructive sleep apnea   Post Op Vitals Reviewed: Yes    No anethesia notable event occurred.    Staff: Anesthesiologist, CRNA   Comments: Pt taken back to ICU by RN on portable monitor and face mask. O2 sats 95%          BP      Temp      Pulse     Resp      SpO2

## 2024-09-13 NOTE — OP NOTE
OPERATIVE REPORT  PATIENT NAME: Isaac Kramer    :  1960  MRN: [id number]  Pt Location: BE OR ROOM 08    SURGERY DATE: 2024    Surgeons and Role:     * Marika Ac MD - Primary     * Sidney Collins MD - Assisting    Preop Diagnosis:  Pancreatic mass [K86.89]    Post-Op Diagnosis Codes:     * Pancreatic mass [K86.89]    Procedure(s):  WHIPPLE PROCEDURE/PANCREATICO-DUODENECTOMY    Specimen(s):  ID Type Source Tests Collected by Time Destination   1 : Gallbladder Tissue Gallbladder TISSUE EXAM Marika Ac MD 2024 0941    2 : Portal Lymph Node Tissue Lymph Node TISSUE EXAM Marika Ac MD 2024 1037    3 : Whipple; SEE COMMENTS Tissue Pancreas TISSUE EXAM Marika Ac MD 2024 1215    4 : Portacaval node Tissue Lymph Node TISSUE EXAM Marika Ac MD 2024 1312    5 : Additional Uncinate/Pancreas Tissue Soft Tissue, Other TISSUE EXAM Marika Ac MD 2024 1348    A : Bile duct Tissue Common Bile Duct ANAEROBIC CULTURE AND GRAM STAIN, FUNGAL CULTURE, CULTURE, TISSUE AND GRAM STAIN Marika Ac MD 2024 1041        Estimated Blood Loss:   Minimal    Drains:  Closed/Suction Drain Left Abdomen Bulb 19 Fr. (Active)   Dressing Status Clean;Dry;Intact 24 0402   Drainage Appearance Bloody 24 0633   Status To bulb suction 24 0601   Output (mL) 105 mL 24 0801   Number of days: 1       NG/OG/Enteral Tube Nasogastric Right nare (Active)   Placement Reverification Auscultation 24 0601   Site Assessment Clean;Dry;Intact 24 210   Status Suction-low intermittent 24 210   Drainage Appearance Bile 24 210   Output (mL) 450 mL 24 0801   Number of days: 1       Urethral Catheter Temperature probe;Straight-tip;Latex 16 Fr. (Active)   Reasons to continue Urinary Catheter  Accurate I&O assessment in critically ill patients (48 hr. max) 24 210   Goal for Removal Voiding trial when ambulation improves  09/12/24 2101   Site Assessment Clean;Skin intact 09/12/24 2101   Carr Care Done 09/12/24 2032   Collection Container Standard drainage bag 09/12/24 2101   Securement Method Securing device (Describe) 09/12/24 2101   Output (mL) 325 mL 09/13/24 0801   Number of days: 1       Anesthesia Type:   General w/ Epidural    Operative Indications:  Ampullary mass with HGD    Operative Findings:  Bulky ampullary mass extending into pancreas head; uncinate margin with atypical appearing glands; additional RP tissue dissected off SMA and send as final specimen.     Complications:   None    Procedure and Technique:  The patient was identified in the operating suite and general endotracheal intubation achieved.  An NG tube was placed and a Carr was placed.  The abdomen was prepped and draped in the usual sterile fashion.  A timeout was performed.  An upper midline incision was made and carried down to the fascia which was opened along the length of the incision.  The falciform ligament was divided with the Enseal device.  The Bookwalter was placed.  Dissection began in the lesser sac, dividing the gastrocolic ligament just off the gastroepiploic bringing up both proximally to the short gastrics and distally to the takeoff of the gastroepiploic.  The inferior margin of the pancreas was opened widely, revealing the SMV at the base of the middle colic vessel.  A tunnel was created anterior to the SMV, terminating at the portal vein with no evidence of tumor involvement.  We then continued the dissection at the portal triad.  The peritoneum overlying the portal triad was opened broadly with the Enseal device, taking care to ligate all small contributory vessels and lymphatics.  The hepatic artery was visualized at the medial side of the triad, and the origin of the GDA was dissected free.  The GDA was then doubly clipped and suture-ligated.  We continued dissection of the portal triad to reveal the portal vein at the superior  margin of the pancreas, completing the tunnel from inferior to superior pancreas.  This appeared free of disease.  We then turned our attention towards taking down the gallbladder in order to elucidate the bile duct.  The stent could be palpated within the duct.  The gallbladder was taken down in a top-down fashion and showed significant edema due to the obstructive jaundice with which the patient presented.  The right hepatic artery was dissected free from the edema surrounding the infundibulum of the gallbladder, and the cystic artery isolated. This was clamped and tied off.  The cystic duct was then skeletonized and also suture-ligated.  Gallbladder was passed off the field.  We continued our dissection, widely kocherized in the duodenum out of the retroperitoneum.  At the posterior aspect of the duodenum, the firm mass could be palpated, and again appeared to be free from surrounding structures.  To elucidate the portal structures, we then elected to divide the antrum.  The greater and lesser omentum was divided with the Enseal device to a portion proximately 3 to 4 cm proximal to the pylorus.  The antrum was then divided with the blue load of the linear stapler.  The remaining stomach was tucked into the left upper quadrant.  We continued dissection of the portal triad, carefully  the bile duct from the portal vein.  Small bleeding vessels were controlled with suture ligation.  The bile duct was ultimately  from the portal vein, and transected about 1 cm superior to the pancreas.  The bile that flowed from the duct had a decent amount of sediment.  This was cultured in the case of need for antibiotics.  A clamp was then placed across the bile duct.  We continued dissection at the jejunum, identifying the ligament of Treitz and transecting the jejunum approximately 20 to 25 cm distal to this at a portion freely movable mesentery.  The mesentery was then divided, taking care to remain close to  the proximal jejunum, and carefully taking down the ligamentous and peritoneal attachments at the ligament of Treitz.  Ultimately, the bowel was passed to the right side of the abdomen through the defect base of the mesentery.  At this juncture, transection of the pancreas was initiated.  Silk sutures were placed at the superior and inferior aspects of the pancreas for ligation of the superior and inferior pancreaticoduodenal arteries.  Pancreas was then carefully transected over the SMV portal vein junction.  A 4 mm duct was visualized and appeared healthy.  We then initiated dissection of the uncinate process, carefully dividing the contributing branches to the SMV and SMA with the Enseal device.  Larger branches were clipped and divided.  The specimen was then freed and passed off the field for frozen analysis.  This ultimately returned with atypia at the uncinate process margin.  The vein was carefully retracted medially, and additional retroperitoneal was skeletonized on the SMA and passed off for final tissue analysis.  The area was copiously irrigated and Surgiflo was placed for hemostasis.  Hemostasis was thus confirmed.  We then initiated reconstruction in standard fashion.  The proximal jejunum was brought through the defect in the bare area to the right of the middle colic vessels.  A hepatic duct to mucosa anastomosis was fashioned with running 4-0 PDS.  A Ray-Evelyn was placed around the anastomosis for later inspection, revealing no evidence of biliary leakage.  We then formed a duct to mucosa pancreatic anastomosis in 2 layers with 6-0 PDS and 2-0 Vicryl suture.  Both anastomoses were hemostatic at the conclusion.  We then brought the limb antecolic and created a gastrojejunostomy with the linear stapler and closed with a TA stapler at the posterior aspect of the stomach.  NG tube placement was confirmed.  The area was copiously irrigated with 3 L of saline given the obstructed bile, and hemostasis was  confirmed.  A drain was brought through the left upper quadrant passing behind the pancreatic and biliary anastomoses and terminating at the retroperitoneal margin.  This was affixed in place.  The fascia was then closed with running #1 PDS.  The skin was irrigated and closed with 4-0 Monocryl followed by skin glue.  The patient tolerated the procedure well.     I was present for the entire procedure.    Patient Disposition:  Critical Care Unit             SIGNATURE: Marika Ac MD  DATE: September 13, 2024  TIME: 9:01 AM

## 2024-09-13 NOTE — CASE MANAGEMENT
Case Management Assessment & Discharge Planning Note    Patient name Isaac Kramer  [id number]  : 1960 Date 2024       Current Admission Date: 2024  Current Admission Diagnosis:Pancreatic mass  Patient Active Problem List    Diagnosis Date Noted Date Diagnosed    MOOKIE (acute kidney injury) (HCC) 2024     GERD (gastroesophageal reflux disease)      Pancreatic mass 2024     Elevated LFTs 2024     Biliary sepsis 2024     Acute obstructive cholangitis 2024     Enteritis 2024     Prostate cancer (HCC) 2023     Microscopic hematuria 10/10/2022     BPH (benign prostatic hyperplasia) 10/10/2022     Coronary artery disease involving native coronary artery of native heart without angina pectoris 2022     Overweight with body mass index (BMI) of 26 to 26.9 in adult 2020     Aortic valve stenosis 2020     ED (erectile dysfunction) of non-organic origin 2015     Peripheral neuropathy 2014     Peripheral vascular disease (HCC) 2014     Benign essential hypertension 2014     Hypercholesterolemia 2013       LOS (days): 1  Geometric Mean LOS (GMLOS) (days): 5  Days to GMLOS:4.2     OBJECTIVE:  PATIENT READMITTED TO HOSPITAL  Risk of Unplanned Readmission Score: 17.16         Current admission status: Inpatient       Preferred Pharmacy:   [id number] - [place] - [address]  [address]  [place] [address]  Phone: [phone] Fax: [phone]    Primary Care Provider: Pablo Perez DO    Primary Insurance: MEDICARE  Secondary Insurance:     ASSESSMENT:  Active Health Care Proxies       Jacque Kramer Health Care Agent - Spouse   Primary Phone: [phone] (Mobile)  Home Phone: [phone]                 Advance Directives  Does patient have a Health Care POA?: Yes  Does patient have Advance Directives?: Yes  Advance  Directives: Living will, Power of  for health care  Primary Contact: Jacque Kramer       Readmission Root Cause  30 Day Readmission: Yes  Who directed you to return to the hospital?: Specialist  Did you understand whom to contact if you had questions or problems?: Yes  Were you able to get your prescriptions filled when you left the hospital?: Yes  Did you take your medications as prescribed?: Yes  Were you able to get to your follow-up appointments?: Yes  During previous admission, was a post-acute recommendation made?: No  Patient was readmitted due to: Pancreatic mass, s/p Whipple procedure.  Action Plan: Pending clinical course.    Patient Information  Admitted from:: Home  Mental Status: Alert  During Assessment patient was accompanied by: Spouse  Assessment information provided by:: Patient, Spouse  Primary Caregiver: Spouse  Caregiver's Telephone Number:: [phone]  Support Systems: Self, Spouse/significant other, Son  County of Residence: [place]  What city do you live in?: [place]  Home entry access options. Select all that apply.: No steps to enter home  Type of Current Residence: [facility]  Living Arrangements: Lives w/ Spouse/significant other  Is patient a ?: No    Activities of Daily Living Prior to Admission  Functional Status: Independent  Completes ADLs independently?: Yes  Ambulates independently?: Yes  Does patient use assisted devices?: No  Does patient currently own DME?: Yes  What DME does the patient currently own?: Straight Cane  Does patient have a history of Outpatient Therapy (PT/OT)?[facility]  Does the patient have a history of Short-Term Rehab?: Yes (Pt cannot recall the facility.)  Does patient have a history of HHC?: No  Does patient currently have HHC?: No       Patient Information Continued  Income Source: Employed  Does patient have prescription coverage?: Yes  Does patient receive dialysis treatments?: No  Does patient have a history of substance  abuse?: No  Does patient have a history of Mental Health Diagnosis?: No       Means of Transportation  Means of Transport to Appts:: Family transport      Social Determinants of Health (SDOH)      Flowsheet Row Most Recent Value   Housing Stability    In the last 12 months, was there a time when you were not able to pay the mortgage or rent on time? N   In the past 12 months, how many times have you moved where you were living? 0   At any time in the past 12 months, were you homeless or living in a shelter (including now)? N   Transportation Needs    In the past 12 months, has lack of transportation kept you from medical appointments or from getting medications? no   In the past 12 months, has lack of transportation kept you from meetings, work, or from getting things needed for daily living? No   Food Insecurity    Within the past 12 months, you worried that your food would run out before you got the money to buy more. Never true   Within the past 12 months, the food you bought just didn't last and you didn't have money to get more. Never true   Utilities    In the past 12 months has the electric, gas, oil, or water company threatened to shut off services in your home? No            DISCHARGE DETAILS:    Discharge planning discussed with:: Patient and Jacque  Freedom of Choice: No  Comments - Freedom of Choice: Pt S/P whipple, CM following to discuss choice and discharge needs. TBD  CM contacted family/caregiver?: Yes   Contacts  Patient Contacts: Jacque Kramer  Relationship to Patient:: Family  Contact Method: In Person  Reason/Outcome: Continuity of Care, Emergency Contact, Discharge Planning     Other Referral/Resources/Interventions Provided:  Referral Comments: Pt admitted with pancreatic mass now s/p whipple and second surgery for bleed.  CM following for DC needs.     Information:  CM met with pt and wife at bedside, introduced myself and explained my role. Pt resides in trailer home with wife, no SHILPA. DME  includes a cane.  IADL prior to admit.  Pt used to smoke but quit and says he only drinks on a rare occasion.  Pt is now S/P whipple procedure for pancreatic mass.  CM following for DC needs.

## 2024-09-13 NOTE — CONSULTS
e-Consult (IPC)  - Interventional Radiology  Isaac Kramer 63 y.o. male MRN: [id number]  Unit/Bed#: MICU 04 Encounter: [id number]          Interventional Radiology has been consulted to evaluate Isaac Kramer      Consults  09/13/24    Assessment/Recommendation:   63 year old male with history of pancreatic mass s/p Whipple on 9/12/2024.  Patient noted to have downtrending Hgb.  CTA showed findings concerning for active contrast extravasation from cystic artery stump.      - Plan for angiogram with possible embolization      11-20 minutes, >50% of the total time devoted to medical consultative verbal/EMR discussion between providers. Written report will be generated in the EMR.     Thank you for allowing Interventional Radiology to participate in the care of Isaac Kramer. Please don't hesitate to call or TigerText us with any questions.     Jerome Cardona MD

## 2024-09-13 NOTE — BRIEF OP NOTE (RAD/CATH)
INTERVENTIONAL RADIOLOGY PROCEDURE NOTE    Date: 9/13/2024    Procedure: Cystic artery embolization  Procedure Summary       Date:  Room / Location:     Anesthesia Start:  Anesthesia Stop:     Procedure:  Diagnosis:     Scheduled Providers:  Responsible Provider:     Anesthesia Type: Not recorded ASA Status: Not recorded            Preoperative diagnosis:   1. Pancreatic mass         Postoperative diagnosis: Same.    Surgeon: Jerome Cardona MD     Assistant: Dr. Guerin    Blood loss: 10 mL    Specimens: None     Findings: Active bleeding seen from a cystic artery branch.  Coil embolization of cystic artery performed with cessation of bleeding.    Complications: None immediate.    Anesthesia: local and general

## 2024-09-13 NOTE — QUICK NOTE
Critical Fellow Post Op Check     S: Patient reports that pain is well controlled.  No nausea and vomiting.     O: Temp:  [94.5 °F (34.7 °C)-99.9 °F (37.7 °C)] 94.5 °F (34.7 °C)  HR:  [] 93  Resp:  [18-48] 20  BP: ()/(47-78) 108/56  Arterial Line BP: ()/(33-67) 132/51    Intake/Output Summary (Last 24 hours) at 9/13/2024 1056  Last data filed at 9/13/2024 1053  Gross per 24 hour   Intake 19487.88 ml   Output 4690 ml   Net 31915.88 ml     Gen: AO x 3, NAD  Cv: Regular rate  Lungs: CTAB  Abd: moderately distended, no rebound. Drain full with rolo blood  Ext: no edema, SCDs on    A/P: 63 y.o. POD#1 s/p whipple pancreatic mass with post-op course c/b intra-abdominal bleed now s/p IR embolization of cystic artery and IVF/product resuscitation. VS and AUOP.    - wife updated  - Isolyte @ 50cc/hr   - Zosyn x24 hours  - labs now including CBC, lactate, INR, magnesium,, phos, BMP  - CXR now  - monitor respiratory status, UOP, and diuresis prn  - PCEA per APS, appreciate their assistance  - NPO with NGT in place  - Carr in place  - Monitor drain ouptut   - consider DVT ppx tonight if h/h stable  - frequent draining of drain fluid    Rylee Merrill

## 2024-09-14 LAB
ABO GROUP BLD BPU: NORMAL
ANION GAP SERPL CALCULATED.3IONS-SCNC: 4 MMOL/L (ref 4–13)
ANION GAP SERPL CALCULATED.3IONS-SCNC: 5 MMOL/L (ref 4–13)
BACTERIA SPEC ANAEROBE CULT: NORMAL
BASOPHILS # BLD AUTO: 0.01 THOUSANDS/ΜL (ref 0–0.1)
BASOPHILS NFR BLD AUTO: 0 % (ref 0–1)
BPU ID: NORMAL
BUN SERPL-MCNC: 13 MG/DL (ref 5–25)
BUN SERPL-MCNC: 14 MG/DL (ref 5–25)
CALCIUM SERPL-MCNC: 7.5 MG/DL (ref 8.4–10.2)
CALCIUM SERPL-MCNC: 7.5 MG/DL (ref 8.4–10.2)
CHLORIDE SERPL-SCNC: 108 MMOL/L (ref 96–108)
CHLORIDE SERPL-SCNC: 108 MMOL/L (ref 96–108)
CO2 SERPL-SCNC: 27 MMOL/L (ref 21–32)
CO2 SERPL-SCNC: 30 MMOL/L (ref 21–32)
CREAT SERPL-MCNC: 0.6 MG/DL (ref 0.6–1.3)
CREAT SERPL-MCNC: 0.74 MG/DL (ref 0.6–1.3)
CROSSMATCH: NORMAL
EOSINOPHIL # BLD AUTO: 0.03 THOUSAND/ΜL (ref 0–0.61)
EOSINOPHIL NFR BLD AUTO: 0 % (ref 0–6)
ERYTHROCYTE [DISTWIDTH] IN BLOOD BY AUTOMATED COUNT: 14.5 % (ref 11.6–15.1)
ERYTHROCYTE [DISTWIDTH] IN BLOOD BY AUTOMATED COUNT: 14.6 % (ref 11.6–15.1)
GFR SERPL CREATININE-BSD FRML MDRD: 105 ML/MIN/1.73SQ M
GFR SERPL CREATININE-BSD FRML MDRD: 96 ML/MIN/1.73SQ M
GLUCOSE SERPL-MCNC: 109 MG/DL (ref 65–140)
GLUCOSE SERPL-MCNC: 94 MG/DL (ref 65–140)
HCT VFR BLD AUTO: 22.9 % (ref 36.5–49.3)
HCT VFR BLD AUTO: 25 % (ref 36.5–49.3)
HGB BLD-MCNC: 7.7 G/DL (ref 12–17)
HGB BLD-MCNC: 8.4 G/DL (ref 12–17)
IMM GRANULOCYTES # BLD AUTO: 0.08 THOUSAND/UL (ref 0–0.2)
IMM GRANULOCYTES NFR BLD AUTO: 1 % (ref 0–2)
INR PPP: 1.39 (ref 0.85–1.19)
LYMPHOCYTES # BLD AUTO: 1.46 THOUSANDS/ΜL (ref 0.6–4.47)
LYMPHOCYTES NFR BLD AUTO: 11 % (ref 14–44)
MAGNESIUM SERPL-MCNC: 2.1 MG/DL (ref 1.9–2.7)
MCH RBC QN AUTO: 29.4 PG (ref 26.8–34.3)
MCH RBC QN AUTO: 29.7 PG (ref 26.8–34.3)
MCHC RBC AUTO-ENTMCNC: 33.6 G/DL (ref 31.4–37.4)
MCHC RBC AUTO-ENTMCNC: 33.6 G/DL (ref 31.4–37.4)
MCV RBC AUTO: 87 FL (ref 82–98)
MCV RBC AUTO: 88 FL (ref 82–98)
MONOCYTES # BLD AUTO: 1.09 THOUSAND/ΜL (ref 0.17–1.22)
MONOCYTES NFR BLD AUTO: 8 % (ref 4–12)
NEUTROPHILS # BLD AUTO: 10.66 THOUSANDS/ΜL (ref 1.85–7.62)
NEUTS SEG NFR BLD AUTO: 80 % (ref 43–75)
NRBC BLD AUTO-RTO: 0 /100 WBCS
PHOSPHATE SERPL-MCNC: 2.9 MG/DL (ref 2.3–4.1)
PLATELET # BLD AUTO: 184 THOUSANDS/UL (ref 149–390)
PLATELET # BLD AUTO: 216 THOUSANDS/UL (ref 149–390)
PMV BLD AUTO: 10.1 FL (ref 8.9–12.7)
PMV BLD AUTO: 9.8 FL (ref 8.9–12.7)
POTASSIUM SERPL-SCNC: 4 MMOL/L (ref 3.5–5.3)
POTASSIUM SERPL-SCNC: 4 MMOL/L (ref 3.5–5.3)
PROTHROMBIN TIME: 17.3 SECONDS (ref 12.3–15)
RBC # BLD AUTO: 2.62 MILLION/UL (ref 3.88–5.62)
RBC # BLD AUTO: 2.83 MILLION/UL (ref 3.88–5.62)
SODIUM SERPL-SCNC: 140 MMOL/L (ref 135–147)
SODIUM SERPL-SCNC: 142 MMOL/L (ref 135–147)
UNIT DISPENSE STATUS: NORMAL
UNIT PRODUCT CODE: NORMAL
UNIT PRODUCT VOLUME: 300 ML
UNIT PRODUCT VOLUME: 350 ML
UNIT PRODUCT VOLUME: 500 ML
UNIT RH: NORMAL
WBC # BLD AUTO: 13.33 THOUSAND/UL (ref 4.31–10.16)
WBC # BLD AUTO: 15.26 THOUSAND/UL (ref 4.31–10.16)

## 2024-09-14 PROCEDURE — 99233 SBSQ HOSP IP/OBS HIGH 50: CPT | Performed by: ANESTHESIOLOGY

## 2024-09-14 PROCEDURE — 85610 PROTHROMBIN TIME: CPT | Performed by: STUDENT IN AN ORGANIZED HEALTH CARE EDUCATION/TRAINING PROGRAM

## 2024-09-14 PROCEDURE — 80048 BASIC METABOLIC PNL TOTAL CA: CPT | Performed by: PHYSICIAN ASSISTANT

## 2024-09-14 PROCEDURE — 84100 ASSAY OF PHOSPHORUS: CPT

## 2024-09-14 PROCEDURE — 80048 BASIC METABOLIC PNL TOTAL CA: CPT

## 2024-09-14 PROCEDURE — 83735 ASSAY OF MAGNESIUM: CPT

## 2024-09-14 PROCEDURE — 99024 POSTOP FOLLOW-UP VISIT: CPT | Performed by: SURGERY

## 2024-09-14 PROCEDURE — 99233 SBSQ HOSP IP/OBS HIGH 50: CPT | Performed by: EMERGENCY MEDICINE

## 2024-09-14 PROCEDURE — 85027 COMPLETE CBC AUTOMATED: CPT | Performed by: PHYSICIAN ASSISTANT

## 2024-09-14 PROCEDURE — 85025 COMPLETE CBC W/AUTO DIFF WBC: CPT

## 2024-09-14 RX ORDER — HEPARIN SODIUM 5000 [USP'U]/ML
5000 INJECTION, SOLUTION INTRAVENOUS; SUBCUTANEOUS EVERY 8 HOURS SCHEDULED
Status: DISCONTINUED | OUTPATIENT
Start: 2024-09-14 | End: 2024-09-15

## 2024-09-14 RX ORDER — ALBUMIN, HUMAN INJ 5% 5 %
25 SOLUTION INTRAVENOUS ONCE
Status: COMPLETED | OUTPATIENT
Start: 2024-09-14 | End: 2024-09-15

## 2024-09-14 RX ADMIN — HEPARIN SODIUM 5000 UNITS: 5000 INJECTION INTRAVENOUS; SUBCUTANEOUS at 22:34

## 2024-09-14 RX ADMIN — SODIUM PHOSPHATE, MONOBASIC, MONOHYDRATE AND SODIUM PHOSPHATE, DIBASIC, ANHYDROUS 12 MMOL: 276; 142 INJECTION, SOLUTION INTRAVENOUS at 10:43

## 2024-09-14 RX ADMIN — HEPARIN SODIUM 5000 UNITS: 5000 INJECTION INTRAVENOUS; SUBCUTANEOUS at 17:31

## 2024-09-14 RX ADMIN — ACETAMINOPHEN 1000 MG: 10 INJECTION INTRAVENOUS at 16:37

## 2024-09-14 RX ADMIN — Medication: at 18:24

## 2024-09-14 RX ADMIN — Medication: at 10:17

## 2024-09-14 RX ADMIN — HYDROMORPHONE HYDROCHLORIDE 0.5 MG: 1 INJECTION, SOLUTION INTRAMUSCULAR; INTRAVENOUS; SUBCUTANEOUS at 03:00

## 2024-09-14 RX ADMIN — ACETAMINOPHEN 1000 MG: 10 INJECTION INTRAVENOUS at 20:51

## 2024-09-14 RX ADMIN — Medication 1 SPRAY: at 00:15

## 2024-09-14 RX ADMIN — PANTOPRAZOLE SODIUM 40 MG: 40 INJECTION, POWDER, FOR SOLUTION INTRAVENOUS at 09:17

## 2024-09-14 RX ADMIN — SODIUM CHLORIDE, SODIUM GLUCONATE, SODIUM ACETATE, POTASSIUM CHLORIDE, MAGNESIUM CHLORIDE, SODIUM PHOSPHATE, DIBASIC, AND POTASSIUM PHOSPHATE 50 ML/HR: .53; .5; .37; .037; .03; .012; .00082 INJECTION, SOLUTION INTRAVENOUS at 12:25

## 2024-09-14 RX ADMIN — PIPERACILLIN SODIUM AND TAZOBACTAM SODIUM 4.5 G: 36; 4.5 INJECTION, POWDER, LYOPHILIZED, FOR SOLUTION INTRAVENOUS at 04:48

## 2024-09-14 RX ADMIN — ACETAMINOPHEN 1000 MG: 10 INJECTION INTRAVENOUS at 09:17

## 2024-09-14 RX ADMIN — Medication 1 SPRAY: at 13:20

## 2024-09-14 NOTE — PROGRESS NOTES
Progress Note - Thoracic    Name: Isaac Kramer 63 y.o. male I MRN: [id number]  Unit/Bed#: MICU 04 I Date of Admission: 9/12/2024   Date of Service: 9/14/2024 I Hospital Day: 2    Assessment & Plan  Pancreatic mass  S/p 9/12 whipple.  S/p 9/13 IR coil embolization cystic artery branch    SEGUN 1.3 L dark  Hemoglobin 7.7 from 7.8    Plan:  -Will continue resuscitation  -Possible DC Carr later today  -Strict monitoring output from SEGUN drain  - maintain NPO/NGT, IVF  - continue PCEA  - DVT ppx, IS, OOB/ambulate  Coronary artery disease involving native coronary artery of native heart without angina pectoris          History of Present Illness   Pain overall improved.  No nausea no vomiting.  No flatus no bowel movements.    Objective      Temp:  [97.9 °F (36.6 °C)-99.3 °F (37.4 °C)] 98.4 °F (36.9 °C)  HR:  [59-98] 67  Resp:  [12-42] 16  BP: ()/(52-69) 128/60  Arterial Line BP: (113-167)/(40-56) 135/44  O2 Device: Nasal cannula          I/O         09/12 0701  09/13 0700 09/13 0701  09/14 0700 09/14 0701  09/15 0700    I.V. (mL/kg) 12768.3 (204.6) 1932.6 (26.1) 663.3 (9)    Blood  946.7     IV Piggyback 2500 1200     Total Intake(mL/kg) 37449.3 (241.4) 4079.2 (55.1) 663.3 (9)    Urine (mL/kg/hr) 2640 (1.6) 2095 (1.2)     Emesis/NG output  1450     Drains 640 1340     Total Output 3280 4885     Net +51908.3 -805.8 +663.3                 Lines/Drains/Airways       Active Status       Name Placement date Placement time Site Days    Epidural Catheter 09/12/24 09/12/24  1622  -- 1    Arterial Line 09/12/24 Right Radial 09/12/24  0750  Radial  2    Closed/Suction Drain Left Abdomen Bulb 19 Fr. 09/12/24  1510  Abdomen  1    NG/OG/Enteral Tube Nasogastric Right nare 09/12/24  0815  Right nare  1    Urethral Catheter Temperature probe;Straight-tip;Latex 16 Fr. 09/12/24  0825  Temperature probe;Straight-tip;Latex  1                  Physical Exam  Vitals and nursing note reviewed.   Constitutional:       General: He is not  in acute distress.     Appearance: He is well-developed.   HENT:      Head: Normocephalic and atraumatic.   Eyes:      Conjunctiva/sclera: Conjunctivae normal.   Cardiovascular:      Rate and Rhythm: Normal rate and regular rhythm.      Heart sounds: No murmur heard.  Pulmonary:      Effort: Pulmonary effort is normal. No respiratory distress.      Breath sounds: Normal breath sounds.   Abdominal:      Palpations: Abdomen is soft.      Tenderness: There is abdominal tenderness.      Comments: Tender left abdomen, SEGUN dark   Musculoskeletal:         General: No swelling.      Cervical back: Neck supple.   Skin:     General: Skin is warm and dry.      Capillary Refill: Capillary refill takes less than 2 seconds.   Neurological:      Mental Status: He is alert.   Psychiatric:         Mood and Affect: Mood normal.

## 2024-09-14 NOTE — PLAN OF CARE
Problem: Prexisting or High Potential for Compromised Skin Integrity  Goal: Skin integrity is maintained or improved  Description: INTERVENTIONS:  - Identify patients at risk for skin breakdown  - Assess and monitor skin integrity  - Assess and monitor nutrition and hydration status  - Monitor labs   - Assess for incontinence   - Turn and reposition patient  - Assist with mobility/ambulation  - Relieve pressure over bony prominences  - Avoid friction and shearing  - Provide appropriate hygiene as needed including keeping skin clean and dry  - Evaluate need for skin moisturizer/barrier cream  - Collaborate with interdisciplinary team   - Patient/family teaching  - Consider wound care consult   Outcome: Progressing     Problem: PAIN - ADULT  Goal: Verbalizes/displays adequate comfort level or baseline comfort level  Description: Interventions:  - Encourage patient to monitor pain and request assistance  - Assess pain using appropriate pain scale  - Administer analgesics based on type and severity of pain and evaluate response  - Implement non-pharmacological measures as appropriate and evaluate response  - Consider cultural and social influences on pain and pain management  - Notify physician/advanced practitioner if interventions unsuccessful or patient reports new pain  Outcome: Progressing     Problem: INFECTION - ADULT  Goal: Absence or prevention of progression during hospitalization  Description: INTERVENTIONS:  - Assess and monitor for signs and symptoms of infection  - Monitor lab/diagnostic results  - Monitor all insertion sites, i.e. indwelling lines, tubes, and drains  - Monitor endotracheal if appropriate and nasal secretions for changes in amount and color  - [place] appropriate cooling/warming therapies per order  - Administer medications as ordered  - Instruct and encourage patient and family to use good hand hygiene technique  - Identify and instruct in appropriate isolation precautions for  identified infection/condition  Outcome: Progressing  Goal: Absence of fever/infection during neutropenic period  Description: INTERVENTIONS:  - Monitor WBC    Outcome: Progressing     Problem: SAFETY ADULT  Goal: Patient will remain free of falls  Description: INTERVENTIONS:  - Educate patient/family on patient safety including physical limitations  - Instruct patient to call for assistance with activity   - Consult OT/PT to assist with strengthening/mobility   - Keep Call bell within reach  - Keep bed low and locked with side rails adjusted as appropriate  - Keep care items and personal belongings within reach  - Initiate and maintain comfort rounds  - Make Fall Risk Sign visible to staff  - Offer Toileting every 2 Hours, in advance of need  - Initiate/Maintain bed/chair alarm  - Obtain necessary fall risk management equipment  - Apply yellow socks and bracelet for high fall risk patients  - Consider moving patient to room near nurses station  Outcome: Progressing  Goal: Maintain or return to baseline ADL function  Description: INTERVENTIONS:  -  Assess patient's ability to carry out ADLs; assess patient's baseline for ADL function and identify physical deficits which impact ability to perform ADLs (bathing, care of mouth/teeth, toileting, grooming, dressing, etc.)  - Assess/evaluate cause of self-care deficits   - Assess range of motion  - Assess patient's mobility; develop plan if impaired  - Assess patient's need for assistive devices and provide as appropriate  - Encourage maximum independence but intervene and supervise when necessary  - Involve family in performance of ADLs  - Assess for home care needs following discharge   - Consider OT consult to assist with ADL evaluation and planning for discharge  - Provide patient education as appropriate  Outcome: Progressing  Goal: Maintains/Returns to pre admission functional level  Description: INTERVENTIONS:  - Perform AM-PAC 6 Click Basic Mobility/ Daily  Activity assessment daily.  - Set and communicate daily mobility goal to care team and patient/family/caregiver.   - Collaborate with rehabilitation services on mobility goals if consulted  - Perform Range of Motion 3 times a day.  - Reposition patient every 2 hours.  - Record patient progress and toleration of activity level   Outcome: Progressing     Problem: Knowledge Deficit  Goal: Patient/family/caregiver demonstrates understanding of disease process, treatment plan, medications, and discharge instructions  Description: Complete learning assessment and assess knowledge base.  Interventions:  - Provide teaching at level of understanding  - Provide teaching via preferred learning methods  Outcome: Progressing

## 2024-09-14 NOTE — PROGRESS NOTES
Progress Note - Trauma   Name: Isaac Kramer 63 y.o. male I MRN: [id number]  Unit/Bed#: MICU 04 I Date of Admission: 9/12/2024   Date of Service: 9/14/2024 I Hospital Day: 2     Assessment & Plan  Pancreatic mass    Coronary artery disease involving native coronary artery of native heart without angina pectoris      Assessment & Plan   Neuro:   Diagnosis: post-op analgesia  Analgesia: PCEA with prn breakthrough med  APS following/managing epidurals\, appreciate assistance   Regulate sleep/wake cycle   Delirium precautions  CAM-ICU daily  Trend neuro exam      CV:   Diagnosis: Postoperative hypotension secondary to cystic artery bleeding, now stabilized  S/p IVF and product resuscitation, no pressors  Holding PTA HTN regimen  Diagnosis: history of HTN, CAD  Monitor on telemetry   Home regimen (holding): asa 81mg daily, lipitor 20mg daily, lisinopril 20mg daily     Pulm:  No active issues  Pulmonary toileting, incentive spirometry     GI:   Diagnosis: pancreatic mass, POD #2 s/p whipple  NGT to LIWS, strict npo  Monitor SEGUN drain output  Care per surgical oncology team  GI ppx: Pantoprazole IV   Bowel regimen: None currently     :   No active issues  Baseline Cr: 0.6-0.7   Current Cr: 0.60  Carr in place for strict I/O   Trend BMP      F/E/N:   F: isolyte 50cc/hr while NPO  E: Replete electrolytes for goal K >4, Phos >3, Mg >2  N: NPO with NGT     Heme/Onc:   Diagnosis: acute blood loss anemia secondary to primary procedure and bleeding of cystic artery now s/p IR embolization on POD#1 with stabilization  S/p 1u pRBCs and 1u whole blood on POD#1   Last hb 7.7 - stable  Trend h/h  Monitor drain output - expected drainage of hematoma, now transitioning to serosanguinous   VTE ppx: SCDS, will discuss starting SQH with surgical team today     Endo:   No acute issues  BG goal 140-180      ID:   No active issues  24 hours of zosyn post-operatively (9/13-9/14) given intra-op biliary findings and hematoma  Empty surgical  drain frequently to avoid stasis  Trend fever curve and WBC count      MSK/Skin:   Diagnosis: surgical incision care  Care per primary surgical team  PT/OT   Q2h turning and repositioning      Lines/Tubes/Drains:   A line day 1, lugo     ICU Core Measures     A: Assess, Prevent, and Manage Pain Has pain been assessed? Yes  Need for changes to pain regimen? No   B: Both SAT/SAT  N/A   C: Choice of Sedation RASS Goal: 0 Alert and Calm  Need for changes to sedation or analgesia regimen? No   D: Delirium CAM-ICU: Negative   E: Early Mobility  Plan for early mobility? Yes   F: Family Engagement Plan for family engagement today? Yes       Review of Invasive Devices:    Lugo Plan: Continue for accurate I/O monitoring for 48 hours    Tina Plan: Keep arterial line for hemodynamic monitoring    Prophylaxis:  VTE VTE covered by:    None       Stress Ulcer  covered bypantoprazole (PROTONIX) injection 40 mg [[id number]]         24 Hour Events   24hr events:   -s/p IR embolizaion of cystic artery, 1u pRBCs, and 1u whole blood    Subjective   Review of Systems: See HPI for Review of Systems    Objective                          Vitals I/O      Most Recent Min/Max in 24hrs   Temp 98.2 °F (36.8 °C) Temp  Min: 97.9 °F (36.6 °C)  Max: 99.3 °F (37.4 °C)   Pulse 70 Pulse  Min: 59  Max: 107   Resp (!) 27 Resp  Min: 12  Max: 48   /67 BP  Min: 88/50  Max: 152/67   O2 Sat 92 % SpO2  Min: 91 %  Max: 99 %      Intake/Output Summary (Last 24 hours) at 9/14/2024 0614  Last data filed at 9/14/2024 0610  Gross per 24 hour   Intake 4250.19 ml   Output 4965 ml   Net -714.81 ml       Diet NPO    Invasive Monitoring   Arterial Line  [place] /53  Arterial Line BP  Min: 113/40  Max: 167/53   MAP 92 mmHg  Arterial Line MAP (mmHg)  Min: 60 mmHg  Max: 92 mmHg           Physical Exam   Physical Exam  Vitals and nursing note reviewed.   Eyes:      Pupils: Pupils are equal, round, and reactive to light.   Skin:     General: Skin is warm and dry.    HENT:      Head: Normocephalic.      Mouth/Throat:      Mouth: Mucous membranes are moist.   Cardiovascular:      Rate and Rhythm: Normal rate and regular rhythm.      Pulses: Normal pulses.      Heart sounds: Normal heart sounds.   Abdominal: General: There is no distension.      Palpations: Abdomen is soft.      Tenderness: There is no abdominal tenderness. There is no guarding.      Comments: SEGUN drain with serosanguinous fluid    Constitutional:       General: He is not in acute distress.     Appearance: He is not ill-appearing or toxic-appearing.      Interventions: He is sedated.   Pulmonary:      Effort: Pulmonary effort is normal.      Breath sounds: Normal breath sounds.   Neurological:      General: No focal deficit present.      Mental Status: He is alert and oriented to person, place and time.      Comments: Not assessed   Genitourinary/Anorectal:  Carr present.        Diagnostic Studies        Lab Results: I have reviewed the following results: CBC/BMP:   .     09/13/24  1110 09/13/24  1544 09/14/24  0436   WBC 9.37  --  13.33*   HGB 8.1*   < > 7.7*   HCT 23.7*  --  22.9*     --  184   SODIUM 138  --  140   K 4.2  --  4.0   *  --  108   CO2 23  --  27   BUN 12  --  13   CREATININE 0.57*  --  0.60   GLUC 132  --  109   CAIONIZED 1.05*  --   --    MG 2.2  --  2.1   PHOS 3.9  --  2.9    < > = values in this interval not displayed.    , Lactic Acid:   .     09/13/24  1824   LACTICACID 2.3*    , ABG:   .     09/13/24  1114   PHART 7.390   MVL1PUV 36.7   PO2ART 97.8   QPB1LDU 21.7*   BEART -2.9         Medications:  Scheduled PRN   acetaminophen, 1,000 mg, TID  pantoprazole, 40 mg, Q24H BETITO  piperacillin-tazobactam, 4.5 g, Q8H      HYDROmorphone, 0.5 mg, Q2H PRN  labetalol, 10 mg, Q6H PRN  ondansetron, 4 mg, Q6H PRN  phenol, 1 spray, Q2H PRN       Continuous    multi-electrolyte, 50 mL/hr, Last Rate: 50 mL/hr (09/13/24 1101)  ropivacaine 0.1% and fentaNYL 2 mcg/mL,          Labs:    CBC    Recent Labs     09/12/24  1538 09/12/24  1730 09/13/24  1110 09/13/24  1544 09/13/24  2200 09/14/24  0436   WBC 17.58*   < > 9.37  --   --  13.33*   HGB 8.5*   < > 8.1*   < > 7.8* 7.7*   HCT 26.6*   < > 23.7*  --   --  22.9*      < > 178  --   --  184   BANDSPCT 5  --   --   --   --   --     < > = values in this interval not displayed.     BMP    Recent Labs     09/13/24  1110 09/14/24  0436   SODIUM 138 140   K 4.2 4.0   * 108   CO2 23 27   AGAP 5 5   BUN 12 13   CREATININE 0.57* 0.60   CALCIUM 7.3* 7.5*       Coags    Recent Labs     09/13/24  1110 09/14/24  0436   INR 1.68* 1.39*        Additional Electrolytes  Recent Labs     09/12/24  1427 09/12/24  1730 09/13/24  1110 09/14/24  0436   MG  --    < > 2.2 2.1   PHOS  --    < > 3.9 2.9   CAIONIZED 1.05*  --  1.05*  --     < > = values in this interval not displayed.          Blood Gas    Recent Labs     09/13/24  1114   PHART 7.390   ICP4CUR 36.7   PO2ART 97.8   BBC2GRX 21.7*   BEART -2.9   SOURCE Line, Arterial     Recent Labs     09/13/24  1114   SOURCE Line, Arterial    LFTs  Recent Labs     09/12/24  1849 09/13/24  0434   * 170*   * 129*   ALKPHOS 101 72   ALB 2.8* 3.2*   TBILI 0.60 0.66       Infectious  No recent results  Glucose  Recent Labs     09/12/24  1730 09/13/24  0434 09/13/24  1110 09/14/24  0436   GLUC 157* 118 132 109

## 2024-09-14 NOTE — PLAN OF CARE
Problem: Prexisting or High Potential for Compromised Skin Integrity  Goal: Skin integrity is maintained or improved  Description: INTERVENTIONS:  - Identify patients at risk for skin breakdown  - Assess and monitor skin integrity  - Assess and monitor nutrition and hydration status  - Monitor labs   - Assess for incontinence   - Turn and reposition patient  - Assist with mobility/ambulation  - Relieve pressure over bony prominences  - Avoid friction and shearing  - Provide appropriate hygiene as needed including keeping skin clean and dry  - Evaluate need for skin moisturizer/barrier cream  - Collaborate with interdisciplinary team   - Patient/family teaching  - Consider wound care consult   Outcome: Progressing     Problem: PAIN - ADULT  Goal: Verbalizes/displays adequate comfort level or baseline comfort level  Description: Interventions:  - Encourage patient to monitor pain and request assistance  - Assess pain using appropriate pain scale  - Administer analgesics based on type and severity of pain and evaluate response  - Implement non-pharmacological measures as appropriate and evaluate response  - Consider cultural and social influences on pain and pain management  - Notify physician/advanced practitioner if interventions unsuccessful or patient reports new pain  Outcome: Progressing     Problem: INFECTION - ADULT  Goal: Absence or prevention of progression during hospitalization  Description: INTERVENTIONS:  - Assess and monitor for signs and symptoms of infection  - Monitor lab/diagnostic results  - Monitor all insertion sites, i.e. indwelling lines, tubes, and drains  - Monitor endotracheal if appropriate and nasal secretions for changes in amount and color  - [place] appropriate cooling/warming therapies per order  - Administer medications as ordered  - Instruct and encourage patient and family to use good hand hygiene technique  - Identify and instruct in appropriate isolation precautions for  identified infection/condition  Outcome: Progressing  Goal: Absence of fever/infection during neutropenic period  Description: INTERVENTIONS:  - Monitor WBC    Outcome: Progressing     Problem: SAFETY ADULT  Goal: Patient will remain free of falls  Description: INTERVENTIONS:  - Educate patient/family on patient safety including physical limitations  - Instruct patient to call for assistance with activity   - Consult OT/PT to assist with strengthening/mobility   - Keep Call bell within reach  - Keep bed low and locked with side rails adjusted as appropriate  - Keep care items and personal belongings within reach  - Initiate and maintain comfort rounds  - Make Fall Risk Sign visible to staff  - Offer Toileting every  Hours, in advance of need  - Initiate/Maintain alarm  - Obtain necessary fall risk management equipment   - Apply yellow socks and bracelet for high fall risk patients  - Consider moving patient to room near nurses station  Outcome: Progressing  Goal: Maintain or return to baseline ADL function  Description: INTERVENTIONS:  -  Assess patient's ability to carry out ADLs; assess patient's baseline for ADL function and identify physical deficits which impact ability to perform ADLs (bathing, care of mouth/teeth, toileting, grooming, dressing, etc.)  - Assess/evaluate cause of self-care deficits   - Assess range of motion  - Assess patient's mobility; develop plan if impaired  - Assess patient's need for assistive devices and provide as appropriate  - Encourage maximum independence but intervene and supervise when necessary  - Involve family in performance of ADLs  - Assess for home care needs following discharge   - Consider OT consult to assist with ADL evaluation and planning for discharge  - Provide patient education as appropriate  Outcome: Progressing  Goal: Maintains/Returns to pre admission functional level  Description: INTERVENTIONS:  - Perform AM-PAC 6 Click Basic Mobility/ Daily Activity  assessment daily.  - Set and communicate daily mobility goal to care team and patient/family/caregiver.   - Collaborate with rehabilitation services on mobility goals if consulted  - Perform Range of Motion  times a day.  - Reposition patient every  hours.  - Dangle patient times a day  - Stand patient  times a day  - Ambulate patient  times a day  - Out of bed to chair times a day   - Out of bed for meals  times a day  - Out of bed for toileting  - Record patient progress and toleration of activity level   Outcome: Progressing     Problem: Knowledge Deficit  Goal: Patient/family/caregiver demonstrates understanding of disease process, treatment plan, medications, and discharge instructions  Description: Complete learning assessment and assess knowledge base.  Interventions:  - Provide teaching at level of understanding  - Provide teaching via preferred learning methods  Outcome: Progressing

## 2024-09-14 NOTE — ASSESSMENT & PLAN NOTE
S/p 9/12 whipple.  S/p 9/13 IR coil embolization cystic artery branch    SEGUN 1.3 L dark  Hemoglobin 7.7 from 7.8    Plan:  -Will continue resuscitation  -Possible DC Lilly later today  -Strict monitoring output from SEGUN drain  - maintain NPO/NGT, IVF  - continue PCEA  - DVT ppx, IS, OOB/ambulate

## 2024-09-14 NOTE — PROGRESS NOTES
Progress Note - Acute Pain   Name: Isaac Kramer 63 y.o. male I MRN: [id number]  Unit/Bed#: MICU 04 I Date of Admission: 9/12/2024   Date of Service: 9/14/2024 I Hospital Day: 2    Assessment & Plan  Coronary artery disease involving native coronary artery of native heart without angina pectoris  Limited use of NSAIDs in patients with CAD  Pancreatic mass  S/p Whipple 9/12    - Received Thoracic Epidural preoperatively   - currently 6/3/10/3, increase to 8/4/10/3 for better analgesia and improved hemodynamics      - IV Dilaudid PRN breakthrough pain  - IV Tylenol while NPO  - Consider IV Robaxin if pain remains uncontrolled after epidural increase    - IS OOB PT as able  - Bowel regimen when appropriate to prevent OIC    APS will continue to follow. Please contact Acute Pain Service - via SecureChat from [phone] with additional questions or concerns. See SecureChat or [place] for additional contacts and after hours information.     History of Present Illness   Isaac Kramer is a 63 y.o. male who presents with s/p Whipple on 9/12.  Pt received a thoracic epidural preoperatively. S/p IR embolization for ongoing bleed postop from cystic artery on 9/13.      Pain History  Current pain location(s):  Pain Score: 8  Pain Location/Orientation: Other (Comment) (throat)  Pain Scale: Pain Assessment Tool: 0-10  24 hour history: Patient with improved BP today, states that he mainly has throat soreness probably from the NG tube but still has occasional pain from his incision sites especially when he moves. Discussed that since his hemodynamics have improved today, we will plan to increase his epidural rate and bolus dose as well. He is in agreement, continues to be NPO and will likely be getting out of bed to chair today.     Opioid requirement previous 24 hours: Fentanyl [id number], IV dilaudid 0.5mgx1     Meds/Allergies   all current active meds have been reviewed  No Known Allergies    Objective      Temp:  [97.9 °F  (36.6 °C)-99.3 °F (37.4 °C)] 98.8 °F (37.1 °C)  HR:  [59-84] 75  Resp:  [12-37] 36  BP: ()/(52-69) 136/64  Arterial Line BP: (113-167)/(40-56) 159/56  Physical Exam  Constitutional:       Appearance: Normal appearance.   HENT:      Head: Normocephalic and atraumatic.      Nose:      Comments: NG tube present     Mouth/Throat:      Mouth: Mucous membranes are moist.   Cardiovascular:      Rate and Rhythm: Normal rate.   Pulmonary:      Effort: Pulmonary effort is normal.   Musculoskeletal:         General: Normal range of motion.      Cervical back: Normal range of motion.   Skin:     General: Skin is dry.   Neurological:      General: No focal deficit present.      Mental Status: He is alert and oriented to person, place, and time.      Epidural: Site clean/dry/intact, no surrounding erythema/edema/induration, infusion functioning appropriately        Lab Results: I have reviewed the following results:   Estimated Creatinine Clearance: 113 mL/min (by C-G formula based on SCr of 0.6 mg/dL).  Lab Results   Component Value Date    WBC 13.33 (H) 09/14/2024    HGB 7.7 (L) 09/14/2024    HCT 22.9 (L) 09/14/2024     09/14/2024         Component Value Date/Time    K 4.0 09/14/2024 0436     09/14/2024 0436    CO2 27 09/14/2024 0436    CO2 17 (L) 09/12/2024 1427    BUN 13 09/14/2024 0436    CREATININE 0.60 09/14/2024 0436         Component Value Date/Time    CALCIUM 7.5 (L) 09/14/2024 0436    ALKPHOS 72 09/13/2024 0434     (H) 09/13/2024 0434     (H) 09/13/2024 0434    TP 4.1 (L) 09/13/2024 0434    ALB 3.2 (L) 09/13/2024 0434     Imaging Review: No pertinent imaging studies reviewed.  Other Studies: No additional pertinent studies reviewed.     Administrative Statements   I have spent a total time of 30 minutes in caring for this patient on the day of the visit/encounter including Diagnostic results, Risks and benefits of tx options, Instructions for management, Patient and family education,  Importance of tx compliance, Impressions, Counseling / Coordination of care, Documenting in the medical record, Reviewing / ordering tests, medicine, procedures  , Obtaining or reviewing history  , and Communicating with other healthcare professionals .

## 2024-09-14 NOTE — ASSESSMENT & PLAN NOTE
S/p Whipple 9/12    - Received Thoracic Epidural preoperatively   - currently 6/3/10/3, increase to 8/4/10/3 for better analgesia and improved hemodynamics      - IV Dilaudid PRN breakthrough pain  - IV Tylenol while NPO  - Consider IV Robaxin if pain remains uncontrolled after epidural increase    - IS OOB PT as able  - Bowel regimen when appropriate to prevent OIC

## 2024-09-15 LAB
ANION GAP SERPL CALCULATED.3IONS-SCNC: 5 MMOL/L (ref 4–13)
ANION GAP SERPL CALCULATED.3IONS-SCNC: 6 MMOL/L (ref 4–13)
BACTERIA TISS AEROBE CULT: ABNORMAL
BASOPHILS # BLD AUTO: 0.05 THOUSANDS/ΜL (ref 0–0.1)
BASOPHILS NFR BLD AUTO: 0 % (ref 0–1)
BUN SERPL-MCNC: 13 MG/DL (ref 5–25)
BUN SERPL-MCNC: 14 MG/DL (ref 5–25)
CALCIUM SERPL-MCNC: 7 MG/DL (ref 8.4–10.2)
CALCIUM SERPL-MCNC: 7.3 MG/DL (ref 8.4–10.2)
CHLORIDE SERPL-SCNC: 109 MMOL/L (ref 96–108)
CHLORIDE SERPL-SCNC: 109 MMOL/L (ref 96–108)
CO2 SERPL-SCNC: 27 MMOL/L (ref 21–32)
CO2 SERPL-SCNC: 27 MMOL/L (ref 21–32)
CREAT SERPL-MCNC: 0.54 MG/DL (ref 0.6–1.3)
CREAT SERPL-MCNC: 0.55 MG/DL (ref 0.6–1.3)
EOSINOPHIL # BLD AUTO: 0.74 THOUSAND/ΜL (ref 0–0.61)
EOSINOPHIL NFR BLD AUTO: 6 % (ref 0–6)
ERYTHROCYTE [DISTWIDTH] IN BLOOD BY AUTOMATED COUNT: 14.6 % (ref 11.6–15.1)
GFR SERPL CREATININE-BSD FRML MDRD: 109 ML/MIN/1.73SQ M
GFR SERPL CREATININE-BSD FRML MDRD: 110 ML/MIN/1.73SQ M
GLUCOSE SERPL-MCNC: 80 MG/DL (ref 65–140)
GLUCOSE SERPL-MCNC: 85 MG/DL (ref 65–140)
GRAM STN SPEC: ABNORMAL
GRAM STN SPEC: ABNORMAL
HCT VFR BLD AUTO: 22.2 % (ref 36.5–49.3)
HCT VFR BLD AUTO: 22.4 % (ref 36.5–49.3)
HCT VFR BLD AUTO: 23.5 % (ref 36.5–49.3)
HGB BLD-MCNC: 7.2 G/DL (ref 12–17)
HGB BLD-MCNC: 7.3 G/DL (ref 12–17)
HGB BLD-MCNC: 7.8 G/DL (ref 12–17)
IMM GRANULOCYTES # BLD AUTO: 0.06 THOUSAND/UL (ref 0–0.2)
IMM GRANULOCYTES NFR BLD AUTO: 1 % (ref 0–2)
LYMPHOCYTES # BLD AUTO: 1.99 THOUSANDS/ΜL (ref 0.6–4.47)
LYMPHOCYTES NFR BLD AUTO: 16 % (ref 14–44)
MAGNESIUM SERPL-MCNC: 1.9 MG/DL (ref 1.9–2.7)
MCH RBC QN AUTO: 29.3 PG (ref 26.8–34.3)
MCHC RBC AUTO-ENTMCNC: 32.6 G/DL (ref 31.4–37.4)
MCV RBC AUTO: 90 FL (ref 82–98)
MONOCYTES # BLD AUTO: 0.9 THOUSAND/ΜL (ref 0.17–1.22)
MONOCYTES NFR BLD AUTO: 7 % (ref 4–12)
NEUTROPHILS # BLD AUTO: 8.79 THOUSANDS/ΜL (ref 1.85–7.62)
NEUTS SEG NFR BLD AUTO: 70 % (ref 43–75)
NRBC BLD AUTO-RTO: 0 /100 WBCS
PHOSPHATE SERPL-MCNC: 2.4 MG/DL (ref 2.3–4.1)
PLATELET # BLD AUTO: 188 THOUSANDS/UL (ref 149–390)
PMV BLD AUTO: 10 FL (ref 8.9–12.7)
POTASSIUM SERPL-SCNC: 3.4 MMOL/L (ref 3.5–5.3)
POTASSIUM SERPL-SCNC: 3.5 MMOL/L (ref 3.5–5.3)
RBC # BLD AUTO: 2.49 MILLION/UL (ref 3.88–5.62)
SODIUM SERPL-SCNC: 141 MMOL/L (ref 135–147)
SODIUM SERPL-SCNC: 142 MMOL/L (ref 135–147)
WBC # BLD AUTO: 12.53 THOUSAND/UL (ref 4.31–10.16)

## 2024-09-15 PROCEDURE — 80048 BASIC METABOLIC PNL TOTAL CA: CPT

## 2024-09-15 PROCEDURE — 99232 SBSQ HOSP IP/OBS MODERATE 35: CPT | Performed by: EMERGENCY MEDICINE

## 2024-09-15 PROCEDURE — 85014 HEMATOCRIT: CPT | Performed by: PHYSICIAN ASSISTANT

## 2024-09-15 PROCEDURE — 99024 POSTOP FOLLOW-UP VISIT: CPT | Performed by: SURGERY

## 2024-09-15 PROCEDURE — 85025 COMPLETE CBC W/AUTO DIFF WBC: CPT

## 2024-09-15 PROCEDURE — 83735 ASSAY OF MAGNESIUM: CPT

## 2024-09-15 PROCEDURE — 99233 SBSQ HOSP IP/OBS HIGH 50: CPT | Performed by: STUDENT IN AN ORGANIZED HEALTH CARE EDUCATION/TRAINING PROGRAM

## 2024-09-15 PROCEDURE — 85014 HEMATOCRIT: CPT

## 2024-09-15 PROCEDURE — 85018 HEMOGLOBIN: CPT

## 2024-09-15 PROCEDURE — 84100 ASSAY OF PHOSPHORUS: CPT

## 2024-09-15 PROCEDURE — 85018 HEMOGLOBIN: CPT | Performed by: PHYSICIAN ASSISTANT

## 2024-09-15 RX ORDER — POTASSIUM CHLORIDE 14.9 MG/ML
20 INJECTION INTRAVENOUS
Status: DISCONTINUED | OUTPATIENT
Start: 2024-09-15 | End: 2024-09-15

## 2024-09-15 RX ORDER — POTASSIUM CHLORIDE 14.9 MG/ML
20 INJECTION INTRAVENOUS ONCE
Status: DISCONTINUED | OUTPATIENT
Start: 2024-09-15 | End: 2024-09-15

## 2024-09-15 RX ORDER — MAGNESIUM SULFATE HEPTAHYDRATE 40 MG/ML
2 INJECTION, SOLUTION INTRAVENOUS ONCE
Status: COMPLETED | OUTPATIENT
Start: 2024-09-15 | End: 2024-09-15

## 2024-09-15 RX ORDER — METHOCARBAMOL 100 MG/ML
750 INJECTION, SOLUTION INTRAMUSCULAR; INTRAVENOUS EVERY 8 HOURS SCHEDULED
Status: DISCONTINUED | OUTPATIENT
Start: 2024-09-15 | End: 2024-09-17

## 2024-09-15 RX ORDER — HEPARIN SODIUM 5000 [USP'U]/ML
5000 INJECTION, SOLUTION INTRAVENOUS; SUBCUTANEOUS EVERY 8 HOURS SCHEDULED
Status: DISCONTINUED | OUTPATIENT
Start: 2024-09-15 | End: 2024-09-19

## 2024-09-15 RX ORDER — ALBUMIN, HUMAN INJ 5% 5 %
12.5 SOLUTION INTRAVENOUS ONCE
Status: COMPLETED | OUTPATIENT
Start: 2024-09-15 | End: 2024-09-15

## 2024-09-15 RX ORDER — POTASSIUM CHLORIDE 1500 MG/1
40 TABLET, EXTENDED RELEASE ORAL ONCE
Status: DISCONTINUED | OUTPATIENT
Start: 2024-09-15 | End: 2024-09-15

## 2024-09-15 RX ADMIN — HEPARIN SODIUM 5000 UNITS: 5000 INJECTION INTRAVENOUS; SUBCUTANEOUS at 21:23

## 2024-09-15 RX ADMIN — HYDROMORPHONE HYDROCHLORIDE 0.5 MG: 1 INJECTION, SOLUTION INTRAMUSCULAR; INTRAVENOUS; SUBCUTANEOUS at 04:52

## 2024-09-15 RX ADMIN — ALBUMIN (HUMAN) 25 G: 12.5 INJECTION, SOLUTION INTRAVENOUS at 00:47

## 2024-09-15 RX ADMIN — POTASSIUM CHLORIDE 20 MEQ: 14.9 INJECTION, SOLUTION INTRAVENOUS at 06:00

## 2024-09-15 RX ADMIN — ALBUMIN (HUMAN) 12.5 G: 12.5 INJECTION, SOLUTION INTRAVENOUS at 04:48

## 2024-09-15 RX ADMIN — ACETAMINOPHEN 1000 MG: 10 INJECTION INTRAVENOUS at 08:12

## 2024-09-15 RX ADMIN — MAGNESIUM SULFATE HEPTAHYDRATE 2 G: 40 INJECTION, SOLUTION INTRAVENOUS at 06:38

## 2024-09-15 RX ADMIN — SODIUM CHLORIDE, SODIUM GLUCONATE, SODIUM ACETATE, POTASSIUM CHLORIDE, MAGNESIUM CHLORIDE, SODIUM PHOSPHATE, DIBASIC, AND POTASSIUM PHOSPHATE 50 ML/HR: .53; .5; .37; .037; .03; .012; .00082 INJECTION, SOLUTION INTRAVENOUS at 06:04

## 2024-09-15 RX ADMIN — Medication: at 13:25

## 2024-09-15 RX ADMIN — HEPARIN SODIUM 5000 UNITS: 5000 INJECTION INTRAVENOUS; SUBCUTANEOUS at 06:39

## 2024-09-15 RX ADMIN — Medication: at 13:01

## 2024-09-15 RX ADMIN — ACETAMINOPHEN 1000 MG: 10 INJECTION INTRAVENOUS at 21:23

## 2024-09-15 RX ADMIN — SODIUM CHLORIDE, SODIUM GLUCONATE, SODIUM ACETATE, POTASSIUM CHLORIDE, MAGNESIUM CHLORIDE, SODIUM PHOSPHATE, DIBASIC, AND POTASSIUM PHOSPHATE 125 ML/HR: .53; .5; .37; .037; .03; .012; .00082 INJECTION, SOLUTION INTRAVENOUS at 21:24

## 2024-09-15 RX ADMIN — HEPARIN SODIUM 5000 UNITS: 5000 INJECTION INTRAVENOUS; SUBCUTANEOUS at 14:40

## 2024-09-15 RX ADMIN — PANTOPRAZOLE SODIUM 40 MG: 40 INJECTION, POWDER, FOR SOLUTION INTRAVENOUS at 08:12

## 2024-09-15 RX ADMIN — POTASSIUM CHLORIDE 20 MEQ: 14.9 INJECTION, SOLUTION INTRAVENOUS at 08:04

## 2024-09-15 NOTE — PROGRESS NOTES
Progress Note - Trauma   Name: Isaac Kramer 63 y.o. male I MRN: [id number]  Unit/Bed#: MICU 04 I Date of Admission: 9/12/2024   Date of Service: 9/15/2024 I Hospital Day: 3     Assessment & Plan  Pancreatic mass    Coronary artery disease involving native coronary artery of native heart without angina pectoris      Assessment & Plan   Neuro:   Diagnosis: post-op analgesia  Analgesia: PCEA with prn breakthrough med  APS following/managing epidural, appreciate assistance   Regulate sleep/wake cycle   Delirium precautions  CAM-ICU daily  Trend neuro exam      CV:   Diagnosis: Postoperative hypotension secondary to cystic artery bleeding, now resolved  Still holding PTA HTN regimen, but as BP increases, he is near meeting parameters to resume, continue to monitor  Diagnosis: history of HTN, CAD  Monitor on telemetry   Home regimen (holding): asa 81mg daily, lipitor 20mg daily, lisinopril 20mg daily     Pulm:  No active issues  Pulmonary toileting, incentive spirometry  Wean O2 as tolerated     GI:   Diagnosis: pancreatic mass, POD #3 s/p whipple  NGT to LIWS, strict npo  Monitor SEGUN drain output - decreasing  Care per surgical oncology team  GI ppx: Pantoprazole IV   Bowel regimen: None currently     :   Borderline UOP, improved overnight with albumin bolus  Baseline Cr: 0.6-0.7   Current Cr: 0.55  Carr still in place for strict I/O - can consider Dc'ing this afternoon if UOP improves  Trend BMP and UOP     F/E/N:   F: isolyte 50cc/hr while NPO  E: Replete electrolytes for goal K >4, Phos >3, Mg >2  N: NPO with NGT     Heme/Onc:   Diagnosis: acute blood loss anemia secondary to primary procedure and bleeding of cystic artery now s/p IR embolization on POD#1 (9/13) with stabilization  S/p 1u pRBCs and 1u whole blood on POD#1   Last hb 7.3 - stable compared to prior  Trend h/h  Monitor drain output - overall decreasing  VTE ppx: SCDS, will discuss starting SQH with surgical team today     Endo:   No acute issues  BG  goal 140-180      ID:   No active issues  24 hours of zosyn post-operatively (9/13-9/14) given intra-op biliary findings and hematoma  Empty surgical drain frequently to avoid stasis  Trend fever curve and WBC count      MSK/Skin:   Diagnosis: surgical incision care  Care per primary surgical team  PT/OT   Q2h turning and repositioning      Lines/Tubes/Drains:   lugo     ICU Core Measures     A: Assess, Prevent, and Manage Pain Has pain been assessed? Yes  Need for changes to pain regimen? No   B: Both SAT/SAT  N/A   C: Choice of Sedation RASS Goal: 0 Alert and Calm  Need for changes to sedation or analgesia regimen? No   D: Delirium CAM-ICU: Negative   E: Early Mobility  Plan for early mobility? Yes   F: Family Engagement Plan for family engagement today? Yes       Review of Invasive Devices:    Lugo Plan: Continue for accurate I/O monitoring for 48 hours    [place] Plan: Keep arterial line for hemodynamic monitoring    Prophylaxis:  VTE VTE covered by:  heparin (porcine), Subcutaneous       Stress Ulcer  covered bypantoprazole (PROTONIX) injection 40 mg [[id number]]         24 Hour Events   24hr events: no acute events    Subjective   Review of Systems: See HPI for Review of Systems    Objective                          Vitals I/O      Most Recent Min/Max in 24hrs   Temp 98.8 °F (37.1 °C) Temp  Min: 98.4 °F (36.9 °C)  Max: 99.7 °F (37.6 °C)   Pulse 81 Pulse  Min: 67  Max: 86   Resp 21 Resp  Min: 13  Max: 36   /68 BP  Min: 96/52  Max: 150/70   O2 Sat 95 % SpO2  Min: 89 %  Max: 98 %      Intake/Output Summary (Last 24 hours) at 9/15/2024 0609  Last data filed at 9/15/2024 0600  Gross per 24 hour   Intake 3253.65 ml   Output 2062 ml   Net 1191.65 ml       Diet NPO    Invasive Monitoring   Arterial Line  [place] BP 32/32  Arterial Line BP  Min: 32/32  Max: 159/56   MAP (!) 32 mmHg  Arterial Line MAP (mmHg)  Min: 32 mmHg  Max: 89 mmHg           Physical Exam   Physical Exam  Vitals and nursing note reviewed.   Eyes:       Pupils: Pupils are equal, round, and reactive to light.   Skin:     General: Skin is warm and dry.   HENT:      Head: Normocephalic.      Mouth/Throat:      Mouth: Mucous membranes are moist.   Cardiovascular:      Rate and Rhythm: Normal rate and regular rhythm.      Pulses: Normal pulses.      Heart sounds: Normal heart sounds.   Abdominal: General: There is no distension.      Palpations: Abdomen is soft.      Tenderness: There is no abdominal tenderness. There is no guarding.      Comments: SEGUN drain with serosanguinous fluid    Constitutional:       General: He is not in acute distress.     Appearance: He is not ill-appearing or toxic-appearing.      Interventions: He is not sedated.  Pulmonary:      Effort: Pulmonary effort is normal.      Breath sounds: Normal breath sounds.   Neurological:      General: No focal deficit present.      Mental Status: He is alert and oriented to person, place and time.      Comments: Not assessed   Genitourinary/Anorectal:  Carr present.        Diagnostic Studies        Lab Results: I have reviewed the following results: CBC/BMP:   .     09/15/24  0053 09/15/24  0521   WBC  --  12.53*   HGB 7.2* 7.3*   HCT 22.2* 22.4*   PLT  --  188   SODIUM 141  --    K 3.4*  --    *  --    CO2 27  --    BUN 14  --    CREATININE 0.54*  --    GLUC 85  --     , Lactic Acid:   No new results in last 24 hours.   , ABG:   No new results in last 24 hours.        Medications:  Scheduled PRN   acetaminophen, 1,000 mg, TID  heparin (porcine), 5,000 Units, Q8H BETITO  pantoprazole, 40 mg, Q24H BETITO  potassium chloride, 20 mEq, Once  potassium chloride, 40 mEq, Once      HYDROmorphone, 0.5 mg, Q2H PRN  labetalol, 10 mg, Q6H PRN  ondansetron, 4 mg, Q6H PRN  phenol, 1 spray, Q2H PRN       Continuous    multi-electrolyte, 50 mL/hr, Last Rate: 50 mL/hr (09/15/24 0604)  ropivacaine 0.1% and fentaNYL 2 mcg/mL,          Labs:   CBC    Recent Labs     09/14/24  1623 09/15/24  0053 09/15/24  0521   WBC  15.26*  --  12.53*   HGB 8.4* 7.2* 7.3*   HCT 25.0* 22.2* 22.4*     --  188     BMP    Recent Labs     09/14/24  1623 09/15/24  0053   SODIUM 142 141   K 4.0 3.4*    109*   CO2 30 27   AGAP 4 5   BUN 14 14   CREATININE 0.74 0.54*   CALCIUM 7.5* 7.0*       Coags    Recent Labs     09/13/24  1110 09/14/24  0436   INR 1.68* 1.39*        Additional Electrolytes  Recent Labs     09/13/24 1110 09/14/24  0436   MG 2.2 2.1   PHOS 3.9 2.9   CAIONIZED 1.05*  --           Blood Gas    Recent Labs     09/13/24  1114   PHART 7.390   CTE8VYV 36.7   PO2ART 97.8   NEJ3IDD 21.7*   BEART -2.9   SOURCE Line, Arterial     Recent Labs     09/13/24  1114   SOURCE Line, Arterial    LFTs  No recent results      Infectious  No recent results  Glucose  Recent Labs     09/13/24  1110 09/14/24  0436 09/14/24  1623 09/15/24  0053   GLUC 132 109 94 85

## 2024-09-15 NOTE — ASSESSMENT & PLAN NOTE
S/p Whipple 9/12    - Received Thoracic Epidural on 9/12   - Will increase PCEA settings to 8/6/10/3 to optimize spread of analgesic medication  - Add IV robaxin 750mg q8hr elsa x2 days given NPO status     - IV Dilaudid PRN breakthrough pain  - IV Tylenol while NPO    - IS OOB PT as able  - Bowel regimen when appropriate to prevent OIC

## 2024-09-15 NOTE — PROGRESS NOTES
Progress Note - Acute Pain   Name: Isaac Kramer 63 y.o. male I MRN: [id number]  Unit/Bed#: MICU 04 I Date of Admission: 9/12/2024   Date of Service: 9/15/2024 I Hospital Day: 3    Assessment & Plan  Coronary artery disease involving native coronary artery of native heart without angina pectoris  Limited use of NSAIDs in patients with CAD  Pancreatic mass  S/p Whipple 9/12    - Received Thoracic Epidural on 9/12   - Will increase PCEA settings to 8/6/10/3 to optimize spread of analgesic medication  - Add IV robaxin 750mg q8hr elsa x2 days given NPO status     - IV Dilaudid PRN breakthrough pain  - IV Tylenol while NPO    - IS OOB PT as able  - Bowel regimen when appropriate to prevent OIC  Benign essential hypertension      APS will continue to follow. Please contact Acute Pain Service - via SecureChat from [phone] with additional questions or concerns. See SecureChat or Amion for additional contacts and after hours information.     History of Present Illness   Isaac Kramer is a 63 y.o. male who presents with s/p Whipple on 9/12.  Pt received a thoracic epidural preoperatively. S/p IR embolization for ongoing bleed postop from cystic artery on 9/13. A perioperative thoracic epidural was placed for post-operative analgesia.     Upon bedside evaluation, Isaac was walking the hallways. Midline abdominal pain seems to be well controlled but reports some breakthrough left sided abdominal pain. No evidence of excessive sympathectomy-induced hypotension/pressor requirements/abnormal sensorimotor deficits. Denies opioid-induced side effects including nausea/vomiting/itching/constipation. IV dilaudid 0.5mg used in past 24 hours. Hitting PCEA button with some benefit.         Pain History  Current pain location(s):  Pain Score: 5  Pain Location/Orientation: Orientation: Mid, Location: Back (L lower rib area)  Pain Scale: Pain Assessment Tool: 0-10  24 hour history: See above    Opioid requirement previous 24 hours: IV  dilaudid 0.5mg  Meds/Allergies   all current active meds have been reviewed  No Known Allergies    Objective      Temp:  [98.4 °F (36.9 °C)-99.7 °F (37.6 °C)] 99 °F (37.2 °C)  HR:  [67-86] 85  Resp:  [13-29] 23  BP: ()/(52-70) 137/67  Physical Exam  Vitals reviewed.   HENT:      Head: Normocephalic.      Mouth/Throat:      Mouth: Mucous membranes are dry.   Eyes:      Extraocular Movements: Extraocular movements intact.   Cardiovascular:      Rate and Rhythm: Normal rate.      Pulses: Normal pulses.   Pulmonary:      Effort: Pulmonary effort is normal.   Abdominal:      General: Abdomen is flat.      Tenderness: There is abdominal tenderness.   Musculoskeletal:         General: Normal range of motion.      Cervical back: Normal range of motion.   Skin:     General: Skin is dry.   Neurological:      General: No focal deficit present.      Mental Status: He is alert and oriented to person, place, and time.   Psychiatric:         Mood and Affect: Mood normal.      Epidural: Site clean/dry/intact, no surrounding erythema/edema/induration, infusion functioning appropriately        Lab Results: I have reviewed the following results:   Estimated Creatinine Clearance: 122.2 mL/min (A) (by C-G formula based on SCr of 0.55 mg/dL (L)).  Lab Results   Component Value Date    WBC 12.53 (H) 09/15/2024    HGB 7.3 (L) 09/15/2024    HCT 22.4 (L) 09/15/2024     09/15/2024         Component Value Date/Time    K 3.5 09/15/2024 0521     (H) 09/15/2024 0521    CO2 27 09/15/2024 0521    CO2 17 (L) 09/12/2024 1427    BUN 13 09/15/2024 0521    CREATININE 0.55 (L) 09/15/2024 0521         Component Value Date/Time    CALCIUM 7.3 (L) 09/15/2024 0521    ALKPHOS 72 09/13/2024 0434     (H) 09/13/2024 0434     (H) 09/13/2024 0434    TP 4.1 (L) 09/13/2024 0434    ALB 3.2 (L) 09/13/2024 0434     Imaging Review: No pertinent imaging studies reviewed.  Other Studies: No additional pertinent studies reviewed.

## 2024-09-15 NOTE — PROGRESS NOTES
Progress Note - Oncology-Surgical   Name: Isaac Kramer 63 y.o. male I MRN: [id number]  Unit/Bed#: MICU 04 I Date of Admission: 9/12/2024   Date of Service: 9/15/2024 I Hospital Day: 3    Assessment & Plan  Pancreatic mass  S/p 9/12 whipple.  S/p 9/13 IR coil embolization cystic artery branch    Plan:  -Continue NPO/NGT  -Continue IV fluids  -Continue to trend H/H  -Continue albumin boluses for rescucitation as needed  -PRN pain meds  -PRN anti nausea meds  -Lugo out once rescucitated  -Continue DVT prophylaxis  -Keep epidural  -Rest of care per ICU        History of Present Illness   No acute events overnight. Patient states he has some moderate abdominal pain. Denies having nausea, vomiting, fevers, chills, chest pain, shortness of breath. Minimal UOP in lugo. No bowel movements and no flatus.     Objective      Temp:  [98.4 °F (36.9 °C)-99.7 °F (37.6 °C)] 98.8 °F (37.1 °C)  HR:  [67-86] 81  Resp:  [13-36] 21  BP: ()/(52-70) 148/68  Arterial Line BP: ()/(32-56) 32/32  O2 Device: Nasal cannula          I/O         09/13 0701  09/14 0700 09/14 0701  09/15 0700    P.O.  0    I.V. (mL/kg) 1932.6 (26.1) 2003.7 (27.6)    Blood 946.7     NG/GT  0    IV Piggyback 1200 1250    Total Intake(mL/kg) 4079.2 (55.1) 3253.7 (44.9)    Urine (mL/kg/hr) 2095 (1.2) 832 (0.5)    Emesis/NG output 1450 375    Drains 1340 455    Total Output 4885 1662    Net -805.8 +1591.7                Lines/Drains/Airways       Active Status       Name Placement date Placement time Site Days    Epidural Catheter 09/12/24 09/12/24  1622  -- 2    Closed/Suction Drain Left Abdomen Bulb 19 Fr. 09/12/24  1510  Abdomen  2    NG/OG/Enteral Tube Nasogastric Right nare 09/12/24  0815  Right nare  2    Urethral Catheter Temperature probe;Straight-tip;Latex 16 Fr. 09/12/24  0825  Temperature probe;Straight-tip;Latex  2                  Physical Exam     Physical Exam:  General: No acute distress, alert and oriented. Currently on 3L of  NC.  Neuro: alert, oriented x3  HENT: PERRL, EOMI  CV: Well perfused, regular rate and rhythm  Lungs: Normal work of breathing, no increased respiratory effort  Abdomen: Soft, tender to palpation left abdomen, mildly distended. Incisions c/d/I. NG bilious output. SEGUN serosang output.   MSK/Extremities: No edema, clubbing or cyanosis  Skin: Warm, dry      Lab Results: I have reviewed the following results: CBC/BMP:   .     09/15/24  0053 09/15/24  0521   WBC  --  12.53*   HGB 7.2* 7.3*   HCT 22.2* 22.4*   PLT  --  188   SODIUM 141  --    K 3.4*  --    *  --    CO2 27  --    BUN 14  --    CREATININE 0.54*  --    GLUC 85  --       Imaging Review: No pertinent imaging studies reviewed.  Other Studies: No additional pertinent studies reviewed.    VTE Pharmacologic Prophylaxis: Heparin  VTE Mechanical Prophylaxis: sequential compression device

## 2024-09-15 NOTE — PLAN OF CARE
Problem: Prexisting or High Potential for Compromised Skin Integrity  Goal: Skin integrity is maintained or improved  Description: INTERVENTIONS:  - Identify patients at risk for skin breakdown  - Assess and monitor skin integrity  - Assess and monitor nutrition and hydration status  - Monitor labs   - Assess for incontinence   - Turn and reposition patient  - Assist with mobility/ambulation  - Relieve pressure over bony prominences  - Avoid friction and shearing  - Provide appropriate hygiene as needed including keeping skin clean and dry  - Evaluate need for skin moisturizer/barrier cream  - Collaborate with interdisciplinary team   - Patient/family teaching  - Consider wound care consult   Outcome: Progressing     Problem: PAIN - ADULT  Goal: Verbalizes/displays adequate comfort level or baseline comfort level  Description: Interventions:  - Encourage patient to monitor pain and request assistance  - Assess pain using appropriate pain scale  - Administer analgesics based on type and severity of pain and evaluate response  - Implement non-pharmacological measures as appropriate and evaluate response  - Consider cultural and social influences on pain and pain management  - Notify physician/advanced practitioner if interventions unsuccessful or patient reports new pain  Outcome: Progressing     Problem: INFECTION - ADULT  Goal: Absence or prevention of progression during hospitalization  Description: INTERVENTIONS:  - Assess and monitor for signs and symptoms of infection  - Monitor lab/diagnostic results  - Monitor all insertion sites, i.e. indwelling lines, tubes, and drains  - Monitor endotracheal if appropriate and nasal secretions for changes in amount and color  - [place] appropriate cooling/warming therapies per order  - Administer medications as ordered  - Instruct and encourage patient and family to use good hand hygiene technique  - Identify and instruct in appropriate isolation precautions for  identified infection/condition  Outcome: Progressing  Goal: Absence of fever/infection during neutropenic period  Description: INTERVENTIONS:  - Monitor WBC    Outcome: Progressing     Problem: SAFETY ADULT  Goal: Patient will remain free of falls  Description: INTERVENTIONS:  - Educate patient/family on patient safety including physical limitations  - Instruct patient to call for assistance with activity   - Consult OT/PT to assist with strengthening/mobility   - Keep Call bell within reach  - Keep bed low and locked with side rails adjusted as appropriate  - Keep care items and personal belongings within reach  - Initiate and maintain comfort rounds  - Make Fall Risk Sign visible to staff  - Offer Toileting every 2 Hours, in advance of need  - Initiate/Maintain bed/chair alarm  - Obtain necessary fall risk management equipment  - Apply yellow socks and bracelet for high fall risk patients  - Consider moving patient to room near nurses station  Outcome: Progressing  Goal: Maintain or return to baseline ADL function  Description: INTERVENTIONS:  -  Assess patient's ability to carry out ADLs; assess patient's baseline for ADL function and identify physical deficits which impact ability to perform ADLs (bathing, care of mouth/teeth, toileting, grooming, dressing, etc.)  - Assess/evaluate cause of self-care deficits   - Assess range of motion  - Assess patient's mobility; develop plan if impaired  - Assess patient's need for assistive devices and provide as appropriate  - Encourage maximum independence but intervene and supervise when necessary  - Involve family in performance of ADLs  - Assess for home care needs following discharge   - Consider OT consult to assist with ADL evaluation and planning for discharge  - Provide patient education as appropriate  Outcome: Progressing  Goal: Maintains/Returns to pre admission functional level  Description: INTERVENTIONS:  - Perform AM-PAC 6 Click Basic Mobility/ Daily  12-Dec-2019 14:56 Activity assessment daily.  - Set and communicate daily mobility goal to care team and patient/family/caregiver.   - Collaborate with rehabilitation services on mobility goals if consulted  - Perform Range of Motion 3 times a day.  - Reposition patient every 2 hours.  - Stand patient 3 times a day  - Ambulate patient 3 times a day  - Out of bed to chair 3 times a day   - Out of bed for meals 3 times a day  - Out of bed for toileting  - Record patient progress and toleration of activity level   Outcome: Progressing     Problem: Knowledge Deficit  Goal: Patient/family/caregiver demonstrates understanding of disease process, treatment plan, medications, and discharge instructions  Description: Complete learning assessment and assess knowledge base.  Interventions:  - Provide teaching at level of understanding  - Provide teaching via preferred learning methods  Outcome: Progressing

## 2024-09-15 NOTE — PLAN OF CARE
Problem: Prexisting or High Potential for Compromised Skin Integrity  Goal: Skin integrity is maintained or improved  Description: INTERVENTIONS:  - Identify patients at risk for skin breakdown  - Assess and monitor skin integrity  - Assess and monitor nutrition and hydration status  - Monitor labs   - Assess for incontinence   - Turn and reposition patient  - Assist with mobility/ambulation  - Relieve pressure over bony prominences  - Avoid friction and shearing  - Provide appropriate hygiene as needed including keeping skin clean and dry  - Evaluate need for skin moisturizer/barrier cream  - Collaborate with interdisciplinary team   - Patient/family teaching  - Consider wound care consult   Outcome: Progressing     Problem: PAIN - ADULT  Goal: Verbalizes/displays adequate comfort level or baseline comfort level  Description: Interventions:  - Encourage patient to monitor pain and request assistance  - Assess pain using appropriate pain scale  - Administer analgesics based on type and severity of pain and evaluate response  - Implement non-pharmacological measures as appropriate and evaluate response  - Consider cultural and social influences on pain and pain management  - Notify physician/advanced practitioner if interventions unsuccessful or patient reports new pain  Outcome: Progressing     Problem: INFECTION - ADULT  Goal: Absence or prevention of progression during hospitalization  Description: INTERVENTIONS:  - Assess and monitor for signs and symptoms of infection  - Monitor lab/diagnostic results  - Monitor all insertion sites, i.e. indwelling lines, tubes, and drains  - Monitor endotracheal if appropriate and nasal secretions for changes in amount and color  - [place] appropriate cooling/warming therapies per order  - Administer medications as ordered  - Instruct and encourage patient and family to use good hand hygiene technique  - Identify and instruct in appropriate isolation precautions for  identified infection/condition  Outcome: Progressing  Goal: Absence of fever/infection during neutropenic period  Description: INTERVENTIONS:  - Monitor WBC    Outcome: Progressing     Problem: SAFETY ADULT  Goal: Patient will remain free of falls  Description: INTERVENTIONS:  - Educate patient/family on patient safety including physical limitations  - Instruct patient to call for assistance with activity   - Consult OT/PT to assist with strengthening/mobility   - Keep Call bell within reach  - Keep bed low and locked with side rails adjusted as appropriate  - Keep care items and personal belongings within reach  - Initiate and maintain comfort rounds  - Make Fall Risk Sign visible to staff  - Offer Toileting every 2 Hours, in advance of need  - Initiate/Maintain alarm  - Obtain necessary fall risk management equipment:   - Apply yellow socks and bracelet for high fall risk patients  - Consider moving patient to room near nurses station  Outcome: Progressing  Goal: Maintain or return to baseline ADL function  Description: INTERVENTIONS:  -  Assess patient's ability to carry out ADLs; assess patient's baseline for ADL function and identify physical deficits which impact ability to perform ADLs (bathing, care of mouth/teeth, toileting, grooming, dressing, etc.)  - Assess/evaluate cause of self-care deficits   - Assess range of motion  - Assess patient's mobility; develop plan if impaired  - Assess patient's need for assistive devices and provide as appropriate  - Encourage maximum independence but intervene and supervise when necessary  - Involve family in performance of ADLs  - Assess for home care needs following discharge   - Consider OT consult to assist with ADL evaluation and planning for discharge  - Provide patient education as appropriate  Outcome: Progressing  Goal: Maintains/Returns to pre admission functional level  Description: INTERVENTIONS:  - Perform AM-PAC 6 Click Basic Mobility/ Daily Activity  assessment daily.  - Set and communicate daily mobility goal to care team and patient/family/caregiver.   - Collaborate with rehabilitation services on mobility goals if consulted  - Perform Range of Motion 3 times a day.  - Reposition patient every 2 hours.  - Dangle patient 3 times a day  - Stand patient 3 times a day  - Ambulate patient 3 times a day  - Out of bed to chair 3 times a day   - Out of bed for meals 3 times a day  - Out of bed for toileting  - Record patient progress and toleration of activity level   Outcome: Progressing     Problem: Knowledge Deficit  Goal: Patient/family/caregiver demonstrates understanding of disease process, treatment plan, medications, and discharge instructions  Description: Complete learning assessment and assess knowledge base.  Interventions:  - Provide teaching at level of understanding  - Provide teaching via preferred learning methods  Outcome: Progressing

## 2024-09-16 ENCOUNTER — APPOINTMENT (INPATIENT)
Dept: RADIOLOGY | Facility: HOSPITAL | Age: 64
DRG: 405 | End: 2024-09-16
Payer: MEDICARE

## 2024-09-16 LAB
ANION GAP SERPL CALCULATED.3IONS-SCNC: 11 MMOL/L (ref 4–13)
ANION GAP SERPL CALCULATED.3IONS-SCNC: 5 MMOL/L (ref 4–13)
BASOPHILS # BLD AUTO: 0.03 THOUSANDS/ΜL (ref 0–0.1)
BASOPHILS NFR BLD AUTO: 0 % (ref 0–1)
BUN SERPL-MCNC: 12 MG/DL (ref 5–25)
BUN SERPL-MCNC: 8 MG/DL (ref 5–25)
CALCIUM SERPL-MCNC: 7.5 MG/DL (ref 8.4–10.2)
CALCIUM SERPL-MCNC: 7.5 MG/DL (ref 8.4–10.2)
CARDIAC TROPONIN I PNL SERPL HS: 4 NG/L
CHLORIDE SERPL-SCNC: 106 MMOL/L (ref 96–108)
CHLORIDE SERPL-SCNC: 106 MMOL/L (ref 96–108)
CO2 SERPL-SCNC: 25 MMOL/L (ref 21–32)
CO2 SERPL-SCNC: 25 MMOL/L (ref 21–32)
CREAT SERPL-MCNC: 0.42 MG/DL (ref 0.6–1.3)
CREAT SERPL-MCNC: 0.49 MG/DL (ref 0.6–1.3)
EOSINOPHIL # BLD AUTO: 0.65 THOUSAND/ΜL (ref 0–0.61)
EOSINOPHIL NFR BLD AUTO: 6 % (ref 0–6)
ERYTHROCYTE [DISTWIDTH] IN BLOOD BY AUTOMATED COUNT: 14 % (ref 11.6–15.1)
ERYTHROCYTE [DISTWIDTH] IN BLOOD BY AUTOMATED COUNT: 14.1 % (ref 11.6–15.1)
FUNGUS SPEC CULT: NORMAL
GFR SERPL CREATININE-BSD FRML MDRD: 114 ML/MIN/1.73SQ M
GFR SERPL CREATININE-BSD FRML MDRD: 122 ML/MIN/1.73SQ M
GLUCOSE SERPL-MCNC: 112 MG/DL (ref 65–140)
GLUCOSE SERPL-MCNC: 60 MG/DL (ref 65–140)
HCT VFR BLD AUTO: 22.3 % (ref 36.5–49.3)
HCT VFR BLD AUTO: 22.7 % (ref 36.5–49.3)
HGB BLD-MCNC: 7.3 G/DL (ref 12–17)
HGB BLD-MCNC: 7.6 G/DL (ref 12–17)
IMM GRANULOCYTES # BLD AUTO: 0.14 THOUSAND/UL (ref 0–0.2)
IMM GRANULOCYTES NFR BLD AUTO: 1 % (ref 0–2)
LYMPHOCYTES # BLD AUTO: 1.46 THOUSANDS/ΜL (ref 0.6–4.47)
LYMPHOCYTES NFR BLD AUTO: 14 % (ref 14–44)
MAGNESIUM SERPL-MCNC: 2 MG/DL (ref 1.9–2.7)
MCH RBC QN AUTO: 29.7 PG (ref 26.8–34.3)
MCH RBC QN AUTO: 30.3 PG (ref 26.8–34.3)
MCHC RBC AUTO-ENTMCNC: 32.7 G/DL (ref 31.4–37.4)
MCHC RBC AUTO-ENTMCNC: 33.5 G/DL (ref 31.4–37.4)
MCV RBC AUTO: 90 FL (ref 82–98)
MCV RBC AUTO: 91 FL (ref 82–98)
MONOCYTES # BLD AUTO: 0.84 THOUSAND/ΜL (ref 0.17–1.22)
MONOCYTES NFR BLD AUTO: 8 % (ref 4–12)
NEUTROPHILS # BLD AUTO: 7.2 THOUSANDS/ΜL (ref 1.85–7.62)
NEUTS SEG NFR BLD AUTO: 71 % (ref 43–75)
NRBC BLD AUTO-RTO: 0 /100 WBCS
PHOSPHATE SERPL-MCNC: 3 MG/DL (ref 2.3–4.1)
PLATELET # BLD AUTO: 212 THOUSANDS/UL (ref 149–390)
PLATELET # BLD AUTO: 233 THOUSANDS/UL (ref 149–390)
PMV BLD AUTO: 9.6 FL (ref 8.9–12.7)
PMV BLD AUTO: 9.8 FL (ref 8.9–12.7)
POTASSIUM SERPL-SCNC: 3.7 MMOL/L (ref 3.5–5.3)
POTASSIUM SERPL-SCNC: 3.8 MMOL/L (ref 3.5–5.3)
RBC # BLD AUTO: 2.46 MILLION/UL (ref 3.88–5.62)
RBC # BLD AUTO: 2.51 MILLION/UL (ref 3.88–5.62)
SODIUM SERPL-SCNC: 136 MMOL/L (ref 135–147)
SODIUM SERPL-SCNC: 142 MMOL/L (ref 135–147)
WBC # BLD AUTO: 10.06 THOUSAND/UL (ref 4.31–10.16)
WBC # BLD AUTO: 10.32 THOUSAND/UL (ref 4.31–10.16)

## 2024-09-16 PROCEDURE — 99232 SBSQ HOSP IP/OBS MODERATE 35: CPT

## 2024-09-16 PROCEDURE — 80048 BASIC METABOLIC PNL TOTAL CA: CPT

## 2024-09-16 PROCEDURE — 71045 X-RAY EXAM CHEST 1 VIEW: CPT

## 2024-09-16 PROCEDURE — 80076 HEPATIC FUNCTION PANEL: CPT | Performed by: PHYSICIAN ASSISTANT

## 2024-09-16 PROCEDURE — 83735 ASSAY OF MAGNESIUM: CPT | Performed by: PHYSICIAN ASSISTANT

## 2024-09-16 PROCEDURE — 84484 ASSAY OF TROPONIN QUANT: CPT

## 2024-09-16 PROCEDURE — 93005 ELECTROCARDIOGRAM TRACING: CPT

## 2024-09-16 PROCEDURE — 80048 BASIC METABOLIC PNL TOTAL CA: CPT | Performed by: PHYSICIAN ASSISTANT

## 2024-09-16 PROCEDURE — 85025 COMPLETE CBC W/AUTO DIFF WBC: CPT

## 2024-09-16 PROCEDURE — 99232 SBSQ HOSP IP/OBS MODERATE 35: CPT | Performed by: ANESTHESIOLOGY

## 2024-09-16 PROCEDURE — 97167 OT EVAL HIGH COMPLEX 60 MIN: CPT

## 2024-09-16 PROCEDURE — 85027 COMPLETE CBC AUTOMATED: CPT | Performed by: PHYSICIAN ASSISTANT

## 2024-09-16 PROCEDURE — 82150 ASSAY OF AMYLASE: CPT | Performed by: PHYSICIAN ASSISTANT

## 2024-09-16 PROCEDURE — 97163 PT EVAL HIGH COMPLEX 45 MIN: CPT

## 2024-09-16 PROCEDURE — 99024 POSTOP FOLLOW-UP VISIT: CPT | Performed by: STUDENT IN AN ORGANIZED HEALTH CARE EDUCATION/TRAINING PROGRAM

## 2024-09-16 PROCEDURE — 84100 ASSAY OF PHOSPHORUS: CPT | Performed by: PHYSICIAN ASSISTANT

## 2024-09-16 RX ORDER — DEXTROSE MONOHYDRATE, SODIUM CHLORIDE, AND POTASSIUM CHLORIDE 50; 1.49; 4.5 G/1000ML; G/1000ML; G/1000ML
84 INJECTION, SOLUTION INTRAVENOUS CONTINUOUS
Status: DISCONTINUED | OUTPATIENT
Start: 2024-09-16 | End: 2024-09-17

## 2024-09-16 RX ORDER — POTASSIUM CHLORIDE 1500 MG/1
40 TABLET, EXTENDED RELEASE ORAL ONCE
Status: COMPLETED | OUTPATIENT
Start: 2024-09-16 | End: 2024-09-16

## 2024-09-16 RX ADMIN — SODIUM CHLORIDE, SODIUM GLUCONATE, SODIUM ACETATE, POTASSIUM CHLORIDE, MAGNESIUM CHLORIDE, SODIUM PHOSPHATE, DIBASIC, AND POTASSIUM PHOSPHATE 125 ML/HR: .53; .5; .37; .037; .03; .012; .00082 INJECTION, SOLUTION INTRAVENOUS at 05:20

## 2024-09-16 RX ADMIN — POTASSIUM CHLORIDE 40 MEQ: 1500 TABLET, EXTENDED RELEASE ORAL at 11:18

## 2024-09-16 RX ADMIN — HEPARIN SODIUM 5000 UNITS: 5000 INJECTION INTRAVENOUS; SUBCUTANEOUS at 05:15

## 2024-09-16 RX ADMIN — HEPARIN SODIUM 5000 UNITS: 5000 INJECTION INTRAVENOUS; SUBCUTANEOUS at 22:37

## 2024-09-16 RX ADMIN — METHOCARBAMOL 750 MG: 100 INJECTION INTRAMUSCULAR; INTRAVENOUS at 14:47

## 2024-09-16 RX ADMIN — HEPARIN SODIUM 5000 UNITS: 5000 INJECTION INTRAVENOUS; SUBCUTANEOUS at 14:09

## 2024-09-16 RX ADMIN — METHOCARBAMOL 750 MG: 100 INJECTION INTRAMUSCULAR; INTRAVENOUS at 05:15

## 2024-09-16 RX ADMIN — ACETAMINOPHEN 1000 MG: 10 INJECTION INTRAVENOUS at 16:05

## 2024-09-16 RX ADMIN — ACETAMINOPHEN 1000 MG: 10 INJECTION INTRAVENOUS at 08:53

## 2024-09-16 RX ADMIN — Medication: at 05:15

## 2024-09-16 RX ADMIN — PANTOPRAZOLE SODIUM 40 MG: 40 INJECTION, POWDER, FOR SOLUTION INTRAVENOUS at 08:53

## 2024-09-16 RX ADMIN — DEXTROSE, SODIUM CHLORIDE, AND POTASSIUM CHLORIDE 84 ML/HR: 5; .45; .15 INJECTION INTRAVENOUS at 11:18

## 2024-09-16 RX ADMIN — DEXTROSE, SODIUM CHLORIDE, AND POTASSIUM CHLORIDE 84 ML/HR: 5; .45; .15 INJECTION INTRAVENOUS at 22:43

## 2024-09-16 RX ADMIN — ACETAMINOPHEN 1000 MG: 10 INJECTION INTRAVENOUS at 22:28

## 2024-09-16 NOTE — PLAN OF CARE
Problem: PHYSICAL THERAPY ADULT  Goal: Performs mobility at highest level of function for planned discharge setting.  See evaluation for individualized goals.  Description: Treatment/Interventions: ADL retraining, Functional transfer training, LE strengthening/ROM, Therapeutic exercise, Endurance training, Gait training, Bed mobility, Spoke to nursing, OT  Equipment Recommended: Walker       See flowsheet documentation for full assessment, interventions and recommendations.  9/16/2024 1637 by Jan Sexton  Note: Prognosis: Good  Problem List: Decreased strength, Decreased endurance, Impaired balance, Decreased mobility, Decreased coordination, Pain  Assessment: Pt is a 63 y.o. male who was admitted with Pancreatic mass s/p whipple and coil embolization of cystic artery branch. Patient has a past medical history of Benign essential hypertension, CAD (coronary artery disease), Cancer (HCC), Coronary artery disease involving native coronary artery of native heart without angina pectoris, Enteritis, GERD (gastroesophageal reflux disease), Hyperlipidemia, Hypertension, Other chest pain, Palpitations, Prostate cancer (HCC), and Sleep apnea. Patient was alert and oriented during the session and able to answer all questions promptly. Able to perform bed mobility and transfers with min assist of 1. Patient then ambulated for a lap around ICCU (around 200') with CGA and rolling walker. Pt walker with short stride and excessively slow speed but able to walk full lap without taking a break and reported feeling fine with no increase in pain during ambulation. Pt was then returned to room where he was left in bedside chair with all needs in reach. Pt would benefit from continued therapy to work on endurance, ambulation and balance to help return to PLOF.        Rehab Resource Intensity Level, PT: III (Minimum Resource Intensity)    See flowsheet documentation for full assessment.

## 2024-09-16 NOTE — PLAN OF CARE
Problem: Prexisting or High Potential for Compromised Skin Integrity  Goal: Skin integrity is maintained or improved  Description: INTERVENTIONS:  - Identify patients at risk for skin breakdown  - Assess and monitor skin integrity  - Assess and monitor nutrition and hydration status  - Monitor labs   - Assess for incontinence   - Turn and reposition patient  - Assist with mobility/ambulation  - Relieve pressure over bony prominences  - Avoid friction and shearing  - Provide appropriate hygiene as needed including keeping skin clean and dry  - Evaluate need for skin moisturizer/barrier cream  - Collaborate with interdisciplinary team   - Patient/family teaching  - Consider wound care consult   Outcome: Progressing     Problem: PAIN - ADULT  Goal: Verbalizes/displays adequate comfort level or baseline comfort level  Description: Interventions:  - Encourage patient to monitor pain and request assistance  - Assess pain using appropriate pain scale  - Administer analgesics based on type and severity of pain and evaluate response  - Implement non-pharmacological measures as appropriate and evaluate response  - Consider cultural and social influences on pain and pain management  - Notify physician/advanced practitioner if interventions unsuccessful or patient reports new pain  Outcome: Progressing     Problem: INFECTION - ADULT  Goal: Absence or prevention of progression during hospitalization  Description: INTERVENTIONS:  - Assess and monitor for signs and symptoms of infection  - Monitor lab/diagnostic results  - Monitor all insertion sites, i.e. indwelling lines, tubes, and drains  - Monitor endotracheal if appropriate and nasal secretions for changes in amount and color  - [place] appropriate cooling/warming therapies per order  - Administer medications as ordered  - Instruct and encourage patient and family to use good hand hygiene technique  - Identify and instruct in appropriate isolation precautions for  identified infection/condition  Outcome: Progressing  Goal: Absence of fever/infection during neutropenic period  Description: INTERVENTIONS:  - Monitor WBC    Outcome: Progressing     Problem: SAFETY ADULT  Goal: Patient will remain free of falls  Description: INTERVENTIONS:  - Educate patient/family on patient safety including physical limitations  - Instruct patient to call for assistance with activity   - Consult OT/PT to assist with strengthening/mobility   - Keep Call bell within reach  - Keep bed low and locked with side rails adjusted as appropriate  - Keep care items and personal belongings within reach  - Initiate and maintain comfort rounds  - Make Fall Risk Sign visible to staff  - Offer Toileting every  Hours, in advance of need  - Initiate/Maintain alarm  - Obtain necessary fall risk management equipment:   - Apply yellow socks and bracelet for high fall risk patients  - Consider moving patient to room near nurses station  Outcome: Progressing  Goal: Maintain or return to baseline ADL function  Description: INTERVENTIONS:  -  Assess patient's ability to carry out ADLs; assess patient's baseline for ADL function and identify physical deficits which impact ability to perform ADLs (bathing, care of mouth/teeth, toileting, grooming, dressing, etc.)  - Assess/evaluate cause of self-care deficits   - Assess range of motion  - Assess patient's mobility; develop plan if impaired  - Assess patient's need for assistive devices and provide as appropriate  - Encourage maximum independence but intervene and supervise when necessary  - Involve family in performance of ADLs  - Assess for home care needs following discharge   - Consider OT consult to assist with ADL evaluation and planning for discharge  - Provide patient education as appropriate  Outcome: Progressing  Goal: Maintains/Returns to pre admission functional level  Description: INTERVENTIONS:  - Perform AM-PAC 6 Click Basic Mobility/ Daily Activity  assessment daily.  - Set and communicate daily mobility goal to care team and patient/family/caregiver.   - Collaborate with rehabilitation services on mobility goals if consulted  - Perform Range of Motion  times a day.  - Reposition patient every  hours.  - Dangle patient  times a day  - Stand patient  times a day  - Ambulate patient  times a day  - Out of bed to chair  times a day   - Out of bed for meals  times a day  - Out of bed for toileting  - Record patient progress and toleration of activity level   Outcome: Progressing     Problem: Knowledge Deficit  Goal: Patient/family/caregiver demonstrates understanding of disease process, treatment plan, medications, and discharge instructions  Description: Complete learning assessment and assess knowledge base.  Interventions:  - Provide teaching at level of understanding  - Provide teaching via preferred learning methods  Outcome: Progressing

## 2024-09-16 NOTE — ASSESSMENT & PLAN NOTE
S/p 9/12 whipple.  S/p 9/13 IR coil embolization cystic artery branch    Plan:  -NPO, can have sips of clears  -follow labs this morning  -Continue albumin boluses for rescucitation as needed  -PRN pain meds  -PRN anti nausea meds  -Continue DVT prophylaxis  -Keep epidural, will discuss with anesthesia weaning and removal asap  -Rest of care per ICU

## 2024-09-16 NOTE — PROGRESS NOTES
Progress Note - Acute Pain   Name: Isaac Kramer 63 y.o. male I MRN: [id number]  [facility] I Date of Admission: 9/12/2024   Date of Service: 9/16/2024 I Hospital Day: 4    Assessment & Plan  Coronary artery disease involving native coronary artery of native heart without angina pectoris  Limited use of NSAIDs in patients with CAD  Pancreatic mass  S/p Whipple 9/12    - Received Thoracic Epidural on 9/12   - Continue PCEA settings 8/6/10/3   - Remove tomorrow, on 9/17, at request of surgical team   - Please hold heparin dose in the morning on 9/17 for epidural removal    If tolerating PO:  - Transition from IV to PO robaxin, recommend 500mg q6hrs scheduled  - Transition from IV to PO tylenol, recommend 975mg q8hrs scheduled    - IS OOB PT as able  - Bowel regimen when appropriate to prevent OIC  Benign essential hypertension      APS will continue to follow. Please contact Acute Pain Service - via [employer] from [phone] with additional questions or concerns. See [email or web address] or [employer] for additional contacts and after hours information.     History of Present Illness   Isaac Kramer is a 63 y.o. male who presents with s/p Whipple on 9/12.  Pt received a thoracic epidural preoperatively. S/p IR embolization for ongoing bleed postop from cystic artery on 9/13. A perioperative thoracic epidural was placed for post-operative analgesia.     Patient doing well today. Seen after ambulating the hallways with PT. Pain is well controlled. Discussed with surgical team will pull epidural tomorrow on 9/17. Patient ordered a clear liquid diet today.    Pain History  Current pain location(s):  Pain Score: 0  Pain Location/Orientation: Orientation: Mid, Location: Back (L lower rib area)  Pain Scale: Pain Assessment Tool: 0-10  24 hour history: OG. As above.    Opioid requirement previous 24 hours: n/a  Meds/Allergies   all current active meds have been reviewed  No Known Allergies    Objective      Temp:  [96.8  °F (36 °C)-99 °F (37.2 °C)] 98.5 °F (36.9 °C)  HR:  [66-89] 66  Resp:  [13-20] 13  BP: (117-147)/(63-70) 117/67  Physical Exam  Constitutional:       General: He is not in acute distress.     Appearance: Normal appearance.   Eyes:      Extraocular Movements: Extraocular movements intact.      Conjunctiva/sclera: Conjunctivae normal.   Cardiovascular:      Rate and Rhythm: Normal rate and regular rhythm.   Pulmonary:      Effort: Pulmonary effort is normal. No respiratory distress.   Abdominal:      General: Abdomen is flat. There is no distension.      Palpations: Abdomen is soft.   Musculoskeletal:         General: Normal range of motion.      Cervical back: Normal range of motion and neck supple.   Skin:     General: Skin is warm and dry.   Neurological:      General: No focal deficit present.      Mental Status: He is alert and oriented to person, place, and time.   Psychiatric:         Mood and Affect: Mood normal.         Behavior: Behavior normal.      Epidural: Site clean/dry/intact, no surrounding erythema/edema/induration, infusion functioning appropriately        Lab Results: I have reviewed the following results: CBC/BMP:   .     09/16/24  0553   WBC 10.06   HGB 7.3*   HCT 22.3*      SODIUM 142   K 3.7      CO2 25   BUN 12   CREATININE 0.49*   GLUC 60*   MG 2.0   PHOS 3.0      Estimated Creatinine Clearance: 125.9 mL/min (A) (by C-G formula based on SCr of 0.49 mg/dL (L)).  Lab Results   Component Value Date    WBC 10.06 09/16/2024    HGB 7.3 (L) 09/16/2024    HCT 22.3 (L) 09/16/2024     09/16/2024         Component Value Date/Time    K 3.7 09/16/2024 0553     09/16/2024 0553    CO2 25 09/16/2024 0553    CO2 17 (L) 09/12/2024 1427    BUN 12 09/16/2024 0553    CREATININE 0.49 (L) 09/16/2024 0553         Component Value Date/Time    CALCIUM 7.5 (L) 09/16/2024 0553    ALKPHOS 72 09/13/2024 0434     (H) 09/13/2024 0434     (H) 09/13/2024 0434    TP 4.1 (L) 09/13/2024  0434    ALB 3.2 (L) 09/13/2024 0434       Administrative Statements   I have spent a total time of 15 minutes in caring for this patient on the day of the visit/encounter including Prognosis, Risks and benefits of tx options, Patient and family education, and Communicating with other healthcare professionals .

## 2024-09-16 NOTE — PROGRESS NOTES
"Progress Note - Oncology-Surgical   Name: Isaac Kramer 63 y.o. male I MRN: [id number]  Unit/Bed#: [facility] 201-01 I Date of Admission: 9/12/2024   Date of Service: 9/15/2024 I Hospital Day: 3     Assessment & Plan  Pancreatic mass  S/p 9/12 whipple.  S/p 9/13 IR coil embolization cystic artery branch    Plan:  -NPO, can have sips of clears  -follow labs this morning  -Continue albumin boluses for rescucitation as needed  -PRN pain meds  -PRN anti nausea meds  -Continue DVT prophylaxis  -Keep epidural, will discuss with anesthesia weaning and removal asap  -Rest of care per ICU      Subjective/Objective     Subjective:   Pain controlled.  No nausea no vomiting.  Passing flatus no bowel movements    Objective:     Blood pressure 146/65, pulse 76, temperature 98.8 °F (37.1 °C), temperature source Oral, resp. rate 20, height 5' 4\" (1.626 m), weight 72.5 kg (159 lb 13.3 oz), SpO2 100%.,Body mass index is 27.44 kg/m².      Intake/Output Summary (Last 24 hours) at 9/15/2024 2252  Last data filed at 9/15/2024 2124  Gross per 24 hour   Intake 2223.72 ml   Output 2045 ml   Net 178.72 ml       Invasive Devices       Peripheral Intravenous Line  Duration             Peripheral IV 09/12/24 Left Arm 3 days    Peripheral IV 09/12/24 Left;Proximal Arm 3 days    Peripheral IV 09/12/24 Right Forearm 3 days              Epidural Line  Duration             Epidural Catheter 09/12/24 3 days              Drain  Duration             Closed/Suction Drain Left Abdomen Bulb 19 Fr. 3 days                    Physical Exam:   Gen: NAD, Comfortable  Neuro: A&O, No focal deficits  Head: Normal Cephalic, Atraumatic  Eye: EOMI, PERRLA, No scleral icterus  Neck: Supple, No JVD, Midline trachea  CV: RRR, Cap refill <2 sec  Pulm: Normal work of breathing, no respiratory distress  Abd: Soft, Non-Distended, Non-Tender  Ext: No edema, Non-tender  Skin: warm, dry, intact      "

## 2024-09-16 NOTE — ASSESSMENT & PLAN NOTE
S/p Aashish 9/12    - Received Thoracic Epidural on 9/12   - Continue PCEA settings 8/6/10/3   - Remove tomorrow, on 9/17, at request of surgical team   - Please hold heparin dose in the morning on 9/17 for epidural removal    If tolerating PO:  - Transition from IV to PO robaxin, recommend 500mg q6hrs scheduled  - Transition from IV to PO tylenol, recommend 975mg q8hrs scheduled    - IS OOB PT as able  - Bowel regimen when appropriate to prevent OIC

## 2024-09-16 NOTE — PHYSICAL THERAPY NOTE
PHYSICAL THERAPY NOTE          Patient Name: Isaac Kramer  Today's Date: 9/16/2024 09/16/24 1146   PT Last Visit   PT Visit Date 09/16/24   Note Type   Note type Evaluation   Pain Assessment   Pain Assessment Tool 0-10   Pain Score 5   Pain Location/Orientation Location: Abdomen  (left side)   Hospital Pain Intervention(s) Repositioned;Ambulation/increased activity   Restrictions/Precautions   Weight Bearing Precautions Per Order No   Other Precautions Chair Alarm;Bed Alarm;Multiple lines;Telemetry;Fall Risk;Pain   Home Living   Type of Home Mobile home   Home Layout One level;Stairs to enter with rails   Home Equipment Cane   Prior Function   Level of [place] Independent with ADLs;Independent with functional mobility;Independent with IADLS   Lives With Spouse;Son   Receives Help From Family   IADLs Independent with driving;Family/Friend/Other provides meals;Independent with medication management   Falls in the last 6 months 0   Cognition   Overall Cognitive Status WFL   Orientation Level Oriented X4   RLE Assessment   RLE Assessment WFL   LLE Assessment   LLE Assessment WFL   Coordination   Movements are Fluid and Coordinated 0   Coordination and Movement Description slow movements   Bed Mobility   Supine to Sit 4  Minimal assistance   Additional items Assist x 1   Transfers   Sit to Stand 4  Minimal assistance   Additional items Assist x 1   Stand to Sit 4  Minimal assistance   Additional items Assist x 1   Ambulation/Elevation   Gait pattern Short stride;Excessively slow;Narrow WILDER   Gait Assistance 4  Minimal assist  (Contact guard)   Additional items Assist x 1   Assistive Device Rolling walker   Distance 200'   Balance   Static Sitting Fair +   Dynamic Sitting Fair   Static Standing Fair   Dynamic Standing Fair -   Ambulatory Fair -   Endurance Deficit   Endurance Deficit Yes   Endurance Deficit Description limited  by muscular endurance   Activity Tolerance   Activity Tolerance Patient limited by fatigue;Patient limited by pain   Medical Staff Made Aware OT present for co-evaluation   Nurse Made Aware nursing cleared pt for therapy   Assessment   Prognosis Good   Problem List Decreased strength;Decreased endurance;Impaired balance;Decreased mobility;Decreased coordination;Pain   Assessment Pt is a 63 y.o. male who was admitted with Pancreatic mass s/p whipple and coil embolization of cystic artery branch. Patient has a past medical history of Benign essential hypertension, CAD (coronary artery disease), Cancer (HCC), Coronary artery disease involving native coronary artery of native heart without angina pectoris, Enteritis, GERD (gastroesophageal reflux disease), Hyperlipidemia, Hypertension, Other chest pain, Palpitations, Prostate cancer (HCC), and Sleep apnea. Patient was alert and oriented during the session and able to answer all questions promptly. Able to perform bed mobility and transfers with min assist of 1. Patient then ambulated for a lap around ICCU (around 200') with CGA and rolling walker. Pt walker with short stride and excessively slow speed but able to walk full lap without taking a break and reported feeling fine with no increase in pain during ambulation. Pt was then returned to room where he was left in bedside chair with all needs in reach. Pt would benefit from continued therapy to work on endurance, ambulation and balance to help return to PLOF.   Goals   Patient Goals return home   STG Expiration Date 09/23/24   Short Term Goal #1 1) pt able to ambulate 250' at an even pace with normal stride length using least restrictive AD to increase tolerance to higher level activity, 2) Pt will improve balance to fair+ or better in all categories to increase patient safety and decrease risk of future falls, 3) Pt will increase muscular strength and endurance to work towards returning to hiking and hunting   Plan    Treatment/Interventions ADL retraining;Functional transfer training;LE strengthening/ROM;Therapeutic exercise;Endurance training;Gait training;Bed mobility;Spoke to nursing;OT   PT Frequency 2-3x/wk   Discharge Recommendation   Rehab Resource Intensity Level, PT III (Minimum Resource Intensity)   Equipment Recommended Walker   AM-PAC Basic Mobility Inpatient   Turning in Flat Bed Without Bedrails 3   Lying on Back to Sitting on Edge of Flat Bed Without Bedrails 3   Moving Bed to Chair 3   Standing Up From Chair Using Arms 3   Walk in Room 3   Climb 3-5 Stairs With Railing 3   Basic Mobility Inpatient Raw Score 18   Basic Mobility Standardized Score 41.05   [facility] Highest Level Of Mobility   -HLM Goal 6: Walk 10 steps or more   JH-HLM Achieved 7: Walk 25 feet or more   End of Consult   Patient Position at End of Consult Bedside chair;All needs within reach   Jan Sexton, SPT

## 2024-09-16 NOTE — OCCUPATIONAL THERAPY NOTE
Occupational Therapy Evaluation     Patient Name: Isaac Kramer  Today's Date: 9/16/2024  Problem List  Principal Problem:    Pancreatic mass  Active Problems:    Benign essential hypertension    Coronary artery disease involving native coronary artery of native heart without angina pectoris    Past Medical History  Past Medical History:   Diagnosis Date    Benign essential hypertension 05/08/2014    CAD (coronary artery disease)     s/p NAVA prox LAD and D1 7/28/2022    Cancer (HCC)     Prostate    Coronary artery disease involving native coronary artery of native heart without angina pectoris 08/09/2022    Enteritis 08/28/2024    GERD (gastroesophageal reflux disease)     Hyperlipidemia     Hypertension     Other chest pain     Palpitations     Prostate cancer (HCC) 04/18/2023    Sleep apnea      Past Surgical History  Past Surgical History:   Procedure Laterality Date    CARDIAC CATHETERIZATION Left 7/28/2022    Procedure: Cardiac catheterization-left heart catheterization;  Surgeon: Sai Henry MD;  [facility]: [facility];  Service: Cardiology    CARDIAC CATHETERIZATION N/A 7/28/2022    Procedure: Cardiac pci;  Surgeon: Sai Henry MD;  [facility]: [facility];  Service: Cardiology    HERNIA REPAIR      IR EMBOLIZATION (SPECIFY VESSEL OR SITE)  9/13/2024    WV LAPS SURG [id number] RPBIC RAD W/NRV SPARING ROBOT N/A 5/22/2023    Procedure: PROSTATECTOMY RADICAL & PELVIC LYMPH NODE DISSECTION  W/ ROBOT;  Surgeon: Otoniel Harmon MD;  Location: [facility];  Service: Urology    WV PROSTATE NEEDLE BIOPSY ANY APPROACH N/A 2/28/2023    Procedure: TRANSRECTAL MRI FUSION BIOPSY PROSTATE;  Surgeon: Milan Arellano MD;  Location: [facility];  Service: Urology    WHIPPLE PROCEDURE/PANCREATICO-DUODENECTOMY N/A 9/12/2024    Procedure: WHIPPLE PROCEDURE/PANCREATICO-DUODENECTOMY;  Surgeon: Marika Ac MD;  Location: [facility];  Service: Surgical Oncology         09/16/24 1145   OT Last Visit    OT Visit Date 09/16/24   Pain Assessment   Pain Assessment Tool 0-10   Pain Score 5   Pain Location/Orientation Location: Abdomen;Orientation: Lower;Orientation: Left   Hospital Pain Intervention(s) Repositioned;Ambulation/increased activity;Emotional support;Relaxation technique   Restrictions/Precautions   Weight Bearing Precautions Per Order No   Other Precautions Chair Alarm;Bed Alarm;Multiple lines;Fall Risk;Pain   Home Living   Type of Home Mobile home   Home Layout One level;Stairs to enter with rails   Home Equipment Cane   Prior Function   Level of [place] Independent with ADLs;Independent with functional mobility;Independent with IADLS   Lives With Spouse;Son   Receives Help From Family   IADLs Independent with driving;Family/Friend/Other provides meals;Independent with medication management   Falls in the last 6 months 0   Vocational Retired  ()   Lifestyle   Autonomy I adls and mobility- i iadls -shares homemaking with family   Reciprocal Relationships supportive family   Service to Others retired   Intrinsic Gratification mostly sedentary   Subjective   Subjective offers no c/o   ADL   Grooming Assistance 5  Supervision/Setup   UB Bathing Assistance 4  Minimal Assistance   LB Bathing Assistance 4  Minimal Assistance   UB Dressing Assistance 4  Minimal Assistance   LB Dressing Assistance 4  Minimal Assistance   Toileting Assistance  4  Minimal Assistance   Bed Mobility   Supine to Sit 4  Minimal assistance   Transfers   Sit to Stand 4  Minimal assistance   Stand to Sit 4  Minimal assistance   Functional Mobility   Functional Mobility 4  Minimal assistance   Additional items Rolling walker   Balance   Static Sitting Fair +   Dynamic Sitting Fair   Static Standing Fair   Dynamic Standing Fair -   Ambulatory Fair -   Activity Tolerance   Activity Tolerance Patient limited by fatigue;Patient limited by pain   Medical Staff Made Aware PT present for co-eval 2* medical complexity, comorbidities  and limited overall tolerance to activities   RUE Assessment   RUE Assessment WFL   LUE Assessment   LUE Assessment WFL   Cognition   Overall Cognitive Status WFL   Assessment   Limitation Decreased ADL status;Decreased endurance;Decreased self-care trans;Decreased high-level ADLs   Prognosis Good   Assessment Pt is a 63 y.o. male who was admitted to [facility] on [facility]/12/2024 with Pancreatic mass s/p whipple and coil embolization of cystic artery branch. Patient   has a past medical history of Benign essential hypertension, CAD (coronary artery disease), Cancer (HCC), Coronary artery disease involving native coronary artery of native heart without angina pectoris, Enteritis, GERD (gastroesophageal reflux disease), Hyperlipidemia, Hypertension, Other chest pain, Palpitations, Prostate cancer (HCC), and Sleep apnea.At baseline pt was completing adls and mobility independently - I iadls - shares homemaking with family. Pt lives with spouse /son in mobile home . Currently pt requires min assist for overall ADLS and min assist for functional mobility/transfers. Pt currently presents with impairments in the following categories -steps to enter environment, difficulty performing ADLS, difficulty performing IADLS , and environment activity tolerance, endurance, standing balance/tolerance, and sitting balance/tolerance. These impairments, as well as pt's fatigue, pain, risk for falls, and home environment  limit pt's ability to safely engage in all baseline areas of occupation, includingbathing, dressing, toileting, functional mobility/transfers, community mobility, laundry , driving, house maintenance, meal prep, cleaning, social participation , and leisure activities  From OT standpoint, recommend Level III resources upon D/C. OT will continue 2-3x/wk x 14 days (9/30/24) to follow to address the below stated goals.   Goals   Patient Goals have less pain   Long Term Goal #1 1) Mod I UB/LB adls after setup with use  of LHAE PRN  2)  Mod I toileting and clothing management  3) Mod I bed mobility  4) Mod I functional mob/transfers to and from all surfaces with fair+ to good balance/safety   5) Increase activity tolerance to 30-35min for participation in adls and enjoyable activities  6) Assess DME needs   7) Demonstrate good carryover with safe use of AD during functional tasks   8) Assess DME needs   9) Assist with safe d/c recommendations   Discharge Recommendation   Rehab Resource Intensity Level, OT III (Minimum Resource Intensity)   AM-PAC Daily Activity Inpatient   Lower Body Dressing 3   Bathing 3   Toileting 3   Upper Body Dressing 3   Grooming 4   Eating 4   Daily Activity Raw Score 20   Daily Activity Standardized Score (Calc for Raw Score >=11) 42.03   AM-PAC Applied Cognition Inpatient   Following a Speech/Presentation 4   Understanding Ordinary Conversation 4   Taking Medications 4   Remembering Where Things Are Placed or Put Away 4   Remembering List of 4-5 Errands 4   Taking Care of Complicated Tasks 4   Applied Cognition Raw Score 24   Applied Cognition Standardized Score 62.21   End of Consult   Education Provided Yes   Patient Position at End of Consult Bedside chair;Bed/Chair alarm activated;All needs within reach   Nurse Communication Nurse aware of consult         The patient's raw score on the AM-PAC Daily Activity Inpatient Short Form is 20. A raw score of greater than or equal to 19 suggests the patient may benefit from discharge to home. Please refer to the recommendation of the Occupational Therapist for safe discharge planning.        Documentation Completed By:    RAYNA Gloria/L  [employer] Certified - [id number]

## 2024-09-16 NOTE — PLAN OF CARE
Problem: OCCUPATIONAL THERAPY ADULT  Goal: Performs self-care activities at highest level of function for planned discharge setting.  See evaluation for individualized goals.  Description:            See flowsheet documentation for full assessment, interventions and recommendations.   Note: Limitation: Decreased ADL status, Decreased endurance, Decreased self-care trans, Decreased high-level ADLs  Prognosis: Good  Assessment: Pt is a 63 y.o. male who was admitted to [facility] on 9/12/2024 with Pancreatic mass s/p whipple and coil embolization of cystic artery branch. Patient   has a past medical history of Benign essential hypertension, CAD (coronary artery disease), Cancer (HCC), Coronary artery disease involving native coronary artery of native heart without angina pectoris, Enteritis, GERD (gastroesophageal reflux disease), Hyperlipidemia, Hypertension, Other chest pain, Palpitations, Prostate cancer (HCC), and Sleep apnea.At baseline pt was completing adls and mobility independently - I iadls - shares homemaking with family. Pt lives with spouse /son in mobile home . Currently pt requires min assist for overall ADLS and min assist for functional mobility/transfers. Pt currently presents with impairments in the following categories -steps to enter environment, difficulty performing ADLS, difficulty performing IADLS , and environment activity tolerance, endurance, standing balance/tolerance, and sitting balance/tolerance. These impairments, as well as pt's fatigue, pain, risk for falls, and home environment  limit pt's ability to safely engage in all baseline areas of occupation, includingbathing, dressing, toileting, functional mobility/transfers, community mobility, laundry , driving, house maintenance, meal prep, cleaning, social participation , and leisure activities  From OT standpoint, recommend Level III resources upon D/C. OT will continue to follow to address the below stated goals.     Rehab  Resource Intensity Level, OT: III (Minimum Resource Intensity)

## 2024-09-16 NOTE — CASE MANAGEMENT
Case Management Discharge Planning Note    Patient name Isaac Kramer  Location [facility] 201/[facility] 201-01 MRN [id number]  : 1960 Date 2024       Current Admission Date: 2024  Current Admission Diagnosis:Pancreatic mass   Patient Active Problem List    Diagnosis Date Noted Date Diagnosed    MOOKIE (acute kidney injury) (HCC) 2024     GERD (gastroesophageal reflux disease)      Pancreatic mass 2024     Elevated LFTs 2024     Biliary sepsis 2024     Acute obstructive cholangitis 2024     Enteritis 2024     Prostate cancer (HCC) 2023     Microscopic hematuria 10/10/2022     BPH (benign prostatic hyperplasia) 10/10/2022     Coronary artery disease involving native coronary artery of native heart without angina pectoris 2022     Overweight with body mass index (BMI) of 26 to 26.9 in adult 2020     Aortic valve stenosis 2020     ED (erectile dysfunction) of non-organic origin 2015     Peripheral neuropathy 2014     Peripheral vascular disease (HCC) 2014     Benign essential hypertension 2014     Hypercholesterolemia 2013       LOS (days): 4  Geometric Mean LOS (GMLOS) (days): 9  Days to GMLOS:5.1     OBJECTIVE:  Risk of Unplanned Readmission Score: 17.08         Current admission status: Inpatient   Preferred Pharmacy:   [id number] - [place] - [address]  [address]  [place] [address]  Phone: [phone] Fax: [phone]    Primary Care Provider: Pablo Perez DO    Primary Insurance: MEDICARE  Secondary Insurance:     DISCHARGE DETAILS:                                                                                                 Additional Comments: POD#4 Whipple procedure, had returned to OR for cystic artery bleeding. Has PICC line, has thoracic epidural catheter for pain control to be removed tomorrow. Has SEGUN bulb ignacio. Seen by OT with minimal  needs. PT still pending. Anticipate will need HHC when evals completed. Has RW and BSC at home.

## 2024-09-16 NOTE — PROGRESS NOTES
"Progress Note -Interventional Radiology NP  Isaac Kramer 63 y.o. male MRN: [id number]  Unit/Bed#: [facility] 201-01 Encounter: [id number]    Assessment/Plan:    Isaac Kramer is a 63 y.o. male who presented with downward trending Hgb s/p Whipple on 9/12/2024.  Patient had a CTA that showed extravasation from the cystic artery stump.    Patient is s/p embolization on Friday. Coil embolization of cystic artery.    Hgb 7.3 today. Past several trends 8.3-7.2.  Previously received 1 unit PRBC and 1 unit whole blood.    Patient currently with epidural for pain management.    Recommend continuing to monitor H&H.  Patient on a clear liquid diet. No reports of N/V.    Right groin dressing can be removed this evening.  Right groin is soft - no hematoma.    Please have low threshold to reach out to IR with any concerns for bleeding.    Rest of care per Primary Team.    Subjective: Patient sitting up in bedside chair awake and alert. Patient has no reports of abdominal pain. Patient reports that he was able to ambulate in the hallway with assistance earlier today and had no discomfort.      Patient Active Problem List   Diagnosis    Benign essential hypertension    ED (erectile dysfunction) of non-organic origin    Hypercholesterolemia    Peripheral vascular disease (HCC)    Peripheral neuropathy    Overweight with body mass index (BMI) of 26 to 26.9 in adult    Aortic valve stenosis    Coronary artery disease involving native coronary artery of native heart without angina pectoris    Microscopic hematuria    BPH (benign prostatic hyperplasia)    Prostate cancer (HCC)    Pancreatic mass    Elevated LFTs    Biliary sepsis    Acute obstructive cholangitis    Enteritis    GERD (gastroesophageal reflux disease)    MOOKIE (acute kidney injury) (HCC)          Objective:    Vitals:  /65 (BP Location: Left arm)   Pulse 72   Temp 98.3 °F (36.8 °C) (Oral)   Resp (!) 31   Ht 5' 4\" (1.626 m)   Wt 70.8 kg (156 lb 1.4 oz)   SpO2 " 90%   BMI 26.79 kg/m²   Body mass index is 26.79 kg/m².  Weight (last 2 days)       Date/Time Weight    09/16/24 0556 70.8 (156.09)    09/15/24 0532 72.5 (159.83)    09/14/24 0548 74 (163.14)            I/Os:    Intake/Output Summary (Last 24 hours) at 9/16/2024 1427  Last data filed at 9/16/2024 1200  Gross per 24 hour   Intake 2776.46 ml   Output 1850 ml   Net 926.46 ml       Invasive Devices       Peripheral Intravenous Line  Duration             Peripheral IV 09/12/24 Left;Proximal Arm 4 days    Peripheral IV 09/16/24 Distal;Right;Upper;Ventral (anterior) Arm <1 day              Epidural Line  Duration             Epidural Catheter 09/12/24 3 days              Drain  Duration             Closed/Suction Drain Left Abdomen Bulb 19 Fr. 3 days                    Physical Exam:  Physical Exam  HENT:      Head: Normocephalic.      Mouth/Throat:      Mouth: Mucous membranes are moist.   Cardiovascular:      Rate and Rhythm: Normal rate.      Pulses: Normal pulses.   Pulmonary:      Effort: Pulmonary effort is normal.   Abdominal:      Palpations: Abdomen is soft.      Comments: No tenderness with palpation   Musculoskeletal:      Cervical back: Normal range of motion.   Skin:     General: Skin is warm and dry.      Capillary Refill: Capillary refill takes less than 2 seconds.   Neurological:      Mental Status: He is alert and oriented to person, place, and time.   Psychiatric:         Mood and Affect: Mood normal.         Behavior: Behavior normal.         Thought Content: Thought content normal.         Judgment: Judgment normal.                      Lab Results and Cultures:   CBC with diff:   Lab Results   Component Value Date    WBC 10.06 09/16/2024    HGB 7.3 (L) 09/16/2024    HCT 22.3 (L) 09/16/2024    MCV 91 09/16/2024     09/16/2024    RBC 2.46 (L) 09/16/2024    MCH 29.7 09/16/2024    MCHC 32.7 09/16/2024    RDW 14.1 09/16/2024    MPV 9.8 09/16/2024    NRBC 0 09/15/2024      BMP/CMP:  Lab Results  "  Component Value Date    K 3.7 09/16/2024     09/16/2024    CO2 25 09/16/2024    CO2 17 (L) 09/12/2024    BUN 12 09/16/2024    CREATININE 0.49 (L) 09/16/2024    GLUCOSE 153 (H) 09/12/2024    CALCIUM 7.5 (L) 09/16/2024     (H) 09/13/2024     (H) 09/13/2024    ALKPHOS 72 09/13/2024    EGFR 114 09/16/2024   ,     Coags:   Lab Results   Component Value Date    PTT 32 09/10/2024    INR 1.39 (H) 09/14/2024   ,   Results from last 7 days   Lab Units 09/14/24  0436 09/13/24  1110 09/10/24  0820   PTT seconds  --   --  32   INR  1.39* 1.68* 1.00        Lipid Panel: No results found for: \"CHOL\"  Lab Results   Component Value Date    HDL 28 (L) 07/18/2022     Lab Results   Component Value Date    HDL 28 (L) 07/18/2022     Lab Results   Component Value Date    LDLCALC 111 (H) 07/18/2022     Lab Results   Component Value Date    TRIG 117 07/18/2022       HgbA1c:   Lab Results   Component Value Date    HGBA1C 5.7 (H) 09/10/2024    HGBA1C 5.4 05/10/2023       Blood Culture:   Lab Results   Component Value Date    BLOODCX Klebsiella variicola (A) 08/27/2024    BLOODCX Klebsiella variicola (A) 08/27/2024   ,   Urinalysis:   Lab Results   Component Value Date    COLORU Yellow 08/27/2024    COLORU orange/dark 08/27/2024    CLARITYU Clear 08/27/2024    CLARITYU hazy 08/27/2024    SPECGRAV <=1.005 (L) 08/27/2024    PHUR 6.0 08/27/2024    PHUR 7.0 05/28/2023    LEUKOCYTESUR Negative 08/27/2024    LEUKOCYTESUR trace 08/27/2024    NITRITE Negative 08/27/2024    NITRITE pos 08/27/2024    GLUCOSEU Negative 08/27/2024    GLUCOSEU neg 08/27/2024    KETONESU Negative 08/27/2024    KETONESU trace 08/27/2024    BILIRUBINUR 1+ (A) 08/27/2024    BILIRUBINUR large 08/27/2024    BLOODU Negative 08/27/2024    BLOODU mod 08/27/2024   ,   Urine Culture:   Lab Results   Component Value Date    URINECX No Growth <1000 cfu/mL 08/27/2024   ,   Wound Culure:  No results found for: \"WOUNDCULT\"    VTE Pharmacologic Prophylaxis: Sequential " compression device (Venodyne)  and Heparin      Thank you for allowing me to participate in the care of Isaac Kramer. Please don't hesitate to call, text, email, or TigerText with any questions.     This text is generated with voice recognition software. There may be translation, syntax,  or grammatical errors. If you have any questions, please contact the dictating provider.

## 2024-09-17 ENCOUNTER — APPOINTMENT (INPATIENT)
Dept: RADIOLOGY | Facility: HOSPITAL | Age: 64
DRG: 405 | End: 2024-09-17
Payer: MEDICARE

## 2024-09-17 LAB
2HR DELTA HS TROPONIN: 0 NG/L
ALBUMIN SERPL BCG-MCNC: 3 G/DL (ref 3.5–5)
ALP SERPL-CCNC: 69 U/L (ref 34–104)
ALT SERPL W P-5'-P-CCNC: 62 U/L (ref 7–52)
AMYLASE FLD QL: <10 U/L
AMYLASE SERPL-CCNC: 11 IU/L (ref 29–103)
AST SERPL W P-5'-P-CCNC: 12 U/L (ref 13–39)
ATRIAL RATE: 77 BPM
BILIRUB DIRECT SERPL-MCNC: 0.07 MG/DL (ref 0–0.2)
BILIRUB SERPL-MCNC: 0.5 MG/DL (ref 0.2–1)
CARDIAC TROPONIN I PNL SERPL HS: 4 NG/L
INR PPP: 1.08 (ref 0.85–1.19)
P AXIS: 37 DEGREES
PR INTERVAL: 167 MS
PROT SERPL-MCNC: 4.6 G/DL (ref 6.4–8.4)
PROTHROMBIN TIME: 14.3 SECONDS (ref 12.3–15)
QRS AXIS: -6 DEGREES
QRSD INTERVAL: 88 MS
QT INTERVAL: 375 MS
QTC INTERVAL: 425 MS
T WAVE AXIS: 26 DEGREES
VENTRICULAR RATE: 77 BPM

## 2024-09-17 PROCEDURE — 99223 1ST HOSP IP/OBS HIGH 75: CPT | Performed by: INTERNAL MEDICINE

## 2024-09-17 PROCEDURE — 93010 ELECTROCARDIOGRAM REPORT: CPT | Performed by: INTERNAL MEDICINE

## 2024-09-17 PROCEDURE — 82150 ASSAY OF AMYLASE: CPT | Performed by: PHYSICIAN ASSISTANT

## 2024-09-17 PROCEDURE — 99024 POSTOP FOLLOW-UP VISIT: CPT | Performed by: STUDENT IN AN ORGANIZED HEALTH CARE EDUCATION/TRAINING PROGRAM

## 2024-09-17 PROCEDURE — 99232 SBSQ HOSP IP/OBS MODERATE 35: CPT | Performed by: NURSE PRACTITIONER

## 2024-09-17 PROCEDURE — 84484 ASSAY OF TROPONIN QUANT: CPT

## 2024-09-17 PROCEDURE — 85610 PROTHROMBIN TIME: CPT | Performed by: NURSE PRACTITIONER

## 2024-09-17 PROCEDURE — 71045 X-RAY EXAM CHEST 1 VIEW: CPT

## 2024-09-17 RX ORDER — OXYCODONE HYDROCHLORIDE 5 MG/1
5 TABLET ORAL EVERY 4 HOURS PRN
Status: DISCONTINUED | OUTPATIENT
Start: 2024-09-17 | End: 2024-09-19 | Stop reason: HOSPADM

## 2024-09-17 RX ORDER — GABAPENTIN 300 MG/1
300 CAPSULE ORAL 3 TIMES DAILY
Status: DISCONTINUED | OUTPATIENT
Start: 2024-09-17 | End: 2024-09-19 | Stop reason: HOSPADM

## 2024-09-17 RX ORDER — CELECOXIB 200 MG/1
200 CAPSULE ORAL DAILY
Status: DISCONTINUED | OUTPATIENT
Start: 2024-09-17 | End: 2024-09-19 | Stop reason: HOSPADM

## 2024-09-17 RX ORDER — ACETAMINOPHEN 325 MG/1
975 TABLET ORAL EVERY 8 HOURS SCHEDULED
Status: DISCONTINUED | OUTPATIENT
Start: 2024-09-17 | End: 2024-09-19 | Stop reason: HOSPADM

## 2024-09-17 RX ORDER — FUROSEMIDE 10 MG/ML
20 INJECTION INTRAMUSCULAR; INTRAVENOUS ONCE
Status: COMPLETED | OUTPATIENT
Start: 2024-09-17 | End: 2024-09-17

## 2024-09-17 RX ORDER — OXYCODONE HYDROCHLORIDE 10 MG/1
10 TABLET ORAL EVERY 4 HOURS PRN
Status: DISCONTINUED | OUTPATIENT
Start: 2024-09-17 | End: 2024-09-19 | Stop reason: HOSPADM

## 2024-09-17 RX ORDER — METHOCARBAMOL 500 MG/1
500 TABLET, FILM COATED ORAL EVERY 8 HOURS SCHEDULED
Status: DISCONTINUED | OUTPATIENT
Start: 2024-09-17 | End: 2024-09-19 | Stop reason: HOSPADM

## 2024-09-17 RX ORDER — ACETAMINOPHEN 325 MG/1
975 TABLET ORAL EVERY 6 HOURS SCHEDULED
Status: DISCONTINUED | OUTPATIENT
Start: 2024-09-17 | End: 2024-09-17

## 2024-09-17 RX ADMIN — ACETAMINOPHEN 975 MG: 325 TABLET ORAL at 21:00

## 2024-09-17 RX ADMIN — Medication: at 01:39

## 2024-09-17 RX ADMIN — PANTOPRAZOLE SODIUM 40 MG: 40 INJECTION, POWDER, FOR SOLUTION INTRAVENOUS at 08:33

## 2024-09-17 RX ADMIN — GABAPENTIN 300 MG: 300 CAPSULE ORAL at 20:59

## 2024-09-17 RX ADMIN — GABAPENTIN 300 MG: 300 CAPSULE ORAL at 17:24

## 2024-09-17 RX ADMIN — ACETAMINOPHEN 975 MG: 325 TABLET ORAL at 08:33

## 2024-09-17 RX ADMIN — FUROSEMIDE 20 MG: 10 INJECTION, SOLUTION INTRAMUSCULAR; INTRAVENOUS at 10:25

## 2024-09-17 RX ADMIN — OXYCODONE HYDROCHLORIDE 5 MG: 5 TABLET ORAL at 17:25

## 2024-09-17 RX ADMIN — METHOCARBAMOL 500 MG: 500 TABLET ORAL at 14:46

## 2024-09-17 RX ADMIN — METHOCARBAMOL 500 MG: 500 TABLET ORAL at 21:00

## 2024-09-17 RX ADMIN — HEPARIN SODIUM 5000 UNITS: 5000 INJECTION INTRAVENOUS; SUBCUTANEOUS at 14:46

## 2024-09-17 RX ADMIN — CELECOXIB 200 MG: 200 CAPSULE ORAL at 17:24

## 2024-09-17 RX ADMIN — GABAPENTIN 300 MG: 300 CAPSULE ORAL at 08:33

## 2024-09-17 RX ADMIN — HEPARIN SODIUM 5000 UNITS: 5000 INJECTION INTRAVENOUS; SUBCUTANEOUS at 21:00

## 2024-09-17 NOTE — ASSESSMENT & PLAN NOTE
S/p Whipple 9/12    S/P Thoracic Epidural on 9/12; removed on 9/17/2024 without difficulty, tip intact.  Repeat INR 1.08 on 9/17/2024  DVT prophylaxis with subcutaneous heparin, last dose was 916 at 2237  Discontinued epidural infusion orders  Ok to resume DVT prophylaxis this afternoon as scheduled at 1400  Start Tylenol 975 mg every 8 hours scheduled  Start Robaxin 500 mg oral every 8 hours scheduled  Hold for sedation  Gabapentin 300 mg 3 times a day, started per primary service  Estimated Creatinine Clearance: 162 mL/min (A) (by C-G formula based on SCr of 0.42 mg/dL (L)).  Would recommend to lower starting doses given age  Oxycodone 5 mg Every 4 hours as needed for moderate pain  Oxycodone 10mg every 4 hours as needed for severe pain  Dilaudid 0.5mg IV every 2 hours as needed for breakthrough pain  Narcan as needed for opioid reversal agent/respiratory depression     - IS OOB PT as able  - Bowel regimen per surgical services when appropriate to prevent OIC

## 2024-09-17 NOTE — PROGRESS NOTES
Progress Note - Acute Pain   Name: Isaac Kramer 63 y.o. male I MRN: [id number]  Unit/Bed#: [facility] 201-01 I Date of Admission: 9/12/2024   Date of Service: 9/17/2024 I Hospital Day: 5    Assessment & Plan  Coronary artery disease involving native coronary artery of native heart without angina pectoris  Limited use of NSAIDs in patients with CAD.  Pancreatic mass  S/p Whipple 9/12    S/P Thoracic Epidural on 9/12; removed on 9/17/2024 without difficulty, tip intact.  Repeat INR 1.08 on 9/17/2024  DVT prophylaxis with subcutaneous heparin, last dose was 916 at 2237  Discontinued epidural infusion orders  Ok to resume DVT prophylaxis this afternoon as scheduled at 1400  Start Tylenol 975 mg every 8 hours scheduled  Start Robaxin 500 mg oral every 8 hours scheduled  Hold for sedation  Gabapentin 300 mg 3 times a day, started per primary service  Estimated Creatinine Clearance: 162 mL/min (A) (by C-G formula based on SCr of 0.42 mg/dL (L)).  Would recommend to lower starting doses given age  Oxycodone 5 mg Every 4 hours as needed for moderate pain  Oxycodone 10mg every 4 hours as needed for severe pain  Dilaudid 0.5mg IV every 2 hours as needed for breakthrough pain  Narcan as needed for opioid reversal agent/respiratory depression     - IS OOB PT as able  - Bowel regimen per surgical services when appropriate to prevent OIC    Pain well controlled, epidural discontinued. Palliative service will follow for ongoing pain management now that epidural is out.    Treatment plan reviewed with patient, bedside nurse, palliative care, anesthesia and primary care service.     APS will sign off at this time. Thank you for the consult. All opioids and other analgesics to be written at discretion of primary team. Please contact Acute Pain Service - via SecureChat from [phone] with additional questions or concerns. See SecureChat or [facility] [facility] for additional contacts and after hours information.    History of Present Illness   Isaac DUGAN  Williams is a 63 y.o. male who is s/p whipple on 9/12/2024 and IR coil embolization cystic artery branch on 9/13/2024.     Pain History  Current pain location(s):  Pain Score: 0  Pain Location/Orientation: Location: Abdomen (left side)  Pain Scale: Pain Assessment Tool: 0-10  24 hour history: Patient doing well, NAD. Pain controlled with the epidural, denied abdominal pain at this time. Denied HA, dizziness, N/V, itching, UE weakness and paresthesias.     Opioid requirement previous 24 hours: none  Meds/Allergies   all current active meds have been reviewed, current meds:   Current Facility-Administered Medications:     acetaminophen (TYLENOL) tablet 975 mg, Q6H BETITO    gabapentin (NEURONTIN) capsule 300 mg, TID    heparin (porcine) subcutaneous injection 5,000 Units, Q8H BETITO    HYDROmorphone (DILAUDID) injection 0.5 mg, Q2H PRN    labetalol (NORMODYNE) injection 10 mg, Q6H PRN    methocarbamol (ROBAXIN) injection 750 mg, Q8H BETITO    ondansetron (ZOFRAN) injection 4 mg, Q6H PRN    oxyCODONE (ROXICODONE) IR tablet 5 mg, Q4H PRN **OR** oxyCODONE (ROXICODONE) immediate release tablet 10 mg, Q4H PRN    pantoprazole (PROTONIX) injection 40 mg, Q24H BETITO    phenol (CHLORASEPTIC) 1.4 % mucosal liquid 1 spray, Q2H PRN    ropivacaine 0.1% and fentaNYL 2 mcg/mL PCEA, Continuous, and PTA meds:   Prior to Admission Medications   Prescriptions Last Dose Informant Patient Reported? Taking?   aspirin 81 mg chewable tablet 9/9/2024 Self Yes Yes   Sig: Chew 81 mg daily   atorvastatin (LIPITOR) 20 mg tablet 9/11/2024 at 1900 Self No Yes   Sig: Take 1 tablet (20 mg total) by mouth daily   lisinopril (ZESTRIL) 20 mg tablet 9/9/2024 Self No Yes   Sig: Take 1 tablet (20 mg total) by mouth daily      Facility-Administered Medications: None     No Known Allergies    Objective      Temp:  [98.3 °F (36.8 °C)-98.9 °F (37.2 °C)] 98.3 °F (36.8 °C)  HR:  [64-78] 78  Resp:  [17-31] 23  BP: (134-168)/(65-79) 165/78  Physical Exam  Vitals and  nursing note reviewed.   Constitutional:       General: He is awake. He is not in acute distress.     Appearance: Normal appearance. He is not ill-appearing, toxic-appearing or diaphoretic.   HENT:      Head: Normocephalic and atraumatic.      Nose: Nose normal. No congestion or rhinorrhea.      Mouth/Throat:      Mouth: Mucous membranes are moist.   Eyes:      Extraocular Movements: Extraocular movements intact.   Cardiovascular:      Rate and Rhythm: Normal rate.   Pulmonary:      Effort: Pulmonary effort is normal. No respiratory distress.   Skin:     General: Skin is warm and dry.      Coloration: Skin is not pale.   Neurological:      Mental Status: He is alert and oriented to person, place, and time. Mental status is at baseline.   Psychiatric:         Attention and Perception: Attention normal.         Mood and Affect: Mood normal.         Speech: Speech normal.         Behavior: Behavior normal. Behavior is cooperative.      Epidural: Site clean/dry/intact, no surrounding erythema/edema/induration, infusion functioning appropriately      Lab Results: I have reviewed the following results:   Estimated Creatinine Clearance: 162 mL/min (A) (by C-G formula based on SCr of 0.42 mg/dL (L)).  Lab Results   Component Value Date    WBC 10.32 (H) 09/16/2024    HGB 7.6 (L) 09/16/2024    HCT 22.7 (L) 09/16/2024     09/16/2024         Component Value Date/Time    K 3.8 09/16/2024 2312     09/16/2024 2312    CO2 25 09/16/2024 2312    CO2 17 (L) 09/12/2024 1427    BUN 8 09/16/2024 2312    CREATININE 0.42 (L) 09/16/2024 2312         Component Value Date/Time    CALCIUM 7.5 (L) 09/16/2024 2312    ALKPHOS 69 09/16/2024 2312    AST 12 (L) 09/16/2024 2312    ALT 62 (H) 09/16/2024 2312    TP 4.6 (L) 09/16/2024 2312    ALB 3.0 (L) 09/16/2024 2312     Imaging Review: Reviewed radiology reports from this admission including: chest xray.  Other Studies: No additional pertinent studies reviewed.

## 2024-09-17 NOTE — PROGRESS NOTES
Progress Note - Oncology-Surgical   Name: Isaac Kramer 63 y.o. male I MRN: [id number]  Unit/Bed#: [facility] 201-01 I Date of Admission: 9/12/2024   Date of Service: 9/17/2024 I Hospital Day: 5    Assessment & Plan  Pancreatic mass  64 yo male now s/p 9/12 whipple and s/p 9/13 IR coil embolization cystic artery branch    AVSS on 3L of NC   cc and 1x unrecorded  SEGUN 450 cc serosang output  BM 0x   No am labs    Plan:  -Advance to clears/toast/crackers  -MIVF@84  -PRN pain meds  -PRN anti nausea meds  -Continue DVT prophylaxis  -Plan for epidural removal today  -Restart ASA  -PT/OT Level 3  -CM for dispo planning  -Most likely out of unit today        History of Present Illness   No acute events overnight. Patient denies having nausea, vomiting, fevers, chills, chest pain, shortness of breath. Tolerating PO intake. Voiding without difficulty. Having bowel movements and passing flatus.     Objective      Temp:  [98.3 °F (36.8 °C)-98.9 °F (37.2 °C)] 98.8 °F (37.1 °C)  HR:  [64-74] 64  Resp:  [13-31] 17  BP: (117-168)/(65-79) 142/71  O2 Device: None (Room air)          I/O         09/15 0701  09/16 0700 09/16 0701  09/17 0700    P.O. 0 750    I.V. (mL/kg) 2348.7 (33.2) 1463.3 (20.7)    NG/GT 0 0    IV Piggyback 353.3 200    Total Intake(mL/kg) 2702.1 (38.2) 2413.3 (34.1)    Urine (mL/kg/hr) 1560 (0.9) 725 (0.4)    Emesis/NG output 200     Drains 485 450    Stool  0    Total Output 2245 1175    Net +457.1 +1238.3          Unmeasured Urine Occurrence  1 x    Unmeasured Stool Occurrence  0 x          Lines/Drains/Airways       Active Status       Name Placement date Placement time Site Days    Epidural Catheter 09/12/24 09/12/24  1622  -- 4    Closed/Suction Drain Left Abdomen Bulb 19 Fr. 09/12/24  1510  Abdomen  4                  Physical Exam     Physical Exam:  General: No acute distress, alert and oriented  Neuro: alert, oriented x3  HENT: PERRL, EOMI  CV: Well perfused, regular rate and rhythm  Lungs: Normal work  of breathing, no increased respiratory effort  Abdomen: Soft, non-tender, non-distended. Incisions clean, dry, intact. SEGUN serosang output.  MSK/Extremities: No edema, clubbing or cyanosis  Skin: Warm, dry      Lab Results: I have reviewed the following results: CBC/BMP:   .     09/16/24  2312   WBC 10.32*   HGB 7.6*   HCT 22.7*      SODIUM 136   K 3.8      CO2 25   BUN 8   CREATININE 0.42*   GLUC 112      Imaging Review: No pertinent imaging studies reviewed.  Other Studies: No additional pertinent studies reviewed.    VTE Pharmacologic Prophylaxis: Heparin  VTE Mechanical Prophylaxis: sequential compression device

## 2024-09-17 NOTE — CONSULTS
Inpatient Consultation - Palliative and Supportive Care   Isaac Kramer 63 y.o. male [id number]    Patient Active Problem List   Diagnosis    Benign essential hypertension    ED (erectile dysfunction) of non-organic origin    Hypercholesterolemia    Peripheral vascular disease (HCC)    Peripheral neuropathy    Overweight with body mass index (BMI) of 26 to 26.9 in adult    Aortic valve stenosis    Coronary artery disease involving native coronary artery of native heart without angina pectoris    Microscopic hematuria    BPH (benign prostatic hyperplasia)    Prostate cancer (HCC)    Pancreatic mass    Elevated LFTs    Biliary sepsis    Acute obstructive cholangitis    Enteritis    GERD (gastroesophageal reflux disease)    MOOKIE (acute kidney injury) (HCC)     Active issues specifically addressed today include:   Pancreatic mass  Post Whipple  IR coil embolization  Post-op pain    Plan:  1. Symptom management -    -  oxycodone 5mg/10mg q4h PRN for pain once epidural d/c'd.     - Agree with scheduled tylenol   - neurontin 300mg TID   - regular bowel monitoring    2. Goals - Not discussed today.      I have reviewed the patient's controlled substance dispensing history in the Prescription Drug Monitoring Program in compliance with the [facility] regulations before prescribing any controlled substances.         We appreciate the invitation to be involved in this patient's care.  We will follow.  Please do not hesitate to reach our on call provider through our clinic answering service at [phone] should you have acute symptom control concerns.    Rajesh Vigil MD  Palliative and Supportive Care  Clinic/Answering Service: [phone]  You can find me on [facility] [employer] Secure Chat!       IDENTIFICATION:  Inpatient consult to Palliative Care  Consult performed by: Rajesh Vigil MD  Consult ordered by: Rajesh Kidd PA-C      Physician Requesting Consult: Marika Ac MD  Reason for Consult / Principal Problem:  symptom mgmt  Hx and PE limited by: n/a    NARRATIVE AND INTERVAL HISTORY:       Isaac Kramer is a 63 y.o. male who presents for scheduled whipple in the setting of a concerning pancreatic mass.  Patient in initially presented for flank pain, at the end of August.  Found to have obstructive cholangitis with a pancreatic head mass.  Admitted for Whipple now post-op day 5.  Required a coil embolization of cystic artery POD1, but has otherwise been recovering nicely.      On today's assessment, seen sitting OOB in chair.  States he is recovering nicely.  Denies nausea, vomiting, constipation. Endorses some loose BMs and flatus.  Has been ambulating.  Pain very well controlled.  Appetite has been poor but is recovering. Sleeping well.  Energy improving.  PCEA interrogated.  Has only used 2 bolus doses since last pump clear.      Past Medical History:   Diagnosis Date    Benign essential hypertension 05/08/2014    CAD (coronary artery disease)     s/p NAVA prox LAD and D1 7/28/2022    Cancer (HCC)     Prostate    Coronary artery disease involving native coronary artery of native heart without angina pectoris 08/09/2022    Enteritis 08/28/2024    GERD (gastroesophageal reflux disease)     Hyperlipidemia     Hypertension     Other chest pain     Palpitations     Prostate cancer (HCC) 04/18/2023    Sleep apnea      Past Surgical History:   Procedure Laterality Date    CARDIAC CATHETERIZATION Left 7/28/2022    Procedure: Cardiac catheterization-left heart catheterization;  Surgeon: Sai Henry MD;  Location: [facility] CARDIAC CATH [facility];  Service: Cardiology    CARDIAC CATHETERIZATION N/A 7/28/2022    Procedure: Cardiac pci;  Surgeon: Sai Henry MD;  Location: [facility] CARDIAC C[facility];  Service: Cardiology    HERNIA REPAIR      IR EMBOLIZATION (SPECIFY VESSEL OR SITE)  9/13/2024    FL LAPS SURG [id number] RPBIC RAD W/NRV SPARING ROBOT N/A 5/22/2023    Procedure: PROSTATECTOMY RADICAL & PELVIC LYMPH NODE DISSECTION  W/ ROBOT;   Surgeon: Otoniel Harmon MD;  Location: [facility];  Service: Urology    IA PROSTATE NEEDLE BIOPSY ANY APPROACH N/A 2023    Procedure: TRANSRECTAL MRI FUSION BIOPSY PROSTATE;  Surgeon: Milan Arellano MD;  Location:  End[facility];  Service: Urology    WHIPPLE PROCEDURE/PANCREATICO-DUODENECTOMY N/A 2024    Procedure: WHIPPLE PROCEDURE/PANCREATICO-DUODENECTOMY;  Surgeon: Marika Ac MD;  Location: [facility];  Service: Surgical Oncology     Social History     Socioeconomic History    Marital status: /Civil Union     Spouse name: Not on file    Number of children: Not on file    Years of education: Not on file    Highest education level: Not on file   Occupational History    Not on file   Tobacco Use    Smoking status: Former     Current packs/day: 0.00     Types: Cigarettes     Quit date: 2022     Years since quittin.7    Smokeless tobacco: Never   Vaping Use    Vaping status: Never Used   Substance and Sexual Activity    Alcohol use: Not Currently    Drug use: Never    Sexual activity: Not on file   Other Topics Concern    Not on file   Social History Narrative    EMPLOYED         Social Determinants of Health     Financial Resource Strain: Not on file   Food Insecurity: No Food Insecurity (2024)    Hunger Vital Sign     Worried About Running Out of Food in the Last Year: Never true     Ran Out of Food in the Last Year: Never true   Transportation Needs: No Transportation Needs (2024)    PRAPARE - Transportation     Lack of Transportation (Medical): No     Lack of Transportation (Non-Medical): No   Physical Activity: Not on file   Stress: Not on file   Social Connections: Unknown (2024)    Received from [facility]    Social Connections     How often do you feel lonely or isolated from those around you? (Adult - for ages 18 years and over): Not on file   Intimate Partner Violence: Not on file   Housing Stability: Low Risk  (2024)    Housing Stability Vital  Sign     Unable to Pay for Housing in the Last Year: No     Number of Times Moved in the Last Year: 0     Homeless in the Last Year: No     Family History   Problem Relation Age of Onset    No Known Problems Mother     No Known Problems Father        MEDICATIONS / ALLERGIES:    all current active meds have been reviewed and current meds:   Current Facility-Administered Medications:     acetaminophen (TYLENOL) tablet 975 mg, Q6H BETITO    gabapentin (NEURONTIN) capsule 300 mg, TID    heparin (porcine) subcutaneous injection 5,000 Units, Q8H BETITO    HYDROmorphone (DILAUDID) injection 0.5 mg, Q2H PRN    labetalol (NORMODYNE) injection 10 mg, Q6H PRN    methocarbamol (ROBAXIN) injection 750 mg, Q8H BETITO    ondansetron (ZOFRAN) injection 4 mg, Q6H PRN    oxyCODONE (ROXICODONE) IR tablet 5 mg, Q4H PRN **OR** oxyCODONE (ROXICODONE) immediate release tablet 10 mg, Q4H PRN    pantoprazole (PROTONIX) injection 40 mg, Q24H BETITO    phenol (CHLORASEPTIC) 1.4 % mucosal liquid 1 spray, Q2H PRN    ropivacaine 0.1% and fentaNYL 2 mcg/mL PCEA, Continuous    No Known Allergies    OBJECTIVE:    Physical Exam  Physical Exam  Vitals and nursing note reviewed.   Constitutional:       General: He is not in acute distress.     Appearance: He is ill-appearing. He is not toxic-appearing or diaphoretic.   HENT:      Head: Normocephalic and atraumatic.      Right Ear: External ear normal.      Left Ear: External ear normal.      Nose: Nose normal.      Mouth/Throat:      Mouth: Mucous membranes are moist.   Eyes:      General:         Right eye: No discharge.         Left eye: No discharge.   Cardiovascular:      Rate and Rhythm: Normal rate.   Pulmonary:      Effort: No respiratory distress.   Abdominal:      General: There is no distension.      Palpations: Abdomen is soft.   Musculoskeletal:         General: No swelling or deformity.   Skin:     General: Skin is warm and dry.   Neurological:      General: No focal deficit present.      Mental  Status: He is alert and oriented to person, place, and time.   Psychiatric:         Mood and Affect: Mood normal.         Behavior: Behavior normal.         Lab Results: I have personally reviewed pertinent labs., CBC:   Lab Results   Component Value Date    WBC 10.32 (H) 09/16/2024    HGB 7.6 (L) 09/16/2024    HCT 22.7 (L) 09/16/2024    MCV 90 09/16/2024     09/16/2024    RBC 2.51 (L) 09/16/2024    MCH 30.3 09/16/2024    MCHC 33.5 09/16/2024    RDW 14.0 09/16/2024    MPV 9.6 09/16/2024    NRBC 0 09/16/2024   , CMP:   Lab Results   Component Value Date    SODIUM 136 09/16/2024    K 3.8 09/16/2024     09/16/2024    CO2 25 09/16/2024    BUN 8 09/16/2024    CREATININE 0.42 (L) 09/16/2024    CALCIUM 7.5 (L) 09/16/2024    AST 12 (L) 09/16/2024    ALT 62 (H) 09/16/2024    ALKPHOS 69 09/16/2024    EGFR 122 09/16/2024   , BMP:  Lab Results   Component Value Date    SODIUM 136 09/16/2024    K 3.8 09/16/2024     09/16/2024    CO2 25 09/16/2024    BUN 8 09/16/2024    CREATININE 0.42 (L) 09/16/2024    GLUC 112 09/16/2024    CALCIUM 7.5 (L) 09/16/2024    AGAP 5 09/16/2024    EGFR 122 09/16/2024     Imaging Studies: I personalyl reviewed relevant reports  EKG, Pathology, and Other Studies: I personally reviewed relevant reports    I have spent a total time of 45 minutes in caring for this patient on the day of the visit/encounter including Instructions for management, Importance of tx compliance, Risk factor reductions, Counseling / Coordination of care, Documenting in the medical record, Reviewing / ordering tests, medicine, procedures  , Obtaining or reviewing history  , Communicating with other healthcare professionals , and ordering opioids, interrogating epidural PCA

## 2024-09-17 NOTE — ASSESSMENT & PLAN NOTE
64 yo male now s/p 9/12 whipple and s/p 9/13 IR coil embolization cystic artery branch    AVSS on 3L of NC   cc and 1x unrecorded  SEGUN 450 cc serosang output  BM 0x   No am labs    Plan:  -Advance to clears/toast/crackers  -MIVF@84  -PRN pain meds  -PRN anti nausea meds  -Continue DVT prophylaxis  -Plan for epidural removal today  -Restart ASA  -PT/OT Level 3  -CM for dispo planning  -Most likely out of unit today

## 2024-09-17 NOTE — QUICK NOTE
Acute pain service update:  Plan is for epidural removal today.  Consult placed for palliative care given Whipple procedure and ongoing pain management.    PT/INR on 9/13/2024 was 20/1.68  PT/INR on 9/14/2024 was 17.3/1.39    Repeat INR ordered this morning.  Continue to hold DVT prophylaxis.  Acute pain service will reevaluate patient once INR has been resulted.    DULCE Mir  Acute Pain Service

## 2024-09-17 NOTE — CASE MANAGEMENT
Case Management Discharge Planning Note    Patient name Isaac Kramer  Location [facility] 931/[facility] 931-01 MRN [id number]  : 1960 Date 2024       Current Admission Date: 2024  Current Admission Diagnosis:Pancreatic mass   Patient Active Problem List    Diagnosis Date Noted Date Diagnosed    MOOKIE (acute kidney injury) (HCC) 2024     GERD (gastroesophageal reflux disease)      Pancreatic mass 2024     Elevated LFTs 2024     Biliary sepsis 2024     Acute obstructive cholangitis 2024     Enteritis 2024     Prostate cancer (HCC) 2023     Microscopic hematuria 10/10/2022     BPH (benign prostatic hyperplasia) 10/10/2022     Coronary artery disease involving native coronary artery of native heart without angina pectoris 2022     Overweight with body mass index (BMI) of 26 to 26.9 in adult 2020     Aortic valve stenosis 2020     ED (erectile dysfunction) of non-organic origin 2015     Peripheral neuropathy 2014     Peripheral vascular disease (HCC) 2014     Benign essential hypertension 2014     Hypercholesterolemia 2013       LOS (days): 5  Geometric Mean LOS (GMLOS) (days): 9  Days to GMLOS:4.1     OBJECTIVE:  Risk of Unplanned Readmission Score: 15.85         Current admission status: Inpatient   Preferred Pharmacy:   [id number] [place] - [address]  [address]  [place] [address]  Phone: [phone] Fax: [phone]    Primary Care Provider: Pablo Perez DO    Primary Insurance: MEDICARE  Secondary Insurance:     DISCHARGE DETAILS:    Discharge planning discussed with:: pt at bedside  [place] of Choice: Yes  Comments - [place] of Choice: CM discussed recommendation for HHC in the home. Pt is agreeable. [place] referrals submitted for PT/OT in the home. CM will continue to follow as needed.  CM contacted family/caregiver?: No- see comments  Were  Treatment Team discharge recommendations reviewed with patient/caregiver?: Yes  Did patient/caregiver verbalize understanding of patient care needs?: Yes  Were patient/caregiver advised of the risks associated with not following Treatment Team discharge recommendations?: Yes    Contacts  Patient Contacts: Jacque Kramer  Relationship to Patient:: Family  Contact Method: Phone  Reason/Outcome: Continuity of Care, Emergency Contact, Discharge Planning    Requested Home Health Care         Is the patient interested in HHC at discharge?: Yes  Home Health Discipline requested:: Occupational Therapy, Physical Therapy  Home Health Follow-Up Provider:: PCP  Home Health Services Needed:: Gait/ADL Training, Evaluate Functional Status and Safety, Strengthening/Theraputic Exercises to Improve Function  Homebound Criteria Met:: Requires the Assistance of Another Person for Safe Ambulation or to Leave the Home  Supporting Clincal Findings:: Fatigues Easliy in Short Distances, Limited Endurance    DME Referral Provided  Referral made for DME?: No         Would you like to participate in our [facility] service program?  : No - Declined    Treatment Team Recommendation: Home with Home Health Care  Discharge Destination Plan:: Home with Home Health Care  Transport at Discharge : Family                                      Additional Comments: CM introduced herself and role to pt at bedside. CM discussed recommendation for PT/OT in the home. Pt is agreeable. [place] referrals submitted for HHC via aidin. CM will continue to follow as needed.

## 2024-09-18 ENCOUNTER — HOME HEALTH ADMISSION (OUTPATIENT)
Dept: HOME HEALTH SERVICES | Facility: HOME HEALTHCARE | Age: 64
End: 2024-09-18
Payer: MEDICARE

## 2024-09-18 LAB
ANION GAP SERPL CALCULATED.3IONS-SCNC: 6 MMOL/L (ref 4–13)
BASOPHILS # BLD AUTO: 0.04 THOUSANDS/ΜL (ref 0–0.1)
BASOPHILS NFR BLD AUTO: 0 % (ref 0–1)
BUN SERPL-MCNC: 7 MG/DL (ref 5–25)
CALCIUM SERPL-MCNC: 7.8 MG/DL (ref 8.4–10.2)
CHLORIDE SERPL-SCNC: 104 MMOL/L (ref 96–108)
CO2 SERPL-SCNC: 29 MMOL/L (ref 21–32)
CREAT SERPL-MCNC: 0.49 MG/DL (ref 0.6–1.3)
EOSINOPHIL # BLD AUTO: 0.65 THOUSAND/ΜL (ref 0–0.61)
EOSINOPHIL NFR BLD AUTO: 7 % (ref 0–6)
ERYTHROCYTE [DISTWIDTH] IN BLOOD BY AUTOMATED COUNT: 14.8 % (ref 11.6–15.1)
GFR SERPL CREATININE-BSD FRML MDRD: 114 ML/MIN/1.73SQ M
GLUCOSE SERPL-MCNC: 91 MG/DL (ref 65–140)
HCT VFR BLD AUTO: 24.7 % (ref 36.5–49.3)
HGB BLD-MCNC: 8.1 G/DL (ref 12–17)
IMM GRANULOCYTES # BLD AUTO: 0.13 THOUSAND/UL (ref 0–0.2)
IMM GRANULOCYTES NFR BLD AUTO: 1 % (ref 0–2)
LYMPHOCYTES # BLD AUTO: 1.46 THOUSANDS/ΜL (ref 0.6–4.47)
LYMPHOCYTES NFR BLD AUTO: 15 % (ref 14–44)
MCH RBC QN AUTO: 29.1 PG (ref 26.8–34.3)
MCHC RBC AUTO-ENTMCNC: 32.8 G/DL (ref 31.4–37.4)
MCV RBC AUTO: 89 FL (ref 82–98)
MONOCYTES # BLD AUTO: 0.85 THOUSAND/ΜL (ref 0.17–1.22)
MONOCYTES NFR BLD AUTO: 9 % (ref 4–12)
NEUTROPHILS # BLD AUTO: 6.88 THOUSANDS/ΜL (ref 1.85–7.62)
NEUTS SEG NFR BLD AUTO: 68 % (ref 43–75)
NRBC BLD AUTO-RTO: 0 /100 WBCS
PLATELET # BLD AUTO: 284 THOUSANDS/UL (ref 149–390)
PMV BLD AUTO: 9.9 FL (ref 8.9–12.7)
POTASSIUM SERPL-SCNC: 3.7 MMOL/L (ref 3.5–5.3)
RBC # BLD AUTO: 2.78 MILLION/UL (ref 3.88–5.62)
SODIUM SERPL-SCNC: 139 MMOL/L (ref 135–147)
WBC # BLD AUTO: 10.01 THOUSAND/UL (ref 4.31–10.16)

## 2024-09-18 PROCEDURE — 97530 THERAPEUTIC ACTIVITIES: CPT

## 2024-09-18 PROCEDURE — 80048 BASIC METABOLIC PNL TOTAL CA: CPT | Performed by: PHYSICIAN ASSISTANT

## 2024-09-18 PROCEDURE — 97535 SELF CARE MNGMENT TRAINING: CPT

## 2024-09-18 PROCEDURE — 99232 SBSQ HOSP IP/OBS MODERATE 35: CPT | Performed by: INTERNAL MEDICINE

## 2024-09-18 PROCEDURE — 97116 GAIT TRAINING THERAPY: CPT

## 2024-09-18 PROCEDURE — 85025 COMPLETE CBC W/AUTO DIFF WBC: CPT | Performed by: PHYSICIAN ASSISTANT

## 2024-09-18 PROCEDURE — 99024 POSTOP FOLLOW-UP VISIT: CPT | Performed by: STUDENT IN AN ORGANIZED HEALTH CARE EDUCATION/TRAINING PROGRAM

## 2024-09-18 RX ORDER — FUROSEMIDE 10 MG/ML
20 INJECTION INTRAMUSCULAR; INTRAVENOUS ONCE
Status: COMPLETED | OUTPATIENT
Start: 2024-09-18 | End: 2024-09-18

## 2024-09-18 RX ORDER — DOCUSATE SODIUM 100 MG/1
100 CAPSULE, LIQUID FILLED ORAL DAILY
Status: DISCONTINUED | OUTPATIENT
Start: 2024-09-19 | End: 2024-09-18

## 2024-09-18 RX ORDER — POLYETHYLENE GLYCOL 3350 17 G/17G
17 POWDER, FOR SOLUTION ORAL DAILY
Status: DISCONTINUED | OUTPATIENT
Start: 2024-09-18 | End: 2024-09-19 | Stop reason: HOSPADM

## 2024-09-18 RX ORDER — DOCUSATE SODIUM 100 MG/1
100 CAPSULE, LIQUID FILLED ORAL DAILY
Status: DISCONTINUED | OUTPATIENT
Start: 2024-09-18 | End: 2024-09-19 | Stop reason: HOSPADM

## 2024-09-18 RX ORDER — DOCUSATE SODIUM 100 MG/1
100 CAPSULE, LIQUID FILLED ORAL 2 TIMES DAILY
Status: DISCONTINUED | OUTPATIENT
Start: 2024-09-18 | End: 2024-09-18

## 2024-09-18 RX ORDER — POTASSIUM CHLORIDE 1500 MG/1
40 TABLET, EXTENDED RELEASE ORAL ONCE
Status: COMPLETED | OUTPATIENT
Start: 2024-09-18 | End: 2024-09-18

## 2024-09-18 RX ADMIN — GABAPENTIN 300 MG: 300 CAPSULE ORAL at 21:33

## 2024-09-18 RX ADMIN — HEPARIN SODIUM 5000 UNITS: 5000 INJECTION INTRAVENOUS; SUBCUTANEOUS at 05:14

## 2024-09-18 RX ADMIN — ACETAMINOPHEN 975 MG: 325 TABLET ORAL at 21:33

## 2024-09-18 RX ADMIN — METHOCARBAMOL 500 MG: 500 TABLET ORAL at 05:14

## 2024-09-18 RX ADMIN — POLYETHYLENE GLYCOL 3350 17 G: 17 POWDER, FOR SOLUTION ORAL at 13:05

## 2024-09-18 RX ADMIN — ACETAMINOPHEN 975 MG: 325 TABLET ORAL at 13:57

## 2024-09-18 RX ADMIN — PANTOPRAZOLE SODIUM 40 MG: 40 INJECTION, POWDER, FOR SOLUTION INTRAVENOUS at 07:49

## 2024-09-18 RX ADMIN — OXYCODONE HYDROCHLORIDE 10 MG: 10 TABLET ORAL at 21:36

## 2024-09-18 RX ADMIN — GABAPENTIN 300 MG: 300 CAPSULE ORAL at 07:49

## 2024-09-18 RX ADMIN — FUROSEMIDE 20 MG: 10 INJECTION, SOLUTION INTRAMUSCULAR; INTRAVENOUS at 13:03

## 2024-09-18 RX ADMIN — POTASSIUM CHLORIDE 40 MEQ: 1500 TABLET, EXTENDED RELEASE ORAL at 11:43

## 2024-09-18 RX ADMIN — CELECOXIB 200 MG: 200 CAPSULE ORAL at 17:34

## 2024-09-18 RX ADMIN — METHOCARBAMOL 500 MG: 500 TABLET ORAL at 13:57

## 2024-09-18 RX ADMIN — ONDANSETRON 4 MG: 2 INJECTION INTRAMUSCULAR; INTRAVENOUS at 18:18

## 2024-09-18 RX ADMIN — HYDROMORPHONE HYDROCHLORIDE 0.5 MG: 1 INJECTION, SOLUTION INTRAMUSCULAR; INTRAVENOUS; SUBCUTANEOUS at 18:23

## 2024-09-18 RX ADMIN — GABAPENTIN 300 MG: 300 CAPSULE ORAL at 15:48

## 2024-09-18 RX ADMIN — METHOCARBAMOL 500 MG: 500 TABLET ORAL at 21:33

## 2024-09-18 RX ADMIN — HEPARIN SODIUM 5000 UNITS: 5000 INJECTION INTRAVENOUS; SUBCUTANEOUS at 13:57

## 2024-09-18 RX ADMIN — OXYCODONE HYDROCHLORIDE 10 MG: 10 TABLET ORAL at 15:48

## 2024-09-18 RX ADMIN — HEPARIN SODIUM 5000 UNITS: 5000 INJECTION INTRAVENOUS; SUBCUTANEOUS at 21:33

## 2024-09-18 RX ADMIN — ACETAMINOPHEN 975 MG: 325 TABLET ORAL at 05:14

## 2024-09-18 RX ADMIN — DOCUSATE SODIUM 100 MG: 100 CAPSULE, LIQUID FILLED ORAL at 22:15

## 2024-09-18 NOTE — PROGRESS NOTES
Inpatient Progress note - Palliative and Supportive Care   Isaac Kramer 63 y.o. male [id number]    Patient Active Problem List   Diagnosis    Benign essential hypertension    ED (erectile dysfunction) of non-organic origin    Hypercholesterolemia    Peripheral vascular disease (HCC)    Peripheral neuropathy    Overweight with body mass index (BMI) of 26 to 26.9 in adult    Aortic valve stenosis    Coronary artery disease involving native coronary artery of native heart without angina pectoris    Microscopic hematuria    BPH (benign prostatic hyperplasia)    Prostate cancer (HCC)    Pancreatic mass    Elevated LFTs    Biliary sepsis    Acute obstructive cholangitis    Enteritis    GERD (gastroesophageal reflux disease)    MOOKIE (acute kidney injury) (HCC)     Active issues specifically addressed today include:   Pancreatic mass  Post-whipple  Post-op pain    Plan:  1. Symptom management -    - Continue oxycodone 5mg/10mg PRN   - Remainder of meds as ordered.    - Will be available to write Rx on discharge.         NARRATIVE AND INTERVAL HISTORY:       Patient seen sitting up in bed with his son at bedside. Has used 1x dose of oxycodone 5mg.  Feels pain is well controlled.  Appetite is returning.  Did acceptably well with breakfast and is hoping to advance his diet for lunch.  No nausea/vomiting.  Bowel movements have slowed.  Still ambulating.     MEDICATIONS / ALLERGIES:     all current active meds have been reviewed and current meds:   Current Facility-Administered Medications:     acetaminophen (TYLENOL) tablet 975 mg, Q8H BETITO    celecoxib (CeleBREX) capsule 200 mg, Daily    gabapentin (NEURONTIN) capsule 300 mg, TID    heparin (porcine) subcutaneous injection 5,000 Units, Q8H BETITO    HYDROmorphone (DILAUDID) injection 0.5 mg, Q2H PRN    labetalol (NORMODYNE) injection 10 mg, Q6H PRN    methocarbamol (ROBAXIN) tablet 500 mg, Q8H BETITO    naloxone (NARCAN) 0.04 mg/mL syringe 0.04 mg, Q1MIN PRN    ondansetron  (ZOFRAN) injection 4 mg, Q6H PRN    oxyCODONE (ROXICODONE) IR tablet 5 mg, Q4H PRN **OR** oxyCODONE (ROXICODONE) immediate release tablet 10 mg, Q4H PRN    pantoprazole (PROTONIX) injection 40 mg, Q24H BETITO    phenol (CHLORASEPTIC) 1.4 % mucosal liquid 1 spray, Q2H PRN    No Known Allergies    OBJECTIVE:    Physical Exam  Physical Exam  Vitals and nursing note reviewed.   Constitutional:       General: He is not in acute distress.     Appearance: He is not ill-appearing or toxic-appearing.   HENT:      Head: Normocephalic and atraumatic.      Right Ear: External ear normal.      Left Ear: External ear normal.      Nose: Nose normal.      Mouth/Throat:      Mouth: Mucous membranes are moist.   Eyes:      General:         Right eye: No discharge.         Left eye: No discharge.   Cardiovascular:      Rate and Rhythm: Normal rate.   Pulmonary:      Effort: No respiratory distress.   Abdominal:      General: There is no distension.      Comments: Surgical incision C/d/I   Musculoskeletal:         General: No swelling.   Skin:     General: Skin is warm and dry.      Coloration: Skin is not jaundiced or pale.   Neurological:      General: No focal deficit present.      Mental Status: He is alert and oriented to person, place, and time. Mental status is at baseline.   Psychiatric:         Mood and Affect: Mood normal.         Behavior: Behavior normal.         Lab Results: I have personally reviewed pertinent labs., CBC:   Lab Results   Component Value Date    WBC 10.01 09/18/2024    HGB 8.1 (L) 09/18/2024    HCT 24.7 (L) 09/18/2024    MCV 89 09/18/2024     09/18/2024    RBC 2.78 (L) 09/18/2024    MCH 29.1 09/18/2024    MCHC 32.8 09/18/2024    RDW 14.8 09/18/2024    MPV 9.9 09/18/2024    NRBC 0 09/18/2024   , CMP:   Lab Results   Component Value Date    SODIUM 139 09/18/2024    K 3.7 09/18/2024     09/18/2024    CO2 29 09/18/2024    BUN 7 09/18/2024    CREATININE 0.49 (L) 09/18/2024    CALCIUM 7.8 (L)  09/18/2024    EGFR 114 09/18/2024   , BMP:  Lab Results   Component Value Date    SODIUM 139 09/18/2024    K 3.7 09/18/2024     09/18/2024    CO2 29 09/18/2024    BUN 7 09/18/2024    CREATININE 0.49 (L) 09/18/2024    GLUC 91 09/18/2024    CALCIUM 7.8 (L) 09/18/2024    AGAP 6 09/18/2024    EGFR 114 09/18/2024         I have spent a total time of 30 minutes in caring for this patient on the day of the visit/encounter including Risks and benefits of tx options, Instructions for management, Patient and family education, Counseling / Coordination of care, Documenting in the medical record, Reviewing / ordering tests, medicine, procedures  , and Obtaining or reviewing history  .

## 2024-09-18 NOTE — PHYSICAL THERAPY NOTE
"                                                                                  PHYSICAL THERAPY NOTE          Patient Name: Isaac Kramer  Today's Date: 9/18/2024 09/18/24 1022   PT Last Visit   PT Visit Date 09/18/24   Note Type   Note Type Treatment   Pain Assessment   Pain Assessment Tool 0-10   Pain Score 5   Pain Location/Orientation Orientation: Left;Location: Abdomen   Pain Onset/Description Onset: Ongoing;Descriptor: Sore   Effect of Pain on Daily Activities limits functional mobility   Patient's Stated Pain Goal No pain   Hospital Pain Intervention(s) Rest;Emotional support   Multiple Pain Sites No   Restrictions/Precautions   Weight Bearing Precautions Per Order No   Other Precautions Fall Risk;Pain;Contact/isolation   General   Chart Reviewed Yes   Response to Previous Treatment Patient with no complaints from previous session.   Family/Caregiver Present No   Cognition   Overall Cognitive Status WFL   Arousal/Participation Alert;Responsive;Cooperative   Attention Within functional limits   Orientation Level Oriented X4   Memory Within functional limits   Following Commands Follows one step commands without difficulty   Subjective   Subjective \"I can only walk with the walker now, it feels more supportive\"   Bed Mobility   Supine to Sit Unable to assess   Sit to Supine Unable to assess   Additional Comments found seated at edge of bed, left OOB in bedside chair   Transfers   Sit to Stand 5  Supervision   Additional items Increased time required;Verbal cues   Stand to Sit 5  Supervision   Additional items Increased time required;Verbal cues   Additional Comments RW, 3x STS at edge of bed   Ambulation/Elevation   Gait pattern Decreased foot clearance;Inconsistent mariela;Short stride;Excessively slow;Step to   Gait Assistance 5  Supervision   Additional items Verbal cues   Assistive Device Rolling walker   Distance 100' + 200' + 100'   Stair Management Assistance 5  Supervision   Additional " items Verbal cues;Increased time required   Stair Management Technique One rail R;Step to pattern;Nonreciprocal;Foreward;Backward   Number of Stairs 1   Ambulation/Elevation Additional Comments Patient demonstrated the ability to ambulate 100' + 200' +100' with RW, requiring intermittent verbal cues for RW management. Patient required an standing rest break for ~3 minutes prior to further ambulation due to limited endurance and LE strength. Patient demonstrated the ability to negotiate 1 step to mimic home set up, noting adequate LE strength and control to perform safely with supervision at this time.   Balance   Static Sitting Good   Dynamic Sitting Fair +   Static Standing Fair   Dynamic Standing Fair -   Ambulatory Fair -   Endurance Deficit   Endurance Deficit Yes   Endurance Deficit Description generalized LE weaknes, decreased activity tolerance   Activity Tolerance   Activity Tolerance Patient limited by fatigue;Patient limited by pain;Patient tolerated treatment well   Nurse Made Aware RN Cleared   Assessment   Prognosis Good   Problem List Decreased strength;Decreased endurance;Impaired balance;Decreased mobility;Decreased coordination;Pain   Assessment PT initiated treatment session in order to assist patient in achieving goals to improve transfers, gait training, and overall activity tolerance. Patient demonstrated progress toward achieving functional mobility goals as evidenced by improving activity tolerance, ambulation, and overall mobility. Patient pleasant, cooperative, and agreeable to today's treatment session. Throughout treatment session patient required both verbal and tactile cuing to improve safety, efficiency, and mechanics of mobility in addition to hands on assistance for all aspects of functional mobility. Additionally, he required increased time to execute specific mobility tasks with rest breaks in between secondary to gross fatigue and weakness. Patient received seated on edge of bed.  Patient is currently supervision bed mobility, transfers, and ambulation. Patient demonstrated the ability to ambulate 100' + 200' +100' with RW, requiring intermittent verbal cues for RW management. Patient required a standing rest break for ~3 minutes prior to further ambulation due to limited endurance and LE strength. Patient demonstrated the ability to negotiate 1 step to mimic home set up, noting adequate LE strength and control to perform safely with supervision at this time. Patient left OOB in bedside chair with all needs met. Patient will benefit from continued skilled physical therapy to address gait / balance dysfunction, decreased activity tolerance, and generalized weakness. The patients AM-PAC Basic Mobility Inpatient Short From Raw Score is 18 . Based on AM-PAC scoring and patient presentation, PT currently recommending Level III (Minimum Resource Intensity). Please also refer to the recommendation of the Physical Therapist for safe discharge planning.   Barriers to Discharge None   Goals   Patient Goals to decrease pain   STG Expiration Date 09/23/24   PT Treatment Day 1   Plan   Treatment/Interventions ADL retraining;Functional transfer training;LE strengthening/ROM;Elevations;Therapeutic exercise;Endurance training;Patient/family training;Bed mobility;Gait training;Spoke to nursing   Progress Progressing toward goals   PT Frequency 2-3x/wk   Discharge Recommendation   Rehab Resource Intensity Level, PT III (Minimum Resource Intensity)   Equipment Recommended Walker   Walker Package Recommended Wheeled walker   AM-PAC Basic Mobility Inpatient   Turning in Flat Bed Without Bedrails 3   Lying on Back to Sitting on Edge of Flat Bed Without Bedrails 3   Moving Bed to Chair 3   Standing Up From Chair Using Arms 3   Walk in Room 3   Climb 3-5 Stairs With Railing 3   Basic Mobility Inpatient Raw Score 18   Basic Mobility Standardized Score 41.05   [facility] Level Of Mobility   [facility] Goal 6:  Walk 10 steps or more   -HLM Achieved 8: Walk 250 feet ot more   Education   Education Provided Mobility training;Assistive device   Patient Demonstrates acceptance/verbal understanding   End of Consult   Patient Position at End of Consult Bedside chair;Bed/Chair alarm activated;All needs within reach     Rebecca Lomax PT, DPT

## 2024-09-18 NOTE — PROGRESS NOTES
Progress Note - Oncology-Surgical   Name: Isaac Kramer 63 y.o. male I MRN: [id number]  Unit/Bed#: [facility] 931-01 I Date of Admission: 9/12/2024   Date of Service: 9/18/2024 I Hospital Day: 6    Assessment & Plan  Pancreatic mass  64 yo male now s/p 9/12 whipple and s/p 9/13 IR coil embolization cystic artery branch    AVSS on room air  UOP 1550cc  SEGUN 435 cc dark serosang output  No BM     Plan:  -Advance diet as tolerated  -PRN pain and nausea control  -multimodal pain control  -Continue DVT prophylaxis  -Restart ASA  -PT/OT Level 3  -CM for dispo planning    Please contact the SecureChat role for the Oncology-Surgical service with any questions/concerns.    Subjective   NAEON. AVSS. Patient reports pain is controlled. Tolerating diet. Voiding spontaneously, passing flatus but not yet having bowel function. Denies fever, chills, nausea, vomiting.     Objective    Temp:  [98.1 °F (36.7 °C)-99 °F (37.2 °C)] 98.6 °F (37 °C)  HR:  [72-79] 73  Resp:  [18-24] 20  BP: (124-165)/(58-78) 143/70  O2 Device: None (Room air)          I/O         09/16 0701  09/17 0700 09/17 0701  09/18 0700    P.O. 750 640    I.V. (mL/kg) 1463.3 (19.7) 1170.1 (15.7)    NG/GT 0 0    IV Piggyback 200     Total Intake(mL/kg) 2413.3 (32.4) 1810.1 (24.3)    Urine (mL/kg/hr) 950 (0.5) 1550 (0.9)    Drains 450 435    Stool 0     Total Output 1400 1985    Net +1013.3 -175          Unmeasured Urine Occurrence 1 x 1 x    Unmeasured Stool Occurrence 0 x           Lines/Drains/Airways       Active Status       Name Placement date Placement time Site Days    Closed/Suction Drain Left Abdomen Bulb 19 Fr. 09/12/24  1510  Abdomen  5                  Physical Exam   General: NAD  HENT: NCAT MMM  Neck: supple, no JVD  CV: nl rate  Lungs: nl wob. No resp distress  ABD: Soft, nontender, non distended. Incisions clean. Dry, intact. SEGUN with dark serosang output  Extrem: No CCE  Neuro: AAOx3    Lab Results: I have reviewed the following results: CBC/BMP: No new  results in last 24 hours.   Imaging Review: Reviewed radiology reports from this admission including: chest xray.  Other Studies: EKG was reviewed.     VTE Pharmacologic Prophylaxis: Heparin  VTE Mechanical Prophylaxis: sequential compression device    Agatha Bruno MD  General Surgery Resident

## 2024-09-18 NOTE — OCCUPATIONAL THERAPY NOTE
Occupational Therapy Progress Note     Patient Name: Isaac Kramer  Today's Date: 9/18/2024  Problem List  Principal Problem:    Pancreatic mass  Active Problems:    Benign essential hypertension    Coronary artery disease involving native coronary artery of native heart without angina pectoris       09/18/24 1113   OT Last Visit   OT Visit Date 09/18/24   Note Type   Note Type Treatment   Pain Assessment   Pain Assessment Tool 0-10   Pain Score 2   Pain Location/Orientation Orientation: Left;Location: Abdomen;Location: Incision   Effect of Pain on Daily Activities limits comfort and activity tolerance   Patient's Stated Pain Goal No pain   Hospital Pain Intervention(s) Repositioned;Ambulation/increased activity;Emotional support   Restrictions/Precautions   Weight Bearing Precautions Per Order No   Other Precautions Contact/isolation;Multiple lines;Fall Risk;Pain  (x1 SEGUN drain)   Lifestyle   Autonomy I adls and mobility- i iadls -shares homemaking with family   Reciprocal Relationships supportive family   Service to Others retired   Intrinsic Gratification mostly sedentary   ADL   Where Assessed Chair   Grooming Assistance 5  Supervision/Setup   Grooming Deficit Setup;Wash/dry hands;Wash/dry face;Teeth care;Brushing hair   Grooming Comments Pt completed grooming tasks standing at the sink w/o AD.   UB Dressing Assistance 5  Supervision/Setup   UB Dressing Deficit Setup;Thread RUE;Thread LUE;Pull over head   LB Dressing Assistance 4  Minimal Assistance   LB Dressing Deficit Setup;Verbal cueing;Increased time to complete;Don/doff R sock;Don/doff L sock   LB Dressing Comments Pt able to don R sock in figure four position; required A to don L sock. Will trial LHAE; reports spouse will be home to assist w/ LB dressing if needed.   Bed Mobility   Supine to Sit Unable to assess   Sit to Supine Unable to assess   Additional Comments Pt seated OOB in chair upon therapist's arrival and at end of OT tx session w/ all  needs within reach.   Transfers   Sit to Stand 5  Supervision   Additional items Increased time required;Verbal cues   Stand to Sit 5  Supervision   Additional items Increased time required;Verbal cues   Additional Comments completed w/o AD   Functional Mobility   Functional Mobility   (CCG)   Additional Comments Pt ambulated from chair >< bathroom w/ CCG and w/o AD.   Additional items   (no AD)   Cognition   Overall Cognitive Status WFL   Arousal/Participation Alert;Responsive;Cooperative   Attention Within functional limits   Orientation Level Oriented X4   Memory Within functional limits   Following Commands Follows one step commands without difficulty   Comments Pt was pleasant, cooperative, and willing to participate in OT tx session.   Activity Tolerance   Activity Tolerance Patient tolerated treatment well   Medical Staff Made Aware RN clearance prior to session   Assessment   Assessment Pt seen for skilled OT treatment session from 1049 to 1113 w/ interventions focusing on ADL participation, activity tolerance, sitting balance, standing tolerance, standing balance, transfer skills, and fxnl mobility. Pt was agreeable and willing to participate in session. Pt engaged in the following tasks: S for grooming tasks standing at the sink, S for UB dressing, and min A for LB dressing. Pt also required S for transfer and CCG for fxnl mobility w/o AD. In comparison to previous session, pt demonstrated improvements in ADLs and mobility tasks as she required less physical assistance for UB dressing, transfers, and fxnl mobility today. Pt continues to be functioning below baseline level as occupational performance remains limited by decreased ADL status, decreased activity tolerance, decreased endurance, decreased standing tolerance, decreased standing balance, decreased transfer skills, decreased fxnl mobility, and pain. From OT standpoint, recommend Level III (Minimum Resource Intensity) at time of d/c. Pt will benefit  from continued OT treatment while in acute care to address deficits as defined above and maximize level of functional independence with ADLs and functional mobility. Pt seated OOB in chair w/ all needs met at end of session.   Plan   Treatment Interventions ADL retraining;Functional transfer training;Endurance training;Patient/family training;Equipment evaluation/education;Compensatory technique education;Continued evaluation;Energy conservation;Activityengagement   Goal Expiration Date 09/30/24   OT Treatment Day 1   OT Frequency 2-3x/wk   Discharge Recommendation   Rehab Resource Intensity Level, OT III (Minimum Resource Intensity)   AM-PAC Daily Activity Inpatient   Lower Body Dressing 3   Bathing 3   Toileting 3   Upper Body Dressing 3   Grooming 3   Eating 4   Daily Activity Raw Score 19   Daily Activity Standardized Score (Calc for Raw Score >=11) 40.22   AM-PAC Applied Cognition Inpatient   Following a Speech/Presentation 4   Understanding Ordinary Conversation 4   Taking Medications 4   Remembering Where Things Are Placed or Put Away 4   Remembering List of 4-5 Errands 4   Taking Care of Complicated Tasks 4   Applied Cognition Raw Score 24   Applied Cognition Standardized Score 62.21       The patient's raw score on the AM-PAC Daily Activity Inpatient Short Form is 19. A raw score of greater than or equal to 19 suggests the patient may benefit from discharge to home. Please refer to the recommendation of the Occupational Therapist for safe discharge planning.    JORDAN Calderón, OTR/L

## 2024-09-18 NOTE — PLAN OF CARE
Problem: PHYSICAL THERAPY ADULT  Goal: Performs mobility at highest level of function for planned discharge setting.  See evaluation for individualized goals.  Description: Treatment/Interventions: ADL retraining, Functional transfer training, LE strengthening/ROM, Therapeutic exercise, Endurance training, Gait training, Bed mobility, Spoke to nursing, OT  Equipment Recommended: Walker       See flowsheet documentation for full assessment, interventions and recommendations.  Outcome: Progressing  Note: Prognosis: Good  Problem List: Decreased strength, Decreased endurance, Impaired balance, Decreased mobility, Decreased coordination, Pain  Assessment: PT initiated treatment session in order to assist patient in achieving goals to improve transfers, gait training, and overall activity tolerance. Patient demonstrated progress toward achieving functional mobility goals as evidenced by improving activity tolerance, ambulation, and overall mobility. Patient pleasant, cooperative, and agreeable to today's treatment session. Patient received seated on edge of bed. Patient is currently supervision bed mobility, transfers, and ambulation. Patient demonstrated the ability to ambulate 100' + 200' +100' with RW, requiring intermittent verbal cues for RW management. Patient required an standing rest break for ~3 minutes prior to further ambulation due to limited endurance and LE strength. Patient demonstrated the ability to negotiate 1 step to mimic home set up, noting adequate LE strength and control to perform safely with supervision at this time. Patient left OOB in bedside chair with all needs met. Patient will benefit from continued skilled physical therapy to address gait / balance dysfunction, decreased activity tolerance, and generalized weakness. The patients AM-PAC Basic Mobility Inpatient Short From Raw Score is 18 .  Based on AM-PAC scoring and patient presentation, PT currently recommending Level III (Minimum  Resource Intensity). Please also refer to the recommendation of the Physical Therapist for safe discharge planning.  Barriers to Discharge: None     Rehab Resource Intensity Level, PT: III (Minimum Resource Intensity)    See flowsheet documentation for full assessment.

## 2024-09-18 NOTE — PROGRESS NOTES
Patient:    MRN:  [id number]    Josuéin Request ID:  [id number]    Level of care reserved:  Home Health Agency    Partner Reserved:  [facility], [facility], [place] [address] [phone]    Clinical needs requested:    Geography searched:  [phone]    Start of Service:    Request sent:  2:35pm EDT on 9/17/2024 by Yee Moran    Partner reserved:  3:32pm EDT on 9/18/2024 by Yee Morna    Choice list shared:  2:31pm EDT on 9/18/2024 by Yee Moran

## 2024-09-18 NOTE — ASSESSMENT & PLAN NOTE
64 yo male now s/p 9/12 whipple and s/p 9/13 IR coil embolization cystic artery branch    AVSS on room air  UOP 1550cc  SEGUN 435 cc dark serosang output  No BM     Plan:  -Advance diet as tolerated  -PRN pain and nausea control  -multimodal pain control  -Continue DVT prophylaxis  -Restart ASA  -PT/OT Level 3  -CM for dispo planning

## 2024-09-18 NOTE — PLAN OF CARE
Problem: OCCUPATIONAL THERAPY ADULT  Goal: Performs self-care activities at highest level of function for planned discharge setting.  See evaluation for individualized goals.  Description:            See flowsheet documentation for full assessment, interventions and recommendations.   9/18/2024 1417 by Fiorella Freitas OT  Outcome: Progressing  Note: Limitation: Decreased ADL status, Decreased endurance, Decreased self-care trans, Decreased high-level ADLs  Prognosis: Good  Assessment: Pt seen for skilled OT treatment session from 1049 to 1113 w/ interventions focusing on ADL participation, activity tolerance, sitting balance, standing tolerance, standing balance, transfer skills, and fxnl mobility. Pt was agreeable and willing to participate in session. Pt engaged in the following tasks: S for grooming tasks standing at the sink, S for UB dressing, and min A for LB dressing. Pt also required S for transfer and CCG for fxnl mobility w/o AD. In comparison to previous session, pt demonstrated improvements in ADLs and mobility tasks as she required less physical assistance for UB dressing, transfers, and fxnl mobility today. Pt continues to be functioning below baseline level as occupational performance remains limited by decreased ADL status, decreased activity tolerance, decreased endurance, decreased standing tolerance, decreased standing balance, decreased transfer skills, decreased fxnl mobility, and pain. From OT standpoint, recommend Level III (Minimum Resource Intensity) at time of d/c. Pt will benefit from continued OT treatment while in acute care to address deficits as defined above and maximize level of functional independence with ADLs and functional mobility. Pt seated OOB in chair w/ all needs met at end of session.     Rehab Resource Intensity Level, OT: III (Minimum Resource Intensity)       9/18/2024 1409 by Fiorella Freitas OT  Note: Limitation: Decreased ADL status, Decreased endurance,  Decreased self-care trans, Decreased high-level ADLs  Prognosis: Good  Assessment: Pt is a 63 y.o. male who was admitted to [facility] on 9/12/2024 with Pancreatic mass s/p whipple and coil embolization of cystic artery branch. Patient   has a past medical history of Benign essential hypertension, CAD (coronary artery disease), Cancer (HCC), Coronary artery disease involving native coronary artery of native heart without angina pectoris, Enteritis, GERD (gastroesophageal reflux disease), Hyperlipidemia, Hypertension, Other chest pain, Palpitations, Prostate cancer (HCC), and Sleep apnea.At baseline pt was completing adls and mobility independently - I iadls - shares homemaking with family. Pt lives with spouse /son in mobile home . Currently pt requires min assist for overall ADLS and min assist for functional mobility/transfers. Pt currently presents with impairments in the following categories -steps to enter environment, difficulty performing ADLS, difficulty performing IADLS , and environment activity tolerance, endurance, standing balance/tolerance, and sitting balance/tolerance. These impairments, as well as pt's fatigue, pain, risk for falls, and home environment  limit pt's ability to safely engage in all baseline areas of occupation, includingbathing, dressing, toileting, functional mobility/transfers, community mobility, laundry , driving, house maintenance, meal prep, cleaning, social participation , and leisure activities  From OT standpoint, recommend Level III resources upon D/C. OT will continue to follow to address the below stated goals.     Rehab Resource Intensity Level, OT: III (Minimum Resource Intensity)

## 2024-09-18 NOTE — CASE MANAGEMENT
Case Management Discharge Planning Note    Patient name Isaac Kramer  Location [place] [facility] [facility] MRN [id number]  : 1960 Date 2024       Current Admission Date: 2024  Current Admission Diagnosis:Pancreatic mass   Patient Active Problem List    Diagnosis Date Noted Date Diagnosed    MOOKIE (acute kidney injury) (HCC) 2024     GERD (gastroesophageal reflux disease)      Pancreatic mass 2024     Elevated LFTs 2024     Biliary sepsis 2024     Acute obstructive cholangitis 2024     Enteritis 2024     Prostate cancer (HCC) 2023     Microscopic hematuria 10/10/2022     BPH (benign prostatic hyperplasia) 10/10/2022     Coronary artery disease involving native coronary artery of native heart without angina pectoris 2022     Overweight with body mass index (BMI) of 26 to 26.9 in adult 2020     Aortic valve stenosis 2020     ED (erectile dysfunction) of non-organic origin 2015     Peripheral neuropathy 2014     Peripheral vascular disease (HCC) 2014     Benign essential hypertension 2014     Hypercholesterolemia 2013       LOS (days): 6  Geometric Mean LOS (GMLOS) (days): 9  Days to GMLOS:3.1     OBJECTIVE:  Risk of Unplanned Readmission Score: 11.99         Current admission status: Inpatient   Preferred Pharmacy:   [id number] - [place] - [address]  [address]  [place] [id number]  Phone: [phone] Fax: [phone]    Primary Care Provider: Pablo Perez DO    Primary Insurance: MEDICARE  Secondary Insurance:     DISCHARGE DETAILS:    Discharge planning discussed with:: pt at bedside  [place] of Choice: Yes  Comments - [place] of Choice: [facility] reserved for PT/OT per pt choice . CM will continue to follow as needed.  CM contacted family/caregiver?: No- see comments  Were Treatment Team discharge recommendations reviewed with  patient/caregiver?: Yes  Did patient/caregiver verbalize understanding of patient care needs?: Yes  Were patient/caregiver advised of the risks associated with not following Treatment Team discharge recommendations?: Yes    Contacts  Patient Contacts: Jacque Kramer  Relationship to Patient:: Family  Contact Method: Phone  Reason/Outcome: Continuity of Care, Emergency Contact, Discharge Planning    Requested Home Health Care         Home Health Discipline requested:: Occupational Therapy, Physical Therapy  Home Health Agency Name:: [facility]  HHA External Referral Reason (only applicable if external HHA name selected): Patient has established relationship with provider  Home Health Follow-Up Provider:: PCP  Home Health Services Needed:: Gait/ADL Training, Evaluate Functional Status and Safety, Strengthening/Theraputic Exercises to Improve Function  Homebound Criteria Met:: Requires the Assistance of Another Person for Safe Ambulation or to Leave the Home, Uses an Assist Device (i.e. cane, walker, etc)  Supporting Clincal Findings:: Fatigues Easliy in Short Distances, Limited Endurance    DME Referral Provided  Referral made for DME?: Yes  DME referral completed for the following items:: Walker  DME Supplier Name:: [employer]    Other Referral/Resources/Interventions Provided:  Interventions: HHC, DME  Referral Comments: [facility] reserved for PT/OT per pt choice.    Would you like to participate in our [facility] service program?  : No - Declined    Treatment Team Recommendation: Home with Home Health Care  Discharge Destination Plan:: Home with Home Health Care  Transport at Discharge : Family                                      Additional Comments: [facility] reserved for PT/OT per pt choice. Walker will be ordered and delievered to pt room. CM will continue to follow as needed.

## 2024-09-19 ENCOUNTER — TRANSITIONAL CARE MANAGEMENT (OUTPATIENT)
Dept: FAMILY MEDICINE CLINIC | Facility: CLINIC | Age: 64
End: 2024-09-19

## 2024-09-19 VITALS
DIASTOLIC BLOOD PRESSURE: 74 MMHG | RESPIRATION RATE: 14 BRPM | OXYGEN SATURATION: 92 % | TEMPERATURE: 98.9 F | WEIGHT: 149.47 LBS | BODY MASS INDEX: 25.52 KG/M2 | HEART RATE: 83 BPM | SYSTOLIC BLOOD PRESSURE: 132 MMHG | HEIGHT: 64 IN

## 2024-09-19 LAB
AMYLASE FLD QL: <10 U/L
AMYLASE SERPL-CCNC: 12 IU/L (ref 29–103)
ANION GAP SERPL CALCULATED.3IONS-SCNC: 7 MMOL/L (ref 4–13)
BASOPHILS # BLD AUTO: 0.04 THOUSANDS/ΜL (ref 0–0.1)
BASOPHILS NFR BLD AUTO: 0 % (ref 0–1)
BUN SERPL-MCNC: 9 MG/DL (ref 5–25)
CALCIUM SERPL-MCNC: 8 MG/DL (ref 8.4–10.2)
CHLORIDE SERPL-SCNC: 105 MMOL/L (ref 96–108)
CO2 SERPL-SCNC: 31 MMOL/L (ref 21–32)
CREAT SERPL-MCNC: 0.64 MG/DL (ref 0.6–1.3)
DME PARACHUTE DELIVERY DATE ACTUAL: NORMAL
DME PARACHUTE DELIVERY DATE REQUESTED: NORMAL
DME PARACHUTE ITEM DESCRIPTION: NORMAL
DME PARACHUTE ORDER STATUS: NORMAL
DME PARACHUTE SUPPLIER NAME: NORMAL
DME PARACHUTE SUPPLIER PHONE: NORMAL
EOSINOPHIL # BLD AUTO: 0.59 THOUSAND/ΜL (ref 0–0.61)
EOSINOPHIL NFR BLD AUTO: 6 % (ref 0–6)
ERYTHROCYTE [DISTWIDTH] IN BLOOD BY AUTOMATED COUNT: 15.2 % (ref 11.6–15.1)
GFR SERPL CREATININE-BSD FRML MDRD: 102 ML/MIN/1.73SQ M
GLUCOSE SERPL-MCNC: 103 MG/DL (ref 65–140)
HCT VFR BLD AUTO: 26.8 % (ref 36.5–49.3)
HGB BLD-MCNC: 8.4 G/DL (ref 12–17)
IMM GRANULOCYTES # BLD AUTO: 0.14 THOUSAND/UL (ref 0–0.2)
IMM GRANULOCYTES NFR BLD AUTO: 2 % (ref 0–2)
LYMPHOCYTES # BLD AUTO: 1.43 THOUSANDS/ΜL (ref 0.6–4.47)
LYMPHOCYTES NFR BLD AUTO: 15 % (ref 14–44)
MCH RBC QN AUTO: 29 PG (ref 26.8–34.3)
MCHC RBC AUTO-ENTMCNC: 31.3 G/DL (ref 31.4–37.4)
MCV RBC AUTO: 92 FL (ref 82–98)
MONOCYTES # BLD AUTO: 0.83 THOUSAND/ΜL (ref 0.17–1.22)
MONOCYTES NFR BLD AUTO: 9 % (ref 4–12)
NEUTROPHILS # BLD AUTO: 6.58 THOUSANDS/ΜL (ref 1.85–7.62)
NEUTS SEG NFR BLD AUTO: 68 % (ref 43–75)
NRBC BLD AUTO-RTO: 0 /100 WBCS
PLATELET # BLD AUTO: 328 THOUSANDS/UL (ref 149–390)
PMV BLD AUTO: 9.9 FL (ref 8.9–12.7)
POTASSIUM SERPL-SCNC: 4.3 MMOL/L (ref 3.5–5.3)
RBC # BLD AUTO: 2.9 MILLION/UL (ref 3.88–5.62)
SODIUM SERPL-SCNC: 143 MMOL/L (ref 135–147)
WBC # BLD AUTO: 9.61 THOUSAND/UL (ref 4.31–10.16)

## 2024-09-19 PROCEDURE — NC001 PR NO CHARGE: Performed by: STUDENT IN AN ORGANIZED HEALTH CARE EDUCATION/TRAINING PROGRAM

## 2024-09-19 PROCEDURE — 99024 POSTOP FOLLOW-UP VISIT: CPT | Performed by: STUDENT IN AN ORGANIZED HEALTH CARE EDUCATION/TRAINING PROGRAM

## 2024-09-19 PROCEDURE — 80048 BASIC METABOLIC PNL TOTAL CA: CPT

## 2024-09-19 PROCEDURE — 99232 SBSQ HOSP IP/OBS MODERATE 35: CPT | Performed by: INTERNAL MEDICINE

## 2024-09-19 PROCEDURE — 82150 ASSAY OF AMYLASE: CPT

## 2024-09-19 PROCEDURE — 85025 COMPLETE CBC W/AUTO DIFF WBC: CPT

## 2024-09-19 RX ORDER — DOCUSATE SODIUM 100 MG/1
100 CAPSULE, LIQUID FILLED ORAL DAILY
Qty: 7 CAPSULE | Refills: 0 | Status: SHIPPED | OUTPATIENT
Start: 2024-09-20 | End: 2024-09-27

## 2024-09-19 RX ORDER — ENOXAPARIN SODIUM 100 MG/ML
40 INJECTION SUBCUTANEOUS EVERY 24 HOURS
Status: DISCONTINUED | OUTPATIENT
Start: 2024-09-19 | End: 2024-09-19 | Stop reason: HOSPADM

## 2024-09-19 RX ORDER — POLYETHYLENE GLYCOL 3350 17 G/17G
17 POWDER, FOR SOLUTION ORAL DAILY
Start: 2024-09-20 | End: 2024-09-27

## 2024-09-19 RX ORDER — METHOCARBAMOL 500 MG/1
500 TABLET, FILM COATED ORAL EVERY 8 HOURS SCHEDULED
Qty: 21 TABLET | Refills: 0 | Status: SHIPPED | OUTPATIENT
Start: 2024-09-19 | End: 2024-09-26

## 2024-09-19 RX ORDER — PANTOPRAZOLE SODIUM 40 MG/1
40 TABLET, DELAYED RELEASE ORAL
Status: DISCONTINUED | OUTPATIENT
Start: 2024-09-20 | End: 2024-09-19 | Stop reason: HOSPADM

## 2024-09-19 RX ORDER — GABAPENTIN 300 MG/1
300 CAPSULE ORAL 3 TIMES DAILY
Qty: 21 CAPSULE | Refills: 0 | Status: SHIPPED | OUTPATIENT
Start: 2024-09-19 | End: 2024-09-26

## 2024-09-19 RX ORDER — OXYCODONE HYDROCHLORIDE 5 MG/1
5 TABLET ORAL EVERY 4 HOURS PRN
Qty: 20 TABLET | Refills: 0 | Status: SHIPPED | OUTPATIENT
Start: 2024-09-19 | End: 2024-09-23

## 2024-09-19 RX ORDER — ACETAMINOPHEN 325 MG/1
975 TABLET ORAL EVERY 8 HOURS SCHEDULED
Qty: 63 TABLET | Refills: 0 | Status: SHIPPED | OUTPATIENT
Start: 2024-09-19 | End: 2024-09-26

## 2024-09-19 RX ORDER — POLYETHYLENE GLYCOL 3350 17 G/17G
17 POWDER, FOR SOLUTION ORAL DAILY
Qty: 119 G | Refills: 0 | Status: SHIPPED | OUTPATIENT
Start: 2024-09-20 | End: 2024-09-19

## 2024-09-19 RX ORDER — PANTOPRAZOLE SODIUM 40 MG/1
40 TABLET, DELAYED RELEASE ORAL
Qty: 30 TABLET | Refills: 0 | Status: SHIPPED | OUTPATIENT
Start: 2024-09-20 | End: 2024-10-20

## 2024-09-19 RX ORDER — CELECOXIB 200 MG/1
200 CAPSULE ORAL DAILY
Qty: 7 CAPSULE | Refills: 0 | Status: SHIPPED | OUTPATIENT
Start: 2024-09-19 | End: 2024-09-26

## 2024-09-19 RX ORDER — ENOXAPARIN SODIUM 100 MG/ML
40 INJECTION SUBCUTANEOUS EVERY 24 HOURS
Qty: 8.4 ML | Refills: 0 | Status: SHIPPED | OUTPATIENT
Start: 2024-09-19 | End: 2024-10-10

## 2024-09-19 RX ORDER — ENOXAPARIN SODIUM 100 MG/ML
40 INJECTION SUBCUTANEOUS EVERY 24 HOURS
Status: DISCONTINUED | OUTPATIENT
Start: 2024-09-19 | End: 2024-09-19

## 2024-09-19 RX ORDER — OXYCODONE HYDROCHLORIDE 5 MG/1
5 TABLET ORAL EVERY 6 HOURS PRN
Qty: 16 TABLET | Refills: 0 | Status: SHIPPED | OUTPATIENT
Start: 2024-09-19 | End: 2024-09-19

## 2024-09-19 RX ADMIN — ACETAMINOPHEN 975 MG: 325 TABLET ORAL at 05:19

## 2024-09-19 RX ADMIN — ACETAMINOPHEN 975 MG: 325 TABLET ORAL at 13:55

## 2024-09-19 RX ADMIN — PANTOPRAZOLE SODIUM 40 MG: 40 INJECTION, POWDER, FOR SOLUTION INTRAVENOUS at 08:58

## 2024-09-19 RX ADMIN — METHOCARBAMOL 500 MG: 500 TABLET ORAL at 05:19

## 2024-09-19 RX ADMIN — POLYETHYLENE GLYCOL 3350 17 G: 17 POWDER, FOR SOLUTION ORAL at 08:58

## 2024-09-19 RX ADMIN — OXYCODONE HYDROCHLORIDE 10 MG: 10 TABLET ORAL at 13:56

## 2024-09-19 RX ADMIN — ENOXAPARIN SODIUM 40 MG: 40 INJECTION SUBCUTANEOUS at 10:48

## 2024-09-19 RX ADMIN — METHOCARBAMOL 500 MG: 500 TABLET ORAL at 13:55

## 2024-09-19 RX ADMIN — HEPARIN SODIUM 5000 UNITS: 5000 INJECTION INTRAVENOUS; SUBCUTANEOUS at 05:19

## 2024-09-19 RX ADMIN — GABAPENTIN 300 MG: 300 CAPSULE ORAL at 08:58

## 2024-09-19 RX ADMIN — DOCUSATE SODIUM 100 MG: 100 CAPSULE, LIQUID FILLED ORAL at 08:58

## 2024-09-19 NOTE — PROGRESS NOTES
Inpatient Progress note - Palliative and Supportive Care   Isaac Kramer 63 y.o. male [id number]    Patient Active Problem List   Diagnosis    Benign essential hypertension    ED (erectile dysfunction) of non-organic origin    Hypercholesterolemia    Peripheral vascular disease (HCC)    Peripheral neuropathy    Overweight with body mass index (BMI) of 26 to 26.9 in adult    Aortic valve stenosis    Coronary artery disease involving native coronary artery of native heart without angina pectoris    Microscopic hematuria    BPH (benign prostatic hyperplasia)    Prostate cancer (HCC)    Pancreatic mass    Elevated LFTs    Biliary sepsis    Acute obstructive cholangitis    Enteritis    GERD (gastroesophageal reflux disease)    MOOKIE (acute kidney injury) (HCC)     Active issues specifically addressed today include:   Pancreatic mass  Post-whipple  Post-op pain  Palliative Care Encounter    Plan:  1. Symptom management -   - [facility]  - arranging outpatient follow up with [facility]      NARRATIVE AND INTERVAL HISTORY:       Patient feeling better this AM, though notes a difficult night.  Had quick onset of abdominal pain for which he required opioid therapy, however this improved with alleviation of flatus.  No BM.  Tolerating PO intake.      MEDICATIONS / ALLERGIES:     all current active meds have been reviewed and current meds:   Current Facility-Administered Medications:     acetaminophen (TYLENOL) tablet 975 mg, Q8H BETITO    celecoxib (CeleBREX) capsule 200 mg, Daily    docusate sodium (COLACE) capsule 100 mg, Daily    enoxaparin (LOVENOX) subcutaneous injection 40 mg, Q24H    gabapentin (NEURONTIN) capsule 300 mg, TID    HYDROmorphone (DILAUDID) injection 0.5 mg, Q2H PRN    labetalol (NORMODYNE) injection 10 mg, Q6H PRN    methocarbamol (ROBAXIN) tablet 500 mg, Q8H BETITO    naloxone (NARCAN) 0.04 mg/mL syringe 0.04 mg, Q1MIN PRN    ondansetron (ZOFRAN) injection 4 mg, Q6H PRN    oxyCODONE  (ROXICODONE) IR tablet 5 mg, Q4H PRN **OR** oxyCODONE (ROXICODONE) immediate release tablet 10 mg, Q4H PRN    [START ON 9/20/2024] pantoprazole (PROTONIX) EC tablet 40 mg, Daily Before Breakfast    phenol (CHLORASEPTIC) 1.4 % mucosal liquid 1 spray, Q2H PRN    polyethylene glycol (MIRALAX) packet 17 g, Daily    No Known Allergies    OBJECTIVE:    Physical Exam  Physical Exam  Vitals and nursing note reviewed.   Constitutional:       General: He is not in acute distress.     Appearance: He is ill-appearing. He is not toxic-appearing or diaphoretic.   HENT:      Head: Normocephalic and atraumatic.      Right Ear: External ear normal.      Left Ear: External ear normal.      Nose: Nose normal.      Mouth/Throat:      Mouth: Mucous membranes are moist.   Eyes:      General:         Right eye: No discharge.         Left eye: No discharge.   Cardiovascular:      Rate and Rhythm: Normal rate.   Pulmonary:      Effort: No respiratory distress.   Abdominal:      General: There is distension.   Musculoskeletal:         General: No swelling or deformity.   Skin:     General: Skin is warm and dry.   Neurological:      General: No focal deficit present.      Mental Status: He is alert. Mental status is at baseline.   Psychiatric:         Mood and Affect: Mood normal.         Behavior: Behavior normal.         Lab Results: I have personally reviewed pertinent labs., CBC:   Lab Results   Component Value Date    WBC 9.61 09/19/2024    HGB 8.4 (L) 09/19/2024    HCT 26.8 (L) 09/19/2024    MCV 92 09/19/2024     09/19/2024    RBC 2.90 (L) 09/19/2024    MCH 29.0 09/19/2024    MCHC 31.3 (L) 09/19/2024    RDW 15.2 (H) 09/19/2024    MPV 9.9 09/19/2024    NRBC 0 09/19/2024   , CMP:   Lab Results   Component Value Date    SODIUM 143 09/19/2024    K 4.3 09/19/2024     09/19/2024    CO2 31 09/19/2024    BUN 9 09/19/2024    CREATININE 0.64 09/19/2024    CALCIUM 8.0 (L) 09/19/2024    EGFR 102 09/19/2024   , BMP:  Lab Results    Component Value Date    SODIUM 143 09/19/2024    K 4.3 09/19/2024     09/19/2024    CO2 31 09/19/2024    BUN 9 09/19/2024    CREATININE 0.64 09/19/2024    GLUC 103 09/19/2024    CALCIUM 8.0 (L) 09/19/2024    AGAP 7 09/19/2024    EGFR 102 09/19/2024         I have spent a total time of 30 minutes in caring for this patient on the day of the visit/encounter including Diagnostic results, Prognosis, Risks and benefits of tx options, Instructions for management, Patient and family education, Importance of tx compliance, Counseling / Coordination of care, Documenting in the medical record, Reviewing / ordering tests, medicine, procedures  , and Obtaining or reviewing history  .

## 2024-09-19 NOTE — PLAN OF CARE
Problem: Prexisting or High Potential for Compromised Skin Integrity  Goal: Skin integrity is maintained or improved  Description: INTERVENTIONS:  - Identify patients at risk for skin breakdown  - Assess and monitor skin integrity  - Assess and monitor nutrition and hydration status  - Monitor labs   - Assess for incontinence   - Turn and reposition patient  - Assist with mobility/ambulation  - Relieve pressure over bony prominences  - Avoid friction and shearing  - Provide appropriate hygiene as needed including keeping skin clean and dry  - Evaluate need for skin moisturizer/barrier cream  - Collaborate with interdisciplinary team   - Patient/family teaching  - Consider wound care consult   Outcome: Progressing     Problem: PAIN - ADULT  Goal: Verbalizes/displays adequate comfort level or baseline comfort level  Description: Interventions:  - Encourage patient to monitor pain and request assistance  - Assess pain using appropriate pain scale  - Administer analgesics based on type and severity of pain and evaluate response  - Implement non-pharmacological measures as appropriate and evaluate response  - Consider cultural and social influences on pain and pain management  - Notify physician/advanced practitioner if interventions unsuccessful or patient reports new pain  Outcome: Progressing     Problem: INFECTION - ADULT  Goal: Absence or prevention of progression during hospitalization  Description: INTERVENTIONS:  - Assess and monitor for signs and symptoms of infection  - Monitor lab/diagnostic results  - Monitor all insertion sites, i.e. indwelling lines, tubes, and drains  - Monitor endotracheal if appropriate and nasal secretions for changes in amount and color  - Adams appropriate cooling/warming therapies per order  - Administer medications as ordered  - Instruct and encourage patient and family to use good hand hygiene technique  - Identify and instruct in appropriate isolation precautions for  identified infection/condition  Outcome: Progressing  Goal: Absence of fever/infection during neutropenic period  Description: INTERVENTIONS:  - Monitor WBC    Outcome: Progressing     Problem: SAFETY ADULT  Goal: Patient will remain free of falls  Description: INTERVENTIONS:  - Educate patient/family on patient safety including physical limitations  - Instruct patient to call for assistance with activity   - Consult OT/PT to assist with strengthening/mobility   - Keep Call bell within reach  - Keep bed low and locked with side rails adjusted as appropriate  - Keep care items and personal belongings within reach  - Initiate and maintain comfort rounds  - Make Fall Risk Sign visible to staff  - Offer Toileting every  Hours, in advance of need  - Initiate/Maintain alarm  - Obtain necessary fall risk management equipment:   - Apply yellow socks and bracelet for high fall risk patients  - Consider moving patient to room near nurses station  Outcome: Progressing  Goal: Maintain or return to baseline ADL function  Description: INTERVENTIONS:  -  Assess patient's ability to carry out ADLs; assess patient's baseline for ADL function and identify physical deficits which impact ability to perform ADLs (bathing, care of mouth/teeth, toileting, grooming, dressing, etc.)  - Assess/evaluate cause of self-care deficits   - Assess range of motion  - Assess patient's mobility; develop plan if impaired  - Assess patient's need for assistive devices and provide as appropriate  - Encourage maximum independence but intervene and supervise when necessary  - Involve family in performance of ADLs  - Assess for home care needs following discharge   - Consider OT consult to assist with ADL evaluation and planning for discharge  - Provide patient education as appropriate  Outcome: Progressing  Goal: Maintains/Returns to pre admission functional level  Description: INTERVENTIONS:  - Perform AM-PAC 6 Click Basic Mobility/ Daily Activity  assessment daily.  - Set and communicate daily mobility goal to care team and patient/family/caregiver.   - Collaborate with rehabilitation services on mobility goals if consulted  - Perform Range of Motion  times a day.  - Reposition patient every  hours.  - Dangle patient  times a day  - Stand patient  times a day  - Ambulate patient  times a day  - Out of bed to chair  times a day   - Out of bed for meal times a day  - Out of bed for toileting  - Record patient progress and toleration of activity level   Outcome: Progressing     Problem: Nutrition/Hydration-ADULT  Goal: Nutrient/Hydration intake appropriate for improving, restoring or maintaining nutritional needs  Description: Monitor and assess patient's nutrition/hydration status for malnutrition. Collaborate with interdisciplinary team and initiate plan and interventions as ordered.  Monitor patient's weight and dietary intake as ordered or per policy. Utilize nutrition screening tool and intervene as necessary. Determine patient's food preferences and provide high-protein, high-caloric foods as appropriate.     INTERVENTIONS:  - Monitor oral intake, urinary output, labs, and treatment plans  - Assess nutrition and hydration status and recommend course of action  - Evaluate amount of meals eaten  - Assist patient with eating if necessary   - Allow adequate time for meals  - Recommend/ encourage appropriate diets, oral nutritional supplements, and vitamin/mineral supplements  - Order, calculate, and assess calorie counts as needed  - Recommend, monitor, and adjust tube feedings and TPN/PPN based on assessed needs  - Assess need for intravenous fluids  - Provide specific nutrition/hydration education as appropriate  - Include patient/family/caregiver in decisions related to nutrition  Outcome: Progressing     Problem: Knowledge Deficit  Goal: Patient/family/caregiver demonstrates understanding of disease process, treatment plan, medications, and discharge  instructions  Description: Complete learning assessment and assess knowledge base.  Interventions:  - Provide teaching at level of understanding  - Provide teaching via preferred learning methods  Outcome: Progressing

## 2024-09-19 NOTE — INCIDENTAL FINDINGS
The following findings require follow up:  Radiographic finding   Findin/24 CTA: IMPRESSION:     1. Active extravasation from the cystic artery with adjacent hematoma in the right upper quadrant.  2. Small amount of layering hemoperitoneum in the pelvis.  3. 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 There are mildly prominent loops of small bowel without evidence of obstruction.  4. NG tube tip just below the GE junction. Consider slight advancement. The proximal port is above the GE junction.     The study was marked in EPIC for immediate notification.   Follow up required: Status post IR coil embolization with resolution of bleeding cystic artery.    Will follow-up with Dr. Ac in the outpatient setting for postoperative follow-up appointment in 2 weeks.   Follow up should be done within 2 week(s)    Please notify the following clinician to assist with the follow up:   Dr. Ac in 2 weeks for postoperative follow-up.    Incidental finding results were discussed with the Patient by Tali Culver PA-C on 24.   They expressed understanding and all questions answered.

## 2024-09-19 NOTE — DISCHARGE INSTR - AVS FIRST PAGE
DISCHARGE INSTRUCTIONS    Follow Up: Follow up with Dr. Ac on 10/8 at 10:45AM    Diet: Postgastrectomy diet, small portions. Small more frequent meals.     Pain: Tylenol 975 mg every 8 hours scheduled for 7 days.  Gabapentin 300mg 3 times daily for 7 days.  Celebrex 200 mg daily for 7 days.  Robaxin 500 mg every 8 hours scheduled for 7 days.  Oxycodone 5 mg as needed for moderate/severe pain every 6 hours.    DRAIN CARE:  Empty drainage bag/bulb when it becomes full into a measurable container. Record output each time drain is emptied in a notebook. Record output totals every day.    May shower with drain. Keep tube clean and dry with soap and water.  Call the office if output <20cc over 24 hrs    Shower: You may shower over the wound. Do not bathe or use a pool or hot tub until cleared by the physician.    Activity: As tolerated. You may go up and down stairs, walk as much as you are comfortable, but walk at least 3 times each day. Do not lift anything heavier than 15 pounds for at least 2-4 weeks, unless cleared by the doctor.    Driving: Do not drive or make any important decisions while on narcotic pain medication. Generally, you may drive when your off all narcotic pain medications.    Medications: Resume all of your previous medications, unless told otherwise by the doctor. You do not need to take the narcotic pain medications unless you are having significant pain and discomfort.    Call the office: If you are experiencing any of the following, fevers above 101.5° or chills, significant nausea or vomiting, increase in abdominal pain, if the wound develops drainage and/or is excessive redness around the wound, or if you have significant diarrhea or other worsening symptoms.

## 2024-09-19 NOTE — ASSESSMENT & PLAN NOTE
62 yo male now s/p 9/12 whipple and s/p 9/13 IR coil embolization cystic artery branch    SEGUN 380 cc ss  SEGUN amylase normal    Plan:  -Advance diet as tolerated  -possible dc home today with drain  -multimodal pain control  -Continue DVT prophylaxis  -PT/OT Level 3  -CM for dispo planning

## 2024-09-19 NOTE — CASE MANAGEMENT
Case Management Discharge Planning Note    Patient name Isaac Kramer  Location [facility] 931/[facility] 931-01 MRN [id number]  : 1960 Date 2024       Current Admission Date: 2024  Current Admission Diagnosis:Pancreatic mass   Patient Active Problem List    Diagnosis Date Noted Date Diagnosed    MOOKIE (acute kidney injury) (HCC) 2024     GERD (gastroesophageal reflux disease)      Pancreatic mass 2024     Elevated LFTs 2024     Biliary sepsis 2024     Acute obstructive cholangitis 2024     Enteritis 2024     Prostate cancer (HCC) 2023     Microscopic hematuria 10/10/2022     BPH (benign prostatic hyperplasia) 10/10/2022     Coronary artery disease involving native coronary artery of native heart without angina pectoris 2022     Overweight with body mass index (BMI) of 26 to 26.9 in adult 2020     Aortic valve stenosis 2020     ED (erectile dysfunction) of non-organic origin 2015     Peripheral neuropathy 2014     Peripheral vascular disease (HCC) 2014     Benign essential hypertension 2014     Hypercholesterolemia 2013       LOS (days): 7  Geometric Mean LOS (GMLOS) (days): 9  Days to GMLOS:2.2     OBJECTIVE:  Risk of Unplanned Readmission Score: 11.41         Current admission status: Inpatient   Preferred Pharmacy:   [id number] - [place] - [address]  [address]  [place] [address]  Phone: [phone] Fax: [phone]    [facility] - [place] - [address]  [address]  [place] [address]  Phone: [phone] Fax: [phone]    Primary Care Provider: Pablo Perez DO    Primary Insurance: MEDICARE  Secondary Insurance:     DISCHARGE DETAILS:    Discharge planning discussed with:: pt at bedside; pt spouse via phone  Freedom of Choice: Yes  Comments - [place] of Choice: [facility] reserved for SN/PT/OT  per pt choice. CM will continue to follow as needed.  CM contacted family/caregiver?: No- see comments  Were Treatment Team discharge recommendations reviewed with patient/caregiver?: Yes  Did patient/caregiver verbalize understanding of patient care needs?: Yes  Were patient/caregiver advised of the risks associated with not following Treatment Team discharge recommendations?: Yes    Contacts  Patient Contacts: Jacque Kramer  Relationship to Patient:: Family  Contact Method: Phone  Reason/Outcome: Continuity of Care, Emergency Contact, Discharge Planning    Requested Home Health Care         Is the patient interested in HHC at discharge?: Yes  Home Health Discipline requested:: Nursing, Occupational Therapy, Physical Therapy  Home Health Agency Name:: [facility]  HHA External Referral Reason (only applicable if external HHA name selected): Patient has established relationship with provider  Home Health Follow-Up Provider:: PCP  Home Health Services Needed:: Gait/ADL Training, Evaluate Functional Status and Safety, Strengthening/Theraputic Exercises to Improve Function, Other (comment) (SEGUN drain management services)  Homebound Criteria Met:: Requires the Assistance of Another Person for Safe Ambulation or to Leave the Home, Uses an Assist Device (i.e. cane, walker, etc)  Supporting Clincal Findings:: Fatigues Easliy in Short Distances, Limited Endurance    DME Referral Provided  Referral made for DME?: Yes  DME referral completed for the following items:: Walker  DME Supplier Name:: [employer]    Other Referral/Resources/Interventions Provided:  Interventions: HHC, DME    Would you like to participate in our [facility] service program?  : No - Declined    Treatment Team Recommendation: Home with Home Health Care  Discharge Destination Plan:: Home with Home Health Care  Transport at Discharge : Family                                      Additional Comments: Pt is DC home today. [facility] reserved  for Sn/PT/OT. Pt spouse will provide transport home. Walker ordered and will be delivered to pt room. CM will continue to follow as needed.

## 2024-09-19 NOTE — PROGRESS NOTES
"Progress Note - Oncology-Surgical   Name: Isaac Kramer 63 y.o. male I MRN: [id number]  Unit/Bed#: [facility] 931-01 I Date of Admission: 9/12/2024   Date of Service: 9/19/2024 I Hospital Day: 7     Assessment & Plan  Pancreatic mass  62 yo male now s/p 9/12 whipple and s/p 9/13 IR coil embolization cystic artery branch    SEGUN 380 cc ss  SEGUN amylase normal    Plan:  -Advance diet as tolerated  -possible dc home today with drain  -multimodal pain control  -Continue DVT prophylaxis  -PT/OT Level 3  -CM for dispo planning        Subjective/Objective     Subjective:   No acute events overnight. No nausae or vomiting. Passing flatus no BM    Objective:     Blood pressure 133/71, pulse 79, temperature 98.9 °F (37.2 °C), temperature source Oral, resp. rate 16, height 5' 4\" (1.626 m), weight 70 kg (154 lb 5.2 oz), SpO2 92%.,Body mass index is 26.49 kg/m².      Intake/Output Summary (Last 24 hours) at 9/19/2024 0350  Last data filed at 9/19/2024 0255  Gross per 24 hour   Intake 480 ml   Output 415 ml   Net 65 ml       Invasive Devices       Peripheral Intravenous Line  Duration             Peripheral IV 09/16/24 Distal;Right;Upper;Ventral (anterior) Arm 2 days              Drain  Duration             Closed/Suction Drain Left Abdomen Bulb 19 Fr. 6 days                    Physical Exam:   Gen: NAD, Comfortable  Neuro: A&O, No focal deficits  Head: Normal Cephalic, Atraumatic  Eye: EOMI, PERRLA, No scleral icterus  Neck: Supple, No JVD, Midline trachea  CV: RRR, Cap refill <2 sec  Pulm: Normal work of breathing, no respiratory distress  Abd: Soft, Non-Distended, Non-Tender  Ext: No edema, Non-tender  Skin: warm, dry, intact    "

## 2024-09-19 NOTE — DISCHARGE SUMMARY
Discharge Summary -surgical oncology  Name: Isaac Kramer 63 y.o. male I MRN: [id number]  Unit/Bed#: [facility] 931-01 I Date of Admission: 9/12/2024   Date of Service: 9/19/2024 I Hospital Day: 7     Assessment & Plan  Pancreatic mass  Status post Whipple procedure complicated by cystic artery branch bleed status post IR coil embolization  -Postoperative ileus with NG tube placement for bowel decompression: Resolved prior to discharge  Benign essential hypertension  Stable throughout hospital stay    Admission Date: 9/12/2024 0548  Discharge Date: 09/19/24  Admitting Diagnosis: Pancreatic mass [K86.89]  Discharge Diagnosis:   Medical Problems       Resolved Problems  Date Reviewed: 9/19/2024   None       Procedures Performed:   9/12/2024 Whipple by Dr. Ac  9/13 IR coil embolization of cystic artery branch  9/15/2024 for NG tube removal  9/17/2024 for epidural removal by APS    Summary of Hospital Course: Hospital Course: Patient presented for elective surgical intervention as mentioned above.  Postoperatively he was transferred to the critical care unit where he was monitored acutely postoperatively by the critical care team.  Patient became mildly hypotensive overnight and a CTA scan was ordered with evidence of a active extravasation of the cystic artery with adjacent hematoma in the right upper quadrant for which IR was consulted for acute coil embolization.  Patient was adequately resuscitated in the intensive care unit.  After coil embolization patient was transferred back to the critical care unit where he was continued on IV fluids, remained n.p.o., had a Carr in place, epidural in place for pain control, DVT prophylaxis, and was encouraged to be out of bed and ambulating as well as utilizing his incentive spirometer.    Patient was transferred out of the critical care unit on postoperative day 3 and onto the medical surgical floor. An NG tube was placed and maintained until removal on postoperative day 3.   "PT and OT were consulted to assist with discharge needs.  Patient's epidural was removed on postoperative day 5 and he was transition to a p.o. pain control regimen.  His diet was slowly advanced throughout his hospital stay after NG tube removal.  Patient's Carr was removed upon transfer to the floor.    Patient's drain amylase levels were checked for which were negative however he had significant output from his SEGUN drain so he was scheduled to follow-up with [facility] nursing for drain care at home upon discharge.  By postoperative day 7 he was tolerating diet without nausea or vomiting, out of bed and ambulating without any assistance, urinating without any difficulties, he underwent Lovenox injection teaching as well as drain care teaching, he was having bowel function and tolerating his pain control on a p.o. regimen.    Prior to discharge all discharge instructions as well as postoperative follow-up appointments were discussed with the patient at bedside, he was in agreement plan.  Patient underwent Lovenox injection teaching as well as drain care prior to discharge.  Questions answered by patient.    Significant Findings, Care, Treatment and Services Provided: As listed above    Complications: Active extract from cystic artery branch postoperatively    Discharge Day Visit / Exam:   /74   Pulse 83   Temp 98.9 °F (37.2 °C)   Resp 14   Ht 5' 4\" (1.626 m)   Wt 67.8 kg (149 lb 7.6 oz)   SpO2 92%   BMI 25.66 kg/m²       Discussion with Family:  Discussed with patient at bedside thoroughly, all questions answered.  Underwent Lovenox injection teaching as well as green drain care teaching..     Condition at Discharge: good       Discharge instructions/Information to patient and family:   See After Visit Summary (AVS) for information provided to patient and family.      Provisions for Follow-Up Care:  See after visit summary for information related to follow-up care and any pertinent home health orders.  "     PCP: Pablo Perez,     Disposition: Home with [facility]    Planned Readmission: No     Discharge Medications:  See after visit summary for reconciled discharge medications provided to patient and family.      Discharge Statement:  I have spent a total time of 30 minutes in caring for this patient on the day of the visit/encounter. >30 minutes of time was spent on: Instructions for management, Patient and family education, Importance of tx compliance, Counseling / Coordination of care, Documenting in the medical record, and Communicating with other healthcare professionals .    Tali Culver

## 2024-09-19 NOTE — ASSESSMENT & PLAN NOTE
Status post Whipple procedure complicated by cystic artery branch bleed status post IR coil embolization  -Postoperative ileus with NG tube placement for bowel decompression: Resolved prior to discharge

## 2024-09-20 ENCOUNTER — TELEPHONE (OUTPATIENT)
Dept: PALLIATIVE MEDICINE | Facility: CLINIC | Age: 64
End: 2024-09-20

## 2024-09-20 ENCOUNTER — HOME CARE VISIT (OUTPATIENT)
Dept: HOME HEALTH SERVICES | Facility: HOME HEALTHCARE | Age: 64
End: 2024-09-20
Payer: MEDICARE

## 2024-09-20 VITALS
OXYGEN SATURATION: 93 % | DIASTOLIC BLOOD PRESSURE: 60 MMHG | SYSTOLIC BLOOD PRESSURE: 114 MMHG | RESPIRATION RATE: 16 BRPM | HEART RATE: 92 BPM | TEMPERATURE: 97.9 F

## 2024-09-20 PROCEDURE — 10330081 VN NO-PAY CLAIM PROCEDURE

## 2024-09-20 PROCEDURE — 88305 TISSUE EXAM BY PATHOLOGIST: CPT | Performed by: PATHOLOGY

## 2024-09-20 PROCEDURE — 88309 TISSUE EXAM BY PATHOLOGIST: CPT | Performed by: PATHOLOGY

## 2024-09-20 PROCEDURE — 88304 TISSUE EXAM BY PATHOLOGIST: CPT | Performed by: PATHOLOGY

## 2024-09-20 PROCEDURE — 88307 TISSUE EXAM BY PATHOLOGIST: CPT | Performed by: PATHOLOGY

## 2024-09-20 PROCEDURE — G0299 HHS/HOSPICE OF RN EA 15 MIN: HCPCS

## 2024-09-20 RX ADMIN — ENOXAPARIN SODIUM 40 MG: 100 INJECTION SUBCUTANEOUS at 12:15

## 2024-09-20 NOTE — TELEPHONE ENCOUNTER
I spoke to patient who declined to schedule per Dr Ac she told him he doesn't have to follow up with palliative care at this time. Removing from list.

## 2024-09-23 ENCOUNTER — HOME CARE VISIT (OUTPATIENT)
Dept: HOME HEALTH SERVICES | Facility: HOME HEALTHCARE | Age: 64
End: 2024-09-23
Payer: MEDICARE

## 2024-09-23 LAB — FUNGUS SPEC CULT: NORMAL

## 2024-09-24 ENCOUNTER — HOME CARE VISIT (OUTPATIENT)
Dept: HOME HEALTH SERVICES | Facility: HOME HEALTHCARE | Age: 64
End: 2024-09-24
Payer: MEDICARE

## 2024-09-24 VITALS — HEART RATE: 77 BPM | DIASTOLIC BLOOD PRESSURE: 58 MMHG | OXYGEN SATURATION: 96 % | SYSTOLIC BLOOD PRESSURE: 122 MMHG

## 2024-09-24 VITALS — SYSTOLIC BLOOD PRESSURE: 120 MMHG | DIASTOLIC BLOOD PRESSURE: 60 MMHG | OXYGEN SATURATION: 97 % | HEART RATE: 94 BPM

## 2024-09-24 PROCEDURE — G0151 HHCP-SERV OF PT,EA 15 MIN: HCPCS

## 2024-09-24 PROCEDURE — G0152 HHCP-SERV OF OT,EA 15 MIN: HCPCS | Performed by: OCCUPATIONAL THERAPIST

## 2024-09-24 NOTE — CASE COMMUNICATION
No further OT services are planned at this time as patient's  upper extremity strength is WFL to WNLs and patient has good understanding of  post-surgical 15 lb lifting restriction.   Patient denies  need for additional OT services for ADL training as he is  comfortable with current level of safety with performance of self care/ daily activities.

## 2024-09-25 ENCOUNTER — HOME CARE VISIT (OUTPATIENT)
Dept: HOME HEALTH SERVICES | Facility: HOME HEALTHCARE | Age: 64
End: 2024-09-25
Payer: MEDICARE

## 2024-09-25 ENCOUNTER — TELEPHONE (OUTPATIENT)
Age: 64
End: 2024-09-25

## 2024-09-25 DIAGNOSIS — R11.2 POST-OPERATIVE NAUSEA AND VOMITING: Primary | ICD-10-CM

## 2024-09-25 DIAGNOSIS — Z98.890 POST-OPERATIVE NAUSEA AND VOMITING: Primary | ICD-10-CM

## 2024-09-25 RX ORDER — ONDANSETRON 4 MG/1
4 TABLET, FILM COATED ORAL EVERY 8 HOURS PRN
Qty: 20 TABLET | Refills: 0 | Status: SHIPPED | OUTPATIENT
Start: 2024-09-25

## 2024-09-25 NOTE — TELEPHONE ENCOUNTER
Pt stated Surgical Oncology Provider: Dr. Ac    Actionable item:  Call Back from Office    What is the reason for the call/chief complaint?  Received a phone call from patient.  Patient had whipple procedure on 9/12.  Overnight, patient has been emptying SEGUN drain every hour.  Patient noted that he emptied 80 ml @1113 and another 80 ml @ 1157.  Fluid is yellow-orange in color.  Patient also c/o nausea over the last day.  Patient is able to tolerate toast and water.  Patient denies any fevers. Patient stated that incision looks good but SEGUN drain has been leaking over the last few days.  Instructed patient to keep clean gauze over drain insertion site.  Patient verbalized understanding.  Please call patient with any further recommendations.       Priority: High Priority

## 2024-09-25 NOTE — TELEPHONE ENCOUNTER
"Spoke with patient due to complaint of increase in drain output. Pt states since 10am and 12 noon today he has emptied 260mls out of his drain. He states the increase in output started this morning. He was previously emptying about 60mls. He reports the output to be \"orange/yellow\" in color. No reported fevers. Pt is also complaining of some decreased appetite today due to nausea when he goes to eat. Will discuss with Dr Ac and get back to him with any further recommendations.     2:30pm Spoke with pt and advised he continue recording drain output and to touch base with us on Friday with his output numbers. Discussed our office will be sending over a prescription to his pharmacy to help with his nausea. He verbalized understanding.   "

## 2024-09-25 NOTE — CASE COMMUNICATION
Secure chat message sent to Dr. DAI Ac -     Call received from pt reporting drainage output has gone from 60 cc/day to over 100 cc every 2 hours since yesterday. Reports leaking around insertion site which he reported a day or two ago. Describes drainage as clear yellow. Denies fever, redness, pain. Next nursing vs planned for 9/27. Would you like pt to call you?    Responded that her office will call pt.

## 2024-09-26 ENCOUNTER — TELEPHONE (OUTPATIENT)
Dept: FAMILY MEDICINE CLINIC | Facility: CLINIC | Age: 64
End: 2024-09-26

## 2024-09-27 ENCOUNTER — HOME CARE VISIT (OUTPATIENT)
Dept: HOME HEALTH SERVICES | Facility: HOME HEALTHCARE | Age: 64
End: 2024-09-27
Payer: MEDICARE

## 2024-09-27 VITALS
HEART RATE: 78 BPM | TEMPERATURE: 97.4 F | RESPIRATION RATE: 16 BRPM | OXYGEN SATURATION: 97 % | DIASTOLIC BLOOD PRESSURE: 60 MMHG | SYSTOLIC BLOOD PRESSURE: 118 MMHG

## 2024-09-27 PROBLEM — C25.9 PANCREATIC ADENOCARCINOMA (HCC): Status: ACTIVE | Noted: 2024-08-28

## 2024-09-27 PROBLEM — K83.09 BILIARY SEPSIS: Status: RESOLVED | Noted: 2024-08-28 | Resolved: 2024-09-27

## 2024-09-27 PROCEDURE — G0299 HHS/HOSPICE OF RN EA 15 MIN: HCPCS

## 2024-09-30 ENCOUNTER — HOME CARE VISIT (OUTPATIENT)
Dept: HOME HEALTH SERVICES | Facility: HOME HEALTHCARE | Age: 64
End: 2024-09-30
Payer: MEDICARE

## 2024-09-30 LAB — FUNGUS SPEC CULT: NORMAL

## 2024-10-02 ENCOUNTER — TELEPHONE (OUTPATIENT)
Dept: HEMATOLOGY ONCOLOGY | Facility: CLINIC | Age: 64
End: 2024-10-02

## 2024-10-02 ENCOUNTER — DOCUMENTATION (OUTPATIENT)
Dept: HEMATOLOGY ONCOLOGY | Facility: CLINIC | Age: 64
End: 2024-10-02

## 2024-10-02 ENCOUNTER — CONSULT (OUTPATIENT)
Dept: HEMATOLOGY ONCOLOGY | Facility: CLINIC | Age: 64
End: 2024-10-02

## 2024-10-02 ENCOUNTER — OFFICE VISIT (OUTPATIENT)
Dept: SURGICAL ONCOLOGY | Facility: CLINIC | Age: 64
End: 2024-10-02

## 2024-10-02 VITALS
WEIGHT: 127 LBS | OXYGEN SATURATION: 99 % | SYSTOLIC BLOOD PRESSURE: 118 MMHG | HEIGHT: 64 IN | TEMPERATURE: 98.2 F | DIASTOLIC BLOOD PRESSURE: 70 MMHG | HEART RATE: 82 BPM | BODY MASS INDEX: 21.68 KG/M2 | RESPIRATION RATE: 18 BRPM

## 2024-10-02 VITALS
TEMPERATURE: 98.7 F | WEIGHT: 125 LBS | SYSTOLIC BLOOD PRESSURE: 118 MMHG | HEART RATE: 81 BPM | OXYGEN SATURATION: 96 % | HEIGHT: 64 IN | BODY MASS INDEX: 21.34 KG/M2 | DIASTOLIC BLOOD PRESSURE: 76 MMHG | RESPIRATION RATE: 16 BRPM

## 2024-10-02 DIAGNOSIS — C22.1 CHOLANGIOCARCINOMA (HCC): ICD-10-CM

## 2024-10-02 DIAGNOSIS — C24.1 AMPULLARY CARCINOMA (HCC): ICD-10-CM

## 2024-10-02 DIAGNOSIS — C25.9 PANCREATIC ADENOCARCINOMA (HCC): Primary | ICD-10-CM

## 2024-10-02 DIAGNOSIS — D64.9 ANEMIA, UNSPECIFIED TYPE: ICD-10-CM

## 2024-10-02 DIAGNOSIS — R97.8 ELEVATED CA 19-9 LEVEL: ICD-10-CM

## 2024-10-02 DIAGNOSIS — E53.8 VITAMIN B12 DEFICIENCY: ICD-10-CM

## 2024-10-02 PROBLEM — C61 PROSTATE CANCER (HCC): Status: RESOLVED | Noted: 2023-04-18 | Resolved: 2024-10-02

## 2024-10-02 PROCEDURE — 99245 OFF/OP CONSLTJ NEW/EST HI 55: CPT | Performed by: INTERNAL MEDICINE

## 2024-10-02 PROCEDURE — 99024 POSTOP FOLLOW-UP VISIT: CPT | Performed by: STUDENT IN AN ORGANIZED HEALTH CARE EDUCATION/TRAINING PROGRAM

## 2024-10-02 RX ORDER — ONDANSETRON 8 MG/1
8 TABLET, FILM COATED ORAL EVERY 8 HOURS PRN
Qty: 30 TABLET | Refills: 5 | Status: SHIPPED | OUTPATIENT
Start: 2024-10-02

## 2024-10-02 RX ORDER — LIDOCAINE/PRILOCAINE 2.5 %-2.5%
CREAM (GRAM) TOPICAL AS NEEDED
Qty: 30 G | Refills: 5 | Status: SHIPPED | OUTPATIENT
Start: 2024-10-02

## 2024-10-02 NOTE — PROGRESS NOTES
Surgical Oncology Follow Up           Isaac Kramer  1960  [id number]  [facility] [place] [facility] [address] [address] [phone]      ASSESSMENT AND PLAN    1. Pancreatic adenocarcinoma (HCC)  Assessment & Plan:  Misclassification. Will work to get this removed.   2. Ampullary carcinoma (HCC)  Assessment & Plan:  Will present at MD. Will need adj therapy. I will see him in 3-4 mo to see how he's doing. Drain out today. Reviewed path in full.          Chief Complaint   Patient presents with    Post-op       Return in about 4 months (around 2/2/2025).      Oncology History Overview Note   Presents for discussion of RT for recently diagnosed Jihan 7 (4+3) prostate cancer.  Referred by Dr. Biggs    Lab Results   Component Value Date    PSA 5.2 (H) 11/08/2022    PSA 6.6 (H) 07/18/2022    PSA 3.9 06/24/2020      10/10/22  Urology  Seen in consult for elevated PSA  F/U PSA recommended    12/28/21  MRI Prostate  IMPRESSION:   1. PI-RADSv2.1 Category 4 -High (clinically significant cancer is likely to be present). 0.5 cm possible lesion in the posterior right peripheral zone at base.   2. No extraprostatic tumor, seminal vesicle invasion, pelvic lymphadenopathy, or pelvic osseous metastatic disease.   3. Moderate BPH with calculated prostate volume of 69 cc.    2/28/23  Tissue Exam  A.  Prostate, region of interest #1, needle biopsy:  - Prostatic adenocarcinoma, acinar, not otherwise specified; [place]han grade 4 +3= score of 7 (grade group 3) in 4 of 5 cores involving 20% of needle core tissue and measuring up to 5 mm in length (score 4=50-60%).  - Periprostatic fat invasion: Not identified.  - Seminal vesicle invasion: Not identified.  - Lymph-vascular invasion: Not identified.  - Perineural invasion: Not identified.  - Additional Pathologic Findings:  None.     B.  Prostate, right lateral and medial base, needle biopsy:  - Benign  prostate tissue, negative for malignancy.     C.  Prostate, right mid lateral, needle biopsy:  - Benign prostate tissue, negative for malignancy.     D.  Prostate, right mid medial, needle biopsy:  - Benign prostate tissue, negative for malignancy.     E.  Prostate, right lateral apex, needle biopsy:   - Benign prostate tissue, negative for malignancy.     F. Prostate, right medial apex, needle biopsy:  - Benign prostate tissue, negative for malignancy.     G.  Prostate, left lateral base, needle biopsy:  - Prostatic adenocarcinoma, acinar, not otherwise specified; [place] grade 3 +3= score of 6 (grade group 1) in 1 of 1 cores involving 10% of needle core tissue and measuring 2 mm in length.  - Periprostatic fat invasion: Not identified.  - Seminal vesicle invasion: Not identified.  - Lymph-vascular invasion: Not identified.  - Perineural invasion: Not identified.  - Additional Pathologic Findings:  None.     H.  Prostate, left medial base, needle biopsy:  - Prostatic adenocarcinoma, acinar, not otherwise specified; Jihan grade 3 +4= score of 7 (grade group 2) in 1 of 1 cores involving 50% of needle core tissue and measuring 8 mm in length (score 4 = 5-10%).  - Periprostatic fat invasion: Not identified.  - Seminal vesicle invasion: Not identified.  - Lymph-vascular invasion: Not identified.  - Perineural invasion: Present.  - Additional Pathologic Findings:  None.     I.  Prostate, left mid lateral, needle biopsy:  - Prostatic adenocarcinoma, acinar, not otherwise specified; [place] grade 3 +4= score of 7 (grade group 2) in 1 of 1 cores involving 40-50% of needle core tissue and measuring 5 mm in length (score 4 = 5-10%).  - Periprostatic fat invasion: Not identified.  - Seminal vesicle invasion: Not identified.  - Lymph-vascular invasion: Not identified.  - Perineural invasion: Not identified.  - Additional Pathologic Findings:  High-grade prostatic intraepithelial neoplasia (HGPIN).     J.  Prostate, left mid  medial, needle biopsy:  - Prostatic adenocarcinoma, acinar, not otherwise specified; [place] grade 3 +4= score of 7 (grade group 2) in 1 of 1 cores involving 40-50% of needle core tissue and measuring 7 mm in length (score 4 = 5-10%).  - Periprostatic fat invasion: Not identified.  - Seminal vesicle invasion: Not identified.  - Lymph-vascular invasion: Not identified.  - Perineural invasion: Not identified.  - Additional Pathologic Findings:  High-grade prostatic intraepithelial neoplasia (HGPIN).     K.  Prostate, left lateral apex, needle biopsy:  -Focal atypical small acinar proliferation, cannot exclude limited low-grade carcinoma.     L.  Prostate, left medial apex, needle biopsy:  - Benign prostate tissue, negative for malignancy.    3/27/23  Bone Scan  IMPRESSION:   1.  No scintigraphic evidence of osseous metastasis    4/6/23  Dr. Biggs  Does not qualify for active surveillance.  RT and surgery discussed.  Leaning towards RT will arrange for consult    4/20/23  Dr. Harmon  Desires surgical intervention    Upcoming:       Prostate cancer (HCC) (Resolved)   2/28/2023 Biopsy    A.  Prostate, region of interest #1, needle biopsy:  - Prostatic adenocarcinoma, acinar, not otherwise specified; [place] grade 4 +3= score of 7 (grade group 3) in 4 of 5 cores involving 20% of needle core tissue and measuring up to 5 mm in length (score 4=50-60%).  - Periprostatic fat invasion: Not identified.  - Seminal vesicle invasion: Not identified.  - Lymph-vascular invasion: Not identified.  - Perineural invasion: Not identified.  - Additional Pathologic Findings:  None.     B.  Prostate, right lateral and medial base, needle biopsy:  - Benign prostate tissue, negative for malignancy.     C.  Prostate, right mid lateral, needle biopsy:  - Benign prostate tissue, negative for malignancy.     D.  Prostate, right mid medial, needle biopsy:  - Benign prostate tissue, negative for malignancy.     E.  Prostate, right lateral apex,  needle biopsy:   - Benign prostate tissue, negative for malignancy.     F. Prostate, right medial apex, needle biopsy:  - Benign prostate tissue, negative for malignancy.     G.  Prostate, left lateral base, needle biopsy:  - Prostatic adenocarcinoma, acinar, not otherwise specified; [place] grade 3 +3= score of 6 (grade group 1) in 1 of 1 cores involving 10% of needle core tissue and measuring 2 mm in length.  - Periprostatic fat invasion: Not identified.  - Seminal vesicle invasion: Not identified.  - Lymph-vascular invasion: Not identified.  - Perineural invasion: Not identified.  - Additional Pathologic Findings:  None.     H.  Prostate, left medial base, needle biopsy:  - Prostatic adenocarcinoma, acinar, not otherwise specified; [place] grade 3 +4= score of 7 (grade group 2) in 1 of 1 cores involving 50% of needle core tissue and measuring 8 mm in length (score 4 = 5-10%).  - Periprostatic fat invasion: Not identified.  - Seminal vesicle invasion: Not identified.  - Lymph-vascular invasion: Not identified.  - Perineural invasion: Present.  - Additional Pathologic Findings:  None.     I.  Prostate, left mid lateral, needle biopsy:  - Prostatic adenocarcinoma, acinar, not otherwise specified; [place] grade 3 +4= score of 7 (grade group 2) in 1 of 1 cores involving 40-50% of needle core tissue and measuring 5 mm in length (score 4 = 5-10%).  - Periprostatic fat invasion: Not identified.  - Seminal vesicle invasion: Not identified.  - Lymph-vascular invasion: Not identified.  - Perineural invasion: Not identified.  - Additional Pathologic Findings:  High-grade prostatic intraepithelial neoplasia (HGPIN).     J.  Prostate, left mid medial, needle biopsy:  - Prostatic adenocarcinoma, acinar, not otherwise specified; [place] grade 3 +4= score of 7 (grade group 2) in 1 of 1 cores involving 40-50% of needle core tissue and measuring 7 mm in length (score 4 = 5-10%).  - Periprostatic fat invasion: Not identified.  -  Seminal vesicle invasion: Not identified.  - Lymph-vascular invasion: Not identified.  - Perineural invasion: Not identified.  - Additional Pathologic Findings:  High-grade prostatic intraepithelial neoplasia (HGPIN).     K.  Prostate, left lateral apex, needle biopsy:  -Focal atypical small acinar proliferation, cannot exclude limited low-grade carcinoma.     L.  Prostate, left medial apex, needle biopsy:  - Benign prostate tissue, negative for malignancy.     2/28/2023 Initial Diagnosis    Prostate cancer (HCC)     Pancreatic adenocarcinoma (HCC)   8/28/2024 Initial Diagnosis    Pancreatic adenocarcinoma (HCC)     9/12/2024 Surgery    A. Gallbladder, cholecystectomy:  - Chronic cholecystitis.  - One lymph node, negative for malignancy (0/1).  - Negative for malignancy.     B. Lymph Node, Portal Lymph Node:  - One lymph node, negative for malignancy (0/1).     C. Pancreas, Whipple; SEE COMMENTS:  - Adenocarcinoma arising in adenoma.  - Tumor invades into muscularis propria of the duodenum (pT2).  - Lymphovascular invasion is present.   - One of sixteen lymph nodes is positive for tumor (1/16, pN1).  - Pancreatic intraepithelial neoplasia (PanIN), low grade.   - All margins are negative for carcinoma or dysplasia.     Block C11 is sent to [facility] for MI Profile Testing.   Immunohistochemistry (IHC) testing for Mismatch Repair (MMR) proteins has been requested on block C9, and the results will be issued in an addendum.     D. Lymph Node, Portacaval node:  - Eight lymph nodes, negative for malignancy (0/8).     E. Soft Tissue, Other, Additional Uncinate/Pancreas:  - Portion of pancreas with no pathologic abnormality  - Two lymph nodes, negative for malignancy (0/2).     9/12/2024 -  Cancer Staged    Staging form: Pancreas, AJCC 8th Edition  - Pathologic stage from 9/12/2024: Stage IIB (pT2, pN1, cM0) - Signed by Jacob Colby MD on 10/2/2024  Stage prefix: Initial diagnosis  Histologic grade (G):  G2  Histologic grading system: 3 grade system  Lymph-vascular invasion (LVI): LVI present/identified, NOS  Carbohydrate antigen 19-9 (CA 19-9) (U/mL): 14  Carcinoembryonic antigen (CEA) (ng/mL): 0.7       10/21/2024 -  Chemotherapy    alteplase (CATHFLO), 2 mg, Intracatheter, Every 1 Minute as needed, 0 of 12 cycles  pegfilgrastim (NEULASTA ONPRO), 6 mg, Subcutaneous, Once, 0 of 12 cycles  fosaprepitant (EMEND) IVPB, 150 mg, Intravenous, Once, 0 of 12 cycles  irinotecan (CAMPTOSAR) chemo infusion, 150 mg/m2 = 242 mg (83.3 % of original dose 180 mg/m2), Intravenous, Once, 0 of 12 cycles  Dose modification: 150 mg/m2 (original dose 180 mg/m2, Cycle 1, Reason: Dose modified as per discussion with consulting physician)  leucovorin calcium IVPB, 400 mg/m2 = 644 mg, Intravenous, Once, 0 of 12 cycles  oxaliplatin (ELOXATIN) chemo infusion, 85 mg/m2 = 136.85 mg, Intravenous, Once, 0 of 12 cycles  fluorouracil (ADRUCIL) ambulatory infusion Soln, 1,200 mg/m2/day = 3,865 mg, Intravenous, Over 46 hours, 0 of 12 cycles     Ampullary carcinoma (HCC)   10/2/2024 Initial Diagnosis    Ampullary carcinoma (HCC)     10/2/2024 -  Cancer Staged    Staging form: Ampulla of Vater, AJCC 8th Edition  - Pathologic: Stage IIIA (pT2, pN1, cM0) - Signed by Marika Ac MD on 10/2/2024             FOLLOW UP HISTORY    Staging: T2N1 intestinal type ampullary adenocarcinoma  Treatment history: whipple 9/12/24  Current treatment: postop  Disease status: postop    History of Present Illness: Pt seen postop. Doing well. POC c/b arterial bleed requiring IR on POD #1. Has done well since and is recovering well with no major issues. Drain putting out minimally, no n/v, pain well-controlled. Met Dr Colby this morning to discuss adj therapy    Review of Systems  Complete ROS Surg Onc:   Complete ROS Surg Onc:   Constitutional: The patient denies new or recent history of general fatigue, no recent weight loss, no change in appetite.   Eyes: No  complaints of visual problems, no scleral icterus.   ENT: no complaints of ear pain, no hoarseness, no difficulty swallowing,  no tinnitus and no new masses in head, oral cavity, or neck.   Cardiovascular: No complaints of chest pain, no palpitations, no ankle edema.   Respiratory: No complaints of shortness of breath, no cough.   Gastrointestinal: No complaints of jaundice, no bloody stools, no pale stools.   Genitourinary: No complaints of dysuria, no hematuria, no nocturia, no frequent urination, no urethral discharge.   Musculoskeletal: No complaints of weakness, paralysis, joint stiffness or arthralgias.  Integumentary: No complaints of rash, no new lesions.   Neurological: No complaints of convulsions, no seizures, no dizziness.   Hematologic/Lymphatic: No complaints of easy bruising.   Endocrine:  No hot or cold intolerance.  No polydipsia, polyphagia, or polyuria.  Allergy/immunology:  No environmental allergies.  No food allergies.  Not immunocompromised.  Skin:  No pallor or rash.  No wound.        Patient Active Problem List   Diagnosis    Benign essential hypertension    ED (erectile dysfunction) of non-organic origin    Hypercholesterolemia    Peripheral vascular disease (HCC)    Peripheral neuropathy    Overweight with body mass index (BMI) of 26 to 26.9 in adult    Aortic valve stenosis    Coronary artery disease involving native coronary artery of native heart without angina pectoris    Microscopic hematuria    BPH (benign prostatic hyperplasia)    Pancreatic adenocarcinoma (HCC)    Elevated LFTs    Acute obstructive cholangitis    Enteritis    GERD (gastroesophageal reflux disease)    MOOKIE (acute kidney injury) (HCC)    Ampullary carcinoma (HCC)     Past Medical History:   Diagnosis Date    Benign essential hypertension 05/08/2014    CAD (coronary artery disease)     s/p NAVA prox LAD and D1 7/28/2022    Cancer (HCC)     Prostate    Coronary artery disease involving native coronary artery of native  heart without angina pectoris 2022    Enteritis 2024    GERD (gastroesophageal reflux disease)     Hyperlipidemia     Hypertension     Other chest pain     Palpitations     Prostate cancer (HCC) 2023    Sleep apnea      Past Surgical History:   Procedure Laterality Date    CARDIAC CATHETERIZATION Left 2022    Procedure: Cardiac catheterization-left heart catheterization;  Surgeon: Sai Henry MD;  Location: [facility];  Service: Cardiology    CARDIAC CATHETERIZATION N/A 2022    Procedure: Cardiac pci;  Surgeon: Sai Henry MD;  Location: [facility];  Service: Cardiology    HERNIA REPAIR      IR EMBOLIZATION (SPECIFY VESSEL OR SITE)  2024    CT LAPS SURG [id number] RPBIC RAD W/NRV SPARING ROBOT N/A 2023    Procedure: PROSTATECTOMY RADICAL & PELVIC LYMPH NODE DISSECTION  W/ ROBOT;  Surgeon: Otoniel Harmon MD;  Location: [facility];  Service: Urology    CT PROSTATE NEEDLE BIOPSY ANY APPROACH N/A 2023    Procedure: TRANSRECTAL MRI FUSION BIOPSY PROSTATE;  Surgeon: Milan Arellano MD;  Location: [facility];  Service: Urology    WHIPPLE PROCEDURE/PANCREATICO-DUODENECTOMY N/A 2024    Procedure: WHIPPLE PROCEDURE/PANCREATICO-DUODENECTOMY;  Surgeon: Marika Ac MD;  Location: [facility];  Service: Surgical Oncology     Family History   Problem Relation Age of Onset    No Known Problems Mother     No Known Problems Father      Social History     Socioeconomic History    Marital status: /Civil Union     Spouse name: Not on file    Number of children: Not on file    Years of education: Not on file    Highest education level: Not on file   Occupational History    Not on file   Tobacco Use    Smoking status: Former     Current packs/day: 0.00     Types: Cigarettes     Quit date: 2022     Years since quittin.7    Smokeless tobacco: Never   Vaping Use    Vaping status: Never Used   Substance and Sexual Activity    Alcohol use: Not  Currently    Drug use: Never    Sexual activity: Not on file   Other Topics Concern    Not on file   Social History Narrative    EMPLOYED         Social Determinants of Health     Financial Resource Strain: Not on file   Food Insecurity: No Food Insecurity (9/13/2024)    Hunger Vital Sign     Worried About Running Out of Food in the Last Year: Never true     Ran Out of Food in the Last Year: Never true   Transportation Needs: No Transportation Needs (9/13/2024)    PRAPARE - Transportation     Lack of Transportation (Medical): No     Lack of Transportation (Non-Medical): No   Physical Activity: Not on file   Stress: Not on file   Social Connections: Unknown (6/18/2024)    Received from "[employer]"     How often do you feel lonely or isolated from those around you? (Adult - for ages 18 years and over): Not on file   Intimate Partner Violence: Not on file   Housing Stability: Low Risk  (9/13/2024)    Housing Stability Vital Sign     Unable to Pay for Housing in the Last Year: No     Number of Times Moved in the Last Year: 0     Homeless in the Last Year: No       Current Outpatient Medications:     aspirin 81 mg chewable tablet, Chew 81 mg daily, Disp: , Rfl:     atorvastatin (LIPITOR) 20 mg tablet, Take 1 tablet (20 mg total) by mouth daily, Disp: 30 tablet, Rfl: 5    enoxaparin (LOVENOX) 40 mg/0.4 mL, Inject 0.4 mL (40 mg total) under the skin every 24 hours for 21 days, Disp: 8.4 mL, Rfl: 0    lisinopril (ZESTRIL) 20 mg tablet, Take 1 tablet (20 mg total) by mouth daily, Disp: 30 tablet, Rfl: 0    ondansetron (ZOFRAN) 8 mg tablet, Take 1 tablet (8 mg total) by mouth every 8 (eight) hours as needed for nausea or vomiting, Disp: 30 tablet, Rfl: 5    pantoprazole (PROTONIX) 40 mg tablet, Take 1 tablet (40 mg total) by mouth daily before breakfast Do not start before September 20, 2024., Disp: 30 tablet, Rfl: 0    polyethylene glycol (MIRALAX) 17 g packet, Take 17 g by mouth daily for 7 days  Do not start before September 20, 2024. (Patient taking differently: Take 17 g by mouth daily Mix with 8oz fluid.), Disp: , Rfl:     celecoxib (CeleBREX) 200 mg capsule, Take 1 capsule (200 mg total) by mouth daily for 7 days (Patient not taking: Reported on 10/2/2024), Disp: 7 capsule, Rfl: 0    docusate sodium (COLACE) 100 mg capsule, Take 1 capsule (100 mg total) by mouth daily for 7 days (Patient not taking: Reported on 10/2/2024), Disp: 7 capsule, Rfl: 0    gabapentin (NEURONTIN) 300 mg capsule, Take 1 capsule (300 mg total) by mouth 3 (three) times a day for 7 days (Patient not taking: Reported on 10/2/2024), Disp: 21 capsule, Rfl: 0    lidocaine-prilocaine (EMLA) cream, Apply topically as needed for mild pain (Patient not taking: Reported on 10/2/2024), Disp: 30 g, Rfl: 5    methocarbamol (ROBAXIN) 500 mg tablet, Take 1 tablet (500 mg total) by mouth every 8 (eight) hours for 7 days (Patient not taking: Reported on 10/2/2024), Disp: 21 tablet, Rfl: 0    ondansetron (ZOFRAN) 4 mg tablet, Take 1 tablet (4 mg total) by mouth every 8 (eight) hours as needed for nausea or vomiting (Patient not taking: Reported on 10/2/2024), Disp: 20 tablet, Rfl: 0  No Known Allergies  Vitals:    10/02/24 1355   BP: 118/76   Pulse: 81   Resp: 16   Temp: 98.7 °F (37.1 °C)   SpO2: 96%       Physical Exam  Constitutional: General appearance: The Patient is well-developed and well-nourished who appears the stated age in no acute distress. Patient is pleasant and talkative.     HEENT:  Normocephalic.  Sclerae are anicteric. Mucous membranes are moist. Neck is supple without adenopathy. No JVD.     Chest: Easy WOB.     Cardiac: Heart is regular rate.     Abdomen: Abdomen is soft, non-tender, non-distended and without masses.     Extremities: There is no clubbing or cyanosis. There is no edema.  Symmetric.  Neuro: Grossly nonfocal. Gait is normal.     Lymphatic: No evidence of cervical adenopathy bilaterally.   No evidence of axillary  adenopathy bilaterally. No evidence of inguinal adenopathy bilaterally.     Skin: Warm, anicteric.  Incision healing well  Psych:  Patient is pleasant and talkative.    Imaging  XR chest portable    Result Date: 9/17/2024  Narrative: XR CHEST PORTABLE INDICATION: r/o pleural effusion. COMPARISON: CXR 9/16/2024, abdomen CT 9/13/2024, chest CT 8/28/2024. FINDINGS: Epidural catheter. Moderate right pleural effusion and small left pleural effusion. Right greater than left bibasilar atelectasis. No pneumothorax. Normal cardiomediastinal silhouette. Bones are unremarkable for age. Embolization coil right upper quadrant.     Impression: Moderate right and small left pleural effusion and bibasilar atelectasis. Workstation performed: [id number]     XR chest portable    Result Date: 9/17/2024  Narrative: XR CHEST PORTABLE INDICATION: left sided chest pain and shortness of breath. COMPARISON: Chest x-ray 9/13/2024 FINDINGS: Nasogastric tube has been removed. Partial improved aeration of the bilateral lung fields. Probable residual pulmonary vascular congestion and small bilateral pleural effusions with right basilar atelectasis or infiltrate not excluded. Normal cardiomediastinal silhouette. Bones are unremarkable for age.     Impression: Partially improved aeration of the lung fields. Probable residual pulmonary vascular congestion and small bilateral pleural effusions with right basilar atelectasis or infiltrate not excluded. Workstation performed: [id number]     XR chest portable ICU    Result Date: 9/13/2024  Narrative: XR CHEST PORTABLE ICU INDICATION: hypoxia. COMPARISON: 8/27/2024 FINDINGS: Bilateral patchy/hazy airspace opacities are noted. No pneumothorax. Nasogastric tube is seen extending below the left hemidiaphragm. A wire is seen projecting over the right shoulder region and mid chest, possibly related to the described thoracic epidural, correlate clinically Bones are unremarkable for age. Normal upper abdomen.      Impression: Bilateral patchy/hazy airspace opacities, which may represent pulmonary congestion versus bilateral infiltrates in the appropriate clinical setting Nasogastric tube extends below left hemidiaphragm Workstation performed: [id number]     IR embolization (specify vessel or site)    Result Date: 9/13/2024  Narrative: PROCEDURE: Cystic artery embolization Procedural Personnel Attending physician(s): Dr. Cardona Resident physician(s): Dr. Guerin Pre-procedure diagnosis: Intraperitoneal hemorrhage Post-procedure diagnosis: Same Indication: Patient with history of pancreatic mass status post Whipple's noted to have downtrending hemoglobin. CTA showed findings concerning for active bleeding from cystic artery. PROCEDURE SUMMARY: - Arterial access with ultrasound guidance - Selective cannulization of celiac, common hepatic, and cystic arteries with angiograms - Coil embolization of of cystic artery PROCEDURE DETAILS: Pre-procedure Consent: Informed consent for the procedure including risks, benefits and alternatives was obtained and time-out was performed prior to the procedure. Preparation: The site was prepared and draped using maximal sterile barrier technique including cutaneous antisepsis. Anesthesia/sedation Level of anesthesia/sedation: General anesthesia Anesthesia/sedation administered by: Anesthesiology Total intra-service sedation time (minutes): 60 Access Local anesthesia was administered. The vessel was sonographically evaluated and judged to be patent. Real time ultrasound was used to visualize needle entry into the vessel and a permanent image was stored. A 5 Romanian sheath was placed. Vessel accessed: Right common femoral artery Access technique: 19 gauge access needle Mesenteric angiography and interventions 5 [place] VS2 catheter was used to selectively cannulate the celiac artery. Celiac artery angiogram was performed. 2.8 [place] Progreat microcatheter was used to selectively cannulate the  common hepatic artery and angiogram was performed. The microcatheter  was used to selectively cannulate the cystic artery. There was active contrast extravasation from branches of the cystic artery. Cystic artery was coil embolized using 3 mm x 5 mm [place] Soft coil. Post embolization angiogram showed cessation of flow into  the cystic artery as well as cessation of active contrast extravasation. Closure Right common femoral artery access was closed using Perclose closure device. Contrast Contrast agent: Visipaque Contrast volume (mL): 81 Radiation Dose Fluoroscopy time (minutes): 18.3 Reference air kerma (mGy): 1339 Additional Details Estimated blood loss (mL): 10 Complications: No immediate complications.     Impression: Active contrast extravasation visualized from branches of the cystic artery. Cystic artery was coil embolized with resolution of contrast extravasation. Plan: -Continue ICU cares. -Monitor hemoglobin. Workstation performed: [id number]     CTA abdomen pelvis w wo contrast    Addendum Date: 9/13/2024 Addendum:   ADDENDUM: The treating team is aware of these findings, and the patient is currently in interventional radiology for embolization.    Result Date: 9/13/2024  Narrative: CT ANGIOGRAM OF THE ABDOMEN AND PELVIS WITH AND WITHOUT IV CONTRAST INDICATION: s/p Whipple w bloody drain output want to rule out GDA bleed. COMPARISON: Preoperative CT dated 8/27/2024 TECHNIQUE: CT angiogram examination of the abdomen and pelvis was performed according to standard protocol. This examination, like all CT scans performed in the [facility], was performed utilizing techniques to minimize radiation dose exposure, including the use of iterative reconstruction and automated exposure control. Contrast as well as noncontrast images were obtained. Rad dose 2041.57 mGy-cm IV Contrast: 75 mL of iohexol (OMNIPAQUE) Enteric Contrast: Not administered. FINDINGS: VASCULAR STRUCTURES: There is active  extravasation from the cystic artery with hematoma in the right upper quadrant.. The origin of the gastroduodenal artery is suspected on image 52 of series 330 and 69 of series 605. The remainder of the gastroduodenal  artery is not clearly identified and likely surgically ligated. There is no adjacent contrast extravasation. OTHER FINDINGS ABDOMEN LOWER CHEST: Small bilateral pleural effusions with adjacent, compressive atelectasis. LIVER/BILIARY TREE: Unremarkable. GALLBLADDER: Post cholecystectomy. There is extravasation of contrast on arterial phase images adjacent to a surgical clip, best seen on image 53 of series 303. This appears to originate from the cystic artery. There are adjacent, perihepatic mixed density blood products SPLEEN: Unremarkable. 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 There is distal pancreatic ductal dilation. ADRENAL GLANDS: Unremarkable. KIDNEYS/URETERS: Simple renal cyst(s). Otherwise unremarkable kidneys. No hydronephrosis. STOMACH AND BOWEL: Changes of a Whipple procedure are noted. There are prominent loops of small bowel with air-fluid levels which likely reflect a postoperative ileus. There is bowel gas distally without findings suspicious for small bowel obstruction. APPENDIX: No findings to suggest appendicitis. ABDOMINOPELVIC CAVITY: There are heterogeneous blood products in the right upper quadrant along the inferior margin of the liver and adjacent to the cholecystectomy bed. There is also a predominantly simple appearing fluid in the left abdomen. There is predominantly simple appearing fluid in the pelvis with a small amount of layering, hyperdense blood products seen posteriorly on image 158 of series 303. There is a small amount of free air, expected in the recent postoperative setting. NG tube tip is in the upper stomach, just below the GE junction. There is an operative drain in the operative bed. PELVIS REPRODUCTIVE ORGANS:  Unremarkable for patient's age. URINARY BLADDER: Decompressed by Carr catheter ABDOMINAL WALL/INGUINAL REGIONS: Unremarkable. BONES: No acute fracture or suspicious osseous lesion. Epidural catheter is partially imaged.     Impression: 1. Active extravasation from the cystic artery with adjacent hematoma in the right upper quadrant. 2. Small amount of layering hemoperitoneum in the pelvis. 3. 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 There are mildly prominent loops of small bowel without evidence of obstruction. 4. NG tube tip just below the GE junction. Consider slight advancement. The proximal port is above the GE junction. The study was marked in EPIC for immediate notification. Workstation performed: [id number]     I personally reviewed and interpreted the above laboratory and imaging data.

## 2024-10-02 NOTE — TELEPHONE ENCOUNTER
Pt did not stop at checkout after Dr Ac's visit. A 4m f/u was scheduled for pt on 2/5/25 145pm, pt notified.via voice mail as he did not answer the phone.

## 2024-10-02 NOTE — PROGRESS NOTES
Hematology/Oncology Outpatient Follow-up  Isaac Kramer 63 y.o. male 1960 [id number]    Date:  10/2/2024        Assessment and Plan:  1. Pancreatic adenocarcinoma (HCC)    Pathological stage IIB (pT2, pN1, cM0) MSI stable, grade 2 adenocarcinoma of the head of the pancreas, status post Whipple's procedure on 9/12/2024.    The patient was educated about his pathological stage and the need for adjuvant chemotherapy to decrease the chance for recurrence of the resected pancreatic cancer.    He seems to have decent performance status to be able to tolerate 6 months worth of modified FOLFIRINOX adjuvantly.  Subsequent, concurrent chemoradiation could be discussed if the patient desires highly aggressive treatment and he continues to have good performance status post 6 months worth of adjuvant chemotherapy.    The patient was educated extensively about the potential side effects and toxicity related to the planned chemotherapy.  He will be supported with Neulasta after each chemotherapy to avoid neutropenic complications.    We will wait for the surgical oncology clearance before the patient can be started on the adjuvant chemotherapy.  He will need a Port-A-Cath placement by IR.  We did discuss also the potential benefit of seeing the palliative care team for symptom management in the near future.    The tumor tissue will be sent out for molecular evaluation by Jose if it was not sent yet.  We will also aim for genetic oncology evaluation since he had recent history of resected prostate cancer to rule out germline mutation.    Addendum:  After discussing the case with Dr. Ac from the surgical oncology team and reviewing the pathology report.  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  The adjuvant treatment with modified FOLFIRINOX continues to be the appropriate approach.    - CBC and differential; Standing  - Comprehensive metabolic panel;  Standing  - Magnesium; Standing  - Cancer antigen 19-9; Future  - Ambulatory referral to Palliative Care; Future  - Ambulatory referral to Interventional Radiology; Future  - Ambulatory Referral to Oncology Genetics; Future    2. Anemia, unspecified type  The patient seems to be anemic.  The exact etiology is most likely related to the recent surgery.  We will work him up to rule out iron deficiency or other treatable etiology for his anemia.  - Folate; Future  - Ferritin; Future  - Iron Panel (Includes Ferritin, Iron Sat%, Iron, and TIBC); Future  - Vitamin B12; Future    3. Elevated CA 19-9 level  Baseline CA 19-9 was normal.    4. Vitamin B12 deficiency  Level will be checked  - Folate; Future  - Vitamin B12; Future            HPI:  This is a 63-year-old male with history of Jihan score 4+3= 7 adenocarcinoma of the prostate status post radical prostatectomy on 5/22/2023.  He also seems to have history of coronary artery disease, hypertension, GERD, hyperlipidemia, etc.  He denied positive family history for malignancy.  The patient apparently had left flank pain which was evaluated with a CT scan of the abdomen pelvis with contrast on 8/27/2024 in the emergency room.  The CT scan showed mass in the lower pancreatic head region obstructing the CBD and pancreatic duct measuring 2.8 cm suspicious for malignant process.  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  CT scan of the chest without contrast on 8/28/2024 was negative for malignant process.  The patient then had Whipple procedure on 9/12/2024 which showed MSI stable adenocarcinoma of the pancreas arising in adenoma.  - Tumor invades into muscularis propria of the duodenum (pT2).  - Lymphovascular invasion is present.   - One of sixteen lymph nodes is positive for tumor (1/16, pN1).  - Pancreatic intraepithelial neoplasia (PanIN), low grade.   - All margins are negative for carcinoma or dysplasia.  His  baseline CEA and CA 19-9 were within normal range prior to the Whipple procedure.  Most recent blood work on 9/19/2024 showed hemoglobin of 8.4 with normal white cells and platelets.  White cell differential was normal.  Creatinine was 0.6 with low normal calcium level.  Oncology History Overview Note   Presents for discussion of RT for recently diagnosed Jihan 7 (4+3) prostate cancer.  Referred by Dr. Biggs    Lab Results   Component Value Date    PSA 5.2 (H) 11/08/2022    PSA 6.6 (H) 07/18/2022    PSA 3.9 06/24/2020      10/10/22  Urology  Seen in consult for elevated PSA  F/U PSA recommended    12/28/21  MRI Prostate  IMPRESSION:   1. PI-RADSv2.1 Category 4 -High (clinically significant cancer is likely to be present). 0.5 cm possible lesion in the posterior right peripheral zone at base.   2. No extraprostatic tumor, seminal vesicle invasion, pelvic lymphadenopathy, or pelvic osseous metastatic disease.   3. Moderate BPH with calculated prostate volume of 69 cc.    2/28/23  Tissue Exam  A.  Prostate, region of interest #1, needle biopsy:  - Prostatic adenocarcinoma, acinar, not otherwise specified; [place] grade 4 +3= score of 7 (grade group 3) in 4 of 5 cores involving 20% of needle core tissue and measuring up to 5 mm in length (score 4=50-60%).  - Periprostatic fat invasion: Not identified.  - Seminal vesicle invasion: Not identified.  - Lymph-vascular invasion: Not identified.  - Perineural invasion: Not identified.  - Additional Pathologic Findings:  None.     B.  Prostate, right lateral and medial base, needle biopsy:  - Benign prostate tissue, negative for malignancy.     C.  Prostate, right mid lateral, needle biopsy:  - Benign prostate tissue, negative for malignancy.     D.  Prostate, right mid medial, needle biopsy:  - Benign prostate tissue, negative for malignancy.     E.  Prostate, right lateral apex, needle biopsy:   - Benign prostate tissue, negative for malignancy.     F. Prostate, right  medial apex, needle biopsy:  - Benign prostate tissue, negative for malignancy.     G.  Prostate, left lateral base, needle biopsy:  - Prostatic adenocarcinoma, acinar, not otherwise specified; [place] grade 3 +3= score of 6 (grade group 1) in 1 of 1 cores involving 10% of needle core tissue and measuring 2 mm in length.  - Periprostatic fat invasion: Not identified.  - Seminal vesicle invasion: Not identified.  - Lymph-vascular invasion: Not identified.  - Perineural invasion: Not identified.  - Additional Pathologic Findings:  None.     H.  Prostate, left medial base, needle biopsy:  - Prostatic adenocarcinoma, acinar, not otherwise specified; Jihan grade 3 +4= score of 7 (grade group 2) in 1 of 1 cores involving 50% of needle core tissue and measuring 8 mm in length (score 4 = 5-10%).  - Periprostatic fat invasion: Not identified.  - Seminal vesicle invasion: Not identified.  - Lymph-vascular invasion: Not identified.  - Perineural invasion: Present.  - Additional Pathologic Findings:  None.     I.  Prostate, left mid lateral, needle biopsy:  - Prostatic adenocarcinoma, acinar, not otherwise specified; [place] grade 3 +4= score of 7 (grade group 2) in 1 of 1 cores involving 40-50% of needle core tissue and measuring 5 mm in length (score 4 = 5-10%).  - Periprostatic fat invasion: Not identified.  - Seminal vesicle invasion: Not identified.  - Lymph-vascular invasion: Not identified.  - Perineural invasion: Not identified.  - Additional Pathologic Findings:  High-grade prostatic intraepithelial neoplasia (HGPIN).     J.  Prostate, left mid medial, needle biopsy:  - Prostatic adenocarcinoma, acinar, not otherwise specified; Jihan grade 3 +4= score of 7 (grade group 2) in 1 of 1 cores involving 40-50% of needle core tissue and measuring 7 mm in length (score 4 = 5-10%).  - Periprostatic fat invasion: Not identified.  - Seminal vesicle invasion: Not identified.  - Lymph-vascular invasion: Not identified.  -  Perineural invasion: Not identified.  - Additional Pathologic Findings:  High-grade prostatic intraepithelial neoplasia (HGPIN).     K.  Prostate, left lateral apex, needle biopsy:  -Focal atypical small acinar proliferation, cannot exclude limited low-grade carcinoma.     L.  Prostate, left medial apex, needle biopsy:  - Benign prostate tissue, negative for malignancy.    3/27/23  Bone Scan  IMPRESSION:   1.  No scintigraphic evidence of osseous metastasis    4/6/23  Dr. Biggs  Does not qualify for active surveillance.  RT and surgery discussed.  Leaning towards RT will arrange for consult    4/20/23  Dr. Harmon  Desires surgical intervention    Upcoming:       Prostate cancer (HCC) (Resolved)   2/28/2023 Biopsy    A.  Prostate, region of interest #1, needle biopsy:  - Prostatic adenocarcinoma, acinar, not otherwise specified; [place] grade 4 +3= score of 7 (grade group 3) in 4 of 5 cores involving 20% of needle core tissue and measuring up to 5 mm in length (score 4=50-60%).  - Periprostatic fat invasion: Not identified.  - Seminal vesicle invasion: Not identified.  - Lymph-vascular invasion: Not identified.  - Perineural invasion: Not identified.  - Additional Pathologic Findings:  None.     B.  Prostate, right lateral and medial base, needle biopsy:  - Benign prostate tissue, negative for malignancy.     C.  Prostate, right mid lateral, needle biopsy:  - Benign prostate tissue, negative for malignancy.     D.  Prostate, right mid medial, needle biopsy:  - Benign prostate tissue, negative for malignancy.     E.  Prostate, right lateral apex, needle biopsy:   - Benign prostate tissue, negative for malignancy.     F. Prostate, right medial apex, needle biopsy:  - Benign prostate tissue, negative for malignancy.     G.  Prostate, left lateral base, needle biopsy:  - Prostatic adenocarcinoma, acinar, not otherwise specified; Jihan grade 3 +3= score of 6 (grade group 1) in 1 of 1 cores involving 10% of needle  core tissue and measuring 2 mm in length.  - Periprostatic fat invasion: Not identified.  - Seminal vesicle invasion: Not identified.  - Lymph-vascular invasion: Not identified.  - Perineural invasion: Not identified.  - Additional Pathologic Findings:  None.     H.  Prostate, left medial base, needle biopsy:  - Prostatic adenocarcinoma, acinar, not otherwise specified; Jihan grade 3 +4= score of 7 (grade group 2) in 1 of 1 cores involving 50% of needle core tissue and measuring 8 mm in length (score 4 = 5-10%).  - Periprostatic fat invasion: Not identified.  - Seminal vesicle invasion: Not identified.  - Lymph-vascular invasion: Not identified.  - Perineural invasion: Present.  - Additional Pathologic Findings:  None.     I.  Prostate, left mid lateral, needle biopsy:  - Prostatic adenocarcinoma, acinar, not otherwise specified; [place]han grade 3 +4= score of 7 (grade group 2) in 1 of 1 cores involving 40-50% of needle core tissue and measuring 5 mm in length (score 4 = 5-10%).  - Periprostatic fat invasion: Not identified.  - Seminal vesicle invasion: Not identified.  - Lymph-vascular invasion: Not identified.  - Perineural invasion: Not identified.  - Additional Pathologic Findings:  High-grade prostatic intraepithelial neoplasia (HGPIN).     J.  Prostate, left mid medial, needle biopsy:  - Prostatic adenocarcinoma, acinar, not otherwise specified; [place] grade 3 +4= score of 7 (grade group 2) in 1 of 1 cores involving 40-50% of needle core tissue and measuring 7 mm in length (score 4 = 5-10%).  - Periprostatic fat invasion: Not identified.  - Seminal vesicle invasion: Not identified.  - Lymph-vascular invasion: Not identified.  - Perineural invasion: Not identified.  - Additional Pathologic Findings:  High-grade prostatic intraepithelial neoplasia (HGPIN).     K.  Prostate, left lateral apex, needle biopsy:  -Focal atypical small acinar proliferation, cannot exclude limited low-grade carcinoma.     L.   Prostate, left medial apex, needle biopsy:  - Benign prostate tissue, negative for malignancy.     2/28/2023 Initial Diagnosis    Prostate cancer (HCC)     Pancreatic adenocarcinoma (HCC)   8/28/2024 Initial Diagnosis    Pancreatic adenocarcinoma (HCC)     9/12/2024 Surgery    A. Gallbladder, cholecystectomy:  - Chronic cholecystitis.  - One lymph node, negative for malignancy (0/1).  - Negative for malignancy.     B. Lymph Node, Portal Lymph Node:  - One lymph node, negative for malignancy (0/1).     C. Pancreas, Whipple; SEE COMMENTS:  - Adenocarcinoma arising in adenoma.  - Tumor invades into muscularis propria of the duodenum (pT2).  - Lymphovascular invasion is present.   - One of sixteen lymph nodes is positive for tumor (1/16, pN1).  - Pancreatic intraepithelial neoplasia (PanIN), low grade.   - All margins are negative for carcinoma or dysplasia.     Block C11 is sent to [facility] for MI Profile Testing.   Immunohistochemistry (IHC) testing for Mismatch Repair (MMR) proteins has been requested on block C9, and the results will be issued in an addendum.     D. Lymph Node, Portacaval node:  - Eight lymph nodes, negative for malignancy (0/8).     E. Soft Tissue, Other, Additional Uncinate/Pancreas:  - Portion of pancreas with no pathologic abnormality  - Two lymph nodes, negative for malignancy (0/2).     9/12/2024 -  Cancer Staged    Staging form: Pancreas, AJCC 8th Edition  - Pathologic stage from 9/12/2024: Stage IIB (pT2, pN1, cM0) - Signed by Jacob Colby MD on 10/2/2024  Stage prefix: Initial diagnosis  Histologic grade (G): G2  Histologic grading system: 3 grade system  Lymph-vascular invasion (LVI): LVI present/identified, NOS  Carbohydrate antigen 19-9 (CA 19-9) (U/mL): 14  Carcinoembryonic antigen (CEA) (ng/mL): 0.7           Interval history:    ROS: Review of Systems   Constitutional:  Negative for chills and fever.   HENT:  Negative for ear pain and sore throat.    Eyes:  Negative for  pain and visual disturbance.   Respiratory:  Negative for cough and shortness of breath.    Cardiovascular:  Negative for chest pain and palpitations.   Gastrointestinal:  Positive for constipation. Negative for abdominal pain and vomiting.   Genitourinary:  Negative for dysuria and hematuria.   Musculoskeletal:  Negative for arthralgias and back pain.   Skin:  Negative for color change and rash.   Neurological:  Negative for seizures and syncope.   Psychiatric/Behavioral:  Positive for sleep disturbance.    All other systems reviewed and are negative.      Past Medical History:   Diagnosis Date    Benign essential hypertension 05/08/2014    CAD (coronary artery disease)     s/p NAVA prox LAD and D1 7/28/2022    Cancer (HCC)     Prostate    Coronary artery disease involving native coronary artery of native heart without angina pectoris 08/09/2022    Enteritis 08/28/2024    GERD (gastroesophageal reflux disease)     Hyperlipidemia     Hypertension     Other chest pain     Palpitations     Prostate cancer (HCC) 04/18/2023    Sleep apnea        Past Surgical History:   Procedure Laterality Date    CARDIAC CATHETERIZATION Left 7/28/2022    Procedure: Cardiac catheterization-left heart catheterization;  Surgeon: Sai Henry MD;  Location: [facility];  Service: Cardiology    CARDIAC CATHETERIZATION N/A 7/28/2022    Procedure: Cardiac pci;  Surgeon: Sai Henry MD;  Location: [facility];  Service: Cardiology    HERNIA REPAIR      IR EMBOLIZATION (SPECIFY VESSEL OR SITE)  9/13/2024    OH LAPS SURG [id number] RPBIC RAD W/NRV SPARING ROBOT N/A 5/22/2023    Procedure: PROSTATECTOMY RADICAL & PELVIC LYMPH NODE DISSECTION  W/ ROBOT;  Surgeon: Otoniel Harmon MD;  Location: [facility];  Service: Urology    OH PROSTATE NEEDLE BIOPSY ANY APPROACH N/A 2/28/2023    Procedure: TRANSRECTAL MRI FUSION BIOPSY PROSTATE;  Surgeon: Milan Arellano MD;  Location: [facility];  Service: Urology    [place]  PROCEDURE/PANCREATICO-DUODENECTOMY N/A 2024    Procedure: WHIPPLE PROCEDURE/PANCREATICO-DUODENECTOMY;  Surgeon: Marika Ac MD;  Location: [facility];  Service: Surgical Oncology       Social History     Socioeconomic History    Marital status: /Civil Union     Spouse name: None    Number of children: None    Years of education: None    Highest education level: None   Occupational History    None   Tobacco Use    Smoking status: Former     Current packs/day: 0.00     Types: Cigarettes     Quit date: 2022     Years since quittin.7    Smokeless tobacco: Never   Vaping Use    Vaping status: Never Used   Substance and Sexual Activity    Alcohol use: Not Currently    Drug use: Never    Sexual activity: None   Other Topics Concern    None   Social History Narrative    EMPLOYED         Social Determinants of Health     Financial Resource Strain: Not on file   Food Insecurity: No Food Insecurity (2024)    Hunger Vital Sign     Worried About Running Out of Food in the Last Year: Never true     Ran Out of Food in the Last Year: Never true   Transportation Needs: No Transportation Needs (2024)    PRAPARE - Transportation     Lack of Transportation (Medical): No     Lack of Transportation (Non-Medical): No   Physical Activity: Not on file   Stress: Not on file   Social Connections: Unknown (2024)    Received from [employer]    Social Single Touch Systems     How often do you feel lonely or isolated from those around you? (Adult - for ages 18 years and over): Not on file   Intimate Partner Violence: Not on file   Housing Stability: Low Risk  (2024)    Housing Stability Vital Sign     Unable to Pay for Housing in the Last Year: No     Number of Times Moved in the Last Year: 0     Homeless in the Last Year: No       Family History   Problem Relation Age of Onset    No Known Problems Mother     No Known Problems Father        No Known Allergies      Current Outpatient Medications:      "aspirin 81 mg chewable tablet, Chew 81 mg daily, Disp: , Rfl:     atorvastatin (LIPITOR) 20 mg tablet, Take 1 tablet (20 mg total) by mouth daily, Disp: 30 tablet, Rfl: 5    docusate sodium (COLACE) 100 mg capsule, Take 1 capsule (100 mg total) by mouth daily for 7 days, Disp: 7 capsule, Rfl: 0    enoxaparin (LOVENOX) 40 mg/0.4 mL, Inject 0.4 mL (40 mg total) under the skin every 24 hours for 21 days, Disp: 8.4 mL, Rfl: 0    lisinopril (ZESTRIL) 20 mg tablet, Take 1 tablet (20 mg total) by mouth daily, Disp: 30 tablet, Rfl: 0    ondansetron (ZOFRAN) 4 mg tablet, Take 1 tablet (4 mg total) by mouth every 8 (eight) hours as needed for nausea or vomiting, Disp: 20 tablet, Rfl: 0    pantoprazole (PROTONIX) 40 mg tablet, Take 1 tablet (40 mg total) by mouth daily before breakfast Do not start before September 20, 2024., Disp: 30 tablet, Rfl: 0    celecoxib (CeleBREX) 200 mg capsule, Take 1 capsule (200 mg total) by mouth daily for 7 days (Patient not taking: Reported on 10/2/2024), Disp: 7 capsule, Rfl: 0    gabapentin (NEURONTIN) 300 mg capsule, Take 1 capsule (300 mg total) by mouth 3 (three) times a day for 7 days (Patient not taking: Reported on 10/2/2024), Disp: 21 capsule, Rfl: 0    methocarbamol (ROBAXIN) 500 mg tablet, Take 1 tablet (500 mg total) by mouth every 8 (eight) hours for 7 days (Patient not taking: Reported on 10/2/2024), Disp: 21 tablet, Rfl: 0    polyethylene glycol (MIRALAX) 17 g packet, Take 17 g by mouth daily for 7 days Do not start before September 20, 2024. (Patient taking differently: Take 17 g by mouth daily. Mix with 8oz fluid.), Disp: , Rfl:       Physical Exam:  /70 (BP Location: Left arm, Patient Position: Sitting, Cuff Size: Adult)   Pulse 82   Temp 98.2 °F (36.8 °C)   Resp 18   Ht 5' 4\" (1.626 m)   Wt 57.6 kg (127 lb)   SpO2 99%   BMI 21.80 kg/m²     Physical Exam  Constitutional:       Appearance: He is well-developed.   HENT:      Head: Normocephalic and atraumatic.      " Nose: Nose normal.   Eyes:      General: No scleral icterus.        Right eye: No discharge.         Left eye: No discharge.      Conjunctiva/sclera: Conjunctivae normal.      Pupils: Pupils are equal, round, and reactive to light.   Neck:      Thyroid: No thyromegaly.      Trachea: No tracheal deviation.   Cardiovascular:      Rate and Rhythm: Normal rate and regular rhythm.      Heart sounds: Normal heart sounds. No murmur heard.     No friction rub.   Pulmonary:      Effort: Pulmonary effort is normal. No respiratory distress.      Breath sounds: Normal breath sounds. No wheezing or rales.   Chest:      Chest wall: No tenderness.   Abdominal:      General: There is no distension.      Palpations: Abdomen is soft. There is no hepatomegaly or splenomegaly.      Tenderness: There is abdominal tenderness. There is no guarding or rebound.      Comments: Mid abdominal wound healing seems to be appropriate.   Musculoskeletal:         General: No tenderness or deformity. Normal range of motion.      Cervical back: Normal range of motion and neck supple.   Lymphadenopathy:      Cervical: No cervical adenopathy.   Skin:     General: Skin is warm and dry.      Coloration: Skin is not pale.      Findings: No erythema or rash.   Neurological:      Mental Status: He is alert and oriented to person, place, and time.      Cranial Nerves: No cranial nerve deficit.      Coordination: Coordination normal.      Deep Tendon Reflexes: Reflexes are normal and symmetric.   Psychiatric:         Behavior: Behavior normal.         Thought Content: Thought content normal.         Judgment: Judgment normal.           Labs:  Lab Results   Component Value Date    WBC 9.61 09/19/2024    HGB 8.4 (L) 09/19/2024    HCT 26.8 (L) 09/19/2024    MCV 92 09/19/2024     09/19/2024     Lab Results   Component Value Date    K 4.3 09/19/2024     09/19/2024    CO2 31 09/19/2024    BUN 9 09/19/2024    CREATININE 0.64 09/19/2024    GLUCOSE 153 (H)  "09/12/2024    GLUF 92 05/22/2023    CALCIUM 8.0 (L) 09/19/2024    CORRECTEDCA 7.5 (L) 09/13/2024    AST 12 (L) 09/16/2024    ALT 62 (H) 09/16/2024    ALKPHOS 69 09/16/2024    EGFR 102 09/19/2024     No results found for: \"TSH\"    Patient voiced understanding and agreement in the above discussion. Aware to contact our office with questions/symptoms in the interim.   "

## 2024-10-02 NOTE — PROGRESS NOTES
In-basket message received from Dr. Ac to add patient to the [facility] on 10/17/2024. Chart reviewed and prep completed.

## 2024-10-02 NOTE — ASSESSMENT & PLAN NOTE
Will present at [facility]. Will need adj therapy. I will see him in 3-4 mo to see how he's doing. Drain out today. Reviewed path in full.

## 2024-10-03 ENCOUNTER — OFFICE VISIT (OUTPATIENT)
Dept: FAMILY MEDICINE CLINIC | Facility: CLINIC | Age: 64
End: 2024-10-03

## 2024-10-03 ENCOUNTER — TELEPHONE (OUTPATIENT)
Dept: SURGICAL ONCOLOGY | Facility: CLINIC | Age: 64
End: 2024-10-03

## 2024-10-03 ENCOUNTER — HOME CARE VISIT (OUTPATIENT)
Dept: HOME HEALTH SERVICES | Facility: HOME HEALTHCARE | Age: 64
End: 2024-10-03
Payer: MEDICARE

## 2024-10-03 VITALS
BODY MASS INDEX: 21.27 KG/M2 | WEIGHT: 124.6 LBS | HEIGHT: 64 IN | HEART RATE: 74 BPM | SYSTOLIC BLOOD PRESSURE: 112 MMHG | OXYGEN SATURATION: 98 % | DIASTOLIC BLOOD PRESSURE: 62 MMHG | TEMPERATURE: 96.4 F

## 2024-10-03 VITALS
SYSTOLIC BLOOD PRESSURE: 114 MMHG | DIASTOLIC BLOOD PRESSURE: 70 MMHG | HEART RATE: 74 BPM | TEMPERATURE: 97.2 F | OXYGEN SATURATION: 98 %

## 2024-10-03 DIAGNOSIS — I10 BENIGN ESSENTIAL HYPERTENSION: ICD-10-CM

## 2024-10-03 DIAGNOSIS — I73.9 PERIPHERAL VASCULAR DISEASE (HCC): ICD-10-CM

## 2024-10-03 DIAGNOSIS — C25.9 PANCREATIC ADENOCARCINOMA (HCC): Primary | ICD-10-CM

## 2024-10-03 DIAGNOSIS — Z13.6 SCREENING FOR CARDIOVASCULAR CONDITION: ICD-10-CM

## 2024-10-03 DIAGNOSIS — Z23 ENCOUNTER FOR IMMUNIZATION: ICD-10-CM

## 2024-10-03 PROCEDURE — G0299 HHS/HOSPICE OF RN EA 15 MIN: HCPCS

## 2024-10-03 PROCEDURE — 99495 TRANSJ CARE MGMT MOD F2F 14D: CPT | Performed by: FAMILY MEDICINE

## 2024-10-03 RX ORDER — LISINOPRIL 20 MG/1
20 TABLET ORAL DAILY
Qty: 30 TABLET | Refills: 3 | Status: SHIPPED | OUTPATIENT
Start: 2024-10-03

## 2024-10-03 NOTE — PROGRESS NOTES
Assessment/Plan:    No problem-specific Assessment & Plan notes found for this encounter.       Diagnoses and all orders for this visit:    Pancreatic adenocarcinoma (HCC)    Encounter for immunization    Benign essential hypertension  -     lisinopril (ZESTRIL) 20 mg tablet; Take 1 tablet (20 mg total) by mouth daily    Screening for cardiovascular condition  -     Lipid panel; Future    Peripheral vascular disease (HCC)          PHQ-2/9 Depression Screening    Little interest or pleasure in doing things: 0 - not at all  Feeling down, depressed, or hopeless: 0 - not at all  PHQ-2 Score: 0  PHQ-2 Interpretation: Negative depression screen        TCM Call       Date and time call was made  9/19/2024  4:20 PM    Hospital care reviewed  Records reviewed    Patient was hospitialized at  [facility]    Date of Admission  09/12/24    Date of discharge  09/19/24    Diagnosis  Pancreatic mass    Disposition  Home          TCM Call       Scheduled for follow up?  Yes    Did you obtain your prescribed medications  Yes    Do you need help managing your prescriptions or medications  No    Is transportation to your appointment needed  No    I have advised the patient to call PCP with any new or worsening symptoms  wendi wood             Subjective:      Patient ID: Isaac Kramer is a 63 y.o. male.    Hospital follow up for pancreatic mass, pt had surgery to remove the mass and debulk the metastasis, pt is set up for chemo      The following portions of the patient's history were reviewed and updated as appropriate: allergies, current medications, past family history, past medical history, past social history, past surgical history, and problem list.    Review of Systems   Constitutional:  Positive for unexpected weight change. Negative for chills, fatigue and fever.   Gastrointestinal:  Negative for abdominal pain, constipation, diarrhea, nausea and vomiting.         Objective:    /62   Pulse 74   Temp (!)  "96.4 °F (35.8 °C) (Tympanic)   Ht 5' 4\" (1.626 m)   Wt 56.5 kg (124 lb 9.6 oz)   SpO2 98%   BMI 21.39 kg/m²      Physical Exam  Vitals and nursing note reviewed.   Constitutional:       General: He is not in acute distress.     Appearance: Normal appearance. He is not ill-appearing or diaphoretic.   HENT:      Head: Normocephalic and atraumatic.   Eyes:      Conjunctiva/sclera: Conjunctivae normal.   Cardiovascular:      Rate and Rhythm: Normal rate and regular rhythm.      Heart sounds: Normal heart sounds. No murmur heard.  Pulmonary:      Effort: Pulmonary effort is normal. No respiratory distress.      Breath sounds: Normal breath sounds. No wheezing, rhonchi or rales.   Abdominal:      General: There is no distension.      Palpations: Abdomen is soft. There is no mass.      Tenderness: There is no abdominal tenderness. There is no guarding or rebound.   Musculoskeletal:      Cervical back: Normal range of motion and neck supple. No rigidity.      Right lower leg: No edema.      Left lower leg: No edema.   Lymphadenopathy:      Cervical: No cervical adenopathy.   Neurological:      Mental Status: He is alert and oriented to person, place, and time.   Psychiatric:         Mood and Affect: Mood normal.         Behavior: Behavior normal.         Thought Content: Thought content normal.         Judgment: Judgment normal.         "

## 2024-10-03 NOTE — TELEPHONE ENCOUNTER
Oncology Finance Advocacy Intake and Intervention  Oncology Finance Counselor/Advocate placed call to patient. This writer informed patient that this writer is here to assist patient with billing questions, financial assistance, payment/payment plans, quotes, copayment assistance, insurance optimization, and insurance navigation.    This writer conducted a thorough benefit review of copayment, deductible, and out of pocket cost. This information is documented below and has been reviewed with patient.     Copayment:  Deductible:  Out of Pocket Cost:  Insurance optimization (Limited benefit vs self-pay):  Patient assistance status:Patient Outreach  Free Drug Applications:N/A   Oral Chemo Application:N/A  BIN#:  PCN#:  GRP#:  Copay:$  Interventions:    E mail received from prior authorization team regarding the patient and him only having Medicare A for coverage:    Patient only has Medicare Part A. Confirmed this on Medicare website. I ran an [employer] discovery query and it came back no other coverages found. I called the patient and left a message for a return call. Patient is scheduled to begin treatment on 10/21/24 for his chemotherapy (Leucovorin, Irinotecan, Oxaliplatin, Emend, Fluorouracil and Neualsta OnPro). I'm not sure if anything can be done to assist him or if his only option would be to constanza him self pay and for him to get a GFE.     Thanks,    Sharmaine Vazquez Prior Authorization and Referral Specialist    Prior to reaching out to the patient I would review his self pay balances and see that he has been working with the [facility] Financial Counselor team for assistance through [facility].    I would then place a call to the patient and after I would introduce myself as an Oncology  at [facility] I asked if he was in the process of obtaining additional insurance to support his Medicare A and open enrollment starts 10/18/24. He stated he was thinking about it but has insurance  already with [facility]. I stated that would be [facility] assistance and may not cover other costs for procedures outside of [facility].   He will wait to hear back from the Hospital Financial Counselors regarding his application for support.  I stated that if he has any other questions or concerns that I can address for him my contact information would be on [employer]amel[employer].    Information above was review thoroughly with patient and patient was advise of possible assistance programs/interventions. If any question arise patient can contact this writer at below information. This information was given to patient at time of contact.      Notes in Guarantor:    Today     09:03 AM  General Fa Approved    Account #[id number]  Aby Hein  09:03 AM  General Fa Approved    Account #[id number]  Aby Hein  Phone:[phone]  Email: [email or web address]

## 2024-10-07 LAB — FUNGUS SPEC CULT: NORMAL

## 2024-10-10 ENCOUNTER — TELEPHONE (OUTPATIENT)
Dept: HEMATOLOGY ONCOLOGY | Facility: CLINIC | Age: 64
End: 2024-10-10

## 2024-10-10 ENCOUNTER — PATIENT OUTREACH (OUTPATIENT)
Dept: HEMATOLOGY ONCOLOGY | Facility: CLINIC | Age: 64
End: 2024-10-10

## 2024-10-10 NOTE — PROGRESS NOTES
I reached out and spoke with ANNE MARIE now that consults have been completed with the oncology teams to review for any barriers to care and offer supportive services as needed. Distress Thermometer completed at this time. Patient scored 1/10. Based on responses to DT, no indication for referral to SW needed at this time.  I reviewed and updated the members assigned to the care team in [facility].   He knows the members of the care team as well as how and when to contact them with any needs.   He verbalizes managing the schedules well.   He is currently unable to drive but is supported by family or friends and denies transportation needs.    He denies any uncontrolled symptoms. Discussed role of Palliative Care in symptom and side effect management. Declined referral at this time.  Patients states that he is eating and drinking as per usual with no unintentional weight loss.     Patient does not smoke.   Patient states he is well supported by family and friends.    Patient feels he has inadequate financial resources related to INSURANCE COVERAGE AND IS ALREADY WORKING WITH MAYANK MENDOZA Routing message to Oncology Financial Advocacy Team.    I did notice he is currently scheduled for C1 10/21 however he does not get his port until 10/28. Message sent to clinical team to address.    Based on individual needs I will follow up in about 3 weeks.   I have provided my direct contact information and welcome them to contact me if their needs as discussed above change. He was appreciative for the call.

## 2024-10-10 NOTE — TELEPHONE ENCOUNTER
Spoke with patient and changed his follow up appointment with dr gómez from 11/05/2024 to 11/21/2024 @ 10:20. Patient agreed.

## 2024-10-10 NOTE — TELEPHONE ENCOUNTER
Patient's schedule has been updated. Patient is aware and understanding of scheduled dates and times. Shanita- Can you please change order dates to reflect scheduled appointments. Thanks

## 2024-10-14 LAB — FUNGUS SPEC CULT: NORMAL

## 2024-10-17 ENCOUNTER — DOCUMENTATION (OUTPATIENT)
Dept: HEMATOLOGY ONCOLOGY | Facility: CLINIC | Age: 64
End: 2024-10-17

## 2024-10-17 NOTE — PROGRESS NOTES
RECTAL/GI MULTIDISCIPLINARY CASE REVIEW    DATE: 10/17/2024      PRESENTING DOCTOR: Dr. Ac      DIAGNOSIS: Pancreatic cancer IIB      Isaac DUGAN Berkshire was presented at the Rectal/GI Multidisciplinary Conference today.      PHYSICIAN RECOMMENDED PLAN:    (Positive node and perineural invasion)  -Recommendation for adjuvant chemotherapy with FOLFIRINOX, possibly followed by chemo/RT. Patient is set up for infusion appointments and seeing Dr. Colby (medical oncology).    Team agreed to plan.    The final treatment plan will be left to the discretion of the patient and the treating physician.     DISCLAIMERS:  TO THE TREATING PHYSICIAN:  This conference is a meeting of clinicians from various specialty areas who evaluate and discuss patients for whom a multidisciplinary treatment approach is being considered. Please note that the above opinion was a consensus of the conference attendees and is intended only to assist in quality care of the discussed patient.  The responsibility for follow up on the input given during the conference, along with any final decisions regarding plan of care, is that of the patient and the patient's provider. Accordingly, appointments have only been recommended based on this information and have NOT been scheduled unless otherwise noted.      TO THE PATIENT:  This summary is a brief record of major aspects of your cancer treatment. You may choose to share a copy with any of your doctors or nurses. However, this is not a detailed or comprehensive record of your care.      NCCN guidelines were readily available for review at this discussion

## 2024-10-18 NOTE — PROGRESS NOTES
Dr. Colby aware of recommendations  Patient starting chemotherapy at the beginning of November  Scheduled appointment on 11/21 OK

## 2024-10-21 ENCOUNTER — TELEPHONE (OUTPATIENT)
Dept: INTERVENTIONAL RADIOLOGY/VASCULAR | Facility: HOSPITAL | Age: 64
End: 2024-10-21

## 2024-10-21 NOTE — PROGRESS NOTES
Spoke with Isaac and confirmed appointment at the [facility] on 10/28 arrival time of 1000.  Instructed to have nothing to eat or drink after midnight and have a ride to and from. Sent my chart message with information as well. Consult complete.

## 2024-10-22 RX ORDER — SODIUM CHLORIDE 9 MG/ML
125 INJECTION, SOLUTION INTRAVENOUS CONTINUOUS
OUTPATIENT
Start: 2024-10-22

## 2024-10-24 ENCOUNTER — HOSPITAL ENCOUNTER (OUTPATIENT)
Dept: RADIOLOGY | Facility: HOSPITAL | Age: 64
Discharge: HOME | End: 2024-10-24

## 2024-10-24 DIAGNOSIS — K83.09 ACUTE OBSTRUCTIVE CHOLANGITIS: ICD-10-CM

## 2024-10-28 ENCOUNTER — HOSPITAL ENCOUNTER (OUTPATIENT)
Dept: INTERVENTIONAL RADIOLOGY/VASCULAR | Facility: HOSPITAL | Age: 64
Discharge: HOME | End: 2024-10-28
Attending: INTERNAL MEDICINE

## 2024-10-28 VITALS
RESPIRATION RATE: 16 BRPM | BODY MASS INDEX: 21.17 KG/M2 | WEIGHT: 124 LBS | TEMPERATURE: 97.5 F | HEART RATE: 73 BPM | DIASTOLIC BLOOD PRESSURE: 62 MMHG | OXYGEN SATURATION: 98 % | SYSTOLIC BLOOD PRESSURE: 168 MMHG | HEIGHT: 64 IN

## 2024-10-28 DIAGNOSIS — C25.9 PANCREATIC ADENOCARCINOMA (HCC): ICD-10-CM

## 2024-10-28 PROBLEM — T45.1X5A CHEMOTHERAPY-INDUCED NEUTROPENIA (HCC): Status: ACTIVE | Noted: 2024-10-28

## 2024-10-28 PROBLEM — D70.1 CHEMOTHERAPY-INDUCED NEUTROPENIA (HCC): Status: ACTIVE | Noted: 2024-10-28

## 2024-10-28 PROBLEM — Z45.2 ENCOUNTER FOR CENTRAL LINE CARE: Status: ACTIVE | Noted: 2024-10-28

## 2024-10-28 PROCEDURE — 76937 US GUIDE VASCULAR ACCESS: CPT

## 2024-10-28 PROCEDURE — C1788 PORT, INDWELLING, IMP: HCPCS

## 2024-10-28 PROCEDURE — 77001 FLUOROGUIDE FOR VEIN DEVICE: CPT | Performed by: RADIOLOGY

## 2024-10-28 PROCEDURE — 76937 US GUIDE VASCULAR ACCESS: CPT | Performed by: RADIOLOGY

## 2024-10-28 PROCEDURE — 99152 MOD SED SAME PHYS/QHP 5/>YRS: CPT

## 2024-10-28 PROCEDURE — 77001 FLUOROGUIDE FOR VEIN DEVICE: CPT

## 2024-10-28 PROCEDURE — 36561 INSERT TUNNELED CV CATH: CPT

## 2024-10-28 PROCEDURE — 36561 INSERT TUNNELED CV CATH: CPT | Performed by: RADIOLOGY

## 2024-10-28 PROCEDURE — 99152 MOD SED SAME PHYS/QHP 5/>YRS: CPT | Performed by: RADIOLOGY

## 2024-10-28 PROCEDURE — 99153 MOD SED SAME PHYS/QHP EA: CPT

## 2024-10-28 RX ORDER — SODIUM CHLORIDE 9 MG/ML
30 INJECTION, SOLUTION INTRAVENOUS CONTINUOUS
Status: DISCONTINUED | OUTPATIENT
Start: 2024-10-28 | End: 2024-11-01 | Stop reason: HOSPADM

## 2024-10-28 RX ORDER — CEFAZOLIN SODIUM 1 G/50ML
1000 SOLUTION INTRAVENOUS ONCE
Status: CANCELLED | OUTPATIENT
Start: 2024-10-28 | End: 2024-10-28

## 2024-10-28 RX ORDER — SODIUM CHLORIDE 9 MG/ML
20 INJECTION, SOLUTION INTRAVENOUS ONCE AS NEEDED
Status: CANCELLED | OUTPATIENT
Start: 2024-11-04

## 2024-10-28 RX ORDER — CEFAZOLIN SODIUM 1 G/50ML
1000 SOLUTION INTRAVENOUS ONCE
Status: COMPLETED | OUTPATIENT
Start: 2024-10-28 | End: 2024-10-28

## 2024-10-28 RX ORDER — LIDOCAINE HYDROCHLORIDE AND EPINEPHRINE 10; 10 MG/ML; UG/ML
INJECTION, SOLUTION INFILTRATION; PERINEURAL AS NEEDED
Status: COMPLETED | OUTPATIENT
Start: 2024-10-28 | End: 2024-10-28

## 2024-10-28 RX ORDER — MIDAZOLAM HYDROCHLORIDE 2 MG/2ML
INJECTION, SOLUTION INTRAMUSCULAR; INTRAVENOUS AS NEEDED
Status: COMPLETED | OUTPATIENT
Start: 2024-10-28 | End: 2024-10-28

## 2024-10-28 RX ORDER — LIDOCAINE WITH 8.4% SOD BICARB 0.9%(10ML)
SYRINGE (ML) INJECTION AS NEEDED
Status: COMPLETED | OUTPATIENT
Start: 2024-10-28 | End: 2024-10-28

## 2024-10-28 RX ORDER — ATROPINE SULFATE 1 MG/ML
0.25 INJECTION, SOLUTION INTRAVENOUS ONCE
Status: CANCELLED | OUTPATIENT
Start: 2024-11-04

## 2024-10-28 RX ORDER — FENTANYL CITRATE 50 UG/ML
INJECTION, SOLUTION INTRAMUSCULAR; INTRAVENOUS AS NEEDED
Status: COMPLETED | OUTPATIENT
Start: 2024-10-28 | End: 2024-10-28

## 2024-10-28 RX ORDER — SODIUM CHLORIDE 9 MG/ML
30 INJECTION, SOLUTION INTRAVENOUS CONTINUOUS
Status: CANCELLED | OUTPATIENT
Start: 2024-10-28

## 2024-10-28 RX ORDER — ATROPINE SULFATE 1 MG/ML
0.25 INJECTION, SOLUTION INTRAVENOUS ONCE AS NEEDED
Status: CANCELLED | OUTPATIENT
Start: 2024-11-04

## 2024-10-28 RX ORDER — DEXTROSE MONOHYDRATE 50 MG/ML
20 INJECTION, SOLUTION INTRAVENOUS ONCE
Status: CANCELLED | OUTPATIENT
Start: 2024-11-04

## 2024-10-28 RX ADMIN — MIDAZOLAM 1 MG: 1 INJECTION INTRAMUSCULAR; INTRAVENOUS at 11:15

## 2024-10-28 RX ADMIN — FENTANYL CITRATE 50 MCG: 50 INJECTION, SOLUTION INTRAMUSCULAR; INTRAVENOUS at 11:08

## 2024-10-28 RX ADMIN — MIDAZOLAM 1 MG: 1 INJECTION INTRAMUSCULAR; INTRAVENOUS at 11:08

## 2024-10-28 RX ADMIN — MIDAZOLAM 1 MG: 1 INJECTION INTRAMUSCULAR; INTRAVENOUS at 11:28

## 2024-10-28 RX ADMIN — FENTANYL CITRATE 50 MCG: 50 INJECTION, SOLUTION INTRAMUSCULAR; INTRAVENOUS at 11:28

## 2024-10-28 RX ADMIN — LIDOCAINE HYDROCHLORIDE AND EPINEPHRINE 20 ML: 10; 10 INJECTION, SOLUTION INFILTRATION; PERINEURAL at 11:30

## 2024-10-28 RX ADMIN — CEFAZOLIN SODIUM 1000 MG: 1 SOLUTION INTRAVENOUS at 10:38

## 2024-10-28 RX ADMIN — Medication 10 ML: at 11:22

## 2024-10-28 RX ADMIN — FENTANYL CITRATE 50 MCG: 50 INJECTION, SOLUTION INTRAMUSCULAR; INTRAVENOUS at 11:15

## 2024-10-28 NOTE — DISCHARGE INSTRUCTIONS
[facility]  Interventional Radiology  [phone]\    Procedural Sedation   WHAT YOU NEED TO KNOW:   Procedural sedation is medicine used during procedures to help you feel relaxed and calm. You will remember little to none of the procedure. After sedation you may feel tired, weak, or unsteady on your feet. You may also have trouble concentrating or short-term memory loss. These symptoms should go away in 24 hours or less.   DISCHARGE INSTRUCTIONS:   Call 911 or have someone else call for any of the following:   You have sudden trouble breathing.     You cannot be woken.     Contact Interventional Radiology at [phone]   [facility] PATIENTS: Contact Interventional Radiology at [phone] [place] PATIENTS: Contact Interventional Radiology at [phone]) if any of the following occur:      You have a severe headache or dizziness.     Your heart is beating faster than usual.    You have a fever or chills.     Your skin is itchy, swollen, or you have a rash.     You have nausea or are vomiting for more than 8 hours after the procedure.      You have questions or concerns about your condition or care.  Self-care:   Have someone stay with you for 24 hours. This person can drive you to errands and help you do things around the house. This person can also watch for problems.      Rest and do quiet activities for 24 hours. Do not exercise, ride a bike, or play sports. Stand up slowly to prevent dizziness and falls. Take short walks around the house with another person. Slowly return to your usual activities the next day.      Do not drive or use dangerous machines or tools for 24 hours. You may injure yourself or others. Examples include a lawnmower, saw, or drill. Do not return to work for 24 hours if you use dangerous machines or tools for work.      Do not make important decisions for 24 hours. For example, do not sign important papers or invest money.      Drink liquids as directed.  Liquids help flush the sedation medicine out of your body. Ask how much liquid to drink each day and which liquids are best for you.      Eat small, frequent meals to prevent nausea and vomiting. Start with clear liquids such as juice or broth. If you do not vomit after clear liquids, you can eat your usual foods.      Do not drink alcohol or take medicines that make you drowsy. This includes medicines that help you sleep and anxiety medicines. Ask your healthcare provider if it is safe for you to take pain medicine.  Follow up with your healthcare provider as directed: Write down your questions so you remember to ask them during your visits.     Implanted Venous Access Port     WHAT YOU NEED TO KNOW:   An implanted venous access port is a device used to give treatments and take blood. It may also be called a central venous access device (CVAD). The port is a small container that is placed under your skin, usually in your upper chest. The port is attached to a catheter that enters a large vein.   DISCHARGE INSTRUCTIONS:   Resume your normal diet. Small sips of flat soda will help with mild nausea.  Prevent an infection:   Wash your hands often.  Use soap and water. Clean your hands before and after you care for your port. Remind everyone who cares for your port to wash their hands.   Check your skin for infection every day.  Look for redness, swelling, or fluid oozing from the port site.  Care for your port:   1. You may shower beginning 48 hours after procedure.     2.  Leave glue in place.    3. It is normal for some bruising to occur.    4. Use Tylenol for pain.    5. Limit use of arm on the side that your port was placed. Lift nothing heavier than 5 pounds for 1 week, and then gradually increase activity as tolerated.    6. DO NOT apply ointment, lotion or cream to port site until incision is healed. Allow glue to fall off. DO NOT attempt to peel glue from skin even it it begins to flake.     7. After the port  incision is healed you may swim, bathe.  Notify the Interventional Radiologist if you have any of the followin. Fever above 101 F    2. Increased redness or swelling after 1st day.     3. Increased pain after 1st day.    4. Any sign of infection (drainage from port site, skin separation, hot to touch).    5. Persistent nausea or vomiting.    Contact Interventional Radiology at [phone] ([facility] PATIENTS: Contact Interventional Radiology at [phone]) ([facility] PATIENTS: Contact Interventional Radiology at [phone]).

## 2024-10-28 NOTE — H&P
Interventional Radiology  History and Physical 10/28/2024     Isaac Kramer   1960   [id number]    Assessment/Plan:  Assessment is pancreas cancer.  Postsurgical.  For chemotherapy.  Plan is port placement.  Patient has a rifle cynthia.  Will use left side.    Problem List Items Addressed This Visit          Digestive    Pancreatic adenocarcinoma (HCC)    Relevant Orders    IR port placement          Subjective:     Patient ID: Isaac Kramer is a 63 y.o. male.    History of Present Illness  Pancreas cancer, for treatment    Review of Systems      Past Medical History:   Diagnosis Date    Benign essential hypertension 05/08/2014    CAD (coronary artery disease)     s/p NAVA prox LAD and D1 7/28/2022    Cancer (HCC)     Prostate    Coronary artery disease involving native coronary artery of native heart without angina pectoris 08/09/2022    Enteritis 08/28/2024    GERD (gastroesophageal reflux disease)     Hyperlipidemia     Hypertension     Other chest pain     Palpitations     Prostate cancer (HCC) 04/18/2023    Sleep apnea         Past Surgical History:   Procedure Laterality Date    CARDIAC CATHETERIZATION Left 7/28/2022    Procedure: Cardiac catheterization-left heart catheterization;  Surgeon: Sai Henry MD;  Location: [facility] CARDIAC [facility]ATH [facility];  Service: Cardiology    CARDIAC CATHETERIZATION N/A 7/28/2022    Procedure: Cardiac pci;  Surgeon: Sai Henry MD;  Location: [facility] CARDIAC CATH [facility];  Service: Cardiology    HERNIA REPAIR      IR EMBOLIZATION (SPECIFY VESSEL OR SITE)  9/13/2024    UT LAPS SURG [id number] RPBIC RAD W/NRV SPARING ROBOT N/A 5/22/2023    Procedure: PROSTATECTOMY RADICAL & PELVIC LYMPH NODE DISSECTION  W/ ROBOT;  Surgeon: Otoniel Harmon MD;  Location: [facility];  Service: Urology    UT PROSTATE NEEDLE BIOPSY ANY APPROACH N/A 2/28/2023    Procedure: TRANSRECTAL MRI FUSION BIOPSY PROSTATE;  Surgeon: Milan Arellano MD;  Location: [facility];  Service: Urology    [place]  PROCEDURE/PANCREATICO-DUODENECTOMY N/A 2024    Procedure: WHIPPLE PROCEDURE/PANCREATICO-DUODENECTOMY;  Surgeon: Marika Ac MD;  Location: [facility];  Service: Surgical Oncology        Social History     Tobacco Use   Smoking Status Former    Current packs/day: 0.00    Types: Cigarettes    Quit date: 2022    Years since quittin.8   Smokeless Tobacco Never        Social History     Substance and Sexual Activity   Alcohol Use Not Currently        Social History     Substance and Sexual Activity   Drug Use Never        No Known Allergies    Current Outpatient Medications   Medication Sig Dispense Refill    aspirin 81 mg chewable tablet Chew 81 mg daily      atorvastatin (LIPITOR) 20 mg tablet Take 1 tablet (20 mg total) by mouth daily 30 tablet 5    lisinopril (ZESTRIL) 20 mg tablet Take 1 tablet (20 mg total) by mouth daily 30 tablet 3    celecoxib (CeleBREX) 200 mg capsule Take 1 capsule (200 mg total) by mouth daily for 7 days (Patient not taking: Reported on 10/2/2024) 7 capsule 0    docusate sodium (COLACE) 100 mg capsule Take 1 capsule (100 mg total) by mouth daily for 7 days (Patient not taking: Reported on 10/2/2024) 7 capsule 0    enoxaparin (LOVENOX) 40 mg/0.4 mL Inject 0.4 mL (40 mg total) under the skin every 24 hours for 21 days 8.4 mL 0    gabapentin (NEURONTIN) 300 mg capsule Take 1 capsule (300 mg total) by mouth 3 (three) times a day for 7 days (Patient not taking: Reported on 10/2/2024) 21 capsule 0    lidocaine-prilocaine (EMLA) cream Apply topically as needed for mild pain (Patient not taking: Reported on 10/2/2024) 30 g 5    methocarbamol (ROBAXIN) 500 mg tablet Take 1 tablet (500 mg total) by mouth every 8 (eight) hours for 7 days (Patient not taking: Reported on 10/2/2024) 21 tablet 0    ondansetron (ZOFRAN) 4 mg tablet Take 1 tablet (4 mg total) by mouth every 8 (eight) hours as needed for nausea or vomiting (Patient not taking: Reported on 10/2/2024) 20 tablet 0     "ondansetron (ZOFRAN) 8 mg tablet Take 1 tablet (8 mg total) by mouth every 8 (eight) hours as needed for nausea or vomiting 30 tablet 5    pantoprazole (PROTONIX) 40 mg tablet Take 1 tablet (40 mg total) by mouth daily before breakfast Do not start before September 20, 2024. 30 tablet 0    polyethylene glycol (MIRALAX) 17 g packet Take 17 g by mouth daily for 7 days Do not start before September 20, 2024. (Patient taking differently: Take 17 g by mouth daily Mix with 8oz fluid.)       Current Facility-Administered Medications   Medication Dose Route Frequency Provider Last Rate Last Admin    ceFAZolin (ANCEF) IVPB (premix in dextrose) 1,000 mg 50 mL  1,000 mg Intravenous Once Mick Christianson MD   1,000 mg at 10/28/24 1038    sodium chloride 0.9 % infusion  30 mL/hr Intravenous Continuous Mick Christianson MD              Objective:    Vitals:    10/28/24 1000   BP: (!) 180/80   Pulse: 75   Resp: 16   Temp: 97.8 °F (36.6 °C)   TempSrc: Temporal   SpO2: 100%   Weight: 56.2 kg (124 lb)   Height: 5' 4\" (1.626 m)        Physical Exam      Patient awake alert and oriented.  Accompanied by his son.  They seem to have a good understanding of what is going on.  Regular rate and rhythm  Normal respiratory excursion  Not a lot of fat on the chest  The skin over the chest is clean, dry, and intact.    Lab Results   Component Value Date    BNP 51 06/30/2022      Lab Results   Component Value Date    WBC 9.61 09/19/2024    HGB 8.4 (L) 09/19/2024    HCT 26.8 (L) 09/19/2024    MCV 92 09/19/2024     09/19/2024     Lab Results   Component Value Date    INR 1.08 09/17/2024    INR 1.39 (H) 09/14/2024    INR 1.68 (H) 09/13/2024    PROTIME 14.3 09/17/2024    PROTIME 17.3 (H) 09/14/2024    PROTIME 20.0 (H) 09/13/2024     Lab Results   Component Value Date    PTT 32 09/10/2024         I have personally reviewed pertinent imaging and laboratory results.     Code Status: Prior  Advance Directive and Living Will:      Power of " :    POLST:      This text is generated with voice recognition software. There may be translation, syntax,  or grammatical errors. If you have any questions, please contact the dictating provider.

## 2024-10-29 DIAGNOSIS — D70.1 CHEMOTHERAPY-INDUCED NEUTROPENIA (HCC): ICD-10-CM

## 2024-10-29 DIAGNOSIS — T45.1X5A CHEMOTHERAPY-INDUCED NEUTROPENIA (HCC): ICD-10-CM

## 2024-10-29 DIAGNOSIS — C25.9 PANCREATIC ADENOCARCINOMA (HCC): Primary | ICD-10-CM

## 2024-11-01 ENCOUNTER — HOSPITAL ENCOUNTER (OUTPATIENT)
Dept: INFUSION CENTER | Facility: HOSPITAL | Age: 64
End: 2024-11-01

## 2024-11-01 VITALS — TEMPERATURE: 97.8 F

## 2024-11-01 DIAGNOSIS — Z45.2 ENCOUNTER FOR CENTRAL LINE CARE: Primary | ICD-10-CM

## 2024-11-01 DIAGNOSIS — C25.9 PANCREATIC ADENOCARCINOMA (HCC): ICD-10-CM

## 2024-11-01 DIAGNOSIS — D64.9 ANEMIA, UNSPECIFIED TYPE: ICD-10-CM

## 2024-11-01 DIAGNOSIS — E53.8 VITAMIN B12 DEFICIENCY: ICD-10-CM

## 2024-11-01 DIAGNOSIS — Z13.6 SCREENING FOR CARDIOVASCULAR CONDITION: ICD-10-CM

## 2024-11-01 LAB
ALBUMIN SERPL BCG-MCNC: 4.1 G/DL (ref 3.5–5)
ALP SERPL-CCNC: 96 U/L (ref 34–104)
ALT SERPL W P-5'-P-CCNC: 13 U/L (ref 7–52)
ANION GAP SERPL CALCULATED.3IONS-SCNC: 6 MMOL/L (ref 4–13)
AST SERPL W P-5'-P-CCNC: 15 U/L (ref 13–39)
BASOPHILS # BLD AUTO: 0.05 THOUSANDS/ΜL (ref 0–0.1)
BASOPHILS NFR BLD AUTO: 1 % (ref 0–1)
BILIRUB SERPL-MCNC: 0.49 MG/DL (ref 0.2–1)
BUN SERPL-MCNC: 15 MG/DL (ref 5–25)
CALCIUM SERPL-MCNC: 9.2 MG/DL (ref 8.4–10.2)
CHLORIDE SERPL-SCNC: 106 MMOL/L (ref 96–108)
CHOLEST SERPL-MCNC: 147 MG/DL
CO2 SERPL-SCNC: 27 MMOL/L (ref 21–32)
CREAT SERPL-MCNC: 0.64 MG/DL (ref 0.6–1.3)
EOSINOPHIL # BLD AUTO: 0.23 THOUSAND/ΜL (ref 0–0.61)
EOSINOPHIL NFR BLD AUTO: 3 % (ref 0–6)
ERYTHROCYTE [DISTWIDTH] IN BLOOD BY AUTOMATED COUNT: 13.2 % (ref 11.6–15.1)
FERRITIN SERPL-MCNC: 45 NG/ML (ref 24–336)
FOLATE SERPL-MCNC: 12.4 NG/ML
GFR SERPL CREATININE-BSD FRML MDRD: 102 ML/MIN/1.73SQ M
GLUCOSE SERPL-MCNC: 93 MG/DL (ref 65–140)
HCT VFR BLD AUTO: 38.9 % (ref 36.5–49.3)
HDLC SERPL-MCNC: 34 MG/DL
HGB BLD-MCNC: 12.2 G/DL (ref 12–17)
IMM GRANULOCYTES # BLD AUTO: 0.01 THOUSAND/UL (ref 0–0.2)
IMM GRANULOCYTES NFR BLD AUTO: 0 % (ref 0–2)
IRON SATN MFR SERPL: 25 % (ref 15–50)
IRON SERPL-MCNC: 73 UG/DL (ref 50–212)
LDLC SERPL CALC-MCNC: 91 MG/DL (ref 0–100)
LYMPHOCYTES # BLD AUTO: 1.58 THOUSANDS/ΜL (ref 0.6–4.47)
LYMPHOCYTES NFR BLD AUTO: 21 % (ref 14–44)
MAGNESIUM SERPL-MCNC: 2.1 MG/DL (ref 1.9–2.7)
MCH RBC QN AUTO: 27.8 PG (ref 26.8–34.3)
MCHC RBC AUTO-ENTMCNC: 31.4 G/DL (ref 31.4–37.4)
MCV RBC AUTO: 89 FL (ref 82–98)
MONOCYTES # BLD AUTO: 0.62 THOUSAND/ΜL (ref 0.17–1.22)
MONOCYTES NFR BLD AUTO: 8 % (ref 4–12)
NEUTROPHILS # BLD AUTO: 5.05 THOUSANDS/ΜL (ref 1.85–7.62)
NEUTS SEG NFR BLD AUTO: 67 % (ref 43–75)
NONHDLC SERPL-MCNC: 113 MG/DL
NRBC BLD AUTO-RTO: 0 /100 WBCS
PLATELET # BLD AUTO: 312 THOUSANDS/UL (ref 149–390)
PMV BLD AUTO: 9.7 FL (ref 8.9–12.7)
POTASSIUM SERPL-SCNC: 4.1 MMOL/L (ref 3.5–5.3)
PROT SERPL-MCNC: 6.5 G/DL (ref 6.4–8.4)
RBC # BLD AUTO: 4.39 MILLION/UL (ref 3.88–5.62)
SODIUM SERPL-SCNC: 139 MMOL/L (ref 135–147)
TIBC SERPL-MCNC: 291 UG/DL (ref 250–450)
TRIGL SERPL-MCNC: 108 MG/DL
UIBC SERPL-MCNC: 218 UG/DL (ref 155–355)
VIT B12 SERPL-MCNC: 214 PG/ML (ref 180–914)
WBC # BLD AUTO: 7.54 THOUSAND/UL (ref 4.31–10.16)

## 2024-11-01 PROCEDURE — 80061 LIPID PANEL: CPT

## 2024-11-01 PROCEDURE — 82728 ASSAY OF FERRITIN: CPT

## 2024-11-01 PROCEDURE — 83550 IRON BINDING TEST: CPT

## 2024-11-01 PROCEDURE — 83735 ASSAY OF MAGNESIUM: CPT

## 2024-11-01 PROCEDURE — 83540 ASSAY OF IRON: CPT

## 2024-11-01 PROCEDURE — 85025 COMPLETE CBC W/AUTO DIFF WBC: CPT

## 2024-11-01 PROCEDURE — 86301 IMMUNOASSAY TUMOR CA 19-9: CPT

## 2024-11-01 PROCEDURE — 82607 VITAMIN B-12: CPT

## 2024-11-01 PROCEDURE — 82746 ASSAY OF FOLIC ACID SERUM: CPT

## 2024-11-01 PROCEDURE — 80053 COMPREHEN METABOLIC PANEL: CPT

## 2024-11-01 NOTE — PROGRESS NOTES
Isaac Kramer  tolerated lab draw and port flush well with no complications.      Isaac Kramer is aware of future appt on 11/4 at 8:30AM.     AVS printed and given to Isaac Kramer: No (Declined by Isaac Kramer).

## 2024-11-01 NOTE — PROGRESS NOTES
Isaac Kramer  tolerated treatment well with no complications.  Labs obtained via port without incident.    Isaac Kramer is aware of future appt on 11/4 at 8:30 am.     AVS declined by Isaac Kramer.

## 2024-11-03 LAB — CANCER AG19-9 SERPL-ACNC: 4 U/ML (ref 0–35)

## 2024-11-04 ENCOUNTER — HOSPITAL ENCOUNTER (OUTPATIENT)
Dept: INFUSION CENTER | Facility: HOSPITAL | Age: 64
Discharge: HOME | End: 2024-11-04
Attending: INTERNAL MEDICINE

## 2024-11-04 VITALS
HEART RATE: 85 BPM | RESPIRATION RATE: 18 BRPM | SYSTOLIC BLOOD PRESSURE: 150 MMHG | WEIGHT: 130.5 LBS | BODY MASS INDEX: 22.28 KG/M2 | HEIGHT: 64 IN | DIASTOLIC BLOOD PRESSURE: 73 MMHG | TEMPERATURE: 98 F

## 2024-11-04 DIAGNOSIS — T45.1X5A CHEMOTHERAPY-INDUCED NEUTROPENIA (HCC): Primary | ICD-10-CM

## 2024-11-04 DIAGNOSIS — D70.1 CHEMOTHERAPY-INDUCED NEUTROPENIA (HCC): Primary | ICD-10-CM

## 2024-11-04 DIAGNOSIS — C25.9 PANCREATIC ADENOCARCINOMA (HCC): ICD-10-CM

## 2024-11-04 PROCEDURE — 96367 TX/PROPH/DG ADDL SEQ IV INF: CPT

## 2024-11-04 PROCEDURE — 96417 CHEMO IV INFUS EACH ADDL SEQ: CPT

## 2024-11-04 PROCEDURE — 96368 THER/DIAG CONCURRENT INF: CPT

## 2024-11-04 PROCEDURE — 96415 CHEMO IV INFUSION ADDL HR: CPT

## 2024-11-04 PROCEDURE — 96413 CHEMO IV INFUSION 1 HR: CPT

## 2024-11-04 PROCEDURE — 96375 TX/PRO/DX INJ NEW DRUG ADDON: CPT

## 2024-11-04 PROCEDURE — G0498 CHEMO EXTEND IV INFUS W/PUMP: HCPCS

## 2024-11-04 RX ORDER — DEXTROSE MONOHYDRATE 50 MG/ML
20 INJECTION, SOLUTION INTRAVENOUS ONCE
Status: COMPLETED | OUTPATIENT
Start: 2024-11-04 | End: 2024-11-04

## 2024-11-04 RX ORDER — ATROPINE SULFATE 1 MG/ML
0.25 INJECTION, SOLUTION INTRAVENOUS ONCE
Status: COMPLETED | OUTPATIENT
Start: 2024-11-04 | End: 2024-11-04

## 2024-11-04 RX ORDER — ATROPINE SULFATE 1 MG/ML
0.25 INJECTION, SOLUTION INTRAVENOUS ONCE AS NEEDED
Status: DISCONTINUED | OUTPATIENT
Start: 2024-11-04 | End: 2024-11-08 | Stop reason: HOSPADM

## 2024-11-04 RX ORDER — SODIUM CHLORIDE 9 MG/ML
20 INJECTION, SOLUTION INTRAVENOUS ONCE AS NEEDED
Status: DISCONTINUED | OUTPATIENT
Start: 2024-11-04 | End: 2024-11-08 | Stop reason: HOSPADM

## 2024-11-04 RX ADMIN — DEXTROSE 20 ML/HR: 5 SOLUTION INTRAVENOUS at 09:41

## 2024-11-04 RX ADMIN — ATROPINE SULFATE 0.25 MG: 1 INJECTION, SOLUTION INTRAVENOUS at 12:18

## 2024-11-04 RX ADMIN — DEXAMETHASONE SODIUM PHOSPHATE: 10 INJECTION, SOLUTION INTRAMUSCULAR; INTRAVENOUS at 08:40

## 2024-11-04 RX ADMIN — SODIUM CHLORIDE 20 ML/HR: 0.9 INJECTION, SOLUTION INTRAVENOUS at 08:38

## 2024-11-04 RX ADMIN — LEUCOVORIN CALCIUM 650 MG: 500 INJECTION, POWDER, LYOPHILIZED, FOR SOLUTION INTRAMUSCULAR; INTRAVENOUS at 11:48

## 2024-11-04 RX ADMIN — OXALIPLATIN 136.85 MG: 5 INJECTION, SOLUTION INTRAVENOUS at 09:46

## 2024-11-04 RX ADMIN — FOSAPREPITANT 150 MG: 150 INJECTION, POWDER, LYOPHILIZED, FOR SOLUTION INTRAVENOUS at 09:02

## 2024-11-04 RX ADMIN — IRINOTECAN HYDROCHLORIDE 240 MG: 20 INJECTION, SOLUTION INTRAVENOUS at 12:18

## 2024-11-04 NOTE — PROGRESS NOTES
Isaac Kramer  tolerated treatment well with no complications.  Elastomeric medicine ball connected, verified & unclamped by JAIME Mares RN.    Isaac Kramer is aware of future appt on 11/6 at 12 pm.     AVS declined by Isaac Kramer.     English

## 2024-11-06 ENCOUNTER — HOSPITAL ENCOUNTER (OUTPATIENT)
Dept: INFUSION CENTER | Facility: HOSPITAL | Age: 64
Discharge: HOME | End: 2024-11-06
Attending: INTERNAL MEDICINE

## 2024-11-06 DIAGNOSIS — D70.1 CHEMOTHERAPY-INDUCED NEUTROPENIA (HCC): Primary | ICD-10-CM

## 2024-11-06 DIAGNOSIS — C25.9 PANCREATIC ADENOCARCINOMA (HCC): ICD-10-CM

## 2024-11-06 DIAGNOSIS — T45.1X5A CHEMOTHERAPY-INDUCED NEUTROPENIA (HCC): Primary | ICD-10-CM

## 2024-11-06 LAB
CARIS GENOMIC LOH - EXOME: NORMAL
CARIS HER2/NEU: NEGATIVE
CARIS HLA-A: NORMAL
CARIS HLA-B: NORMAL
CARIS HLA-C: NORMAL
CARIS MISMATCH REPAIR STATUS: NORMAL
CARIS MLH1: NORMAL
CARIS MSH2: NORMAL
CARIS MSH6: NORMAL
CARIS MSI - EXOME: NORMAL
CARIS PD-L1 (SP142): NEGATIVE
CARIS PMS2: NORMAL
CARIS TMB - EXOME: NORMAL

## 2024-11-06 PROCEDURE — 96372 THER/PROPH/DIAG INJ SC/IM: CPT

## 2024-11-06 RX ADMIN — PEGFILGRASTIM 6 MG: KIT SUBCUTANEOUS at 12:12

## 2024-11-06 NOTE — PROGRESS NOTES
Isaac Kramer  tolerated the last 46 hours of adrucil without  incident. Tolerated treatment well with no complications.      Isaac Kramer is aware of future appt on 11/15 1000    AVS printed and given to Isaac Kramer:  No (Declined by Isaac Kramer)

## 2024-11-07 ENCOUNTER — OFFICE VISIT (OUTPATIENT)
Dept: FAMILY MEDICINE CLINIC | Facility: CLINIC | Age: 64
End: 2024-11-07

## 2024-11-07 VITALS
HEART RATE: 88 BPM | BODY MASS INDEX: 21.34 KG/M2 | TEMPERATURE: 97.3 F | WEIGHT: 125 LBS | HEIGHT: 64 IN | OXYGEN SATURATION: 98 % | SYSTOLIC BLOOD PRESSURE: 120 MMHG | DIASTOLIC BLOOD PRESSURE: 62 MMHG

## 2024-11-07 DIAGNOSIS — C25.9 PANCREATIC ADENOCARCINOMA (HCC): ICD-10-CM

## 2024-11-07 DIAGNOSIS — Z23 ENCOUNTER FOR IMMUNIZATION: ICD-10-CM

## 2024-11-07 DIAGNOSIS — Z00.00 ANNUAL PHYSICAL EXAM: Primary | ICD-10-CM

## 2024-11-07 DIAGNOSIS — I10 BENIGN ESSENTIAL HYPERTENSION: ICD-10-CM

## 2024-11-07 DIAGNOSIS — I25.10 CORONARY ARTERY DISEASE INVOLVING NATIVE CORONARY ARTERY OF NATIVE HEART WITHOUT ANGINA PECTORIS: ICD-10-CM

## 2024-11-07 PROCEDURE — 99397 PER PM REEVAL EST PAT 65+ YR: CPT

## 2024-11-07 PROCEDURE — 99214 OFFICE O/P EST MOD 30 MIN: CPT

## 2024-11-07 NOTE — PROGRESS NOTES
Adult Annual Physical  Name: Isaac Kramer      : 1960      MRN: [id number]  Encounter Provider: DULCE Simon  Encounter Date: 2024   Encounter department: [facility]    Assessment & Plan  Annual physical exam    Previous notes from PCP reviewed.         Pancreatic adenocarcinoma (HCC)    [facility] Onc notes reviewed.  Currently undergoing chemotherapy.         Benign essential hypertension    At goal.         Coronary artery disease involving native coronary artery of native heart without angina pectoris         Encounter for immunization    Counseled. Will discuss with oncologist.    Orders:    influenza vaccine, high-dose, PF 0.5 mL (Fluzone High Dose)    HEPATITIS A VACCINE ADULT IM    Zoster Vaccine Recombinant IM    Immunizations and preventive care screenings were discussed with patient today. Appropriate education was printed on patient's after visit summary.    Discussed risks and benefits of prostate cancer screening. We discussed the controversial history of PSA screening for prostate cancer in the [place] as well as the risk of over detection and over treatment of prostate cancer by way of PSA screening.  The patient understands that PSA blood testing is an imperfect way to screen for prostate cancer and that elevated PSA levels in the blood may also be caused by infection, inflammation, prostatic trauma or manipulation, urological procedures, or by benign prostatic enlargement.    The role of the digital rectal examination in prostate cancer screening was also discussed and I discussed with him that there is large interobserver variability in the findings of digital rectal examination.    Counseling:  Alcohol/drug use: discussed moderation in alcohol intake, the recommendations for healthy alcohol use, and avoidance of illicit drug use.  Dental Health: discussed importance of regular tooth brushing, flossing, and dental visits.  Injury prevention:  discussed safety/seat belts, safety helmets, smoke detectors, carbon monoxide detectors, and smoking near bedding or upholstery.  Sexual health: discussed sexually transmitted diseases, partner selection, use of condoms, avoidance of unintended pregnancy, and contraceptive alternatives.  Exercise: the importance of regular exercise/physical activity was discussed. Recommend exercise 3-5 times per week for at least 30 minutes.       Depression Screening and Follow-up Plan: Patient was screened for depression during today's encounter. They screened negative with a PHQ-2 score of 0.        History of Present Illness     Adult Annual Physical:  Patient presents for annual physical.     Diet and Physical Activity:  - Diet/Nutrition: well balanced diet.  - Exercise: no formal exercise.    Depression Screening:  - PHQ-2 Score: 0    General Health:  - Sleep: sleeps well.  - Hearing: normal hearing right ear and normal hearing left ear.  - Vision: goes for regular eye exams and wears glasses.  - Dental: no dental visits for > 1 year.     Health:    - Urinary symptoms: none.     Advanced Care Planning:  - Has an advanced directive?: no    - Has a durable medical POA?: no    - ACP document given to patient?: yes      Review of Systems   Constitutional:  Negative for chills, fatigue and fever.   HENT:  Negative for congestion, ear discharge, ear pain, facial swelling, rhinorrhea, sinus pressure, sinus pain, sore throat and trouble swallowing.    Eyes:  Negative for photophobia, pain and visual disturbance.   Respiratory:  Negative for cough, chest tightness, shortness of breath and wheezing.    Cardiovascular:  Negative for chest pain, palpitations and leg swelling.   Gastrointestinal:  Negative for abdominal pain, diarrhea, nausea and vomiting.   Genitourinary:  Negative for dysuria, flank pain and hematuria.   Musculoskeletal:  Negative for arthralgias, back pain, myalgias and neck pain.   Skin:  Negative for pallor and  wound.   Neurological:  Negative for dizziness, syncope, weakness, numbness and headaches.   Psychiatric/Behavioral:  Negative for confusion and sleep disturbance.    All other systems reviewed and are negative.    Medical History Reviewed by provider this encounter:  Tobacco  Allergies  Meds  Problems  Med Hx  Surg Hx  Fam Hx       Current Outpatient Medications on File Prior to Visit   Medication Sig Dispense Refill    aspirin 81 mg chewable tablet Chew 81 mg daily      atorvastatin (LIPITOR) 20 mg tablet Take 1 tablet (20 mg total) by mouth daily 30 tablet 5    lisinopril (ZESTRIL) 20 mg tablet Take 1 tablet (20 mg total) by mouth daily 30 tablet 3    ondansetron (ZOFRAN) 4 mg tablet Take 1 tablet (4 mg total) by mouth every 8 (eight) hours as needed for nausea or vomiting 20 tablet 0    ondansetron (ZOFRAN) 8 mg tablet Take 1 tablet (8 mg total) by mouth every 8 (eight) hours as needed for nausea or vomiting 30 tablet 5    celecoxib (CeleBREX) 200 mg capsule Take 1 capsule (200 mg total) by mouth daily for 7 days (Patient not taking: Reported on 10/2/2024) 7 capsule 0    docusate sodium (COLACE) 100 mg capsule Take 1 capsule (100 mg total) by mouth daily for 7 days (Patient not taking: Reported on 10/2/2024) 7 capsule 0    enoxaparin (LOVENOX) 40 mg/0.4 mL Inject 0.4 mL (40 mg total) under the skin every 24 hours for 21 days (Patient not taking: Reported on 2024) 8.4 mL 0    [] fluorouracil 3,865 mg in CADD/Elastomeric Infusion Device Infuse 3,865 mg (1,200 mg/m2/day x 1.61 m2) into a catheter in a vein via infusion device over 46 hours for 2 days  Infusion planned for 2024. 1 each 0    gabapentin (NEURONTIN) 300 mg capsule Take 1 capsule (300 mg total) by mouth 3 (three) times a day for 7 days (Patient not taking: Reported on 10/2/2024) 21 capsule 0    lidocaine-prilocaine (EMLA) cream Apply topically as needed for mild pain (Patient not taking: Reported on 10/2/2024) 30 g 5     "methocarbamol (ROBAXIN) 500 mg tablet Take 1 tablet (500 mg total) by mouth every 8 (eight) hours for 7 days (Patient not taking: Reported on 10/2/2024) 21 tablet 0    pantoprazole (PROTONIX) 40 mg tablet Take 1 tablet (40 mg total) by mouth daily before breakfast Do not start before 2024. (Patient not taking: Reported on 2024) 30 tablet 0    polyethylene glycol (MIRALAX) 17 g packet Take 17 g by mouth daily for 7 days Do not start before 2024. (Patient taking differently: Take 17 g by mouth daily Mix with 8oz fluid.)       Current Facility-Administered Medications on File Prior to Visit   Medication Dose Route Frequency Provider Last Rate Last Admin    alteplase (CATHFLO) injection 2 mg  2 mg Intracatheter Q1MIN PRN Jacob Colby MD        alteplase (CATHFLO) injection 2 mg  2 mg Intracatheter Q1MIN PRN Jacob Colby MD        Atropine Sulfate injection 0.25 mg  0.25 mg Intravenous Once PRN Jacob Colby MD        [COMPLETED] pegfilgrastim (NEULASTA ONPRO) subcutaneous injection kit 6 mg  6 mg Subcutaneous Once Jacob Colby MD   6 mg at 24 1212    sodium chloride 0.9 % infusion  20 mL/hr Intravenous Once PRN Jacob Colby MD   Stopped at 24 0941      Social History     Tobacco Use    Smoking status: Former     Current packs/day: 0.00     Types: Cigarettes     Quit date: 2022     Years since quittin.8    Smokeless tobacco: Never   Vaping Use    Vaping status: Never Used   Substance and Sexual Activity    Alcohol use: Not Currently    Drug use: Never    Sexual activity: Not on file       Objective     /62   Pulse 88   Temp (!) 97.3 °F (36.3 °C) (Tympanic)   Ht 5' 4\" (1.626 m)   Wt 56.7 kg (125 lb)   SpO2 98%   BMI 21.46 kg/m²     Physical Exam  Vitals and nursing note reviewed.   Constitutional:       General: He is not in acute distress.     Appearance: Normal appearance. He is well-developed. He is not ill-appearing.   HENT:      Head: Normocephalic and " atraumatic.      Jaw: There is normal jaw occlusion.      Right Ear: Tympanic membrane normal.      Left Ear: Tympanic membrane normal.      Nose: Nose normal.      Mouth/Throat:      Pharynx: Oropharynx is clear. No oropharyngeal exudate or posterior oropharyngeal erythema.   Eyes:      Extraocular Movements: Extraocular movements intact.      Conjunctiva/sclera: Conjunctivae normal.   Neck:      Thyroid: No thyroid mass.      Vascular: No carotid bruit or JVD.      Trachea: Trachea and phonation normal.   Cardiovascular:      Rate and Rhythm: Normal rate and regular rhythm.      Heart sounds: S1 normal and S2 normal. Murmur heard.      Systolic murmur is present.   Pulmonary:      Effort: Pulmonary effort is normal. No tachypnea or respiratory distress.      Breath sounds: Normal breath sounds and air entry. No stridor. No decreased breath sounds.   Chest:      Chest wall: Deformity (port L chest wall; CDI) present. No tenderness.   Abdominal:      General: Bowel sounds are normal. There is no distension.      Palpations: Abdomen is soft.      Tenderness: There is no abdominal tenderness. There is no right CVA tenderness or left CVA tenderness.   Musculoskeletal:         General: No swelling.      Cervical back: Normal range of motion and neck supple.      Right lower leg: No edema.      Left lower leg: No edema.   Lymphadenopathy:      Cervical: No cervical adenopathy.   Skin:     General: Skin is warm and dry.      Capillary Refill: Capillary refill takes less than 2 seconds.      Findings: No rash.   Neurological:      General: No focal deficit present.      Mental Status: He is alert and oriented to person, place, and time.      Cranial Nerves: Cranial nerves 2-12 are intact.      Sensory: Sensation is intact.      Motor: Motor function is intact.      Coordination: Coordination is intact.      Gait: Gait is intact.   Psychiatric:         Mood and Affect: Mood normal.         Behavior: Behavior is cooperative.

## 2024-11-08 ENCOUNTER — HOSPITAL ENCOUNTER (INPATIENT)
Facility: HOSPITAL | Age: 64
LOS: 2 days | Discharge: HOME | DRG: 393 | End: 2024-11-11
Attending: EMERGENCY MEDICINE | Admitting: HOSPITALIST
Payer: MEDICARE

## 2024-11-08 ENCOUNTER — APPOINTMENT (EMERGENCY)
Dept: CT IMAGING | Facility: HOSPITAL | Age: 64
DRG: 393 | End: 2024-11-08
Payer: MEDICARE

## 2024-11-08 ENCOUNTER — NURSE TRIAGE (OUTPATIENT)
Age: 64
End: 2024-11-08

## 2024-11-08 DIAGNOSIS — K51.00 PANCOLITIS (HCC): Primary | ICD-10-CM

## 2024-11-08 DIAGNOSIS — I25.10 CORONARY ARTERY DISEASE INVOLVING NATIVE CORONARY ARTERY OF NATIVE HEART WITHOUT ANGINA PECTORIS: ICD-10-CM

## 2024-11-08 DIAGNOSIS — I10 BENIGN ESSENTIAL HYPERTENSION: ICD-10-CM

## 2024-11-08 DIAGNOSIS — I35.0 AORTIC VALVE STENOSIS: ICD-10-CM

## 2024-11-08 DIAGNOSIS — C25.9 PANCREATIC ADENOCARCINOMA (HCC): ICD-10-CM

## 2024-11-08 DIAGNOSIS — D72.829 LEUKOCYTOSIS: ICD-10-CM

## 2024-11-08 DIAGNOSIS — R19.7 DIARRHEA: ICD-10-CM

## 2024-11-08 DIAGNOSIS — I25.10 CAD (CORONARY ARTERY DISEASE): ICD-10-CM

## 2024-11-08 LAB
ALBUMIN SERPL BCG-MCNC: 3.9 G/DL (ref 3.5–5)
ALP SERPL-CCNC: 97 U/L (ref 34–104)
ALT SERPL W P-5'-P-CCNC: 12 U/L (ref 7–52)
ANION GAP SERPL CALCULATED.3IONS-SCNC: 7 MMOL/L (ref 4–13)
AST SERPL W P-5'-P-CCNC: 11 U/L (ref 13–39)
ATRIAL RATE: 82 BPM
BASOPHILS # BLD MANUAL: 0 THOUSAND/UL (ref 0–0.1)
BASOPHILS NFR MAR MANUAL: 0 % (ref 0–1)
BILIRUB SERPL-MCNC: 0.61 MG/DL (ref 0.2–1)
BUN SERPL-MCNC: 21 MG/DL (ref 5–25)
CALCIUM SERPL-MCNC: 8.7 MG/DL (ref 8.4–10.2)
CARDIAC TROPONIN I PNL SERPL HS: <2 NG/L
CHLORIDE SERPL-SCNC: 102 MMOL/L (ref 96–108)
CO2 SERPL-SCNC: 26 MMOL/L (ref 21–32)
CREAT SERPL-MCNC: 0.98 MG/DL (ref 0.6–1.3)
EOSINOPHIL # BLD MANUAL: 0.29 THOUSAND/UL (ref 0–0.4)
EOSINOPHIL NFR BLD MANUAL: 1 % (ref 0–6)
ERYTHROCYTE [DISTWIDTH] IN BLOOD BY AUTOMATED COUNT: 13.1 % (ref 11.6–15.1)
GFR SERPL CREATININE-BSD FRML MDRD: 80 ML/MIN/1.73SQ M
GLUCOSE SERPL-MCNC: 137 MG/DL (ref 65–140)
HCT VFR BLD AUTO: 39.1 % (ref 36.5–49.3)
HGB BLD-MCNC: 12.7 G/DL (ref 12–17)
LIPASE SERPL-CCNC: <6 U/L (ref 11–82)
LYMPHOCYTES # BLD AUTO: 1.47 THOUSAND/UL (ref 0.6–4.47)
LYMPHOCYTES # BLD AUTO: 5 % (ref 14–44)
MCH RBC QN AUTO: 28.5 PG (ref 26.8–34.3)
MCHC RBC AUTO-ENTMCNC: 32.5 G/DL (ref 31.4–37.4)
MCV RBC AUTO: 88 FL (ref 82–98)
MONOCYTES # BLD AUTO: 0.29 THOUSAND/UL (ref 0–1.22)
MONOCYTES NFR BLD: 1 % (ref 4–12)
NEUTROPHILS # BLD MANUAL: 27.38 THOUSAND/UL (ref 1.85–7.62)
NEUTS BAND NFR BLD MANUAL: 7 % (ref 0–8)
NEUTS SEG NFR BLD AUTO: 86 % (ref 43–75)
P AXIS: 58 DEGREES
PLATELET # BLD AUTO: 299 THOUSANDS/UL (ref 149–390)
PLATELET BLD QL SMEAR: ADEQUATE
PMV BLD AUTO: 9.4 FL (ref 8.9–12.7)
POTASSIUM SERPL-SCNC: 3.8 MMOL/L (ref 3.5–5.3)
PR INTERVAL: 164 MS
PROT SERPL-MCNC: 5.9 G/DL (ref 6.4–8.4)
QRS AXIS: -60 DEGREES
QRSD INTERVAL: 100 MS
QT INTERVAL: 356 MS
QTC INTERVAL: 415 MS
RBC # BLD AUTO: 4.45 MILLION/UL (ref 3.88–5.62)
RBC MORPH BLD: NORMAL
SODIUM SERPL-SCNC: 135 MMOL/L (ref 135–147)
T WAVE AXIS: 45 DEGREES
VENTRICULAR RATE: 82 BPM
WBC # BLD AUTO: 29.44 THOUSAND/UL (ref 4.31–10.16)

## 2024-11-08 PROCEDURE — 99223 1ST HOSP IP/OBS HIGH 75: CPT | Performed by: NURSE PRACTITIONER

## 2024-11-08 PROCEDURE — 96361 HYDRATE IV INFUSION ADD-ON: CPT

## 2024-11-08 PROCEDURE — 80053 COMPREHEN METABOLIC PANEL: CPT | Performed by: EMERGENCY MEDICINE

## 2024-11-08 PROCEDURE — 99284 EMERGENCY DEPT VISIT MOD MDM: CPT

## 2024-11-08 PROCEDURE — 85027 COMPLETE CBC AUTOMATED: CPT | Performed by: EMERGENCY MEDICINE

## 2024-11-08 PROCEDURE — 93010 ELECTROCARDIOGRAM REPORT: CPT | Performed by: INTERNAL MEDICINE

## 2024-11-08 PROCEDURE — 85007 BL SMEAR W/DIFF WBC COUNT: CPT | Performed by: EMERGENCY MEDICINE

## 2024-11-08 PROCEDURE — 36415 COLL VENOUS BLD VENIPUNCTURE: CPT | Performed by: EMERGENCY MEDICINE

## 2024-11-08 PROCEDURE — 99254 IP/OBS CNSLTJ NEW/EST MOD 60: CPT | Performed by: STUDENT IN AN ORGANIZED HEALTH CARE EDUCATION/TRAINING PROGRAM

## 2024-11-08 PROCEDURE — 83690 ASSAY OF LIPASE: CPT | Performed by: EMERGENCY MEDICINE

## 2024-11-08 PROCEDURE — 96374 THER/PROPH/DIAG INJ IV PUSH: CPT

## 2024-11-08 PROCEDURE — 93005 ELECTROCARDIOGRAM TRACING: CPT

## 2024-11-08 PROCEDURE — 74177 CT ABD & PELVIS W/CONTRAST: CPT

## 2024-11-08 PROCEDURE — 84484 ASSAY OF TROPONIN QUANT: CPT | Performed by: EMERGENCY MEDICINE

## 2024-11-08 PROCEDURE — 99285 EMERGENCY DEPT VISIT HI MDM: CPT | Performed by: EMERGENCY MEDICINE

## 2024-11-08 PROCEDURE — 96375 TX/PRO/DX INJ NEW DRUG ADDON: CPT

## 2024-11-08 RX ORDER — ACETAMINOPHEN 325 MG/1
650 TABLET ORAL EVERY 6 HOURS PRN
Status: DISCONTINUED | OUTPATIENT
Start: 2024-11-08 | End: 2024-11-11 | Stop reason: HOSPADM

## 2024-11-08 RX ORDER — OXYCODONE HYDROCHLORIDE 5 MG/1
5 TABLET ORAL EVERY 4 HOURS PRN
Status: DISCONTINUED | OUTPATIENT
Start: 2024-11-08 | End: 2024-11-11 | Stop reason: HOSPADM

## 2024-11-08 RX ORDER — HYDROMORPHONE HCL IN WATER/PF 6 MG/30 ML
0.2 PATIENT CONTROLLED ANALGESIA SYRINGE INTRAVENOUS EVERY 2 HOUR PRN
Status: DISCONTINUED | OUTPATIENT
Start: 2024-11-08 | End: 2024-11-11 | Stop reason: HOSPADM

## 2024-11-08 RX ORDER — SODIUM CHLORIDE 9 MG/ML
100 INJECTION, SOLUTION INTRAVENOUS CONTINUOUS
Status: DISCONTINUED | OUTPATIENT
Start: 2024-11-08 | End: 2024-11-10

## 2024-11-08 RX ORDER — HYDROMORPHONE HCL IN WATER/PF 6 MG/30 ML
0.2 PATIENT CONTROLLED ANALGESIA SYRINGE INTRAVENOUS ONCE
Status: COMPLETED | OUTPATIENT
Start: 2024-11-08 | End: 2024-11-08

## 2024-11-08 RX ORDER — DICYCLOMINE HYDROCHLORIDE 10 MG/1
10 CAPSULE ORAL
Status: DISCONTINUED | OUTPATIENT
Start: 2024-11-08 | End: 2024-11-11 | Stop reason: HOSPADM

## 2024-11-08 RX ORDER — HYDROMORPHONE HCL/PF 1 MG/ML
0.5 SYRINGE (ML) INJECTION ONCE
Status: COMPLETED | OUTPATIENT
Start: 2024-11-08 | End: 2024-11-08

## 2024-11-08 RX ORDER — ASPIRIN 81 MG/1
81 TABLET, CHEWABLE ORAL DAILY
Status: DISCONTINUED | OUTPATIENT
Start: 2024-11-09 | End: 2024-11-11 | Stop reason: HOSPADM

## 2024-11-08 RX ORDER — ENOXAPARIN SODIUM 100 MG/ML
40 INJECTION SUBCUTANEOUS DAILY
Status: DISCONTINUED | OUTPATIENT
Start: 2024-11-09 | End: 2024-11-11 | Stop reason: HOSPADM

## 2024-11-08 RX ORDER — ONDANSETRON 2 MG/ML
4 INJECTION INTRAMUSCULAR; INTRAVENOUS ONCE
Status: COMPLETED | OUTPATIENT
Start: 2024-11-08 | End: 2024-11-08

## 2024-11-08 RX ADMIN — DICYCLOMINE HYDROCHLORIDE 10 MG: 10 CAPSULE ORAL at 17:49

## 2024-11-08 RX ADMIN — HYDROMORPHONE HYDROCHLORIDE 0.5 MG: 1 INJECTION, SOLUTION INTRAMUSCULAR; INTRAVENOUS; SUBCUTANEOUS at 14:33

## 2024-11-08 RX ADMIN — SODIUM CHLORIDE 1000 ML: 0.9 INJECTION, SOLUTION INTRAVENOUS at 10:12

## 2024-11-08 RX ADMIN — ONDANSETRON 4 MG: 2 INJECTION INTRAMUSCULAR; INTRAVENOUS at 10:14

## 2024-11-08 RX ADMIN — SODIUM CHLORIDE 100 ML/HR: 0.9 INJECTION, SOLUTION INTRAVENOUS at 17:49

## 2024-11-08 RX ADMIN — HYDROMORPHONE HYDROCHLORIDE 0.2 MG: 0.2 INJECTION, SOLUTION INTRAMUSCULAR; INTRAVENOUS; SUBCUTANEOUS at 10:20

## 2024-11-08 RX ADMIN — IOHEXOL 100 ML: 350 INJECTION, SOLUTION INTRAVENOUS at 11:07

## 2024-11-08 RX ADMIN — OXYCODONE HYDROCHLORIDE 5 MG: 5 TABLET ORAL at 20:23

## 2024-11-08 NOTE — ED PROVIDER NOTES
Time reflects when diagnosis was documented in both MDM as applicable and the Disposition within this note       Time User Action Codes Description Comment    11/8/2024  3:11 PM Breezy Ennis [K51.00] Pancolitis (HCC)     11/8/2024  3:11 PM Breezy Ennis [R19.7] Diarrhea     11/8/2024  3:11 PM Breezy Ennis [D72.829] Leukocytosis     11/8/2024  3:21 PM Breezy Ennis [C25.9] Pancreatic adenocarcinoma (HCC)           ED Disposition       ED Disposition   Admit    Condition   Stable    Date/Time   Fri Nov 8, 2024  3:12 PM    Comment   Case was discussed with RASTA and the patient's admission status was agreed to be Admission Status: observation status to the service of Dr. Richardson.               Assessment & Plan       Medical Decision Making  63-year-old male with known pancreatic cancer post Whipple on chemotherapy and Neulasta presenting for reports of worsening abdominal pain and profuse diarrhea.  Blood work and CT scan showing diffuse pancolitis and a significant leukocytosis.  Patient did receive Neulasta 2 days ago so case discussed with hematology oncology and leukocytosis could be secondary to the Neulasta but there is also with a concern of the pancolitis.  In discussion with GI recommending stool studies and holding antibiotics until stool studies received and admitting at this time further evaluation.    Amount and/or Complexity of Data Reviewed  Labs: ordered.  Radiology: ordered.    Risk  Prescription drug management.  Decision regarding hospitalization.             Medications   sodium chloride 0.9 % bolus 1,000 mL (1,000 mL Intravenous New Bag 11/8/24 1012)   HYDROmorphone HCl (DILAUDID) injection 0.2 mg (0.2 mg Intravenous Given 11/8/24 1020)   ondansetron (ZOFRAN) injection 4 mg (4 mg Intravenous Given 11/8/24 1014)   iohexol (OMNIPAQUE) 350 MG/ML injection (MULTI-DOSE) 100 mL (100 mL Intravenous Given 11/8/24 1107)   HYDROmorphone (DILAUDID) injection 0.5 mg (0.5 mg  Intravenous Given 11/8/24 1433)       ED Risk Strat Scores                           SBIRT 20yo+      Flowsheet Row Most Recent Value   Initial Alcohol Screen: US AUDIT-C     1. How often do you have a drink containing alcohol? 0 Filed at: 11/08/2024 1005   2. How many drinks containing alcohol do you have on a typical day you are drinking?  0 Filed at: 11/08/2024 1005   3a. Male UNDER 65: How often do you have five or more drinks on one occasion? 0 Filed at: 11/08/2024 1005   3b. FEMALE Any Age, or MALE 65+: How often do you have 4 or more drinks on one occassion? 0 Filed at: 11/08/2024 1005   Audit-C Score 0 Filed at: 11/08/2024 1005   SONG: How many times in the past year have you...    Used an illegal drug or used a prescription medication for non-medical reasons? Never Filed at: 11/08/2024 1005                            History of Present Illness       Chief Complaint   Patient presents with    Abdominal Pain     Pt reports mid abdominal pain, diarrhea, sore throat starting last night. Pt recently started chemo.       Past Medical History:   Diagnosis Date    Benign essential hypertension 05/08/2014    CAD (coronary artery disease)     s/p NAVA prox LAD and D1 7/28/2022    Cancer (HCC)     Prostate    Coronary artery disease involving native coronary artery of native heart without angina pectoris 08/09/2022    Enteritis 08/28/2024    GERD (gastroesophageal reflux disease)     Hyperlipidemia     Hypertension     Other chest pain     Palpitations     Prostate cancer (HCC) 04/18/2023    Sleep apnea       Past Surgical History:   Procedure Laterality Date    CARDIAC CATHETERIZATION Left 7/28/2022    Procedure: Cardiac catheterization-left heart catheterization;  Surgeon: Sai Henry MD;  Location: [facility];  Service: Cardiology    CARDIAC CATHETERIZATION N/A 7/28/2022    Procedure: Cardiac pci;  Surgeon: Sai Henry MD;  Location: [facility];  Service: Cardiology    HERNIA REPAIR      IR  EMBOLIZATION (SPECIFY VESSEL OR SITE)  2024    IR PORT PLACEMENT  10/28/2024    ND LAPS SURG [id number] RPBIC RAD W/NRV SPARING ROBOT N/A 2023    Procedure: PROSTATECTOMY RADICAL & PELVIC LYMPH NODE DISSECTION  W/ ROBOT;  Surgeon: Otoniel Harmon MD;  Location: [facility];  Service: Urology    ND PROSTATE NEEDLE BIOPSY ANY APPROACH N/A 2023    Procedure: TRANSRECTAL MRI FUSION BIOPSY PROSTATE;  Surgeon: Milan Arellano MD;  Location: [facility];  Service: Urology    WHIPPLE PROCEDURE/PANCREATICO-DUODENECTOMY N/A 2024    Procedure: WHIPPLE PROCEDURE/PANCREATICO-DUODENECTOMY;  Surgeon: Marika Ac MD;  Location: [facility];  Service: Surgical Oncology      Family History   Problem Relation Age of Onset    No Known Problems Mother     No Known Problems Father       Social History     Tobacco Use    Smoking status: Former     Current packs/day: 0.00     Types: Cigarettes     Quit date: 2022     Years since quittin.8    Smokeless tobacco: Never   Vaping Use    Vaping status: Never Used   Substance Use Topics    Alcohol use: Not Currently    Drug use: Never      E-Cigarette/Vaping    E-Cigarette Use Never User       E-Cigarette/Vaping Substances    Nicotine No     THC No     CBD No     Flavoring No     Other No     Unknown No       I have reviewed and agree with the history as documented.     64 yo male with known pancreatic CA on folfirinox presenting to the ed for reports of worsening abdominal pain, nausea and diarrhea, along with reports of throat pain and reported pain with swallowing, chest tightness. Denies fevers, chills, night sweats, urinary symptoms.       Abdominal Pain  Associated symptoms: diarrhea, fatigue, nausea and shortness of breath        Review of Systems   Constitutional:  Positive for fatigue.   Respiratory:  Positive for chest tightness and shortness of breath.    Gastrointestinal:  Positive for abdominal pain, diarrhea and nausea.   Neurological:  Positive  for weakness.   All other systems reviewed and are negative.          Objective       ED Triage Vitals   Temperature Pulse Blood Pressure Respirations SpO2 Patient Position - Orthostatic VS   11/08/24 1005 11/08/24 1005 11/08/24 1005 11/08/24 1005 11/08/24 1005 11/08/24 1005   98.1 °F (36.7 °C) 86 105/58 16 97 % Sitting      Temp Source Heart Rate Source BP Location FiO2 (%) Pain Score    11/08/24 1005 -- -- -- 11/08/24 1020    Oral    7      Vitals      Date and Time Temp Pulse SpO2 Resp BP Pain Score FACES Pain Rating User   11/08/24 1230 -- 80 94 % 18 120/60 -- -- AM   11/08/24 1215 -- 81 96 % 16 113/59 -- -- AM   11/08/24 1200 -- 79 94 % 16 120/59 -- -- AM   11/08/24 1130 -- 82 96 % 16 117/61 -- -- AM   11/08/24 1115 -- 82 97 % 16 113/55 -- -- AM   11/08/24 1045 -- 70 96 % 18 104/52 -- -- AM   11/08/24 1020 -- -- -- -- -- 7 -- AM   11/08/24 1005 98.1 °F (36.7 °C) 86 97 % 16 105/58 -- -- AM            Physical Exam  Vitals and nursing note reviewed.   Constitutional:       General: He is not in acute distress.     Appearance: He is well-developed. He is not diaphoretic.   HENT:      Head: Normocephalic and atraumatic.      Right Ear: External ear normal.      Left Ear: External ear normal.      Nose: Nose normal.   Eyes:      General: No scleral icterus.        Right eye: No discharge.         Left eye: No discharge.      Conjunctiva/sclera: Conjunctivae normal.   Cardiovascular:      Rate and Rhythm: Normal rate and regular rhythm.      Heart sounds: Normal heart sounds. No murmur heard.     No friction rub. No gallop.   Pulmonary:      Effort: Pulmonary effort is normal. No respiratory distress.      Breath sounds: Normal breath sounds. No wheezing or rales.   Abdominal:      General: Bowel sounds are normal. There is no distension.      Palpations: Abdomen is soft. There is no mass.      Tenderness: There is generalized abdominal tenderness. There is no right CVA tenderness, left CVA tenderness or guarding.    Musculoskeletal:         General: No tenderness or deformity. Normal range of motion.      Cervical back: Normal range of motion and neck supple.   Skin:     General: Skin is warm and dry.      Coloration: Skin is not pale.      Findings: No erythema or rash.   Neurological:      Mental Status: He is alert and oriented to person, place, and time.   Psychiatric:         Behavior: Behavior normal.         Thought Content: Thought content normal.         Judgment: Judgment normal.         Results Reviewed       Procedure Component Value Units Date/Time    Stool Enteric Bacterial Panel by PCR [[id number]]     Lab Status: No result Specimen: Stool     Clostridium difficile toxin by PCR with EIA [[id number]]     Lab Status: No result Specimen: Stool from Per Rectum     Ova and parasite examination [[id number]]     Lab Status: No result Specimen: Stool from Rectum     Fecal leukocytes [[id number]]     Lab Status: No result Specimen: Stool     Aeromonas/Pleisiomonas Stool Culture [[id number]]     Lab Status: No result Specimen: Stool     RBC Morphology Reflex Test [[id number]] Collected: 11/08/24 1011    Lab Status: Final result Specimen: Blood from Arm, Right Updated: 11/08/24 1201    CBC and differential [[id number]]  (Abnormal) Collected: 11/08/24 1011    Lab Status: Final result Specimen: Blood from Arm, Right Updated: 11/08/24 1149     WBC 29.44 Thousand/uL      RBC 4.45 Million/uL      Hemoglobin 12.7 g/dL      Hematocrit 39.1 %      MCV 88 fL      MCH 28.5 pg      MCHC 32.5 g/dL      RDW 13.1 %      MPV 9.4 fL      Platelets 299 Thousands/uL     Narrative:      This is an appended report.  These results have been appended to a previously verified report.    Manual Differential(PHLEBS Do Not Order) [[id number]]  (Abnormal) Collected: 11/08/24 1011    Lab Status: Final result Specimen: Blood from Arm, Right Updated: 11/08/24 1149     Segmented % 86 %      Bands % 7 %      Lymphocytes % 5 %      Monocytes % 1 %       Eosinophils % 1 %      Basophils % 0 %      Absolute Neutrophils 27.38 Thousand/uL      Absolute Lymphocytes 1.47 Thousand/uL      Absolute Monocytes 0.29 Thousand/uL      Absolute Eosinophils 0.29 Thousand/uL      Absolute Basophils 0.00 Thousand/uL      Total Counted --     RBC Morphology Normal     Platelet Estimate Adequate    HS Troponin 0hr (reflex protocol) [[id number]]  (Normal) Collected: 11/08/24 1011    Lab Status: Final result Specimen: Blood from Arm, Right Updated: 11/08/24 1055     hs TnI 0hr <2 ng/L     Comprehensive metabolic panel [[id number]]  (Abnormal) Collected: 11/08/24 1011    Lab Status: Final result Specimen: Blood from Arm, Right Updated: 11/08/24 1051     Sodium 135 mmol/L      Potassium 3.8 mmol/L      Chloride 102 mmol/L      CO2 26 mmol/L      ANION GAP 7 mmol/L      BUN 21 mg/dL      Creatinine 0.98 mg/dL      Glucose 137 mg/dL      Calcium 8.7 mg/dL      AST 11 U/L      ALT 12 U/L      Alkaline Phosphatase 97 U/L      Total Protein 5.9 g/dL      Albumin 3.9 g/dL      Total Bilirubin 0.61 mg/dL      eGFR 80 ml/min/1.73sq m     Narrative:      [employer] guidelines for Chronic Kidney Disease (CKD):     Stage 1 with normal or high GFR (GFR > 90 mL/min/1.73 square meters)    Stage 2 Mild CKD (GFR = 60-89 mL/min/1.73 square meters)    Stage 3A Moderate CKD (GFR = 45-59 mL/min/1.73 square meters)    Stage 3B Moderate CKD (GFR = 30-44 mL/min/1.73 square meters)    Stage 4 Severe CKD (GFR = 15-29 mL/min/1.73 square meters)    Stage 5 End Stage CKD (GFR <15 mL/min/1.73 square meters)  Note: GFR calculation is accurate only with a steady state creatinine    Lipase [[id number]]  (Abnormal) Collected: 11/08/24 1011    Lab Status: Final result Specimen: Blood from Arm, Right Updated: 11/08/24 1051     Lipase <6 u/L             CT abdomen pelvis with contrast   Final Interpretation by Nico Malcolm MD (11/08 1318)      Pancolonic mural thickening and pericolonic  stranding consistent with colitis.      Postsurgical changes of Whipple procedure and cystic artery embolization. Decreased size of heterogeneous collection in the gallbladder fossa, likely resolving hematoma.      Workstation performed: [id number]             Procedures    ED Medication and Procedure Management   Prior to Admission Medications   Prescriptions Last Dose Informant Patient Reported? Taking?   aspirin 81 mg chewable tablet  Self Yes No   Sig: Chew 81 mg daily   atorvastatin (LIPITOR) 20 mg tablet  Self No No   Sig: Take 1 tablet (20 mg total) by mouth daily   celecoxib (CeleBREX) 200 mg capsule  Self No No   Sig: Take 1 capsule (200 mg total) by mouth daily for 7 days   Patient not taking: Reported on 10/2/2024   docusate sodium (COLACE) 100 mg capsule  Self No No   Sig: Take 1 capsule (100 mg total) by mouth daily for 7 days   Patient not taking: Reported on 10/2/2024   enoxaparin (LOVENOX) 40 mg/0.4 mL  Self No No   Sig: Inject 0.4 mL (40 mg total) under the skin every 24 hours for 21 days   Patient not taking: Reported on 11/7/2024   gabapentin (NEURONTIN) 300 mg capsule   No No   Sig: Take 1 capsule (300 mg total) by mouth 3 (three) times a day for 7 days   Patient not taking: Reported on 10/2/2024   lidocaine-prilocaine (EMLA) cream   No No   Sig: Apply topically as needed for mild pain   Patient not taking: Reported on 10/2/2024   lisinopril (ZESTRIL) 20 mg tablet   No No   Sig: Take 1 tablet (20 mg total) by mouth daily   methocarbamol (ROBAXIN) 500 mg tablet   No No   Sig: Take 1 tablet (500 mg total) by mouth every 8 (eight) hours for 7 days   Patient not taking: Reported on 10/2/2024   ondansetron (ZOFRAN) 4 mg tablet  Self No No   Sig: Take 1 tablet (4 mg total) by mouth every 8 (eight) hours as needed for nausea or vomiting   ondansetron (ZOFRAN) 8 mg tablet   No No   Sig: Take 1 tablet (8 mg total) by mouth every 8 (eight) hours as needed for nausea or vomiting   pantoprazole (PROTONIX) 40  mg tablet  Self No No   Sig: Take 1 tablet (40 mg total) by mouth daily before breakfast Do not start before September 20, 2024.   Patient not taking: Reported on 11/7/2024   polyethylene glycol (MIRALAX) 17 g packet   No No   Sig: Take 17 g by mouth daily for 7 days Do not start before September 20, 2024.   Patient taking differently: Take 17 g by mouth daily Mix with 8oz fluid.      Facility-Administered Medications: None     Patient's Medications   Discharge Prescriptions    No medications on file     No discharge procedures on file.  ED SEPSIS DOCUMENTATION   Time reflects when diagnosis was documented in both MDM as applicable and the Disposition within this note       Time User Action Codes Description Comment    11/8/2024  3:11 PM Breezy Ennis [K51.00] Pancolitis (HCC)     11/8/2024  3:11 PM Breezy Ennis [R19.7] Diarrhea     11/8/2024  3:11 PM Breezy Ennis [D72.829] Leukocytosis     11/8/2024  3:21 PM Breezy Ennis [C25.9] Pancreatic adenocarcinoma (HCC)                  Breezy Ennis, DO  11/08/24 1522

## 2024-11-08 NOTE — TELEPHONE ENCOUNTER
"Patient reports feeling very poor since yesterday. He reports his throat feels \"tight\". Asked for clarification of symptoms and he said he's having no difficulty breathing or shortness of breath but feels like his throat is sore. He is unsure if it is red or there are white patches. He also reports he is dizzy when he gets up to move and feels like he's going to faint  Reports a severe \"stomach ache\" which he describes as 9/10 pain which is new but constant in the center of his abdomen. Reports cramping prior to each bowel movement in addition to the constant pain. Reports diarrhea since yesterday >4 times. He couldn't remember how many times he has gone but says it has been frequent coffee ground and liquid in appearance and well over 4 times.     He reports he hasn't been having much of an appetite so is not eating much. He had toast for breakfast.  Drinking 1.5 quarts of water per day, urinating normally yellow. Reports feeling nauseous took zofran which didn't really help, no vomiting. Denies fever- 98.3.     Advised to report to ED to be evaluated for his abdominal pain and lightheadedness, he will have his son take him there now.      Reason for Disposition   Signs of dehydration    Answer Assessment - Initial Assessment Questions  1. What is the cancer diagnosis, and what treatment is the patient receiving? Review past medical history.  Pancreatic adenocarcinoma/ampullary carcinoma    2. What medications is the patient taking?   Folfirinox    3. When did the diarrhea start?  Yesterday    4. Obtain history of bowel habits, including frequency, and liquid vs. formed stool.  Liquid stool like coffee grounds since yesterday >4 times. Reports he is going very frequently    6. Ask the patient to describe symptoms in detail, including number of stools in a 24-hour period. Is there any weight loss?  >4 but couldn't put a specific number, said he's going very frequently    7. Urine output and character, including any " signs of dehydration (e.g., dry mouth, decreased urine output, lethargy, weakness, decreased skin turgor)  Normal yellow urine output, reports feeling like he is going to pass out when he stands up and walks a bit.    8. Does anything make the diarrhea better or worse? What remedies has the patient tried, and what have been the results?   Denies trying anything so far    9. What has the patient done in the past for diarrhea management, and what was the effect?   Denies history of diarrhea or taking anything in the past    10. Any associated additional symptoms (e.g., abdominal pain or cramps, fever, weight loss, stool incontinence, nausea, or vomiting, decreased urine output)?   Abdominal pain in middle of abdomen 9/10 new since yesterday, constant plus cramping prior to each BM, nausea  Denies fever    11. Assess changes in activities of daily living and function, including diet history, food intolerances/aversions, or allergies.  Feels like he's going to pass out when he gets up and walks    12. Has the patient ingested any well water, unpasteurized milk products, or raw seafood?  Denies    Protocols used: ONC-Diarrhea- ADULT [place]

## 2024-11-08 NOTE — CONSULTS
[facility] - Inpatient Consultation    PATIENT INFORMATION      Isaac Kramer 63 y.o. male MRN: [id number]  Unit/Bed#: ED 01 Encounter: [id number]  PCP: Pablo Perez DO  Date of Admission:  11/8/2024  Date of Consultation: 11/08/24  Requesting Physician: Adelina Richardson MD       ASSESSMENT & PLAN     Isaac Kramer is a 63 y.o. old male with PMH of recent diagnosis of pancreatic adenocarcinoma and ampullary carcinoma  s/p Whipple on 9/12 undergoing chemotherapy and receiving who presented with abdominal pain and discomfort diarrhea.    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  Leukocytosis  History of prostate cancer  Patient is presenting with new onset abdominal discomfort and diarrhea which started this morning. Patient endorsed 7-8 bowel movements without any blood.  Patient was recently started on FOLFOX chemotherapy and due to diagnosis of pancreatic adenocarcinoma with ampullary carcinoma back in August of this year for which she underwent a Whipple surgery. Patient also received Neulasta 2 days ago.  Patient's symptoms may be due to new onset chemotherapy as well as leukocytosis may be in the setting of receiving Neulasta 2 days ago.  However need to rule out any infectious causes of colitis, recommend stool studies to rule out infection.    -C. difficile, stool pathogen panel, stool ova and parasite  -Bentyl for pain relief  -Rest of pain control per primary  -Appreciate recommendations from Oncology team regarding side effects of chemotherapy and Neulasta      REASON FOR CONSULTATION      Concern for colitis abdominal pain and diarrhea    HISTORY OF PRESENT ILLNESS      Isaac Kramer is a 63 y.o. old male with PMH of recent diagnosis of pancreatic adenocarcinoma and ampullary carcinoma  s/p Whipple on 9/12 undergoing chemotherapy and  receiving who presented with abdominal pain and discomfort diarrhea.  Patient states that his symptoms started last night when he started to have abdominal pain with significant diarrhea.  Patient endorsed about 7-8 brown-colored bowel movements with worsening lower abdominal discomfort. He denied any blood in them, patient presented to the hospital for further evaluation.  Patient's white count was up to 29.44 and CT imaging showed pancolonic mural thickening and pericolonic stranding consistent with colitis.    Patient was seen in the ED he states that his abdominal pain is currently under control after receiving Dilaudid for pain control.  He denies any further episodes of diarrhea.  Patient's abdominal exam is soft and nontender he denies any fevers, chills, nausea, or vomiting.      REVIEW OF SYSTEMS     CONSTITUTIONAL: Denies any fever, chills, rigors, and weight loss  HEENT: No earache or tinnitus, denies hearing loss or visual disturbances  CARDIOVASCULAR: No chest pain or palpitations   RESPIRATORY: Denies any cough, hemoptysis, shortness of breath or dyspnea on exertion  GASTROINTESTINAL: As noted in the History of Present Illness   GENITOURINARY: No problems with urination, denies any hematuria or dysuria  NEUROLOGIC: No dizziness or vertigo, denies headaches   MUSCULOSKELETAL: Denies any muscle or joint pain   SKIN: Denies skin rashes or itching   ENDOCRINE: Denies excessive thirst, denies intolerance to heat or cold  PSYCHOSOCIAL: Denies depression or anxiety, denies any recent memory loss     PAST MEDICAL & SURGICAL HISTORY      Past Medical History:   Diagnosis Date    Benign essential hypertension 05/08/2014    CAD (coronary artery disease)     s/p NAVA prox LAD and D1 7/28/2022    Cancer (HCC)     Prostate    Coronary artery disease involving native coronary artery of native heart without angina pectoris 08/09/2022    Enteritis 08/28/2024    GERD (gastroesophageal reflux disease)     Hyperlipidemia      Hypertension     Other chest pain     Palpitations     Prostate cancer (HCC) 2023    Sleep apnea        Past Surgical History:   Procedure Laterality Date    CARDIAC CATHETERIZATION Left 2022    Procedure: Cardiac catheterization-left heart catheterization;  Surgeon: Sai Henry MD;  Location: [facility];  Service: Cardiology    CARDIAC CATHETERIZATION N/A 2022    Procedure: Cardiac pci;  Surgeon: Sai Henry MD;  Location: [facility];  Service: Cardiology    HERNIA REPAIR      IR EMBOLIZATION (SPECIFY VESSEL OR SITE)  2024    IR PORT PLACEMENT  10/28/2024    MI LAPS SURG [id number] RPBIC RAD W/NRV SPARING ROBOT N/A 2023    Procedure: PROSTATECTOMY RADICAL & PELVIC LYMPH NODE DISSECTION  W/ ROBOT;  Surgeon: Otoniel Harmon MD;  Location: [facility];  Service: Urology    MI PROSTATE NEEDLE BIOPSY ANY APPROACH N/A 2023    Procedure: TRANSRECTAL MRI FUSION BIOPSY PROSTATE;  Surgeon: Milan Arellano MD;  Location: [facility];  Service: Urology    WHIPPLE PROCEDURE/PANCREATICO-DUODENECTOMY N/A 2024    Procedure: WHIPPLE PROCEDURE/PANCREATICO-DUODENECTOMY;  Surgeon: Marika Ac MD;  Location: [facility];  Service: Surgical Oncology       MEDICATIONS & ALLERGIES       Medications:   Not in a hospital admission.  No current facility-administered medications for this encounter.    Facility-Administered Medications Ordered in Other Encounters:     [MAR Hold] alteplase (CATHFLO) injection 2 mg, Q1MIN PRN    Allergies:   No Known Allergies    SOCIAL HISTORY      Social History     Marital Status: /Civil Union    Substance Use History:   Social History     Substance and Sexual Activity   Alcohol Use Not Currently     Social History     Tobacco Use   Smoking Status Former    Current packs/day: 0.00    Types: Cigarettes    Quit date: 2022    Years since quittin.8   Smokeless Tobacco Never     Social History     Substance and Sexual Activity    Drug Use Never       FAMILY HISTORY      Family History   Problem Relation Age of Onset    No Known Problems Mother     No Known Problems Father        PHYSICAL EXAM     Objective   Blood pressure 120/60, pulse 80, temperature 98.1 °F (36.7 °C), temperature source Oral, resp. rate 18, SpO2 94%. There is no height or weight on file to calculate BMI.  No intake or output data in the 24 hours ending 11/08/24 1540    General Appearance:   Alert, cooperative, no distress   HEENT:   Normocephalic, atraumatic, anicteric     Neck:   Supple, symmetrical, trachea midline   Lungs:   Equal chest rise, respirations unlabored    Heart:   Regular rate and rhythm   Abdomen:   Soft non tender   Rectal:   Deferred    Extremities:   No cyanosis, clubbing or edema    Neuro:   Moves all 4 extremities    Skin:   No jaundice, rashes, or lesions      ADDITIONAL DATA     Lab Results:     Results from last 7 days   Lab Units 11/08/24  1011   WBC Thousand/uL 29.44*   HEMOGLOBIN g/dL 12.7   HEMATOCRIT % 39.1   PLATELETS Thousands/uL 299   LYMPHO PCT % 5*   MONO PCT % 1*   EOS PCT % 1     Results from last 7 days   Lab Units 11/08/24  1011   POTASSIUM mmol/L 3.8   CHLORIDE mmol/L 102   CO2 mmol/L 26   BUN mg/dL 21   CREATININE mg/dL 0.98   CALCIUM mg/dL 8.7   ALK PHOS U/L 97   ALT U/L 12   AST U/L 11*           Imaging:    CT abdomen pelvis with contrast    Result Date: 11/8/2024  Narrative: CT ABDOMEN AND PELVIS WITH IV CONTRAST INDICATION: reports severe abdominal pain, known pancreatic CA. Whipple 9/12/2024. COMPARISON: CTA of the abdomen and pelvis 9/13/2024. TECHNIQUE: CT examination of the abdomen and pelvis was performed. Multiplanar 2D reformatted images were created from the source data. This examination, like all CT scans performed in the [facility], was performed utilizing techniques to minimize radiation dose exposure, including the use of iterative reconstruction and automated exposure control. Radiation dose  length product (DLP) for this visit: 634.65 mGy-cm IV Contrast: 100 mL of iohexol (OMNIPAQUE) Enteric Contrast: Not administered. FINDINGS: ABDOMEN LOWER CHEST: No acute abnormality in the visualized lower chest. Small bilateral pleural effusions have resolved. LIVER/BILIARY TREE: Pneumobilia. No suspicious hepatic mass. GALLBLADDER: Post cholecystectomy. Embolization coils in the gallbladder fossa from prior cystic artery embolization. Decreased size of a now 5.2 x 3.4 cm heterogeneous collection in the gallbladder fossa at site of prior hematoma. SPLEEN: Unremarkable. PANCREAS: Postsurgical changes of Whipple. Air in the pancreatic duct. No pancreatic duct dilation. ADRENAL GLANDS: Unremarkable. KIDNEYS/URETERS: Bilateral renal cysts and additional renal cortical hypodensities which are too small to characterize, statistically benign. No hydronephrosis. STOMACH AND BOWEL: No disproportionate dilation of the small or large bowel. Postsurgical changes of Whipple. Pancolonic mural thickening and mild pericolonic stranding. APPENDIX: No findings to suggest appendicitis. ABDOMINOPELVIC CAVITY: No ascites. No pneumoperitoneum. No lymphadenopathy. VESSELS: Unremarkable for patient's age. PELVIS REPRODUCTIVE ORGANS: Unremarkable for patient's age. URINARY BLADDER: Underdistended. ABDOMINAL WALL/INGUINAL REGIONS: Postsurgical changes of the anterior abdominal wall. BONES: No acute fracture or suspicious osseous lesion.     Impression: Pancolonic mural thickening and pericolonic stranding consistent with colitis. Postsurgical changes of Whipple procedure and cystic artery embolization. Decreased size of heterogeneous collection in the gallbladder fossa, likely resolving hematoma. Workstation performed: [id number]     IR port placement    Result Date: 11/4/2024  Narrative: Examination: Venous port placement.  Venous access with ultrasound guidance, tunneled port insertion under fluoroscopic guidance. HISTORY: Infusion of  chemotherapy Patient with history pancreas cancer. Participation shooting sports PROCEDURE: Informed consent was obtained.  The left chest and neck was prepped and draped in usual sterile fashion.  Timeout was performed.  Lidocaine was given a as local anesthesia. Ultrasound guidance was used to cannulate the Internal jugular Using fluoroscopic guidance, a wire was advanced centrally. Lidocaine was then given over the chest wall.  An incision was made in the chest/upper arm, and a pocket was created using a combination of sharp, and blunt dissection.  The port was inserted into the pocket.  The catheter was tunneled subcutaneously to the venotomy site, and trimmed to appropriate length.  The catheter was advanced via a peel-away sheath, into the vein, under fluoroscopic guidance. The incision was closed in layers.  Glue was applied to the surface of the skin, and the venotomy site. FINDINGS: Ultrasound shows a widely patent vein.  It was compressible and free of thrombus. Fluoroscopy shows final catheter position of: Right atrium Power injection compatible: Yes     Impression: Technically successful placement of venous access port This is the end of the clinically relevant portion of the report.  Subsequent information is for compliance, documentation, and coding purposes. In the process of informed consent, information was communicated, verbally, to the patient regarding the procedure.  The patient was informed of; the name of the procedure, nature of the procedure, nature of the condition, risks, benefits, alternatives, and potential complications, as well as the consequences of not having the procedure.  All the patient's questions were answered.  Informed consent was signed.  Quoted risks included infection, and venous thrombosis. Automated exposure control was utilized, and there was minimize, in accordance with the application of the ALARA technique. Chlorhexidine and alcohol was used for cleansing and sterile  "preparation of the skin.  This was allowed to dry prior to the application of sterile draping. Timeout was performed, with all team members present, and in agreement.  Confirmation of patient, procedure, laterally, allergies, and all needed equipment was performed. The patient was examined, and is in satisfactory condition for planned moderate sedation. Mallampati score: 1 Intravenous conscious sedation was provided.  There was continuous monitoring of blood pressure, heart rate, respiratory rate, and oxygen saturation by an independent, trained observer. Conscious sedation time: 45 minutes Versed dose: 3 milligrams Fentanyl dose: 150 micrograms After the procedure, the patient was recovered, and return to their baseline status, and was deemed fit for discharge from . Fluoroscopy time: 2.2 minutes Radiation dose: 3 MilliGray PROCEDURE: Port placement Preprocedure diagnosis: Please see \"history \", above Postprocedure diagnosis: Same Antibiotics: Ancef 2 g Specimens: None Estimated blood loss: Less than 5 mL Anesthesia: Local and monitored intravenous conscious sedation Maximal sterile barrier technique, according to current guidelines, were utilized.  These include, but are not limited to: Hat, mask, and shoe covers for all staff.  Sterile gown and gloves for all operators.  A sterile cover was used for the ultrasound probe.  The patient was covered, from head to toe, in sterile drapes. Ultrasound was used to evaluate the above-named access vein as a potential access site.  It was compressible and free of thrombus.  Static and real-time images of needle entry were obtained, and archived. Workstation performed: [id number]       EKG, Pathology, and Other Studies Reviewed on Admission:   EKG: Reviewed      Counseling / Coordination of Care Time: 30 total mins spent in consult. Greater than 50% of total time spent on patient counseling and coordination of care.    Lili Swanson MD  Gastroenterology Fellow  [facility]  Division of Gastroenterology and Hepatology    ...............................................................................................................................................  ** Please Note: This note is constructed using a voice recognition dictation system. **

## 2024-11-08 NOTE — H&P
H&P - Hospitalist   Name: Isaac Kramer 63 y.o. male I MRN: [id number]  Unit/Bed#: ED 01 I Date of Admission: 11/8/2024   Date of Service: 11/8/2024 I Hospital Day: 0     Assessment & Plan  Pancolitis (HCC)  Presented for severe abdominal pain and diarrhea which started overnight  CT imaging revealed evidence for pancolitis  ER physician discussed with gastroenterology who recommended collecting stool studies and admitting to observation  Received 1 L normal saline in the ER, will continue maintenance fluids with normal saline at 100 mL/h  Clear liquid diet, advance as tolerated  Symptom control  Continue Bentyl  Monitor labs and vital signs, conduct serial physical assessments  Pancreatic adenocarcinoma (HCC)  Diagnosed with pancreatic adenocarcinoma 8/27/2024 status post Whipple procedure in 12/20/2024  Mediport placed 10/28/2024.  Started chemotherapy with fluorouracil on 10/29/2024  Continue outpatient follow-up with hematology/oncology  Leukocytosis  PC 29.44. Afebrile  Received Neulasta on 11/6/2024 hemotherapy induced neutropenia  Monitor labs and vital signs  Conduct serial physical assessments  Benign essential hypertension  BP reviewed and acceptable  Hold lisinopril 20 mg p.o. daily due to volume losses from diarrhea  Continue to monitor blood pressure  Coronary artery disease involving native coronary artery of native heart without angina pectoris  Denies chest pain  Continue aspirin  Hold statin until abdominal pain improves and tolerating usual diet  Aortic valve stenosis  Echocardiogram 8/16/2024: Moderate aortic stenosis      VTE Pharmacologic Prophylaxis: VTE Score: 5 High Risk (Score >/= 5) - Pharmacological DVT Prophylaxis Ordered: enoxaparin (Lovenox). Sequential Compression Devices Ordered.  Code Status: Level 1 - Full Code   Discussion with family:  Patient's son was updated in the ER.     Anticipated Length of Stay: Patient will be admitted on an observation basis with an anticipated length  of stay of less than 2 midnights secondary to pancolitis.    History of Present Illness   Chief Complaint: Abdominal pain and diarrhea    Isaac Kramer is a 63 y.o. male with a PMH of essential hypertension, CAD, hyperlipidemia, pancreatic cancer on chemotherapy, and aortic valve stenosis who presents with abdominal pain and diarrhea.  He says his pain started overnight and was accompanied by significant diarrhea.  He says his pain is now improved after pain medication.  Imaging revealed evidence for pancolitis. He denies hematochezia, vomiting, fever, or myalgias.  He has leukocytosis however did have a dose of Neulasta 2 days ago for chemotherapy induced neutropenia. He was admitted to medicine observation for ongoing management and monitoring and symptomatic control.     Review of Systems   Constitutional:  Negative for chills and fever.   HENT:  Negative for congestion.    Eyes:  Negative for visual disturbance.   Respiratory:  Negative for shortness of breath.    Cardiovascular:  Negative for chest pain and palpitations.   Gastrointestinal:  Positive for abdominal pain and diarrhea. Negative for blood in stool.   Genitourinary:  Negative for difficulty urinating.   Musculoskeletal:  Negative for myalgias.   Skin:  Negative for color change.   Neurological:  Negative for dizziness and light-headedness.   Psychiatric/Behavioral:  Negative for confusion.        Historical Information   Past Medical History:   Diagnosis Date    Benign essential hypertension 05/08/2014    CAD (coronary artery disease)     s/p NAVA prox LAD and D1 7/28/2022    Cancer (HCC)     Prostate    Coronary artery disease involving native coronary artery of native heart without angina pectoris 08/09/2022    Enteritis 08/28/2024    GERD (gastroesophageal reflux disease)     Hyperlipidemia     Hypertension     Other chest pain     Palpitations     Prostate cancer (HCC) 04/18/2023    Sleep apnea      Past Surgical History:   Procedure  Laterality Date    CARDIAC CATHETERIZATION Left 2022    Procedure: Cardiac catheterization-left heart catheterization;  Surgeon: Sai Henry MD;  Location: [facility];  Service: Cardiology    CARDIAC CATHETERIZATION N/A 2022    Procedure: Cardiac pci;  Surgeon: Sai Henry MD;  Location: [facility];  Service: Cardiology    HERNIA REPAIR      IR EMBOLIZATION (SPECIFY VESSEL OR SITE)  2024    IR PORT PLACEMENT  10/28/2024    ID LAPS SURG [id number] RPBIC RAD W/NRV SPARING ROBOT N/A 2023    Procedure: PROSTATECTOMY RADICAL & PELVIC LYMPH NODE DISSECTION  W/ ROBOT;  Surgeon: Otoniel Harmon MD;  Location: [facility];  Service: Urology    ID PROSTATE NEEDLE BIOPSY ANY APPROACH N/A 2023    Procedure: TRANSRECTAL MRI FUSION BIOPSY PROSTATE;  Surgeon: Milan Arellano MD;  Location: [facility];  Service: Urology    WHIPPLE PROCEDURE/PANCREATICO-DUODENECTOMY N/A 2024    Procedure: WHIPPLE PROCEDURE/PANCREATICO-DUODENECTOMY;  Surgeon: Marika Ac MD;  Location: [facility];  Service: Surgical Oncology     Social History     Tobacco Use    Smoking status: Former     Current packs/day: 0.00     Types: Cigarettes     Quit date: 2022     Years since quittin.8    Smokeless tobacco: Never   Vaping Use    Vaping status: Never Used   Substance and Sexual Activity    Alcohol use: Not Currently    Drug use: Never    Sexual activity: Not on file     E-Cigarette/Vaping    E-Cigarette Use Never User      E-Cigarette/Vaping Substances    Nicotine No     THC No     CBD No     Flavoring No     Other No     Unknown No      Family History   Problem Relation Age of Onset    No Known Problems Mother     No Known Problems Father      Social History:  Marital Status: /Civil Union   Occupation: Not discussed  Patient Pre-hospital Living Situation: Home  Patient Pre-hospital Level of Mobility: walks  Patient Pre-hospital Diet Restrictions: None    Meds/Allergies   I have  reviewed home medications with patient personally.  Prior to Admission medications    Medication Sig Start Date End Date Taking? Authorizing Provider   aspirin 81 mg chewable tablet Chew 81 mg daily    Historical Provider, MD   atorvastatin (LIPITOR) 20 mg tablet Take 1 tablet (20 mg total) by mouth daily 7/25/24   Rick Robledo MD   celecoxib (CeleBREX) 200 mg capsule Take 1 capsule (200 mg total) by mouth daily for 7 days  Patient not taking: Reported on 10/2/2024 9/19/24 10/2/24  Tali Culver PA-C   docusate sodium (COLACE) 100 mg capsule Take 1 capsule (100 mg total) by mouth daily for 7 days  Patient not taking: Reported on 10/2/2024 9/20/24 10/2/24  Tali Culver PA-C   enoxaparin (LOVENOX) 40 mg/0.4 mL Inject 0.4 mL (40 mg total) under the skin every 24 hours for 21 days  Patient not taking: Reported on 11/7/2024 9/19/24 10/10/24  Tali Culver PA-C   gabapentin (NEURONTIN) 300 mg capsule Take 1 capsule (300 mg total) by mouth 3 (three) times a day for 7 days  Patient not taking: Reported on 10/2/2024 9/19/24 9/26/24  Tali Culver PA-C   lidocaine-prilocaine (EMLA) cream Apply topically as needed for mild pain  Patient not taking: Reported on 10/2/2024 10/2/24   Jacob Colby MD   lisinopril (ZESTRIL) 20 mg tablet Take 1 tablet (20 mg total) by mouth daily 10/3/24   Pablo Posadas DO   methocarbamol (ROBAXIN) 500 mg tablet Take 1 tablet (500 mg total) by mouth every 8 (eight) hours for 7 days  Patient not taking: Reported on 10/2/2024 9/19/24 9/26/24  Tali Culver PA-C   ondansetron (ZOFRAN) 4 mg tablet Take 1 tablet (4 mg total) by mouth every 8 (eight) hours as needed for nausea or vomiting 9/25/24   Marika Ac MD   ondansetron (ZOFRAN) 8 mg tablet Take 1 tablet (8 mg total) by mouth every 8 (eight) hours as needed for nausea or vomiting 10/2/24   Jacob Colby MD   pantoprazole (PROTONIX) 40 mg tablet Take 1 tablet (40 mg total) by  mouth daily before breakfast Do not start before September 20, 2024.  Patient not taking: Reported on 11/7/2024 9/20/24 10/20/24  Tali Culver PA-C   polyethylene glycol (MIRALAX) 17 g packet Take 17 g by mouth daily for 7 days Do not start before September 20, 2024.  Patient taking differently: Take 17 g by mouth daily Mix with 8oz fluid. 9/20/24 10/3/24  Tali Culver PA-C     No Known Allergies    Objective :  Temp:  [98.1 °F (36.7 °C)] 98.1 °F (36.7 °C)  HR:  [70-86] 80  BP: (104-120)/(52-61) 120/60  Resp:  [16-18] 18  SpO2:  [94 %-97 %] 94 %  O2 Device: None (Room air)    Physical Exam  Vitals and nursing note reviewed.   Constitutional:       Appearance: He is underweight. He is ill-appearing.   HENT:      Head: Normocephalic and atraumatic.      Mouth/Throat:      Mouth: Mucous membranes are dry.      Pharynx: Oropharynx is clear.   Eyes:      Pupils: Pupils are equal, round, and reactive to light.   Cardiovascular:      Rate and Rhythm: Normal rate and regular rhythm.      Pulses: Normal pulses.      Heart sounds: Murmur heard.      Comments: MediPort left chest wall with aging ecchymosis tracking inferiorly  Pulmonary:      Effort: Pulmonary effort is normal. No respiratory distress.      Breath sounds: Normal breath sounds.   Abdominal:      General: Bowel sounds are normal. There is no distension.      Palpations: Abdomen is soft.      Tenderness: There is no abdominal tenderness. There is no guarding.      Comments: Well-healing midline abdominal incision   Musculoskeletal:      Cervical back: Neck supple.      Right lower leg: No edema.      Left lower leg: No edema.   Skin:     General: Skin is warm and dry.      Capillary Refill: Capillary refill takes less than 2 seconds.   Neurological:      General: No focal deficit present.      Mental Status: He is alert and oriented to person, place, and time. Mental status is at baseline.         Central Line:  Goal for removal: Will  discontinue when meds requiring line are completed.             Lab Results: I have reviewed the following results:  Results from last 7 days   Lab Units 11/08/24  1011   WBC Thousand/uL 29.44*   HEMOGLOBIN g/dL 12.7   HEMATOCRIT % 39.1   PLATELETS Thousands/uL 299   BANDS PCT % 7   LYMPHO PCT % 5*   MONO PCT % 1*   EOS PCT % 1     Results from last 7 days   Lab Units 11/08/24  1011   SODIUM mmol/L 135   POTASSIUM mmol/L 3.8   CHLORIDE mmol/L 102   CO2 mmol/L 26   BUN mg/dL 21   CREATININE mg/dL 0.98   ANION GAP mmol/L 7   CALCIUM mg/dL 8.7   ALBUMIN g/dL 3.9   TOTAL BILIRUBIN mg/dL 0.61   ALK PHOS U/L 97   ALT U/L 12   AST U/L 11*   GLUCOSE RANDOM mg/dL 137             Lab Results   Component Value Date    HGBA1C 5.7 (H) 09/10/2024    HGBA1C 5.4 05/10/2023           Imaging Results Review: I reviewed radiology reports from this admission including: CT abdomen/pelvis.  Other Study Results Review: No additional pertinent studies reviewed.    Administrative Statements   I have spent a total time of 75 minutes in caring for this patient on the day of the visit/encounter including Diagnostic results, Prognosis, Instructions for management, Patient and family education, Impressions, Counseling / Coordination of care, Documenting in the medical record, Reviewing / ordering tests, medicine, procedures  , Obtaining or reviewing history  , and Communicating with other healthcare professionals .    ** Please Note: This note has been constructed using a voice recognition system. **

## 2024-11-09 LAB
ANION GAP SERPL CALCULATED.3IONS-SCNC: 6 MMOL/L (ref 4–13)
BUN SERPL-MCNC: 13 MG/DL (ref 5–25)
CALCIUM SERPL-MCNC: 7.9 MG/DL (ref 8.4–10.2)
CHLORIDE SERPL-SCNC: 105 MMOL/L (ref 96–108)
CO2 SERPL-SCNC: 25 MMOL/L (ref 21–32)
CREAT SERPL-MCNC: 0.64 MG/DL (ref 0.6–1.3)
ERYTHROCYTE [DISTWIDTH] IN BLOOD BY AUTOMATED COUNT: 13.1 % (ref 11.6–15.1)
GFR SERPL CREATININE-BSD FRML MDRD: 102 ML/MIN/1.73SQ M
GLUCOSE P FAST SERPL-MCNC: 99 MG/DL (ref 65–99)
GLUCOSE SERPL-MCNC: 99 MG/DL (ref 65–140)
HCT VFR BLD AUTO: 35.7 % (ref 36.5–49.3)
HGB BLD-MCNC: 11.4 G/DL (ref 12–17)
MCH RBC QN AUTO: 28.1 PG (ref 26.8–34.3)
MCHC RBC AUTO-ENTMCNC: 31.9 G/DL (ref 31.4–37.4)
MCV RBC AUTO: 88 FL (ref 82–98)
PLATELET # BLD AUTO: 278 THOUSANDS/UL (ref 149–390)
PMV BLD AUTO: 9.5 FL (ref 8.9–12.7)
POTASSIUM SERPL-SCNC: 3.9 MMOL/L (ref 3.5–5.3)
RBC # BLD AUTO: 4.05 MILLION/UL (ref 3.88–5.62)
SODIUM SERPL-SCNC: 136 MMOL/L (ref 135–147)
WBC # BLD AUTO: 28.23 THOUSAND/UL (ref 4.31–10.16)

## 2024-11-09 PROCEDURE — 99232 SBSQ HOSP IP/OBS MODERATE 35: CPT | Performed by: NURSE PRACTITIONER

## 2024-11-09 PROCEDURE — 99232 SBSQ HOSP IP/OBS MODERATE 35: CPT | Performed by: INTERNAL MEDICINE

## 2024-11-09 PROCEDURE — 80048 BASIC METABOLIC PNL TOTAL CA: CPT | Performed by: NURSE PRACTITIONER

## 2024-11-09 PROCEDURE — 87505 NFCT AGENT DETECTION GI: CPT | Performed by: EMERGENCY MEDICINE

## 2024-11-09 PROCEDURE — 85027 COMPLETE CBC AUTOMATED: CPT | Performed by: NURSE PRACTITIONER

## 2024-11-09 RX ORDER — ONDANSETRON 2 MG/ML
4 INJECTION INTRAMUSCULAR; INTRAVENOUS EVERY 8 HOURS PRN
Status: DISCONTINUED | OUTPATIENT
Start: 2024-11-09 | End: 2024-11-11 | Stop reason: HOSPADM

## 2024-11-09 RX ADMIN — ONDANSETRON 4 MG: 2 INJECTION INTRAMUSCULAR; INTRAVENOUS at 13:11

## 2024-11-09 RX ADMIN — DICYCLOMINE HYDROCHLORIDE 10 MG: 10 CAPSULE ORAL at 07:50

## 2024-11-09 RX ADMIN — OXYCODONE HYDROCHLORIDE 5 MG: 5 TABLET ORAL at 03:54

## 2024-11-09 RX ADMIN — DICYCLOMINE HYDROCHLORIDE 10 MG: 10 CAPSULE ORAL at 15:45

## 2024-11-09 RX ADMIN — SODIUM CHLORIDE 100 ML/HR: 0.9 INJECTION, SOLUTION INTRAVENOUS at 03:52

## 2024-11-09 RX ADMIN — OXYCODONE HYDROCHLORIDE 5 MG: 5 TABLET ORAL at 13:08

## 2024-11-09 RX ADMIN — DICYCLOMINE HYDROCHLORIDE 10 MG: 10 CAPSULE ORAL at 12:09

## 2024-11-09 RX ADMIN — ENOXAPARIN SODIUM 40 MG: 40 INJECTION SUBCUTANEOUS at 08:52

## 2024-11-09 RX ADMIN — SODIUM CHLORIDE 100 ML/HR: 0.9 INJECTION, SOLUTION INTRAVENOUS at 15:41

## 2024-11-09 RX ADMIN — ASPIRIN 81 MG 81 MG: 81 TABLET ORAL at 08:52

## 2024-11-09 RX ADMIN — OXYCODONE HYDROCHLORIDE 5 MG: 5 TABLET ORAL at 18:20

## 2024-11-09 NOTE — PROGRESS NOTES
Progress Note - Hospitalist   Name: Isaac Kramer 63 y.o. male I MRN: [id number]  Unit/Bed#: -01 I Date of Admission: 11/8/2024   Date of Service: 11/9/2024 I Hospital Day: 0    Assessment & Plan  Pancolitis (HCC)  Presented for severe abdominal pain and diarrhea which started overnight  CT imaging revealed evidence for pancolitis  ER physician discussed with gastroenterology who recommended collecting stool studies and admitting to observation  Received 1 L normal saline in the ER, will continue maintenance fluids with normal saline at 100 mL/h  Advance diet to full liquid toast crackers   Symptom control  Continue Bentyl  Monitor labs and vital signs, conduct serial physical assessments  Follow stool studies  Benign essential hypertension  BP reviewed and acceptable  Hold lisinopril 20 mg p.o. daily due to volume losses from diarrhea  Continue to monitor blood pressure  Aortic valve stenosis  Echocardiogram 8/16/2024: Moderate aortic stenosis  Monitor volume status  Coronary artery disease involving native coronary artery of native heart without angina pectoris  Denies chest pain  Continue aspirin  Hold statin until abdominal pain improves and tolerating usual diet  Pancreatic adenocarcinoma (HCC)  Diagnosed with pancreatic adenocarcinoma 8/27/2024 status post Whipple procedure in 12/20/2024  Mediport placed 10/28/2024.  Started chemotherapy with fluorouracil on 10/29/2024  Continue outpatient follow-up with hematology/oncology  Leukocytosis  WBC 29.44-->28.23. Remains afebrile  Received Neulasta on 11/6/2024 hemotherapy induced neutropenia  Monitor labs and vital signs  Conduct serial physical assessments    VTE Pharmacologic Prophylaxis: VTE Score: 5 High Risk (Score >/= 5) - Pharmacological DVT Prophylaxis Ordered: enoxaparin (Lovenox). Sequential Compression Devices Ordered.    Mobility:   Basic Mobility Inpatient Raw Score: 22  JH-HLM Goal: 7: Walk 25 feet or more  JH-HLM Achieved: 7: Walk 25 feet or  more  JH-HLM Goal achieved. Continue to encourage appropriate mobility.    Patient Centered Rounds: I performed bedside rounds with nursing staff today.     Education and Discussions with Family / Patient: Updated  (wife) via phone.    Current Length of Stay: 0 day(s)  Current Patient Status: Observation   Certification Statement: The patient will continue to require additional inpatient hospital stay due to pancolitis.  Discharge Plan: Anticipate discharge tomorrow to home.    Code Status: Level 1 - Full Code    Subjective   Patient seen and examined.  He says he had 1 episode of diarrhea.  No other complaints offered.     Objective :  Temp:  [97.8 °F (36.6 °C)-98.1 °F (36.7 °C)] 98.1 °F (36.7 °C)  HR:  [70-83] 72  BP: (102-124)/(52-68) 119/63  Resp:  [16-18] 16  SpO2:  [94 %-97 %] 97 %    Body mass index is 20.96 kg/m².     Input and Output Summary (last 24 hours):     Intake/Output Summary (Last 24 hours) at 11/9/2024 1007  Last data filed at 11/9/2024 0128  Gross per 24 hour   Intake 2005 ml   Output --   Net 2005 ml       Physical Exam  Vitals and nursing note reviewed.   Constitutional:       General: He is not in acute distress.  HENT:      Head: Normocephalic and atraumatic.      Nose: Nose normal.      Mouth/Throat:      Mouth: Mucous membranes are moist.      Pharynx: Oropharynx is clear.   Eyes:      Pupils: Pupils are equal, round, and reactive to light.   Cardiovascular:      Rate and Rhythm: Normal rate and regular rhythm.      Pulses: Normal pulses.      Heart sounds: Murmur heard.   Pulmonary:      Effort: Pulmonary effort is normal. No respiratory distress.      Breath sounds: Normal breath sounds.   Abdominal:      General: Bowel sounds are normal.      Palpations: Abdomen is soft.      Tenderness: There is no abdominal tenderness.   Musculoskeletal:      Cervical back: Neck supple.      Right lower leg: No edema.      Left lower leg: No edema.   Skin:     General: Skin is warm and  dry.      Capillary Refill: Capillary refill takes less than 2 seconds.   Neurological:      General: No focal deficit present.      Mental Status: He is alert and oriented to person, place, and time.       Lines/Drains:  Lines/Drains/Airways       Active Status       Name Placement date Placement time Site Days    Port A Cath 10/28/24 Left Internal jugular 10/28/24  1137  Internal jugular  11                    Central Line:  Goal for removal: Will discontinue when meds requiring line are completed.               Lab Results: I have reviewed the following results:   Results from last 7 days   Lab Units 11/09/24  0449 11/08/24  1011   WBC Thousand/uL 28.23* 29.44*   HEMOGLOBIN g/dL 11.4* 12.7   HEMATOCRIT % 35.7* 39.1   PLATELETS Thousands/uL 278 299   BANDS PCT %  --  7   LYMPHO PCT %  --  5*   MONO PCT %  --  1*   EOS PCT %  --  1     Results from last 7 days   Lab Units 11/09/24  0449 11/08/24  1011   SODIUM mmol/L 136 135   POTASSIUM mmol/L 3.9 3.8   CHLORIDE mmol/L 105 102   CO2 mmol/L 25 26   BUN mg/dL 13 21   CREATININE mg/dL 0.64 0.98   ANION GAP mmol/L 6 7   CALCIUM mg/dL 7.9* 8.7   ALBUMIN g/dL  --  3.9   TOTAL BILIRUBIN mg/dL  --  0.61   ALK PHOS U/L  --  97   ALT U/L  --  12   AST U/L  --  11*   GLUCOSE RANDOM mg/dL 99 137                       Last 24 Hours Medication List:     Current Facility-Administered Medications:     acetaminophen (TYLENOL) tablet 650 mg, Q6H PRN    aspirin chewable tablet 81 mg, Daily    dicyclomine (BENTYL) capsule 10 mg, TID AC    enoxaparin (LOVENOX) subcutaneous injection 40 mg, Daily    HYDROmorphone HCl (DILAUDID) injection 0.2 mg, Q2H PRN    oxyCODONE (ROXICODONE) split tablet 2.5 mg, Q4H PRN **OR** oxyCODONE (ROXICODONE) IR tablet 5 mg, Q4H PRN    sodium chloride 0.9 % infusion, Continuous, Last Rate: 100 mL/hr (11/09/24 0352)    Facility-Administered Medications Ordered in Other Encounters:     [MAR Hold] alteplase (CATHFLO) injection 2 mg, Q1MIN PRN    Administrative  Statements   Today, Patient Was Seen By: DULCE Mays  I have spent a total time of 43 minutes in caring for this patient on the day of the visit/encounter including Diagnostic results, Prognosis, Risks and benefits of tx options, Instructions for management, Patient and family education, Risk factor reductions, Impressions, Counseling / Coordination of care, Documenting in the medical record, Reviewing / ordering tests, medicine, procedures  , Obtaining or reviewing history  , and Communicating with other healthcare professionals .    **Please Note: This note may have been constructed using a voice recognition system.**

## 2024-11-09 NOTE — UTILIZATION REVIEW
Initial Clinical Review    WAS OBSERVATION 11/8/24 @ 1511 CONVERTED TO INPATIENT ADMISSION 11/9/24 @ 1544 DUE TO CONTINUED STAY REQUIRED TO CARE FOR PATIENT WITH DX: PANCOLITIS.    Admission: Date/Time/Statement:   Admission Orders (From admission, onward)       Ordered        11/09/24 1544  INPATIENT ADMISSION  Once            11/08/24 1511  Place in Observation  Once                          Orders Placed This Encounter   Procedures    INPATIENT ADMISSION     Standing Status:   Standing     Number of Occurrences:   1     Order Specific Question:   Level of Care     Answer:   Med Surg [16]     Order Specific Question:   Estimated length of stay     Answer:   More than 2 Midnights     Order Specific Question:   Certification     Answer:   I certify that inpatient services are medically necessary for this patient for a duration of greater than two midnights. See H&P and MD Progress Notes for additional information about the patient's course of treatment.     ED Arrival Information       Expected   11/8/2024 09:43    Arrival   11/8/2024 09:46    Acuity   Urgent              Means of arrival   Walk-In    Escorted by   Family Member    Service   Hospitalist    Admission type   Emergency              Arrival complaint   abdominal pain center of abdomen 9/10, Diarrhea, Dizziness             Chief Complaint   Patient presents with    Abdominal Pain     Pt reports mid abdominal pain, diarrhea, sore throat starting last night. Pt recently started chemo.       Initial Presentation: 63 y.o. male to ED via walk-in from home  Present to ED with abdominal pain and diarrhea.  He says his pain started overnight and was accompanied by significant diarrhea.   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   CT revealed evidence for pancolitis.   PLAN: cont ivf; monitor labs; pain / nausea control (see below); clear liq diet - advance as tolerated; serial abdominal exams; monitor / trend BP's      Anticipated Length of Stay/Certification Statement: Patient will be admitted on an observation basis with an anticipated length of stay of less than 2 midnights secondary to pancolitis.       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 Leukocytosis. History of prostate cancer  Patient is presenting with new onset abdominal discomfort and diarrhea which started this morning. Patient endorsed 7-8 bowel movements without any blood.  Patient was recently started on FOLFOX chemotherapy and due to diagnosis of pancreatic adenocarcinoma with ampullary carcinoma back in August of this year for which she underwent a Whipple surgery. Patient also received Neulasta 2 days ago.  Patient's symptoms may be due to new onset chemotherapy as well as leukocytosis may be in the setting of receiving Neulasta 2 days ago.  However need to rule out any infectious causes of colitis, recommend stool studies to rule out infection.  Plan: f/u C. difficile, stool pathogen panel, stool ova and parasite;  Bentyl for pain relief       Date: 11/9/24 - CHANGED TO INPATIENT    He says he had 1 episode of diarrhea. WBC 28.23  Plan: cont ivf; monitor labs; pain / nausea control (see below); clear liq diet - advance as tolerated; serial abdominal exams; monitor / trend BP's; f/u stool studies      Date: 11/10/24     Day 3: Has surpassed a 2nd midnight with active treatments and services.  He has had no further diarrhea but he is passing a lot of gas.  Of note, he had a small amount of red blood with a bowel  movement.   Plan: cont ivf; monitor labs; pain / nausea control (see below); clear liq diet - advance as tolerated; serial abdominal exams; monitor / trend BP's; f/u stool studies    ED Treatment-Medication Administration from 11/08/2024 0929 to 11/08/2024 1654         Date/Time Order Dose Route Action     11/08/2024 1012 sodium chloride 0.9 % bolus 1,000 mL 1,000 mL Intravenous New Bag     11/08/2024 1020 HYDROmorphone HCl (DILAUDID) injection 0.2 mg 0.2 mg Intravenous Given     11/08/2024 1014 ondansetron (ZOFRAN) injection 4 mg 4 mg Intravenous Given     11/08/2024 1107 iohexol (OMNIPAQUE) 350 MG/ML injection (MULTI-DOSE) 100 mL 100 mL Intravenous Given     11/08/2024 1433 HYDROmorphone (DILAUDID) injection 0.5 mg 0.5 mg Intravenous Given            Scheduled Medications:  aspirin, 81 mg, Oral, Daily  dicyclomine, 10 mg, Oral, TID AC  enoxaparin, 40 mg, Subcutaneous, Daily      Continuous IV Infusions:  sodium chloride, 100 mL/hr, Intravenous, Continuous      PRN Meds:  acetaminophen, 650 mg, Oral, Q6H PRN  HYDROmorphone, 0.2 mg, Intravenous, Q2H PRN  oxyCODONE, 2.5 mg, Oral, Q4H PRN   Or  oxyCODONE, 5 mg, Oral, Q4H PRN  (11/8 recd x1)  (11/9 recd x3)   (10/11 recd x1 so far today)   ondansetron (ZOFRAN) injection 4 mg, Intravenous Q8H PRN      ED Triage Vitals   Temperature Pulse Respirations Blood Pressure SpO2 Pain Score   11/08/24 1005 11/08/24 1005 11/08/24 1005 11/08/24 1005 11/08/24 1005 11/08/24 1020   98.1 °F (36.7 °C) 86 16 105/58 97 % 7     Weight (last 2 days)       Date/Time Weight    11/08/24 1658 55.4 (122.14)            Vital Signs (last 3 days)       Date/Time Temp Pulse Resp BP MAP (mmHg) SpO2 O2 Device Patient Position - Orthostatic VS Pain    11/10/24 1146 -- -- -- -- -- -- -- -- 8    11/10/24 0934 -- -- -- -- -- -- None (Room air) -- No Pain    11/10/24 07:01:47 97.7 °F (36.5 °C) 82 20 139/75 96 96 % None (Room air) Lying --    11/09/24 22:25:37 98.1 °F (36.7 °C) 78 17 117/61 80 94 % -- -- --     11/09/24 2159 -- -- -- -- -- -- -- -- No Pain    11/09/24 1820 -- -- -- -- -- -- -- -- 8    11/09/24 15:51:31 98 °F (36.7 °C) 79 16 123/70 88 98 % -- -- --    11/09/24 1308 -- -- -- -- -- -- -- -- 10 - Worst Possible Pain    11/09/24 0749 -- -- -- -- -- -- -- -- 6    11/09/24 07:38:25 98.1 °F (36.7 °C) 72 16 119/63 82 97 % -- -- --    11/09/24 0354 -- -- -- -- -- -- -- -- 8 11/08/24 2246 97.8 °F (36.6 °C) 83 18 102/54 70 95 % -- -- --    11/08/24 2023 -- -- -- -- -- -- -- -- 8 11/08/24 17:02:39 98 °F (36.7 °C) 77 18 124/68 87 97 % -- -- --    11/08/24 1700 -- -- -- -- -- -- -- -- 6 11/08/24 1651 -- -- -- -- -- -- -- -- 7    11/08/24 1230 -- 80 18 120/60 84 94 % -- -- --    11/08/24 1215 -- 81 16 113/59 80 96 % -- -- --    11/08/24 1200 -- 79 16 120/59 84 94 % -- -- --    11/08/24 1130 -- 82 16 117/61 83 96 % -- -- --    11/08/24 1115 -- 82 16 113/55 76 97 % -- -- --    11/08/24 1045 -- 70 18 104/52 74 96 % -- -- --    11/08/24 1020 -- -- -- -- -- -- -- -- 7    11/08/24 1005 98.1 °F (36.7 °C) 86 16 105/58 76 97 % None (Room air) Sitting --              Pertinent Labs/Diagnostic Test Results:   Radiology:  CT abdomen pelvis with contrast   Final Interpretation by Nico Malcolm MD (11/08 4284)      Pancolonic mural thickening and pericolonic stranding consistent with colitis.      Postsurgical changes of Whipple procedure and cystic artery embolization. Decreased size of heterogeneous collection in the gallbladder fossa, likely resolving hematoma.      Workstation performed: [id number]           Cardiology:  ECG 12 lead   Final Result by Rick Robledo MD (11/08 7583)   Normal sinus rhythm   Left axis deviation   Inferior infarct , age undetermined   Abnormal ECG   When compared with ECG of 16-Sep-2024 22:30,   No significant change was found   Confirmed by Rick Robledo ([id number]) on 11/8/2024 11:09:50 PM          Results from last 7 days   Lab Units 11/10/24  0518 11/09/24  0449  11/08/24  1011   WBC Thousand/uL 34.66* 28.23* 29.44*   HEMOGLOBIN g/dL 10.6* 11.4* 12.7   HEMATOCRIT % 33.7* 35.7* 39.1   PLATELETS Thousands/uL 255 278 299   BANDS PCT %  --   --  7        Results from last 7 days   Lab Units 11/10/24  0518 11/09/24  0449 11/08/24  1011   SODIUM mmol/L 139 136 135   POTASSIUM mmol/L 3.8 3.9 3.8   CHLORIDE mmol/L 110* 105 102   CO2 mmol/L 24 25 26   ANION GAP mmol/L 5 6 7   BUN mg/dL 5 13 21   CREATININE mg/dL 0.56* 0.64 0.98   EGFR ml/min/1.73sq m 108 102 80   CALCIUM mg/dL 7.8* 7.9* 8.7     Results from last 7 days   Lab Units 11/08/24  1011   AST U/L 11*   ALT U/L 12   ALK PHOS U/L 97   TOTAL PROTEIN g/dL 5.9*   ALBUMIN g/dL 3.9   TOTAL BILIRUBIN mg/dL 0.61        Results from last 7 days   Lab Units 11/10/24  0518 11/09/24  0449 11/08/24  1011   GLUCOSE RANDOM mg/dL 90 99 137        Results from last 7 days   Lab Units 11/08/24  1011   HS TNI 0HR ng/L <2        Results from last 7 days   Lab Units 11/08/24  1011   LIPASE u/L <6*              Past Medical History:   Diagnosis Date    Benign essential hypertension 05/08/2014    CAD (coronary artery disease)     s/p NAVA prox LAD and D1 7/28/2022    Cancer (HCC)     Prostate    Coronary artery disease involving native coronary artery of native heart without angina pectoris 08/09/2022    Enteritis 08/28/2024    GERD (gastroesophageal reflux disease)     Hyperlipidemia     Hypertension     Other chest pain     Palpitations     Prostate cancer (HCC) 04/18/2023    Sleep apnea      Present on Admission:   Pancreatic adenocarcinoma (HCC)   Benign essential hypertension   Coronary artery disease involving native coronary artery of native heart without angina pectoris   Aortic valve stenosis      Admitting Diagnosis: Diarrhea [R19.7]  Benign essential hypertension [I10]  Aortic valve stenosis [I35.0]  Leukocytosis [D72.829]  Pancolitis (HCC) [K51.00]  CAD (coronary artery disease) [I25.10]  Abdominal pain [R10.9]  Pancreatic adenocarcinoma  ([facility]) [C25.9]  Coronary artery disease involving native coronary artery of native heart without angina pectoris [I25.10]  Age/Sex: 63 y.o. male    Network Utilization Review Department  ATTENTION: Please call with any questions or concerns to [phone] and carefully listen to the prompts so that you are directed to the right person. All voicemails are confidential.   For Discharge needs, contact Care Management DC Support Team at [phone] opt. [phone]  Send all requests for admission clinical reviews, approved or denied determinations and any other requests to dedicated fax number below belonging to the campus where the patient is receiving treatment. List of dedicated fax numbers for the Facilities:  FACILITY NAME UR FAX NUMBER   ADMISSION DENIALS (Administrative/Medical Necessity) [phone]   DISCHARGE SUPPORT TEAM (NETWORK) [phone]   PARENT CHILD HEALTH (Maternity/NICU/Pediatrics) [phone]   [facility] [phone]   [facility] [phone]   Psychiatric hospital [phone]   [facility] [phone]   [facility] [phone]   [facility] [phone]   [facility] [phone]   [facility] [phone]   [facility] [phone]   [facility] [phone]   [facility] [phone]   [facility] [phone]

## 2024-11-09 NOTE — PLAN OF CARE
Problem: INFECTION - ADULT  Goal: Absence or prevention of progression during hospitalization  Description: INTERVENTIONS:  - Assess and monitor for signs and symptoms of infection  - Monitor lab/diagnostic results  - Monitor all insertion sites, i.e. indwelling lines, tubes, and drains  - Monitor endotracheal if appropriate and nasal secretions for changes in amount and color  - [place] appropriate cooling/warming therapies per order  - Administer medications as ordered  - Instruct and encourage patient and family to use good hand hygiene technique  - Identify and instruct in appropriate isolation precautions for identified infection/condition  Outcome: Progressing     Problem: PAIN - ADULT  Goal: Verbalizes/displays adequate comfort level or baseline comfort level  Description: Interventions:  - Encourage patient to monitor pain and request assistance  - Assess pain using appropriate pain scale  - Administer analgesics based on type and severity of pain and evaluate response  - Implement non-pharmacological measures as appropriate and evaluate response  - Consider cultural and social influences on pain and pain management  - Notify physician/advanced practitioner if interventions unsuccessful or patient reports new pain  Outcome: Progressing    Problem: GASTROINTESTINAL - ADULT  Goal: Minimal or absence of nausea and/or vomiting  Description: INTERVENTIONS:  - Administer IV fluids if ordered to ensure adequate hydration  - Maintain NPO status until nausea and vomiting are resolved  - Nasogastric tube if ordered  - Administer ordered antiemetic medications as needed  - Provide nonpharmacologic comfort measures as appropriate  - Advance diet as tolerated, if ordered  - Consider nutrition services referral to assist patient with adequate nutrition and appropriate food choices  Outcome: Progressing  Goal: Maintains or returns to baseline bowel function  Description: INTERVENTIONS:  - Assess bowel function  -  Encourage oral fluids to ensure adequate hydration  - Administer IV fluids if ordered to ensure adequate hydration  - Administer ordered medications as needed  - Encourage mobilization and activity  - Consider nutritional services referral to assist patient with adequate nutrition and appropriate food choices  Outcome: Progressing  Goal: Maintains adequate nutritional intake  Description: INTERVENTIONS:  - Monitor percentage of each meal consumed  - Identify factors contributing to decreased intake, treat as appropriate  - Assist with meals as needed  - Monitor I&O, weight, and lab values if indicated  - Obtain nutrition services referral as needed  Outcome: Progressing    Problem: METABOLIC, FLUID AND ELECTROLYTES - ADULT  Goal: Electrolytes maintained within normal limits  Description: INTERVENTIONS:  - Monitor labs and assess patient for signs and symptoms of electrolyte imbalances  - Administer electrolyte replacement as ordered  - Monitor response to electrolyte replacements, including repeat lab results as appropriate  - Instruct patient on fluid and nutrition as appropriate  Outcome: Progressing

## 2024-11-09 NOTE — CASE MANAGEMENT
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     MOOKIE (acute kidney injury) (HCC) 2024     GERD (gastroesophageal reflux disease)      Pancreatic adenocarcinoma (HCC) 2024     Elevated LFTs 2024     Acute obstructive cholangitis 2024     Enteritis 2024     Microscopic hematuria 10/10/2022     BPH (benign prostatic hyperplasia) 10/10/2022     Coronary artery disease involving native coronary artery of native heart without angina pectoris 2022     Overweight with body mass index (BMI) of 26 to 26.9 in adult 2020     Aortic valve stenosis 2020     ED (erectile dysfunction) of non-organic origin 2015     Peripheral neuropathy 2014     Peripheral vascular disease (HCC) 2014     Benign essential hypertension 2014     Hypercholesterolemia 2013       LOS (days): 0  Geometric Mean LOS (GMLOS) (days):   Days to GMLOS:     OBJECTIVE:    Risk of Unplanned Readmission Score: 12.58         Current admission status: Inpatient  Referral Reason: Other (d/c planning)    Preferred Pharmacy:   [facility] - [address]  [address] [address]  Phone: [phone] Fax: [phone]    Primary Care Provider: Pablo Perez DO    Primary Insurance: MEDICARE  Secondary Insurance:     ASSESSMENT:  Active Health Care Proxies       Williams Jacque Health Care Agent - Spouse   Primary Phone: [phone] (Mobile)  Home  Phone: [phone]                 Advance Directives  Does patient have a Health Care POA?: No  Was patient offered paperwork?: Yes (declined paperwork)  Does patient have Advance Directives?: No  Was patient offered paperwork?: Yes (declined paperwork)         Readmission Root Cause  30 Day Readmission: No    Patient Information  Admitted from:: Home  Mental Status: Alert  During Assessment patient was accompanied by: Spouse, Son  Assessment information provided by:: Spouse, Patient  Primary Caregiver: Self  Support Systems: Spouse/significant other  [facility] Residence: [facility] do you live in?: [place]  Home entry access options. Select all that apply.: Ramp (1 step into the home)  Living Arrangements: Lives w/ Spouse/significant other    Activities of Daily Living Prior to Admission  Functional Status: Independent  Completes ADLs independently?: Yes  Ambulates independently?: Yes  Does patient use assisted devices?: No  Does patient currently own DME?: No  Does patient have a history of Outpatient Therapy (PT/OT)?: Yes  Does the patient have a history of Short-Term Rehab?: No  Does patient have a history of HHC?: Yes ([facility])  Does patient currently have HHC?: No         Patient Information Continued  Income Source: Pension/skilled nursing  Does patient have prescription coverage?: No ([employer] [place])  Does patient receive dialysis treatments?: No  Does patient have a history of substance abuse?: No  Does patient have a history of Mental Health Diagnosis?: No         Means of Transportation  Means of Transport to Appts:: Drives Self          DISCHARGE DETAILS:    Discharge planning discussed with:: patint & spouse & family at the  bedside  Freedom of Choice: Yes  Comments - Freedom of Choice: pt denies any d/c needs-  cm will continue to follow  CM contacted family/caregiver?: Yes             Contacts  Patient Contacts: Jacque Kramer  Relationship to Patient:: Family (wife)  Contact Method: In  Person  Reason/Outcome: Discharge Planning    Requested Home Health Care         Is the patient interested in HHC at discharge?: No    DME Referral Provided  Referral made for DME?: No         Would you like to participate in our [facility] service program?  : No - Declined    Treatment Team Recommendation: Home (home wiht spouse & outpt follow up- family)                                         Family notified:: family at the bedside

## 2024-11-09 NOTE — PLAN OF CARE
Problem: PAIN - ADULT  Goal: Verbalizes/displays adequate comfort level or baseline comfort level  Description: Interventions:  - Encourage patient to monitor pain and request assistance  - Assess pain using appropriate pain scale  - Administer analgesics based on type and severity of pain and evaluate response  - Implement non-pharmacological measures as appropriate and evaluate response  - Consider cultural and social influences on pain and pain management  - Notify physician/advanced practitioner if interventions unsuccessful or patient reports new pain  Outcome: Progressing    Problem: INFECTION - ADULT  Goal: Absence or prevention of progression during hospitalization  Description: INTERVENTIONS:  - Assess and monitor for signs and symptoms of infection  - Monitor lab/diagnostic results  - Monitor all insertion sites, i.e. indwelling lines, tubes, and drains  - Monitor endotracheal if appropriate and nasal secretions for changes in amount and color  - [place] appropriate cooling/warming therapies per order  - Administer medications as ordered  - Instruct and encourage patient and family to use good hand hygiene technique  - Identify and instruct in appropriate isolation precautions for identified infection/condition  Outcome: Progressing  Goal: Absence of fever/infection during neutropenic period  Description: INTERVENTIONS:  - Monitor WBC  Outcome: Progressing     Problem: GASTROINTESTINAL - ADULT  Goal: Minimal or absence of nausea and/or vomiting  Description: INTERVENTIONS:  - Administer IV fluids if ordered to ensure adequate hydration  - Maintain NPO status until nausea and vomiting are resolved  - Nasogastric tube if ordered  - Administer ordered antiemetic medications as needed  - Provide nonpharmacologic comfort measures as appropriate  - Advance diet as tolerated, if ordered  - Consider nutrition services referral to assist patient with adequate nutrition and appropriate food choices  Outcome:  Progressing  Goal: Maintains or returns to baseline bowel function  Description: INTERVENTIONS:  - Assess bowel function  - Encourage oral fluids to ensure adequate hydration  - Administer IV fluids if ordered to ensure adequate hydration  - Administer ordered medications as needed  - Encourage mobilization and activity  - Consider nutritional services referral to assist patient with adequate nutrition and appropriate food choices  Outcome: Progressing  Goal: Maintains adequate nutritional intake  Description: INTERVENTIONS:  - Monitor percentage of each meal consumed  - Identify factors contributing to decreased intake, treat as appropriate  - Assist with meals as needed  - Monitor I&O, weight, and lab values if indicated  - Obtain nutrition services referral as needed  Outcome: Progressing     Problem: METABOLIC, FLUID AND ELECTROLYTES - ADULT  Goal: Electrolytes maintained within normal limits  Description: INTERVENTIONS:  - Monitor labs and assess patient for signs and symptoms of electrolyte imbalances  - Administer electrolyte replacement as ordered  - Monitor response to electrolyte replacements, including repeat lab results as appropriate  - Instruct patient on fluid and nutrition as appropriate  Outcome: Progressing

## 2024-11-09 NOTE — PROGRESS NOTES
Progress Note - Gastroenterology   Name: Isaac Kramer 63 y.o. male I MRN: [id number]  Unit/Bed#: -01 I Date of Admission: 11/8/2024   Date of Service: 11/9/2024 I Hospital Day: 0     Progress Note- Isaac Kramer 63 y.o. male MRN: [id number]    Unit/Bed#: -01 Encounter: [id number]      Assessment and Plan:    63 y.o. 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  Unclear if leukocytosis may be driven all from Neulasta versus infectious.  He has had mild improvement in diarrhea, would continue to hold off on antidiarrheals until stool studies result    Regarding history of pancreatic adenocarcinoma, ongoing outpatient care per oncology.    -C. difficile, stool pathogen panel, stool ova and parasite pending  -Bentyl for pain relief  -If this is noninfectious, suspect secondary to chemotherapy for which oncology should be aware.    ______________________________________________________________________    Subjective:     No acute events.  Patient reports feeling the same in terms of abdominal discomfort.  He has not tried Bentyl.  He has had 1 bowel movement today so far.  This is an improvement compared to prior to admission.    Medication Administration - last 24 hours from 11/08/2024 1348 to 11/09/2024 1348         Date/Time Order Dose Route Action Action by     11/08/2024 1651 EST sodium chloride 0.9 % bolus 1,000 mL 0 mL Intravenous Stopped Aby Vickers RN     11/08/2024 1433 EST HYDROmorphone (DILAUDID) injection 0.5 mg 0.5 mg Intravenous Given Lobo Peralta RN     " 11/09/2024 1209 EST dicyclomine (BENTYL) capsule 10 mg 10 mg Oral Given Cira Andrews RN     11/09/2024 0750 EST dicyclomine (BENTYL) capsule 10 mg 10 mg Oral Given Cira Andrews, LYNNE     11/08/2024 1749 EST dicyclomine (BENTYL) capsule 10 mg 10 mg Oral Given Kayy Arizmendi, RN     11/09/2024 0852 EST aspirin chewable tablet 81 mg 81 mg Oral Given Cira Andrews, LYNNE     11/09/2024 0352 EST sodium chloride 0.9 % infusion 100 mL/hr Intravenous New Bag Marilee Andrews, LYNNE     11/09/2024 0128 EST sodium chloride 0.9 % infusion 100 mL/hr Intravenous Rate/Dose Verify Marilee Andrews RN     11/08/2024 1749 EST sodium chloride 0.9 % infusion 100 mL/hr Intravenous New Bag Kayy Arizmendi, LYNNE     11/09/2024 1308 EST oxyCODONE (ROXICODONE) split tablet 2.5 mg -- Oral See Alternative Cira Andrews RN     11/09/2024 0354 EST oxyCODONE (ROXICODONE) split tablet 2.5 mg -- Oral See Alternative Marilee Andrews RN     11/08/2024 2023 EST oxyCODONE (ROXICODONE) split tablet 2.5 mg -- Oral See Alternative Marilee Andrews RN     11/09/2024 1308 EST oxyCODONE (ROXICODONE) IR tablet 5 mg 5 mg Oral Given Cira Andrews RN     11/09/2024 0354 EST oxyCODONE (ROXICODONE) IR tablet 5 mg 5 mg Oral Given Marilee Andrews RN     11/08/2024 2023 EST oxyCODONE (ROXICODONE) IR tablet 5 mg 5 mg Oral Given Marilee Andrews RN     11/09/2024 0852 EST enoxaparin (LOVENOX) subcutaneous injection 40 mg 40 mg Subcutaneous Given Cira Andrews RN     11/09/2024 1311 EST ondansetron (ZOFRAN) injection 4 mg 4 mg Intravenous Given Cira Andrews RN            Objective:     Vitals: Blood pressure 119/63, pulse 72, temperature 98.1 °F (36.7 °C), resp. rate 16, height 5' 4\" (1.626 m), weight 55.4 kg (122 lb 2.2 oz), SpO2 97%.,Body mass index is 20.96 kg/m².      Intake/Output Summary (Last 24 hours) at 11/9/2024 1348  Last data filed at 11/9/2024 0128  Gross per 24 hour   Intake 2005 ml   Output --   Net 2005 ml       Physical Exam:   General Appearance: Awake " and alert, in no acute distress  Abdomen: Soft, non-tender, non-distended; bowel sounds normal; no masses or no organomegaly    Invasive Devices       Central Venous Catheter Line  Duration             Port A Cath 10/28/24 Left Internal jugular 12 days              Peripheral Intravenous Line  Duration             Peripheral IV 11/08/24 Left Antecubital 1 day                    Lab Results:  Admission on 11/08/2024   Component Date Value    WBC 11/08/2024 29.44 (H)     RBC 11/08/2024 4.45     Hemoglobin 11/08/2024 12.7     Hematocrit 11/08/2024 39.1     MCV 11/08/2024 88     MCH 11/08/2024 28.5     MCHC 11/08/2024 32.5     RDW 11/08/2024 13.1     MPV 11/08/2024 9.4     Platelets 11/08/2024 299     Sodium 11/08/2024 135     Potassium 11/08/2024 3.8     Chloride 11/08/2024 102     CO2 11/08/2024 26     ANION GAP 11/08/2024 7     BUN 11/08/2024 21     Creatinine 11/08/2024 0.98     Glucose 11/08/2024 137     Calcium 11/08/2024 8.7     AST 11/08/2024 11 (L)     ALT 11/08/2024 12     Alkaline Phosphatase 11/08/2024 97     Total Protein 11/08/2024 5.9 (L)     Albumin 11/08/2024 3.9     Total Bilirubin 11/08/2024 0.61     eGFR 11/08/2024 80     Lipase 11/08/2024 <6 (L)     hs TnI 0hr 11/08/2024 <2     Ventricular Rate 11/08/2024 82     Atrial Rate 11/08/2024 82     AZ Interval 11/08/2024 164     QRSD Interval 11/08/2024 100     QT Interval 11/08/2024 356     QTC Interval 11/08/2024 415     P [place] 11/08/2024 58     QRS [place] 11/08/2024 -60     T Wave [place] 11/08/2024 45     Segmented % 11/08/2024 86 (H)     Bands % 11/08/2024 7     Lymphocytes % 11/08/2024 5 (L)     Monocytes % 11/08/2024 1 (L)     Eosinophils % 11/08/2024 1     Basophils % 11/08/2024 0     Absolute Neutrophils 11/08/2024 27.38 (H)     Absolute Lymphocytes 11/08/2024 1.47     Absolute Monocytes 11/08/2024 0.29     Absolute Eosinophils 11/08/2024 0.29     Absolute Basophils 11/08/2024 0.00     RBC Morphology 11/08/2024 Normal     Platelet Estimate 11/08/2024  Adequate     Sodium 11/09/2024 136     Potassium 11/09/2024 3.9     Chloride 11/09/2024 105     CO2 11/09/2024 25     ANION GAP 11/09/2024 6     BUN 11/09/2024 13     Creatinine 11/09/2024 0.64     Glucose 11/09/2024 99     Glucose, Fasting 11/09/2024 99     Calcium 11/09/2024 7.9 (L)     eGFR 11/09/2024 102     WBC 11/09/2024 28.23 (H)     RBC 11/09/2024 4.05     Hemoglobin 11/09/2024 11.4 (L)     Hematocrit 11/09/2024 35.7 (L)     MCV 11/09/2024 88     MCH 11/09/2024 28.1     MCHC 11/09/2024 31.9     RDW 11/09/2024 13.1     MPV 11/09/2024 9.5     Platelets 11/09/2024 278        Imaging Studies: I have personally reviewed pertinent imaging studies.

## 2024-11-09 NOTE — CASE MANAGEMENT
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     MOOKIE (acute kidney injury) (HCC) 2024     GERD (gastroesophageal reflux disease)      Pancreatic adenocarcinoma (HCC) 2024     Elevated LFTs 2024     Acute obstructive cholangitis 2024     Enteritis 2024     Microscopic hematuria 10/10/2022     BPH (benign prostatic hyperplasia) 10/10/2022     Coronary artery disease involving native coronary artery of native heart without angina pectoris 2022     Overweight with body mass index (BMI) of 26 to 26.9 in adult 2020     Aortic valve stenosis 2020     ED (erectile dysfunction) of non-organic origin 2015     Peripheral neuropathy 2014     Peripheral vascular disease (HCC) 2014     Benign essential hypertension 2014     Hypercholesterolemia 2013       LOS (days): 0  Geometric Mean LOS (GMLOS) (days):   Days to GMLOS:     OBJECTIVE:    Risk of Unplanned Readmission Score: 12.58         Current admission status: Inpatient  Referral Reason: Other (d/c planning)    Preferred Pharmacy:   [facility] - [address]  [address] [address]  Phone: [phone] Fax: [phone]    Primary Care Provider: Pablo Perez DO    Primary Insurance: MEDICARE  Secondary Insurance:     ASSESSMENT:  Active Health Care Proxies       Williams Jacque Health Care Agent - Spouse   Primary Phone: [phone] (Mobile)  Home  Phone: [phone]                 Advance Directives  Does patient have a Health Care POA?: No  Was patient offered paperwork?: Yes (declined paperwork)  Does patient have Advance Directives?: No  Was patient offered paperwork?: Yes (declined paperwork)         Readmission Root Cause  30 Day Readmission: No    Patient Information  Admitted from:: Home  Mental Status: Alert  During Assessment patient was accompanied by: Spouse, Son  Assessment information provided by:: Spouse, Patient  Primary Caregiver: Self  Support Systems: Spouse/significant other  County of Residence: [facility] do you live in?: [place]  Home entry access options. Select all that apply.: Ramp (1 step into the home)  Living Arrangements: Lives w/ Spouse/significant other    Activities of Daily Living Prior to Admission  Functional Status: Independent  Completes ADLs independently?: Yes  Ambulates independently?: Yes  Does patient use assisted devices?: No  Does patient currently own DME?: No  Does patient have a history of Outpatient Therapy (PT/OT)?: Yes  Does the patient have a history of Short-Term Rehab?: No  Does patient have a history of HHC?: Yes ([facility])  Does patient currently have HHC?: No         Patient Information Continued  Income Source: Pension/FCI  Does patient have prescription coverage?: No ([employer])  Does patient receive dialysis treatments?: No  Does patient have a history of substance abuse?: No  Does patient have a history of Mental Health Diagnosis?: No         Means of Transportation  Means of Transport to Appts:: Drives Self          DISCHARGE DETAILS:    Discharge planning discussed with:: patint & spouse & family at the  bedside  Freedom of Choice: Yes  Comments - Freedom of Choice: pt denies any d/c needs-  cm will continue to follow  CM contacted family/caregiver?: Yes             Contacts  Patient Contacts: Jacque Kramer  Relationship to Patient:: Family (wife)  Contact Method: In  Person  Reason/Outcome: Discharge Planning    Requested Home Health Care         Is the patient interested in HHC at discharge?: No    DME Referral Provided  Referral made for DME?: No         Would you like to participate in our [facility] service program?  : No - Declined    Treatment Team Recommendation: Home (home wiht spouse & outpt follow up- family)                                         Family notified:: family at the bedside

## 2024-11-10 LAB
ANION GAP SERPL CALCULATED.3IONS-SCNC: 5 MMOL/L (ref 4–13)
BUN SERPL-MCNC: 5 MG/DL (ref 5–25)
C COLI+JEJUNI TUF STL QL NAA+PROBE: NEGATIVE
C DIFF TOX B TCDB STL QL NAA+PROBE: NEGATIVE
CALCIUM SERPL-MCNC: 7.8 MG/DL (ref 8.4–10.2)
CHLORIDE SERPL-SCNC: 110 MMOL/L (ref 96–108)
CO2 SERPL-SCNC: 24 MMOL/L (ref 21–32)
CREAT SERPL-MCNC: 0.56 MG/DL (ref 0.6–1.3)
EC STX1+STX2 GENES STL QL NAA+PROBE: NEGATIVE
ERYTHROCYTE [DISTWIDTH] IN BLOOD BY AUTOMATED COUNT: 13.2 % (ref 11.6–15.1)
GFR SERPL CREATININE-BSD FRML MDRD: 108 ML/MIN/1.73SQ M
GLUCOSE SERPL-MCNC: 90 MG/DL (ref 65–140)
HCT VFR BLD AUTO: 33.7 % (ref 36.5–49.3)
HGB BLD-MCNC: 10.6 G/DL (ref 12–17)
MCH RBC QN AUTO: 27.7 PG (ref 26.8–34.3)
MCHC RBC AUTO-ENTMCNC: 31.5 G/DL (ref 31.4–37.4)
MCV RBC AUTO: 88 FL (ref 82–98)
PLATELET # BLD AUTO: 255 THOUSANDS/UL (ref 149–390)
PMV BLD AUTO: 9.3 FL (ref 8.9–12.7)
POTASSIUM SERPL-SCNC: 3.8 MMOL/L (ref 3.5–5.3)
RBC # BLD AUTO: 3.82 MILLION/UL (ref 3.88–5.62)
SALMONELLA SP SPAO STL QL NAA+PROBE: NEGATIVE
SHIGELLA SP+EIEC IPAH STL QL NAA+PROBE: NEGATIVE
SODIUM SERPL-SCNC: 139 MMOL/L (ref 135–147)
WBC # BLD AUTO: 34.66 THOUSAND/UL (ref 4.31–10.16)

## 2024-11-10 PROCEDURE — 87046 STOOL CULTR AEROBIC BACT EA: CPT | Performed by: EMERGENCY MEDICINE

## 2024-11-10 PROCEDURE — 85025 COMPLETE CBC W/AUTO DIFF WBC: CPT | Performed by: NURSE PRACTITIONER

## 2024-11-10 PROCEDURE — 99232 SBSQ HOSP IP/OBS MODERATE 35: CPT | Performed by: INTERNAL MEDICINE

## 2024-11-10 PROCEDURE — 99232 SBSQ HOSP IP/OBS MODERATE 35: CPT | Performed by: NURSE PRACTITIONER

## 2024-11-10 PROCEDURE — 80048 BASIC METABOLIC PNL TOTAL CA: CPT | Performed by: NURSE PRACTITIONER

## 2024-11-10 RX ADMIN — OXYCODONE HYDROCHLORIDE 5 MG: 5 TABLET ORAL at 20:05

## 2024-11-10 RX ADMIN — DICYCLOMINE HYDROCHLORIDE 10 MG: 10 CAPSULE ORAL at 09:38

## 2024-11-10 RX ADMIN — ASPIRIN 81 MG 81 MG: 81 TABLET ORAL at 09:38

## 2024-11-10 RX ADMIN — OXYCODONE HYDROCHLORIDE 5 MG: 5 TABLET ORAL at 11:46

## 2024-11-10 RX ADMIN — SODIUM CHLORIDE 100 ML/HR: 0.9 INJECTION, SOLUTION INTRAVENOUS at 11:46

## 2024-11-10 RX ADMIN — DICYCLOMINE HYDROCHLORIDE 10 MG: 10 CAPSULE ORAL at 11:45

## 2024-11-10 RX ADMIN — SODIUM CHLORIDE 100 ML/HR: 0.9 INJECTION, SOLUTION INTRAVENOUS at 02:06

## 2024-11-10 RX ADMIN — DICYCLOMINE HYDROCHLORIDE 10 MG: 10 CAPSULE ORAL at 16:35

## 2024-11-10 RX ADMIN — ENOXAPARIN SODIUM 40 MG: 40 INJECTION SUBCUTANEOUS at 09:38

## 2024-11-10 NOTE — PROGRESS NOTES
Progress Note - Hospitalist   Name: Isaac Kramer 63 y.o. male I MRN: [id number]  Unit/Bed#: -01 I Date of Admission: 11/8/2024   Date of Service: 11/10/2024 I Hospital Day: 1    Assessment & Plan  Pancolitis (HCC)  Presented for severe abdominal pain and diarrhea which started overnight  CT imaging revealed evidence for pancolitis  ER physician discussed with gastroenterology who recommended collecting stool studies and admitting to observation  Received 1 L normal saline in the ER, will continue maintenance fluids with normal saline at 100 mL/h  Advance diet to surgical soft   Symptom control  Continue Bentyl  Monitor labs and vital signs, conduct serial physical assessments  Follow stool studies.  Enteric panel and C. difficile negative.  Ova and parasites and Aeromonas Plesiomonas culture still pending collection  Benign essential hypertension  BP reviewed and acceptable  Hold lisinopril 20 mg p.o. daily due to volume losses from diarrhea  Continue to monitor blood pressure  Aortic valve stenosis  Echocardiogram 8/16/2024: Moderate aortic stenosis  Monitor volume status  Coronary artery disease involving native coronary artery of native heart without angina pectoris  Denies chest pain  Continue aspirin  Hold statin until abdominal pain improves and tolerating usual diet  Pancreatic adenocarcinoma (HCC)  Diagnosed with pancreatic adenocarcinoma 8/27/2024 status post Whipple procedure in 12/20/2024  Mediport placed 10/28/2024.  Started chemotherapy with fluorouracil on 10/29/2024  Continue outpatient follow-up with hematology/oncology  Leukocytosis  WBC 29.44-->28.23-->34.66. Remains afebrile  Received Neulasta on 11/6/2024 hemotherapy induced neutropenia  Monitor labs and vital signs  Conduct serial physical assessments    VTE Pharmacologic Prophylaxis: VTE Score: 5 High Risk (Score >/= 5) - Pharmacological DVT Prophylaxis Ordered: enoxaparin (Lovenox). Sequential Compression Devices  Ordered.    Mobility:   Basic Mobility Inpatient Raw Score: 23  JH-HLM Goal: 7: Walk 25 feet or more  JH-HLM Achieved: 8: Walk 250 feet ot more  JH-HLM Goal achieved. Continue to encourage appropriate mobility.    Patient Centered Rounds: I performed bedside rounds with nursing staff today.     Education and Discussions with Family / Patient:  Will update patient's wife after rounds.     Current Length of Stay: 1 day(s)  Current Patient Status: Inpatient   Certification Statement: The patient will continue to require additional inpatient hospital stay due to colitis.  Discharge Plan: Anticipate discharge tomorrow to home.    Code Status: Level 1 - Full Code    Subjective   Patient evaluated at bedside.  He has had no further diarrhea but he is passing a lot of gas.  Of note, he had a small amount of red blood with a bowel movement.     Objective :  Temp:  [97.7 °F (36.5 °C)-98.1 °F (36.7 °C)] 97.7 °F (36.5 °C)  HR:  [78-82] 82  BP: (117-139)/(61-75) 139/75  Resp:  [16-20] 20  SpO2:  [94 %-98 %] 96 %  O2 Device: None (Room air)    Body mass index is 20.96 kg/m².     Input and Output Summary (last 24 hours):     Intake/Output Summary (Last 24 hours) at 11/10/2024 1338  Last data filed at 11/10/2024 1251  Gross per 24 hour   Intake 3255 ml   Output --   Net 3255 ml       Physical Exam  Vitals and nursing note reviewed.   HENT:      Head: Normocephalic and atraumatic.      Nose: Nose normal.      Mouth/Throat:      Mouth: Mucous membranes are moist.      Pharynx: Oropharynx is clear.   Eyes:      Pupils: Pupils are equal, round, and reactive to light.   Cardiovascular:      Rate and Rhythm: Normal rate and regular rhythm.      Comments: Mediport in place left chest wall  Pulmonary:      Effort: Pulmonary effort is normal. No respiratory distress.      Breath sounds: Normal breath sounds.   Abdominal:      General: Bowel sounds are normal.      Palpations: Abdomen is soft.      Tenderness: There is no abdominal  tenderness.   Musculoskeletal:      Cervical back: Neck supple.      Right lower leg: No edema.      Left lower leg: No edema.   Skin:     General: Skin is warm and dry.      Capillary Refill: Capillary refill takes less than 2 seconds.   Neurological:      General: No focal deficit present.      Mental Status: He is alert and oriented to person, place, and time.         Lines/Drains:  Lines/Drains/Airways       Active Status       Name Placement date Placement time Site Days    Port A Cath 10/28/24 Left Internal jugular 10/28/24  1137  Internal jugular  13                    Central Line:  Goal for removal: Will discontinue when meds requiring line are completed.               Lab Results: I have reviewed the following results:   Results from last 7 days   Lab Units 11/10/24  0518 11/09/24  0449 11/08/24  1011   WBC Thousand/uL 34.66*   < > 29.44*   HEMOGLOBIN g/dL 10.6*   < > 12.7   HEMATOCRIT % 33.7*   < > 39.1   PLATELETS Thousands/uL 255   < > 299   BANDS PCT %  --   --  7   LYMPHO PCT %  --   --  5*   MONO PCT %  --   --  1*   EOS PCT %  --   --  1    < > = values in this interval not displayed.     Results from last 7 days   Lab Units 11/10/24  0518 11/09/24  0449 11/08/24  1011   SODIUM mmol/L 139   < > 135   POTASSIUM mmol/L 3.8   < > 3.8   CHLORIDE mmol/L 110*   < > 102   CO2 mmol/L 24   < > 26   BUN mg/dL 5   < > 21   CREATININE mg/dL 0.56*   < > 0.98   ANION GAP mmol/L 5   < > 7   CALCIUM mg/dL 7.8*   < > 8.7   ALBUMIN g/dL  --   --  3.9   TOTAL BILIRUBIN mg/dL  --   --  0.61   ALK PHOS U/L  --   --  97   ALT U/L  --   --  12   AST U/L  --   --  11*   GLUCOSE RANDOM mg/dL 90   < > 137    < > = values in this interval not displayed.                       Recent Cultures (last 7 days):   Results from last 7 days   Lab Units 11/09/24  0951   C DIFF TOXIN B BY PCR  Negative       Imaging Results Review: No pertinent imaging studies reviewed.  Other Study Results Review: No additional pertinent studies  reviewed.    Last 24 Hours Medication List:     Current Facility-Administered Medications:     acetaminophen (TYLENOL) tablet 650 mg, Q6H PRN    aspirin chewable tablet 81 mg, Daily    dicyclomine (BENTYL) capsule 10 mg, TID AC    enoxaparin (LOVENOX) subcutaneous injection 40 mg, Daily    HYDROmorphone HCl (DILAUDID) injection 0.2 mg, Q2H PRN    ondansetron (ZOFRAN) injection 4 mg, Q8H PRN    oxyCODONE (ROXICODONE) split tablet 2.5 mg, Q4H PRN **OR** oxyCODONE (ROXICODONE) IR tablet 5 mg, Q4H PRN    sodium chloride 0.9 % infusion, Continuous, Last Rate: 100 mL/hr (11/10/24 1146)    Administrative Statements   Today, Patient Was Seen By: DULCE Mays      **Please Note: This note may have been constructed using a voice recognition system.**   Private car

## 2024-11-10 NOTE — PLAN OF CARE
Problem: Potential for Falls  Goal: Patient will remain free of falls  Description: INTERVENTIONS:  - Educate patient/family on patient safety including physical limitations  - Instruct patient to call for assistance with activity   - Consult OT/PT to assist with strengthening/mobility   - Keep Call bell within reach  - Keep bed low and locked with side rails adjusted as appropriate  - Keep care items and personal belongings within reach  - Initiate and maintain comfort rounds  - Make Fall Risk Sign visible to staff  - Apply yellow socks and bracelet for high fall risk patients  - Consider moving patient to room near nurses station  Outcome: Progressing   Pt rings call bell when in need of assistance

## 2024-11-10 NOTE — PLAN OF CARE
Problem: INFECTION - ADULT  Goal: Absence or prevention of progression during hospitalization  Description: INTERVENTIONS:  - Assess and monitor for signs and symptoms of infection  - Monitor lab/diagnostic results  - Monitor all insertion sites, i.e. indwelling lines, tubes, and drains  - Monitor endotracheal if appropriate and nasal secretions for changes in amount and color  - [place] appropriate cooling/warming therapies per order  - Administer medications as ordered  - Instruct and encourage patient and family to use good hand hygiene technique  - Identify and instruct in appropriate isolation precautions for identified infection/condition  Outcome: Progressing  Goal: Absence of fever/infection during neutropenic period  Description: INTERVENTIONS:  - Monitor WBC    Outcome: Progressing     Problem: GASTROINTESTINAL - ADULT  Goal: Minimal or absence of nausea and/or vomiting  Description: INTERVENTIONS:  - Administer IV fluids if ordered to ensure adequate hydration  - Maintain NPO status until nausea and vomiting are resolved  - Nasogastric tube if ordered  - Administer ordered antiemetic medications as needed  - Provide nonpharmacologic comfort measures as appropriate  - Advance diet as tolerated, if ordered  - Consider nutrition services referral to assist patient with adequate nutrition and appropriate food choices  Outcome: Progressing  Goal: Maintains or returns to baseline bowel function  Description: INTERVENTIONS:  - Assess bowel function  - Encourage oral fluids to ensure adequate hydration  - Administer IV fluids if ordered to ensure adequate hydration  - Administer ordered medications as needed  - Encourage mobilization and activity  - Consider nutritional services referral to assist patient with adequate nutrition and appropriate food choices  Outcome: Progressing  Goal: Maintains adequate nutritional intake  Description: INTERVENTIONS:  - Monitor percentage of each meal consumed  - Identify  factors contributing to decreased intake, treat as appropriate  - Assist with meals as needed  - Monitor I&O, weight, and lab values if indicated  - Obtain nutrition services referral as needed  Outcome: Progressing     Problem: METABOLIC, FLUID AND ELECTROLYTES - ADULT  Goal: Electrolytes maintained within normal limits  Description: INTERVENTIONS:  - Monitor labs and assess patient for signs and symptoms of electrolyte imbalances  - Administer electrolyte replacement as ordered  - Monitor response to electrolyte replacements, including repeat lab results as appropriate  - Instruct patient on fluid and nutrition as appropriate  Outcome: Progressing     Problem: PAIN - ADULT  Goal: Verbalizes/displays adequate comfort level or baseline comfort level  Description: Interventions:  - Encourage patient to monitor pain and request assistance  - Assess pain using appropriate pain scale  - Administer analgesics based on type and severity of pain and evaluate response  - Implement non-pharmacological measures as appropriate and evaluate response  - Consider cultural and social influences on pain and pain management  - Notify physician/advanced practitioner if interventions unsuccessful or patient reports new pain  Outcome: Progressing

## 2024-11-10 NOTE — PROGRESS NOTES
Progress Note - Gastroenterology   Name: Isaac Kramer 63 y.o. male I MRN: [id number]  Unit/Bed#: -01 I Date of Admission: 11/8/2024   Date of Service: 11/10/2024 I Hospital Day: 1     Progress Note- Isaac Kramer 63 y.o. male MRN: [id number]    Unit/Bed#: -01 Encounter: [id number]      Assessment and Plan:    63 y.o. 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  Unclear if leukocytosis may be driven all from Neulasta versus infectious.  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     -C. difficile, stool pathogen panel, stool ova and parasite pending  -Bentyl for pain relief  -If this is noninfectious, suspect secondary to chemotherapy for which oncology should be aware.     GI will follow    ______________________________________________________________________    Subjective:     No acute overnight events. Somewhat improved compared to yesterday. Small amount of blood in stool during last BM.    Medication Administration - last 24 hours from 11/09/2024 1137 to 11/10/2024 1137         Date/Time Order Dose Route Action Action by     11/10/2024 0938 EST dicyclomine (BENTYL) capsule 10 mg 10 mg Oral Given Chelsea Dorcas     11/09/2024 1545 EST dicyclomine (BENTYL) capsule 10 mg 10 mg Oral Given Cira Andrews RN     11/09/2024 1209 EST dicyclomine (BENTYL) capsule 10 mg 10 mg Oral Given Cira BAI  "LYNNE Andrews     11/10/2024 0938 EST aspirin chewable tablet 81 mg 81 mg Oral Given Chelsea Dorcas     11/10/2024 0622 EST sodium chloride 0.9 % infusion 100 mL/hr Intravenous Rate/Dose Verify Marilee Andrews RN     11/10/2024 0206 EST sodium chloride 0.9 % infusion 100 mL/hr Intravenous New Bag Marilee Andrews RN     11/09/2024 2359 EST sodium chloride 0.9 % infusion 100 mL/hr Intravenous Rate/Dose Verify Marilee Andrews RN     11/09/2024 1541 EST sodium chloride 0.9 % infusion 100 mL/hr Intravenous New Bag Cira Andrews RN     11/09/2024 1820 EST oxyCODONE (ROXICODONE) split tablet 2.5 mg -- Oral See Alternative Cira Andrews RN     11/09/2024 1308 EST oxyCODONE (ROXICODONE) split tablet 2.5 mg -- Oral See Alternative Cira Andrews RN     11/09/2024 1820 EST oxyCODONE (ROXICODONE) IR tablet 5 mg 5 mg Oral Given Cira Andrews RN     11/09/2024 1308 EST oxyCODONE (ROXICODONE) IR tablet 5 mg 5 mg Oral Given Cira Andrews RN     11/10/2024 0938 EST enoxaparin (LOVENOX) subcutaneous injection 40 mg 40 mg Subcutaneous Given Chelsea Dorcas     11/09/2024 1311 EST ondansetron (ZOFRAN) injection 4 mg 4 mg Intravenous Given Cira Andrews RN            Objective:     Vitals: Blood pressure 139/75, pulse 82, temperature 97.7 °F (36.5 °C), temperature source Oral, resp. rate 20, height 5' 4\" (1.626 m), weight 55.4 kg (122 lb 2.2 oz), SpO2 96%.,Body mass index is 20.96 kg/m².      Intake/Output Summary (Last 24 hours) at 11/10/2024 1137  Last data filed at 11/10/2024 0843  Gross per 24 hour   Intake 3175 ml   Output --   Net 3175 ml       Physical Exam:   General Appearance: Awake and alert, in no acute distress  Abdomen: Soft, non-tender, non-distended; bowel sounds normal; no masses or no organomegaly    Invasive Devices       Central Venous Catheter Line  Duration             Port A Cath 10/28/24 Left Internal jugular 13 days              Peripheral Intravenous Line  Duration             Peripheral IV 11/08/24 " Left Antecubital 2 days                    Lab Results:  Admission on 11/08/2024   Component Date Value    WBC 11/08/2024 29.44 (H)     RBC 11/08/2024 4.45     Hemoglobin 11/08/2024 12.7     Hematocrit 11/08/2024 39.1     MCV 11/08/2024 88     MCH 11/08/2024 28.5     MCHC 11/08/2024 32.5     RDW 11/08/2024 13.1     MPV 11/08/2024 9.4     Platelets 11/08/2024 299     Sodium 11/08/2024 135     Potassium 11/08/2024 3.8     Chloride 11/08/2024 102     CO2 11/08/2024 26     ANION GAP 11/08/2024 7     BUN 11/08/2024 21     Creatinine 11/08/2024 0.98     Glucose 11/08/2024 137     Calcium 11/08/2024 8.7     AST 11/08/2024 11 (L)     ALT 11/08/2024 12     Alkaline Phosphatase 11/08/2024 97     Total Protein 11/08/2024 5.9 (L)     Albumin 11/08/2024 3.9     Total Bilirubin 11/08/2024 0.61     eGFR 11/08/2024 80     Lipase 11/08/2024 <6 (L)     hs TnI 0hr 11/08/2024 <2     Ventricular Rate 11/08/2024 82     Atrial Rate 11/08/2024 82     PA Interval 11/08/2024 164     QRSD Interval 11/08/2024 100     QT Interval 11/08/2024 356     QTC Interval 11/08/2024 415     P Thurman 11/08/2024 58     QRS Thurman 11/08/2024 -60     T Wave Thurman 11/08/2024 45     Segmented % 11/08/2024 86 (H)     Bands % 11/08/2024 7     Lymphocytes % 11/08/2024 5 (L)     Monocytes % 11/08/2024 1 (L)     Eosinophils % 11/08/2024 1     Basophils % 11/08/2024 0     Absolute Neutrophils 11/08/2024 27.38 (H)     Absolute Lymphocytes 11/08/2024 1.47     Absolute Monocytes 11/08/2024 0.29     Absolute Eosinophils 11/08/2024 0.29     Absolute Basophils 11/08/2024 0.00     RBC Morphology 11/08/2024 Normal     Platelet Estimate 11/08/2024 Adequate      C.difficile toxin by PC* 11/09/2024 Negative     Sodium 11/09/2024 136     Potassium 11/09/2024 3.9     Chloride 11/09/2024 105     CO2 11/09/2024 25     ANION GAP 11/09/2024 6     BUN 11/09/2024 13     Creatinine 11/09/2024 0.64     Glucose 11/09/2024 99     Glucose, Fasting 11/09/2024 99     Calcium 11/09/2024 7.9 (L)      eGFR 11/09/2024 102     WBC 11/09/2024 28.23 (H)     RBC 11/09/2024 4.05     Hemoglobin 11/09/2024 11.4 (L)     Hematocrit 11/09/2024 35.7 (L)     MCV 11/09/2024 88     MCH 11/09/2024 28.1     MCHC 11/09/2024 31.9     RDW 11/09/2024 13.1     MPV 11/09/2024 9.5     Platelets 11/09/2024 278     Sodium 11/10/2024 139     Potassium 11/10/2024 3.8     Chloride 11/10/2024 110 (H)     CO2 11/10/2024 24     ANION GAP 11/10/2024 5     BUN 11/10/2024 5     Creatinine 11/10/2024 0.56 (L)     Glucose 11/10/2024 90     Calcium 11/10/2024 7.8 (L)     eGFR 11/10/2024 108     WBC 11/10/2024 34.66 (H)     RBC 11/10/2024 3.82 (L)     Hemoglobin 11/10/2024 10.6 (L)     Hematocrit 11/10/2024 33.7 (L)     MCV 11/10/2024 88     MCH 11/10/2024 27.7     MCHC 11/10/2024 31.5     RDW 11/10/2024 13.2     MPV 11/10/2024 9.3     Platelets 11/10/2024 255        Imaging Studies: I have personally reviewed pertinent imaging studies.

## 2024-11-11 VITALS
TEMPERATURE: 97.9 F | DIASTOLIC BLOOD PRESSURE: 69 MMHG | BODY MASS INDEX: 20.85 KG/M2 | WEIGHT: 122.14 LBS | OXYGEN SATURATION: 95 % | SYSTOLIC BLOOD PRESSURE: 138 MMHG | RESPIRATION RATE: 18 BRPM | HEART RATE: 73 BPM | HEIGHT: 64 IN

## 2024-11-11 LAB
ABO GROUP BLD: NORMAL
ANION GAP SERPL CALCULATED.3IONS-SCNC: 7 MMOL/L (ref 4–13)
BASOPHILS # BLD AUTO: 0.12 THOUSANDS/ÂΜL (ref 0–0.1)
BASOPHILS NFR BLD AUTO: 0 % (ref 0–1)
BLD GP AB SCN SERPL QL: NEGATIVE
BUN SERPL-MCNC: 4 MG/DL (ref 5–25)
CALCIUM SERPL-MCNC: 8 MG/DL (ref 8.4–10.2)
CHLORIDE SERPL-SCNC: 108 MMOL/L (ref 96–108)
CO2 SERPL-SCNC: 25 MMOL/L (ref 21–32)
CREAT SERPL-MCNC: 0.57 MG/DL (ref 0.6–1.3)
EOSINOPHIL # BLD AUTO: 0.24 THOUSAND/ÂΜL (ref 0–0.61)
EOSINOPHIL NFR BLD AUTO: 1 % (ref 0–6)
ERYTHROCYTE [DISTWIDTH] IN BLOOD BY AUTOMATED COUNT: 13.2 % (ref 11.6–15.1)
GFR SERPL CREATININE-BSD FRML MDRD: 107 ML/MIN/1.73SQ M
GLUCOSE SERPL-MCNC: 85 MG/DL (ref 65–140)
HCT VFR BLD AUTO: 33.9 % (ref 36.5–49.3)
HGB BLD-MCNC: 10.9 G/DL (ref 12–17)
IMM GRANULOCYTES # BLD AUTO: 0.49 THOUSAND/UL (ref 0–0.2)
IMM GRANULOCYTES NFR BLD AUTO: 2 % (ref 0–2)
LYMPHOCYTES # BLD AUTO: 2.12 THOUSANDS/ÂΜL (ref 0.6–4.47)
LYMPHOCYTES NFR BLD AUTO: 7 % (ref 14–44)
MCH RBC QN AUTO: 28.2 PG (ref 26.8–34.3)
MCHC RBC AUTO-ENTMCNC: 32.2 G/DL (ref 31.4–37.4)
MCV RBC AUTO: 88 FL (ref 82–98)
MONOCYTES # BLD AUTO: 2.11 THOUSAND/ÂΜL (ref 0.17–1.22)
MONOCYTES NFR BLD AUTO: 7 % (ref 4–12)
NEUTROPHILS # BLD AUTO: 25.37 THOUSANDS/ÂΜL (ref 1.85–7.62)
NEUTS SEG NFR BLD AUTO: 83 % (ref 43–75)
NRBC BLD AUTO-RTO: 0 /100 WBCS
PLATELET # BLD AUTO: 246 THOUSANDS/UL (ref 149–390)
PMV BLD AUTO: 9.4 FL (ref 8.9–12.7)
POTASSIUM SERPL-SCNC: 3.7 MMOL/L (ref 3.5–5.3)
RBC # BLD AUTO: 3.87 MILLION/UL (ref 3.88–5.62)
RH BLD: POSITIVE
SODIUM SERPL-SCNC: 140 MMOL/L (ref 135–147)
SPECIMEN EXPIRATION DATE: NORMAL
WBC # BLD AUTO: 30.45 THOUSAND/UL (ref 4.31–10.16)

## 2024-11-11 PROCEDURE — 86850 RBC ANTIBODY SCREEN: CPT | Performed by: HOSPITALIST

## 2024-11-11 PROCEDURE — 85025 COMPLETE CBC W/AUTO DIFF WBC: CPT | Performed by: NURSE PRACTITIONER

## 2024-11-11 PROCEDURE — 90662 IIV NO PRSV INCREASED AG IM: CPT | Performed by: HOSPITALIST

## 2024-11-11 PROCEDURE — 86900 BLOOD TYPING SEROLOGIC ABO: CPT | Performed by: HOSPITALIST

## 2024-11-11 PROCEDURE — 99239 HOSP IP/OBS DSCHRG MGMT >30: CPT | Performed by: NURSE PRACTITIONER

## 2024-11-11 PROCEDURE — 86901 BLOOD TYPING SEROLOGIC RH(D): CPT | Performed by: HOSPITALIST

## 2024-11-11 PROCEDURE — 80048 BASIC METABOLIC PNL TOTAL CA: CPT | Performed by: NURSE PRACTITIONER

## 2024-11-11 PROCEDURE — G0008 ADMIN INFLUENZA VIRUS VAC: HCPCS | Performed by: HOSPITALIST

## 2024-11-11 PROCEDURE — 99232 SBSQ HOSP IP/OBS MODERATE 35: CPT | Performed by: INTERNAL MEDICINE

## 2024-11-11 RX ORDER — OXYCODONE HYDROCHLORIDE 5 MG/1
5 TABLET ORAL EVERY 4 HOURS PRN
Qty: 20 TABLET | Refills: 0 | Status: SHIPPED | OUTPATIENT
Start: 2024-11-11 | End: 2024-11-12

## 2024-11-11 RX ORDER — DICYCLOMINE HYDROCHLORIDE 10 MG/1
10 CAPSULE ORAL
Qty: 90 CAPSULE | Refills: 0 | Status: ON HOLD | OUTPATIENT
Start: 2024-11-11 | End: 2024-12-11

## 2024-11-11 RX ADMIN — INFLUENZA A VIRUS A/VICTORIA/4897/2022 IVR-238 (H1N1) ANTIGEN (FORMALDEHYDE INACTIVATED), INFLUENZA A VIRUS A/CALIFORNIA/122/2022 SAN-022 (H3N2) ANTIGEN (FORMALDEHYDE INACTIVATED), AND INFLUENZA B VIRUS B/MICHIGAN/01/2021 ANTIGEN (FORMALDEHYDE INACTIVATED) 0.5 ML: 60; 60; 60 INJECTION, SUSPENSION INTRAMUSCULAR at 14:30

## 2024-11-11 RX ADMIN — ENOXAPARIN SODIUM 40 MG: 40 INJECTION SUBCUTANEOUS at 08:28

## 2024-11-11 RX ADMIN — DICYCLOMINE HYDROCHLORIDE 10 MG: 10 CAPSULE ORAL at 11:57

## 2024-11-11 RX ADMIN — ASPIRIN 81 MG 81 MG: 81 TABLET ORAL at 08:28

## 2024-11-11 RX ADMIN — OXYCODONE HYDROCHLORIDE 5 MG: 5 TABLET ORAL at 08:29

## 2024-11-11 RX ADMIN — DICYCLOMINE HYDROCHLORIDE 10 MG: 10 CAPSULE ORAL at 06:00

## 2024-11-11 NOTE — PLAN OF CARE
Problem: Potential for Falls  Goal: Patient will remain free of falls  Description: INTERVENTIONS:  - Educate patient/family on patient safety including physical limitations  - Instruct patient to call for assistance with activity   - Consult OT/PT to assist with strengthening/mobility   - Keep Call bell within reach  - Keep bed low and locked with side rails adjusted as appropriate  - Keep care items and personal belongings within reach  - Initiate and maintain comfort rounds  - Make Fall Risk Sign visible to staff  - Offer Toileting every 2 Hours, in advance of need  - Initiate/Maintain bed alarm  - Obtain necessary fall risk management equipment: non skid socks  - Apply yellow socks and bracelet for high fall risk patients  - Consider moving patient to room near nurses station  Outcome: Progressing      5

## 2024-11-11 NOTE — MALNUTRITION/BMI
This medical record reflects one or more clinical indicators suggestive of malnutrition and/or morbid obesity.    Malnutrition Findings:   Adult Malnutrition type: Chronic illness  Adult Degree of Malnutrition: Other severe protein calorie malnutrition  Malnutrition Characteristics: Inadequate energy, Weight loss                360 Statement: Malnutrition related to chronic illness as evidenced by >7.5% body weight loss in 3 months, <75% energy intake compared to estimated energy needs for >1-month.  To treat with oral diet as tolerated.    BMI Findings:           Body mass index is 20.96 kg/m².     See Nutrition note dated 11/11/2024 in flow sheets for additional details.  Completed nutrition assessment is viewable in the nutrition documentation.

## 2024-11-11 NOTE — ASSESSMENT & PLAN NOTE
BP reviewed and acceptable  Hold lisinopril 20 mg p.o. daily due to volume losses from diarrhea  Continue to monitor blood pressure  
BP reviewed and acceptable  Resume lisinopril 20 mg p.o. daily   Follow up with primary care as an outpatient  
Denies chest pain  Continue aspirin  Hold statin until abdominal pain improves and tolerating usual diet  
Denies chest pain  Continue aspirin  Resume statin  
Diagnosed with pancreatic adenocarcinoma 8/27/2024 status post Whipple procedure in 12/20/2024  Mediport placed 10/28/2024.  Started chemotherapy with fluorouracil on 10/29/2024  Continue outpatient follow-up with hematology/oncology  
Echocardiogram 8/16/2024: Moderate aortic stenosis  
Echocardiogram 8/16/2024: Moderate aortic stenosis  Follow-up with primary care as an outpatient  
Echocardiogram 8/16/2024: Moderate aortic stenosis  Monitor volume status  
Echocardiogram 8/16/2024: Moderate aortic stenosis  Monitor volume status  
PC 29.44. Afebrile  Received Neulasta on 11/6/2024 hemotherapy induced neutropenia  Monitor labs and vital signs  Conduct serial physical assessments  
Presented for severe abdominal pain and diarrhea which started overnight  CT imaging revealed evidence for pancolitis  ER physician discussed with gastroenterology who recommended collecting stool studies and admitting to observation  Received 1 L normal saline in the ER, will continue maintenance fluids with normal saline at 100 mL/h  Advance diet to full liquid toast crackers   Symptom control  Continue Bentyl  Monitor labs and vital signs, conduct serial physical assessments  Follow stool studies  
Presented for severe abdominal pain and diarrhea which started overnight  CT imaging revealed evidence for pancolitis  ER physician discussed with gastroenterology who recommended collecting stool studies and admitting to observation  Received 1 L normal saline in the ER, will continue maintenance fluids with normal saline at 100 mL/h  Advance diet to surgical soft   Symptom control  Continue Bentyl  Monitor labs and vital signs, conduct serial physical assessments  Follow stool studies.  Enteric panel and C. difficile negative.  Ova and parasites and Aeromonas Plesiomonas culture still pending collection  
Presented for severe abdominal pain and diarrhea which started overnight  CT imaging revealed evidence for pancolitis  ER physician discussed with gastroenterology who recommended collecting stool studies and admitting to observation  Received 1 L normal saline in the ER, will continue maintenance fluids with normal saline at 100 mL/h  Clear liquid diet, advance as tolerated  Symptom control  Continue Bentyl  Monitor labs and vital signs, conduct serial physical assessments  
Presented for severe abdominal pain and diarrhea which started overnight.  Improved.  Tolerating diet  CT imaging revealed evidence for pancolitis  S/p IVF  Continue Bentyl  Enteric panel and C. difficile negative. Aeromonas Plesiomonas culture negative   Ova and parasites and fecal leukocytes pending collection at time of discharge  Inbox message sent to Dr. oClby  
WBC 29.44-->28.23-->34.66-->30.45. Remains afebrile  Received Neulasta on 11/6/2024 hemotherapy induced neutropenia  Follow-up as an outpatient with hematology/oncology  
WBC 29.44-->28.23-->34.66. Remains afebrile  Received Neulasta on 11/6/2024 hemotherapy induced neutropenia  Monitor labs and vital signs  Conduct serial physical assessments  
WBC 29.44-->28.23. Remains afebrile  Received Neulasta on 11/6/2024 hemotherapy induced neutropenia  Monitor labs and vital signs  Conduct serial physical assessments  
14-Feb-2024 16:46

## 2024-11-11 NOTE — NUTRITION
11/11/24 1446   Biochemical Data,Medical Tests, and Procedures   Biochemical Data/Medical Tests/Procedures Lab values reviewed;Meds reviewed   Labs (Comment) 11/11/2024 BUN 4, creatinine 0.57, calcium 8.0   Meds (Comment) Aspirin, Bentyl, Lovenox, oxycodone, Zofran   Nutrition-Focused Physical Exam   Nutrition-Focused Physical Exam Findings RN skin assessment reviewed;No edema documented;No skin issues documented   Medical-Related Concerns hypertension, CAD, pancreatic adenocarcinoma status post Whipple procedure 12/20/2024 and chemotherapy starting 10/29/2024, GERD, ampullary carcinoma   Adequacy of Intake   Nutrition Modality PO   Feeding Route   PO Independent   Current PO Intake   Current Diet Order Surgical soft diet, thin liquids   Current Meal Intake %   Estimated calorie intake compared to estimated need Anticipate nutrient needs are not met in setting of catabolic illness.   PES Statement   Problem Intake   Energy Balance (1) Inadequate energy intake NI-1.2   Related to Catabolic illness   As evidenced by: Weight loss   Recommendations/Interventions   Malnutrition/BMI Present Yes   Adult Malnutrition type Chronic illness   Adult Degree of Malnutrition Other severe protein calorie malnutrition   Malnutrition Characteristics Inadequate energy;Weight loss   360 Statement Malnutrition related to chronic illness as evidenced by >7.5% body weight loss in 3 months, <75% energy intake compared to estimated energy needs for >1-month.  To treat with oral diet as tolerated.   Summary Weight loss-MST 3.  Presents with abdominal pain.  Found to have pancolitis.  Past medical history significant for hypertension, CAD, pancreatic adenocarcinoma status post Whipple procedure 12/20/2024 and chemotherapy starting 10/29/2024, GERD, ampullary carcinoma.  Weight history reviewed.  Noted significant 21# loss in 3 months (14.6%).  No edema.  No pressure areas.  Prescribed a surgical soft diet, thin liquids.  Meal  completion 100%.  Anticipate nutrient needs are not met in setting of catabolic illness.  Patient reports having a so-so appetite.  Usually has 2 meals daily.  Follows no special diet pattern.  Drinks boost 3 times daily.  NKFA.  Denies dysphagia.  His wife cooks and grocery shops.  Reports weight loss with hospital admissions and surgeries.  RD discussed diet as prescribed.  Discussed nutrition supplements to which patient politely declines.  He offers no nutrition questions at this time.  Indicates he has received nutrition education about importance of calories/protein in current state.  RD to follow.   Interventions/Recommendations Continue current diet order;Monitor I & O's   Education Assessment   Education Education not indicated at this time   Patient Nutrition Goals   Goal Avoid weight loss;Meet PO needs   Goal Status Initiated   Timeframe to complete goal by next f/u   Nutrition Complexity Risk   Nutrition complexity level Low risk   Follow up date 11/18/24

## 2024-11-11 NOTE — DISCHARGE SUMMARY
Discharge Summary - Hospitalist   Name: Isaac Kramer 63 y.o. male I MRN: [id number]  Unit/Bed#: -01 I Date of Admission: 11/8/2024   Date of Service: 11/11/2024 I Hospital Day: 2     Assessment & Plan  Pancolitis (HCC)  Presented for severe abdominal pain and diarrhea which started overnight.  Improved.  Tolerating diet  CT imaging revealed evidence for pancolitis  S/p IVF  Continue Bentyl  Enteric panel and C. difficile negative. Aeromonas Plesiomonas culture negative   Ova and parasites and fecal leukocytes pending collection at time of discharge  Inbox message sent to Dr. Colby  Benign essential hypertension  BP reviewed and acceptable  Resume lisinopril 20 mg p.o. daily   Follow up with primary care as an outpatient  Aortic valve stenosis  Echocardiogram 8/16/2024: Moderate aortic stenosis  Follow-up with primary care as an outpatient  Coronary artery disease involving native coronary artery of native heart without angina pectoris  Denies chest pain  Continue aspirin  Resume statin  Pancreatic adenocarcinoma (HCC)  Diagnosed with pancreatic adenocarcinoma 8/27/2024 status post Whipple procedure in 12/20/2024  Mediport placed 10/28/2024.  Started chemotherapy with fluorouracil on 10/29/2024  Continue outpatient follow-up with hematology/oncology  Leukocytosis  WBC 29.44-->28.23-->34.66-->30.45. Remains afebrile  Received Neulasta on 11/6/2024 hemotherapy induced neutropenia  Follow-up as an outpatient with hematology/oncology     Medical Problems       Resolved Problems  Date Reviewed: 11/7/2024   None       Discharging Physician / Practitioner: DULCE Mays  PCP: Pablo Perez DO  Admission Date:   Admission Orders (From admission, onward)       Ordered        11/09/24 1544  INPATIENT ADMISSION  Once            11/08/24 1511  Place in Observation  Once                          Discharge Date: 11/11/24    Consultations During Hospital Stay:  Gastroenterology    Significant Findings /  "Test Results:   CT abdomen and pelvis:Pancolonic mural thickening and pericolonic stranding consistent with colitis. Postsurgical changes of Whipple procedure and cystic artery embolization. Decreased size of heterogeneous collection in the gallbladder fossa, likely resolving hematoma.      Reason for Admission: Pancolitis    Hospital Course:   Isaac Kramer is a 63 y.o. male patient who originally presented to the hospital on 11/8/2024 due to diarrhea and abdominal pain.  CT imaging revealed pancolitis.  He had leukocytosis although received Neulasta 2 days prior.  He received broad-spectrum antibiotics in the ER. He was admitted to medicine.  He received IV fluids and pain control. He was evaluated by gastroenterology.  He gradually improved and was discharged home in stable condition to the care of his wife.  He verbalizes understanding of follow-up care and reasons to return to the hospital.     Condition at Discharge: stable    Discharge Day Visit / Exam:   Subjective: Patient evaluated at bedside.  He said he is feeling better, tolerating a diet, no further diarrhea, no further hematochezia.  Vitals: Blood Pressure: 130/62 (11/11/24 0751)  Pulse: 83 (11/11/24 0751)  Temperature: 98.1 °F (36.7 °C) (11/11/24 0751)  Temp Source: Oral (11/10/24 0701)  Respirations: 18 (11/11/24 0751)  Height: 5' 4\" (162.6 cm) (11/08/24 1658)  Weight - Scale: 55.4 kg (122 lb 2.2 oz) (11/08/24 1658)  SpO2: 97 % (11/11/24 0751)    Physical Exam  Vitals and nursing note reviewed.   Constitutional:       General: He is not in acute distress.     Comments: Looks better, color improved   HENT:      Head: Normocephalic and atraumatic.      Nose: Nose normal.      Mouth/Throat:      Mouth: Mucous membranes are moist.      Pharynx: Oropharynx is clear.   Eyes:      Pupils: Pupils are equal, round, and reactive to light.   Cardiovascular:      Rate and Rhythm: Normal rate and regular rhythm.      Pulses: Normal pulses.      Heart sounds: " Normal heart sounds.   Pulmonary:      Effort: Pulmonary effort is normal. No respiratory distress.      Breath sounds: Normal breath sounds.   Abdominal:      General: Bowel sounds are normal.      Palpations: Abdomen is soft.      Tenderness: There is no abdominal tenderness.   Musculoskeletal:      Cervical back: Neck supple.      Right lower leg: No edema.      Left lower leg: No edema.   Skin:     General: Skin is warm and dry.      Capillary Refill: Capillary refill takes less than 2 seconds.   Neurological:      General: No focal deficit present.      Mental Status: He is alert and oriented to person, place, and time. Mental status is at baseline.          Discussion with Family: Updated  (wife) via phone.    Discharge instructions/Information to patient and family:   See after visit summary for information provided to patient and family.      Provisions for Follow-Up Care:  See after visit summary for information related to follow-up care and any pertinent home health orders.      Mobility at time of Discharge:   Basic Mobility Inpatient Raw Score: 23  JH-HLM Goal: 7: Walk 25 feet or more  JH-HLM Achieved: 7: Walk 25 feet or more  HLM Goal achieved. Continue to encourage appropriate mobility.     Disposition:   Home    Planned Readmission: No    Discharge Medications:  See after visit summary for reconciled discharge medications provided to patient and/or family.      Administrative Statements   Discharge Statement:  I have spent a total time of 55 minutes in caring for this patient on the day of the visit/encounter. >30 minutes of time was spent on: Diagnostic results, Risks and benefits of tx options, Instructions for management, Patient and family education, Importance of tx compliance, Impressions, Counseling / Coordination of care, Documenting in the medical record, Reviewing / ordering tests, medicine, procedures  , and Communicating with other healthcare professionals .    **Please Note:  This note may have been constructed using a voice recognition system**

## 2024-11-11 NOTE — PLAN OF CARE
Problem: INFECTION - ADULT  Goal: Absence or prevention of progression during hospitalization  Description: INTERVENTIONS:  - Assess and monitor for signs and symptoms of infection  - Monitor lab/diagnostic results  - Monitor all insertion sites, i.e. indwelling lines, tubes, and drains  - Monitor endotracheal if appropriate and nasal secretions for changes in amount and color  - [place] appropriate cooling/warming therapies per order  - Administer medications as ordered  - Instruct and encourage patient and family to use good hand hygiene technique  - Identify and instruct in appropriate isolation precautions for identified infection/condition  Outcome: Progressing  Goal: Absence of fever/infection during neutropenic period  Description: INTERVENTIONS:  - Monitor WBC  Outcome: Progressing     Problem: GASTROINTESTINAL - ADULT  Goal: Minimal or absence of nausea and/or vomiting  Description: INTERVENTIONS:  - Administer IV fluids if ordered to ensure adequate hydration  - Maintain NPO status until nausea and vomiting are resolved  - Nasogastric tube if ordered  - Administer ordered antiemetic medications as needed  - Provide nonpharmacologic comfort measures as appropriate  - Advance diet as tolerated, if ordered  - Consider nutrition services referral to assist patient with adequate nutrition and appropriate food choices  Outcome: Progressing  Goal: Maintains or returns to baseline bowel function  Description: INTERVENTIONS:  - Assess bowel function  - Encourage oral fluids to ensure adequate hydration  - Administer IV fluids if ordered to ensure adequate hydration  - Administer ordered medications as needed  - Encourage mobilization and activity  - Consider nutritional services referral to assist patient with adequate nutrition and appropriate food choices  Outcome: Progressing  Goal: Maintains adequate nutritional intake  Description: INTERVENTIONS:  - Monitor percentage of each meal consumed  - Identify  factors contributing to decreased intake, treat as appropriate  - Assist with meals as needed  - Monitor I&O, weight, and lab values if indicated  - Obtain nutrition services referral as needed  Outcome: Progressing     Problem: METABOLIC, FLUID AND ELECTROLYTES - ADULT  Goal: Electrolytes maintained within normal limits  Description: INTERVENTIONS:  - Monitor labs and assess patient for signs and symptoms of electrolyte imbalances  - Administer electrolyte replacement as ordered  - Monitor response to electrolyte replacements, including repeat lab results as appropriate  - Instruct patient on fluid and nutrition as appropriate  Outcome: Progressing     Problem: PAIN - ADULT  Goal: Verbalizes/displays adequate comfort level or baseline comfort level  Description: Interventions:  - Encourage patient to monitor pain and request assistance  - Assess pain using appropriate pain scale  - Administer analgesics based on type and severity of pain and evaluate response  - Implement non-pharmacological measures as appropriate and evaluate response  - Consider cultural and social influences on pain and pain management  - Notify physician/advanced practitioner if interventions unsuccessful or patient reports new pain  Outcome: Progressing

## 2024-11-11 NOTE — PROGRESS NOTES
[facility] - Inpatient Progress Note    PATIENT INFORMATION      Isaac Kramer 63 y.o. male MRN: [id number]  Unit/Bed#: -01 Encounter: [id number]    ASSESSMENT & PLAN   Isaac Kramer is a 63 y.o. old male with PMH of recent diagnosis of pancreatic adenocarcinoma and ampullary carcinoma  s/p Whipple on 9/12 undergoing chemotherapy and receiving who presented with abdominal pain and discomfort diarrhea.     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  History of prostate cancer  Patient is presenting with new onset abdominal discomfort and diarrhea. Patient endorsed 7-8 bowel movements without any blood.  Patient was recently started on FOLFOX chemotherapy and due to diagnosis of pancreatic adenocarcinoma with ampullary carcinoma back in August of this year for which he underwent a Whipple surgery. Patient also received Neulasta last week.  Infectious workup thus far has been negative, most likely cause of diarrhea is initiation of chemotherapy or Neulasta therapy.     -C. difficile, stool pathogen and aeromonas panel is negative,  -Bentyl for pain relief  -Rest of pain control per primary  -Appreciate recommendations from Oncology team regarding side effects of chemotherapy and Neulasta as infectious workup thus far has been negative.  -GI will sign off, please reach out with any questions.      SUBJECTIVE     Patient seen and evaluated at bedside.  States he is feeling significantly better compared to admission endorses mild lower abdominal discomfort.    MEDICATIONS & ALLERGIES       Medications:     Medications Prior to Admission:     aspirin 81 mg chewable tablet    atorvastatin (LIPITOR) 20 mg tablet    lisinopril (ZESTRIL) 20 mg tablet    celecoxib (CeleBREX) 200 mg capsule    docusate sodium (COLACE) 100 mg capsule    enoxaparin (LOVENOX) 40 mg/0.4 mL     "gabapentin (NEURONTIN) 300 mg capsule    lidocaine-prilocaine (EMLA) cream    methocarbamol (ROBAXIN) 500 mg tablet    ondansetron (ZOFRAN) 4 mg tablet    ondansetron (ZOFRAN) 8 mg tablet    pantoprazole (PROTONIX) 40 mg tablet    polyethylene glycol (MIRALAX) 17 g packet    Current Facility-Administered Medications:     acetaminophen (TYLENOL) tablet 650 mg, Q6H PRN    aspirin chewable tablet 81 mg, Daily    dicyclomine (BENTYL) capsule 10 mg, TID AC    enoxaparin (LOVENOX) subcutaneous injection 40 mg, Daily    HYDROmorphone HCl (DILAUDID) injection 0.2 mg, Q2H PRN    ondansetron (ZOFRAN) injection 4 mg, Q8H PRN    oxyCODONE (ROXICODONE) split tablet 2.5 mg, Q4H PRN **OR** oxyCODONE (ROXICODONE) IR tablet 5 mg, Q4H PRN    Allergies:   No Known Allergies    PHYSICAL EXAM     Objective   Blood pressure 130/62, pulse 83, temperature 98.1 °F (36.7 °C), resp. rate 18, height 5' 4\" (1.626 m), weight 55.4 kg (122 lb 2.2 oz), SpO2 97%. Body mass index is 20.96 kg/m².    Intake/Output Summary (Last 24 hours) at 11/11/2024 1146  Last data filed at 11/10/2024 1739  Gross per 24 hour   Intake 540 ml   Output --   Net 540 ml       General Appearance:   Alert, cooperative, no distress   HEENT:   Normocephalic, atraumatic, anicteric     Neck:   Supple, symmetrical, trachea midline   Lungs:   Equal chest rise, respirations unlabored    Heart:   Regular rate and rhythm   Abdomen:   Mild tenderness to palpation   Rectal:   Deferred    Extremities:   No cyanosis, clubbing or edema    Neuro:   Moves all 4 extremities    Skin:   No jaundice, rashes, or lesions      ADDITIONAL DATA     Lab Results:     Results from last 7 days   Lab Units 11/11/24  0459   WBC Thousand/uL 30.45*   HEMOGLOBIN g/dL 10.9*   HEMATOCRIT % 33.9*   PLATELETS Thousands/uL 246   SEGS PCT % 83*   LYMPHO PCT % 7*   MONO PCT % 7   EOS PCT % 1     Results from last 7 days   Lab Units 11/11/24 0459 11/09/24  0449 11/08/24  1011   POTASSIUM mmol/L 3.7   < > 3.8 "   CHLORIDE mmol/L 108   < > 102   CO2 mmol/L 25   < > 26   BUN mg/dL 4*   < > 21   CREATININE mg/dL 0.57*   < > 0.98   CALCIUM mg/dL 8.0*   < > 8.7   ALK PHOS U/L  --   --  97   ALT U/L  --   --  12   AST U/L  --   --  11*    < > = values in this interval not displayed.           Imaging:    CT abdomen pelvis with contrast    Result Date: 11/8/2024  Narrative: CT ABDOMEN AND PELVIS WITH IV CONTRAST INDICATION: reports severe abdominal pain, known pancreatic CA. Whipple 9/12/2024. COMPARISON: CTA of the abdomen and pelvis 9/13/2024. TECHNIQUE: CT examination of the abdomen and pelvis was performed. Multiplanar 2D reformatted images were created from the source data. This examination, like all CT scans performed in the [facility], was performed utilizing techniques to minimize radiation dose exposure, including the use of iterative reconstruction and automated exposure control. Radiation dose length product (DLP) for this visit: 634.65 mGy-cm IV Contrast: 100 mL of iohexol (OMNIPAQUE) Enteric Contrast: Not administered. FINDINGS: ABDOMEN LOWER CHEST: No acute abnormality in the visualized lower chest. Small bilateral pleural effusions have resolved. LIVER/BILIARY TREE: Pneumobilia. No suspicious hepatic mass. GALLBLADDER: Post cholecystectomy. Embolization coils in the gallbladder fossa from prior cystic artery embolization. Decreased size of a now 5.2 x 3.4 cm heterogeneous collection in the gallbladder fossa at site of prior hematoma. SPLEEN: Unremarkable. PANCREAS: Postsurgical changes of Whipple. Air in the pancreatic duct. No pancreatic duct dilation. ADRENAL GLANDS: Unremarkable. KIDNEYS/URETERS: Bilateral renal cysts and additional renal cortical hypodensities which are too small to characterize, statistically benign. No hydronephrosis. STOMACH AND BOWEL: No disproportionate dilation of the small or large bowel. Postsurgical changes of Whipple. Pancolonic mural thickening and mild pericolonic  stranding. APPENDIX: No findings to suggest appendicitis. ABDOMINOPELVIC CAVITY: No ascites. No pneumoperitoneum. No lymphadenopathy. VESSELS: Unremarkable for patient's age. PELVIS REPRODUCTIVE ORGANS: Unremarkable for patient's age. URINARY BLADDER: Underdistended. ABDOMINAL WALL/INGUINAL REGIONS: Postsurgical changes of the anterior abdominal wall. BONES: No acute fracture or suspicious osseous lesion.     Impression: Pancolonic mural thickening and pericolonic stranding consistent with colitis. Postsurgical changes of Whipple procedure and cystic artery embolization. Decreased size of heterogeneous collection in the gallbladder fossa, likely resolving hematoma. Workstation performed: [id number]     IR port placement    Result Date: 11/4/2024  Narrative: Examination: Venous port placement.  Venous access with ultrasound guidance, tunneled port insertion under fluoroscopic guidance. HISTORY: Infusion of chemotherapy Patient with history pancreas cancer. Participation shooting sports PROCEDURE: Informed consent was obtained.  The left chest and neck was prepped and draped in usual sterile fashion.  Timeout was performed.  Lidocaine was given a as local anesthesia. Ultrasound guidance was used to cannulate the Internal jugular Using fluoroscopic guidance, a wire was advanced centrally. Lidocaine was then given over the chest wall.  An incision was made in the chest/upper arm, and a pocket was created using a combination of sharp, and blunt dissection.  The port was inserted into the pocket.  The catheter was tunneled subcutaneously to the venotomy site, and trimmed to appropriate length.  The catheter was advanced via a peel-away sheath, into the vein, under fluoroscopic guidance. The incision was closed in layers.  Glue was applied to the surface of the skin, and the venotomy site. FINDINGS: Ultrasound shows a widely patent vein.  It was compressible and free of thrombus. Fluoroscopy shows final catheter position  "of: Right atrium Power injection compatible: Yes     Impression: Technically successful placement of venous access port This is the end of the clinically relevant portion of the report.  Subsequent information is for compliance, documentation, and coding purposes. In the process of informed consent, information was communicated, verbally, to the patient regarding the procedure.  The patient was informed of; the name of the procedure, nature of the procedure, nature of the condition, risks, benefits, alternatives, and potential complications, as well as the consequences of not having the procedure.  All the patient's questions were answered.  Informed consent was signed.  Quoted risks included infection, and venous thrombosis. Automated exposure control was utilized, and there was minimize, in accordance with the application of the ALARA technique. Chlorhexidine and alcohol was used for cleansing and sterile preparation of the skin.  This was allowed to dry prior to the application of sterile draping. Timeout was performed, with all team members present, and in agreement.  Confirmation of patient, procedure, laterally, allergies, and all needed equipment was performed. The patient was examined, and is in satisfactory condition for planned moderate sedation. Mallampati score: 1 Intravenous conscious sedation was provided.  There was continuous monitoring of blood pressure, heart rate, respiratory rate, and oxygen saturation by an independent, trained observer. Conscious sedation time: 45 minutes Versed dose: 3 milligrams Fentanyl dose: 150 micrograms After the procedure, the patient was recovered, and return to their baseline status, and was deemed fit for discharge from . Fluoroscopy time: 2.2 minutes Radiation dose: 3 MilliGray PROCEDURE: Port placement Preprocedure diagnosis: Please see \"history \", above Postprocedure diagnosis: Same Antibiotics: Ancef 2 g Specimens: None Estimated blood loss: Less than 5 mL " Anesthesia: Local and monitored intravenous conscious sedation Maximal sterile barrier technique, according to current guidelines, were utilized.  These include, but are not limited to: Hat, mask, and shoe covers for all staff.  Sterile gown and gloves for all operators.  A sterile cover was used for the ultrasound probe.  The patient was covered, from head to toe, in sterile drapes. Ultrasound was used to evaluate the above-named access vein as a potential access site.  It was compressible and free of thrombus.  Static and real-time images of needle entry were obtained, and archived. Workstation performed: [id number]       EKG, Pathology, and Other Studies Reviewed on Admission:   EKG: Reviewed      Counseling / Coordination of Care Time: 30 total mins spent n consult. Greater than 50% of total time spent on patient counseling and coordination of care.    Lili Swanson MD  Gastroenterology Fellow  [facility]  Division of Gastroenterology and Hepatology    ...............................................................................................................................................  ** Please Note: This note is constructed using a voice recognition dictation system. **

## 2024-11-11 NOTE — NURSING NOTE
Pt DC to home via family in stable condition. All belongings packed and sent with pt. AVS reviewed and sent.

## 2024-11-12 ENCOUNTER — TRANSITIONAL CARE MANAGEMENT (OUTPATIENT)
Dept: FAMILY MEDICINE CLINIC | Facility: CLINIC | Age: 64
End: 2024-11-12

## 2024-11-12 ENCOUNTER — TELEPHONE (OUTPATIENT)
Age: 64
End: 2024-11-12

## 2024-11-12 LAB — AEROMONAS SPEC QL CULT: NORMAL

## 2024-11-12 RX ORDER — OXYCODONE HYDROCHLORIDE 5 MG/1
5 TABLET ORAL EVERY 4 HOURS PRN
Qty: 20 TABLET | Refills: 0 | Status: SHIPPED | OUTPATIENT
Start: 2024-11-12 | End: 2024-11-22

## 2024-11-12 NOTE — QUICK NOTE
"11/12/2024 12:26 PM    Received notification yesterday that [facility] pharmacy required preauthorization for patient's oxycodone 5 mg #20.  Unable to complete process yesterday, resumed attempts today.  Spoke with [facility]"" pharmacy [facility] who said that they would provide a prescription to be  to appear to [facility] in this patient with history of pancreatic cancer, Whipple procedure, and colitis unable to get pain medicine.  Patient did have 1 oxycodone left from prior prescription which he took last night.     Called both patient and patient's wife at 11:46 AM stating that they had till noon to call down to [facility] and give a credit card number for the payment to make that noon deadline for  to this campus, which is not typical procedure as patient has already been discharged.  I gave patient the number and he said \"I will get my wife to call she does that stuff.\"    At approximately 1210 I called [facility] to see if patient's wife had called with the number to make the deadline for the  delivery.  They said that the wife had not called.  I called patient's wife back and asked if she had called and she said that she got a voicemail.  I told her I was worried because we missed the deadline.  Patient's wife states, \" this is ridiculous, he should have been discharged with meds.\"  I told her that we do not dispense narcotic prescriptions from this campus.  Patient's wife states, \"if I have to I will drive to [facility] and get them.\"  Plan to follow-up with pharmacy and patient/patient's wife again in about an hour.    "

## 2024-11-12 NOTE — TELEPHONE ENCOUNTER
Pt called to report that on Friday he called office to report terrible stomach pains and was advised to go to ER. Pt was admitted and reported that his colon was inflamed. Pt is scheduled for D1C2 on 11/18 and would like to know what to do as he reports the treatment was bad for him. Pt reports feeling a little better today, past 24 hours pt only had one episode of diarrhea that is a little more solid today but still has about the small amt of blood that was reported in ER. Please advise.

## 2024-11-12 NOTE — TELEPHONE ENCOUNTER
Pt called to ask Dr. Colby what he should do about the symptoms he was having. He said his symptoms have improved

## 2024-11-12 NOTE — TELEPHONE ENCOUNTER
Phoned pt and let him know that Dr Colby wants to see pt before cycle 2 so pt scheduled to be seen 11/19/24 at  office at 2 pm and changed the date of cycle 2 to 11/20/24

## 2024-11-12 NOTE — QUICK NOTE
11/12/2024 1:36 PM    Called mariana Arias for [facility] has not picked up yet but unclear when medication will be delivered to [facility].  Will attempt to follow up later in the afternoon.

## 2024-11-12 NOTE — QUICK NOTE
Please addend discharge summary to include:     Severe protein calorie malnutrition related to chronic illness as evidenced by >7.5% body weight loss in 3 months, <75% energy intake compared to estimated energy needs for >1-month.  To treat with oral diet as tolerated.

## 2024-11-13 DIAGNOSIS — D70.1 CHEMOTHERAPY-INDUCED NEUTROPENIA (HCC): ICD-10-CM

## 2024-11-13 DIAGNOSIS — C25.9 PANCREATIC ADENOCARCINOMA (HCC): Primary | ICD-10-CM

## 2024-11-13 DIAGNOSIS — T45.1X5A CHEMOTHERAPY-INDUCED NEUTROPENIA (HCC): ICD-10-CM

## 2024-11-13 RX ORDER — DEXTROSE MONOHYDRATE 50 MG/ML
20 INJECTION, SOLUTION INTRAVENOUS ONCE
Status: CANCELLED | OUTPATIENT
Start: 2024-11-20

## 2024-11-13 RX ORDER — SODIUM CHLORIDE 9 MG/ML
20 INJECTION, SOLUTION INTRAVENOUS ONCE AS NEEDED
Status: CANCELLED | OUTPATIENT
Start: 2024-11-20

## 2024-11-13 RX ORDER — ATROPINE SULFATE 1 MG/ML
0.25 INJECTION, SOLUTION INTRAVENOUS ONCE AS NEEDED
Status: CANCELLED | OUTPATIENT
Start: 2024-11-20

## 2024-11-13 RX ORDER — ATROPINE SULFATE 1 MG/ML
0.25 INJECTION, SOLUTION INTRAVENOUS ONCE
Status: CANCELLED | OUTPATIENT
Start: 2024-11-20

## 2024-11-15 ENCOUNTER — OFFICE VISIT (OUTPATIENT)
Dept: FAMILY MEDICINE CLINIC | Facility: CLINIC | Age: 64
End: 2024-11-15

## 2024-11-15 ENCOUNTER — HOSPITAL ENCOUNTER (OUTPATIENT)
Dept: INFUSION CENTER | Facility: HOSPITAL | Age: 64
End: 2024-11-15

## 2024-11-15 VITALS
TEMPERATURE: 96.9 F | HEIGHT: 64 IN | WEIGHT: 126.8 LBS | DIASTOLIC BLOOD PRESSURE: 64 MMHG | HEART RATE: 94 BPM | OXYGEN SATURATION: 99 % | BODY MASS INDEX: 21.65 KG/M2 | SYSTOLIC BLOOD PRESSURE: 122 MMHG

## 2024-11-15 DIAGNOSIS — I10 BENIGN ESSENTIAL HYPERTENSION: ICD-10-CM

## 2024-11-15 DIAGNOSIS — C25.9 PANCREATIC ADENOCARCINOMA (HCC): ICD-10-CM

## 2024-11-15 DIAGNOSIS — K51.00 PANCOLITIS (HCC): Primary | ICD-10-CM

## 2024-11-15 PROCEDURE — 99496 TRANSJ CARE MGMT HIGH F2F 7D: CPT | Performed by: FAMILY MEDICINE

## 2024-11-15 NOTE — PROGRESS NOTES
Assessment/Plan:    No problem-specific Assessment & Plan notes found for this encounter.       Diagnoses and all orders for this visit:    Pancolitis (HCC)  Comments:  marked improvement    Pancreatic adenocarcinoma (HCC)  Comments:  per onc    Benign essential hypertension          PHQ-2/9 Depression Screening    Little interest or pleasure in doing things: 0 - not at all  Feeling down, depressed, or hopeless: 0 - not at all  PHQ-2 Score: 0  PHQ-2 Interpretation: Negative depression screen        TCM Call       Date and time call was made  11/12/2024  7:15 AM    Hospital care reviewed  Records reviewed    Patient was hospitialized at  [facility]    Date of Admission  11/08/24    Date of discharge  11/11/24    Diagnosis  pancolitis    Disposition  Home          TCM Call       Scheduled for follow up?  Yes    Did you obtain your prescribed medications  Yes    Do you need help managing your prescriptions or medications  No    Is transportation to your appointment needed  No    I have advised the patient to call PCP with any new or worsening symptoms  zion ley             Subjective:      Patient ID: Isaac Kramer is a 63 y.o. male.    Hospital follow up for pancolitis, pt had a reaction to the chemotherapy, and will be changing it up on Tuesday, pt states that he feels well today        The following portions of the patient's history were reviewed and updated as appropriate: allergies, current medications, past family history, past medical history, past social history, past surgical history, and problem list.    Review of Systems   Constitutional:  Negative for chills, fever and unexpected weight change.   Respiratory:  Negative for shortness of breath and wheezing.    Cardiovascular:  Negative for chest pain and palpitations.   Gastrointestinal:  Positive for abdominal pain and diarrhea. Negative for constipation, nausea and vomiting.         Objective:    /64   Pulse 94   Temp (!) 96.9 °F  "(36.1 °C) (Tympanic)   Ht 5' 4\" (1.626 m)   Wt 57.5 kg (126 lb 12.8 oz)   SpO2 99%   BMI 21.77 kg/m²      Physical Exam  Vitals and nursing note reviewed.   Constitutional:       General: He is not in acute distress.     Appearance: Normal appearance. He is not ill-appearing, toxic-appearing or diaphoretic.   HENT:      Head: Normocephalic and atraumatic.   Eyes:      Conjunctiva/sclera: Conjunctivae normal.   Cardiovascular:      Rate and Rhythm: Normal rate and regular rhythm.      Heart sounds: Murmur heard.   Pulmonary:      Effort: Pulmonary effort is normal. No respiratory distress.      Breath sounds: Normal breath sounds. No wheezing, rhonchi or rales.   Abdominal:      General: There is no distension.      Palpations: Abdomen is soft. There is no mass.      Tenderness: There is no abdominal tenderness. There is no guarding or rebound.   Musculoskeletal:      Cervical back: Normal range of motion and neck supple. No rigidity.      Right lower leg: No edema.      Left lower leg: No edema.   Lymphadenopathy:      Cervical: No cervical adenopathy.   Neurological:      Mental Status: He is alert and oriented to person, place, and time.   Psychiatric:         Mood and Affect: Mood normal.         Behavior: Behavior normal.         Thought Content: Thought content normal.         Judgment: Judgment normal.         "

## 2024-11-18 ENCOUNTER — HOSPITAL ENCOUNTER (OUTPATIENT)
Dept: INFUSION CENTER | Facility: HOSPITAL | Age: 64
Discharge: HOME | End: 2024-11-18
Attending: INTERNAL MEDICINE

## 2024-11-19 ENCOUNTER — TELEPHONE (OUTPATIENT)
Dept: HEMATOLOGY ONCOLOGY | Facility: CLINIC | Age: 64
End: 2024-11-19

## 2024-11-19 ENCOUNTER — APPOINTMENT (OUTPATIENT)
Dept: LAB | Facility: CLINIC | Age: 64
End: 2024-11-19

## 2024-11-19 ENCOUNTER — OFFICE VISIT (OUTPATIENT)
Dept: HEMATOLOGY ONCOLOGY | Facility: CLINIC | Age: 64
End: 2024-11-19

## 2024-11-19 VITALS
HEART RATE: 70 BPM | BODY MASS INDEX: 21.51 KG/M2 | OXYGEN SATURATION: 98 % | HEIGHT: 64 IN | TEMPERATURE: 97.8 F | DIASTOLIC BLOOD PRESSURE: 62 MMHG | SYSTOLIC BLOOD PRESSURE: 120 MMHG | RESPIRATION RATE: 18 BRPM | WEIGHT: 126 LBS

## 2024-11-19 DIAGNOSIS — C25.9 PANCREATIC ADENOCARCINOMA (HCC): Primary | ICD-10-CM

## 2024-11-19 DIAGNOSIS — C61 PROSTATE CANCER (HCC): ICD-10-CM

## 2024-11-19 DIAGNOSIS — D70.1 CHEMOTHERAPY-INDUCED NEUTROPENIA (HCC): ICD-10-CM

## 2024-11-19 DIAGNOSIS — C24.1 AMPULLARY CARCINOMA (HCC): ICD-10-CM

## 2024-11-19 DIAGNOSIS — C25.9 PANCREATIC ADENOCARCINOMA (HCC): ICD-10-CM

## 2024-11-19 DIAGNOSIS — T45.1X5A CHEMOTHERAPY-INDUCED NEUTROPENIA (HCC): ICD-10-CM

## 2024-11-19 LAB
ALBUMIN SERPL BCG-MCNC: 4.1 G/DL (ref 3.5–5)
ALP SERPL-CCNC: 169 U/L (ref 34–104)
ALT SERPL W P-5'-P-CCNC: 11 U/L (ref 7–52)
ANION GAP SERPL CALCULATED.3IONS-SCNC: 8 MMOL/L (ref 4–13)
ANISOCYTOSIS BLD QL SMEAR: PRESENT
AST SERPL W P-5'-P-CCNC: 13 U/L (ref 13–39)
BASOPHILS # BLD MANUAL: 0.21 THOUSAND/UL (ref 0–0.1)
BASOPHILS NFR MAR MANUAL: 1 % (ref 0–1)
BILIRUB SERPL-MCNC: 0.22 MG/DL (ref 0.2–1)
BUN SERPL-MCNC: 14 MG/DL (ref 5–25)
BURR CELLS BLD QL SMEAR: PRESENT
CALCIUM SERPL-MCNC: 8.6 MG/DL (ref 8.4–10.2)
CHLORIDE SERPL-SCNC: 104 MMOL/L (ref 96–108)
CO2 SERPL-SCNC: 29 MMOL/L (ref 21–32)
CREAT SERPL-MCNC: 0.58 MG/DL (ref 0.6–1.3)
EOSINOPHIL # BLD MANUAL: 0.83 THOUSAND/UL (ref 0–0.4)
EOSINOPHIL NFR BLD MANUAL: 4 % (ref 0–6)
ERYTHROCYTE [DISTWIDTH] IN BLOOD BY AUTOMATED COUNT: 13.9 % (ref 11.6–15.1)
GFR SERPL CREATININE-BSD FRML MDRD: 106 ML/MIN/1.73SQ M
GLUCOSE P FAST SERPL-MCNC: 118 MG/DL (ref 65–99)
HCT VFR BLD AUTO: 40.1 % (ref 36.5–49.3)
HGB BLD-MCNC: 12.6 G/DL (ref 12–17)
LYMPHOCYTES # BLD AUTO: 11 % (ref 14–44)
LYMPHOCYTES # BLD AUTO: 3.11 THOUSAND/UL (ref 0.6–4.47)
MAGNESIUM SERPL-MCNC: 2.2 MG/DL (ref 1.9–2.7)
MCH RBC QN AUTO: 27.7 PG (ref 26.8–34.3)
MCHC RBC AUTO-ENTMCNC: 31.4 G/DL (ref 31.4–37.4)
MCV RBC AUTO: 88 FL (ref 82–98)
METAMYELOCYTE ABSOLUTE CT: 0.21 THOUSAND/UL (ref 0–0.1)
METAMYELOCYTES NFR BLD MANUAL: 1 % (ref 0–1)
MICROCYTES BLD QL AUTO: PRESENT
MONOCYTES # BLD AUTO: 0.62 THOUSAND/UL (ref 0–1.22)
MONOCYTES NFR BLD: 3 % (ref 4–12)
NEUTROPHILS # BLD MANUAL: 15.74 THOUSAND/UL (ref 1.85–7.62)
NEUTS BAND NFR BLD MANUAL: 2 % (ref 0–8)
NEUTS SEG NFR BLD AUTO: 74 % (ref 43–75)
PLATELET # BLD AUTO: 349 THOUSANDS/UL (ref 149–390)
PLATELET BLD QL SMEAR: ADEQUATE
PMV BLD AUTO: 10.1 FL (ref 8.9–12.7)
POIKILOCYTOSIS BLD QL SMEAR: PRESENT
POTASSIUM SERPL-SCNC: 3.8 MMOL/L (ref 3.5–5.3)
PROT SERPL-MCNC: 6.2 G/DL (ref 6.4–8.4)
PSA SERPL-MCNC: 0.01 NG/ML (ref 0–4)
RBC # BLD AUTO: 4.55 MILLION/UL (ref 3.88–5.62)
RBC MORPH BLD: PRESENT
SODIUM SERPL-SCNC: 141 MMOL/L (ref 135–147)
VARIANT LYMPHS # BLD AUTO: 4 %
WBC # BLD AUTO: 20.71 THOUSAND/UL (ref 4.31–10.16)

## 2024-11-19 PROCEDURE — 84153 ASSAY OF PSA TOTAL: CPT

## 2024-11-19 PROCEDURE — 85027 COMPLETE CBC AUTOMATED: CPT

## 2024-11-19 PROCEDURE — 83735 ASSAY OF MAGNESIUM: CPT

## 2024-11-19 PROCEDURE — 85007 BL SMEAR W/DIFF WBC COUNT: CPT

## 2024-11-19 PROCEDURE — 80053 COMPREHEN METABOLIC PANEL: CPT

## 2024-11-19 PROCEDURE — 36415 COLL VENOUS BLD VENIPUNCTURE: CPT

## 2024-11-19 PROCEDURE — 99215 OFFICE O/P EST HI 40 MIN: CPT | Performed by: INTERNAL MEDICINE

## 2024-11-19 NOTE — PROGRESS NOTES
Time out with magan juarez rn. Treatment will have a dose reduction of 20% with tomorrows treatment. Oxaliplatin decreased to 65m,g/m2  irinotecan decreased to 125mg/m2 and adrucil elastomeric med ball decreased to 1000mg/m2. [facility] made aware by magan. Nya engle in pharmacy at  made aware of changes.

## 2024-11-19 NOTE — TELEPHONE ENCOUNTER
Title: Dose Reduction     Date patient scheduled: 11/20/2024    Original medication ordered:   Oxaliplatin 85mg/m2  Irinotecan 150mg/m2  Fluorouracil 1200mg/m2mday    New Medication ordered:   Oxaliplatin 65mg/m2  Irinotecan 125mg/m2  Fluorouraci 1000 mg/m2/day    Office RN notified patient?? yes    Is the patient scheduled within 24 hours?? If yes, follow up with verbal telephone call. Time out completed with Miguelina RN at  infusion.     Office RN to route to INF  pool. Infusion  pool routes to INF TECH POOL.    Infusion tech to receive message, confirm scheduled treatment duration matches ordered treatment duration or adjust accordingly, and re-link appointment request orders.    Infusion tech to notify pharmacy and finance.

## 2024-11-19 NOTE — PROGRESS NOTES
Hematology/Oncology Outpatient Follow-up  Isaac Kramer 64 y.o. male 1960 [id number]    Date:  11/19/2024        Assessment and Plan:  1. Pancreatic adenocarcinoma (HCC) (Primary)  Pathological stage IIB (pT2, pN1, cM0) MSI stable, grade 2 adenocarcinoma of the head of the pancreas, status post Whipple's procedure on 9/12/2024.   There was a discussion with Dr. 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    His case was presented at the GI tumor board.  The recommendation was to pursue adjuvant modified FOLFIRINOX followed by concurrent chemoradiation if the patient continues to have good performance status.    Modified FOLFIRINOX cycle 1 was started on 11/4/2024.    Unfortunately the patient developed pancolitis couple of days after the first cycle of adjuvant chemotherapy and had to be admitted to the hospital for couple of days.  The patient stated today that he is not interested in pursuing the same chemotherapy if it is going to cause the same side effects again after cycle 2.  We did discuss different options including decreasing the dose of chemotherapy by 20% versus switching it to gemcitabine/Abraxane top of her regimen.  The patient stated that he would be willing to try cycle 2 of modified FOLFIRINOX with the 20% dose adjustment.  He will need to be monitored closely after cycle 2 for any significant complications or expected toxicity.      - CBC and differential; Standing  - Comprehensive metabolic panel; Standing  - Magnesium; Standing    2. Ampullary carcinoma (HCC)  As above    3. Chemotherapy-induced neutropenia (HCC)  He will be supported with Neulasta after chemotherapy to avoid neutropenic complications.        HPI:  This is a 63-year-old male with history of Jihan score 4+3= 7 adenocarcinoma of the prostate status post radical  prostatectomy on 5/22/2023.  He also seems to have history of coronary artery disease, hypertension, GERD, hyperlipidemia, etc.  He denied positive family history for malignancy.  The patient apparently had left flank pain which was evaluated with a CT scan of the abdomen pelvis with contrast on 8/27/2024 in the emergency room.  The CT scan showed mass in the lower pancreatic head region obstructing the CBD and pancreatic duct measuring 2.8 cm suspicious for malignant process.  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  CT scan of the chest without contrast on 8/28/2024 was negative for malignant process.  The patient then had Whipple procedure on 9/12/2024 which showed MSI stable adenocarcinoma of the pancreas arising in adenoma.  - Tumor invades into muscularis propria of the duodenum (pT2).  - Lymphovascular invasion is present.   - One of sixteen lymph nodes is positive for tumor (1/16, pN1).  - Pancreatic intraepithelial neoplasia (PanIN), low grade.   - All margins are negative for carcinoma or dysplasia.  His baseline CEA and CA 19-9 were within normal range prior to the Whipple procedure.      Interval history:  The patient came in today for a follow-up visit accompanied by his son.  He unfortunately had to be admitted to the hospital after the first cycle of modified FOLFIRINOX for couple of days due to pancolitis.  He was then discharged on 11/11/2024.  He stated he had severe abdominal pain couple days after the first cycle of chemotherapy.  CT scan of the abdomen pelvis with contrast on 11/8/2024 showed:  IMPRESSION:     Pancolonic mural thickening and pericolonic stranding consistent with colitis.     Postsurgical changes of Whipple procedure and cystic artery embolization. Decreased size of heterogeneous collection in the gallbladder fossa, likely resolving hematoma.  Oncology History Overview Note   Presents for discussion of RT for recently  diagnosed [place] 7 (4+3) prostate cancer.  Referred by Dr. Biggs    Lab Results   Component Value Date    PSA 5.2 (H) 11/08/2022    PSA 6.6 (H) 07/18/2022    PSA 3.9 06/24/2020      10/10/22  Urology  Seen in consult for elevated PSA  F/U PSA recommended    12/28/21  MRI Prostate  IMPRESSION:   1. PI-RADSv2.1 Category 4 -High (clinically significant cancer is likely to be present). 0.5 cm possible lesion in the posterior right peripheral zone at base.   2. No extraprostatic tumor, seminal vesicle invasion, pelvic lymphadenopathy, or pelvic osseous metastatic disease.   3. Moderate BPH with calculated prostate volume of 69 cc.    2/28/23  Tissue Exam  A.  Prostate, region of interest #1, needle biopsy:  - Prostatic adenocarcinoma, acinar, not otherwise specified; Jihan grade 4 +3= score of 7 (grade group 3) in 4 of 5 cores involving 20% of needle core tissue and measuring up to 5 mm in length (score 4=50-60%).  - Periprostatic fat invasion: Not identified.  - Seminal vesicle invasion: Not identified.  - Lymph-vascular invasion: Not identified.  - Perineural invasion: Not identified.  - Additional Pathologic Findings:  None.     B.  Prostate, right lateral and medial base, needle biopsy:  - Benign prostate tissue, negative for malignancy.     C.  Prostate, right mid lateral, needle biopsy:  - Benign prostate tissue, negative for malignancy.     D.  Prostate, right mid medial, needle biopsy:  - Benign prostate tissue, negative for malignancy.     E.  Prostate, right lateral apex, needle biopsy:   - Benign prostate tissue, negative for malignancy.     F. Prostate, right medial apex, needle biopsy:  - Benign prostate tissue, negative for malignancy.     G.  Prostate, left lateral base, needle biopsy:  - Prostatic adenocarcinoma, acinar, not otherwise specified; Jihan grade 3 +3= score of 6 (grade group 1) in 1 of 1 cores involving 10% of needle core tissue and measuring 2 mm in length.  - Periprostatic fat  invasion: Not identified.  - Seminal vesicle invasion: Not identified.  - Lymph-vascular invasion: Not identified.  - Perineural invasion: Not identified.  - Additional Pathologic Findings:  None.     H.  Prostate, left medial base, needle biopsy:  - Prostatic adenocarcinoma, acinar, not otherwise specified; Jihan grade 3 +4= score of 7 (grade group 2) in 1 of 1 cores involving 50% of needle core tissue and measuring 8 mm in length (score 4 = 5-10%).  - Periprostatic fat invasion: Not identified.  - Seminal vesicle invasion: Not identified.  - Lymph-vascular invasion: Not identified.  - Perineural invasion: Present.  - Additional Pathologic Findings:  None.     I.  Prostate, left mid lateral, needle biopsy:  - Prostatic adenocarcinoma, acinar, not otherwise specified; Jihan grade 3 +4= score of 7 (grade group 2) in 1 of 1 cores involving 40-50% of needle core tissue and measuring 5 mm in length (score 4 = 5-10%).  - Periprostatic fat invasion: Not identified.  - Seminal vesicle invasion: Not identified.  - Lymph-vascular invasion: Not identified.  - Perineural invasion: Not identified.  - Additional Pathologic Findings:  High-grade prostatic intraepithelial neoplasia (HGPIN).     J.  Prostate, left mid medial, needle biopsy:  - Prostatic adenocarcinoma, acinar, not otherwise specified; Winfield grade 3 +4= score of 7 (grade group 2) in 1 of 1 cores involving 40-50% of needle core tissue and measuring 7 mm in length (score 4 = 5-10%).  - Periprostatic fat invasion: Not identified.  - Seminal vesicle invasion: Not identified.  - Lymph-vascular invasion: Not identified.  - Perineural invasion: Not identified.  - Additional Pathologic Findings:  High-grade prostatic intraepithelial neoplasia (HGPIN).     K.  Prostate, left lateral apex, needle biopsy:  -Focal atypical small acinar proliferation, cannot exclude limited low-grade carcinoma.     L.  Prostate, left medial apex, needle biopsy:  - Benign prostate tissue,  negative for malignancy.    3/27/23  Bone Scan  IMPRESSION:   1.  No scintigraphic evidence of osseous metastasis    4/6/23  Dr. Biggs  Does not qualify for active surveillance.  RT and surgery discussed.  Leaning towards RT will arrange for consult    4/20/23  Dr. Harmon  Desires surgical intervention    Upcoming:       Prostate cancer (HCC) (Resolved)   2/28/2023 Biopsy    A.  Prostate, region of interest #1, needle biopsy:  - Prostatic adenocarcinoma, acinar, not otherwise specified; Jihan grade 4 +3= score of 7 (grade group 3) in 4 of 5 cores involving 20% of needle core tissue and measuring up to 5 mm in length (score 4=50-60%).  - Periprostatic fat invasion: Not identified.  - Seminal vesicle invasion: Not identified.  - Lymph-vascular invasion: Not identified.  - Perineural invasion: Not identified.  - Additional Pathologic Findings:  None.     B.  Prostate, right lateral and medial base, needle biopsy:  - Benign prostate tissue, negative for malignancy.     C.  Prostate, right mid lateral, needle biopsy:  - Benign prostate tissue, negative for malignancy.     D.  Prostate, right mid medial, needle biopsy:  - Benign prostate tissue, negative for malignancy.     E.  Prostate, right lateral apex, needle biopsy:   - Benign prostate tissue, negative for malignancy.     F. Prostate, right medial apex, needle biopsy:  - Benign prostate tissue, negative for malignancy.     G.  Prostate, left lateral base, needle biopsy:  - Prostatic adenocarcinoma, acinar, not otherwise specified; Jihan grade 3 +3= score of 6 (grade group 1) in 1 of 1 cores involving 10% of needle core tissue and measuring 2 mm in length.  - Periprostatic fat invasion: Not identified.  - Seminal vesicle invasion: Not identified.  - Lymph-vascular invasion: Not identified.  - Perineural invasion: Not identified.  - Additional Pathologic Findings:  None.     H.  Prostate, left medial base, needle biopsy:  - Prostatic adenocarcinoma, acinar,  not otherwise specified; Jihan grade 3 +4= score of 7 (grade group 2) in 1 of 1 cores involving 50% of needle core tissue and measuring 8 mm in length (score 4 = 5-10%).  - Periprostatic fat invasion: Not identified.  - Seminal vesicle invasion: Not identified.  - Lymph-vascular invasion: Not identified.  - Perineural invasion: Present.  - Additional Pathologic Findings:  None.     I.  Prostate, left mid lateral, needle biopsy:  - Prostatic adenocarcinoma, acinar, not otherwise specified; Irving grade 3 +4= score of 7 (grade group 2) in 1 of 1 cores involving 40-50% of needle core tissue and measuring 5 mm in length (score 4 = 5-10%).  - Periprostatic fat invasion: Not identified.  - Seminal vesicle invasion: Not identified.  - Lymph-vascular invasion: Not identified.  - Perineural invasion: Not identified.  - Additional Pathologic Findings:  High-grade prostatic intraepithelial neoplasia (HGPIN).     J.  Prostate, left mid medial, needle biopsy:  - Prostatic adenocarcinoma, acinar, not otherwise specified; Jihan grade 3 +4= score of 7 (grade group 2) in 1 of 1 cores involving 40-50% of needle core tissue and measuring 7 mm in length (score 4 = 5-10%).  - Periprostatic fat invasion: Not identified.  - Seminal vesicle invasion: Not identified.  - Lymph-vascular invasion: Not identified.  - Perineural invasion: Not identified.  - Additional Pathologic Findings:  High-grade prostatic intraepithelial neoplasia (HGPIN).     K.  Prostate, left lateral apex, needle biopsy:  -Focal atypical small acinar proliferation, cannot exclude limited low-grade carcinoma.     L.  Prostate, left medial apex, needle biopsy:  - Benign prostate tissue, negative for malignancy.     2/28/2023 Initial Diagnosis    Prostate cancer (HCC)     Pancreatic adenocarcinoma (HCC)   8/28/2024 Initial Diagnosis    Pancreatic adenocarcinoma (HCC)     9/12/2024 Surgery    A. Gallbladder, cholecystectomy:  - Chronic cholecystitis.  - One lymph node,  negative for malignancy (0/1).  - Negative for malignancy.     B. Lymph Node, Portal Lymph Node:  - One lymph node, negative for malignancy (0/1).     C. Pancreas, Whipple; SEE COMMENTS:  - Adenocarcinoma arising in adenoma.  - Tumor invades into muscularis propria of the duodenum (pT2).  - Lymphovascular invasion is present.   - One of sixteen lymph nodes is positive for tumor (1/16, pN1).  - Pancreatic intraepithelial neoplasia (PanIN), low grade.   - All margins are negative for carcinoma or dysplasia.     Block C11 is sent to [facility] for MI Profile Testing.   Immunohistochemistry (IHC) testing for Mismatch Repair (MMR) proteins has been requested on block C9, and the results will be issued in an addendum.     D. Lymph Node, Portacaval node:  - Eight lymph nodes, negative for malignancy (0/8).     E. Soft Tissue, Other, Additional Uncinate/Pancreas:  - Portion of pancreas with no pathologic abnormality  - Two lymph nodes, negative for malignancy (0/2).     9/12/2024 -  Cancer Staged    Staging form: Pancreas, AJCC 8th Edition  - Pathologic stage from 9/12/2024: Stage IIB (pT2, pN1, cM0) - Signed by Jacob Colby MD on 10/2/2024  Stage prefix: Initial diagnosis  Histologic grade (G): G2  Histologic grading system: 3 grade system  Lymph-vascular invasion (LVI): LVI present/identified, NOS  Carbohydrate antigen 19-9 (CA 19-9) (U/mL): 14  Carcinoembryonic antigen (CEA) (ng/mL): 0.7       11/4/2024 -  Chemotherapy    alteplase (CATHFLO), 2 mg, Intracatheter, Every 1 Minute as needed, 1 of 12 cycles  pegfilgrastim (NEULASTA ONPRO), 6 mg, Subcutaneous, Once, 1 of 12 cycles  Administration: 6 mg (11/6/2024)  fosaprepitant (EMEND) IVPB, 150 mg, Intravenous, Once, 1 of 12 cycles  Administration: 150 mg (11/4/2024)  irinotecan (CAMPTOSAR) chemo infusion, 242 mg (83.3 % of original dose 180 mg/m2), Intravenous, Once, 1 of 12 cycles  Dose modification: 150 mg/m2 (original dose 180 mg/m2, Cycle 1, Reason: Dose  modified as per discussion with consulting physician), 125 mg/m2 (original dose 180 mg/m2, Cycle 2, Reason: Dose modified as per discussion with consulting physician)  Administration: 240 mg (11/4/2024)  leucovorin calcium IVPB, 644 mg, Intravenous, Once, 1 of 12 cycles  Administration: 650 mg (11/4/2024)  oxaliplatin (ELOXATIN) chemo infusion, 85 mg/m2 = 136.85 mg, Intravenous, Once, 1 of 12 cycles  Dose modification: 65 mg/m2 (original dose 85 mg/m2, Cycle 2, Reason: Dose modified as per discussion with consulting physician)  Administration: 136.85 mg (11/4/2024)  fluorouracil (ADRUCIL) ambulatory infusion Soln, 1,200 mg/m2/day = 3,865 mg, Intravenous, Over 46 hours, 1 of 12 cycles  Dose modification: 960 mg/m2/day (original dose 1,200 mg/m2/day, Cycle 3, Reason: Dose modified as per discussion with consulting physician), 1,000 mg/m2/day (original dose 1,200 mg/m2/day, Cycle 3, Reason: Dose modified as per discussion with consulting physician), 960 mg/m2/day (original dose 1,200 mg/m2/day, Cycle 2, Reason: Dose modified as per discussion with consulting physician)     Ampullary carcinoma (HCC)   10/2/2024 Initial Diagnosis    Ampullary carcinoma (HCC)     10/2/2024 -  Cancer Staged    Staging form: Ampulla of Vater, AJCC 8th Edition  - Pathologic: Stage IIIA (pT2, pN1, cM0) - Signed by Marika Ac MD on 10/2/2024               ROS: Review of Systems   Constitutional:  Positive for fatigue. Negative for chills and fever.   HENT:  Positive for trouble swallowing. Negative for ear pain and sore throat.    Eyes:  Negative for pain and visual disturbance.   Respiratory:  Negative for cough and shortness of breath.    Cardiovascular:  Negative for chest pain and palpitations.   Gastrointestinal:  Negative for abdominal pain and vomiting.   Genitourinary:  Negative for dysuria and hematuria.   Musculoskeletal:  Negative for arthralgias and back pain.   Skin:  Negative for color change and rash.   Neurological:   Negative for seizures and syncope.   All other systems reviewed and are negative.      Past Medical History:   Diagnosis Date    Benign essential hypertension 05/08/2014    CAD (coronary artery disease)     s/p NAVA prox LAD and D1 7/28/2022    Cancer (HCC)     Prostate    Coronary artery disease involving native coronary artery of native heart without angina pectoris 08/09/2022    Enteritis 08/28/2024    GERD (gastroesophageal reflux disease)     Hyperlipidemia     Hypertension     Other chest pain     Palpitations     Prostate cancer (HCC) 04/18/2023    Sleep apnea        Past Surgical History:   Procedure Laterality Date    CARDIAC CATHETERIZATION Left 7/28/2022    Procedure: Cardiac catheterization-left heart catheterization;  Surgeon: Sai Henry MD;  Location: [facility];  Service: Cardiology    CARDIAC CATHETERIZATION N/A 7/28/2022    Procedure: Cardiac pci;  Surgeon: Sai Henry MD;  Location: [facility];  Service: Cardiology    HERNIA REPAIR      IR EMBOLIZATION (SPECIFY VESSEL OR SITE)  9/13/2024    IR PORT PLACEMENT  10/28/2024    CT LAPS SURG [id number] RPBIC RAD W/NRV SPARING ROBOT N/A 5/22/2023    Procedure: PROSTATECTOMY RADICAL & PELVIC LYMPH NODE DISSECTION  W/ ROBOT;  Surgeon: Otoniel Harmon MD;  Location: [facility];  Service: Urology    CT PROSTATE NEEDLE BIOPSY ANY APPROACH N/A 2/28/2023    Procedure: TRANSRECTAL MRI FUSION BIOPSY PROSTATE;  Surgeon: Milan Arellano MD;  Location: [facility];  Service: Urology    WHIPPLE PROCEDURE/PANCREATICO-DUODENECTOMY N/A 9/12/2024    Procedure: WHIPPLE PROCEDURE/PANCREATICO-DUODENECTOMY;  Surgeon: Marika Ac MD;  Location: [facility];  Service: Surgical Oncology       Social History     Socioeconomic History    Marital status: /Civil Union     Spouse name: None    Number of children: None    Years of education: None    Highest education level: None   Occupational History    None   Tobacco Use    Smoking status:  Former     Current packs/day: 0.00     Types: Cigarettes     Quit date: 2022     Years since quittin.9    Smokeless tobacco: Never   Vaping Use    Vaping status: Never Used   Substance and Sexual Activity    Alcohol use: Not Currently    Drug use: Never    Sexual activity: None   Other Topics Concern    None   Social History Narrative    EMPLOYED         Social Drivers of Health     Financial Resource Strain: Not on file   Food Insecurity: No Food Insecurity (2024)    Nursing - Inadequate Food Risk Classification     Worried About Running Out of Food in the Last Year: Never true     Ran Out of Food in the Last Year: Never true     Ran Out of Food in the Last Year: 1   Transportation Needs: No Transportation Needs (2024)    Nursing - Transportation Risk Classification     Lack of Transportation: Not on file     Lack of Transportation: 2   Physical Activity: Not on file   Stress: Not on file   Social Connections: Unknown (2024)    Received from [facility]    Social Viewpoint     How often do you feel lonely or isolated from those around you? (Adult - for ages 18 years and over): Not on file   Intimate Partner Violence: Unknown (2024)    Nursing IPS     Feels Physically and Emotionally Safe: Not on file     Physically Hurt by Someone: Not on file     Humiliated or Emotionally Abused by Someone: Not on file     Physically Hurt by Someone: 2     Hurt or Threatened by Someone: 2   Housing Stability: Unknown (2024)    Nursing: Inadequate Housing Risk Classification     Has Housing: Not on file     Worried About Losing Housing: Not on file     Unable to Get Utilities: Not on file     Unable to Pay for Housing in the Last Year: 2     Has Housin       Family History   Problem Relation Age of Onset    No Known Problems Mother     No Known Problems Father        No Known Allergies      Current Outpatient Medications:     aspirin 81 mg chewable tablet, Chew 81 mg daily, Disp: , Rfl:  "    atorvastatin (LIPITOR) 20 mg tablet, Take 1 tablet (20 mg total) by mouth daily, Disp: 30 tablet, Rfl: 5    dicyclomine (BENTYL) 10 mg capsule, Take 1 capsule (10 mg total) by mouth 3 (three) times a day before meals, Disp: 90 capsule, Rfl: 0    [START ON 11/21/2024] fluorouracil 3,865 mg in CADD/Elastomeric Infusion Device, Infuse 3,865 mg (1,200 mg/m2/day x 1.61 m2) into a catheter in a vein via infusion device over 46 hours for 2 days  Infusion planned for November 21, 2024., Disp: 1 each, Rfl: 0    lisinopril (ZESTRIL) 20 mg tablet, Take 1 tablet (20 mg total) by mouth daily, Disp: 30 tablet, Rfl: 3    oxyCODONE (ROXICODONE) 5 immediate release tablet, Take 1 tablet (5 mg total) by mouth every 4 (four) hours as needed for severe pain for up to 10 days Max Daily Amount: 30 mg, Disp: 20 tablet, Rfl: 0    [START ON 11/20/2024] fluorouracil 3,220 mg in CADD/Elastomeric Infusion Device, Infuse 3,220 mg (1,000 mg/m2/day x 1.61 m2) into a catheter in a vein via infusion device over 46 hours for 2 days  Infusion planned for November 20, 2024., Disp: 1 each, Rfl: 0      Physical Exam:  /62 (BP Location: Right arm, Patient Position: Sitting, Cuff Size: Adult)   Pulse 70   Temp 97.8 °F (36.6 °C)   Resp 18   Ht 5' 4\" (1.626 m)   Wt 57.2 kg (126 lb)   SpO2 98%   BMI 21.63 kg/m²     Physical Exam  Constitutional:       Appearance: He is well-developed.   HENT:      Head: Normocephalic and atraumatic.      Nose: Nose normal.   Eyes:      General: No scleral icterus.        Right eye: No discharge.         Left eye: No discharge.      Conjunctiva/sclera: Conjunctivae normal.      Pupils: Pupils are equal, round, and reactive to light.   Neck:      Thyroid: No thyromegaly.      Trachea: No tracheal deviation.   Cardiovascular:      Rate and Rhythm: Normal rate and regular rhythm.      Heart sounds: Normal heart sounds. No murmur heard.     No friction rub.   Pulmonary:      Effort: Pulmonary effort is normal. No " "respiratory distress.      Breath sounds: Normal breath sounds. No wheezing or rales.   Chest:      Chest wall: No tenderness.   Abdominal:      General: There is no distension.      Palpations: Abdomen is soft. There is no hepatomegaly or splenomegaly.      Tenderness: There is no abdominal tenderness. There is no guarding or rebound.   Musculoskeletal:         General: No tenderness or deformity. Normal range of motion.      Cervical back: Normal range of motion and neck supple.   Lymphadenopathy:      Cervical: No cervical adenopathy.   Skin:     General: Skin is warm and dry.      Coloration: Skin is not pale.      Findings: No erythema or rash.   Neurological:      Mental Status: He is alert and oriented to person, place, and time.      Cranial Nerves: No cranial nerve deficit.      Coordination: Coordination normal.      Deep Tendon Reflexes: Reflexes are normal and symmetric.   Psychiatric:         Behavior: Behavior normal.         Thought Content: Thought content normal.         Judgment: Judgment normal.           Labs:  Lab Results   Component Value Date    WBC 30.45 (H) 11/11/2024    HGB 10.9 (L) 11/11/2024    HCT 33.9 (L) 11/11/2024    MCV 88 11/11/2024     11/11/2024     Lab Results   Component Value Date    K 3.7 11/11/2024     11/11/2024    CO2 25 11/11/2024    BUN 4 (L) 11/11/2024    CREATININE 0.57 (L) 11/11/2024    GLUCOSE 153 (H) 09/12/2024    GLUF 99 11/09/2024    CALCIUM 8.0 (L) 11/11/2024    CORRECTEDCA 7.5 (L) 09/13/2024    AST 11 (L) 11/08/2024    ALT 12 11/08/2024    ALKPHOS 97 11/08/2024    EGFR 107 11/11/2024     No results found for: \"TSH\"    Patient voiced understanding and agreement in the above discussion. Aware to contact our office with questions/symptoms in the interim.   "

## 2024-11-20 ENCOUNTER — HOSPITAL ENCOUNTER (OUTPATIENT)
Dept: INFUSION CENTER | Facility: HOSPITAL | Age: 64
Discharge: HOME | End: 2024-11-20
Attending: INTERNAL MEDICINE

## 2024-11-20 VITALS
RESPIRATION RATE: 16 BRPM | SYSTOLIC BLOOD PRESSURE: 120 MMHG | TEMPERATURE: 96.9 F | BODY MASS INDEX: 21.94 KG/M2 | HEART RATE: 83 BPM | OXYGEN SATURATION: 96 % | DIASTOLIC BLOOD PRESSURE: 64 MMHG | HEIGHT: 64 IN | WEIGHT: 128.5 LBS

## 2024-11-20 DIAGNOSIS — D70.1 CHEMOTHERAPY-INDUCED NEUTROPENIA (HCC): Primary | ICD-10-CM

## 2024-11-20 DIAGNOSIS — T45.1X5A CHEMOTHERAPY-INDUCED NEUTROPENIA (HCC): Primary | ICD-10-CM

## 2024-11-20 DIAGNOSIS — C25.9 PANCREATIC ADENOCARCINOMA (HCC): ICD-10-CM

## 2024-11-20 PROCEDURE — 96415 CHEMO IV INFUSION ADDL HR: CPT

## 2024-11-20 PROCEDURE — 96417 CHEMO IV INFUS EACH ADDL SEQ: CPT

## 2024-11-20 PROCEDURE — 96368 THER/DIAG CONCURRENT INF: CPT

## 2024-11-20 PROCEDURE — 96413 CHEMO IV INFUSION 1 HR: CPT

## 2024-11-20 PROCEDURE — G0498 CHEMO EXTEND IV INFUS W/PUMP: HCPCS

## 2024-11-20 PROCEDURE — 96367 TX/PROPH/DG ADDL SEQ IV INF: CPT

## 2024-11-20 PROCEDURE — 96375 TX/PRO/DX INJ NEW DRUG ADDON: CPT

## 2024-11-20 RX ORDER — ATROPINE SULFATE 1 MG/ML
0.25 INJECTION, SOLUTION INTRAVENOUS ONCE
Status: COMPLETED | OUTPATIENT
Start: 2024-11-20 | End: 2024-11-20

## 2024-11-20 RX ORDER — DEXTROSE MONOHYDRATE 50 MG/ML
20 INJECTION, SOLUTION INTRAVENOUS ONCE
Status: COMPLETED | OUTPATIENT
Start: 2024-11-20 | End: 2024-11-20

## 2024-11-20 RX ORDER — ATROPINE SULFATE 1 MG/ML
0.25 INJECTION, SOLUTION INTRAVENOUS ONCE AS NEEDED
Status: DISCONTINUED | OUTPATIENT
Start: 2024-11-20 | End: 2024-11-24 | Stop reason: HOSPADM

## 2024-11-20 RX ORDER — SODIUM CHLORIDE 9 MG/ML
20 INJECTION, SOLUTION INTRAVENOUS ONCE AS NEEDED
Status: DISCONTINUED | OUTPATIENT
Start: 2024-11-20 | End: 2024-11-24 | Stop reason: HOSPADM

## 2024-11-20 RX ADMIN — LEUCOVORIN CALCIUM 650 MG: 500 INJECTION, POWDER, LYOPHILIZED, FOR SOLUTION INTRAMUSCULAR; INTRAVENOUS at 13:21

## 2024-11-20 RX ADMIN — OXALIPLATIN 100 MG: 5 INJECTION, SOLUTION INTRAVENOUS at 11:18

## 2024-11-20 RX ADMIN — ATROPINE SULFATE 0.25 MG: 1 INJECTION, SOLUTION INTRAVENOUS at 13:53

## 2024-11-20 RX ADMIN — FOSAPREPITANT 150 MG: 150 INJECTION, POWDER, LYOPHILIZED, FOR SOLUTION INTRAVENOUS at 10:05

## 2024-11-20 RX ADMIN — DEXTROSE 20 ML/HR: 5 SOLUTION INTRAVENOUS at 11:16

## 2024-11-20 RX ADMIN — SODIUM CHLORIDE 20 ML/HR: 0.9 INJECTION, SOLUTION INTRAVENOUS at 09:44

## 2024-11-20 RX ADMIN — DEXAMETHASONE SODIUM PHOSPHATE: 100 INJECTION INTRAMUSCULAR; INTRAVENOUS at 09:44

## 2024-11-20 RX ADMIN — IRINOTECAN HYDROCHLORIDE 200 MG: 20 INJECTION, SOLUTION INTRAVENOUS at 13:56

## 2024-11-20 NOTE — PROGRESS NOTES
Isaac Kramer  tolerated Oxaliplatin, Leucovorin, Irinotecan, and Adrucil CADD treatment well with no complications.      Isaac Kramer is aware of future appt on 11/22 at 2PM.     AVS printed and given to Isaac Kramer:No (Declined by Isaac Kramer).

## 2024-11-21 ENCOUNTER — HOSPITAL ENCOUNTER (OUTPATIENT)
Dept: INFUSION CENTER | Facility: HOSPITAL | Age: 64
Discharge: HOME | End: 2024-11-21
Attending: INTERNAL MEDICINE

## 2024-11-22 ENCOUNTER — RESULTS FOLLOW-UP (OUTPATIENT)
Dept: UROLOGY | Facility: CLINIC | Age: 64
End: 2024-11-22

## 2024-11-22 ENCOUNTER — HOSPITAL ENCOUNTER (OUTPATIENT)
Dept: INFUSION CENTER | Facility: HOSPITAL | Age: 64
End: 2024-11-22
Attending: INTERNAL MEDICINE
Payer: COMMERCIAL

## 2024-11-22 DIAGNOSIS — D70.1 CHEMOTHERAPY-INDUCED NEUTROPENIA (HCC): ICD-10-CM

## 2024-11-22 DIAGNOSIS — C25.9 PANCREATIC ADENOCARCINOMA (HCC): Primary | ICD-10-CM

## 2024-11-22 DIAGNOSIS — T45.1X5A CHEMOTHERAPY-INDUCED NEUTROPENIA (HCC): ICD-10-CM

## 2024-11-22 PROCEDURE — 96372 THER/PROPH/DIAG INJ SC/IM: CPT

## 2024-11-22 RX ADMIN — PEGFILGRASTIM 6 MG: KIT SUBCUTANEOUS at 13:52

## 2024-11-22 NOTE — PROGRESS NOTES
Isaac Kramer  tolerated the last 46 hours of adrucil treatment well with no complications.  Elastomeric med ball falttned as expected after 46 hours. Port flushed and deaccessed per routine. Neulasta on pro applied to left arm. Light blinking green.     Isaac Kramer is aware of future appt on 12/4 0800    AVS printed and given to Isaac Kramer:  No (Declined by Isaac Kramer)

## 2024-11-24 ENCOUNTER — APPOINTMENT (EMERGENCY)
Dept: CT IMAGING | Facility: HOSPITAL | Age: 64
DRG: 814 | End: 2024-11-24
Payer: MEDICARE

## 2024-11-24 ENCOUNTER — HOSPITAL ENCOUNTER (INPATIENT)
Facility: HOSPITAL | Age: 64
LOS: 1 days | Discharge: HOME | DRG: 814 | End: 2024-11-26
Attending: EMERGENCY MEDICINE | Admitting: INTERNAL MEDICINE
Payer: MEDICARE

## 2024-11-24 ENCOUNTER — APPOINTMENT (EMERGENCY)
Dept: RADIOLOGY | Facility: HOSPITAL | Age: 64
DRG: 814 | End: 2024-11-24
Payer: MEDICARE

## 2024-11-24 DIAGNOSIS — D72.829 LEUKOCYTOSIS: Primary | ICD-10-CM

## 2024-11-24 DIAGNOSIS — T45.1X5A ADVERSE EFFECT OF CHEMOTHERAPY, INITIAL ENCOUNTER: ICD-10-CM

## 2024-11-24 DIAGNOSIS — T45.1X5A CHEMOTHERAPY-INDUCED NEUTROPENIA (HCC): ICD-10-CM

## 2024-11-24 DIAGNOSIS — R10.9 ABDOMINAL PAIN: ICD-10-CM

## 2024-11-24 DIAGNOSIS — C25.9 PANCREATIC ADENOCARCINOMA (HCC): ICD-10-CM

## 2024-11-24 DIAGNOSIS — D70.1 CHEMOTHERAPY-INDUCED NEUTROPENIA (HCC): ICD-10-CM

## 2024-11-24 PROBLEM — R10.84 GENERALIZED ABDOMINAL PAIN: Status: ACTIVE | Noted: 2024-11-24

## 2024-11-24 LAB
2HR DELTA HS TROPONIN: -2 NG/L
ALBUMIN SERPL BCG-MCNC: 4 G/DL (ref 3.5–5)
ALP SERPL-CCNC: 143 U/L (ref 34–104)
ALT SERPL W P-5'-P-CCNC: 13 U/L (ref 7–52)
ANION GAP SERPL CALCULATED.3IONS-SCNC: 7 MMOL/L (ref 4–13)
ANISOCYTOSIS BLD QL SMEAR: PRESENT
AST SERPL W P-5'-P-CCNC: 13 U/L (ref 13–39)
ATRIAL RATE: 89 BPM
BASOPHILS # BLD MANUAL: 0 THOUSAND/UL (ref 0–0.1)
BASOPHILS NFR MAR MANUAL: 0 % (ref 0–1)
BILIRUB SERPL-MCNC: 0.32 MG/DL (ref 0.2–1)
BUN SERPL-MCNC: 20 MG/DL (ref 5–25)
CALCIUM SERPL-MCNC: 8.7 MG/DL (ref 8.4–10.2)
CARDIAC TROPONIN I PNL SERPL HS: 5 NG/L (ref ?–50)
CARDIAC TROPONIN I PNL SERPL HS: 7 NG/L (ref ?–50)
CHLORIDE SERPL-SCNC: 103 MMOL/L (ref 96–108)
CO2 SERPL-SCNC: 27 MMOL/L (ref 21–32)
CREAT SERPL-MCNC: 0.91 MG/DL (ref 0.6–1.3)
EOSINOPHIL # BLD MANUAL: 1.98 THOUSAND/UL (ref 0–0.4)
EOSINOPHIL NFR BLD MANUAL: 2 % (ref 0–6)
ERYTHROCYTE [DISTWIDTH] IN BLOOD BY AUTOMATED COUNT: 13.9 % (ref 11.6–15.1)
GFR SERPL CREATININE-BSD FRML MDRD: 87 ML/MIN/1.73SQ M
GLUCOSE SERPL-MCNC: 93 MG/DL (ref 65–140)
HCT VFR BLD AUTO: 40.6 % (ref 36.5–49.3)
HGB BLD-MCNC: 13 G/DL (ref 12–17)
LIPASE SERPL-CCNC: <6 U/L (ref 11–82)
LYMPHOCYTES # BLD AUTO: 0.99 THOUSAND/UL (ref 0.6–4.47)
LYMPHOCYTES # BLD AUTO: 1 % (ref 14–44)
MCH RBC QN AUTO: 28.3 PG (ref 26.8–34.3)
MCHC RBC AUTO-ENTMCNC: 32 G/DL (ref 31.4–37.4)
MCV RBC AUTO: 88 FL (ref 82–98)
MONOCYTES # BLD AUTO: 0.99 THOUSAND/UL (ref 0–1.22)
MONOCYTES NFR BLD: 1 % (ref 4–12)
NEUTROPHILS # BLD MANUAL: 95.23 THOUSAND/UL (ref 1.85–7.62)
NEUTS BAND NFR BLD MANUAL: 8 % (ref 0–8)
NEUTS SEG NFR BLD AUTO: 88 % (ref 43–75)
P AXIS: 49 DEGREES
PLATELET # BLD AUTO: 307 THOUSANDS/UL (ref 149–390)
PLATELET BLD QL SMEAR: ADEQUATE
PMV BLD AUTO: 9.5 FL (ref 8.9–12.7)
POTASSIUM SERPL-SCNC: 4 MMOL/L (ref 3.5–5.3)
PR INTERVAL: 152 MS
PROCALCITONIN SERPL-MCNC: 0.08 NG/ML
PROT SERPL-MCNC: 6.4 G/DL (ref 6.4–8.4)
QRS AXIS: -67 DEGREES
QRSD INTERVAL: 92 MS
QT INTERVAL: 350 MS
QTC INTERVAL: 425 MS
RBC # BLD AUTO: 4.6 MILLION/UL (ref 3.88–5.62)
RBC MORPH BLD: PRESENT
SMUDGE CELLS BLD QL SMEAR: PRESENT
SODIUM SERPL-SCNC: 137 MMOL/L (ref 135–147)
T WAVE AXIS: 93 DEGREES
VENTRICULAR RATE: 89 BPM
WBC # BLD AUTO: 99.2 THOUSAND/UL (ref 4.31–10.16)

## 2024-11-24 PROCEDURE — 96376 TX/PRO/DX INJ SAME DRUG ADON: CPT

## 2024-11-24 PROCEDURE — 80053 COMPREHEN METABOLIC PANEL: CPT | Performed by: EMERGENCY MEDICINE

## 2024-11-24 PROCEDURE — 74177 CT ABD & PELVIS W/CONTRAST: CPT

## 2024-11-24 PROCEDURE — 71045 X-RAY EXAM CHEST 1 VIEW: CPT

## 2024-11-24 PROCEDURE — 84145 PROCALCITONIN (PCT): CPT | Performed by: EMERGENCY MEDICINE

## 2024-11-24 PROCEDURE — 96374 THER/PROPH/DIAG INJ IV PUSH: CPT

## 2024-11-24 PROCEDURE — 85027 COMPLETE CBC AUTOMATED: CPT | Performed by: EMERGENCY MEDICINE

## 2024-11-24 PROCEDURE — 99285 EMERGENCY DEPT VISIT HI MDM: CPT

## 2024-11-24 PROCEDURE — 93005 ELECTROCARDIOGRAM TRACING: CPT

## 2024-11-24 PROCEDURE — 99223 1ST HOSP IP/OBS HIGH 75: CPT | Performed by: INTERNAL MEDICINE

## 2024-11-24 PROCEDURE — 96375 TX/PRO/DX INJ NEW DRUG ADDON: CPT

## 2024-11-24 PROCEDURE — 36415 COLL VENOUS BLD VENIPUNCTURE: CPT | Performed by: EMERGENCY MEDICINE

## 2024-11-24 PROCEDURE — 85007 BL SMEAR W/DIFF WBC COUNT: CPT | Performed by: EMERGENCY MEDICINE

## 2024-11-24 PROCEDURE — 83690 ASSAY OF LIPASE: CPT | Performed by: EMERGENCY MEDICINE

## 2024-11-24 PROCEDURE — 99285 EMERGENCY DEPT VISIT HI MDM: CPT | Performed by: EMERGENCY MEDICINE

## 2024-11-24 PROCEDURE — 84484 ASSAY OF TROPONIN QUANT: CPT | Performed by: EMERGENCY MEDICINE

## 2024-11-24 PROCEDURE — 87040 BLOOD CULTURE FOR BACTERIA: CPT | Performed by: EMERGENCY MEDICINE

## 2024-11-24 PROCEDURE — 96361 HYDRATE IV INFUSION ADD-ON: CPT

## 2024-11-24 RX ORDER — DICYCLOMINE HYDROCHLORIDE 10 MG/1
10 CAPSULE ORAL
Status: DISCONTINUED | OUTPATIENT
Start: 2024-11-24 | End: 2024-11-26 | Stop reason: HOSPADM

## 2024-11-24 RX ORDER — HYDROMORPHONE HCL IN WATER/PF 6 MG/30 ML
0.2 PATIENT CONTROLLED ANALGESIA SYRINGE INTRAVENOUS ONCE
Refills: 0 | Status: COMPLETED | OUTPATIENT
Start: 2024-11-24 | End: 2024-11-24

## 2024-11-24 RX ORDER — ATORVASTATIN CALCIUM 20 MG/1
20 TABLET, FILM COATED ORAL DAILY
Status: DISCONTINUED | OUTPATIENT
Start: 2024-11-24 | End: 2024-11-26 | Stop reason: HOSPADM

## 2024-11-24 RX ORDER — ONDANSETRON 2 MG/ML
4 INJECTION INTRAMUSCULAR; INTRAVENOUS ONCE
Status: COMPLETED | OUTPATIENT
Start: 2024-11-24 | End: 2024-11-24

## 2024-11-24 RX ORDER — ONDANSETRON 2 MG/ML
4 INJECTION INTRAMUSCULAR; INTRAVENOUS EVERY 6 HOURS PRN
Status: DISCONTINUED | OUTPATIENT
Start: 2024-11-24 | End: 2024-11-26 | Stop reason: HOSPADM

## 2024-11-24 RX ORDER — OXYCODONE HYDROCHLORIDE 5 MG/1
5 TABLET ORAL EVERY 4 HOURS PRN
Refills: 0 | Status: DISCONTINUED | OUTPATIENT
Start: 2024-11-24 | End: 2024-11-26 | Stop reason: HOSPADM

## 2024-11-24 RX ORDER — LISINOPRIL 20 MG/1
20 TABLET ORAL DAILY
Status: DISCONTINUED | OUTPATIENT
Start: 2024-11-24 | End: 2024-11-26 | Stop reason: HOSPADM

## 2024-11-24 RX ORDER — HYDROMORPHONE HCL IN WATER/PF 6 MG/30 ML
0.2 PATIENT CONTROLLED ANALGESIA SYRINGE INTRAVENOUS ONCE
Status: COMPLETED | OUTPATIENT
Start: 2024-11-24 | End: 2024-11-24

## 2024-11-24 RX ORDER — ENOXAPARIN SODIUM 100 MG/ML
40 INJECTION SUBCUTANEOUS DAILY
Status: DISCONTINUED | OUTPATIENT
Start: 2024-11-24 | End: 2024-11-26 | Stop reason: HOSPADM

## 2024-11-24 RX ORDER — ASPIRIN 81 MG/1
81 TABLET, CHEWABLE ORAL DAILY
Status: DISCONTINUED | OUTPATIENT
Start: 2024-11-24 | End: 2024-11-26 | Stop reason: HOSPADM

## 2024-11-24 RX ADMIN — ENOXAPARIN SODIUM 40 MG: 100 INJECTION SUBCUTANEOUS at 20:36

## 2024-11-24 RX ADMIN — HYDROMORPHONE HYDROCHLORIDE 0.2 MG: 0.2 INJECTION, SOLUTION INTRAMUSCULAR; INTRAVENOUS; SUBCUTANEOUS at 11:39

## 2024-11-24 RX ADMIN — ONDANSETRON 4 MG: 2 INJECTION INTRAMUSCULAR; INTRAVENOUS at 11:40

## 2024-11-24 RX ADMIN — HYDROMORPHONE HYDROCHLORIDE 0.2 MG: 0.2 INJECTION, SOLUTION INTRAMUSCULAR; INTRAVENOUS; SUBCUTANEOUS at 14:16

## 2024-11-24 RX ADMIN — IOHEXOL 100 ML: 350 INJECTION, SOLUTION INTRAVENOUS at 12:24

## 2024-11-24 RX ADMIN — OXYCODONE HYDROCHLORIDE 5 MG: 5 TABLET ORAL at 22:32

## 2024-11-24 RX ADMIN — SODIUM CHLORIDE 1000 ML: 0.9 INJECTION, SOLUTION INTRAVENOUS at 11:24

## 2024-11-24 RX ADMIN — DICYCLOMINE HYDROCHLORIDE 10 MG: 10 CAPSULE ORAL at 20:36

## 2024-11-24 NOTE — ED PROVIDER NOTES
Time reflects when diagnosis was documented in both MDM as applicable and the Disposition within this note       Time User Action Codes Description Comment    11/24/2024  3:26 PM Kel Garnett [D72.829] Leukocytosis     11/24/2024  3:26 PM Kel Garnett [R10.9] Abdominal pain     11/24/2024  3:26 PM Kel Garnett [T45.1X5A] Adverse effect of chemotherapy, initial encounter           ED Disposition       ED Disposition   Admit    Condition   Stable    Date/Time   Sun Nov 24, 2024  3:25 PM    Comment   Case was discussed with [facility] and the patient's admission status was agreed to be Admission Status: observation status to the service of Dr. Ramirez .               Assessment & Plan       Medical Decision Making  64-year-old male with pancreatic cancer on chemo presenting for evaluation of generalized abdominal discomfort, lightheadedness, nausea.  Symptoms since earlier this morning.  Received chemo on Friday.  Has had similar symptoms with that in the past.  Has been tolerating p.o.  No fevers or chills.  Differential includes side effect of chemo, colitis, dehydration.  Will obtain CBC, CMP, lipase.  Given the lightheadedness will obtain EKG and cardiac workup.  CBC shows leukocytosis of 99, this is significantly higher from previous readings because of this, will obtain CT abdomen pelvis.  CT abdomen pelvis shows no acute abnormality, improvement of previous findings.  Spoke with heme-onc in regards leukocytosis.  They believe this may be secondary to the Neulasta though given the amount of leukocytosis, recommend admission and blood cultures rule out infection.    Problems Addressed:  Abdominal pain: acute illness or injury  Adverse effect of chemotherapy, initial encounter: acute illness or injury  Leukocytosis: acute illness or injury    Amount and/or Complexity of Data Reviewed  Labs: ordered.  Radiology: ordered.    Risk  Prescription drug management.  Decision regarding hospitalization.        ED  Course as of 11/24/24 1630   Sun Nov 24, 2024   1520 Per Dr. Mendoza of heme onc: high white count secondary to Neulasta that he had on 11/22/2024.  Number could go higher.  This high count could give rupture of spleen.  In my opinion he should stay in the hospital for observation and repeat blood count tomorrow hoping that counts will not go any higher.  I think we should take cultures  to make sure there is no infection.       Medications   aspirin chewable tablet 81 mg (has no administration in time range)   atorvastatin (LIPITOR) tablet 20 mg (has no administration in time range)   dicyclomine (BENTYL) capsule 10 mg (has no administration in time range)   lisinopril (ZESTRIL) tablet 20 mg (has no administration in time range)   ondansetron (ZOFRAN) injection 4 mg (has no administration in time range)   enoxaparin (LOVENOX) subcutaneous injection 40 mg (has no administration in time range)   sodium chloride 0.9 % bolus 1,000 mL (0 mL Intravenous Stopped 11/24/24 1224)   HYDROmorphone HCl (DILAUDID) injection 0.2 mg (0.2 mg Intravenous Given 11/24/24 1139)   ondansetron (ZOFRAN) injection 4 mg (4 mg Intravenous Given 11/24/24 1140)   iohexol (OMNIPAQUE) 350 MG/ML injection (MULTI-DOSE) 100 mL (100 mL Intravenous Given 11/24/24 1224)   HYDROmorphone HCl (DILAUDID) injection 0.2 mg (0.2 mg Intravenous Given 11/24/24 1416)       ED Risk Strat Scores                           SBIRT 20yo+      Flowsheet Row Most Recent Value   Initial Alcohol Screen: US AUDIT-C     1. How often do you have a drink containing alcohol? 0 Filed at: 11/24/2024 1118   2. How many drinks containing alcohol do you have on a typical day you are drinking?  0 Filed at: 11/24/2024 1118   3a. Male UNDER 65: How often do you have five or more drinks on one occasion? 0 Filed at: 11/24/2024 1118   3b. FEMALE Any Age, or MALE 65+: How often do you have 4 or more drinks on one occassion? 0 Filed at: 11/24/2024 1118   Audit-C Score 0 Filed at:  11/24/2024 1118   SONG: How many times in the past year have you...    Used an illegal drug or used a prescription medication for non-medical reasons? Never Filed at: 11/24/2024 1118                            History of Present Illness       Chief Complaint   Patient presents with    Abdominal Pain    Dizziness     Patient presenting to ED with abdominal pain and dizziness after chemo treatment on Wed. Hx of pancreatic cancer.        Past Medical History:   Diagnosis Date    Benign essential hypertension 05/08/2014    CAD (coronary artery disease)     s/p NAVA prox LAD and D1 7/28/2022    Cancer (HCC)     Prostate    Coronary artery disease involving native coronary artery of native heart without angina pectoris 08/09/2022    Enteritis 08/28/2024    GERD (gastroesophageal reflux disease)     Hyperlipidemia     Hypertension     Other chest pain     Palpitations     Prostate cancer (HCC) 04/18/2023    Sleep apnea       Past Surgical History:   Procedure Laterality Date    CARDIAC CATHETERIZATION Left 7/28/2022    Procedure: Cardiac catheterization-left heart catheterization;  Surgeon: Sai Henry MD;  Location: [facility];  Service: Cardiology    CARDIAC CATHETERIZATION N/A 7/28/2022    Procedure: Cardiac pci;  Surgeon: Sai Henry MD;  Location: [facility];  Service: Cardiology    HERNIA REPAIR      IR EMBOLIZATION (SPECIFY VESSEL OR SITE)  9/13/2024    IR PORT PLACEMENT  10/28/2024    ID LAPS SURG [id number] RPBIC RAD W/NRV SPARING ROBOT N/A 5/22/2023    Procedure: PROSTATECTOMY RADICAL & PELVIC LYMPH NODE DISSECTION  W/ ROBOT;  Surgeon: Otoniel Harmon MD;  Location: [facility];  Service: Urology    ID PROSTATE NEEDLE BIOPSY ANY APPROACH N/A 2/28/2023    Procedure: TRANSRECTAL MRI FUSION BIOPSY PROSTATE;  Surgeon: Milan Arellano MD;  Location: [facility];  Service: Urology    WHIPPLE PROCEDURE/PANCREATICO-DUODENECTOMY N/A 9/12/2024    Procedure: WHIPPLE  PROCEDURE/PANCREATICO-DUODENECTOMY;  Surgeon: Marika Ac MD;  Location: [facility];  Service: Surgical Oncology      Family History   Problem Relation Age of Onset    No Known Problems Mother     No Known Problems Father       Social History     Tobacco Use    Smoking status: Former     Current packs/day: 0.00     Types: Cigarettes     Quit date: 2022     Years since quittin.9    Smokeless tobacco: Never   Vaping Use    Vaping status: Never Used   Substance Use Topics    Alcohol use: Not Currently    Drug use: Never      E-Cigarette/Vaping    E-Cigarette Use Never User       E-Cigarette/Vaping Substances    Nicotine No     THC No     CBD No     Flavoring No     Other No     Unknown No       I have reviewed and agree with the history as documented.     Patient is a 64-year-old male with history of pancreatic cancer on chemo, who presents for evaluation of generalized abdominal pain, lightheadedness.  Patient says the symptoms have been present since this morning.  Patient says he last had chemo on Wednesday and Friday of last week.  Patient says she has had similar symptoms like this after receiving his chemotherapy.  He denies any fevers or chills.  Does admit to some nausea.  He has been able to tolerate p.o.  He denies any chest pain but does admit to some shortness of breath.        Review of Systems   Constitutional:  Negative for chills, fever and unexpected weight change.   HENT:  Negative for congestion, sore throat and trouble swallowing.    Eyes:  Negative for pain, discharge and itching.   Respiratory:  Negative for cough, chest tightness, shortness of breath and wheezing.    Cardiovascular:  Negative for chest pain, palpitations and leg swelling.   Gastrointestinal:  Positive for abdominal pain and nausea. Negative for blood in stool, diarrhea and vomiting.   Endocrine: Negative for polyuria.   Genitourinary:  Negative for difficulty urinating, dysuria, frequency and hematuria.    Musculoskeletal:  Negative for arthralgias and back pain.   Neurological:  Positive for light-headedness. Negative for dizziness, syncope, weakness and headaches.           Objective       ED Triage Vitals [11/24/24 1116]   Temperature Pulse Blood Pressure Respirations SpO2 Patient Position - Orthostatic VS   98.3 °F (36.8 °C) 97 121/58 18 98 % Sitting      Temp Source Heart Rate Source BP Location FiO2 (%) Pain Score    Oral Monitor Left arm -- 8      Vitals      Date and Time Temp Pulse SpO2 Resp BP Pain Score FACES Pain Rating User   11/24/24 1445 -- -- -- -- -- No Pain -- SG   11/24/24 1343 -- -- -- -- -- 8 -- SG   11/24/24 1340 -- 85 95 % 12 113/59 -- -- SG   11/24/24 1116 98.3 °F (36.8 °C) 97 98 % 18 121/58 8 -- SG            Physical Exam  Vitals and nursing note reviewed.   Constitutional:       General: He is not in acute distress.     Appearance: He is well-developed.   HENT:      Head: Normocephalic and atraumatic.      Right Ear: External ear normal.      Left Ear: External ear normal.   Eyes:      Conjunctiva/sclera: Conjunctivae normal.      Pupils: Pupils are equal, round, and reactive to light.   Cardiovascular:      Rate and Rhythm: Normal rate and regular rhythm.      Heart sounds: Normal heart sounds. No murmur heard.     No friction rub. No gallop.   Pulmonary:      Effort: Pulmonary effort is normal. No respiratory distress.      Breath sounds: Normal breath sounds. No wheezing or rales.   Abdominal:      General: Bowel sounds are normal. There is no distension.      Palpations: Abdomen is soft.      Tenderness: There is generalized abdominal tenderness. There is no guarding.   Musculoskeletal:         General: No tenderness or deformity. Normal range of motion.      Cervical back: Normal range of motion.   Lymphadenopathy:      Cervical: No cervical adenopathy.   Skin:     General: Skin is warm and dry.   Neurological:      General: No focal deficit present.      Mental Status: He is alert  and oriented to person, place, and time. Mental status is at baseline.      Cranial Nerves: No cranial nerve deficit.      Sensory: No sensory deficit.      Motor: No weakness or abnormal muscle tone.   Psychiatric:         Behavior: Behavior normal.         Results Reviewed       Procedure Component Value Units Date/Time    Procalcitonin [[id number]]  (Normal) Collected: 11/24/24 1124    Lab Status: Final result Specimen: Blood from Arm, Right Updated: 11/24/24 1543     Procalcitonin 0.08 ng/ml     Blood culture #1 [[id number]] Collected: 11/24/24 1535    Lab Status: In process Specimen: Blood from Arm, Left Updated: 11/24/24 1538    Blood culture #2 [[id number]] Collected: 11/24/24 1535    Lab Status: In process Specimen: Blood from Arm, Right Updated: 11/24/24 1538    HS Troponin I 2hr [[id number]]  (Normal) Collected: 11/24/24 1331    Lab Status: Final result Specimen: Blood from Arm, Right Updated: 11/24/24 1358     hs TnI 2hr 5 ng/L      Delta 2hr hsTnI -2 ng/L     Comprehensive metabolic panel [[id number]]  (Abnormal) Collected: 11/24/24 1124    Lab Status: Final result Specimen: Blood from Arm, Right Updated: 11/24/24 1212     Sodium 137 mmol/L      Potassium 4.0 mmol/L      Chloride 103 mmol/L      CO2 27 mmol/L      ANION GAP 7 mmol/L      BUN 20 mg/dL      Creatinine 0.91 mg/dL      Glucose 93 mg/dL      Calcium 8.7 mg/dL      AST 13 U/L      ALT 13 U/L      Alkaline Phosphatase 143 U/L      Total Protein 6.4 g/dL      Albumin 4.0 g/dL      Total Bilirubin 0.32 mg/dL      eGFR 87 ml/min/1.73sq m     Narrative:      National Kid[employer] guidelines for Chronic Kidney Disease (CKD):     Stage 1 with normal or high GFR (GFR > 90 mL/min/1.73 square meters)    Stage 2 Mild CKD (GFR = 60-89 mL/min/1.73 square meters)    Stage 3A Moderate CKD (GFR = 45-59 mL/min/1.73 square meters)    Stage 3B Moderate CKD (GFR = 30-44 mL/min/1.73 square meters)    Stage 4 Severe CKD (GFR = 15-29 mL/min/1.73 square  meters)    Stage 5 End Stage CKD (GFR <15 mL/min/1.73 square meters)  Note: GFR calculation is accurate only with a steady state creatinine    Lipase [[id number]]  (Abnormal) Collected: 11/24/24 1124    Lab Status: Final result Specimen: Blood from Arm, Right Updated: 11/24/24 1212     Lipase <6 u/L     RBC Morphology Reflex Test [[id number]] Collected: 11/24/24 1124    Lab Status: Final result Specimen: Blood from Arm, Right Updated: 11/24/24 1201    CBC and differential [[id number]]  (Abnormal) Collected: 11/24/24 1124    Lab Status: Final result Specimen: Blood from Arm, Right Updated: 11/24/24 1157     WBC 99.20 Thousand/uL      RBC 4.60 Million/uL      Hemoglobin 13.0 g/dL      Hematocrit 40.6 %      MCV 88 fL      MCH 28.3 pg      MCHC 32.0 g/dL      RDW 13.9 %      MPV 9.5 fL      Platelets 307 Thousands/uL     Narrative:      This is an appended report.  These results have been appended to a previously verified report.    Manual Differential(PHLEBS Do Not Order) [[id number]]  (Abnormal) Collected: 11/24/24 1124    Lab Status: Final result Specimen: Blood from Arm, Right Updated: 11/24/24 1157     Segmented % 88 %      Bands % 8 %      Lymphocytes % 1 %      Monocytes % 1 %      Eosinophils % 2 %      Basophils % 0 %      Absolute Neutrophils 95.23 Thousand/uL      Absolute Lymphocytes 0.99 Thousand/uL      Absolute Monocytes 0.99 Thousand/uL      Absolute Eosinophils 1.98 Thousand/uL      Absolute Basophils 0.00 Thousand/uL      Total Counted --     Smudge Cells Present     RBC Morphology Present     Platelet Estimate Adequate     Anisocytosis Present    HS Troponin 0hr (reflex protocol) [[id number]]  (Normal) Collected: 11/24/24 1124    Lab Status: Final result Specimen: Blood from Arm, Right Updated: 11/24/24 1157     hs TnI 0hr 7 ng/L             XR chest 1 view portable   Final Interpretation by Cachorro White MD (11/24 1319)      No acute cardiopulmonary disease.            Workstation performed: [id number]          CT abdomen pelvis with contrast   Final Interpretation by Frederick Gonzales MD (11/24 1324)      1.  Stable postsurgical changes from Whipple's procedure. No disproportionate inflammatory findings around any of the enteric or visceral anastomoses.      2.  Postsurgical collection (hematoma) at the gallbladder fossa appears unchanged in size from 11/8 and significantly decreased from the immediate postoperative period in September. No progressive surrounding inflammation or new fluid collections to    suggest superinfection or new abscess.      3.  Small bowel shows findings of mild focal ileus or low level enteritis without obstruction or evidence for bowel wall compromise.               Resident: MING El I, the attending radiologist, have reviewed the images and agree with the final report above.      Workstation performed: [id number]             ECG 12 Lead Documentation Only    Date/Time: 11/24/2024 12:50 PM    Performed by: Kel Garnett DO  Authorized by: Kel Garnett DO    Indications / Diagnosis:  Lightheadedness  ECG reviewed by me, the ED Provider: no    Patient location:  ED  Previous ECG:     Previous ECG:  Compared to current    Similarity:  No change    Comparison to cardiac monitor: Yes    Interpretation:     Interpretation: normal    Rate:     ECG rate:  80    ECG rate assessment: normal    Rhythm:     Rhythm: sinus rhythm    Ectopy:     Ectopy: none    QRS:     QRS axis:  Normal  Conduction:     Conduction: normal    ST segments:     ST segments:  Normal  T waves:     T waves: normal        ED Medication and Procedure Management   Prior to Admission Medications   Prescriptions Last Dose Informant Patient Reported? Taking?   aspirin 81 mg chewable tablet 11/24/2024 Morning Self Yes Yes   Sig: Chew 81 mg daily   atorvastatin (LIPITOR) 20 mg tablet 11/24/2024 Morning Self No Yes   Sig: Take 1 tablet (20 mg total) by mouth daily   dicyclomine (BENTYL) 10 mg capsule 11/24/2024 Morning  Self No Yes   Sig: Take 1 capsule (10 mg total) by mouth 3 (three) times a day before meals   fluorouracil 3,865 mg in CADD/Elastomeric Infusion Device  Self No No   Sig: Infuse 3,865 mg (1,200 mg/m2/day x 1.61 m2) into a catheter in a vein via infusion device over 46 hours for 2 days  Infusion planned for November 21, 2024.   lisinopril (ZESTRIL) 20 mg tablet 11/24/2024 Morning Self No Yes   Sig: Take 1 tablet (20 mg total) by mouth daily      Facility-Administered Medications: None     Patient's Medications   Discharge Prescriptions    No medications on file     No discharge procedures on file.  ED SEPSIS DOCUMENTATION   Time reflects when diagnosis was documented in both MDM as applicable and the Disposition within this note       Time User Action Codes Description Comment    11/24/2024  3:26 PM Kel Garnett [D72.829] Leukocytosis     11/24/2024  3:26 PM Kel Garnett [R10.9] Abdominal pain     11/24/2024  3:26 PM Kel Garnett [T45.1X5A] Adverse effect of chemotherapy, initial encounter                  Kle Garnett DO  11/24/24 4917

## 2024-11-24 NOTE — ASSESSMENT & PLAN NOTE
Patient presented with c/o generalized abdominal pain, nausea, lightheaded since this morning. Patient received chemotherapy on Friday and reports similar symptoms in the past.   CT abd/pelvis:    Stable postsurgical changes from Whipple's procedure. No disproportionate inflammatory findings around any of the enteric or visceral anastomoses. Postsurgical collection (hematoma) at the gallbladder fossa appears unchanged in size from 11/8 and significantly decreased from the immediate postoperative period in September. No progressive surrounding inflammation or new fluid collections to suggest superinfection or new abscess. Small bowel shows findings of mild focal ileus or low level enteritis without obstruction or evidence for bowel wall compromise.  Supportive care, continue to monitor.

## 2024-11-24 NOTE — NURSING NOTE
Patient admitted to [facility] MS[facility] from [facility] ED.   Patient arrived via wheelchair, accompanied by ED PCA. Patient is awake, alert and oriented x 4, able to make his needs known. Ambulates independently. Patient is oriented to new room, call bell and bedside table are within reach. Admission database initiated.    Scd's ordered - Unable to apply- NO PUMP cords for SCD available on this unit.    Billie Hernandez AAS, RN, CMSRN  [facility]

## 2024-11-24 NOTE — ASSESSMENT & PLAN NOTE
Blood pressure currently stable  Continue home regimen of lisinopril  Monitor blood pressure trends

## 2024-11-24 NOTE — ASSESSMENT & PLAN NOTE
Patient with WBC of 99.20  Likely due to Neulasta   ED attending spoke with oncology on call and recommended infections workup and monitoring  Blood cx x 2 pending  Procalcitonin pending  CXR: No acute cardiopulmonary disease.   CT abd/pelvis without acute findings  Monitor vials and labs

## 2024-11-24 NOTE — ASSESSMENT & PLAN NOTE
Patient with known hx of pancreatic cancer, follows with Dr. Colby  Status post Whipple's procedure on 9/12/2024.   The patient is currently receiving chemotherapy with last dose on 11/22/2024  Continue outpatient follow-up with oncology

## 2024-11-24 NOTE — PLAN OF CARE
Problem: PAIN - ADULT  Goal: Verbalizes/displays adequate comfort level or baseline comfort level  Description: Interventions:  - Encourage patient to monitor pain and request assistance  - Assess pain using appropriate pain scale  - Administer analgesics based on type and severity of pain and evaluate response  - Implement non-pharmacological measures as appropriate and evaluate response  - Consider cultural and social influences on pain and pain management  - Notify physician/advanced practitioner if interventions unsuccessful or patient reports new pain  11/24/2024 1705 by Monserrat Mosqueda RN  Outcome: Progressing  11/24/2024 1705 by Monserrat Mosqueda RN  Outcome: Progressing     Problem: INFECTION - ADULT  Goal: Absence or prevention of progression during hospitalization  Description: INTERVENTIONS:  - Assess and monitor for signs and symptoms of infection  - Monitor lab/diagnostic results  - Monitor all insertion sites, i.e. indwelling lines, tubes, and drains  - Monitor endotracheal if appropriate and nasal secretions for changes in amount and color  - [place] appropriate cooling/warming therapies per order  - Administer medications as ordered  - Instruct and encourage patient and family to use good hand hygiene technique  - Identify and instruct in appropriate isolation precautions for identified infection/condition  11/24/2024 1705 by Monserrat Mosqueda RN  Outcome: Progressing  11/24/2024 1705 by Monserrat Mosqueda RN  Outcome: Progressing  Goal: Absence of fever/infection during neutropenic period  Description: INTERVENTIONS:  - Monitor WBC    11/24/2024 1705 by Monserrat Mosqueda RN  Outcome: Progressing  11/24/2024 1705 by Monserrat Mosqueda RN  Outcome: Progressing     Problem: SAFETY ADULT  Goal: Patient will remain free of falls  Description: INTERVENTIONS:  - Educate patient/family on patient safety including physical limitations  - Instruct patient to call for assistance with activity   - Consult OT/PT to assist with  strengthening/mobility   - Keep Call bell within reach  - Keep bed low and locked with side rails adjusted as appropriate  - Keep care items and personal belongings within reach  - Initiate and maintain comfort rounds  - Make Fall Risk Sign visible to staff  - Offer Toileting every 2 Hours, in advance of need  - Initiate/Maintain bed alarm  - Obtain necessary fall risk management equipment: non skid socks  - Apply yellow socks and bracelet for high fall risk patients  - Consider moving patient to room near nurses station  11/24/2024 1705 by Monserrat Mosqueda RN  Outcome: Progressing  11/24/2024 1705 by Monserrat Mosqueda RN  Outcome: Progressing  Goal: Maintain or return to baseline ADL function  Description: INTERVENTIONS:  -  Assess patient's ability to carry out ADLs; assess patient's baseline for ADL function and identify physical deficits which impact ability to perform ADLs (bathing, care of mouth/teeth, toileting, grooming, dressing, etc.)  - Assess/evaluate cause of self-care deficits   - Assess range of motion  - Assess patient's mobility; develop plan if impaired  - Assess patient's need for assistive devices and provide as appropriate  - Encourage maximum independence but intervene and supervise when necessary  - Involve family in performance of ADLs  - Assess for home care needs following discharge   - Consider OT consult to assist with ADL evaluation and planning for discharge  - Provide patient education as appropriate  11/24/2024 1705 by Monserrat Mosqueda RN  Outcome: Progressing  11/24/2024 1705 by Monserrat Mosqueda RN  Outcome: Progressing  Goal: Maintains/Returns to pre admission functional level  Description: INTERVENTIONS:  - Perform AM-PAC 6 Click Basic Mobility/ Daily Activity assessment daily.  - Set and communicate daily mobility goal to care team and patient/family/caregiver.   - Collaborate with rehabilitation services on mobility goals if consulted  - Perform Range of Motion 2 times a day.  - Reposition patient  every 2 hours.  - Dangle patient 2 times a day  - Stand patient 2 times a day  - Ambulate patient 2 times a day  - Out of bed to chair 3 times a day   - Out of bed for meals 3 times a day  - Out of bed for toileting  - Record patient progress and toleration of activity level   11/24/2024 1705 by Monserrat Mosqueda RN  Outcome: Progressing  11/24/2024 1705 by Monserrat Mosqueda RN  Outcome: Progressing     Problem: DISCHARGE PLANNING  Goal: Discharge to home or other facility with appropriate resources  Description: INTERVENTIONS:  - Identify barriers to discharge w/patient and caregiver  - Arrange for needed discharge resources and transportation as appropriate  - Identify discharge learning needs (meds, wound care, etc.)  - Refer to Case Management Department for coordinating discharge planning if the patient needs post-hospital services based on physician/advanced practitioner order or complex needs related to functional status, cognitive ability, or social support system  11/24/2024 1705 by Monserrat Mosqueda RN  Outcome: Progressing  11/24/2024 1705 by Monserrat Mosqueda RN  Outcome: Progressing     Problem: Knowledge Deficit  Goal: Patient/family/caregiver demonstrates understanding of disease process, treatment plan, medications, and discharge instructions  Description: Complete learning assessment and assess knowledge base.  Interventions:  - Provide teaching at level of understanding  - Provide teaching via preferred learning methods  11/24/2024 1705 by Monserrat Mosqueda RN  Outcome: Progressing  11/24/2024 1705 by Monserrat Mosuqeda RN  Outcome: Progressing

## 2024-11-24 NOTE — H&P
H&P - Hospitalist   Name: Isaac Kramer 64 y.o. male I MRN: [id number]  Unit/Bed#: ED 24 I Date of Admission: 11/24/2024   Date of Service: 11/24/2024 I Hospital Day: 0     Assessment & Plan  Leukocytosis  Patient with WBC of 99.20  Likely due to Neulasta   ED attending spoke with oncology on call and recommended infections workup and monitoring  Blood cx x 2 pending  Procalcitonin pending  CXR: No acute cardiopulmonary disease.   CT abd/pelvis without acute findings  Monitor vials and labs  Generalized abdominal pain  Patient presented with c/o generalized abdominal pain, nausea, lightheaded since this morning. Patient received chemotherapy on Friday and reports similar symptoms in the past.   CT abd/pelvis:    Stable postsurgical changes from Whipple's procedure. No disproportionate inflammatory findings around any of the enteric or visceral anastomoses. Postsurgical collection (hematoma) at the gallbladder fossa appears unchanged in size from 11/8 and significantly decreased from the immediate postoperative period in September. No progressive surrounding inflammation or new fluid collections to suggest superinfection or new abscess. Small bowel shows findings of mild focal ileus or low level enteritis without obstruction or evidence for bowel wall compromise.  Supportive care, continue to monitor.   Pancreatic adenocarcinoma (HCC)  Patient with known hx of pancreatic cancer, follows with Dr. Colby  Status post Whipple's procedure on 9/12/2024.   The patient is currently receiving chemotherapy with last dose on 11/22/2024  Continue outpatient follow-up with oncology  Benign essential hypertension  Blood pressure currently stable  Continue home regimen of lisinopril  Monitor blood pressure trends  Coronary artery disease involving native coronary artery of native heart without angina pectoris  Continue home regimen of statin, aspirin      VTE Pharmacologic Prophylaxis:   Moderate Risk (Score 3-4) -  Pharmacological DVT Prophylaxis Ordered: enoxaparin (Lovenox).  Code Status: Prior   Discussion with family: Patient declined call to .     Anticipated Length of Stay: Patient will be admitted on an observation basis with an anticipated length of stay of less than 2 midnights secondary to need for infectious workup and monitoring.    History of Present Illness   Chief Complaint: abdominal discomfort, nausea, and dizziness     Isaac Kramer is a 64 y.o. male with a PMH of pancreatic cancer, HTN, CAD, HLD who presents with a complaint of abdominal discomfort, nausea, and dizziness which started this morning.  The patient states that he received his chemotherapy on Friday, 11/22/2024 and this is the same symptoms he had after his last chemotherapy.  Chest x-ray is negative.  CT abdomen pelvis is negative for acute findings.  The patient is noted to have a WBC of 99.  ED provider spoke to oncology on-call who recommended infectious workup and monitoring overnight.  The patient states that his abdominal discomfort has improved and nausea has improved.  He denies any headaches, chest pain, vomiting, or diarrhea.  He denies any fevers or chills..    Review of Systems   Gastrointestinal:  Positive for abdominal pain and nausea.   Neurological:  Positive for dizziness.   All other systems reviewed and are negative.      Historical Information   Past Medical History:   Diagnosis Date    Benign essential hypertension 05/08/2014    CAD (coronary artery disease)     s/p NAVA prox LAD and D1 7/28/2022    Cancer (HCC)     Prostate    Coronary artery disease involving native coronary artery of native heart without angina pectoris 08/09/2022    Enteritis 08/28/2024    GERD (gastroesophageal reflux disease)     Hyperlipidemia     Hypertension     Other chest pain     Palpitations     Prostate cancer (HCC) 04/18/2023    Sleep apnea      Past Surgical History:   Procedure Laterality Date    CARDIAC CATHETERIZATION Left  2022    Procedure: Cardiac catheterization-left heart catheterization;  Surgeon: Sai Henry MD;  Location: [facility];  Service: Cardiology    CARDIAC CATHETERIZATION N/A 2022    Procedure: Cardiac pci;  Surgeon: Sai Henry MD;  Location: [facility];  Service: Cardiology    HERNIA REPAIR      IR EMBOLIZATION (SPECIFY VESSEL OR SITE)  2024    IR PORT PLACEMENT  10/28/2024    MA LAPS SURG [id number] RPBIC RAD W/NRV SPARING ROBOT N/A 2023    Procedure: PROSTATECTOMY RADICAL & PELVIC LYMPH NODE DISSECTION  W/ ROBOT;  Surgeon: Otoniel Harmon MD;  Location: [facility];  Service: Urology    MA PROSTATE NEEDLE BIOPSY ANY APPROACH N/A 2023    Procedure: TRANSRECTAL MRI FUSION BIOPSY PROSTATE;  Surgeon: Milan Arellano MD;  Location: [facility];  Service: Urology    WHIPPLE PROCEDURE/PANCREATICO-DUODENECTOMY N/A 2024    Procedure: WHIPPLE PROCEDURE/PANCREATICO-DUODENECTOMY;  Surgeon: Marika Ac MD;  Location: [facility];  Service: Surgical Oncology     Social History     Tobacco Use    Smoking status: Former     Current packs/day: 0.00     Types: Cigarettes     Quit date: 2022     Years since quittin.9    Smokeless tobacco: Never   Vaping Use    Vaping status: Never Used   Substance and Sexual Activity    Alcohol use: Not Currently    Drug use: Never    Sexual activity: Not on file     E-Cigarette/Vaping    E-Cigarette Use Never User      E-Cigarette/Vaping Substances    Nicotine No     THC No     CBD No     Flavoring No     Other No     Unknown No      Family History   Problem Relation Age of Onset    No Known Problems Mother     No Known Problems Father      Social History:  Marital Status: /Civil Union   Occupation: unknown  Patient Pre-hospital Living Situation: Home  Patient Pre-hospital Level of Mobility: walks  Patient Pre-hospital Diet Restrictions: cardiac    Meds/Allergies   I have reviewed home medications with patient  personally.  Prior to Admission medications    Medication Sig Start Date End Date Taking? Authorizing Provider   aspirin 81 mg chewable tablet Chew 81 mg daily    Historical Provider, MD   atorvastatin (LIPITOR) 20 mg tablet Take 1 tablet (20 mg total) by mouth daily 7/25/24   Rick Robledo MD   dicyclomine (BENTYL) 10 mg capsule Take 1 capsule (10 mg total) by mouth 3 (three) times a day before meals 11/11/24 12/11/24  DULCE Mays   fluorouracil 3,865 mg in CADD/Elastomeric Infusion Device Infuse 3,865 mg (1,200 mg/m2/day x 1.61 m2) into a catheter in a vein via infusion device over 46 hours for 2 days  Infusion planned for November 21, 2024. 11/21/24 11/23/24  Jacob Colby MD   lisinopril (ZESTRIL) 20 mg tablet Take 1 tablet (20 mg total) by mouth daily 10/3/24   Pablo Posadas,      No Known Allergies    Objective :  Temp:  [98.3 °F (36.8 °C)] 98.3 °F (36.8 °C)  HR:  [85-97] 85  BP: (113-121)/(58-59) 113/59  Resp:  [12-18] 12  SpO2:  [95 %-98 %] 95 %  O2 Device: None (Room air)    Physical Exam  Vitals and nursing note reviewed.   Constitutional:       General: He is awake.      Appearance: Normal appearance.   HENT:      Head: Normocephalic and atraumatic.   Cardiovascular:      Rate and Rhythm: Normal rate and regular rhythm.      Heart sounds: Normal heart sounds.   Pulmonary:      Effort: Pulmonary effort is normal.      Breath sounds: Normal breath sounds.   Abdominal:      Palpations: Abdomen is soft.      Tenderness: There is no abdominal tenderness.   Skin:     General: Skin is warm and dry.   Neurological:      General: No focal deficit present.      Mental Status: He is alert and oriented to person, place, and time.   Psychiatric:         Attention and Perception: Attention normal.         Mood and Affect: Mood normal.         Speech: Speech normal.         Behavior: Behavior is cooperative.         Cognition and Memory: Cognition and memory normal.           Lines/Drains:      Central Line:  Goal for removal: N/A - Chronic PICC             Lab Results: I have reviewed the following results:  Results from last 7 days   Lab Units 11/24/24  1124   WBC Thousand/uL 99.20*   HEMOGLOBIN g/dL 13.0   HEMATOCRIT % 40.6   PLATELETS Thousands/uL 307   BANDS PCT % 8   LYMPHO PCT % 1*   MONO PCT % 1*   EOS PCT % 2     Results from last 7 days   Lab Units 11/24/24  1124   SODIUM mmol/L 137   POTASSIUM mmol/L 4.0   CHLORIDE mmol/L 103   CO2 mmol/L 27   BUN mg/dL 20   CREATININE mg/dL 0.91   ANION GAP mmol/L 7   CALCIUM mg/dL 8.7   ALBUMIN g/dL 4.0   TOTAL BILIRUBIN mg/dL 0.32   ALK PHOS U/L 143*   ALT U/L 13   AST U/L 13   GLUCOSE RANDOM mg/dL 93             Lab Results   Component Value Date    HGBA1C 5.7 (H) 09/10/2024    HGBA1C 5.4 05/10/2023     Results from last 7 days   Lab Units 11/24/24  1124   PROCALCITONIN ng/ml 0.08       Imaging Results Review: I reviewed radiology reports from this admission including: chest xray and CT abdomen/pelvis.  Other Study Results Review: No additional pertinent studies reviewed.        ** Please Note: This note has been constructed using a voice recognition system. **

## 2024-11-25 ENCOUNTER — TELEPHONE (OUTPATIENT)
Dept: HEMATOLOGY ONCOLOGY | Facility: CLINIC | Age: 64
End: 2024-11-25

## 2024-11-25 ENCOUNTER — TELEPHONE (OUTPATIENT)
Age: 64
End: 2024-11-25

## 2024-11-25 PROBLEM — E43 SEVERE PROTEIN-CALORIE MALNUTRITION (HCC): Status: ACTIVE | Noted: 2024-11-25

## 2024-11-25 LAB
ALBUMIN SERPL BCG-MCNC: 3.6 G/DL (ref 3.5–5)
ALP SERPL-CCNC: 122 U/L (ref 34–104)
ALT SERPL W P-5'-P-CCNC: 10 U/L (ref 7–52)
ANION GAP SERPL CALCULATED.3IONS-SCNC: 4 MMOL/L (ref 4–13)
AST SERPL W P-5'-P-CCNC: 10 U/L (ref 13–39)
ATRIAL RATE: 80 BPM
BILIRUB SERPL-MCNC: 0.27 MG/DL (ref 0.2–1)
BUN SERPL-MCNC: 20 MG/DL (ref 5–25)
CALCIUM SERPL-MCNC: 8.4 MG/DL (ref 8.4–10.2)
CHLORIDE SERPL-SCNC: 104 MMOL/L (ref 96–108)
CO2 SERPL-SCNC: 27 MMOL/L (ref 21–32)
CREAT SERPL-MCNC: 0.77 MG/DL (ref 0.6–1.3)
ERYTHROCYTE [DISTWIDTH] IN BLOOD BY AUTOMATED COUNT: 13.9 % (ref 11.6–15.1)
GFR SERPL CREATININE-BSD FRML MDRD: 94 ML/MIN/1.73SQ M
GLUCOSE P FAST SERPL-MCNC: 91 MG/DL (ref 65–99)
GLUCOSE SERPL-MCNC: 91 MG/DL (ref 65–140)
HCT VFR BLD AUTO: 36.1 % (ref 36.5–49.3)
HGB BLD-MCNC: 11.5 G/DL (ref 12–17)
MAGNESIUM SERPL-MCNC: 2.1 MG/DL (ref 1.9–2.7)
MCH RBC QN AUTO: 27.9 PG (ref 26.8–34.3)
MCHC RBC AUTO-ENTMCNC: 31.9 G/DL (ref 31.4–37.4)
MCV RBC AUTO: 88 FL (ref 82–98)
P AXIS: 63 DEGREES
PHOSPHATE SERPL-MCNC: 4.2 MG/DL (ref 2.3–4.1)
PLATELET # BLD AUTO: 283 THOUSANDS/UL (ref 149–390)
PMV BLD AUTO: 9.5 FL (ref 8.9–12.7)
POTASSIUM SERPL-SCNC: 4.6 MMOL/L (ref 3.5–5.3)
PR INTERVAL: 158 MS
PROT SERPL-MCNC: 5.4 G/DL (ref 6.4–8.4)
QRS AXIS: -60 DEGREES
QRSD INTERVAL: 94 MS
QT INTERVAL: 358 MS
QTC INTERVAL: 412 MS
RBC # BLD AUTO: 4.12 MILLION/UL (ref 3.88–5.62)
SODIUM SERPL-SCNC: 135 MMOL/L (ref 135–147)
T WAVE AXIS: 80 DEGREES
VENTRICULAR RATE: 80 BPM
WBC # BLD AUTO: 86.41 THOUSAND/UL (ref 4.31–10.16)

## 2024-11-25 PROCEDURE — 80053 COMPREHEN METABOLIC PANEL: CPT | Performed by: PHYSICIAN ASSISTANT

## 2024-11-25 PROCEDURE — 83735 ASSAY OF MAGNESIUM: CPT | Performed by: PHYSICIAN ASSISTANT

## 2024-11-25 PROCEDURE — 84100 ASSAY OF PHOSPHORUS: CPT | Performed by: PHYSICIAN ASSISTANT

## 2024-11-25 PROCEDURE — 99232 SBSQ HOSP IP/OBS MODERATE 35: CPT | Performed by: PHYSICIAN ASSISTANT

## 2024-11-25 PROCEDURE — 93010 ELECTROCARDIOGRAM REPORT: CPT | Performed by: INTERNAL MEDICINE

## 2024-11-25 PROCEDURE — 85027 COMPLETE CBC AUTOMATED: CPT | Performed by: PHYSICIAN ASSISTANT

## 2024-11-25 RX ORDER — DOCUSATE SODIUM 100 MG/1
100 CAPSULE, LIQUID FILLED ORAL 2 TIMES DAILY
Status: DISCONTINUED | OUTPATIENT
Start: 2024-11-25 | End: 2024-11-26 | Stop reason: HOSPADM

## 2024-11-25 RX ADMIN — OXYCODONE HYDROCHLORIDE 5 MG: 5 TABLET ORAL at 09:08

## 2024-11-25 RX ADMIN — DICYCLOMINE HYDROCHLORIDE 10 MG: 10 CAPSULE ORAL at 09:02

## 2024-11-25 RX ADMIN — DICYCLOMINE HYDROCHLORIDE 10 MG: 10 CAPSULE ORAL at 16:45

## 2024-11-25 RX ADMIN — ATORVASTATIN CALCIUM 20 MG: 20 TABLET, FILM COATED ORAL at 09:02

## 2024-11-25 RX ADMIN — OXYCODONE HYDROCHLORIDE 5 MG: 5 TABLET ORAL at 20:58

## 2024-11-25 RX ADMIN — ENOXAPARIN SODIUM 40 MG: 100 INJECTION SUBCUTANEOUS at 09:02

## 2024-11-25 RX ADMIN — DOCUSATE SODIUM 100 MG: 100 CAPSULE, LIQUID FILLED ORAL at 20:58

## 2024-11-25 RX ADMIN — DICYCLOMINE HYDROCHLORIDE 10 MG: 10 CAPSULE ORAL at 11:28

## 2024-11-25 RX ADMIN — ASPIRIN 81 MG 81 MG: 81 TABLET ORAL at 09:02

## 2024-11-25 NOTE — ASSESSMENT & PLAN NOTE
Malnutrition Findings:   Adult Malnutrition type: Chronic illness  Adult Degree of Malnutrition: Other severe protein calorie malnutrition  Malnutrition Characteristics: Inadequate energy, Weight loss                  360 Statement: Malnutrition related to chronic illness as evidenced by <75% energy intake compared to estimated energy needs for >1-month, >10% body weight loss in 6 months, >7.5% body weight loss in 3 months.  To treat with oral diet and nutrition supplements as tolerated.    BMI Findings:           Body mass index is 21.48 kg/m².

## 2024-11-25 NOTE — MALNUTRITION/BMI
This medical record reflects one or more clinical indicators suggestive of malnutrition and/or morbid obesity.    Malnutrition Findings:   Adult Malnutrition type: Chronic illness  Adult Degree of Malnutrition: Other severe protein calorie malnutrition  Malnutrition Characteristics: Inadequate energy, Weight loss                360 Statement: Malnutrition related to chronic illness as evidenced by <75% energy intake compared to estimated energy needs for >1-month, >10% body weight loss in 6 months, >7.5% body weight loss in 3 months.  To treat with oral diet and nutrition supplements as tolerated.    BMI Findings:           Body mass index is 21.48 kg/m².     See Nutrition note dated 11/25/2024 in flow sheets for additional details.  Completed nutrition assessment is viewable in the nutrition documentation.

## 2024-11-25 NOTE — ASSESSMENT & PLAN NOTE
Patient presented with c/o generalized abdominal pain, nausea, lightheaded since this morning. Patient received chemotherapy on Friday and reports similar symptoms in the past.   CT abd/pelvis:    Stable postsurgical changes from Whipple's procedure. No disproportionate inflammatory findings around any of the enteric or visceral anastomoses. Postsurgical collection (hematoma) at the gallbladder fossa appears unchanged in size from 11/8 and significantly decreased from the immediate postoperative period in September. No progressive surrounding inflammation or new fluid collections to suggest superinfection or new abscess. Small bowel shows findings of mild focal ileus or low level enteritis without obstruction or evidence for bowel wall compromise.  Patient tolerating regular diet  Patient has antiemetics at home and is feeling well enough to go home.   Outpatient follow-up with PCP

## 2024-11-25 NOTE — TELEPHONE ENCOUNTER
Call received from patients wife. Pt is currently admitting into the hospital for severe stomach pain. Stated patient has been having this pain after each cycle of chemo and this is the second time he has ended up in the hospital because of this. Questioning why this keeps happening to him and whether anything should be changed with his treatment. Stated that patients pain was very severe making him short of breath. Wife requesting call back to discuss what could be causing this to happen after each treatment. She would also like to discuss patients cancer staging as well.

## 2024-11-25 NOTE — QUICK NOTE
Patient requesting pain medication due to abdominal pain. Patient has known history of adenocarcinoma. Pdmp reviewed patient given recent rx for oxycodone 5 mg. Will start patient on oxycodone 5 mg q4h prn moderate pain.

## 2024-11-25 NOTE — ASSESSMENT & PLAN NOTE
Patient with known hx of pancreatic cancer, follows with Dr. Colby  Status post Whipple's procedure on 9/12/2024.   The patient is currently receiving chemotherapy with most recent treatment on 11/22/2024  Continue outpatient follow-up with oncology

## 2024-11-25 NOTE — PROGRESS NOTES
Progress Note - Hospitalist   Name: Isaac Kramer 64 y.o. male I MRN: [id number]  Unit/Bed#: -01 I Date of Admission: 11/24/2024   Date of Service: 11/25/2024 I Hospital Day: 0    Assessment & Plan  Leukocytosis  Patient with WBC of 99.20 -> 86.41  Likely due to Neulasta   ED attending spoke with oncology on call and recommended infections workup and monitoring  Blood cx:- pending  Procalcitonin- negative  CXR: No acute cardiopulmonary disease.   CT abd/pelvis without acute findings  Spoke with Oncology on call Dr. Mendoza- patient okay for discharge home with blood cultures negative at 24 hours- may sure patient is aware to return to ED if he gets sick.   Outpatient follow-up with PCP and Oncology  Generalized abdominal pain  Patient presented with c/o generalized abdominal pain, nausea, lightheaded since this morning. Patient received chemotherapy on Friday and reports similar symptoms in the past.   CT abd/pelvis:    Stable postsurgical changes from Whipple's procedure. No disproportionate inflammatory findings around any of the enteric or visceral anastomoses. Postsurgical collection (hematoma) at the gallbladder fossa appears unchanged in size from 11/8 and significantly decreased from the immediate postoperative period in September. No progressive surrounding inflammation or new fluid collections to suggest superinfection or new abscess. Small bowel shows findings of mild focal ileus or low level enteritis without obstruction or evidence for bowel wall compromise.  Patient tolerating regular diet  Patient has antiemetics at home and is feeling well enough to go home.   Outpatient follow-up with PCP   Pancreatic adenocarcinoma (HCC)  Patient with known hx of pancreatic cancer, follows with Dr. Colby  Status post Whipple's procedure on 9/12/2024.   The patient is currently receiving chemotherapy with most recent treatment on 11/22/2024  Continue outpatient follow-up with oncology  Benign essential  hypertension  Blood pressure currently stable  Continue home regimen of lisinopril  Coronary artery disease involving native coronary artery of native heart without angina pectoris  Continue home regimen of statin, aspirin  Severe protein-calorie malnutrition (HCC)  Malnutrition Findings:   Adult Malnutrition type: Chronic illness  Adult Degree of Malnutrition: Other severe protein calorie malnutrition  Malnutrition Characteristics: Inadequate energy, Weight loss                  360 Statement: Malnutrition related to chronic illness as evidenced by <75% energy intake compared to estimated energy needs for >1-month, >10% body weight loss in 6 months, >7.5% body weight loss in 3 months.  To treat with oral diet and nutrition supplements as tolerated.    BMI Findings:           Body mass index is 21.48 kg/m².       VTE Pharmacologic Prophylaxis:   Moderate Risk (Score 3-4) - Pharmacological DVT Prophylaxis Ordered: enoxaparin (Lovenox).    Mobility:   Basic Mobility Inpatient Raw Score: 24  JH-HLM Goal: 8: Walk 250 feet or more  JH-HLM Achieved: 7: Walk 25 feet or more  JH-HLM Goal NOT achieved. Continue with multidisciplinary rounding and encourage appropriate mobility to improve upon JH-HLM goals.    Patient Centered Rounds: I performed bedside rounds with nursing staff today.   Discussions with Specialists or Other Care Team Provider: nursing, CM, oncology    Education and Discussions with Family / Patient: Updated  (wife) at bedside.    Current Length of Stay: 0 day(s)  Current Patient Status: Observation   Certification Statement: The patient, admitted on an observation basis, will now require > 2 midnight hospital stay due to continued infections workup- waiting on blood culture results.  Discharge Plan: Anticipate discharge tomorrow to home.    Code Status: Level 1 - Full Code    Subjective   The patient was seen and examined. The patient is sitting up on the side of his bed in no acute distress.  He states he's still with mild nausea and abdominal discomfort. He's tolerating his diet.     Objective :  Temp:  [98.2 °F (36.8 °C)-98.3 °F (36.8 °C)] 98.3 °F (36.8 °C)  HR:  [78-80] 78  BP: (103-107)/(57-60) 103/57  Resp:  [14] 14  SpO2:  [94 %-96 %] 96 %  O2 Device: None (Room air)    Body mass index is 21.48 kg/m².     Input and Output Summary (last 24 hours):     Intake/Output Summary (Last 24 hours) at 11/25/2024 1759  Last data filed at 11/25/2024 1201  Gross per 24 hour   Intake 360 ml   Output --   Net 360 ml       Physical Exam  Vitals and nursing note reviewed.   Constitutional:       General: He is awake.      Appearance: Normal appearance.   HENT:      Head: Normocephalic and atraumatic.   Cardiovascular:      Rate and Rhythm: Normal rate and regular rhythm.      Heart sounds: Normal heart sounds.   Pulmonary:      Effort: Pulmonary effort is normal.      Breath sounds: Normal breath sounds.   Abdominal:      Palpations: Abdomen is soft.      Tenderness: There is no abdominal tenderness.   Skin:     General: Skin is warm and dry.   Neurological:      General: No focal deficit present.      Mental Status: He is alert and oriented to person, place, and time.   Psychiatric:         Attention and Perception: Attention normal.         Mood and Affect: Mood normal.         Speech: Speech normal.         Behavior: Behavior is cooperative.           Lines/Drains:  Lines/Drains/Airways       Active Status       Name Placement date Placement time Site Days    Port A Cath 10/28/24 Left Internal jugular 10/28/24  1137  Internal jugular  28                    Central Line:  Goal for removal: N/A - Chronic PICC               Lab Results: I have reviewed the following results:   Results from last 7 days   Lab Units 11/25/24  0528 11/24/24  1124   WBC Thousand/uL 86.41* 99.20*   HEMOGLOBIN g/dL 11.5* 13.0   HEMATOCRIT % 36.1* 40.6   PLATELETS Thousands/uL 283 307   BANDS PCT %  --  8   LYMPHO PCT %  --  1*   MONO PCT %   --  1*   EOS PCT %  --  2     Results from last 7 days   Lab Units 11/25/24  0528   SODIUM mmol/L 135   POTASSIUM mmol/L 4.6   CHLORIDE mmol/L 104   CO2 mmol/L 27   BUN mg/dL 20   CREATININE mg/dL 0.77   ANION GAP mmol/L 4   CALCIUM mg/dL 8.4   ALBUMIN g/dL 3.6   TOTAL BILIRUBIN mg/dL 0.27   ALK PHOS U/L 122*   ALT U/L 10   AST U/L 10*   GLUCOSE RANDOM mg/dL 91                 Results from last 7 days   Lab Units 11/24/24  1124   PROCALCITONIN ng/ml 0.08       Recent Cultures (last 7 days):   Results from last 7 days   Lab Units 11/24/24  1535   BLOOD CULTURE  Received in Microbiology Lab. Culture in Progress.  Received in Microbiology Lab. Culture in Progress.       Imaging Results Review: No pertinent imaging studies reviewed.  Other Study Results Review: No additional pertinent studies reviewed.    Last 24 Hours Medication List:     Current Facility-Administered Medications:     aspirin chewable tablet 81 mg, Daily    atorvastatin (LIPITOR) tablet 20 mg, Daily    dicyclomine (BENTYL) capsule 10 mg, TID AC    enoxaparin (LOVENOX) subcutaneous injection 40 mg, Daily    lisinopril (ZESTRIL) tablet 20 mg, Daily    ondansetron (ZOFRAN) injection 4 mg, Q6H PRN    oxyCODONE (ROXICODONE) IR tablet 5 mg, Q4H PRN    Facility-Administered Medications Ordered in Other Encounters:     [MAR Hold] alteplase (CATHFLO) injection 2 mg, Q1MIN PRN    Administrative Statements   Today, Patient Was Seen By: Oleksandr Lyons PA-C  I have spent a total time of 35 minutes in caring for this patient on the day of the visit/encounter including Documenting in the medical record, Reviewing / ordering tests, medicine, procedures  , and Communicating with other healthcare professionals .    **Please Note: This note may have been constructed using a voice recognition system.**

## 2024-11-25 NOTE — ASSESSMENT & PLAN NOTE
Patient with WBC of 99.20 -> 86.41  Likely due to Neulasta   ED attending spoke with oncology on call and recommended infections workup and monitoring  Blood cx:- pending  Procalcitonin- negative  CXR: No acute cardiopulmonary disease.   CT abd/pelvis without acute findings  Spoke with Oncology on call Dr. Mendoza- patient okay for discharge home with blood cultures negative at 24 hours- may sure patient is aware to return to ED if he gets sick.   Outpatient follow-up with PCP and Oncology

## 2024-11-25 NOTE — PLAN OF CARE
Problem: PAIN - ADULT  Goal: Verbalizes/displays adequate comfort level or baseline comfort level  Description: Interventions:  - Encourage patient to monitor pain and request assistance  - Assess pain using appropriate pain scale  - Administer analgesics based on type and severity of pain and evaluate response  - Implement non-pharmacological measures as appropriate and evaluate response  - Consider cultural and social influences on pain and pain management  - Notify physician/advanced practitioner if interventions unsuccessful or patient reports new pain  Outcome: Progressing     Problem: INFECTION - ADULT  Goal: Absence or prevention of progression during hospitalization  Description: INTERVENTIONS:  - Assess and monitor for signs and symptoms of infection  - Monitor lab/diagnostic results  - Monitor all insertion sites, i.e. indwelling lines, tubes, and drains  - Monitor endotracheal if appropriate and nasal secretions for changes in amount and color  - Oak[place] appropriate cooling/warming therapies per order  - Administer medications as ordered  - Instruct and encourage patient and family to use good hand hygiene technique  - Identify and instruct in appropriate isolation precautions for identified infection/condition  Outcome: Progressing  Goal: Absence of fever/infection during neutropenic period  Description: INTERVENTIONS:  - Monitor WBC    Outcome: Progressing     Problem: SAFETY ADULT  Goal: Patient will remain free of falls  Description: INTERVENTIONS:  - Educate patient/family on patient safety including physical limitations  - Instruct patient to call for assistance with activity   - Consult OT/PT to assist with strengthening/mobility   - Keep Call bell within reach  - Keep bed low and locked with side rails adjusted as appropriate  - Keep care items and personal belongings within reach  - Initiate and maintain comfort rounds  - Make Fall Risk Sign visible to staff  - Offer Toileting every 2 Hours,  in advance of need  - Initiate/Maintain bed alarm  - Obtain necessary fall risk management equipment: non skid socks  - Apply yellow socks and bracelet for high fall risk patients  - Consider moving patient to room near nurses station  Outcome: Progressing  Goal: Maintain or return to baseline ADL function  Description: INTERVENTIONS:  -  Assess patient's ability to carry out ADLs; assess patient's baseline for ADL function and identify physical deficits which impact ability to perform ADLs (bathing, care of mouth/teeth, toileting, grooming, dressing, etc.)  - Assess/evaluate cause of self-care deficits   - Assess range of motion  - Assess patient's mobility; develop plan if impaired  - Assess patient's need for assistive devices and provide as appropriate  - Encourage maximum independence but intervene and supervise when necessary  - Involve family in performance of ADLs  - Assess for home care needs following discharge   - Consider OT consult to assist with ADL evaluation and planning for discharge  - Provide patient education as appropriate  Outcome: Progressing  Goal: Maintains/Returns to pre admission functional level  Description: INTERVENTIONS:  - Perform AM-PAC 6 Click Basic Mobility/ Daily Activity assessment daily.  - Set and communicate daily mobility goal to care team and patient/family/caregiver.   - Collaborate with rehabilitation services on mobility goals if consulted  - Perform Range of Motion 2 times a day.  - Reposition patient every 2 hours.  - Dangle patient 2 times a day  - Stand patient 2 times a day  - Ambulate patient 2 times a day  - Out of bed to chair 3 times a day   - Out of bed for meals 3 times a day  - Out of bed for toileting  - Record patient progress and toleration of activity level   Outcome: Progressing     Problem: DISCHARGE PLANNING  Goal: Discharge to home or other facility with appropriate resources  Description: INTERVENTIONS:  - Identify barriers to discharge w/patient and  caregiver  - Arrange for needed discharge resources and transportation as appropriate  - Identify discharge learning needs (meds, wound care, etc.)  - Refer to Case Management Department for coordinating discharge planning if the patient needs post-hospital services based on physician/advanced practitioner order or complex needs related to functional status, cognitive ability, or social support system  Outcome: Progressing     Problem: Knowledge Deficit  Goal: Patient/family/caregiver demonstrates understanding of disease process, treatment plan, medications, and discharge instructions  Description: Complete learning assessment and assess knowledge base.  Interventions:  - Provide teaching at level of understanding  - Provide teaching via preferred learning methods  Outcome: Progressing

## 2024-11-25 NOTE — TELEPHONE ENCOUNTER
Reviewed with Dr. Colby, recommended for patient to have onpro decreased from 6 mg to 4 mg total.

## 2024-11-26 ENCOUNTER — TRANSITIONAL CARE MANAGEMENT (OUTPATIENT)
Dept: FAMILY MEDICINE CLINIC | Facility: CLINIC | Age: 64
End: 2024-11-26

## 2024-11-26 VITALS
DIASTOLIC BLOOD PRESSURE: 72 MMHG | RESPIRATION RATE: 14 BRPM | BODY MASS INDEX: 21.38 KG/M2 | OXYGEN SATURATION: 96 % | HEIGHT: 64 IN | SYSTOLIC BLOOD PRESSURE: 133 MMHG | HEART RATE: 71 BPM | TEMPERATURE: 99.1 F | WEIGHT: 125.22 LBS

## 2024-11-26 LAB
ANION GAP SERPL CALCULATED.3IONS-SCNC: 6 MMOL/L (ref 4–13)
BUN SERPL-MCNC: 17 MG/DL (ref 5–25)
CALCIUM SERPL-MCNC: 8.2 MG/DL (ref 8.4–10.2)
CHLORIDE SERPL-SCNC: 105 MMOL/L (ref 96–108)
CO2 SERPL-SCNC: 25 MMOL/L (ref 21–32)
CREAT SERPL-MCNC: 0.61 MG/DL (ref 0.6–1.3)
ERYTHROCYTE [DISTWIDTH] IN BLOOD BY AUTOMATED COUNT: 13.8 % (ref 11.6–15.1)
GFR SERPL CREATININE-BSD FRML MDRD: 104 ML/MIN/1.73SQ M
GLUCOSE SERPL-MCNC: 89 MG/DL (ref 65–140)
HCT VFR BLD AUTO: 34.3 % (ref 36.5–49.3)
HGB BLD-MCNC: 11.1 G/DL (ref 12–17)
MCH RBC QN AUTO: 28.1 PG (ref 26.8–34.3)
MCHC RBC AUTO-ENTMCNC: 32.4 G/DL (ref 31.4–37.4)
MCV RBC AUTO: 87 FL (ref 82–98)
PLATELET # BLD AUTO: 264 THOUSANDS/UL (ref 149–390)
PMV BLD AUTO: 9.8 FL (ref 8.9–12.7)
POTASSIUM SERPL-SCNC: 4.3 MMOL/L (ref 3.5–5.3)
RBC # BLD AUTO: 3.95 MILLION/UL (ref 3.88–5.62)
SODIUM SERPL-SCNC: 136 MMOL/L (ref 135–147)
WBC # BLD AUTO: 81.24 THOUSAND/UL (ref 4.31–10.16)

## 2024-11-26 PROCEDURE — 80048 BASIC METABOLIC PNL TOTAL CA: CPT | Performed by: PHYSICIAN ASSISTANT

## 2024-11-26 PROCEDURE — 99238 HOSP IP/OBS DSCHRG MGMT 30/<: CPT | Performed by: PHYSICIAN ASSISTANT

## 2024-11-26 PROCEDURE — 85027 COMPLETE CBC AUTOMATED: CPT | Performed by: PHYSICIAN ASSISTANT

## 2024-11-26 RX ADMIN — LISINOPRIL 20 MG: 20 TABLET ORAL at 08:22

## 2024-11-26 RX ADMIN — DOCUSATE SODIUM 100 MG: 100 CAPSULE, LIQUID FILLED ORAL at 08:22

## 2024-11-26 RX ADMIN — ASPIRIN 81 MG 81 MG: 81 TABLET ORAL at 08:22

## 2024-11-26 RX ADMIN — DICYCLOMINE HYDROCHLORIDE 10 MG: 10 CAPSULE ORAL at 06:22

## 2024-11-26 RX ADMIN — ENOXAPARIN SODIUM 40 MG: 100 INJECTION SUBCUTANEOUS at 08:22

## 2024-11-26 RX ADMIN — ATORVASTATIN CALCIUM 20 MG: 20 TABLET, FILM COATED ORAL at 08:22

## 2024-11-26 NOTE — UTILIZATION REVIEW
Initial Clinical Review    WAS OBSERVATION 11/24/24 CONVERTED TO INPATIENT ADMISSION 11/25/24 DUE TO CONTINUED STAY REQUIRED TO CARE FOR PATIENT WITH ABDOMINAL PAIN.   .       Admission: Date/Time/Statement:   Admission Orders (From admission, onward)       Ordered        11/25/24 1801  INPATIENT ADMISSION  Once            11/24/24 1526  Place in Observation  Once                          Orders Placed This Encounter   Procedures                               Standing Status:   Standing     Number of Occurrences:   1     Level of Care:   Med Surg [16]     Estimated length of stay:   More than 2 Midnights     Certification:   I certify that inpatient services are medically necessary for this patient for a duration of greater than two midnights. See H&P and MD Progress Notes for additional information about the patient's course of treatment.     ED Arrival Information       Expected   -    Arrival   11/24/2024 11:09    Acuity   Urgent              Means of arrival   Walk-In    Escorted by   Spouse    Service   Hospitalist    Admission type   Emergency              Arrival complaint   abd pain dizziness             Chief Complaint   Patient presents with    Abdominal Pain    Dizziness     Patient presenting to ED with abdominal pain and dizziness after chemo treatment on Wed. Hx of pancreatic cancer.        Initial Presentation: 64 y.o. male presents to the ED with complaints of abdominal pain, nausea and dizziness which started this morning. Pt states he received Chemotherapy on Friday 11/22 and these are the same symptoms he had after his last chemotherapy. On Exam: + abdominal pain, + nausea and dizziness which improved after dilaudid and zofran. PMH  pancreatic cancer, HTN, CAD, HLD, s/p whipple procedure, 9/2024.In the ED pt reports pain at 8/10. He received IV dilaudid x 2 doses , 1 dose of IV Zofran fro nausea and 1 liter IVF's of NS. Abnormal labs/imaging: WBC 99., CT A/P negative for acute findings.  ED  provider spoke with oncologist on call and rec admission for infectious workup. Plan: admit for observation for generalized abdominal pain,elevated WBC, blood cultures, continue home anithypertensives, IV pain control, antiemetics, IVF's, trend WBC.     Anticipated Length of Stay/Certification Statement: Patient will be admitted on an observation basis with an anticipated length of stay of less than 2 midnights secondary to need for infectious workup and monitoring.     CONVERTED TO IP 11/25/24.     Date: 11/25/24   Day 2: Hospitalist: On Exam: reports mild nausea and abdominal discomfort. Blood cultures pending. WBC down to 86.41, HGB 11.5, HCT 36.1,ALK PHOS 122, AST 10. Placed on oxycodone for pain control Q4 PRN.     Date: 11/26/24  Day 3: Has surpassed a 2nd midnight with active treatments and services. Hospitalist: BC negative after 24 hrs. Pt tolerating regular diet, pain controlled with oxycodone. Pt reports feeling well enough to dc home today, has antiemetics at home.     DC home 11/26/24.        ED Treatment-Medication Administration from 11/24/2024 1109 to 11/24/2024 1701         Date/Time Order Dose Route Action     11/24/2024 1124 sodium chloride 0.9 % bolus 1,000 mL 1,000 mL Intravenous New Bag     11/24/2024 1139 HYDROmorphone HCl (DILAUDID) injection 0.2 mg 0.2 mg Intravenous Given     11/24/2024 1140 ondansetron (ZOFRAN) injection 4 mg 4 mg Intravenous Given     11/24/2024 1224 iohexol (OMNIPAQUE) 350 MG/ML injection (MULTI-DOSE) 100 mL 100 mL Intravenous Given     11/24/2024 1416 HYDROmorphone HCl (DILAUDID) injection 0.2 mg 0.2 mg Intravenous Given            Scheduled Medications:  aspirin, 81 mg, Oral, Daily  atorvastatin, 20 mg, Oral, Daily  dicyclomine, 10 mg, Oral, TID AC  docusate sodium, 100 mg, Oral, BID  enoxaparin, 40 mg, Subcutaneous, Daily  lisinopril, 20 mg, Oral, Daily      Continuous IV Infusions:     PRN Meds:  ondansetron, 4 mg, Intravenous, Q6H PRN  oxyCODONE, 5 mg, Oral, Q4H  MON      ED Triage Vitals [11/24/24 1116]   Temperature Pulse Respirations Blood Pressure SpO2 Pain Score   98.3 °F (36.8 °C) 97 18 121/58 98 % 8     Weight (last 2 days)       Date/Time Weight    11/24/24 1717 56.8 (125.22)            Vital Signs (last 3 days)       Date/Time Temp Pulse Resp BP MAP (mmHg) SpO2 O2 Device Patient Position - Orthostatic VS Ghada Coma Scale Score Pain    11/26/24 0822 -- -- -- 115/62 -- -- -- -- -- --    11/26/24 0730 -- -- -- -- -- 96 % None (Room air) -- 15 6    11/26/24 07:22:53 98.3 °F (36.8 °C) 80 16 105/62 76 95 % -- -- -- --    11/26/24 00:07:16 -- 76 -- 104/55 71 95 % -- -- -- --    11/25/24 23:49:23 97.4 °F (36.3 °C) 75 16 95/54 68 96 % None (Room air) Lying -- --    11/25/24 2058 -- -- -- -- -- -- -- -- -- 6 11/25/24 2007 -- -- -- -- -- -- -- -- 15 7 11/25/24 14:51:29 98.3 °F (36.8 °C) 78 14 103/57 72 96 % -- -- -- --    11/25/24 1100 -- -- -- -- -- -- -- -- -- 5    11/25/24 0908 -- -- -- -- -- -- -- -- -- 7 11/25/24 07:13:45 98.2 °F (36.8 °C) 80 14 107/60 76 96 % -- -- -- --    11/24/24 2232 -- -- -- -- -- 94 % -- -- -- 9    11/24/24 2219 -- -- -- -- -- 95 % None (Room air) -- 15 --    11/24/24 1717 98.6 °F (37 °C) 80 18 116/83 94 96 % None (Room air) Sitting 15 --    11/24/24 17:14:55 98.6 °F (37 °C) 86 16 116/83 94 96 % -- -- -- --    11/24/24 1644 98.2 °F (36.8 °C) 82 16 113/56 80 96 % None (Room air) Sitting -- 4    11/24/24 1445 -- -- -- -- -- -- -- -- -- No Pain    11/24/24 1343 -- -- -- -- -- -- -- -- -- 8    11/24/24 1340 -- 85 12 113/59 82 95 % None (Room air) Sitting -- --    11/24/24 1144 -- -- -- -- -- -- -- -- 15 --    11/24/24 1116 98.3 °F (36.8 °C) 97 18 121/58 -- 98 % None (Room air) Sitting -- 8              Pertinent Labs/Diagnostic Test Results:   Radiology:  XR chest 1 view portable   Final Interpretation by Cachorro White MD (11/24 1347)      No acute cardiopulmonary disease.            Workstation performed: [id number]         CT abdomen pelvis  with contrast   Final Interpretation by Frederick Gonzales MD (11/24 1324)      1.  Stable postsurgical changes from Whipple's procedure. No disproportionate inflammatory findings around any of the enteric or visceral anastomoses.      2.  Postsurgical collection (hematoma) at the gallbladder fossa appears unchanged in size from 11/8 and significantly decreased from the immediate postoperative period in September. No progressive surrounding inflammation or new fluid collections to    suggest superinfection or new abscess.      3.  Small bowel shows findings of mild focal ileus or low level enteritis without obstruction or evidence for bowel wall compromise.               Resident: MING El I, the attending radiologist, have reviewed the images and agree with the final report above.      Workstation performed: [id number]           Cardiology:  ECG 12 lead   Final Result by Rick Robledo MD (11/25 0813)   Normal sinus rhythm   Left axis deviation   Inferior infarct (cited on or before 08-Nov-2024)   No significant change since prior tracing   Confirmed by Rick Robledo ([id number]) on 11/25/2024 8:13:41 AM        GI:  No orders to display           Results from last 7 days   Lab Units 11/26/24 0445 11/25/24 0528 11/24/24 1124 11/19/24  1426   WBC Thousand/uL 81.24* 86.41* 99.20* 20.71*   HEMOGLOBIN g/dL 11.1* 11.5* 13.0 12.6   HEMATOCRIT % 34.3* 36.1* 40.6 40.1   PLATELETS Thousands/uL 264 283 307 349   BANDS PCT %  --   --  8 2         Results from last 7 days   Lab Units 11/26/24 0445 11/25/24 0528 11/24/24 1124 11/19/24  1426   SODIUM mmol/L 136 135 137 141   POTASSIUM mmol/L 4.3 4.6 4.0 3.8   CHLORIDE mmol/L 105 104 103 104   CO2 mmol/L 25 27 27 29   ANION GAP mmol/L 6 4 7 8   BUN mg/dL 17 20 20 14   CREATININE mg/dL 0.61 0.77 0.91 0.58*   EGFR ml/min/1.73sq m 104 94 87 106   CALCIUM mg/dL 8.2* 8.4 8.7 8.6   MAGNESIUM mg/dL  --  2.1  --  2.2   PHOSPHORUS mg/dL  --  4.2*  --   --      Results from  last 7 days   Lab Units 11/25/24  0528 11/24/24  1124 11/19/24  1426   AST U/L 10* 13 13   ALT U/L 10 13 11   ALK PHOS U/L 122* 143* 169*   TOTAL PROTEIN g/dL 5.4* 6.4 6.2*   ALBUMIN g/dL 3.6 4.0 4.1   TOTAL BILIRUBIN mg/dL 0.27 0.32 0.22         Results from last 7 days   Lab Units 11/26/24  0445 11/25/24  0528 11/24/24  1124   GLUCOSE RANDOM mg/dL 89 91 93               Results from last 7 days   Lab Units 11/24/24  1331 11/24/24  1124   HS TNI 0HR ng/L  --  7   HS TNI 2HR ng/L 5  --    HSTNI D2 ng/L -2  --                  Results from last 7 days   Lab Units 11/24/24  1124   PROCALCITONIN ng/ml 0.08               Results from last 7 days   Lab Units 11/24/24  1124   LIPASE u/L <6*                         Results from last 7 days   Lab Units 11/24/24  1535   BLOOD CULTURE  No Growth at 24 hrs.  No Growth at 24 hrs.                   Past Medical History:   Diagnosis Date    Benign essential hypertension 05/08/2014    CAD (coronary artery disease)     s/p NAVA prox LAD and D1 7/28/2022    Cancer (HCC)     Prostate    Coronary artery disease involving native coronary artery of native heart without angina pectoris 08/09/2022    Enteritis 08/28/2024    GERD (gastroesophageal reflux disease)     Hyperlipidemia     Hypertension     Other chest pain     Palpitations     Prostate cancer (HCC) 04/18/2023    Sleep apnea      Present on Admission:   Benign essential hypertension   Pancreatic adenocarcinoma (HCC)   Leukocytosis   Generalized abdominal pain   Coronary artery disease involving native coronary artery of native heart without angina pectoris      Admitting Diagnosis: Dizziness [R42]  Leukocytosis [D72.829]  Abdominal pain [R10.9]  Adverse effect of chemotherapy, initial encounter [T45.1X5A]  Age/Sex: 64 y.o. male    Network Utilization Review Department  ATTENTION: Please call with any questions or concerns to [phone] and carefully listen to the prompts so that you are directed to the right person. All  voicemails are confidential.   For Discharge needs, contact Care Management DC Support Team at [phone] opt. 2  Send all requests for admission clinical reviews, approved or denied determinations and any other requests to dedicated fax number below belonging to the campus where the patient is receiving treatment. List of dedicated fax numbers for the Facilities:  FACILITY NAME UR FAX NUMBER   ADMISSION DENIALS (Administrative/Medical Necessity) [phone]   DISCHARGE SUPPORT TEAM (NETWORK) [phone]   PARENT CHILD HEALTH (Maternity/NICU/Pediatrics) [phone]   [facility] [phone]   [facility] [phone]   [facility] [phone]   [facility] [phone]   [facility] [phone]   [facility] [phone]   [facility] [phone]   [facility] [phone]   [facility] [phone]   [facility] [phone]   [facility] [phone]   [facility] [phone]

## 2024-11-26 NOTE — CASE MANAGEMENT
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     MOOKIE (acute kidney injury) (HCC) 2024     GERD (gastroesophageal reflux disease)      Pancreatic adenocarcinoma (HCC) 2024     Elevated LFTs 2024     Acute obstructive cholangitis 2024     Enteritis 2024     Microscopic hematuria 10/10/2022     BPH (benign prostatic hyperplasia) 10/10/2022     Coronary artery disease involving native coronary artery of native heart without angina pectoris 2022     Overweight with body mass index (BMI) of 26 to 26.9 in adult 2020     Aortic valve stenosis 2020     ED (erectile dysfunction) of non-organic origin 2015     Peripheral neuropathy 2014     Peripheral vascular disease (HCC) 2014     Benign essential hypertension 2014     Hypercholesterolemia 2013       LOS (days): 1  Geometric Mean LOS (GMLOS) (days): 5  Days to GMLOS:4.4     OBJECTIVE:  PATIENT READMITTED TO HOSPITAL  Risk of Unplanned Readmission Score: 19.56         Current admission status: Inpatient  Referral Reason: Other    Preferred Pharmacy:   [facility] - [address]  [address] [place] [address]  Phone: [phone] Fax: [phone]    Primary Care Provider: Pablo Perez DO    Primary Insurance: MEDICARE  Secondary Insurance:      ASSESSMENT:  Active Health Care Proxies       Jacque Kramer Health Care Agent - Spouse   Primary Phone: [phone] (Mobile)  Home Phone: [phone]                 Advance Directives  Does patient have a Health Care POA?: No  Was patient offered paperwork?: Yes  Does patient currently have a Health Care decision maker?: Yes, please see Health Care Proxy section  Does patient have Advance Directives?: No  Was patient offered paperwork?: Yes  Primary Contact: Jacque Kramer - spouse         Readmission Root Cause  30 Day Readmission: Yes  During your hospital stay, did someone (provider, nurse, ) explain your care to you in a way you could understand?: Yes  Did you feel medically stable to leave the hospital?: Yes  Were you able to pay for your medication at the pharmacy?: Yes  Did you have reliable transportation to take you to your appointments?: Yes  During previous admission, was a post-acute recommendation made?: No  Patient was readmitted due to: Leukocytosis  Action Plan: Home with spouse and OP follow up    Patient Information  Admitted from:: Home  Mental Status: Alert  During Assessment patient was accompanied by: Not accompanied during assessment  Assessment information provided by:: Patient  Primary Caregiver: Self  Support Systems: Spouse/significant other, Son  County of Residence: [place]  What city do you live in?: [place]  Home entry access options. Select all that apply.: Ramp  Type of Current Residence: [facility]  Living Arrangements: Lives w/ Spouse/significant other, Lives w/ Children  Is patient a ?: No    Activities of Daily Living Prior to Admission  Functional Status: Independent  Completes ADLs independently?: Yes  Ambulates independently?: Yes  Does patient use assisted devices?: No  Does patient currently own DME?: Yes  What DME does the patient currently own?: [employer]  Does patient have a history of Outpatient Therapy (PT/OT)?: No  Does the  patient have a history of Short-Term Rehab?: No  Does patient have a history of HHC?: Yes ([facility])  Does patient currently have HHC?: No         Patient Information Continued  Income Source: Pension/group home  Does patient have prescription coverage?: Yes  Does patient receive dialysis treatments?: No  Does patient have a history of substance abuse?: No  Does patient have a history of Mental Health Diagnosis?: No         Means of Transportation  Means of Transport to Appts:: Drives Self          DISCHARGE DETAILS:    Discharge planning discussed with:: Patient  Freedom of Choice: Yes  Comments - Freedom of Choice: Patient has no identified CM discharge needs at his time. Pt will return home with spouse when stable. CM to follow as needed.  CM contacted family/caregiver?: Yes  Were Treatment Team discharge recommendations reviewed with patient/caregiver?: Yes  Did patient/caregiver verbalize understanding of patient care needs?: Yes  Were patient/caregiver advised of the risks associated with not following Treatment Team discharge recommendations?: Yes    Contacts  Patient Contacts: Jacque Kramer  Relationship to Patient:: Family  Contact Method: Phone  Reason/Outcome: Discharge Planning    Requested Home Health Care         Is the patient interested in HHC at discharge?: No    DME Referral Provided  Referral made for DME?: No         Would you like to participate in our [facility] service program?  : No - Declined    Treatment Team Recommendation: Home  Discharge Destination Plan:: Home  Transport at Discharge : Family

## 2024-11-26 NOTE — PLAN OF CARE
Problem: PAIN - ADULT  Goal: Verbalizes/displays adequate comfort level or baseline comfort level  Description: Interventions:  - Encourage patient to monitor pain and request assistance  - Assess pain using appropriate pain scale  - Administer analgesics based on type and severity of pain and evaluate response  - Implement non-pharmacological measures as appropriate and evaluate response  - Consider cultural and social influences on pain and pain management  - Notify physician/advanced practitioner if interventions unsuccessful or patient reports new pain  11/26/2024 0817 by Fani Burrell RN  Outcome: Adequate for Discharge  11/26/2024 0750 by Fani Burrell RN  Outcome: Progressing     Problem: INFECTION - ADULT  Goal: Absence or prevention of progression during hospitalization  Description: INTERVENTIONS:  - Assess and monitor for signs and symptoms of infection  - Monitor lab/diagnostic results  - Monitor all insertion sites, i.e. indwelling lines, tubes, and drains  - Monitor endotracheal if appropriate and nasal secretions for changes in amount and color  - [place] appropriate cooling/warming therapies per order  - Administer medications as ordered  - Instruct and encourage patient and family to use good hand hygiene technique  - Identify and instruct in appropriate isolation precautions for identified infection/condition  11/26/2024 0817 by Fani Burrell RN  Outcome: Adequate for Discharge  11/26/2024 0750 by Fani Burrell RN  Outcome: Progressing  Goal: Absence of fever/infection during neutropenic period  Description: INTERVENTIONS:  - Monitor WBC    11/26/2024 0817 by Fani Burrell RN  Outcome: Adequate for Discharge  11/26/2024 0750 by Fani Burrell RN  Outcome: Progressing     Problem: SAFETY ADULT  Goal: Patient will remain free of falls  Description: INTERVENTIONS:  - Educate patient/family on patient safety including physical limitations  - Instruct patient to call for assistance with activity   -  Consult OT/PT to assist with strengthening/mobility   - Keep Call bell within reach  - Keep bed low and locked with side rails adjusted as appropriate  - Keep care items and personal belongings within reach  - Initiate and maintain comfort rounds  - Make Fall Risk Sign visible to staff  - Offer Toileting every 2 Hours, in advance of need  - Initiate/Maintain bed alarm  - Obtain necessary fall risk management equipment: non skid socks  - Apply yellow socks and bracelet for high fall risk patients  - Consider moving patient to room near nurses station  11/26/2024 0817 by Fani Burrell RN  Outcome: Adequate for Discharge  11/26/2024 0750 by Fani Burrell RN  Outcome: Progressing  Goal: Maintain or return to baseline ADL function  Description: INTERVENTIONS:  -  Assess patient's ability to carry out ADLs; assess patient's baseline for ADL function and identify physical deficits which impact ability to perform ADLs (bathing, care of mouth/teeth, toileting, grooming, dressing, etc.)  - Assess/evaluate cause of self-care deficits   - Assess range of motion  - Assess patient's mobility; develop plan if impaired  - Assess patient's need for assistive devices and provide as appropriate  - Encourage maximum independence but intervene and supervise when necessary  - Involve family in performance of ADLs  - Assess for home care needs following discharge   - Consider OT consult to assist with ADL evaluation and planning for discharge  - Provide patient education as appropriate  11/26/2024 0817 by Fani Burrell RN  Outcome: Adequate for Discharge  11/26/2024 0750 by Fani Burrell RN  Outcome: Progressing  Goal: Maintains/Returns to pre admission functional level  Description: INTERVENTIONS:  - Perform AM-PAC 6 Click Basic Mobility/ Daily Activity assessment daily.  - Set and communicate daily mobility goal to care team and patient/family/caregiver.   - Collaborate with rehabilitation services on mobility goals if consulted  - Perform  Range of Motion 2 times a day.  - Reposition patient every 2 hours.  - Dangle patient 2 times a day  - Stand patient 2 times a day  - Ambulate patient 2 times a day  - Out of bed to chair 3 times a day   - Out of bed for meals 3 times a day  - Out of bed for toileting  - Record patient progress and toleration of activity level   11/26/2024 0817 by Fani Burrell RN  Outcome: Adequate for Discharge  11/26/2024 0750 by Fani Burrell RN  Outcome: Progressing     Problem: DISCHARGE PLANNING  Goal: Discharge to home or other facility with appropriate resources  Description: INTERVENTIONS:  - Identify barriers to discharge w/patient and caregiver  - Arrange for needed discharge resources and transportation as appropriate  - Identify discharge learning needs (meds, wound care, etc.)  - Refer to Case Management Department for coordinating discharge planning if the patient needs post-hospital services based on physician/advanced practitioner order or complex needs related to functional status, cognitive ability, or social support system  11/26/2024 0817 by Fani Burrell RN  Outcome: Adequate for Discharge  11/26/2024 0750 by Fani Burrell RN  Outcome: Progressing     Problem: Knowledge Deficit  Goal: Patient/family/caregiver demonstrates understanding of disease process, treatment plan, medications, and discharge instructions  Description: Complete learning assessment and assess knowledge base.  Interventions:  - Provide teaching at level of understanding  - Provide teaching via preferred learning methods  11/26/2024 0817 by Fani Burrell RN  Outcome: Adequate for Discharge  11/26/2024 0750 by Fani Burrell RN  Outcome: Progressing     Problem: GASTROINTESTINAL - ADULT  Goal: Minimal or absence of nausea and/or vomiting  Description: INTERVENTIONS:  - Administer IV fluids if ordered to ensure adequate hydration  - Maintain NPO status until nausea and vomiting are resolved  - Nasogastric tube if ordered  - Administer ordered  antiemetic medications as needed  - Provide nonpharmacologic comfort measures as appropriate  - Advance diet as tolerated, if ordered  - Consider nutrition services referral to assist patient with adequate nutrition and appropriate food choices  11/26/2024 0817 by Fani Burrell RN  Outcome: Adequate for Discharge  11/26/2024 0750 by Fani Burrell RN  Outcome: Progressing  Goal: Maintains or returns to baseline bowel function  Description: INTERVENTIONS:  - Assess bowel function  - Encourage oral fluids to ensure adequate hydration  - Administer IV fluids if ordered to ensure adequate hydration  - Administer ordered medications as needed  - Encourage mobilization and activity  - Consider nutritional services referral to assist patient with adequate nutrition and appropriate food choices  11/26/2024 0817 by Fani Burrell RN  Outcome: Adequate for Discharge  11/26/2024 0750 by Fani Burrell RN  Outcome: Progressing  Goal: Maintains adequate nutritional intake  Description: INTERVENTIONS:  - Monitor percentage of each meal consumed  - Identify factors contributing to decreased intake, treat as appropriate  - Assist with meals as needed  - Monitor I&O, weight, and lab values if indicated  - Obtain nutrition services referral as needed  11/26/2024 0817 by Fani Burrell RN  Outcome: Adequate for Discharge  11/26/2024 0750 by Fani Burrell RN  Outcome: Progressing

## 2024-11-26 NOTE — DISCHARGE SUMMARY
Discharge Summary - Hospitalist   Name: Isaac Kramer 64 y.o. male I MRN: [id number]  Unit/Bed#: -01 I Date of Admission: 11/24/2024   Date of Service: 11/26/2024 I Hospital Day: 1     Assessment & Plan  Leukocytosis  Patient with WBC of 99.20 -> 86.41 -> 81.24  Likely due to Neulasta   ED attending spoke with oncology on call and recommended infections workup and monitoring  Blood cx:- negative at 24 hrs  Procalcitonin- negative  CXR: No acute cardiopulmonary disease.   CT abd/pelvis without acute findings  Spoke with Oncology on call Dr. Mendoza- patient okay for discharge home with blood cultures negative at 24 hours- make sure patient is aware to return to ED if he gets sick.   I dicussed recommendations of returning to the ED with the patient if he develops fevers, symptoms worsen, or overall he starts to feel unwell.  Outpatient follow-up with PCP and Oncology  Generalized abdominal pain  Patient presented with c/o generalized abdominal pain, nausea, lightheaded since this morning. Patient received chemotherapy on Friday and reports similar symptoms in the past.   CT abd/pelvis:    Stable postsurgical changes from Whipple's procedure. No disproportionate inflammatory findings around any of the enteric or visceral anastomoses. Postsurgical collection (hematoma) at the gallbladder fossa appears unchanged in size from 11/8 and significantly decreased from the immediate postoperative period in September. No progressive surrounding inflammation or new fluid collections to suggest superinfection or new abscess. Small bowel shows findings of mild focal ileus or low level enteritis without obstruction or evidence for bowel wall compromise.  Patient tolerating regular diet  Patient has antiemetics at home and is feeling well enough to go home.   Outpatient follow-up with PCP   Pancreatic adenocarcinoma (HCC)  Patient with known hx of pancreatic cancer, follows with Dr. Colby  Status post Whipple's procedure on  metoprolol tartrate (LOPRESSOR) 50 MG tablet\Last Written Prescription Date:  4/23/20  Last Fill Quantity: 180,  # refills: 1      Last office visit: 5/14/19     Patient did not show for office visit 2/19/20     Refill denied - patient needs appointment.    Kavita Ochoa RN BSN  [facility]  [phone]       9/12/2024.   The patient is currently receiving chemotherapy with most recent treatment on 11/22/2024  Continue outpatient follow-up with oncology  Benign essential hypertension  Blood pressure currently stable  Continue home regimen of lisinopril  Coronary artery disease involving native coronary artery of native heart without angina pectoris  Continue home regimen of statin, aspirin  Severe protein-calorie malnutrition (HCC)  Malnutrition Findings:   Adult Malnutrition type: Chronic illness  Adult Degree of Malnutrition: Other severe protein calorie malnutrition  Malnutrition Characteristics: Inadequate energy, Weight loss                  360 Statement: Malnutrition related to chronic illness as evidenced by <75% energy intake compared to estimated energy needs for >1-month, >10% body weight loss in 6 months, >7.5% body weight loss in 3 months.  To treat with oral diet and nutrition supplements as tolerated.    BMI Findings:           Body mass index is 21.48 kg/m².        Medical Problems       Resolved Problems  Date Reviewed: 11/19/2024   None       Discharging Physician / Practitioner: Oleksandr Lyons PA-C  PCP: Pablo Perez DO  Admission Date:   Admission Orders (From admission, onward)       Ordered        11/25/24 1801  INPATIENT ADMISSION  Once            11/24/24 1526  Place in Observation  Once                          Discharge Date: 11/26/24    Consultations During Hospital Stay:  none    Procedures Performed:   none    Significant Findings / Test Results:   CT abdomen pelvis with contrast  Result Date: 11/24/2024  Impression: 1.  Stable postsurgical changes from Whipple's procedure. No disproportionate inflammatory findings around any of the enteric or visceral anastomoses. 2.  Postsurgical collection (hematoma) at the gallbladder fossa appears unchanged in size from 11/8 and significantly decreased from the immediate postoperative period in September. No progressive surrounding  inflammation or new fluid collections to suggest superinfection or new abscess. 3.  Small bowel shows findings of mild focal ileus or low level enteritis without obstruction or evidence for bowel wall compromise. Resident: MING El I, the attending radiologist, have reviewed the images and agree with the final report above. Workstation performed: [id number]     XR chest 1 view portable  Result Date: 11/24/2024  Impression: No acute cardiopulmonary disease. Workstation performed: [id number]     WBC 99    Incidental Findings:   none     Test Results Pending at Discharge (will require follow up):   none     Outpatient Tests Requested:  none    Complications:  none    Reason for Admission: monitoring for possible infection     Hospital Course:   Isaac Kramer is a 64 y.o. male patient who originally presented to the hospital on 11/24/2024 due to abdominal discomfort, nausea, and dizziness. Please see H&P for complete details of presentation. CT abd/pelvis and CXR were negative for acute findings. He was noted to have a WBC 99. ED provider spoke with oncology on call and felt it the patient's symptoms were likely due to his chemotherapy he received on 11/22/24 and elevated WBC was likely due to Neulasta however recommended infectious workup and monitoring. The patient remained afebrile. Blood cultures showed no growth at 24 hours. The patients WBC trended down to 81.24 and his symptoms were improving. He was tolerating a regular diet. I spoke with Oncology on call Dr. Mendoza who felt it was okay for the patient to be discharged home with instructions to return if he was feeling sick. The patient was instructed to return to the ED if he developed a fever, had worsening symptoms, or overall was not feeling well.        Please see above list of diagnoses and related plan for additional information.     Condition at Discharge: fair    Discharge Day Visit / Exam:   Subjective:    Vitals: Blood Pressure: 115/62  "(11/26/24 0822)  Pulse: 80 (11/26/24 0722)  Temperature: 98.3 °F (36.8 °C) (11/26/24 0722)  Temp Source: Oral (11/25/24 2349)  Respirations: 16 (11/26/24 0722)  Height: 5' 4.02\" (162.6 cm) (11/24/24 1717)  Weight - Scale: 56.8 kg (125 lb 3.5 oz) (11/24/24 1717)  SpO2: 96 % (11/26/24 0730)  Physical Exam  Vitals and nursing note reviewed.   Constitutional:       Appearance: Normal appearance.   HENT:      Head: Normocephalic and atraumatic.      Nose: Nose normal.      Mouth/Throat:      Mouth: Mucous membranes are moist.      Pharynx: Oropharynx is clear.   Cardiovascular:      Rate and Rhythm: Normal rate and regular rhythm.      Heart sounds: Normal heart sounds.   Pulmonary:      Effort: Pulmonary effort is normal.      Breath sounds: Normal breath sounds.   Abdominal:      General: There is no distension.      Palpations: Abdomen is soft.      Tenderness: There is no abdominal tenderness.   Skin:     General: Skin is warm and dry.   Neurological:      General: No focal deficit present.      Mental Status: He is alert and oriented to person, place, and time.   Psychiatric:         Mood and Affect: Mood normal.         Behavior: Behavior normal.          Discussion with Family: Patient declined call to .     Discharge instructions/Information to patient and family:   See after visit summary for information provided to patient and family.      Provisions for Follow-Up Care:  See after visit summary for information related to follow-up care and any pertinent home health orders.      Mobility at time of Discharge:   Basic Mobility Inpatient Raw Score: 24  JH-HLM Goal: 8: Walk 250 feet or more  JH-HLM Achieved: 7: Walk 25 feet or more  HLM Goal NOT achieved. Continue to encourage mobility in post discharge setting.     Disposition:   Home    Planned Readmission: no    Discharge Medications:  See after visit summary for reconciled discharge medications provided to patient and/or family.  "     Administrative Statements   Discharge Statement:  I have spent a total time of 25 minutes in caring for this patient on the day of the visit/encounter. >30 minutes of time was spent on: Patient and family education, Documenting in the medical record, and Reviewing / ordering tests, medicine, procedures  .    **Please Note: This note may have been constructed using a voice recognition system**

## 2024-11-26 NOTE — PLAN OF CARE
Problem: PAIN - ADULT  Goal: Verbalizes/displays adequate comfort level or baseline comfort level  Description: Interventions:  - Encourage patient to monitor pain and request assistance  - Assess pain using appropriate pain scale  - Administer analgesics based on type and severity of pain and evaluate response  - Implement non-pharmacological measures as appropriate and evaluate response  - Consider cultural and social influences on pain and pain management  - Notify physician/advanced practitioner if interventions unsuccessful or patient reports new pain  Outcome: Progressing     Problem: INFECTION - ADULT  Goal: Absence or prevention of progression during hospitalization  Description: INTERVENTIONS:  - Assess and monitor for signs and symptoms of infection  - Monitor lab/diagnostic results  - Monitor all insertion sites, i.e. indwelling lines, tubes, and drains  - Monitor endotracheal if appropriate and nasal secretions for changes in amount and color  - [place] appropriate cooling/warming therapies per order  - Administer medications as ordered  - Instruct and encourage patient and family to use good hand hygiene technique  - Identify and instruct in appropriate isolation precautions for identified infection/condition  Outcome: Progressing  Goal: Absence of fever/infection during neutropenic period  Description: INTERVENTIONS:  - Monitor WBC    Outcome: Progressing     Problem: SAFETY ADULT  Goal: Patient will remain free of falls  Description: INTERVENTIONS:  - Educate patient/family on patient safety including physical limitations  - Instruct patient to call for assistance with activity   - Consult OT/PT to assist with strengthening/mobility   - Keep Call bell within reach  - Keep bed low and locked with side rails adjusted as appropriate  - Keep care items and personal belongings within reach  - Initiate and maintain comfort rounds  - Make Fall Risk Sign visible to staff  - Offer Toileting every 2 Hours,  in advance of need  - Initiate/Maintain bed alarm  - Obtain necessary fall risk management equipment: non skid socks  - Apply yellow socks and bracelet for high fall risk patients  - Consider moving patient to room near nurses station  Outcome: Progressing  Goal: Maintain or return to baseline ADL function  Description: INTERVENTIONS:  -  Assess patient's ability to carry out ADLs; assess patient's baseline for ADL function and identify physical deficits which impact ability to perform ADLs (bathing, care of mouth/teeth, toileting, grooming, dressing, etc.)  - Assess/evaluate cause of self-care deficits   - Assess range of motion  - Assess patient's mobility; develop plan if impaired  - Assess patient's need for assistive devices and provide as appropriate  - Encourage maximum independence but intervene and supervise when necessary  - Involve family in performance of ADLs  - Assess for home care needs following discharge   - Consider OT consult to assist with ADL evaluation and planning for discharge  - Provide patient education as appropriate  Outcome: Progressing  Goal: Maintains/Returns to pre admission functional level  Description: INTERVENTIONS:  - Perform AM-PAC 6 Click Basic Mobility/ Daily Activity assessment daily.  - Set and communicate daily mobility goal to care team and patient/family/caregiver.   - Collaborate with rehabilitation services on mobility goals if consulted  - Perform Range of Motion 2 times a day.  - Reposition patient every 2 hours.  - Dangle patient 2 times a day  - Stand patient 2 times a day  - Ambulate patient 2 times a day  - Out of bed to chair 3 times a day   - Out of bed for meals 3 times a day  - Out of bed for toileting  - Record patient progress and toleration of activity level   Outcome: Progressing     Problem: DISCHARGE PLANNING  Goal: Discharge to home or other facility with appropriate resources  Description: INTERVENTIONS:  - Identify barriers to discharge w/patient and  caregiver  - Arrange for needed discharge resources and transportation as appropriate  - Identify discharge learning needs (meds, wound care, etc.)  - Refer to Case Management Department for coordinating discharge planning if the patient needs post-hospital services based on physician/advanced practitioner order or complex needs related to functional status, cognitive ability, or social support system  Outcome: Progressing     Problem: Knowledge Deficit  Goal: Patient/family/caregiver demonstrates understanding of disease process, treatment plan, medications, and discharge instructions  Description: Complete learning assessment and assess knowledge base.  Interventions:  - Provide teaching at level of understanding  - Provide teaching via preferred learning methods  Outcome: Progressing

## 2024-11-26 NOTE — NURSING NOTE
Pt DC to home via spouse in stable condition. All belongings packed by pt. IV catheter removed fully intact. AVS reviewed with pt face to face, all questions answered.

## 2024-11-26 NOTE — ASSESSMENT & PLAN NOTE
Patient with WBC of 99.20 -> 86.41 -> 81.24  Likely due to Neulasta   ED attending spoke with oncology on call and recommended infections workup and monitoring  Blood cx:- negative at 24 hrs  Procalcitonin- negative  CXR: No acute cardiopulmonary disease.   CT abd/pelvis without acute findings  Spoke with Oncology on call Dr. Mendoza- patient okay for discharge home with blood cultures negative at 24 hours- make sure patient is aware to return to ED if he gets sick.   I dicussed recommendations of returning to the ED with the patient if he develops fevers, symptoms worsen, or overall he starts to feel unwell.  Outpatient follow-up with PCP and Oncology

## 2024-11-26 NOTE — PLAN OF CARE
Problem: PAIN - ADULT  Goal: Verbalizes/displays adequate comfort level or baseline comfort level  Description: Interventions:  - Encourage patient to monitor pain and request assistance  - Assess pain using appropriate pain scale  - Administer analgesics based on type and severity of pain and evaluate response  - Implement non-pharmacological measures as appropriate and evaluate response  - Consider cultural and social influences on pain and pain management  - Notify physician/advanced practitioner if interventions unsuccessful or patient reports new pain  Outcome: Progressing     Problem: SAFETY ADULT  Goal: Maintain or return to baseline ADL function  Description: INTERVENTIONS:  -  Assess patient's ability to carry out ADLs; assess patient's baseline for ADL function and identify physical deficits which impact ability to perform ADLs (bathing, care of mouth/teeth, toileting, grooming, dressing, etc.)  - Assess/evaluate cause of self-care deficits   - Assess range of motion  - Assess patient's mobility; develop plan if impaired  - Assess patient's need for assistive devices and provide as appropriate  - Encourage maximum independence but intervene and supervise when necessary  - Involve family in performance of ADLs  - Assess for home care needs following discharge   - Consider OT consult to assist with ADL evaluation and planning for discharge  - Provide patient education as appropriate  Outcome: Progressing     Problem: GASTROINTESTINAL - ADULT  Goal: Minimal or absence of nausea and/or vomiting  Description: INTERVENTIONS:  - Administer IV fluids if ordered to ensure adequate hydration  - Maintain NPO status until nausea and vomiting are resolved  - Nasogastric tube if ordered  - Administer ordered antiemetic medications as needed  - Provide nonpharmacologic comfort measures as appropriate  - Advance diet as tolerated, if ordered  - Consider nutrition services referral to assist patient with adequate  nutrition and appropriate food choices  Outcome: Progressing

## 2024-11-27 DIAGNOSIS — T45.1X5A CHEMOTHERAPY-INDUCED NEUTROPENIA (HCC): ICD-10-CM

## 2024-11-27 DIAGNOSIS — C25.9 PANCREATIC ADENOCARCINOMA (HCC): Primary | ICD-10-CM

## 2024-11-27 DIAGNOSIS — D70.1 CHEMOTHERAPY-INDUCED NEUTROPENIA (HCC): ICD-10-CM

## 2024-11-27 RX ORDER — ATROPINE SULFATE 1 MG/ML
0.25 INJECTION, SOLUTION INTRAVENOUS ONCE
Status: CANCELLED | OUTPATIENT
Start: 2024-12-04

## 2024-11-27 RX ORDER — ATROPINE SULFATE 1 MG/ML
0.25 INJECTION, SOLUTION INTRAVENOUS ONCE AS NEEDED
Status: CANCELLED | OUTPATIENT
Start: 2024-12-04

## 2024-11-27 RX ORDER — SODIUM CHLORIDE 9 MG/ML
20 INJECTION, SOLUTION INTRAVENOUS ONCE AS NEEDED
Status: CANCELLED | OUTPATIENT
Start: 2024-12-04

## 2024-11-27 RX ORDER — DEXTROSE MONOHYDRATE 50 MG/ML
20 INJECTION, SOLUTION INTRAVENOUS ONCE
Status: CANCELLED | OUTPATIENT
Start: 2024-12-04

## 2024-11-29 ENCOUNTER — TELEPHONE (OUTPATIENT)
Age: 64
End: 2024-11-29

## 2024-11-29 NOTE — TELEPHONE ENCOUNTER
Pt called to report that this is his second time getting treatment and had to go to ER afterwards dues to severe abdominal pain. After the first visit to ED, lowering the treatment dose was done but pt reported that didn't help him the second time around because he still felt the same way and had to go to ER after the second txn. Pt reported that it was reported to him that his colon was inflamed and increased wbc. Pt would like to know the next steps and treatment because he does not want to continue with the same treatment if he is going to experience going to Er each time. Pt is home now and reports feeling a little better now. Denies fever. Please advise.

## 2024-11-29 NOTE — TELEPHONE ENCOUNTER
Phoned pt an dlet him know that Dr gómez is our of office today but will be back on Mon and will discuss pt's concern with thim on Mon and give him a call back.

## 2024-11-30 LAB
BACTERIA BLD CULT: NORMAL
BACTERIA BLD CULT: NORMAL

## 2024-12-02 DIAGNOSIS — T45.1X5A CHEMOTHERAPY-INDUCED NEUTROPENIA (HCC): ICD-10-CM

## 2024-12-02 DIAGNOSIS — C25.9 PANCREATIC ADENOCARCINOMA (HCC): Primary | ICD-10-CM

## 2024-12-02 DIAGNOSIS — D70.1 CHEMOTHERAPY-INDUCED NEUTROPENIA (HCC): ICD-10-CM

## 2024-12-02 RX ORDER — CAPECITABINE 150 MG/1
300 TABLET, FILM COATED ORAL 2 TIMES DAILY
Qty: 84 TABLET | Refills: 11 | Status: SHIPPED | OUTPATIENT
Start: 2024-12-02

## 2024-12-02 RX ORDER — CAPECITABINE 500 MG/1
1000 TABLET, FILM COATED ORAL 2 TIMES DAILY
Qty: 84 TABLET | Refills: 11 | Status: SHIPPED | OUTPATIENT
Start: 2024-12-02

## 2024-12-03 ENCOUNTER — CLINICAL SUPPORT (OUTPATIENT)
Dept: HEMATOLOGY ONCOLOGY | Facility: CLINIC | Age: 64
End: 2024-12-03

## 2024-12-03 ENCOUNTER — TELEPHONE (OUTPATIENT)
Dept: HEMATOLOGY ONCOLOGY | Facility: CLINIC | Age: 64
End: 2024-12-03

## 2024-12-03 VITALS
SYSTOLIC BLOOD PRESSURE: 116 MMHG | HEART RATE: 78 BPM | BODY MASS INDEX: 21.68 KG/M2 | OXYGEN SATURATION: 98 % | RESPIRATION RATE: 18 BRPM | WEIGHT: 127 LBS | DIASTOLIC BLOOD PRESSURE: 76 MMHG | TEMPERATURE: 97.6 F | HEIGHT: 64 IN

## 2024-12-03 DIAGNOSIS — C25.9 PANCREATIC ADENOCARCINOMA (HCC): Primary | ICD-10-CM

## 2024-12-03 PROCEDURE — 99211 OFF/OP EST MAY X REQ PHY/QHP: CPT

## 2024-12-03 NOTE — TELEPHONE ENCOUNTER
Please schedule pt for IV Gemzar on days 1,8,15 at [facility], pt gets labs drawn from his port pt will be on Rx Capecitabine 1,300 mg po BID days 1-21 with the Gemzar.

## 2024-12-03 NOTE — PROGRESS NOTES
Educated pt and his son on the new treatment plan of Gemzar 1,000 mg/m2 IV on days 1,8,15 Q 28 days with Rx Capecitabine 1,300 mg po BID days 1-21 then 7 days rest. Pt qualifies for compassionate funding for the Rx Capecitabine as his Medicare Part B is not going into effect until Feb of 2025. Pt provided with phone contact list, medication information sheets, diarrhea management sheets. Chemo consent was signed. Pt wishes to start on a Wed as his son will be off from school and is his transportation.

## 2024-12-04 ENCOUNTER — HOSPITAL ENCOUNTER (OUTPATIENT)
Dept: INFUSION CENTER | Facility: HOSPITAL | Age: 64
Discharge: HOME | End: 2024-12-04
Attending: INTERNAL MEDICINE

## 2024-12-04 DIAGNOSIS — D70.1 CHEMOTHERAPY-INDUCED NEUTROPENIA (HCC): ICD-10-CM

## 2024-12-04 DIAGNOSIS — C25.9 PANCREATIC ADENOCARCINOMA (HCC): Primary | ICD-10-CM

## 2024-12-04 DIAGNOSIS — T45.1X5A CHEMOTHERAPY-INDUCED NEUTROPENIA (HCC): ICD-10-CM

## 2024-12-04 RX ORDER — SODIUM CHLORIDE 9 MG/ML
20 INJECTION, SOLUTION INTRAVENOUS ONCE
OUTPATIENT
Start: 2024-12-25

## 2024-12-04 RX ORDER — SODIUM CHLORIDE 9 MG/ML
20 INJECTION, SOLUTION INTRAVENOUS ONCE
Status: CANCELLED | OUTPATIENT
Start: 2024-12-11

## 2024-12-04 RX ORDER — SODIUM CHLORIDE 9 MG/ML
20 INJECTION, SOLUTION INTRAVENOUS ONCE
OUTPATIENT
Start: 2024-12-18

## 2024-12-06 ENCOUNTER — HOSPITAL ENCOUNTER (OUTPATIENT)
Dept: INFUSION CENTER | Facility: HOSPITAL | Age: 64
End: 2024-12-06
Attending: INTERNAL MEDICINE

## 2024-12-10 ENCOUNTER — TELEPHONE (OUTPATIENT)
Dept: HEMATOLOGY ONCOLOGY | Facility: CLINIC | Age: 64
End: 2024-12-10

## 2024-12-10 ENCOUNTER — HOSPITAL ENCOUNTER (OUTPATIENT)
Dept: INFUSION CENTER | Facility: HOSPITAL | Age: 64
Discharge: HOME | End: 2024-12-10
Attending: INTERNAL MEDICINE

## 2024-12-10 DIAGNOSIS — C25.9 PANCREATIC ADENOCARCINOMA (HCC): ICD-10-CM

## 2024-12-10 DIAGNOSIS — Z45.2 ENCOUNTER FOR CENTRAL LINE CARE: Primary | ICD-10-CM

## 2024-12-10 DIAGNOSIS — C25.9 PANCREATIC ADENOCARCINOMA (HCC): Primary | ICD-10-CM

## 2024-12-10 LAB
ALBUMIN SERPL BCG-MCNC: 3.9 G/DL (ref 3.5–5)
ALP SERPL-CCNC: 146 U/L (ref 34–104)
ALT SERPL W P-5'-P-CCNC: 13 U/L (ref 7–52)
ANION GAP SERPL CALCULATED.3IONS-SCNC: 5 MMOL/L (ref 4–13)
AST SERPL W P-5'-P-CCNC: 15 U/L (ref 13–39)
BASOPHILS # BLD AUTO: 0.1 THOUSANDS/ÂΜL (ref 0–0.1)
BASOPHILS NFR BLD AUTO: 1 % (ref 0–1)
BILIRUB SERPL-MCNC: 0.22 MG/DL (ref 0.2–1)
BUN SERPL-MCNC: 15 MG/DL (ref 5–25)
CALCIUM SERPL-MCNC: 8.8 MG/DL (ref 8.4–10.2)
CHLORIDE SERPL-SCNC: 108 MMOL/L (ref 96–108)
CO2 SERPL-SCNC: 28 MMOL/L (ref 21–32)
CREAT SERPL-MCNC: 0.59 MG/DL (ref 0.6–1.3)
EOSINOPHIL # BLD AUTO: 0.48 THOUSAND/ÂΜL (ref 0–0.61)
EOSINOPHIL NFR BLD AUTO: 2 % (ref 0–6)
ERYTHROCYTE [DISTWIDTH] IN BLOOD BY AUTOMATED COUNT: 15 % (ref 11.6–15.1)
GFR SERPL CREATININE-BSD FRML MDRD: 105 ML/MIN/1.73SQ M
GLUCOSE SERPL-MCNC: 97 MG/DL (ref 65–140)
HCT VFR BLD AUTO: 37.7 % (ref 36.5–49.3)
HGB BLD-MCNC: 11.8 G/DL (ref 12–17)
IMM GRANULOCYTES # BLD AUTO: 0.28 THOUSAND/UL (ref 0–0.2)
IMM GRANULOCYTES NFR BLD AUTO: 1 % (ref 0–2)
LYMPHOCYTES # BLD AUTO: 2.11 THOUSANDS/ÂΜL (ref 0.6–4.47)
LYMPHOCYTES NFR BLD AUTO: 10 % (ref 14–44)
MAGNESIUM SERPL-MCNC: 2.2 MG/DL (ref 1.9–2.7)
MCH RBC QN AUTO: 27.5 PG (ref 26.8–34.3)
MCHC RBC AUTO-ENTMCNC: 31.3 G/DL (ref 31.4–37.4)
MCV RBC AUTO: 88 FL (ref 82–98)
MONOCYTES # BLD AUTO: 1.76 THOUSAND/ÂΜL (ref 0.17–1.22)
MONOCYTES NFR BLD AUTO: 8 % (ref 4–12)
NEUTROPHILS # BLD AUTO: 16.16 THOUSANDS/ÂΜL (ref 1.85–7.62)
NEUTS SEG NFR BLD AUTO: 78 % (ref 43–75)
NRBC BLD AUTO-RTO: 0 /100 WBCS
PLATELET # BLD AUTO: 328 THOUSANDS/UL (ref 149–390)
PMV BLD AUTO: 9.7 FL (ref 8.9–12.7)
POTASSIUM SERPL-SCNC: 4.1 MMOL/L (ref 3.5–5.3)
PROT SERPL-MCNC: 6.2 G/DL (ref 6.4–8.4)
RBC # BLD AUTO: 4.29 MILLION/UL (ref 3.88–5.62)
SODIUM SERPL-SCNC: 141 MMOL/L (ref 135–147)
WBC # BLD AUTO: 20.89 THOUSAND/UL (ref 4.31–10.16)

## 2024-12-10 PROCEDURE — 80053 COMPREHEN METABOLIC PANEL: CPT

## 2024-12-10 PROCEDURE — 83735 ASSAY OF MAGNESIUM: CPT

## 2024-12-10 PROCEDURE — 85025 COMPLETE CBC W/AUTO DIFF WBC: CPT

## 2024-12-10 RX ORDER — ONDANSETRON 4 MG/1
4 TABLET, FILM COATED ORAL EVERY 8 HOURS PRN
Qty: 20 TABLET | Refills: 0 | Status: SHIPPED | OUTPATIENT
Start: 2024-12-10

## 2024-12-10 NOTE — PROGRESS NOTES
Isaac Kramer  tolerated lab draw well with no complications.      Isaac Kramer is aware of future appt on 12/11 at 11:30AM.     AVS printed and given to Isaac Kramer: No (Declined by Isaac Kramer).

## 2024-12-10 NOTE — PROGRESS NOTES
The patient was not able to tolerate the FOLFIRINOX regimen.  He will be switched to:  Gemzar 1,000 mg/m2 IV on days 1,8,15 Q 28 days with Rx Capecitabine 1,300 mg po BID days 1-21 then 7 days rest.   He was already educated about the potential side effects/toxicity related to this regimen.

## 2024-12-10 NOTE — TELEPHONE ENCOUNTER
Patient requesting a refill on ondansetron (ZOFRAN) injection 4 mg. Patient states that Jacob Colby MD is the prescribing physician.   [facility] [address] [place] [address] - [phone]

## 2024-12-10 NOTE — TELEPHONE ENCOUNTER
Called and clarified. He needs zofran sent to his [facility] pharmacy. Script pended.

## 2024-12-11 ENCOUNTER — HOSPITAL ENCOUNTER (OUTPATIENT)
Dept: INFUSION CENTER | Facility: HOSPITAL | Age: 64
Discharge: HOME | End: 2024-12-11
Attending: INTERNAL MEDICINE
Payer: COMMERCIAL

## 2024-12-11 VITALS
DIASTOLIC BLOOD PRESSURE: 68 MMHG | TEMPERATURE: 98.7 F | HEART RATE: 68 BPM | SYSTOLIC BLOOD PRESSURE: 127 MMHG | BODY MASS INDEX: 22.13 KG/M2 | RESPIRATION RATE: 16 BRPM | HEIGHT: 64 IN | WEIGHT: 129.63 LBS

## 2024-12-11 DIAGNOSIS — D70.1 CHEMOTHERAPY-INDUCED NEUTROPENIA (HCC): ICD-10-CM

## 2024-12-11 DIAGNOSIS — T45.1X5A CHEMOTHERAPY-INDUCED NEUTROPENIA (HCC): ICD-10-CM

## 2024-12-11 DIAGNOSIS — C25.9 PANCREATIC ADENOCARCINOMA (HCC): Primary | ICD-10-CM

## 2024-12-11 PROCEDURE — 96413 CHEMO IV INFUSION 1 HR: CPT

## 2024-12-11 PROCEDURE — 96367 TX/PROPH/DG ADDL SEQ IV INF: CPT

## 2024-12-11 RX ORDER — SODIUM CHLORIDE 9 MG/ML
20 INJECTION, SOLUTION INTRAVENOUS ONCE
Status: COMPLETED | OUTPATIENT
Start: 2024-12-11 | End: 2024-12-11

## 2024-12-11 RX ADMIN — DEXAMETHASONE SODIUM PHOSPHATE 10 MG: 100 INJECTION INTRAMUSCULAR; INTRAVENOUS at 12:03

## 2024-12-11 RX ADMIN — GEMCITABINE 1600 MG: 38 INJECTION, SOLUTION INTRAVENOUS at 12:47

## 2024-12-11 RX ADMIN — SODIUM CHLORIDE 20 ML/HR: 0.9 INJECTION, SOLUTION INTRAVENOUS at 12:11

## 2024-12-11 NOTE — PROGRESS NOTES
Isaac Kramer  tolerated Gemzar treatment well with no complications.      Isaac Kramer is aware of future appt on  12/17/24    AVS printed and given to Isaac Kramer:  Yes

## 2024-12-17 ENCOUNTER — HOSPITAL ENCOUNTER (OUTPATIENT)
Dept: INFUSION CENTER | Facility: HOSPITAL | Age: 64
Discharge: HOME | End: 2024-12-17
Attending: INTERNAL MEDICINE

## 2024-12-17 DIAGNOSIS — Z45.2 ENCOUNTER FOR CENTRAL LINE CARE: Primary | ICD-10-CM

## 2024-12-17 DIAGNOSIS — C25.9 PANCREATIC ADENOCARCINOMA (HCC): ICD-10-CM

## 2024-12-17 LAB
ALBUMIN SERPL BCG-MCNC: 3.8 G/DL (ref 3.5–5)
ALP SERPL-CCNC: 86 U/L (ref 34–104)
ALT SERPL W P-5'-P-CCNC: 26 U/L (ref 7–52)
ANION GAP SERPL CALCULATED.3IONS-SCNC: 5 MMOL/L (ref 4–13)
AST SERPL W P-5'-P-CCNC: 20 U/L (ref 13–39)
BASOPHILS # BLD AUTO: 0.06 THOUSANDS/ÂΜL (ref 0–0.1)
BASOPHILS NFR BLD AUTO: 1 % (ref 0–1)
BILIRUB SERPL-MCNC: 0.35 MG/DL (ref 0.2–1)
BUN SERPL-MCNC: 23 MG/DL (ref 5–25)
CALCIUM SERPL-MCNC: 8.5 MG/DL (ref 8.4–10.2)
CHLORIDE SERPL-SCNC: 107 MMOL/L (ref 96–108)
CO2 SERPL-SCNC: 27 MMOL/L (ref 21–32)
CREAT SERPL-MCNC: 0.57 MG/DL (ref 0.6–1.3)
EOSINOPHIL # BLD AUTO: 0.09 THOUSAND/ÂΜL (ref 0–0.61)
EOSINOPHIL NFR BLD AUTO: 1 % (ref 0–6)
ERYTHROCYTE [DISTWIDTH] IN BLOOD BY AUTOMATED COUNT: 14.7 % (ref 11.6–15.1)
GFR SERPL CREATININE-BSD FRML MDRD: 107 ML/MIN/1.73SQ M
GLUCOSE SERPL-MCNC: 141 MG/DL (ref 65–140)
HCT VFR BLD AUTO: 33.8 % (ref 36.5–49.3)
HGB BLD-MCNC: 10.5 G/DL (ref 12–17)
IMM GRANULOCYTES # BLD AUTO: 0.02 THOUSAND/UL (ref 0–0.2)
IMM GRANULOCYTES NFR BLD AUTO: 0 % (ref 0–2)
LYMPHOCYTES # BLD AUTO: 1.45 THOUSANDS/ÂΜL (ref 0.6–4.47)
LYMPHOCYTES NFR BLD AUTO: 17 % (ref 14–44)
MAGNESIUM SERPL-MCNC: 2.2 MG/DL (ref 1.9–2.7)
MCH RBC QN AUTO: 27.1 PG (ref 26.8–34.3)
MCHC RBC AUTO-ENTMCNC: 31.1 G/DL (ref 31.4–37.4)
MCV RBC AUTO: 87 FL (ref 82–98)
MONOCYTES # BLD AUTO: 0.34 THOUSAND/ÂΜL (ref 0.17–1.22)
MONOCYTES NFR BLD AUTO: 4 % (ref 4–12)
NEUTROPHILS # BLD AUTO: 6.51 THOUSANDS/ÂΜL (ref 1.85–7.62)
NEUTS SEG NFR BLD AUTO: 77 % (ref 43–75)
NRBC BLD AUTO-RTO: 0 /100 WBCS
PLATELET # BLD AUTO: 291 THOUSANDS/UL (ref 149–390)
PMV BLD AUTO: 9.5 FL (ref 8.9–12.7)
POTASSIUM SERPL-SCNC: 4 MMOL/L (ref 3.5–5.3)
PROT SERPL-MCNC: 5.9 G/DL (ref 6.4–8.4)
RBC # BLD AUTO: 3.87 MILLION/UL (ref 3.88–5.62)
SODIUM SERPL-SCNC: 139 MMOL/L (ref 135–147)
WBC # BLD AUTO: 8.47 THOUSAND/UL (ref 4.31–10.16)

## 2024-12-17 PROCEDURE — 85025 COMPLETE CBC W/AUTO DIFF WBC: CPT

## 2024-12-17 PROCEDURE — 80053 COMPREHEN METABOLIC PANEL: CPT

## 2024-12-17 PROCEDURE — 83735 ASSAY OF MAGNESIUM: CPT

## 2024-12-17 NOTE — PROGRESS NOTES
Isaac Kramer  tolerated lab draw well with no complications.      Isaac Kramer is aware of future appt on 12/18 at 12PM.     AVS printed and given to Isaac Kramer: No (Declined by Isaac Kramer).

## 2024-12-18 ENCOUNTER — HOSPITAL ENCOUNTER (OUTPATIENT)
Dept: INFUSION CENTER | Facility: HOSPITAL | Age: 64
Discharge: HOME | End: 2024-12-18
Attending: INTERNAL MEDICINE

## 2024-12-18 VITALS
TEMPERATURE: 97.4 F | SYSTOLIC BLOOD PRESSURE: 134 MMHG | BODY MASS INDEX: 21.91 KG/M2 | DIASTOLIC BLOOD PRESSURE: 70 MMHG | RESPIRATION RATE: 16 BRPM | OXYGEN SATURATION: 100 % | WEIGHT: 128.31 LBS | HEIGHT: 64 IN | HEART RATE: 96 BPM

## 2024-12-18 DIAGNOSIS — D70.1 CHEMOTHERAPY-INDUCED NEUTROPENIA (HCC): ICD-10-CM

## 2024-12-18 DIAGNOSIS — C25.9 PANCREATIC ADENOCARCINOMA (HCC): Primary | ICD-10-CM

## 2024-12-18 DIAGNOSIS — T45.1X5A CHEMOTHERAPY-INDUCED NEUTROPENIA (HCC): ICD-10-CM

## 2024-12-18 PROCEDURE — 96413 CHEMO IV INFUSION 1 HR: CPT

## 2024-12-18 PROCEDURE — 96367 TX/PROPH/DG ADDL SEQ IV INF: CPT

## 2024-12-18 RX ORDER — SODIUM CHLORIDE 9 MG/ML
20 INJECTION, SOLUTION INTRAVENOUS ONCE
Status: COMPLETED | OUTPATIENT
Start: 2024-12-18 | End: 2024-12-18

## 2024-12-18 RX ADMIN — SODIUM CHLORIDE 20 ML/HR: 0.9 INJECTION, SOLUTION INTRAVENOUS at 12:00

## 2024-12-18 RX ADMIN — DEXAMETHASONE SODIUM PHOSPHATE 10 MG: 100 INJECTION INTRAMUSCULAR; INTRAVENOUS at 12:00

## 2024-12-18 RX ADMIN — GEMCITABINE 1600 MG: 38 INJECTION, SOLUTION INTRAVENOUS at 12:50

## 2024-12-18 NOTE — PROGRESS NOTES
Isaac Kramer  tolerated gemzar infusion well with no complications.      Isaac Kramer is aware of future appt on 12/24 at 8:30AM.     AVS printed and given to Isaac Kramer.

## 2024-12-19 ENCOUNTER — OFFICE VISIT (OUTPATIENT)
Dept: HEMATOLOGY ONCOLOGY | Facility: CLINIC | Age: 64
End: 2024-12-19

## 2024-12-19 VITALS
BODY MASS INDEX: 22.36 KG/M2 | OXYGEN SATURATION: 96 % | RESPIRATION RATE: 16 BRPM | HEART RATE: 90 BPM | DIASTOLIC BLOOD PRESSURE: 50 MMHG | HEIGHT: 64 IN | TEMPERATURE: 97.6 F | SYSTOLIC BLOOD PRESSURE: 100 MMHG | WEIGHT: 131 LBS

## 2024-12-19 DIAGNOSIS — C24.1 AMPULLARY CARCINOMA (HCC): ICD-10-CM

## 2024-12-19 DIAGNOSIS — D70.1 CHEMOTHERAPY-INDUCED NEUTROPENIA (HCC): ICD-10-CM

## 2024-12-19 DIAGNOSIS — C25.9 PANCREATIC ADENOCARCINOMA (HCC): Primary | ICD-10-CM

## 2024-12-19 DIAGNOSIS — T45.1X5A CHEMOTHERAPY-INDUCED NEUTROPENIA (HCC): ICD-10-CM

## 2024-12-19 PROCEDURE — 99214 OFFICE O/P EST MOD 30 MIN: CPT | Performed by: INTERNAL MEDICINE

## 2024-12-19 RX ORDER — SODIUM CHLORIDE 9 MG/ML
20 INJECTION, SOLUTION INTRAVENOUS ONCE
OUTPATIENT
Start: 2025-01-08

## 2024-12-19 RX ORDER — SODIUM CHLORIDE 9 MG/ML
20 INJECTION, SOLUTION INTRAVENOUS ONCE
OUTPATIENT
Start: 2025-01-15

## 2024-12-19 RX ORDER — SODIUM CHLORIDE 9 MG/ML
20 INJECTION, SOLUTION INTRAVENOUS ONCE
OUTPATIENT
Start: 2025-01-22

## 2024-12-19 NOTE — PROGRESS NOTES
Hematology/Oncology Outpatient Follow-up  Isaac Kramer 64 y.o. male 1960 [id number]    Date:  12/19/2024        Assessment and Plan:  1. Pancreatic adenocarcinoma (HCC) (Primary)  Pathological stage IIB (pT2, pN1, cM0) MSI stable, grade 2 adenocarcinoma of the head of the pancreas, status post Whipple's procedure on 9/12/2024.   There was a discussion with Dr. 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     His case was presented at the GI tumor board.  The recommendation was to pursue adjuvant modified FOLFIRINOX followed by concurrent chemoradiation if the patient continues to have good performance status.     Modified FOLFIRINOX cycle 1 was started on 11/4/2024.  He only was able to tolerate 2 cycles of modified FOLFIRINOX even though the second cycle was adjusted down by 20%.  Patient was then switched to gemcitabine and capecitabine regimen which was started on 12/11/2024.  Gemzar 1,000 mg/m2 IV on days 1,8,15 Q 28 days with Rx Capecitabine 1,300 mg po BID days 1-21 then 7 days rest.     He is tolerating the current regimen very well.  We discussed continuing the current adjuvant treatment for a total of 4-5 months worth of therapy.    - Infusion Calculated Appointment Request; Future  - CBC and differential; Future  - Comprehensive metabolic panel; Future  - Infusion Calculated Appointment Request; Future  - CBC and differential; Future  - Infusion Calculated Appointment Request; Future  - CBC and differential; Future  - CBC and differential; Future  - Comprehensive metabolic panel; Future  - Magnesium; Future  - Cancer antigen 19-9; Future              HPI:  This is a 63-year-old male with history of Anselmo score 4+3= 7 adenocarcinoma of the prostate status post radical prostatectomy on 5/22/2023.  He also seems to have history of coronary artery  disease, hypertension, GERD, hyperlipidemia, etc.  He denied positive family history for malignancy.  The patient apparently had left flank pain which was evaluated with a CT scan of the abdomen pelvis with contrast on 8/27/2024 in the emergency room.  The CT scan showed mass in the lower pancreatic head region obstructing the CBD and pancreatic duct measuring 2.8 cm suspicious for malignant process.  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  CT scan of the chest without contrast on 8/28/2024 was negative for malignant process.  The patient then had Whipple procedure on 9/12/2024 which showed MSI stable adenocarcinoma of the pancreas arising in adenoma.  - Tumor invades into muscularis propria of the duodenum (pT2).  - Lymphovascular invasion is present.   - One of sixteen lymph nodes is positive for tumor (1/16, pN1).  - Pancreatic intraepithelial neoplasia (PanIN), low grade.   - All margins are negative for carcinoma or dysplasia.  His baseline CEA and CA 19-9 were within normal range prior to the Whipple procedure.        Interval history:  The patient came today for a follow-up visit accompanied by his son.  He apparently did not tolerate the modified FOLFIRINOX.  However, he seems to be tolerating the current treatment with gemcitabine and Xeloda very well.  Blood work on 12 7024 showed hemoglobin of 10.5 with normal white cells and platelets.  Creatinine 0.5 with normal calcium and liver enzymes.      Oncology History Overview Note   Presents for discussion of RT for recently diagnosed Jihan 7 (4+3) prostate cancer.  Referred by Dr. Biggs    Lab Results   Component Value Date    PSA 5.2 (H) 11/08/2022    PSA 6.6 (H) 07/18/2022    PSA 3.9 06/24/2020      10/10/22  Urology  Seen in consult for elevated PSA  F/U PSA recommended    12/28/21  MRI Prostate  IMPRESSION:   1. PI-RADSv2.1 Category 4 -High (clinically significant cancer is likely to be  present). 0.5 cm possible lesion in the posterior right peripheral zone at base.   2. No extraprostatic tumor, seminal vesicle invasion, pelvic lymphadenopathy, or pelvic osseous metastatic disease.   3. Moderate BPH with calculated prostate volume of 69 cc.    2/28/23  Tissue Exam  A.  Prostate, region of interest #1, needle biopsy:  - Prostatic adenocarcinoma, acinar, not otherwise specified; [place] grade 4 +3= score of 7 (grade group 3) in 4 of 5 cores involving 20% of needle core tissue and measuring up to 5 mm in length (score 4=50-60%).  - Periprostatic fat invasion: Not identified.  - Seminal vesicle invasion: Not identified.  - Lymph-vascular invasion: Not identified.  - Perineural invasion: Not identified.  - Additional Pathologic Findings:  None.     B.  Prostate, right lateral and medial base, needle biopsy:  - Benign prostate tissue, negative for malignancy.     C.  Prostate, right mid lateral, needle biopsy:  - Benign prostate tissue, negative for malignancy.     D.  Prostate, right mid medial, needle biopsy:  - Benign prostate tissue, negative for malignancy.     E.  Prostate, right lateral apex, needle biopsy:   - Benign prostate tissue, negative for malignancy.     F. Prostate, right medial apex, needle biopsy:  - Benign prostate tissue, negative for malignancy.     G.  Prostate, left lateral base, needle biopsy:  - Prostatic adenocarcinoma, acinar, not otherwise specified; [place] grade 3 +3= score of 6 (grade group 1) in 1 of 1 cores involving 10% of needle core tissue and measuring 2 mm in length.  - Periprostatic fat invasion: Not identified.  - Seminal vesicle invasion: Not identified.  - Lymph-vascular invasion: Not identified.  - Perineural invasion: Not identified.  - Additional Pathologic Findings:  None.     H.  Prostate, left medial base, needle biopsy:  - Prostatic adenocarcinoma, acinar, not otherwise specified; Jihan grade 3 +4= score of 7 (grade group 2) in 1 of 1 cores involving  50% of needle core tissue and measuring 8 mm in length (score 4 = 5-10%).  - Periprostatic fat invasion: Not identified.  - Seminal vesicle invasion: Not identified.  - Lymph-vascular invasion: Not identified.  - Perineural invasion: Present.  - Additional Pathologic Findings:  None.     I.  Prostate, left mid lateral, needle biopsy:  - Prostatic adenocarcinoma, acinar, not otherwise specified; [place] grade 3 +4= score of 7 (grade group 2) in 1 of 1 cores involving 40-50% of needle core tissue and measuring 5 mm in length (score 4 = 5-10%).  - Periprostatic fat invasion: Not identified.  - Seminal vesicle invasion: Not identified.  - Lymph-vascular invasion: Not identified.  - Perineural invasion: Not identified.  - Additional Pathologic Findings:  High-grade prostatic intraepithelial neoplasia (HGPIN).     J.  Prostate, left mid medial, needle biopsy:  - Prostatic adenocarcinoma, acinar, not otherwise specified; Jihan grade 3 +4= score of 7 (grade group 2) in 1 of 1 cores involving 40-50% of needle core tissue and measuring 7 mm in length (score 4 = 5-10%).  - Periprostatic fat invasion: Not identified.  - Seminal vesicle invasion: Not identified.  - Lymph-vascular invasion: Not identified.  - Perineural invasion: Not identified.  - Additional Pathologic Findings:  High-grade prostatic intraepithelial neoplasia (HGPIN).     K.  Prostate, left lateral apex, needle biopsy:  -Focal atypical small acinar proliferation, cannot exclude limited low-grade carcinoma.     L.  Prostate, left medial apex, needle biopsy:  - Benign prostate tissue, negative for malignancy.    3/27/23  Bone Scan  IMPRESSION:   1.  No scintigraphic evidence of osseous metastasis    4/6/23  Dr. Biggs  Does not qualify for active surveillance.  RT and surgery discussed.  Leaning towards RT will arrange for consult    4/20/23  Dr. Harmon  Desires surgical intervention    Upcoming:       Prostate cancer (HCC) (Resolved)   2/28/2023 Biopsy     A.  Prostate, region of interest #1, needle biopsy:  - Prostatic adenocarcinoma, acinar, not otherwise specified; [place] grade 4 +3= score of 7 (grade group 3) in 4 of 5 cores involving 20% of needle core tissue and measuring up to 5 mm in length (score 4=50-60%).  - Periprostatic fat invasion: Not identified.  - Seminal vesicle invasion: Not identified.  - Lymph-vascular invasion: Not identified.  - Perineural invasion: Not identified.  - Additional Pathologic Findings:  None.     B.  Prostate, right lateral and medial base, needle biopsy:  - Benign prostate tissue, negative for malignancy.     C.  Prostate, right mid lateral, needle biopsy:  - Benign prostate tissue, negative for malignancy.     D.  Prostate, right mid medial, needle biopsy:  - Benign prostate tissue, negative for malignancy.     E.  Prostate, right lateral apex, needle biopsy:   - Benign prostate tissue, negative for malignancy.     F. Prostate, right medial apex, needle biopsy:  - Benign prostate tissue, negative for malignancy.     G.  Prostate, left lateral base, needle biopsy:  - Prostatic adenocarcinoma, acinar, not otherwise specified; Jihan grade 3 +3= score of 6 (grade group 1) in 1 of 1 cores involving 10% of needle core tissue and measuring 2 mm in length.  - Periprostatic fat invasion: Not identified.  - Seminal vesicle invasion: Not identified.  - Lymph-vascular invasion: Not identified.  - Perineural invasion: Not identified.  - Additional Pathologic Findings:  None.     H.  Prostate, left medial base, needle biopsy:  - Prostatic adenocarcinoma, acinar, not otherwise specified; [place] grade 3 +4= score of 7 (grade group 2) in 1 of 1 cores involving 50% of needle core tissue and measuring 8 mm in length (score 4 = 5-10%).  - Periprostatic fat invasion: Not identified.  - Seminal vesicle invasion: Not identified.  - Lymph-vascular invasion: Not identified.  - Perineural invasion: Present.  - Additional Pathologic Findings:  None.      I.  Prostate, left mid lateral, needle biopsy:  - Prostatic adenocarcinoma, acinar, not otherwise specified; [place] grade 3 +4= score of 7 (grade group 2) in 1 of 1 cores involving 40-50% of needle core tissue and measuring 5 mm in length (score 4 = 5-10%).  - Periprostatic fat invasion: Not identified.  - Seminal vesicle invasion: Not identified.  - Lymph-vascular invasion: Not identified.  - Perineural invasion: Not identified.  - Additional Pathologic Findings:  High-grade prostatic intraepithelial neoplasia (HGPIN).     J.  Prostate, left mid medial, needle biopsy:  - Prostatic adenocarcinoma, acinar, not otherwise specified; Jihan grade 3 +4= score of 7 (grade group 2) in 1 of 1 cores involving 40-50% of needle core tissue and measuring 7 mm in length (score 4 = 5-10%).  - Periprostatic fat invasion: Not identified.  - Seminal vesicle invasion: Not identified.  - Lymph-vascular invasion: Not identified.  - Perineural invasion: Not identified.  - Additional Pathologic Findings:  High-grade prostatic intraepithelial neoplasia (HGPIN).     K.  Prostate, left lateral apex, needle biopsy:  -Focal atypical small acinar proliferation, cannot exclude limited low-grade carcinoma.     L.  Prostate, left medial apex, needle biopsy:  - Benign prostate tissue, negative for malignancy.     2/28/2023 Initial Diagnosis    Prostate cancer (HCC)     Pancreatic adenocarcinoma (HCC)   8/28/2024 Initial Diagnosis    Pancreatic adenocarcinoma (HCC)     9/12/2024 Surgery    A. Gallbladder, cholecystectomy:  - Chronic cholecystitis.  - One lymph node, negative for malignancy (0/1).  - Negative for malignancy.     B. Lymph Node, Portal Lymph Node:  - One lymph node, negative for malignancy (0/1).     C. Pancreas, Whipple; SEE COMMENTS:  - Adenocarcinoma arising in adenoma.  - Tumor invades into muscularis propria of the duodenum (pT2).  - Lymphovascular invasion is present.   - One of sixteen lymph nodes is positive for tumor  (1/16, pN1).  - Pancreatic intraepithelial neoplasia (PanIN), low grade.   - All margins are negative for carcinoma or dysplasia.     Block C11 is sent to [facility] for MI Profile Testing.   Immunohistochemistry (IHC) testing for Mismatch Repair (MMR) proteins has been requested on block C9, and the results will be issued in an addendum.     D. Lymph Node, Portacaval node:  - Eight lymph nodes, negative for malignancy (0/8).     E. Soft Tissue, Other, Additional Uncinate/Pancreas:  - Portion of pancreas with no pathologic abnormality  - Two lymph nodes, negative for malignancy (0/2).     9/12/2024 -  Cancer Staged    Staging form: Pancreas, AJCC 8th Edition  - Pathologic stage from 9/12/2024: Stage IIB (pT2, pN1, cM0) - Signed by Jacob Colby MD on 10/2/2024  Stage prefix: Initial diagnosis  Histologic grade (G): G2  Histologic grading system: 3 grade system  Lymph-vascular invasion (LVI): LVI present/identified, NOS  Carbohydrate antigen 19-9 (CA 19-9) (U/mL): 14  Carcinoembryonic antigen (CEA) (ng/mL): 0.7       11/4/2024 - 11/22/2024 Chemotherapy    alteplase (CATHFLO), 2 mg, Intracatheter, Every 1 Minute as needed, 2 of 12 cycles  pegfilgrastim (NEULASTA ONPRO), 6 mg, Subcutaneous, Once, 2 of 12 cycles  Dose modification: 4 mg (original dose 6 mg, Cycle 3)  Administration: 6 mg (11/6/2024), 6 mg (11/22/2024)  fosaprepitant (EMEND) IVPB, 150 mg, Intravenous, Once, 2 of 12 cycles  Administration: 150 mg (11/4/2024), 150 mg (11/20/2024)  irinotecan (CAMPTOSAR) chemo infusion, 242 mg (83.3 % of original dose 180 mg/m2), Intravenous, Once, 2 of 12 cycles  Dose modification: 150 mg/m2 (original dose 180 mg/m2, Cycle 1, Reason: Dose modified as per discussion with consulting physician), 125 mg/m2 (original dose 180 mg/m2, Cycle 2, Reason: Dose modified as per discussion with consulting physician)  Administration: 240 mg (11/4/2024), 200 mg (11/20/2024)  leucovorin calcium IVPB, 644 mg, Intravenous, Once, 2 of  12 cycles  Administration: 650 mg (11/4/2024), 650 mg (11/20/2024)  oxaliplatin (ELOXATIN) chemo infusion, 85 mg/m2 = 136.85 mg, Intravenous, Once, 2 of 12 cycles  Dose modification: 65 mg/m2 (original dose 85 mg/m2, Cycle 2, Reason: Dose modified as per discussion with consulting physician)  Administration: 136.85 mg (11/4/2024), 100 mg (11/20/2024)  fluorouracil (ADRUCIL) ambulatory infusion Soln, 1,200 mg/m2/day = 3,865 mg, Intravenous, Over 46 hours, 2 of 12 cycles  Dose modification: 960 mg/m2/day (original dose 1,200 mg/m2/day, Cycle 3, Reason: Dose modified as per discussion with consulting physician), 1,000 mg/m2/day (original dose 1,200 mg/m2/day, Cycle 3, Reason: Dose modified as per discussion with consulting physician), 960 mg/m2/day (original dose 1,200 mg/m2/day, Cycle 2, Reason: Dose modified as per discussion with consulting physician)     12/11/2024 -  Chemotherapy    alteplase (CATHFLO), 2 mg, Intracatheter, Every 1 Minute as needed, 1 of 6 cycles  gemcitabine (GEMZAR) infusion, 1,000 mg/m2 = 1,600 mg, Intravenous, Once, 1 of 6 cycles  Administration: 1,600 mg (12/11/2024), 1,600 mg (12/18/2024)     Ampullary carcinoma (HCC)   10/2/2024 Initial Diagnosis    Ampullary carcinoma (HCC)     10/2/2024 -  Cancer Staged    Staging form: Ampulla of Vater, AJCC 8th Edition  - Pathologic: Stage IIIA (pT2, pN1, cM0) - Signed by Marika Ac MD on 10/2/2024           Interval history:    ROS: Review of Systems   Constitutional:  Negative for chills and fever.   HENT:  Negative for ear pain and sore throat.    Eyes:  Negative for pain and visual disturbance.   Respiratory:  Negative for cough and shortness of breath.    Cardiovascular:  Negative for chest pain and palpitations.   Gastrointestinal:  Positive for constipation. Negative for abdominal pain and vomiting.   Genitourinary:  Negative for dysuria and hematuria.   Musculoskeletal:  Negative for arthralgias and back pain.   Skin:  Negative for color  change and rash.   Neurological:  Positive for dizziness. Negative for seizures and syncope.   Psychiatric/Behavioral:  Positive for sleep disturbance.    All other systems reviewed and are negative.      Past Medical History:   Diagnosis Date    Benign essential hypertension 05/08/2014    CAD (coronary artery disease)     s/p NAVA prox LAD and D1 7/28/2022    Cancer (HCC)     Prostate    Coronary artery disease involving native coronary artery of native heart without angina pectoris 08/09/2022    Enteritis 08/28/2024    GERD (gastroesophageal reflux disease)     Hyperlipidemia     Hypertension     Other chest pain     Palpitations     Prostate cancer (HCC) 04/18/2023    Sleep apnea        Past Surgical History:   Procedure Laterality Date    CARDIAC CATHETERIZATION Left 7/28/2022    Procedure: Cardiac catheterization-left heart catheterization;  Surgeon: Sai Henry MD;  Location: [facility];  Service: Cardiology    CARDIAC CATHETERIZATION N/A 7/28/2022    Procedure: Cardiac pci;  Surgeon: Sai Henry MD;  Location: [facility];  Service: Cardiology    HERNIA REPAIR      IR EMBOLIZATION (SPECIFY VESSEL OR SITE)  9/13/2024    IR PORT PLACEMENT  10/28/2024    NH LAPS SURG [id number] RPBIC RAD W/NRV SPARING ROBOT N/A 5/22/2023    Procedure: PROSTATECTOMY RADICAL & PELVIC LYMPH NODE DISSECTION  W/ ROBOT;  Surgeon: Otoniel Harmon MD;  Location: [facility];  Service: Urology    NH PROSTATE NEEDLE BIOPSY ANY APPROACH N/A 2/28/2023    Procedure: TRANSRECTAL MRI FUSION BIOPSY PROSTATE;  Surgeon: Milan Arellano MD;  Location: [facility];  Service: Urology    WHIPPLE PROCEDURE/PANCREATICO-DUODENECTOMY N/A 9/12/2024    Procedure: WHIPPLE PROCEDURE/PANCREATICO-DUODENECTOMY;  Surgeon: Marika Ac MD;  Location: [facility];  Service: Surgical Oncology       Social History     Socioeconomic History    Marital status: /Civil Union     Spouse name: None    Number of children: None    Years of  education: None    Highest education level: None   Occupational History    None   Tobacco Use    Smoking status: Former     Current packs/day: 0.00     Types: Cigarettes     Quit date: 2022     Years since quittin.9    Smokeless tobacco: Never   Vaping Use    Vaping status: Never Used   Substance and Sexual Activity    Alcohol use: Not Currently    Drug use: Never    Sexual activity: None   Other Topics Concern    None   Social History Narrative    EMPLOYED         Social Drivers of Health     Financial Resource Strain: Not on file   Food Insecurity: No Food Insecurity (2024)    Nursing - Inadequate Food Risk Classification     Worried About Running Out of Food in the Last Year: Never true     Ran Out of Food in the Last Year: Never true     Ran Out of Food in the Last Year: 1   Transportation Needs: No Transportation Needs (2024)    Nursing - Transportation Risk Classification     Lack of Transportation: Not on file     Lack of Transportation: 2   Physical Activity: Not on file   Stress: Not on file   Social Connections: Unknown (2024)    Received from [facility]    Social Connections     How often do you feel lonely or isolated from those around you? (Adult - for ages 18 years and over): Not on file   Intimate Partner Violence: Unknown (2024)    Nursing IPS     Feels Physically and Emotionally Safe: Not on file     Physically Hurt by Someone: Not on file     Humiliated or Emotionally Abused by Someone: Not on file     Physically Hurt by Someone: 2     Hurt or Threatened by Someone: 2   Housing Stability: Unknown (2024)    Nursing: Inadequate Housing Risk Classification     Has Housing: Not on file     Worried About Losing Housing: Not on file     Unable to Get Utilities: Not on file     Unable to Pay for Housing in the Last Year: 2     Has Housin       Family History   Problem Relation Age of Onset    No Known Problems Mother     No Known Problems Father        No  "Known Allergies      Current Outpatient Medications:     aspirin 81 mg chewable tablet, Chew 81 mg daily, Disp: , Rfl:     atorvastatin (LIPITOR) 20 mg tablet, Take 1 tablet (20 mg total) by mouth daily, Disp: 30 tablet, Rfl: 5    capecitabine (XELODA) 150 MG tablet, Take 2 tablets (300 mg total) by mouth 2 (two) times a day 21 days on, followed by 7 days off, Disp: 84 tablet, Rfl: 11    capecitabine (XELODA) 500 MG tablet, Take 2 tablets (1,000 mg total) by mouth 2 (two) times a day 21 days on, followed by 7 days off, Disp: 84 tablet, Rfl: 11    lisinopril (ZESTRIL) 20 mg tablet, Take 1 tablet (20 mg total) by mouth daily, Disp: 30 tablet, Rfl: 3    ondansetron (ZOFRAN) 4 mg tablet, Take 1 tablet (4 mg total) by mouth every 8 (eight) hours as needed for nausea or vomiting, Disp: 20 tablet, Rfl: 0    dicyclomine (BENTYL) 10 mg capsule, Take 1 capsule (10 mg total) by mouth 3 (three) times a day before meals, Disp: 90 capsule, Rfl: 0  No current facility-administered medications for this visit.    Facility-Administered Medications Ordered in Other Visits:     alteplase (CATHFLO) injection 2 mg, 2 mg, Intracatheter, Q1MIN PRN, Jacob Colby MD    alteplase (CATHFLO) injection 2 mg, 2 mg, Intracatheter, Q1MIN PRN, Jacob Colby MD      Physical Exam:  /50 (BP Location: Right arm, Patient Position: Sitting, Cuff Size: Adult)   Pulse 90   Temp 97.6 °F (36.4 °C)   Resp 16   Ht 5' 4.02\" (1.626 m)   Wt 59.4 kg (131 lb)   SpO2 96%   BMI 22.47 kg/m²     Physical Exam  Constitutional:       Appearance: He is well-developed.   HENT:      Head: Normocephalic and atraumatic.      Nose: Nose normal.   Eyes:      General: No scleral icterus.        Right eye: No discharge.         Left eye: No discharge.      Conjunctiva/sclera: Conjunctivae normal.      Pupils: Pupils are equal, round, and reactive to light.   Neck:      Thyroid: No thyromegaly.      Trachea: No tracheal deviation.   Cardiovascular:      Rate and " "Rhythm: Normal rate and regular rhythm.      Heart sounds: Normal heart sounds. No murmur heard.     No friction rub.   Pulmonary:      Effort: Pulmonary effort is normal. No respiratory distress.      Breath sounds: Normal breath sounds. No wheezing or rales.   Chest:      Chest wall: No tenderness.   Abdominal:      General: There is no distension.      Palpations: Abdomen is soft. There is no hepatomegaly or splenomegaly.      Tenderness: There is no abdominal tenderness. There is no guarding or rebound.   Musculoskeletal:         General: No tenderness or deformity. Normal range of motion.      Cervical back: Normal range of motion and neck supple.   Lymphadenopathy:      Cervical: No cervical adenopathy.   Skin:     General: Skin is warm and dry.      Coloration: Skin is not pale.      Findings: No erythema or rash.   Neurological:      Mental Status: He is alert and oriented to person, place, and time.      Cranial Nerves: No cranial nerve deficit.      Coordination: Coordination normal.      Deep Tendon Reflexes: Reflexes are normal and symmetric.   Psychiatric:         Behavior: Behavior normal.         Thought Content: Thought content normal.         Judgment: Judgment normal.           Labs:  Lab Results   Component Value Date    WBC 8.47 12/17/2024    HGB 10.5 (L) 12/17/2024    HCT 33.8 (L) 12/17/2024    MCV 87 12/17/2024     12/17/2024     Lab Results   Component Value Date    K 4.0 12/17/2024     12/17/2024    CO2 27 12/17/2024    BUN 23 12/17/2024    CREATININE 0.57 (L) 12/17/2024    GLUCOSE 153 (H) 09/12/2024    GLUF 91 11/25/2024    CALCIUM 8.5 12/17/2024    CORRECTEDCA 7.5 (L) 09/13/2024    AST 20 12/17/2024    ALT 26 12/17/2024    ALKPHOS 86 12/17/2024    EGFR 107 12/17/2024     No results found for: \"TSH\"    Patient voiced understanding and agreement in the above discussion. Aware to contact our office with questions/symptoms in the interim.   "

## 2024-12-20 ENCOUNTER — TELEPHONE (OUTPATIENT)
Dept: HEMATOLOGY ONCOLOGY | Facility: CLINIC | Age: 64
End: 2024-12-20

## 2024-12-20 NOTE — TELEPHONE ENCOUNTER
;spoke with patient to let him know his follow up appointment with dr gómez is on 01/23/2025 @ 9:00 patient agreed.

## 2024-12-24 ENCOUNTER — HOSPITAL ENCOUNTER (OUTPATIENT)
Dept: INFUSION CENTER | Facility: HOSPITAL | Age: 64
Discharge: HOME | End: 2024-12-24
Attending: INTERNAL MEDICINE

## 2024-12-24 VITALS — TEMPERATURE: 97.2 F

## 2024-12-24 DIAGNOSIS — Z45.2 ENCOUNTER FOR CENTRAL LINE CARE: Primary | ICD-10-CM

## 2024-12-24 DIAGNOSIS — C25.9 PANCREATIC ADENOCARCINOMA (HCC): ICD-10-CM

## 2024-12-24 LAB
ALBUMIN SERPL BCG-MCNC: 3.9 G/DL (ref 3.5–5)
ALP SERPL-CCNC: 82 U/L (ref 34–104)
ALT SERPL W P-5'-P-CCNC: 20 U/L (ref 7–52)
ANION GAP SERPL CALCULATED.3IONS-SCNC: 8 MMOL/L (ref 4–13)
AST SERPL W P-5'-P-CCNC: 18 U/L (ref 13–39)
BASOPHILS # BLD AUTO: 0.03 THOUSANDS/ÂΜL (ref 0–0.1)
BASOPHILS NFR BLD AUTO: 0 % (ref 0–1)
BILIRUB SERPL-MCNC: 0.46 MG/DL (ref 0.2–1)
BUN SERPL-MCNC: 16 MG/DL (ref 5–25)
CALCIUM SERPL-MCNC: 9 MG/DL (ref 8.4–10.2)
CHLORIDE SERPL-SCNC: 108 MMOL/L (ref 96–108)
CO2 SERPL-SCNC: 23 MMOL/L (ref 21–32)
CREAT SERPL-MCNC: 0.67 MG/DL (ref 0.6–1.3)
EOSINOPHIL # BLD AUTO: 0.04 THOUSAND/ÂΜL (ref 0–0.61)
EOSINOPHIL NFR BLD AUTO: 1 % (ref 0–6)
ERYTHROCYTE [DISTWIDTH] IN BLOOD BY AUTOMATED COUNT: 14.6 % (ref 11.6–15.1)
GFR SERPL CREATININE-BSD FRML MDRD: 100 ML/MIN/1.73SQ M
GLUCOSE SERPL-MCNC: 61 MG/DL (ref 65–140)
HCT VFR BLD AUTO: 32.3 % (ref 36.5–49.3)
HGB BLD-MCNC: 10.2 G/DL (ref 12–17)
IMM GRANULOCYTES # BLD AUTO: 0.02 THOUSAND/UL (ref 0–0.2)
IMM GRANULOCYTES NFR BLD AUTO: 0 % (ref 0–2)
LYMPHOCYTES # BLD AUTO: 0.96 THOUSANDS/ÂΜL (ref 0.6–4.47)
LYMPHOCYTES NFR BLD AUTO: 12 % (ref 14–44)
MAGNESIUM SERPL-MCNC: 1.9 MG/DL (ref 1.9–2.7)
MCH RBC QN AUTO: 27.6 PG (ref 26.8–34.3)
MCHC RBC AUTO-ENTMCNC: 31.6 G/DL (ref 31.4–37.4)
MCV RBC AUTO: 88 FL (ref 82–98)
MONOCYTES # BLD AUTO: 0.21 THOUSAND/ÂΜL (ref 0.17–1.22)
MONOCYTES NFR BLD AUTO: 3 % (ref 4–12)
NEUTROPHILS # BLD AUTO: 6.83 THOUSANDS/ÂΜL (ref 1.85–7.62)
NEUTS SEG NFR BLD AUTO: 84 % (ref 43–75)
NRBC BLD AUTO-RTO: 0 /100 WBCS
PLATELET # BLD AUTO: 186 THOUSANDS/UL (ref 149–390)
PMV BLD AUTO: 9.2 FL (ref 8.9–12.7)
POTASSIUM SERPL-SCNC: 3.5 MMOL/L (ref 3.5–5.3)
PROT SERPL-MCNC: 6.1 G/DL (ref 6.4–8.4)
RBC # BLD AUTO: 3.69 MILLION/UL (ref 3.88–5.62)
SODIUM SERPL-SCNC: 139 MMOL/L (ref 135–147)
WBC # BLD AUTO: 8.09 THOUSAND/UL (ref 4.31–10.16)

## 2024-12-24 PROCEDURE — 80053 COMPREHEN METABOLIC PANEL: CPT

## 2024-12-24 PROCEDURE — 83735 ASSAY OF MAGNESIUM: CPT

## 2024-12-24 PROCEDURE — 85025 COMPLETE CBC W/AUTO DIFF WBC: CPT

## 2024-12-24 NOTE — PROGRESS NOTES
Isaac Kramer  tolerated treatment well with no complications.  Labs obtained via port without difficulty.    Isaac Kramer is aware of future appt on 12/26 at 11 am.     AVS declined by Isaac Kramer.

## 2024-12-26 ENCOUNTER — HOSPITAL ENCOUNTER (OUTPATIENT)
Dept: INFUSION CENTER | Facility: HOSPITAL | Age: 64
End: 2024-12-26
Attending: INTERNAL MEDICINE

## 2024-12-26 VITALS
HEIGHT: 64 IN | TEMPERATURE: 97.1 F | OXYGEN SATURATION: 98 % | SYSTOLIC BLOOD PRESSURE: 135 MMHG | RESPIRATION RATE: 16 BRPM | HEART RATE: 83 BPM | DIASTOLIC BLOOD PRESSURE: 61 MMHG | WEIGHT: 133 LBS | BODY MASS INDEX: 22.71 KG/M2

## 2024-12-26 DIAGNOSIS — C25.9 PANCREATIC ADENOCARCINOMA (HCC): Primary | ICD-10-CM

## 2024-12-26 DIAGNOSIS — T45.1X5A CHEMOTHERAPY-INDUCED NEUTROPENIA (HCC): ICD-10-CM

## 2024-12-26 DIAGNOSIS — D70.1 CHEMOTHERAPY-INDUCED NEUTROPENIA (HCC): ICD-10-CM

## 2024-12-26 PROCEDURE — 96367 TX/PROPH/DG ADDL SEQ IV INF: CPT

## 2024-12-26 PROCEDURE — 96413 CHEMO IV INFUSION 1 HR: CPT

## 2024-12-26 RX ORDER — SODIUM CHLORIDE 9 MG/ML
20 INJECTION, SOLUTION INTRAVENOUS ONCE
Status: COMPLETED | OUTPATIENT
Start: 2024-12-26 | End: 2024-12-26

## 2024-12-26 RX ADMIN — SODIUM CHLORIDE 20 ML/HR: 0.9 INJECTION, SOLUTION INTRAVENOUS at 10:56

## 2024-12-26 RX ADMIN — DEXAMETHASONE SODIUM PHOSPHATE 10 MG: 100 INJECTION INTRAMUSCULAR; INTRAVENOUS at 10:58

## 2024-12-26 RX ADMIN — GEMCITABINE 1600 MG: 38 INJECTION, SOLUTION INTRAVENOUS at 11:44

## 2024-12-26 NOTE — PROGRESS NOTES
Isaac Kramer  tolerated chemotherapy infusion well with no complications.      Iasac Kramer is aware of future appt on 1/7 at 10:30AM.     AVS printed and given to Isaac Kramer.

## 2025-01-06 ENCOUNTER — HOSPITAL ENCOUNTER (EMERGENCY)
Facility: HOSPITAL | Age: 65
Discharge: HOME | End: 2025-01-06
Attending: EMERGENCY MEDICINE

## 2025-01-06 ENCOUNTER — APPOINTMENT (EMERGENCY)
Dept: RADIOLOGY | Facility: HOSPITAL | Age: 65
End: 2025-01-06

## 2025-01-06 VITALS
WEIGHT: 133 LBS | DIASTOLIC BLOOD PRESSURE: 59 MMHG | RESPIRATION RATE: 18 BRPM | SYSTOLIC BLOOD PRESSURE: 118 MMHG | BODY MASS INDEX: 22.71 KG/M2 | TEMPERATURE: 97.5 F | HEIGHT: 64 IN | HEART RATE: 80 BPM | OXYGEN SATURATION: 98 %

## 2025-01-06 DIAGNOSIS — R05.9 COUGH: ICD-10-CM

## 2025-01-06 DIAGNOSIS — R06.02 SHORTNESS OF BREATH: Primary | ICD-10-CM

## 2025-01-06 LAB
ANION GAP SERPL CALCULATED.3IONS-SCNC: 4 MMOL/L (ref 4–13)
BASOPHILS # BLD AUTO: 0.03 THOUSANDS/ΜL (ref 0–0.1)
BASOPHILS NFR BLD AUTO: 0 % (ref 0–1)
BUN SERPL-MCNC: 11 MG/DL (ref 5–25)
CALCIUM SERPL-MCNC: 8.5 MG/DL (ref 8.4–10.2)
CARDIAC TROPONIN I PNL SERPL HS: 4 NG/L (ref ?–50)
CHLORIDE SERPL-SCNC: 108 MMOL/L (ref 96–108)
CO2 SERPL-SCNC: 28 MMOL/L (ref 21–32)
CREAT SERPL-MCNC: 0.74 MG/DL (ref 0.6–1.3)
D DIMER PPP FEU-MCNC: 0.48 UG/ML FEU
EOSINOPHIL # BLD AUTO: 0.25 THOUSAND/ΜL (ref 0–0.61)
EOSINOPHIL NFR BLD AUTO: 4 % (ref 0–6)
ERYTHROCYTE [DISTWIDTH] IN BLOOD BY AUTOMATED COUNT: 17.9 % (ref 11.6–15.1)
FLUAV AG UPPER RESP QL IA.RAPID: NEGATIVE
FLUBV AG UPPER RESP QL IA.RAPID: NEGATIVE
GFR SERPL CREATININE-BSD FRML MDRD: 96 ML/MIN/1.73SQ M
GLUCOSE SERPL-MCNC: 86 MG/DL (ref 65–140)
HCT VFR BLD AUTO: 33.4 % (ref 36.5–49.3)
HGB BLD-MCNC: 10.7 G/DL (ref 12–17)
IMM GRANULOCYTES # BLD AUTO: 0.01 THOUSAND/UL (ref 0–0.2)
IMM GRANULOCYTES NFR BLD AUTO: 0 % (ref 0–2)
LYMPHOCYTES # BLD AUTO: 0.43 THOUSANDS/ΜL (ref 0.6–4.47)
LYMPHOCYTES NFR BLD AUTO: 6 % (ref 14–44)
MCH RBC QN AUTO: 28.4 PG (ref 26.8–34.3)
MCHC RBC AUTO-ENTMCNC: 32 G/DL (ref 31.4–37.4)
MCV RBC AUTO: 89 FL (ref 82–98)
MONOCYTES # BLD AUTO: 1.01 THOUSAND/ΜL (ref 0.17–1.22)
MONOCYTES NFR BLD AUTO: 15 % (ref 4–12)
NEUTROPHILS # BLD AUTO: 5.05 THOUSANDS/ΜL (ref 1.85–7.62)
NEUTS SEG NFR BLD AUTO: 75 % (ref 43–75)
NRBC BLD AUTO-RTO: 0 /100 WBCS
PLATELET # BLD AUTO: 248 THOUSANDS/UL (ref 149–390)
PMV BLD AUTO: 8.5 FL (ref 8.9–12.7)
POTASSIUM SERPL-SCNC: 3.5 MMOL/L (ref 3.5–5.3)
RBC # BLD AUTO: 3.77 MILLION/UL (ref 3.88–5.62)
SARS-COV+SARS-COV-2 AG RESP QL IA.RAPID: NEGATIVE
SODIUM SERPL-SCNC: 140 MMOL/L (ref 135–147)
WBC # BLD AUTO: 6.78 THOUSAND/UL (ref 4.31–10.16)

## 2025-01-06 PROCEDURE — 87811 SARS-COV-2 COVID19 W/OPTIC: CPT | Performed by: EMERGENCY MEDICINE

## 2025-01-06 PROCEDURE — 84484 ASSAY OF TROPONIN QUANT: CPT

## 2025-01-06 PROCEDURE — 99285 EMERGENCY DEPT VISIT HI MDM: CPT

## 2025-01-06 PROCEDURE — 93005 ELECTROCARDIOGRAM TRACING: CPT

## 2025-01-06 PROCEDURE — 36415 COLL VENOUS BLD VENIPUNCTURE: CPT

## 2025-01-06 PROCEDURE — 85379 FIBRIN DEGRADATION QUANT: CPT

## 2025-01-06 PROCEDURE — 87804 INFLUENZA ASSAY W/OPTIC: CPT | Performed by: EMERGENCY MEDICINE

## 2025-01-06 PROCEDURE — 85025 COMPLETE CBC W/AUTO DIFF WBC: CPT

## 2025-01-06 PROCEDURE — 80048 BASIC METABOLIC PNL TOTAL CA: CPT

## 2025-01-06 PROCEDURE — 71046 X-RAY EXAM CHEST 2 VIEWS: CPT

## 2025-01-06 RX ORDER — DEXAMETHASONE 4 MG/1
10 TABLET ORAL ONCE
Status: COMPLETED | OUTPATIENT
Start: 2025-01-06 | End: 2025-01-06

## 2025-01-06 RX ORDER — ALBUTEROL SULFATE 90 UG/1
2 INHALANT RESPIRATORY (INHALATION) EVERY 6 HOURS PRN
Qty: 8.5 G | Refills: 0 | Status: SHIPPED | OUTPATIENT
Start: 2025-01-06

## 2025-01-06 RX ADMIN — DEXAMETHASONE 10 MG: 4 TABLET ORAL at 10:41

## 2025-01-06 NOTE — DISCHARGE INSTRUCTIONS
May use warm/cool fluids, tea with honey, and cough drops to help soothe throat. Albuterol inhaler for any shortness of breath. Follow-up with PCP and return to ED for any worsening symptoms.

## 2025-01-06 NOTE — ED PROVIDER NOTES
Time reflects when diagnosis was documented in both MDM as applicable and the Disposition within this note       Time User Action Codes Description Comment    1/6/2025 11:28 AM Crys Dinh Add [R06.02] Shortness of breath     1/6/2025 11:28 AM Crys Dinh Add [R05.9] Cough           ED Disposition       ED Disposition   Discharge    Condition   Stable    Date/Time   Mon Jan 6, 2025 11:30 AM    Comment   Isaac Kramer discharge to home/self care.                   Assessment & Plan       Medical Decision Making  Patient is a 64-year-old male presenting for evaluation of cough and shortness of breath. Upon examination, patient is well-appearing and does not appear in acute distress. Vital signs showing heart rate of 117 and oxygen of 97%. Normal heart and lung sounds.     Differentials include but are not limited to: pulmonary embolism, ACS, arrhythmia, pneumonia, anemia, URI, COVID, influenza, and other viral syndromes.    Blood work unremarkable and appears to be at baseline. D-dimer negative. Low suspicion for pulmonary embolism. Troponin=4. EKG showing normal sinus rhythm with heart rate of 87 and left axis shift. No change when compared to previous EKG on file. HEART score=4. No pneumonia noted on my interpretation of chest x-ray. COVID/influenza negative. Suspect viral syndrome. Dose of PO decadron given for cough and to help open lungs. Advised patient that he may use warm/cool fluids, tea with honey, and cough drops to help soothe throat. Albuterol inhaler to be used for any shortness of breath. Strict ED return precautions given and encouraged follow-up with PCP. Patient verbalizes understanding of discharge instructions and follow-up care at this time.    Amount and/or Complexity of Data Reviewed  Labs: ordered. Decision-making details documented in ED Course.     Details: Reviewed   Radiology: ordered and independent interpretation performed.     Details: Reviewed     Risk  OTC  drugs.  Prescription drug management.        ED Course as of 01/08/25 1643   Mon Jan 06, 2025   0931 FLU/COVID Rapid Antigen (30 min. TAT) - Preferred screening test in ED  Negative   1008 No pneumonia noted on my interpretation of chest x-ray.    1101 Basic metabolic panel  No electrolyte imbalance.   1110 HS Troponin 0hr (reflex protocol)  Negative.   1110 CBC and differential(!)  Appears to be at baseline.    1120 D-dimer, quantitative  Negative.       Medications   dexamethasone (DECADRON) tablet 10 mg (10 mg Oral Given 1/6/25 1041)       ED Risk Strat Scores   HEART Risk Score      Flowsheet Row Most Recent Value   Heart Score Risk Calculator    History 0 Filed at: 01/06/2025 1110   ECG 0 Filed at: 01/06/2025 1110   Age 2 Filed at: 01/06/2025 1110   Risk Factors 2 Filed at: 01/06/2025 1110   Troponin 0 Filed at: 01/06/2025 1110   HEART Score 4 Filed at: 01/06/2025 1110          HEART Risk Score      Flowsheet Row Most Recent Value   Heart Score Risk Calculator    History 0 Filed at: 01/06/2025 1110   ECG 0 Filed at: 01/06/2025 1110   Age 2 Filed at: 01/06/2025 1110   Risk Factors 2 Filed at: 01/06/2025 1110   Troponin 0 Filed at: 01/06/2025 1110   HEART Score 4 Filed at: 01/06/2025 1110                            SBIRT 22yo+      Flowsheet Row Most Recent Value   Initial Alcohol Screen: US AUDIT-C     1. How often do you have a drink containing alcohol? 0 Filed at: 01/06/2025 0908   2. How many drinks containing alcohol do you have on a typical day you are drinking?  0 Filed at: 01/06/2025 0908   3b. FEMALE Any Age, or MALE 65+: How often do you have 4 or more drinks on one occassion? 0 Filed at: 01/06/2025 0908   Audit-C Score 0 Filed at: 01/06/2025 0908   SONG: How many times in the past year have you...    Used an illegal drug or used a prescription medication for non-medical reasons? Never Filed at: 01/06/2025 0908                            History of Present Illness       Chief Complaint   Patient  presents with    URI     Chest cold started yesterday. Cough, sneezing, shortness of breath on exertion.       Past Medical History:   Diagnosis Date    Benign essential hypertension 05/08/2014    CAD (coronary artery disease)     s/p NAVA prox LAD and D1 7/28/2022    Cancer (HCC)     Prostate    Coronary artery disease involving native coronary artery of native heart without angina pectoris 08/09/2022    Enteritis 08/28/2024    GERD (gastroesophageal reflux disease)     Hyperlipidemia     Hypertension     Other chest pain     Palpitations     Prostate cancer (HCC) 04/18/2023    Sleep apnea       Past Surgical History:   Procedure Laterality Date    CARDIAC CATHETERIZATION Left 7/28/2022    Procedure: Cardiac catheterization-left heart catheterization;  Surgeon: Sai Henry MD;  Location: [facility];  Service: Cardiology    CARDIAC CATHETERIZATION N/A 7/28/2022    Procedure: Cardiac pci;  Surgeon: Sai Henry MD;  Location: [facility];  Service: Cardiology    HERNIA REPAIR      IR EMBOLIZATION (SPECIFY VESSEL OR SITE)  9/13/2024    IR PORT PLACEMENT  10/28/2024    VA LAPS SURG [id number] RPBIC RAD W/NRV SPARING ROBOT N/A 5/22/2023    Procedure: PROSTATECTOMY RADICAL & PELVIC LYMPH NODE DISSECTION  W/ ROBOT;  Surgeon: Otoniel Harmon MD;  Location: [facility];  Service: Urology    VA PROSTATE NEEDLE BIOPSY ANY APPROACH N/A 2/28/2023    Procedure: TRANSRECTAL MRI FUSION BIOPSY PROSTATE;  Surgeon: Milan Arellano MD;  Location: [facility];  Service: Urology    WHIPPLE PROCEDURE/PANCREATICO-DUODENECTOMY N/A 9/12/2024    Procedure: WHIPPLE PROCEDURE/PANCREATICO-DUODENECTOMY;  Surgeon: Marika Ac MD;  Location: [facility];  Service: Surgical Oncology      Family History   Problem Relation Age of Onset    No Known Problems Mother     No Known Problems Father       Social History     Tobacco Use    Smoking status: Former     Current packs/day: 0.00     Types: Cigarettes     Quit date:  2022     Years since quittin.0    Smokeless tobacco: Never   Vaping Use    Vaping status: Never Used   Substance Use Topics    Alcohol use: Not Currently    Drug use: Never      E-Cigarette/Vaping    E-Cigarette Use Never User       E-Cigarette/Vaping Substances    Nicotine No     THC No     CBD No     Flavoring No     Other No     Unknown No       I have reviewed and agree with the history as documented.     Patient is a 64-year-old male with a past medical history including hypertension, CAD, hyperlipidemia, GERD, and prostate cancer. Presents today for evaluation of shortness of breath and URI symptoms. States that he started with a productive cough yesterday as well as a subjective fever and chills. Denies any nasal congestion, rhinorrhea, or PND. Experiences some chest discomfort only when coughing. Feeling more short of breath upon exertion, none at rest. No cold medications taken.      URI  Presenting symptoms: cough, fever and sore throat    Presenting symptoms: no congestion and no rhinorrhea        Review of Systems   Constitutional:  Positive for chills and fever.   HENT:  Positive for sore throat. Negative for congestion and rhinorrhea.    Respiratory:  Positive for cough and shortness of breath.    Cardiovascular:  Negative for chest pain.   Gastrointestinal:  Positive for diarrhea (on chemo). Negative for abdominal pain, nausea and vomiting.   All other systems reviewed and are negative.          Objective       ED Triage Vitals [25]   Temperature Pulse Blood Pressure Respirations SpO2 Patient Position - Orthostatic VS   97.5 °F (36.4 °C) (!) 117 135/66 18 97 % Sitting      Temp Source Heart Rate Source BP Location FiO2 (%) Pain Score    Tympanic Monitor Left arm -- No Pain      Vitals      Date and Time Temp Pulse SpO2 Resp BP Pain Score FACES Pain Rating User   25 1138 -- 80 98 % 18 118/59 -- -- DK   25 0959 -- 87 -- -- -- -- -- RN   25 97.5 °F (36.4 °C)  117 97 % 18 135/66 No Pain -- TG            Physical Exam  Vitals and nursing note reviewed.   Constitutional:       Appearance: Normal appearance.   HENT:      Head: Normocephalic and atraumatic.   Cardiovascular:      Rate and Rhythm: Tachycardia present.      Heart sounds: Murmur heard.   Pulmonary:      Effort: Pulmonary effort is normal.      Breath sounds: Normal breath sounds. No wheezing.   Musculoskeletal:         General: Normal range of motion.   Skin:     General: Skin is warm and dry.      Capillary Refill: Capillary refill takes less than 2 seconds.   Neurological:      General: No focal deficit present.      Mental Status: He is alert and oriented to person, place, and time.   Psychiatric:         Mood and Affect: Mood normal.         Behavior: Behavior normal.         Results Reviewed       Procedure Component Value Units Date/Time    D-dimer, quantitative [[id number]]  (Normal) Collected: 01/06/25 1037    Lab Status: Final result Specimen: Blood from Arm, Right Updated: 01/06/25 1115     D-Dimer, Quant 0.48 ug/ml FEU     Narrative:      In the evaluation for possible pulmonary embolism, in the appropriate (Well's Score of 4 or less) patient, the age adjusted d-dimer cutoff for this patient can be calculated as:    Age x 0.01 (in ug/mL) for Age-adjusted D-dimer exclusion threshold for a patient over 50 years.    HS Troponin 0hr (reflex protocol) [[id number]]  (Normal) Collected: 01/06/25 1037    Lab Status: Final result Specimen: Blood from Arm, Right Updated: 01/06/25 1108     hs TnI 0hr 4 ng/L     Basic metabolic panel [[id number]] Collected: 01/06/25 1037    Lab Status: Final result Specimen: Blood from Arm, Right Updated: 01/06/25 1100     Sodium 140 mmol/L      Potassium 3.5 mmol/L      Chloride 108 mmol/L      CO2 28 mmol/L      ANION GAP 4 mmol/L      BUN 11 mg/dL      Creatinine 0.74 mg/dL      Glucose 86 mg/dL      Calcium 8.5 mg/dL      eGFR 96 ml/min/1.73sq m     Narrative:      National  Kidney Disease Foundation guidelines for Chronic Kidney Disease (CKD):     Stage 1 with normal or high GFR (GFR > 90 mL/min/1.73 square meters)    Stage 2 Mild CKD (GFR = 60-89 mL/min/1.73 square meters)    Stage 3A Moderate CKD (GFR = 45-59 mL/min/1.73 square meters)    Stage 3B Moderate CKD (GFR = 30-44 mL/min/1.73 square meters)    Stage 4 Severe CKD (GFR = 15-29 mL/min/1.73 square meters)    Stage 5 End Stage CKD (GFR <15 mL/min/1.73 square meters)  Note: GFR calculation is accurate only with a steady state creatinine    CBC and differential [[id number]]  (Abnormal) Collected: 01/06/25 1037    Lab Status: Final result Specimen: Blood from Arm, Right Updated: 01/06/25 1044     WBC 6.78 Thousand/uL      RBC 3.77 Million/uL      Hemoglobin 10.7 g/dL      Hematocrit 33.4 %      MCV 89 fL      MCH 28.4 pg      MCHC 32.0 g/dL      RDW 17.9 %      MPV 8.5 fL      Platelets 248 Thousands/uL      nRBC 0 /100 WBCs      Segmented % 75 %      Immature Grans % 0 %      Lymphocytes % 6 %      Monocytes % 15 %      Eosinophils Relative 4 %      Basophils Relative 0 %      Absolute Neutrophils 5.05 Thousands/µL      Absolute Immature Grans 0.01 Thousand/uL      Absolute Lymphocytes 0.43 Thousands/µL      Absolute Monocytes 1.01 Thousand/µL      Eosinophils Absolute 0.25 Thousand/µL      Basophils Absolute 0.03 Thousands/µL     FLU/COVID Rapid Antigen (30 min. TAT) - Preferred screening test in ED [[id number]]  (Normal) Collected: 01/06/25 0910    Lab Status: Final result Specimen: Nares from Nose Updated: 01/06/25 0931     SARS COV Rapid Antigen Negative     Influenza A Rapid Antigen Negative     Influenza B Rapid Antigen Negative    Narrative:      This test has been performed using the [employer] Tosha 2 FLU+SARS Antigen test under the Emergency Use Authorization (EUA). This test has been validated by the  and verified by the performing laboratory. The Tosha uses lateral flow immunofluorescent sandwich assay to detect  SARS-COV, Influenza A and Influenza B Antigen.     The [employer] Tosha 2 SARS Antigen test does not differentiate between SARS-CoV and SARS-CoV-2.     Negative results are presumptive and may be confirmed with a molecular assay, if necessary, for patient management. Negative results do not rule out SARS-CoV-2 or influenza infection and should not be used as the sole basis for treatment or patient management decisions. A negative test result may occur if the level of antigen in a sample is below the limit of detection of this test.     Positive results are indicative of the presence of viral antigens, but do not rule out bacterial infection or co-infection with other viruses.     All test results should be used as an adjunct to clinical observations and other information available to the provider.    FOR PEDIATRIC PATIENTS - copy/paste COVID Guidelines URL to browser: [email or web address]            XR chest 2 views   ED Interpretation by DULCE English (01/06 1008)   No pneumonia noted on my interpretation of chest x-ray.      Final Interpretation by Radha Nieves MD (01/06 1057)      No acute cardiopulmonary disease.            Workstation performed: [id number]             ECG 12 Lead Documentation Only    Date/Time: 1/6/2025 9:58 AM    Performed by: DULCE English  Authorized by: DULCE English    Indications / Diagnosis:  SOB  ECG reviewed by me, the ED Provider: yes    Patient location:  ED  Previous ECG:     Previous ECG:  Compared to current    Comparison ECG info:  NSR, HR=80, left axis shift    Similarity:  No change  Interpretation:     Interpretation: normal    Rate:     ECG rate:  87    ECG rate assessment: normal    Rhythm:     Rhythm: sinus rhythm    Ectopy:     Ectopy: none    QRS:     QRS axis:  Left    QRS intervals:  Normal  Conduction:     Conduction: normal    ST segments:     ST segments:  Normal  T waves:     T  waves: normal        ED Medication and Procedure Management   Prior to Admission Medications   Prescriptions Last Dose Informant Patient Reported? Taking?   aspirin 81 mg chewable tablet  Self Yes No   Sig: Chew 81 mg daily   atorvastatin (LIPITOR) 20 mg tablet  Self No No   Sig: Take 1 tablet (20 mg total) by mouth daily   capecitabine (XELODA) 150 MG tablet  Self No No   Sig: Take 2 tablets (300 mg total) by mouth 2 (two) times a day 21 days on, followed by 7 days off   capecitabine (XELODA) 500 MG tablet  Self No No   Sig: Take 2 tablets (1,000 mg total) by mouth 2 (two) times a day 21 days on, followed by 7 days off   dicyclomine (BENTYL) 10 mg capsule  Self No No   Sig: Take 1 capsule (10 mg total) by mouth 3 (three) times a day before meals   lisinopril (ZESTRIL) 20 mg tablet  Self No No   Sig: Take 1 tablet (20 mg total) by mouth daily   ondansetron (ZOFRAN) 4 mg tablet  Self No No   Sig: Take 1 tablet (4 mg total) by mouth every 8 (eight) hours as needed for nausea or vomiting      Facility-Administered Medications: None     Discharge Medication List as of 1/6/2025 11:31 AM        START taking these medications    Details   albuterol (ProAir HFA) 90 mcg/act inhaler Inhale 2 puffs every 6 (six) hours as needed for wheezing, Starting Mon 1/6/2025, Normal      menthol-cetylpyridinium (CEPACOL) 3 MG lozenge Take 1 lozenge (3 mg total) by mouth as needed for sore throat, Starting Mon 1/6/2025, Normal           CONTINUE these medications which have NOT CHANGED    Details   aspirin 81 mg chewable tablet Chew 81 mg daily, Historical Med      atorvastatin (LIPITOR) 20 mg tablet Take 1 tablet (20 mg total) by mouth daily, Starting Thu 7/25/2024, Normal      !! capecitabine (XELODA) 150 MG tablet Take 2 tablets (300 mg total) by mouth 2 (two) times a day 21 days on, followed by 7 days off, Starting Mon 12/2/2024, Normal      !! capecitabine (XELODA) 500 MG tablet Take 2 tablets (1,000 mg total) by mouth 2 (two) times a  day 21 days on, followed by 7 days off, Starting Mon 12/2/2024, Normal      dicyclomine (BENTYL) 10 mg capsule Take 1 capsule (10 mg total) by mouth 3 (three) times a day before meals, Starting Mon 11/11/2024, Until Wed 12/11/2024, Normal      lisinopril (ZESTRIL) 20 mg tablet Take 1 tablet (20 mg total) by mouth daily, Starting Thu 10/3/2024, Normal      ondansetron (ZOFRAN) 4 mg tablet Take 1 tablet (4 mg total) by mouth every 8 (eight) hours as needed for nausea or vomiting, Starting Tue 12/10/2024, Normal       !! - Potential duplicate medications found. Please discuss with provider.        No discharge procedures on file.  ED SEPSIS DOCUMENTATION   Time reflects when diagnosis was documented in both MDM as applicable and the Disposition within this note       Time User Action Codes Description Comment    1/6/2025 11:28 AM Crys Dinh Add [R06.02] Shortness of breath     1/6/2025 11:28 AM Crys Dinh Add [R05.9] Cough                  Crys DULCE Serrato  01/08/25 4881

## 2025-01-07 ENCOUNTER — HOSPITAL ENCOUNTER (OUTPATIENT)
Dept: INFUSION CENTER | Facility: HOSPITAL | Age: 65
Discharge: HOME | End: 2025-01-07
Attending: INTERNAL MEDICINE

## 2025-01-07 VITALS — TEMPERATURE: 98.7 F

## 2025-01-07 DIAGNOSIS — Z45.2 ENCOUNTER FOR CENTRAL LINE CARE: Primary | ICD-10-CM

## 2025-01-07 DIAGNOSIS — D70.1 CHEMOTHERAPY-INDUCED NEUTROPENIA (HCC): ICD-10-CM

## 2025-01-07 DIAGNOSIS — T45.1X5A CHEMOTHERAPY-INDUCED NEUTROPENIA (HCC): ICD-10-CM

## 2025-01-07 DIAGNOSIS — C25.9 PANCREATIC ADENOCARCINOMA (HCC): ICD-10-CM

## 2025-01-07 LAB
ALBUMIN SERPL BCG-MCNC: 3.8 G/DL (ref 3.5–5)
ALP SERPL-CCNC: 95 U/L (ref 34–104)
ALT SERPL W P-5'-P-CCNC: 19 U/L (ref 7–52)
ANION GAP SERPL CALCULATED.3IONS-SCNC: 8 MMOL/L (ref 4–13)
ANISOCYTOSIS BLD QL SMEAR: PRESENT
AST SERPL W P-5'-P-CCNC: 12 U/L (ref 13–39)
BASOPHILS # BLD MANUAL: 0 THOUSAND/UL (ref 0–0.1)
BASOPHILS NFR MAR MANUAL: 0 % (ref 0–1)
BILIRUB SERPL-MCNC: 0.38 MG/DL (ref 0.2–1)
BUN SERPL-MCNC: 15 MG/DL (ref 5–25)
CALCIUM SERPL-MCNC: 8.4 MG/DL (ref 8.4–10.2)
CHLORIDE SERPL-SCNC: 110 MMOL/L (ref 96–108)
CO2 SERPL-SCNC: 23 MMOL/L (ref 21–32)
CREAT SERPL-MCNC: 0.57 MG/DL (ref 0.6–1.3)
EOSINOPHIL # BLD MANUAL: 0 THOUSAND/UL (ref 0–0.4)
EOSINOPHIL NFR BLD MANUAL: 0 % (ref 0–6)
ERYTHROCYTE [DISTWIDTH] IN BLOOD BY AUTOMATED COUNT: 17.9 % (ref 11.6–15.1)
GFR SERPL CREATININE-BSD FRML MDRD: 107 ML/MIN/1.73SQ M
GLUCOSE SERPL-MCNC: 97 MG/DL (ref 65–140)
HCT VFR BLD AUTO: 32.1 % (ref 36.5–49.3)
HGB BLD-MCNC: 10 G/DL (ref 12–17)
LG PLATELETS BLD QL SMEAR: PRESENT
LYMPHOCYTES # BLD AUTO: 1.05 THOUSAND/UL (ref 0.6–4.47)
LYMPHOCYTES # BLD AUTO: 10 % (ref 14–44)
MAGNESIUM SERPL-MCNC: 2.1 MG/DL (ref 1.9–2.7)
MCH RBC QN AUTO: 27.9 PG (ref 26.8–34.3)
MCHC RBC AUTO-ENTMCNC: 31.2 G/DL (ref 31.4–37.4)
MCV RBC AUTO: 90 FL (ref 82–98)
MONOCYTES # BLD AUTO: 0.84 THOUSAND/UL (ref 0–1.22)
MONOCYTES NFR BLD: 8 % (ref 4–12)
NEUTROPHILS # BLD MANUAL: 8.59 THOUSAND/UL (ref 1.85–7.62)
NEUTS BAND NFR BLD MANUAL: 5 % (ref 0–8)
NEUTS SEG NFR BLD AUTO: 77 % (ref 43–75)
OVALOCYTES BLD QL SMEAR: PRESENT
PLATELET # BLD AUTO: 268 THOUSANDS/UL (ref 149–390)
PLATELET BLD QL SMEAR: ADEQUATE
PMV BLD AUTO: 9.6 FL (ref 8.9–12.7)
POIKILOCYTOSIS BLD QL SMEAR: PRESENT
POTASSIUM SERPL-SCNC: 3.5 MMOL/L (ref 3.5–5.3)
PROT SERPL-MCNC: 6 G/DL (ref 6.4–8.4)
RBC # BLD AUTO: 3.58 MILLION/UL (ref 3.88–5.62)
RBC MORPH BLD: PRESENT
SODIUM SERPL-SCNC: 141 MMOL/L (ref 135–147)
WBC # BLD AUTO: 10.48 THOUSAND/UL (ref 4.31–10.16)

## 2025-01-07 PROCEDURE — 85027 COMPLETE CBC AUTOMATED: CPT | Performed by: INTERNAL MEDICINE

## 2025-01-07 PROCEDURE — 85007 BL SMEAR W/DIFF WBC COUNT: CPT | Performed by: INTERNAL MEDICINE

## 2025-01-07 PROCEDURE — 80053 COMPREHEN METABOLIC PANEL: CPT | Performed by: INTERNAL MEDICINE

## 2025-01-07 PROCEDURE — 83735 ASSAY OF MAGNESIUM: CPT

## 2025-01-07 NOTE — PROGRESS NOTES
Isaac Kramer  tolerated lab draw well with no complications.      Isaac Kramer is aware of future appt on 1/8 at 12PM.     AVS printed and given to Isaac Kramer: No (Declined by Isaac Kramer).

## 2025-01-08 ENCOUNTER — HOSPITAL ENCOUNTER (OUTPATIENT)
Dept: INFUSION CENTER | Facility: HOSPITAL | Age: 65
Discharge: HOME | End: 2025-01-08
Attending: INTERNAL MEDICINE

## 2025-01-08 VITALS
DIASTOLIC BLOOD PRESSURE: 57 MMHG | WEIGHT: 128.75 LBS | TEMPERATURE: 97.8 F | HEIGHT: 64 IN | BODY MASS INDEX: 21.98 KG/M2 | HEART RATE: 99 BPM | OXYGEN SATURATION: 100 % | RESPIRATION RATE: 18 BRPM | SYSTOLIC BLOOD PRESSURE: 121 MMHG

## 2025-01-08 DIAGNOSIS — C25.9 PANCREATIC ADENOCARCINOMA (HCC): Primary | ICD-10-CM

## 2025-01-08 DIAGNOSIS — T45.1X5A CHEMOTHERAPY-INDUCED NEUTROPENIA (HCC): ICD-10-CM

## 2025-01-08 DIAGNOSIS — D70.1 CHEMOTHERAPY-INDUCED NEUTROPENIA (HCC): ICD-10-CM

## 2025-01-08 PROCEDURE — 96367 TX/PROPH/DG ADDL SEQ IV INF: CPT

## 2025-01-08 PROCEDURE — 96413 CHEMO IV INFUSION 1 HR: CPT

## 2025-01-08 RX ORDER — SODIUM CHLORIDE 9 MG/ML
20 INJECTION, SOLUTION INTRAVENOUS ONCE
Status: COMPLETED | OUTPATIENT
Start: 2025-01-08 | End: 2025-01-08

## 2025-01-08 RX ADMIN — SODIUM CHLORIDE 20 ML/HR: 0.9 INJECTION, SOLUTION INTRAVENOUS at 12:20

## 2025-01-08 RX ADMIN — GEMCITABINE 1600 MG: 38 INJECTION, SOLUTION INTRAVENOUS at 13:03

## 2025-01-08 RX ADMIN — DEXAMETHASONE SODIUM PHOSPHATE 10 MG: 100 INJECTION INTRAMUSCULAR; INTRAVENOUS at 12:20

## 2025-01-08 NOTE — PROGRESS NOTES
Isaac Kramer  tolerated treatment well with no complications.(Gemzar)  Pt has a mild URI. No fevers no antibiotics. Pt cleared for tx today per Shanita BOWLING RN with Dr Colby.     Isaac Kramer is aware of future appt on 1/14 at 11a.     AVS printed and given to Isaac Kramer:  No (Declined by Isaac Kramer)

## 2025-01-10 ENCOUNTER — HOSPITAL ENCOUNTER (EMERGENCY)
Facility: HOSPITAL | Age: 65
Discharge: HOME | End: 2025-01-10
Attending: EMERGENCY MEDICINE

## 2025-01-10 ENCOUNTER — APPOINTMENT (EMERGENCY)
Dept: RADIOLOGY | Facility: HOSPITAL | Age: 65
End: 2025-01-10

## 2025-01-10 VITALS
TEMPERATURE: 98.6 F | HEART RATE: 86 BPM | RESPIRATION RATE: 18 BRPM | OXYGEN SATURATION: 98 % | HEIGHT: 64 IN | WEIGHT: 131 LBS | BODY MASS INDEX: 22.36 KG/M2 | DIASTOLIC BLOOD PRESSURE: 68 MMHG | SYSTOLIC BLOOD PRESSURE: 133 MMHG

## 2025-01-10 DIAGNOSIS — J06.9 VIRAL URI WITH COUGH: Primary | ICD-10-CM

## 2025-01-10 PROCEDURE — 99284 EMERGENCY DEPT VISIT MOD MDM: CPT | Performed by: EMERGENCY MEDICINE

## 2025-01-10 PROCEDURE — 99284 EMERGENCY DEPT VISIT MOD MDM: CPT

## 2025-01-10 PROCEDURE — 71046 X-RAY EXAM CHEST 2 VIEWS: CPT

## 2025-01-10 NOTE — ED PROVIDER NOTES
Time reflects when diagnosis was documented in both MDM as applicable and the Disposition within this note       Time User Action Codes Description Comment    1/10/2025 12:36 PM Talya Bonilla Add [J06.9] Viral URI with cough           ED Disposition       ED Disposition   Discharge    Condition   Stable    Date/Time   Fri Francis 10, 2025 12:36 PM    Comment   Isaac Kramer discharge to home/self care.                   Assessment & Plan       Medical Decision Making  Patient is a 63 y/o male PMH of HTN, peripheral vascular disease, BPH, GERD, and ampullary carcinoma presenting with a cough and mild congestion for 5 days. Denies fever, chills, abdominal pain, N/V/D, chest pain, shortness of breath, urinary symptoms. He came in 5 days ago to get seen when it started, COVID/Flu was negative. States they gave him Tessalon Perles and an albuterol inhaler, which gave minimal relief. He has also been taking Robitussin. Today, vitals are normal, O2 sat is 98%, he is well-appearing, in no distress. Did repeat CXR to see if any pneumonia is developing. No acute abnormality seen on independent interpretation, awaiting radiology read. Discussed supportive care, increasing fluid intake, risks and return precautions with patient, he was agreeable to plan and was discharged home.     Amount and/or Complexity of Data Reviewed  Radiology: ordered.     Details: CXR looked unchanged from prior.              Medications - No data to display    ED Risk Strat Scores                          SBIRT 20yo+      Flowsheet Row Most Recent Value   Initial Alcohol Screen: US AUDIT-C     1. How often do you have a drink containing alcohol? 0 Filed at: 01/10/2025 1144   2. How many drinks containing alcohol do you have on a typical day you are drinking?  0 Filed at: 01/10/2025 1144   3a. Male UNDER 65: How often do you have five or more drinks on one occasion? 0 Filed at: 01/10/2025 1144   Audit-C Score 0 Filed at: 01/10/2025 1144   SONG: How  many times in the past year have you...    Used an illegal drug or used a prescription medication for non-medical reasons? Never Filed at: 01/10/2025 1144                            History of Present Illness       Chief Complaint   Patient presents with    Cough     According to the patient he has had a cough since Monday and has been worsening.  Patient reports sinus congestion and chest congestion.       Past Medical History:   Diagnosis Date    Benign essential hypertension 05/08/2014    CAD (coronary artery disease)     s/p NAVA prox LAD and D1 7/28/2022    Cancer (HCC)     Prostate    Coronary artery disease involving native coronary artery of native heart without angina pectoris 08/09/2022    Enteritis 08/28/2024    GERD (gastroesophageal reflux disease)     Hyperlipidemia     Hypertension     Other chest pain     Palpitations     Prostate cancer (HCC) 04/18/2023    Sleep apnea       Past Surgical History:   Procedure Laterality Date    CARDIAC CATHETERIZATION Left 7/28/2022    Procedure: Cardiac catheterization-left heart catheterization;  Surgeon: Sai Henry MD;  Location: [facility];  Service: Cardiology    CARDIAC CATHETERIZATION N/A 7/28/2022    Procedure: Cardiac pci;  Surgeon: Sai Henry MD;  Location: [facility];  Service: Cardiology    HERNIA REPAIR      IR EMBOLIZATION (SPECIFY VESSEL OR SITE)  9/13/2024    IR PORT PLACEMENT  10/28/2024    MS LAPS SURG [id number] RPBIC RAD W/NRV SPARING ROBOT N/A 5/22/2023    Procedure: PROSTATECTOMY RADICAL & PELVIC LYMPH NODE DISSECTION  W/ ROBOT;  Surgeon: Otoniel Harmon MD;  Location: [facility];  Service: Urology    MS PROSTATE NEEDLE BIOPSY ANY APPROACH N/A 2/28/2023    Procedure: TRANSRECTAL MRI FUSION BIOPSY PROSTATE;  Surgeon: Milna Arellano MD;  Location: [facility];  Service: Urology    WHIPPLE PROCEDURE/PANCREATICO-DUODENECTOMY N/A 9/12/2024    Procedure: WHIPPLE PROCEDURE/PANCREATICO-DUODENECTOMY;  Surgeon: Marika MENDEZ  MD Yashira;  Location: [facility];  Service: Surgical Oncology      Family History   Problem Relation Age of Onset    No Known Problems Mother     No Known Problems Father       Social History     Tobacco Use    Smoking status: Former     Current packs/day: 0.00     Types: Cigarettes     Quit date: 2022     Years since quittin.0    Smokeless tobacco: Never   Vaping Use    Vaping status: Never Used   Substance Use Topics    Alcohol use: Not Currently    Drug use: Never      E-Cigarette/Vaping    E-Cigarette Use Never User       E-Cigarette/Vaping Substances    Nicotine No     THC No     CBD No     Flavoring No     Other No     Unknown No       I have reviewed and agree with the history as documented.     Patient is a 63 y/o male PMH of HTN, peripheral vascular disease, BPH, GERD, and ampullary carcinoma presenting with a cough and mild congestion for 5 days. Denies fever, chills, abdominal pain, N/V/D, chest pain, shortness of breath, urinary symptoms. He came in 5 days ago to get seen when it started, COVID/Flu was negative. States they gave him Tessalon Perles and an albuterol inhaler, which gave minimal relief. He has also been taking Robitussin.       History provided by:  Patient   used: No    Cough  Associated symptoms: no chest pain, no chills, no ear pain, no fever, no rash, no shortness of breath and no sore throat        Review of Systems   Constitutional:  Negative for chills and fever.   HENT:  Positive for congestion. Negative for ear pain, postnasal drip and sore throat.    Eyes:  Negative for pain and visual disturbance.   Respiratory:  Positive for cough. Negative for shortness of breath.    Cardiovascular:  Negative for chest pain and palpitations.   Gastrointestinal:  Negative for abdominal pain, diarrhea, nausea and vomiting.   Genitourinary:  Negative for dysuria and hematuria.   Musculoskeletal:  Negative for arthralgias and back pain.   Skin:  Negative for color  change and rash.   Neurological:  Negative for dizziness, seizures, syncope, weakness and light-headedness.   All other systems reviewed and are negative.      Objective       ED Triage Vitals [01/10/25 1142]   Temperature Pulse Blood Pressure Respirations SpO2 Patient Position - Orthostatic VS   98.6 °F (37 °C) 86 133/68 18 98 % Lying      Temp Source Heart Rate Source BP Location FiO2 (%) Pain Score    Temporal -- Right arm -- No Pain      Vitals      Date and Time Temp Pulse SpO2 Resp BP Pain Score FACES Pain Rating User   01/10/25 1142 98.6 °F (37 °C) 86 98 % 18 133/68 No Pain [place] [facility] [place]            Physical Exam  Vitals and nursing note reviewed.   Constitutional:       General: He is not in acute distress.     Appearance: Normal appearance. He is not ill-appearing, toxic-appearing or diaphoretic.   HENT:      Head: Normocephalic.      Right Ear: Tympanic membrane normal.      Left Ear: Tympanic membrane normal.      Nose: Congestion present.      Mouth/Throat:      Mouth: Mucous membranes are moist.      Pharynx: Oropharynx is clear. No oropharyngeal exudate or posterior oropharyngeal erythema.   Eyes:      Conjunctiva/sclera: Conjunctivae normal.      Pupils: Pupils are equal, round, and reactive to light.   Cardiovascular:      Rate and Rhythm: Normal rate and regular rhythm.      Pulses: Normal pulses.      Heart sounds: Normal heart sounds.   Pulmonary:      Effort: Pulmonary effort is normal.      Breath sounds: Normal breath sounds.   Abdominal:      General: Abdomen is flat. There is no distension.      Palpations: Abdomen is soft.      Tenderness: There is no abdominal tenderness. There is no guarding or rebound.   Musculoskeletal:         General: Normal range of motion.      Cervical back: Normal range of motion and neck supple.   Skin:     General: Skin is warm and dry.      Capillary Refill: Capillary refill takes less than 2 seconds.   Neurological:      General: No focal deficit present.      Mental  Status: He is alert and oriented to person, place, and time.   Psychiatric:         Mood and Affect: Mood normal.         Behavior: Behavior normal.         Thought Content: Thought content normal.         Judgment: Judgment normal.         Results Reviewed       None            XR chest 2 views    (Results Pending)       Procedures    ED Medication and Procedure Management   Prior to Admission Medications   Prescriptions Last Dose Informant Patient Reported? Taking?   albuterol (ProAir HFA) 90 mcg/act inhaler   No No   Sig: Inhale 2 puffs every 6 (six) hours as needed for wheezing   aspirin 81 mg chewable tablet  Self Yes No   Sig: Chew 81 mg daily   atorvastatin (LIPITOR) 20 mg tablet  Self No No   Sig: Take 1 tablet (20 mg total) by mouth daily   capecitabine (XELODA) 150 MG tablet  Self No No   Sig: Take 2 tablets (300 mg total) by mouth 2 (two) times a day 21 days on, followed by 7 days off   capecitabine (XELODA) 500 MG tablet  Self No No   Sig: Take 2 tablets (1,000 mg total) by mouth 2 (two) times a day 21 days on, followed by 7 days off   dicyclomine (BENTYL) 10 mg capsule  Self No No   Sig: Take 1 capsule (10 mg total) by mouth 3 (three) times a day before meals   lisinopril (ZESTRIL) 20 mg tablet  Self No No   Sig: Take 1 tablet (20 mg total) by mouth daily   menthol-cetylpyridinium (CEPACOL) 3 MG lozenge   No No   Sig: Take 1 lozenge (3 mg total) by mouth as needed for sore throat   ondansetron (ZOFRAN) 4 mg tablet  Self No No   Sig: Take 1 tablet (4 mg total) by mouth every 8 (eight) hours as needed for nausea or vomiting      Facility-Administered Medications: None     Patient's Medications   Discharge Prescriptions    No medications on file     No discharge procedures on file.  ED SEPSIS DOCUMENTATION   Time reflects when diagnosis was documented in both MDM as applicable and the Disposition within this note       Time User Action Codes Description Comment    1/10/2025 12:36 PM Talya Bonilla Add  [J06.9] Viral URI with cough                  Talya Bonilla PA-C  01/10/25 1242

## 2025-01-10 NOTE — ED ATTENDING ATTESTATION
1/10/2025  IDemar DO, saw and evaluated the patient. I have discussed the patient with the resident/non-physician practitioner and agree with the resident's/non-physician practitioner's findings, Plan of Care, and MDM as documented in the resident's/non-physician practitioner's note, except where noted. All available labs and Radiology studies were reviewed.  I was present for key portions of any procedure(s) performed by the resident/non-physician practitioner and I was immediately available to provide assistance.       At this point I agree with the current assessment done in the Emergency Department.  I have conducted an independent evaluation of this patient a history and physical is as follows:    64-year-old male presents for complaint of several days of cough that has been gradually worsening, associated with chest and sinus congestion.  The cough is productive but not causing pain.  He was seen in the emergency department at the onset of symptoms and had negative CXR, COVID and flu testing.  He was given Tessalon Perles and an albuterol inhaler but has not found relief.  He has tried OTC Robitussin, also without relief.  He quit smoking in 2022.    No recent travel or similar sick contacts.    ROS: No associated fever, LH/dizziness, CP, SOB, n/v/d. Denies other complaint.    PE: NAD, appears comfortable, alert; PERRL, EOMI; MMM, no posterior oropharyngeal exudate, edema or erythema; HRR, no murmur; lungs CTA w/o w/r/r, POx 98% on RA (nl); (-) LE edema, FROM extremities x4; skin p/w/d.    MDM/DDx: Persistent cough - viral URI with persistent inflammation or residual congestion, less likely but at risk for developing pneumonia, clinically doubt pneumothorax.     I independently reviewed and interpreted ordered CXR from this encounter.    A/P: Will check CXR, treat symptoms, reevaluate for further work up and disposition.    ED Course         Critical Care Time  Procedures

## 2025-01-10 NOTE — DISCHARGE INSTRUCTIONS
Continue using Robitussin as needed for cough. A tablespoon of honey to help suppress cough. Increase fluid intake.

## 2025-01-14 ENCOUNTER — HOSPITAL ENCOUNTER (OUTPATIENT)
Dept: INFUSION CENTER | Facility: HOSPITAL | Age: 65
Discharge: HOME | End: 2025-01-14
Attending: INTERNAL MEDICINE

## 2025-01-14 DIAGNOSIS — C25.9 PANCREATIC ADENOCARCINOMA (HCC): ICD-10-CM

## 2025-01-14 DIAGNOSIS — Z45.2 ENCOUNTER FOR CENTRAL LINE CARE: Primary | ICD-10-CM

## 2025-01-14 LAB
ALBUMIN SERPL BCG-MCNC: 3.7 G/DL (ref 3.5–5)
ALP SERPL-CCNC: 86 U/L (ref 34–104)
ALT SERPL W P-5'-P-CCNC: 30 U/L (ref 7–52)
ANION GAP SERPL CALCULATED.3IONS-SCNC: 5 MMOL/L (ref 4–13)
AST SERPL W P-5'-P-CCNC: 20 U/L (ref 13–39)
BASOPHILS # BLD AUTO: 0.03 THOUSANDS/ΜL (ref 0–0.1)
BASOPHILS NFR BLD AUTO: 1 % (ref 0–1)
BILIRUB SERPL-MCNC: 0.33 MG/DL (ref 0.2–1)
BUN SERPL-MCNC: 16 MG/DL (ref 5–25)
CALCIUM SERPL-MCNC: 8.3 MG/DL (ref 8.4–10.2)
CHLORIDE SERPL-SCNC: 109 MMOL/L (ref 96–108)
CO2 SERPL-SCNC: 24 MMOL/L (ref 21–32)
CREAT SERPL-MCNC: 0.52 MG/DL (ref 0.6–1.3)
EOSINOPHIL # BLD AUTO: 0.06 THOUSAND/ΜL (ref 0–0.61)
EOSINOPHIL NFR BLD AUTO: 2 % (ref 0–6)
ERYTHROCYTE [DISTWIDTH] IN BLOOD BY AUTOMATED COUNT: 18 % (ref 11.6–15.1)
GFR SERPL CREATININE-BSD FRML MDRD: 111 ML/MIN/1.73SQ M
GLUCOSE SERPL-MCNC: 92 MG/DL (ref 65–140)
HCT VFR BLD AUTO: 32.8 % (ref 36.5–49.3)
HGB BLD-MCNC: 10.3 G/DL (ref 12–17)
IMM GRANULOCYTES # BLD AUTO: 0.02 THOUSAND/UL (ref 0–0.2)
IMM GRANULOCYTES NFR BLD AUTO: 1 % (ref 0–2)
LYMPHOCYTES # BLD AUTO: 1.23 THOUSANDS/ΜL (ref 0.6–4.47)
LYMPHOCYTES NFR BLD AUTO: 37 % (ref 14–44)
MAGNESIUM SERPL-MCNC: 1.9 MG/DL (ref 1.9–2.7)
MCH RBC QN AUTO: 28 PG (ref 26.8–34.3)
MCHC RBC AUTO-ENTMCNC: 31.4 G/DL (ref 31.4–37.4)
MCV RBC AUTO: 89 FL (ref 82–98)
MONOCYTES # BLD AUTO: 0.27 THOUSAND/ΜL (ref 0.17–1.22)
MONOCYTES NFR BLD AUTO: 8 % (ref 4–12)
NEUTROPHILS # BLD AUTO: 1.68 THOUSANDS/ΜL (ref 1.85–7.62)
NEUTS SEG NFR BLD AUTO: 51 % (ref 43–75)
NRBC BLD AUTO-RTO: 0 /100 WBCS
PLATELET # BLD AUTO: 403 THOUSANDS/UL (ref 149–390)
PMV BLD AUTO: 9 FL (ref 8.9–12.7)
POTASSIUM SERPL-SCNC: 3.7 MMOL/L (ref 3.5–5.3)
PROT SERPL-MCNC: 5.8 G/DL (ref 6.4–8.4)
RBC # BLD AUTO: 3.68 MILLION/UL (ref 3.88–5.62)
SODIUM SERPL-SCNC: 138 MMOL/L (ref 135–147)
WBC # BLD AUTO: 3.29 THOUSAND/UL (ref 4.31–10.16)

## 2025-01-14 PROCEDURE — 85025 COMPLETE CBC W/AUTO DIFF WBC: CPT

## 2025-01-14 PROCEDURE — 80053 COMPREHEN METABOLIC PANEL: CPT

## 2025-01-14 PROCEDURE — 83735 ASSAY OF MAGNESIUM: CPT

## 2025-01-14 PROCEDURE — 86301 IMMUNOASSAY TUMOR CA 19-9: CPT

## 2025-01-14 NOTE — PROGRESS NOTES
Isaac Kramer  tolerated lab draw well with no complications.      Isaac Kramer is aware of future appt on 1/15 at 10:30AM.     AVS printed and given to Isaac Kramer: No (Declined by Isaac Kramer).

## 2025-01-15 ENCOUNTER — HOSPITAL ENCOUNTER (OUTPATIENT)
Dept: INFUSION CENTER | Facility: HOSPITAL | Age: 65
Discharge: HOME | End: 2025-01-15
Attending: INTERNAL MEDICINE

## 2025-01-15 VITALS
WEIGHT: 134.04 LBS | SYSTOLIC BLOOD PRESSURE: 143 MMHG | HEART RATE: 68 BPM | HEIGHT: 64 IN | BODY MASS INDEX: 22.88 KG/M2 | DIASTOLIC BLOOD PRESSURE: 65 MMHG | RESPIRATION RATE: 17 BRPM | OXYGEN SATURATION: 97 % | TEMPERATURE: 96.8 F

## 2025-01-15 DIAGNOSIS — C25.9 PANCREATIC ADENOCARCINOMA (HCC): Primary | ICD-10-CM

## 2025-01-15 DIAGNOSIS — T45.1X5A CHEMOTHERAPY-INDUCED NEUTROPENIA (HCC): ICD-10-CM

## 2025-01-15 DIAGNOSIS — D70.1 CHEMOTHERAPY-INDUCED NEUTROPENIA (HCC): ICD-10-CM

## 2025-01-15 LAB
ATRIAL RATE: 87 BPM
P AXIS: 68 DEGREES
PR INTERVAL: 160 MS
QRS AXIS: -43 DEGREES
QRSD INTERVAL: 90 MS
QT INTERVAL: 352 MS
QTC INTERVAL: 424 MS
T WAVE AXIS: 64 DEGREES
VENTRICULAR RATE: 87 BPM

## 2025-01-15 PROCEDURE — 96367 TX/PROPH/DG ADDL SEQ IV INF: CPT

## 2025-01-15 PROCEDURE — 93010 ELECTROCARDIOGRAM REPORT: CPT | Performed by: INTERNAL MEDICINE

## 2025-01-15 PROCEDURE — 96413 CHEMO IV INFUSION 1 HR: CPT

## 2025-01-15 RX ORDER — SODIUM CHLORIDE 9 MG/ML
20 INJECTION, SOLUTION INTRAVENOUS ONCE
Status: COMPLETED | OUTPATIENT
Start: 2025-01-15 | End: 2025-01-15

## 2025-01-15 RX ADMIN — GEMCITABINE 1600 MG: 38 INJECTION, SOLUTION INTRAVENOUS at 11:06

## 2025-01-15 RX ADMIN — DEXAMETHASONE SODIUM PHOSPHATE 10 MG: 100 INJECTION INTRAMUSCULAR; INTRAVENOUS at 10:29

## 2025-01-15 RX ADMIN — SODIUM CHLORIDE 20 ML/HR: 0.9 INJECTION, SOLUTION INTRAVENOUS at 10:27

## 2025-01-16 LAB — CANCER AG19-9 SERPL-ACNC: 5 U/ML (ref 0–35)

## 2025-01-17 ENCOUNTER — OFFICE VISIT (OUTPATIENT)
Dept: UROLOGY | Facility: CLINIC | Age: 65
End: 2025-01-17

## 2025-01-17 VITALS
HEIGHT: 64 IN | TEMPERATURE: 97.5 F | SYSTOLIC BLOOD PRESSURE: 100 MMHG | WEIGHT: 135 LBS | RESPIRATION RATE: 15 BRPM | DIASTOLIC BLOOD PRESSURE: 62 MMHG | BODY MASS INDEX: 23.05 KG/M2 | HEART RATE: 88 BPM | OXYGEN SATURATION: 98 %

## 2025-01-17 DIAGNOSIS — C61 PROSTATE CANCER (HCC): Primary | ICD-10-CM

## 2025-01-17 DIAGNOSIS — C25.9 PANCREATIC ADENOCARCINOMA (HCC): ICD-10-CM

## 2025-01-17 PROCEDURE — 99213 OFFICE O/P EST LOW 20 MIN: CPT

## 2025-01-17 NOTE — PROGRESS NOTES
Name: Isaac Kramer      : 1960      MRN: [id number]  Encounter Provider: DULCE Gimenez  Encounter Date: 2025   Encounter department: [facility] [place]    :  Assessment & Plan  Prostate cancer (HCC)  [id number] Jihan 4+3=7  s/p RALP/PLND 2023  PSA 0.013 (2024)  Continue with Q6 month PSA surveillance, next due in May  Follow-up in 6 months.    Orders:    PSA Total, Diagnostic; Future    Pancreatic adenocarcinoma (HCC)  Pathological stage IIB (pT2, pN1, cM0) MSI stable, grade 2 adenocarcinoma of the head of the pancreas, status post Whipple's procedure on 2024.   He was started on FOLFIRINOX in 2024 but was unable to tolerate despite modified dose. He was switched to gemcitabine and capecitabine regimen which was started on 2024.  Continue follow up with Hem/Onc             Interval HPI:    Patient presents today reporting doing well from urologic standpoint.  He continues to deny any issues with urinary incontinence and has not had this problem for quite some time now.  He reports a good urinary stream and denies any abdominal pain, flank pain, or gross hematuria.    He he was diagnosed with pancreatic adenocarcinoma;  Pathological stage IIB (pT2, pN1, cM0) MSI stable, grade 2 adenocarcinoma of the head of the pancreas, status post Whipple's procedure on 2024. He was started on FOLFIRINOX in 2024 but was unable to tolerate despite modified dose. He was switched to gemcitabine and capecitabine regimen which was started on 2024.              History of Present Illness   Objective   There were no vitals taken for this visit.    Established patient last seen by me on 2024 with history of Jihan 4+3 = 7 prostate cancer status post RALP/PLND 2023 by Dr. Harmon. Previously known to Dr. Biggs with history of elevated PSA which led to multiparametric MRI, PI-RADS level 4 lesion. Patient underwent transperineal  fusion biopsy. This demonstrates highest Jihan score 4+3 equal 7 with 5 out of 12 cores positive.  PSA 0.013 as of 11/19/2024.            Review of Systems   Constitutional:  Negative for chills and fever.   HENT:  Negative for congestion and sore throat.    Respiratory:  Negative for cough and shortness of breath.    Cardiovascular:  Negative for chest pain and leg swelling.   Gastrointestinal:  Negative for abdominal pain, constipation and diarrhea.   Genitourinary:  Negative for difficulty urinating, dysuria, frequency, hematuria and urgency.   Musculoskeletal:  Negative for back pain and gait problem.   Skin:  Negative for wound.   Allergic/Immunologic: Negative for immunocompromised state.   Hematological:  Does not bruise/bleed easily.       Physical Exam  Vitals and nursing note reviewed.   Constitutional:       General: He is not in acute distress.     Appearance: He is well-developed.   HENT:      Head: Normocephalic and atraumatic.   Eyes:      Conjunctiva/sclera: Conjunctivae normal.   Cardiovascular:      Rate and Rhythm: Normal rate and regular rhythm.      Heart sounds: No murmur heard.  Pulmonary:      Effort: Pulmonary effort is normal. No respiratory distress.      Breath sounds: Normal breath sounds.   Abdominal:      Palpations: Abdomen is soft.      Tenderness: There is no abdominal tenderness.   Musculoskeletal:         General: No swelling.      Cervical back: Neck supple.   Skin:     General: Skin is warm and dry.      Capillary Refill: Capillary refill takes less than 2 seconds.   Neurological:      Mental Status: He is alert.   Psychiatric:         Mood and Affect: Mood normal.           Imaging:          Please Note:  Voice dictation software has been used to create this document. There may be inadvertent transcriptions errors.     DULCE Gimenez 01/17/25

## 2025-01-17 NOTE — ASSESSMENT & PLAN NOTE
Pathological stage IIB (pT2, pN1, cM0) MSI stable, grade 2 adenocarcinoma of the head of the pancreas, status post Whipple's procedure on 9/12/2024.   He was started on FOLFIRINOX in November 2024 but was unable to tolerate despite modified dose. He was switched to gemcitabine and capecitabine regimen which was started on 12/11/2024.  Continue follow up with Hem/Onc

## 2025-01-20 DIAGNOSIS — C25.9 PANCREATIC ADENOCARCINOMA (HCC): Primary | ICD-10-CM

## 2025-01-20 DIAGNOSIS — T45.1X5A CHEMOTHERAPY-INDUCED NEUTROPENIA (HCC): ICD-10-CM

## 2025-01-20 DIAGNOSIS — D70.1 CHEMOTHERAPY-INDUCED NEUTROPENIA (HCC): ICD-10-CM

## 2025-01-21 ENCOUNTER — HOSPITAL ENCOUNTER (OUTPATIENT)
Dept: INFUSION CENTER | Facility: HOSPITAL | Age: 65
Discharge: HOME | End: 2025-01-21
Attending: INTERNAL MEDICINE

## 2025-01-21 VITALS — TEMPERATURE: 97.2 F

## 2025-01-21 DIAGNOSIS — C25.9 PANCREATIC ADENOCARCINOMA (HCC): ICD-10-CM

## 2025-01-21 DIAGNOSIS — Z45.2 ENCOUNTER FOR CENTRAL LINE CARE: Primary | ICD-10-CM

## 2025-01-21 LAB
ALBUMIN SERPL BCG-MCNC: 3.8 G/DL (ref 3.5–5)
ALP SERPL-CCNC: 85 U/L (ref 34–104)
ALT SERPL W P-5'-P-CCNC: 33 U/L (ref 7–52)
ANION GAP SERPL CALCULATED.3IONS-SCNC: 5 MMOL/L (ref 4–13)
AST SERPL W P-5'-P-CCNC: 22 U/L (ref 13–39)
BASOPHILS # BLD AUTO: 0.03 THOUSANDS/ΜL (ref 0–0.1)
BASOPHILS NFR BLD AUTO: 0 % (ref 0–1)
BILIRUB SERPL-MCNC: 0.5 MG/DL (ref 0.2–1)
BUN SERPL-MCNC: 14 MG/DL (ref 5–25)
CALCIUM SERPL-MCNC: 8.6 MG/DL (ref 8.4–10.2)
CHLORIDE SERPL-SCNC: 106 MMOL/L (ref 96–108)
CO2 SERPL-SCNC: 28 MMOL/L (ref 21–32)
CREAT SERPL-MCNC: 0.81 MG/DL (ref 0.6–1.3)
EOSINOPHIL # BLD AUTO: 0.06 THOUSAND/ΜL (ref 0–0.61)
EOSINOPHIL NFR BLD AUTO: 1 % (ref 0–6)
ERYTHROCYTE [DISTWIDTH] IN BLOOD BY AUTOMATED COUNT: 18.6 % (ref 11.6–15.1)
GFR SERPL CREATININE-BSD FRML MDRD: 92 ML/MIN/1.73SQ M
GLUCOSE SERPL-MCNC: 87 MG/DL (ref 65–140)
HCT VFR BLD AUTO: 31.8 % (ref 36.5–49.3)
HGB BLD-MCNC: 10.3 G/DL (ref 12–17)
IMM GRANULOCYTES # BLD AUTO: 0.02 THOUSAND/UL (ref 0–0.2)
IMM GRANULOCYTES NFR BLD AUTO: 0 % (ref 0–2)
LYMPHOCYTES # BLD AUTO: 1.38 THOUSANDS/ΜL (ref 0.6–4.47)
LYMPHOCYTES NFR BLD AUTO: 19 % (ref 14–44)
MAGNESIUM SERPL-MCNC: 2.1 MG/DL (ref 1.9–2.7)
MCH RBC QN AUTO: 28.4 PG (ref 26.8–34.3)
MCHC RBC AUTO-ENTMCNC: 32.4 G/DL (ref 31.4–37.4)
MCV RBC AUTO: 88 FL (ref 82–98)
MONOCYTES # BLD AUTO: 0.42 THOUSAND/ΜL (ref 0.17–1.22)
MONOCYTES NFR BLD AUTO: 6 % (ref 4–12)
NEUTROPHILS # BLD AUTO: 5.3 THOUSANDS/ΜL (ref 1.85–7.62)
NEUTS SEG NFR BLD AUTO: 74 % (ref 43–75)
NRBC BLD AUTO-RTO: 0 /100 WBCS
PLATELET # BLD AUTO: 194 THOUSANDS/UL (ref 149–390)
PMV BLD AUTO: 9.4 FL (ref 8.9–12.7)
POTASSIUM SERPL-SCNC: 4.1 MMOL/L (ref 3.5–5.3)
PROT SERPL-MCNC: 6 G/DL (ref 6.4–8.4)
RBC # BLD AUTO: 3.63 MILLION/UL (ref 3.88–5.62)
SODIUM SERPL-SCNC: 139 MMOL/L (ref 135–147)
WBC # BLD AUTO: 7.21 THOUSAND/UL (ref 4.31–10.16)

## 2025-01-21 PROCEDURE — 85025 COMPLETE CBC W/AUTO DIFF WBC: CPT

## 2025-01-21 PROCEDURE — 83735 ASSAY OF MAGNESIUM: CPT

## 2025-01-21 PROCEDURE — 80053 COMPREHEN METABOLIC PANEL: CPT

## 2025-01-21 NOTE — PROGRESS NOTES
Isaac Kramer  tolerated port access with lab draw well with no complications.      Isaac Kramer is aware of future appt on 1/22/25 at 1030am.     AVS printed and given to Isaac Kramer:  No (Declined by Isaac Kramer)

## 2025-01-22 ENCOUNTER — HOSPITAL ENCOUNTER (OUTPATIENT)
Dept: INFUSION CENTER | Facility: HOSPITAL | Age: 65
Discharge: HOME | End: 2025-01-22
Attending: INTERNAL MEDICINE

## 2025-01-22 VITALS
HEART RATE: 95 BPM | TEMPERATURE: 96.9 F | BODY MASS INDEX: 22.66 KG/M2 | HEIGHT: 64 IN | WEIGHT: 132.72 LBS | SYSTOLIC BLOOD PRESSURE: 121 MMHG | DIASTOLIC BLOOD PRESSURE: 57 MMHG | RESPIRATION RATE: 16 BRPM | OXYGEN SATURATION: 99 %

## 2025-01-22 DIAGNOSIS — C25.9 PANCREATIC ADENOCARCINOMA (HCC): Primary | ICD-10-CM

## 2025-01-22 DIAGNOSIS — T45.1X5A CHEMOTHERAPY-INDUCED NEUTROPENIA (HCC): ICD-10-CM

## 2025-01-22 DIAGNOSIS — D70.1 CHEMOTHERAPY-INDUCED NEUTROPENIA (HCC): ICD-10-CM

## 2025-01-22 PROCEDURE — 96413 CHEMO IV INFUSION 1 HR: CPT

## 2025-01-22 PROCEDURE — 96367 TX/PROPH/DG ADDL SEQ IV INF: CPT

## 2025-01-22 RX ORDER — SODIUM CHLORIDE 9 MG/ML
20 INJECTION, SOLUTION INTRAVENOUS ONCE
Status: COMPLETED | OUTPATIENT
Start: 2025-01-22 | End: 2025-01-22

## 2025-01-22 RX ADMIN — SODIUM CHLORIDE 20 ML/HR: 9 INJECTION, SOLUTION INTRAVENOUS at 10:31

## 2025-01-22 RX ADMIN — GEMCITABINE 1600 MG: 38 INJECTION, SOLUTION INTRAVENOUS at 11:18

## 2025-01-22 RX ADMIN — DEXAMETHASONE SODIUM PHOSPHATE 10 MG: 100 INJECTION INTRAMUSCULAR; INTRAVENOUS at 10:31

## 2025-01-22 NOTE — PROGRESS NOTES
Isaac Kramer  tolerated gemzar infusion well with no complications.      Isaac Kramer is aware of future appt on 2/4 at 1PM.     AVS declined by Isaac Kramer.

## 2025-01-23 ENCOUNTER — OFFICE VISIT (OUTPATIENT)
Dept: HEMATOLOGY ONCOLOGY | Facility: CLINIC | Age: 65
End: 2025-01-23

## 2025-01-23 ENCOUNTER — TELEPHONE (OUTPATIENT)
Dept: HEMATOLOGY ONCOLOGY | Facility: CLINIC | Age: 65
End: 2025-01-23

## 2025-01-23 VITALS
OXYGEN SATURATION: 99 % | SYSTOLIC BLOOD PRESSURE: 118 MMHG | WEIGHT: 137 LBS | RESPIRATION RATE: 18 BRPM | DIASTOLIC BLOOD PRESSURE: 60 MMHG | BODY MASS INDEX: 23.39 KG/M2 | HEIGHT: 64 IN | TEMPERATURE: 98.2 F | HEART RATE: 103 BPM

## 2025-01-23 DIAGNOSIS — C25.9 PANCREATIC ADENOCARCINOMA (HCC): Primary | ICD-10-CM

## 2025-01-23 DIAGNOSIS — T45.1X5A CHEMOTHERAPY-INDUCED NEUTROPENIA (HCC): ICD-10-CM

## 2025-01-23 DIAGNOSIS — D70.1 CHEMOTHERAPY-INDUCED NEUTROPENIA (HCC): ICD-10-CM

## 2025-01-23 DIAGNOSIS — C25.9 PANCREATIC ADENOCARCINOMA (HCC): ICD-10-CM

## 2025-01-23 PROCEDURE — 99214 OFFICE O/P EST MOD 30 MIN: CPT | Performed by: INTERNAL MEDICINE

## 2025-01-23 RX ORDER — SODIUM CHLORIDE 9 MG/ML
20 INJECTION, SOLUTION INTRAVENOUS ONCE
OUTPATIENT
Start: 2025-02-05

## 2025-01-23 RX ORDER — SODIUM CHLORIDE 9 MG/ML
20 INJECTION, SOLUTION INTRAVENOUS ONCE
OUTPATIENT
Start: 2025-02-12

## 2025-01-23 RX ORDER — SODIUM CHLORIDE 9 MG/ML
20 INJECTION, SOLUTION INTRAVENOUS ONCE
OUTPATIENT
Start: 2025-02-19

## 2025-01-23 RX ORDER — ONDANSETRON 4 MG/1
TABLET, FILM COATED ORAL
Qty: 20 TABLET | Refills: 5 | Status: SHIPPED | OUTPATIENT
Start: 2025-01-23

## 2025-01-23 NOTE — ASSESSMENT & PLAN NOTE
Pathological stage IIB (pT2, pN1, cM0) MSI stable, grade 2 adenocarcinoma of the head of the pancreas, status post Whipple's procedure on 9/12/2024.   There was a discussion with Dr. 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     His case was presented at the GI tumor board.  The recommendation was to pursue adjuvant modified FOLFIRINOX followed by concurrent chemoradiation if the patient continues to have good performance status.     Modified FOLFIRINOX cycle 1 was started on 11/4/2024.  He only was able to tolerate 2 cycles of modified FOLFIRINOX even though the second cycle was adjusted down by 20%.  Patient was then switched to gemcitabine and capecitabine regimen which was started on 12/11/2024.  Gemzar 1,000 mg/m2 IV on days 1,8,15 Q 28 days with Rx Capecitabine 1,300 mg po BID days 1-21 then 7 days rest.      The patient tolerated cycle 2 of gemcitabine/capecitabine so far relatively well.  We did discuss continuing the current treatment without any changes.  We will then see him again in March for close monitoring.  We discussed pursuing 6 months worth of adjuvant chemotherapy.    Subsequently, we will consider concurrent chemoradiation according to the patient's performance status and overall condition.

## 2025-01-23 NOTE — PROGRESS NOTES
Name: Isaac Kramer      : 1960      MRN: [id number]  Encounter Provider: Jacob Colby MD  Encounter Date: 2025   Encounter department: [facility] [place]  :  Assessment & Plan  Pancreatic adenocarcinoma (HCC)  Pathological stage IIB (pT2, pN1, cM0) MSI stable, grade 2 adenocarcinoma of the head of the pancreas, status post Whipple's procedure on 2024.   There was a discussion with Dr. 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     His case was presented at the GI tumor board.  The recommendation was to pursue adjuvant modified FOLFIRINOX followed by concurrent chemoradiation if the patient continues to have good performance status.     Modified FOLFIRINOX cycle 1 was started on 2024.  He only was able to tolerate 2 cycles of modified FOLFIRINOX even though the second cycle was adjusted down by 20%.  Patient was then switched to gemcitabine and capecitabine regimen which was started on 2024.  Gemzar 1,000 mg/m2 IV on days 1,8,15 Q 28 days with Rx Capecitabine 1,300 mg po BID days 1-21 then 7 days rest.      The patient tolerated cycle 2 of gemcitabine/capecitabine so far relatively well.  We did discuss continuing the current treatment without any changes.  We will then see him again in March for close monitoring.  We discussed pursuing 6 months worth of adjuvant chemotherapy.    Subsequently, we will consider concurrent chemoradiation according to the patient's performance status and overall condition.       Chemotherapy-induced neutropenia (HCC)             History of Present Illness   Chief Complaint   Patient presents with    Follow-up   HPI:  This is a 63-year-old male with history of [place] score 4+3= 7 adenocarcinoma of the prostate status post radical prostatectomy on 2023.  He  also seems to have history of coronary artery disease, hypertension, GERD, hyperlipidemia, etc.  He denied positive family history for malignancy.  The patient apparently had left flank pain which was evaluated with a CT scan of the abdomen pelvis with contrast on 8/27/2024 in the emergency room.  The CT scan showed mass in the lower pancreatic head region obstructing the CBD and pancreatic duct measuring 2.8 cm suspicious for malignant process.  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  CT scan of the chest without contrast on 8/28/2024 was negative for malignant process.  The patient then had Whipple procedure on 9/12/2024 which showed MSI stable adenocarcinoma of the pancreas arising in adenoma.  - Tumor invades into muscularis propria of the duodenum (pT2).  - Lymphovascular invasion is present.   - One of sixteen lymph nodes is positive for tumor (1/16, pN1).  - Pancreatic intraepithelial neoplasia (PanIN), low grade.   - All margins are negative for carcinoma or dysplasia.  His baseline CEA and CA 19-9 were within normal range prior to the Whipple procedure.        Interval history:  The patient came there for a follow-up visit accompanied by his son.  He stated that he is tolerating the current chemotherapy better than the FOLFIRINOX.  Blood work on 1/21/2025 showed hemoglobin of 10.3 with normal white cells and platelets.  Creatinine 0.8 with normal calcium and liver enzymes.    Oncology History   Cancer Staging   Ampullary carcinoma (HCC)  Staging form: Ampulla of Vater, AJCC 8th Edition  - Pathologic: Stage IIIA (pT2, pN1, cM0) - Signed by Marika Ac MD on 10/2/2024    Pancreatic adenocarcinoma (HCC)  Staging form: Pancreas, AJCC 8th Edition  - Pathologic stage from 9/12/2024: Stage IIB (pT2, pN1, cM0) - Signed by Jacob Colby MD on 10/2/2024  Stage prefix: Initial diagnosis  Histologic grade (G): G2  Histologic grading system: 3 grade  system  Lymph-vascular invasion (LVI): LVI present/identified, NOS  Carbohydrate antigen 19-9 (CA 19-9) (U/mL): 14  Carcinoembryonic antigen (CEA) (ng/mL): 0.7  Oncology History Overview Note   Presents for discussion of RT for recently diagnosed [place] 7 (4+3) prostate cancer.  Referred by Dr. Biggs    Lab Results   Component Value Date    PSA 5.2 (H) 11/08/2022    PSA 6.6 (H) 07/18/2022    PSA 3.9 06/24/2020      10/10/22  Urology  Seen in consult for elevated PSA  F/U PSA recommended    12/28/21  MRI Prostate  IMPRESSION:   1. PI-RADSv2.1 Category 4 -High (clinically significant cancer is likely to be present). 0.5 cm possible lesion in the posterior right peripheral zone at base.   2. No extraprostatic tumor, seminal vesicle invasion, pelvic lymphadenopathy, or pelvic osseous metastatic disease.   3. Moderate BPH with calculated prostate volume of 69 cc.    2/28/23  Tissue Exam  A.  Prostate, region of interest #1, needle biopsy:  - Prostatic adenocarcinoma, acinar, not otherwise specified; [place] grade 4 +3= score of 7 (grade group 3) in 4 of 5 cores involving 20% of needle core tissue and measuring up to 5 mm in length (score 4=50-60%).  - Periprostatic fat invasion: Not identified.  - Seminal vesicle invasion: Not identified.  - Lymph-vascular invasion: Not identified.  - Perineural invasion: Not identified.  - Additional Pathologic Findings:  None.     B.  Prostate, right lateral and medial base, needle biopsy:  - Benign prostate tissue, negative for malignancy.     C.  Prostate, right mid lateral, needle biopsy:  - Benign prostate tissue, negative for malignancy.     D.  Prostate, right mid medial, needle biopsy:  - Benign prostate tissue, negative for malignancy.     E.  Prostate, right lateral apex, needle biopsy:   - Benign prostate tissue, negative for malignancy.     F. Prostate, right medial apex, needle biopsy:  - Benign prostate tissue, negative for malignancy.     G.  Prostate, left lateral  base, needle biopsy:  - Prostatic adenocarcinoma, acinar, not otherwise specified; Jihan grade 3 +3= score of 6 (grade group 1) in 1 of 1 cores involving 10% of needle core tissue and measuring 2 mm in length.  - Periprostatic fat invasion: Not identified.  - Seminal vesicle invasion: Not identified.  - Lymph-vascular invasion: Not identified.  - Perineural invasion: Not identified.  - Additional Pathologic Findings:  None.     H.  Prostate, left medial base, needle biopsy:  - Prostatic adenocarcinoma, acinar, not otherwise specified; [place] grade 3 +4= score of 7 (grade group 2) in 1 of 1 cores involving 50% of needle core tissue and measuring 8 mm in length (score 4 = 5-10%).  - Periprostatic fat invasion: Not identified.  - Seminal vesicle invasion: Not identified.  - Lymph-vascular invasion: Not identified.  - Perineural invasion: Present.  - Additional Pathologic Findings:  None.     I.  Prostate, left mid lateral, needle biopsy:  - Prostatic adenocarcinoma, acinar, not otherwise specified; [place] grade 3 +4= score of 7 (grade group 2) in 1 of 1 cores involving 40-50% of needle core tissue and measuring 5 mm in length (score 4 = 5-10%).  - Periprostatic fat invasion: Not identified.  - Seminal vesicle invasion: Not identified.  - Lymph-vascular invasion: Not identified.  - Perineural invasion: Not identified.  - Additional Pathologic Findings:  High-grade prostatic intraepithelial neoplasia (HGPIN).     J.  Prostate, left mid medial, needle biopsy:  - Prostatic adenocarcinoma, acinar, not otherwise specified; [place] grade 3 +4= score of 7 (grade group 2) in 1 of 1 cores involving 40-50% of needle core tissue and measuring 7 mm in length (score 4 = 5-10%).  - Periprostatic fat invasion: Not identified.  - Seminal vesicle invasion: Not identified.  - Lymph-vascular invasion: Not identified.  - Perineural invasion: Not identified.  - Additional Pathologic Findings:  High-grade prostatic intraepithelial  neoplasia (HGPIN).     K.  Prostate, left lateral apex, needle biopsy:  -Focal atypical small acinar proliferation, cannot exclude limited low-grade carcinoma.     L.  Prostate, left medial apex, needle biopsy:  - Benign prostate tissue, negative for malignancy.    3/27/23  Bone Scan  IMPRESSION:   1.  No scintigraphic evidence of osseous metastasis    4/6/23  Dr. Biggs  Does not qualify for active surveillance.  RT and surgery discussed.  Leaning towards RT will arrange for consult    4/20/23  Dr. Harmon  Desires surgical intervention    Upcoming:       Prostate cancer (HCC) (Resolved)   2/28/2023 Biopsy    A.  Prostate, region of interest #1, needle biopsy:  - Prostatic adenocarcinoma, acinar, not otherwise specified; [place] grade 4 +3= score of 7 (grade group 3) in 4 of 5 cores involving 20% of needle core tissue and measuring up to 5 mm in length (score 4=50-60%).  - Periprostatic fat invasion: Not identified.  - Seminal vesicle invasion: Not identified.  - Lymph-vascular invasion: Not identified.  - Perineural invasion: Not identified.  - Additional Pathologic Findings:  None.     B.  Prostate, right lateral and medial base, needle biopsy:  - Benign prostate tissue, negative for malignancy.     C.  Prostate, right mid lateral, needle biopsy:  - Benign prostate tissue, negative for malignancy.     D.  Prostate, right mid medial, needle biopsy:  - Benign prostate tissue, negative for malignancy.     E.  Prostate, right lateral apex, needle biopsy:   - Benign prostate tissue, negative for malignancy.     F. Prostate, right medial apex, needle biopsy:  - Benign prostate tissue, negative for malignancy.     G.  Prostate, left lateral base, needle biopsy:  - Prostatic adenocarcinoma, acinar, not otherwise specified; [place] grade 3 +3= score of 6 (grade group 1) in 1 of 1 cores involving 10% of needle core tissue and measuring 2 mm in length.  - Periprostatic fat invasion: Not identified.  - Seminal vesicle  invasion: Not identified.  - Lymph-vascular invasion: Not identified.  - Perineural invasion: Not identified.  - Additional Pathologic Findings:  None.     H.  Prostate, left medial base, needle biopsy:  - Prostatic adenocarcinoma, acinar, not otherwise specified; [place] grade 3 +4= score of 7 (grade group 2) in 1 of 1 cores involving 50% of needle core tissue and measuring 8 mm in length (score 4 = 5-10%).  - Periprostatic fat invasion: Not identified.  - Seminal vesicle invasion: Not identified.  - Lymph-vascular invasion: Not identified.  - Perineural invasion: Present.  - Additional Pathologic Findings:  None.     I.  Prostate, left mid lateral, needle biopsy:  - Prostatic adenocarcinoma, acinar, not otherwise specified; [place] grade 3 +4= score of 7 (grade group 2) in 1 of 1 cores involving 40-50% of needle core tissue and measuring 5 mm in length (score 4 = 5-10%).  - Periprostatic fat invasion: Not identified.  - Seminal vesicle invasion: Not identified.  - Lymph-vascular invasion: Not identified.  - Perineural invasion: Not identified.  - Additional Pathologic Findings:  High-grade prostatic intraepithelial neoplasia (HGPIN).     J.  Prostate, left mid medial, needle biopsy:  - Prostatic adenocarcinoma, acinar, not otherwise specified; [place] grade 3 +4= score of 7 (grade group 2) in 1 of 1 cores involving 40-50% of needle core tissue and measuring 7 mm in length (score 4 = 5-10%).  - Periprostatic fat invasion: Not identified.  - Seminal vesicle invasion: Not identified.  - Lymph-vascular invasion: Not identified.  - Perineural invasion: Not identified.  - Additional Pathologic Findings:  High-grade prostatic intraepithelial neoplasia (HGPIN).     K.  Prostate, left lateral apex, needle biopsy:  -Focal atypical small acinar proliferation, cannot exclude limited low-grade carcinoma.     L.  Prostate, left medial apex, needle biopsy:  - Benign prostate tissue, negative for malignancy.     2/28/2023 Initial  Diagnosis    Prostate cancer (HCC)     Pancreatic adenocarcinoma (HCC)   8/28/2024 Initial Diagnosis    Pancreatic adenocarcinoma (HCC)     9/12/2024 Surgery    A. Gallbladder, cholecystectomy:  - Chronic cholecystitis.  - One lymph node, negative for malignancy (0/1).  - Negative for malignancy.     B. Lymph Node, Portal Lymph Node:  - One lymph node, negative for malignancy (0/1).     C. Pancreas, Whipple; SEE COMMENTS:  - Adenocarcinoma arising in adenoma.  - Tumor invades into muscularis propria of the duodenum (pT2).  - Lymphovascular invasion is present.   - One of sixteen lymph nodes is positive for tumor (1/16, pN1).  - Pancreatic intraepithelial neoplasia (PanIN), low grade.   - All margins are negative for carcinoma or dysplasia.     Block C11 is sent to [facility] for MI Profile Testing.   Immunohistochemistry (IHC) testing for Mismatch Repair (MMR) proteins has been requested on block C9, and the results will be issued in an addendum.     D. Lymph Node, Portacaval node:  - Eight lymph nodes, negative for malignancy (0/8).     E. Soft Tissue, Other, Additional Uncinate/Pancreas:  - Portion of pancreas with no pathologic abnormality  - Two lymph nodes, negative for malignancy (0/2).     9/12/2024 -  Cancer Staged    Staging form: Pancreas, AJCC 8th Edition  - Pathologic stage from 9/12/2024: Stage IIB (pT2, pN1, cM0) - Signed by Jacob Colby MD on 10/2/2024  Stage prefix: Initial diagnosis  Histologic grade (G): G2  Histologic grading system: 3 grade system  Lymph-vascular invasion (LVI): LVI present/identified, NOS  Carbohydrate antigen 19-9 (CA 19-9) (U/mL): 14  Carcinoembryonic antigen (CEA) (ng/mL): 0.7       11/4/2024 - 11/22/2024 Chemotherapy    alteplase (CATHFLO), 2 mg, Intracatheter, Every 1 Minute as needed, 2 of 12 cycles  pegfilgrastim (NEULASTA ONPRO), 6 mg, Subcutaneous, Once, 2 of 12 cycles  Dose modification: 4 mg (original dose 6 mg, Cycle 3)  Administration: 6 mg (11/6/2024), 6  mg (11/22/2024)  fosaprepitant (EMEND) IVPB, 150 mg, Intravenous, Once, 2 of 12 cycles  Administration: 150 mg (11/4/2024), 150 mg (11/20/2024)  irinotecan (CAMPTOSAR) chemo infusion, 242 mg (83.3 % of original dose 180 mg/m2), Intravenous, Once, 2 of 12 cycles  Dose modification: 150 mg/m2 (original dose 180 mg/m2, Cycle 1, Reason: Dose modified as per discussion with consulting physician), 125 mg/m2 (original dose 180 mg/m2, Cycle 2, Reason: Dose modified as per discussion with consulting physician)  Administration: 240 mg (11/4/2024), 200 mg (11/20/2024)  leucovorin calcium IVPB, 644 mg, Intravenous, Once, 2 of 12 cycles  Administration: 650 mg (11/4/2024), 650 mg (11/20/2024)  oxaliplatin (ELOXATIN) chemo infusion, 85 mg/m2 = 136.85 mg, Intravenous, Once, 2 of 12 cycles  Dose modification: 65 mg/m2 (original dose 85 mg/m2, Cycle 2, Reason: Dose modified as per discussion with consulting physician)  Administration: 136.85 mg (11/4/2024), 100 mg (11/20/2024)  fluorouracil (ADRUCIL) ambulatory infusion Soln, 1,200 mg/m2/day = 3,865 mg, Intravenous, Over 46 hours, 2 of 12 cycles  Dose modification: 960 mg/m2/day (original dose 1,200 mg/m2/day, Cycle 3, Reason: Dose modified as per discussion with consulting physician), 1,000 mg/m2/day (original dose 1,200 mg/m2/day, Cycle 3, Reason: Dose modified as per discussion with consulting physician), 960 mg/m2/day (original dose 1,200 mg/m2/day, Cycle 2, Reason: Dose modified as per discussion with consulting physician)     12/11/2024 -  Chemotherapy    alteplase (CATHFLO), 2 mg, Intracatheter, Every 1 Minute as needed, 2 of 6 cycles  gemcitabine (GEMZAR) infusion, 1,000 mg/m2 = 1,600 mg, Intravenous, Once, 2 of 6 cycles  Administration: 1,600 mg (12/11/2024), 1,600 mg (12/18/2024), 1,600 mg (1/8/2025), 1,600 mg (12/26/2024), 1,600 mg (1/15/2025), 1,600 mg (1/22/2025)     Ampullary carcinoma (HCC)   10/2/2024 Initial Diagnosis    Ampullary carcinoma (HCC)     10/2/2024 -   Cancer Staged    Staging form: Ampulla of Vater, AJCC 8th Edition  - Pathologic: Stage IIIA (pT2, pN1, cM0) - Signed by Marika Ac MD on 10/2/2024            Review of Systems   Constitutional:  Negative for chills and fever.   HENT:  Negative for ear pain and sore throat.    Eyes:  Negative for pain and visual disturbance.   Respiratory:  Positive for cough and shortness of breath.    Cardiovascular:  Negative for chest pain and palpitations.   Gastrointestinal:  Positive for diarrhea. Negative for abdominal pain and vomiting.   Genitourinary:  Negative for dysuria and hematuria.   Musculoskeletal:  Negative for arthralgias and back pain.   Skin:  Negative for color change and rash.   Neurological:  Negative for seizures and syncope.   Hematological:  Bruises/bleeds easily.   All other systems reviewed and are negative.    Medical History Reviewed by provider this encounter:  Tobacco  Allergies  Meds  Problems  Med Hx  Surg Hx  Fam Hx     .  Current Outpatient Medications on File Prior to Visit   Medication Sig Dispense Refill    albuterol (ProAir HFA) 90 mcg/act inhaler Inhale 2 puffs every 6 (six) hours as needed for wheezing 8.5 g 0    aspirin 81 mg chewable tablet Chew 81 mg daily      atorvastatin (LIPITOR) 20 mg tablet Take 1 tablet (20 mg total) by mouth daily 30 tablet 5    capecitabine (XELODA) 150 MG tablet Take 2 tablets (300 mg total) by mouth 2 (two) times a day 21 days on, followed by 7 days off 84 tablet 11    capecitabine (XELODA) 500 MG tablet Take 2 tablets (1,000 mg total) by mouth 2 (two) times a day 21 days on, followed by 7 days off 84 tablet 11    lisinopril (ZESTRIL) 20 mg tablet Take 1 tablet (20 mg total) by mouth daily 30 tablet 3    menthol-cetylpyridinium (CEPACOL) 3 MG lozenge Take 1 lozenge (3 mg total) by mouth as needed for sore throat 18 lozenge 0    ondansetron (ZOFRAN) 4 mg tablet Take 1 tablet (4 mg total) by mouth every 8 (eight) hours as needed for nausea or  "vomiting 20 tablet 0    dicyclomine (BENTYL) 10 mg capsule Take 1 capsule (10 mg total) by mouth 3 (three) times a day before meals 90 capsule 0     Current Facility-Administered Medications on File Prior to Visit   Medication Dose Route Frequency Provider Last Rate Last Admin    alteplase (CATHFLO) injection 2 mg  2 mg Intracatheter Q1MIN PRN Jacob Colby MD        alteplase (CATHFLO) injection 2 mg  2 mg Intracatheter Q1MIN PRN Jacob Colby MD        [COMPLETED] dexamethasone (DECADRON) 10 mg in sodium chloride 0.9 % 50 mL IVPB  10 mg Intravenous Once Jacob Colby MD   Stopped at 25 1100    [COMPLETED] gemcitabine (GEMZAR) 1,600 mg in sodium chloride 0.9 % 250 mL infusion  1,000 mg/m2 (Treatment Plan Recorded) Intravenous Once Jacob Colby MD   Stopped at 25 1148    [COMPLETED] sodium chloride 0.9 % infusion  20 mL/hr Intravenous Once Jacob Colby MD   Stopped at 25 1150      Social History     Tobacco Use    Smoking status: Former     Current packs/day: 0.00     Types: Cigarettes     Quit date: 2022     Years since quittin.0    Smokeless tobacco: Never   Vaping Use    Vaping status: Never Used   Substance and Sexual Activity    Alcohol use: Not Currently    Drug use: Never    Sexual activity: Not on file         Objective   /60 (BP Location: Right arm, Patient Position: Sitting, Cuff Size: Adult)   Pulse 103   Temp 98.2 °F (36.8 °C)   Resp 18   Ht 5' 4.02\" (1.626 m)   Wt 62.1 kg (137 lb)   SpO2 99%   BMI 23.50 kg/m²     Pain Screening:  Pain Score: 0-No pain  ECOG   2  Physical Exam  Constitutional:       Appearance: He is well-developed.   HENT:      Head: Normocephalic and atraumatic.      Nose: Nose normal.   Eyes:      General: No scleral icterus.        Right eye: No discharge.         Left eye: No discharge.      Conjunctiva/sclera: Conjunctivae normal.      Pupils: Pupils are equal, round, and reactive to light.   Neck:      Thyroid: No thyromegaly.      Trachea: No " tracheal deviation.   Cardiovascular:      Rate and Rhythm: Normal rate and regular rhythm.      Heart sounds: Normal heart sounds. No murmur heard.     No friction rub.   Pulmonary:      Effort: Pulmonary effort is normal. No respiratory distress.      Breath sounds: Normal breath sounds. No wheezing or rales.   Chest:      Chest wall: No tenderness.   Abdominal:      General: There is no distension.      Palpations: Abdomen is soft. There is no hepatomegaly or splenomegaly.      Tenderness: There is no abdominal tenderness. There is no guarding or rebound.   Musculoskeletal:         General: No tenderness or deformity. Normal range of motion.      Cervical back: Normal range of motion and neck supple.   Lymphadenopathy:      Cervical: No cervical adenopathy.   Skin:     General: Skin is warm and dry.      Coloration: Skin is not pale.      Findings: No erythema or rash.   Neurological:      Mental Status: He is alert and oriented to person, place, and time.      Cranial Nerves: No cranial nerve deficit.      Coordination: Coordination normal.      Deep Tendon Reflexes: Reflexes are normal and symmetric.   Psychiatric:         Behavior: Behavior normal.         Thought Content: Thought content normal.         Judgment: Judgment normal.         Labs: I have reviewed the following labs:  Lab Results   Component Value Date/Time    WBC 7.21 01/21/2025 03:46 PM    RBC 3.63 (L) 01/21/2025 03:46 PM    Hemoglobin 10.3 (L) 01/21/2025 03:46 PM    Hematocrit 31.8 (L) 01/21/2025 03:46 PM    MCV 88 01/21/2025 03:46 PM    MCH 28.4 01/21/2025 03:46 PM    RDW 18.6 (H) 01/21/2025 03:46 PM    Platelets 194 01/21/2025 03:46 PM    Segmented % 74 01/21/2025 03:46 PM    Lymphocytes % 19 01/21/2025 03:46 PM    Monocytes % 6 01/21/2025 03:46 PM    Eosinophils Relative 1 01/21/2025 03:46 PM    Basophils Relative 0 01/21/2025 03:46 PM    Immature Grans % 0 01/21/2025 03:46 PM    Absolute Neutrophils 5.30 01/21/2025 03:46 PM     Lab Results    Component Value Date/Time    Potassium 4.1 01/21/2025 03:46 PM    Chloride 106 01/21/2025 03:46 PM    CO2 28 01/21/2025 03:46 PM    CO2, i-STAT 17 (L) 09/12/2024 02:27 PM    BUN 14 01/21/2025 03:46 PM    Creatinine 0.81 01/21/2025 03:46 PM    Glucose, i-STAT 153 (H) 09/12/2024 02:27 PM    Glucose, Fasting 91 11/25/2024 05:28 AM    Calcium 8.6 01/21/2025 03:46 PM    Corrected Calcium 7.5 (L) 09/13/2024 04:34 AM    AST 22 01/21/2025 03:46 PM    ALT 33 01/21/2025 03:46 PM    Alkaline Phosphatase 85 01/21/2025 03:46 PM    Total Protein 6.0 (L) 01/21/2025 03:46 PM    Albumin 3.8 01/21/2025 03:46 PM    Total Bilirubin 0.50 01/21/2025 03:46 PM    eGFR 92 01/21/2025 03:46 PM     Lab Results   Component Value Date/Time    WBC 7.21 01/21/2025 03:46 PM    RBC 3.63 (L) 01/21/2025 03:46 PM    Hemoglobin 10.3 (L) 01/21/2025 03:46 PM    Hematocrit 31.8 (L) 01/21/2025 03:46 PM    MCV 88 01/21/2025 03:46 PM    MCH 28.4 01/21/2025 03:46 PM    RDW 18.6 (H) 01/21/2025 03:46 PM    Platelets 194 01/21/2025 03:46 PM    Segmented % 74 01/21/2025 03:46 PM    Bands % 5 01/07/2025 10:32 AM    Lymphocytes % 19 01/21/2025 03:46 PM    Monocytes % 6 01/21/2025 03:46 PM    Eosinophils Relative 1 01/21/2025 03:46 PM    Basophils Relative 0 01/21/2025 03:46 PM    Immature Grans % 0 01/21/2025 03:46 PM    Absolute Neutrophils 5.30 01/21/2025 03:46 PM      Lab Results   Component Value Date/Time    Sodium 139 01/21/2025 03:46 PM    Potassium 4.1 01/21/2025 03:46 PM    Chloride 106 01/21/2025 03:46 PM    CO2 28 01/21/2025 03:46 PM    CO2, i-STAT 17 (L) 09/12/2024 02:27 PM    ANION GAP 5 01/21/2025 03:46 PM    BUN 14 01/21/2025 03:46 PM    Creatinine 0.81 01/21/2025 03:46 PM    Glucose 87 01/21/2025 03:46 PM    Glucose, Fasting 91 11/25/2024 05:28 AM    Calcium 8.6 01/21/2025 03:46 PM    Corrected Calcium 7.5 (L) 09/13/2024 04:34 AM    AST 22 01/21/2025 03:46 PM    ALT 33 01/21/2025 03:46 PM    Alkaline Phosphatase 85 01/21/2025 03:46 PM    Total  Protein 6.0 (L) 01/21/2025 03:46 PM    Albumin 3.8 01/21/2025 03:46 PM    Total Bilirubin 0.50 01/21/2025 03:46 PM    eGFR 92 01/21/2025 03:46 PM      Lab Results   Component Value Date/Time    CEA 0.7 08/28/2024 05:09 AM    CA 19-9 5 01/14/2025 11:07 AM        Radiology Results Review : No pertinent imaging studies reviewed.

## 2025-02-04 ENCOUNTER — HOSPITAL ENCOUNTER (OUTPATIENT)
Dept: INFUSION CENTER | Facility: HOSPITAL | Age: 65
Discharge: HOME | End: 2025-02-04
Payer: MEDICARE

## 2025-02-04 DIAGNOSIS — Z45.2 ENCOUNTER FOR CENTRAL LINE CARE: Primary | ICD-10-CM

## 2025-02-04 DIAGNOSIS — C25.9 PANCREATIC ADENOCARCINOMA (HCC): ICD-10-CM

## 2025-02-04 LAB
ALBUMIN SERPL BCG-MCNC: 3.6 G/DL (ref 3.5–5)
ALP SERPL-CCNC: 117 U/L (ref 34–104)
ALT SERPL W P-5'-P-CCNC: 12 U/L (ref 7–52)
ANION GAP SERPL CALCULATED.3IONS-SCNC: 5 MMOL/L (ref 4–13)
AST SERPL W P-5'-P-CCNC: 17 U/L (ref 13–39)
BASOPHILS # BLD AUTO: 0.07 THOUSANDS/ΜL (ref 0–0.1)
BASOPHILS NFR BLD AUTO: 1 % (ref 0–1)
BILIRUB SERPL-MCNC: 0.47 MG/DL (ref 0.2–1)
BUN SERPL-MCNC: 12 MG/DL (ref 5–25)
CALCIUM SERPL-MCNC: 8.2 MG/DL (ref 8.4–10.2)
CHLORIDE SERPL-SCNC: 109 MMOL/L (ref 96–108)
CO2 SERPL-SCNC: 26 MMOL/L (ref 21–32)
CREAT SERPL-MCNC: 0.66 MG/DL (ref 0.6–1.3)
EOSINOPHIL # BLD AUTO: 0.42 THOUSAND/ΜL (ref 0–0.61)
EOSINOPHIL NFR BLD AUTO: 7 % (ref 0–6)
ERYTHROCYTE [DISTWIDTH] IN BLOOD BY AUTOMATED COUNT: 20 % (ref 11.6–15.1)
GFR SERPL CREATININE-BSD FRML MDRD: 100 ML/MIN/1.73SQ M
GLUCOSE SERPL-MCNC: 137 MG/DL (ref 65–140)
HCT VFR BLD AUTO: 30.6 % (ref 36.5–49.3)
HGB BLD-MCNC: 9.9 G/DL (ref 12–17)
IMM GRANULOCYTES # BLD AUTO: 0.02 THOUSAND/UL (ref 0–0.2)
IMM GRANULOCYTES NFR BLD AUTO: 0 % (ref 0–2)
LYMPHOCYTES # BLD AUTO: 1.1 THOUSANDS/ΜL (ref 0.6–4.47)
LYMPHOCYTES NFR BLD AUTO: 18 % (ref 14–44)
MAGNESIUM SERPL-MCNC: 2.1 MG/DL (ref 1.9–2.7)
MCH RBC QN AUTO: 29.3 PG (ref 26.8–34.3)
MCHC RBC AUTO-ENTMCNC: 32.4 G/DL (ref 31.4–37.4)
MCV RBC AUTO: 91 FL (ref 82–98)
MONOCYTES # BLD AUTO: 0.96 THOUSAND/ΜL (ref 0.17–1.22)
MONOCYTES NFR BLD AUTO: 16 % (ref 4–12)
NEUTROPHILS # BLD AUTO: 3.44 THOUSANDS/ΜL (ref 1.85–7.62)
NEUTS SEG NFR BLD AUTO: 58 % (ref 43–75)
NRBC BLD AUTO-RTO: 0 /100 WBCS
PLATELET # BLD AUTO: 463 THOUSANDS/UL (ref 149–390)
PMV BLD AUTO: 10 FL (ref 8.9–12.7)
POTASSIUM SERPL-SCNC: 3.8 MMOL/L (ref 3.5–5.3)
PROT SERPL-MCNC: 5.7 G/DL (ref 6.4–8.4)
RBC # BLD AUTO: 3.38 MILLION/UL (ref 3.88–5.62)
SODIUM SERPL-SCNC: 140 MMOL/L (ref 135–147)
WBC # BLD AUTO: 6.01 THOUSAND/UL (ref 4.31–10.16)

## 2025-02-04 PROCEDURE — 85025 COMPLETE CBC W/AUTO DIFF WBC: CPT

## 2025-02-04 PROCEDURE — 80053 COMPREHEN METABOLIC PANEL: CPT

## 2025-02-04 PROCEDURE — 83735 ASSAY OF MAGNESIUM: CPT

## 2025-02-04 NOTE — PROGRESS NOTES
Isaac Kramer  tolerated treatment well with no complications.  Labs obtained via port without difficulty.  Port flushed per protocol.    Isaac Kramer is aware of future appt on 2/5 at 8:30 am.     AVS declined by Isaac Kramer.

## 2025-02-05 ENCOUNTER — OFFICE VISIT (OUTPATIENT)
Dept: SURGICAL ONCOLOGY | Facility: CLINIC | Age: 65
End: 2025-02-05
Payer: MEDICARE

## 2025-02-05 ENCOUNTER — HOSPITAL ENCOUNTER (OUTPATIENT)
Dept: INFUSION CENTER | Facility: HOSPITAL | Age: 65
Discharge: HOME | End: 2025-02-05
Attending: INTERNAL MEDICINE
Payer: MEDICARE

## 2025-02-05 VITALS
RESPIRATION RATE: 18 BRPM | BODY MASS INDEX: 23.22 KG/M2 | TEMPERATURE: 98.1 F | WEIGHT: 136 LBS | HEIGHT: 64 IN | SYSTOLIC BLOOD PRESSURE: 110 MMHG | HEART RATE: 90 BPM | DIASTOLIC BLOOD PRESSURE: 74 MMHG | OXYGEN SATURATION: 94 %

## 2025-02-05 VITALS
DIASTOLIC BLOOD PRESSURE: 64 MMHG | WEIGHT: 135 LBS | HEART RATE: 106 BPM | OXYGEN SATURATION: 98 % | HEIGHT: 64 IN | TEMPERATURE: 97.3 F | BODY MASS INDEX: 23.05 KG/M2 | RESPIRATION RATE: 16 BRPM | SYSTOLIC BLOOD PRESSURE: 137 MMHG

## 2025-02-05 DIAGNOSIS — C25.9 PANCREATIC ADENOCARCINOMA (HCC): Primary | ICD-10-CM

## 2025-02-05 DIAGNOSIS — T45.1X5A CHEMOTHERAPY-INDUCED NEUTROPENIA (HCC): ICD-10-CM

## 2025-02-05 DIAGNOSIS — D70.1 CHEMOTHERAPY-INDUCED NEUTROPENIA (HCC): ICD-10-CM

## 2025-02-05 DIAGNOSIS — C24.1 AMPULLARY CARCINOMA (HCC): Primary | ICD-10-CM

## 2025-02-05 PROCEDURE — 96413 CHEMO IV INFUSION 1 HR: CPT

## 2025-02-05 PROCEDURE — 99214 OFFICE O/P EST MOD 30 MIN: CPT | Performed by: STUDENT IN AN ORGANIZED HEALTH CARE EDUCATION/TRAINING PROGRAM

## 2025-02-05 PROCEDURE — 96367 TX/PROPH/DG ADDL SEQ IV INF: CPT

## 2025-02-05 RX ORDER — SODIUM CHLORIDE 9 MG/ML
20 INJECTION, SOLUTION INTRAVENOUS ONCE
Status: COMPLETED | OUTPATIENT
Start: 2025-02-05 | End: 2025-02-05

## 2025-02-05 RX ADMIN — SODIUM CHLORIDE 20 ML/HR: 0.9 INJECTION, SOLUTION INTRAVENOUS at 08:36

## 2025-02-05 RX ADMIN — GEMCITABINE 1600 MG: 38 INJECTION, SOLUTION INTRAVENOUS at 09:15

## 2025-02-05 RX ADMIN — DEXAMETHASONE SODIUM PHOSPHATE 10 MG: 100 INJECTION INTRAMUSCULAR; INTRAVENOUS at 08:37

## 2025-02-05 NOTE — ASSESSMENT & PLAN NOTE
Patient is doing well on adjuvant therapy.  This has recently been changed and is much better tolerated.  I will see the patient in May or June after completion of therapy with his next set of imaging.  All questions answered today of the patient and his family.

## 2025-02-05 NOTE — PROGRESS NOTES
Surgical Oncology Follow Up           Isaac Kramer  1960  [id number]  Formerly named [facility] [address] [address] [phone]      ASSESSMENT AND PLAN    1. Ampullary carcinoma (HCC)  Assessment & Plan:  Patient is doing well on adjuvant therapy.  This has recently been changed and is much better tolerated.  I will see the patient in May or June after completion of therapy with his next set of imaging.  All questions answered today of the patient and his family.         Chief Complaint   Patient presents with    office visit       No follow-ups on file.      Oncology History Overview Note   Presents for discussion of RT for recently diagnosed Jihan 7 (4+3) prostate cancer.  Referred by Dr. Biggs    Lab Results   Component Value Date    PSA 5.2 (H) 11/08/2022    PSA 6.6 (H) 07/18/2022    PSA 3.9 06/24/2020      10/10/22  Urology  Seen in consult for elevated PSA  F/U PSA recommended    12/28/21  MRI Prostate  IMPRESSION:   1. PI-RADSv2.1 Category 4 -High (clinically significant cancer is likely to be present). 0.5 cm possible lesion in the posterior right peripheral zone at base.   2. No extraprostatic tumor, seminal vesicle invasion, pelvic lymphadenopathy, or pelvic osseous metastatic disease.   3. Moderate BPH with calculated prostate volume of 69 cc.    2/28/23  Tissue Exam  A.  Prostate, region of interest #1, needle biopsy:  - Prostatic adenocarcinoma, acinar, not otherwise specified; [place] grade 4 +3= score of 7 (grade group 3) in 4 of 5 cores involving 20% of needle core tissue and measuring up to 5 mm in length (score 4=50-60%).  - Periprostatic fat invasion: Not identified.  - Seminal vesicle invasion: Not identified.  - Lymph-vascular invasion: Not identified.  - Perineural invasion: Not identified.  - Additional Pathologic Findings:  None.     B.  Prostate, right lateral and medial base, needle biopsy:  -  Benign prostate tissue, negative for malignancy.     C.  Prostate, right mid lateral, needle biopsy:  - Benign prostate tissue, negative for malignancy.     D.  Prostate, right mid medial, needle biopsy:  - Benign prostate tissue, negative for malignancy.     E.  Prostate, right lateral apex, needle biopsy:   - Benign prostate tissue, negative for malignancy.     F. Prostate, right medial apex, needle biopsy:  - Benign prostate tissue, negative for malignancy.     G.  Prostate, left lateral base, needle biopsy:  - Prostatic adenocarcinoma, acinar, not otherwise specified; [place] grade 3 +3= score of 6 (grade group 1) in 1 of 1 cores involving 10% of needle core tissue and measuring 2 mm in length.  - Periprostatic fat invasion: Not identified.  - Seminal vesicle invasion: Not identified.  - Lymph-vascular invasion: Not identified.  - Perineural invasion: Not identified.  - Additional Pathologic Findings:  None.     H.  Prostate, left medial base, needle biopsy:  - Prostatic adenocarcinoma, acinar, not otherwise specified; [place]han grade 3 +4= score of 7 (grade group 2) in 1 of 1 cores involving 50% of needle core tissue and measuring 8 mm in length (score 4 = 5-10%).  - Periprostatic fat invasion: Not identified.  - Seminal vesicle invasion: Not identified.  - Lymph-vascular invasion: Not identified.  - Perineural invasion: Present.  - Additional Pathologic Findings:  None.     I.  Prostate, left mid lateral, needle biopsy:  - Prostatic adenocarcinoma, acinar, not otherwise specified; [place] grade 3 +4= score of 7 (grade group 2) in 1 of 1 cores involving 40-50% of needle core tissue and measuring 5 mm in length (score 4 = 5-10%).  - Periprostatic fat invasion: Not identified.  - Seminal vesicle invasion: Not identified.  - Lymph-vascular invasion: Not identified.  - Perineural invasion: Not identified.  - Additional Pathologic Findings:  High-grade prostatic intraepithelial neoplasia (HGPIN).     J.  Prostate,  left mid medial, needle biopsy:  - Prostatic adenocarcinoma, acinar, not otherwise specified; [place]han grade 3 +4= score of 7 (grade group 2) in 1 of 1 cores involving 40-50% of needle core tissue and measuring 7 mm in length (score 4 = 5-10%).  - Periprostatic fat invasion: Not identified.  - Seminal vesicle invasion: Not identified.  - Lymph-vascular invasion: Not identified.  - Perineural invasion: Not identified.  - Additional Pathologic Findings:  High-grade prostatic intraepithelial neoplasia (HGPIN).     K.  Prostate, left lateral apex, needle biopsy:  -Focal atypical small acinar proliferation, cannot exclude limited low-grade carcinoma.     L.  Prostate, left medial apex, needle biopsy:  - Benign prostate tissue, negative for malignancy.    3/27/23  Bone Scan  IMPRESSION:   1.  No scintigraphic evidence of osseous metastasis    4/6/23  Dr. Biggs  Does not qualify for active surveillance.  RT and surgery discussed.  Leaning towards RT will arrange for consult    4/20/23  Dr. Harmon  Desires surgical intervention    Upcoming:       Prostate cancer (HCC) (Resolved)   2/28/2023 Biopsy    A.  Prostate, region of interest #1, needle biopsy:  - Prostatic adenocarcinoma, acinar, not otherwise specified; [place] grade 4 +3= score of 7 (grade group 3) in 4 of 5 cores involving 20% of needle core tissue and measuring up to 5 mm in length (score 4=50-60%).  - Periprostatic fat invasion: Not identified.  - Seminal vesicle invasion: Not identified.  - Lymph-vascular invasion: Not identified.  - Perineural invasion: Not identified.  - Additional Pathologic Findings:  None.     B.  Prostate, right lateral and medial base, needle biopsy:  - Benign prostate tissue, negative for malignancy.     C.  Prostate, right mid lateral, needle biopsy:  - Benign prostate tissue, negative for malignancy.     D.  Prostate, right mid medial, needle biopsy:  - Benign prostate tissue, negative for malignancy.     E.  Prostate, right  lateral apex, needle biopsy:   - Benign prostate tissue, negative for malignancy.     F. Prostate, right medial apex, needle biopsy:  - Benign prostate tissue, negative for malignancy.     G.  Prostate, left lateral base, needle biopsy:  - Prostatic adenocarcinoma, acinar, not otherwise specified; [place] grade 3 +3= score of 6 (grade group 1) in 1 of 1 cores involving 10% of needle core tissue and measuring 2 mm in length.  - Periprostatic fat invasion: Not identified.  - Seminal vesicle invasion: Not identified.  - Lymph-vascular invasion: Not identified.  - Perineural invasion: Not identified.  - Additional Pathologic Findings:  None.     H.  Prostate, left medial base, needle biopsy:  - Prostatic adenocarcinoma, acinar, not otherwise specified; Jihan grade 3 +4= score of 7 (grade group 2) in 1 of 1 cores involving 50% of needle core tissue and measuring 8 mm in length (score 4 = 5-10%).  - Periprostatic fat invasion: Not identified.  - Seminal vesicle invasion: Not identified.  - Lymph-vascular invasion: Not identified.  - Perineural invasion: Present.  - Additional Pathologic Findings:  None.     I.  Prostate, left mid lateral, needle biopsy:  - Prostatic adenocarcinoma, acinar, not otherwise specified; [place] grade 3 +4= score of 7 (grade group 2) in 1 of 1 cores involving 40-50% of needle core tissue and measuring 5 mm in length (score 4 = 5-10%).  - Periprostatic fat invasion: Not identified.  - Seminal vesicle invasion: Not identified.  - Lymph-vascular invasion: Not identified.  - Perineural invasion: Not identified.  - Additional Pathologic Findings:  High-grade prostatic intraepithelial neoplasia (HGPIN).     J.  Prostate, left mid medial, needle biopsy:  - Prostatic adenocarcinoma, acinar, not otherwise specified; [place] grade 3 +4= score of 7 (grade group 2) in 1 of 1 cores involving 40-50% of needle core tissue and measuring 7 mm in length (score 4 = 5-10%).  - Periprostatic fat invasion: Not  identified.  - Seminal vesicle invasion: Not identified.  - Lymph-vascular invasion: Not identified.  - Perineural invasion: Not identified.  - Additional Pathologic Findings:  High-grade prostatic intraepithelial neoplasia (HGPIN).     K.  Prostate, left lateral apex, needle biopsy:  -Focal atypical small acinar proliferation, cannot exclude limited low-grade carcinoma.     L.  Prostate, left medial apex, needle biopsy:  - Benign prostate tissue, negative for malignancy.     2/28/2023 Initial Diagnosis    Prostate cancer (HCC)     Pancreatic adenocarcinoma (HCC)   8/28/2024 Initial Diagnosis    Pancreatic adenocarcinoma (HCC)     9/12/2024 Surgery    A. Gallbladder, cholecystectomy:  - Chronic cholecystitis.  - One lymph node, negative for malignancy (0/1).  - Negative for malignancy.     B. Lymph Node, Portal Lymph Node:  - One lymph node, negative for malignancy (0/1).     C. Pancreas, Whipple; SEE COMMENTS:  - Adenocarcinoma arising in adenoma.  - Tumor invades into muscularis propria of the duodenum (pT2).  - Lymphovascular invasion is present.   - One of sixteen lymph nodes is positive for tumor (1/16, pN1).  - Pancreatic intraepithelial neoplasia (PanIN), low grade.   - All margins are negative for carcinoma or dysplasia.     Block C11 is sent to [facility] for MI Profile Testing.   Immunohistochemistry (IHC) testing for Mismatch Repair (MMR) proteins has been requested on block C9, and the results will be issued in an addendum.     D. Lymph Node, Portacaval node:  - Eight lymph nodes, negative for malignancy (0/8).     E. Soft Tissue, Other, Additional Uncinate/Pancreas:  - Portion of pancreas with no pathologic abnormality  - Two lymph nodes, negative for malignancy (0/2).     9/12/2024 -  Cancer Staged    Staging form: Pancreas, AJCC 8th Edition  - Pathologic stage from 9/12/2024: Stage IIB (pT2, pN1, cM0) - Signed by Jacob Colby MD on 10/2/2024  Stage prefix: Initial diagnosis  Histologic grade  (G): G2  Histologic grading system: 3 grade system  Lymph-vascular invasion (LVI): LVI present/identified, NOS  Carbohydrate antigen 19-9 (CA 19-9) (U/mL): 14  Carcinoembryonic antigen (CEA) (ng/mL): 0.7       11/4/2024 - 11/22/2024 Chemotherapy    alteplase (CATHFLO), 2 mg, Intracatheter, Every 1 Minute as needed, 2 of 12 cycles  pegfilgrastim (NEULASTA ONPRO), 6 mg, Subcutaneous, Once, 2 of 12 cycles  Dose modification: 4 mg (original dose 6 mg, Cycle 3)  Administration: 6 mg (11/6/2024), 6 mg (11/22/2024)  fosaprepitant (EMEND) IVPB, 150 mg, Intravenous, Once, 2 of 12 cycles  Administration: 150 mg (11/4/2024), 150 mg (11/20/2024)  irinotecan (CAMPTOSAR) chemo infusion, 242 mg (83.3 % of original dose 180 mg/m2), Intravenous, Once, 2 of 12 cycles  Dose modification: 150 mg/m2 (original dose 180 mg/m2, Cycle 1, Reason: Dose modified as per discussion with consulting physician), 125 mg/m2 (original dose 180 mg/m2, Cycle 2, Reason: Dose modified as per discussion with consulting physician)  Administration: 240 mg (11/4/2024), 200 mg (11/20/2024)  leucovorin calcium IVPB, 644 mg, Intravenous, Once, 2 of 12 cycles  Administration: 650 mg (11/4/2024), 650 mg (11/20/2024)  oxaliplatin (ELOXATIN) chemo infusion, 85 mg/m2 = 136.85 mg, Intravenous, Once, 2 of 12 cycles  Dose modification: 65 mg/m2 (original dose 85 mg/m2, Cycle 2, Reason: Dose modified as per discussion with consulting physician)  Administration: 136.85 mg (11/4/2024), 100 mg (11/20/2024)  fluorouracil (ADRUCIL) ambulatory infusion Soln, 1,200 mg/m2/day = 3,865 mg, Intravenous, Over 46 hours, 2 of 12 cycles  Dose modification: 960 mg/m2/day (original dose 1,200 mg/m2/day, Cycle 3, Reason: Dose modified as per discussion with consulting physician), 1,000 mg/m2/day (original dose 1,200 mg/m2/day, Cycle 3, Reason: Dose modified as per discussion with consulting physician), 960 mg/m2/day (original dose 1,200 mg/m2/day, Cycle 2, Reason: Dose modified as per  discussion with consulting physician)     12/11/2024 -  Chemotherapy    alteplase (CATHFLO), 2 mg, Intracatheter, Every 1 Minute as needed, 3 of 6 cycles  gemcitabine (GEMZAR) infusion, 1,000 mg/m2 = 1,600 mg, Intravenous, Once, 3 of 6 cycles  Administration: 1,600 mg (12/11/2024), 1,600 mg (12/18/2024), 1,600 mg (1/8/2025), 1,600 mg (12/26/2024), 1,600 mg (1/15/2025), 1,600 mg (1/22/2025), 1,600 mg (2/5/2025)     Ampullary carcinoma (HCC)   9/12/2024 Surgery    A. Gallbladder, cholecystectomy:  - Chronic cholecystitis.  - One lymph node, negative for malignancy (0/1).  - Negative for malignancy.     B. Lymph Node, Portal Lymph Node:  - One lymph node, negative for malignancy (0/1).     C. Pancreas, Whipple; SEE COMMENTS:  - Adenocarcinoma arising in adenoma.  - Tumor invades into muscularis propria of the duodenum (pT2).  - Lymphovascular invasion is present.   - One of sixteen lymph nodes is positive for tumor (1/16, pN1).  - Pancreatic intraepithelial neoplasia (PanIN), low grade.   - All margins are negative for carcinoma or dysplasia.     Block C11 is sent to [facility] for MI Profile Testing.   Immunohistochemistry (IHC) testing for Mismatch Repair (MMR) proteins has been requested on block C9, and the results will be issued in an addendum.     D. Lymph Node, Portacaval node:  - Eight lymph nodes, negative for malignancy (0/8).     E. Soft Tissue, Other, Additional Uncinate/Pancreas:  - Portion of pancreas with no pathologic abnormality  - Two lymph nodes, negative for malignancy (0/2).     10/2/2024 Initial Diagnosis    Ampullary carcinoma (HCC)     10/2/2024 -  Cancer Staged    Staging form: Ampulla of Vater, AJCC 8th Edition  - Pathologic: Stage IIIA (pT2, pN1, cM0) - Signed by Marika Ac MD on 10/2/2024             FOLLOW UP HISTORY    Staging: T2N1 intestinal type ampullary adenocarcinoma  Treatment history: whipple 9/12/24  Current treatment: adj therapy  Disease status: on  treatment    History of Present Illness: Pt seen mid-adj therapy. Doing realtively well. Was unable to tolerate FOLFIRINOX and has been changed to gem/abraxane. Recently saw Dr Colby 1/23/25 and appears to be tolerating this therapy well. He is scheduled for a total of 6 mo of therapy to be completed in May. Today he has no complaints.  He is doing well with no concerns about his incision.  He is eating well with regular bowel movements.    Review of Systems  Complete ROS Surg Onc:   Complete ROS Surg Onc:   Constitutional: The patient denies new or recent history of general fatigue, no recent weight loss, no change in appetite.   Eyes: No complaints of visual problems, no scleral icterus.   ENT: no complaints of ear pain, no hoarseness, no difficulty swallowing,  no tinnitus and no new masses in head, oral cavity, or neck.   Cardiovascular: No complaints of chest pain, no palpitations, no ankle edema.   Respiratory: No complaints of shortness of breath, no cough.   Gastrointestinal: No complaints of jaundice, no bloody stools, no pale stools.   Genitourinary: No complaints of dysuria, no hematuria, no nocturia, no frequent urination, no urethral discharge.   Musculoskeletal: No complaints of weakness, paralysis, joint stiffness or arthralgias.  Integumentary: No complaints of rash, no new lesions.   Neurological: No complaints of convulsions, no seizures, no dizziness.   Hematologic/Lymphatic: No complaints of easy bruising.   Endocrine:  No hot or cold intolerance.  No polydipsia, polyphagia, or polyuria.  Allergy/immunology:  No environmental allergies.  No food allergies.  Not immunocompromised.  Skin:  No pallor or rash.  No wound.        Patient Active Problem List   Diagnosis    Benign essential hypertension    ED (erectile dysfunction) of non-organic origin    Hypercholesterolemia    Peripheral vascular disease (HCC)    Peripheral neuropathy    Overweight with body mass index (BMI) of 26 to 26.9 in adult     Aortic valve stenosis    Coronary artery disease involving native coronary artery of native heart without angina pectoris    Microscopic hematuria    BPH (benign prostatic hyperplasia)    Pancreatic adenocarcinoma (HCC)    Elevated LFTs    Acute obstructive cholangitis    Enteritis    GERD (gastroesophageal reflux disease)    MOOKIE (acute kidney injury) (HCC)    Ampullary carcinoma (HCC)    Encounter for central line care    Chemotherapy-induced neutropenia (HCC)    Pancolitis (HCC)    Leukocytosis    Generalized abdominal pain    Severe protein-calorie malnutrition (HCC)     Past Medical History:   Diagnosis Date    Benign essential hypertension 05/08/2014    CAD (coronary artery disease)     s/p NAVA prox LAD and D1 7/28/2022    Cancer (HCC)     Prostate    Coronary artery disease involving native coronary artery of native heart without angina pectoris 08/09/2022    Enteritis 08/28/2024    GERD (gastroesophageal reflux disease)     Hyperlipidemia     Hypertension     Other chest pain     Palpitations     Prostate cancer (HCC) 04/18/2023    Sleep apnea      Past Surgical History:   Procedure Laterality Date    CARDIAC CATHETERIZATION Left 7/28/2022    Procedure: Cardiac catheterization-left heart catheterization;  Surgeon: Sai Henry MD;  Location: [facility];  Service: Cardiology    CARDIAC CATHETERIZATION N/A 7/28/2022    Procedure: Cardiac pci;  Surgeon: Sai Henry MD;  Location: [facility];  Service: Cardiology    HERNIA REPAIR      IR EMBOLIZATION (SPECIFY VESSEL OR SITE)  9/13/2024    IR PORT PLACEMENT  10/28/2024    OR LAPS SURG [id number] RPBIC RAD W/NRV SPARING ROBOT N/A 5/22/2023    Procedure: PROSTATECTOMY RADICAL & PELVIC LYMPH NODE DISSECTION  W/ ROBOT;  Surgeon: Otoniel Harmon MD;  Location: [facility];  Service: Urology    OR PROSTATE NEEDLE BIOPSY ANY APPROACH N/A 2/28/2023    Procedure: TRANSRECTAL MRI FUSION BIOPSY PROSTATE;  Surgeon: Milan Arellano MD;  Location:  [facility];  Service: Urology    WHIPPLE PROCEDURE/PANCREATICO-DUODENECTOMY N/A 2024    Procedure: WHIPPLE PROCEDURE/PANCREATICO-DUODENECTOMY;  Surgeon: Marika Ac MD;  Location: [facility];  Service: Surgical Oncology     Family History   Problem Relation Age of Onset    No Known Problems Mother     No Known Problems Father      Social History     Socioeconomic History    Marital status: /Civil Union     Spouse name: Not on file    Number of children: Not on file    Years of education: Not on file    Highest education level: Not on file   Occupational History    Not on file   Tobacco Use    Smoking status: Former     Current packs/day: 0.00     Types: Cigarettes     Quit date: 2022     Years since quittin.1    Smokeless tobacco: Never   Vaping Use    Vaping status: Never Used   Substance and Sexual Activity    Alcohol use: Not Currently    Drug use: Never    Sexual activity: Not on file   Other Topics Concern    Not on file   Social History Narrative    EMPLOYED         Social Drivers of Health     Financial Resource Strain: Not on file   Food Insecurity: No Food Insecurity (2024)    Nursing - Inadequate Food Risk Classification     Worried About Running Out of Food in the Last Year: Never true     Ran Out of Food in the Last Year: Never true     Ran Out of Food in the Last Year: Never true   Transportation Needs: No Transportation Needs (2024)    Nursing - Transportation Risk Classification     Lack of Transportation: Not on file     Lack of Transportation: No   Physical Activity: Not on file   Stress: Not on file   Social Connections: Unknown (2024)    Received from [facility]    Social Connections     How often do you feel lonely or isolated from those around you? (Adult - for ages 18 years and over): Not on file   Intimate Partner Violence: Unknown (2024)    Nursing IPS     Feels Physically and Emotionally Safe: Not on file     Physically Hurt by Someone:  Not on file     Humiliated or Emotionally Abused by Someone: Not on file     Physically Hurt by Someone: No     Hurt or Threatened by Someone: No   Housing Stability: Unknown (2024)    Nursing: Inadequate Housing Risk Classification     Has Housing: Not on file     Worried About Losing Housing: Not on file     Unable to Get Utilities: Not on file     Unable to Pay for Housing in the Last Year: No     Has Housin       Current Outpatient Medications:     albuterol (ProAir HFA) 90 mcg/act inhaler, Inhale 2 puffs every 6 (six) hours as needed for wheezing, Disp: 8.5 g, Rfl: 0    aspirin 81 mg chewable tablet, Chew 81 mg daily, Disp: , Rfl:     atorvastatin (LIPITOR) 20 mg tablet, Take 1 tablet (20 mg total) by mouth daily, Disp: 30 tablet, Rfl: 5    capecitabine (XELODA) 150 MG tablet, Take 2 tablets (300 mg total) by mouth 2 (two) times a day 21 days on, followed by 7 days off, Disp: 84 tablet, Rfl: 11    capecitabine (XELODA) 500 MG tablet, Take 2 tablets (1,000 mg total) by mouth 2 (two) times a day 21 days on, followed by 7 days off, Disp: 84 tablet, Rfl: 11    lisinopril (ZESTRIL) 20 mg tablet, Take 1 tablet (20 mg total) by mouth daily, Disp: 30 tablet, Rfl: 3    menthol-cetylpyridinium (CEPACOL) 3 MG lozenge, Take 1 lozenge (3 mg total) by mouth as needed for sore throat, Disp: 18 lozenge, Rfl: 0    ondansetron (ZOFRAN) 4 mg tablet, TAKE 1 TABLET BY MOUTH EVERY 8 HOURS AS NEEDED FOR NAUSEA FOR VOMITING, Disp: 20 tablet, Rfl: 5    dicyclomine (BENTYL) 10 mg capsule, Take 1 capsule (10 mg total) by mouth 3 (three) times a day before meals, Disp: 90 capsule, Rfl: 0  No current facility-administered medications for this visit.    Facility-Administered Medications Ordered in Other Visits:     alteplase (CATHFLO) injection 2 mg, 2 mg, Intracatheter, Q1MIN PRN, Jacob Colby MD    alteplase (CATHFLO) injection 2 mg, 2 mg, Intracatheter, Q1MIN PRN, Jacob Colby MD  No Known Allergies  Vitals:    [id number]    BP: 110/74   Pulse: 90   Resp: 18   Temp: 98.1 °F (36.7 °C)   SpO2: 94%         Physical Exam  Constitutional: General appearance: The Patient is well-developed and well-nourished who appears the stated age in no acute distress. Patient is pleasant and talkative.     HEENT:  Normocephalic.  Sclerae are anicteric. Mucous membranes are moist. Neck is supple without adenopathy. No JVD.     Chest: Easy WOB.     Cardiac: Heart is regular rate.     Abdomen: Abdomen is soft, non-tender, non-distended and without masses.     Extremities: There is no clubbing or cyanosis. There is no edema.  Symmetric.  Neuro: Grossly nonfocal. Gait is normal.     Lymphatic: No evidence of cervical adenopathy bilaterally.   No evidence of axillary adenopathy bilaterally. No evidence of inguinal adenopathy bilaterally.     Skin: Warm, anicteric.  Incision healing well  Psych:  Patient is pleasant and talkative.    Imaging  XR chest portable    Result Date: 9/17/2024  Narrative: XR CHEST PORTABLE INDICATION: r/o pleural effusion. COMPARISON: CXR 9/16/2024, abdomen CT 9/13/2024, chest CT 8/28/2024. FINDINGS: Epidural catheter. Moderate right pleural effusion and small left pleural effusion. Right greater than left bibasilar atelectasis. No pneumothorax. Normal cardiomediastinal silhouette. Bones are unremarkable for age. Embolization coil right upper quadrant.     Impression: Moderate right and small left pleural effusion and bibasilar atelectasis. Workstation performed: [id number]     XR chest portable    Result Date: 9/17/2024  Narrative: XR CHEST PORTABLE INDICATION: left sided chest pain and shortness of breath. COMPARISON: Chest x-ray 9/13/2024 FINDINGS: Nasogastric tube has been removed. Partial improved aeration of the bilateral lung fields. Probable residual pulmonary vascular congestion and small bilateral pleural effusions with right basilar atelectasis or infiltrate not excluded. Normal cardiomediastinal silhouette. Bones are  unremarkable for age.     Impression: Partially improved aeration of the lung fields. Probable residual pulmonary vascular congestion and small bilateral pleural effusions with right basilar atelectasis or infiltrate not excluded. Workstation performed: [id number]     XR chest portable ICU    Result Date: 9/13/2024  Narrative: XR CHEST PORTABLE ICU INDICATION: hypoxia. COMPARISON: 8/27/2024 FINDINGS: Bilateral patchy/hazy airspace opacities are noted. No pneumothorax. Nasogastric tube is seen extending below the left hemidiaphragm. A wire is seen projecting over the right shoulder region and mid chest, possibly related to the described thoracic epidural, correlate clinically Bones are unremarkable for age. Normal upper abdomen.     Impression: Bilateral patchy/hazy airspace opacities, which may represent pulmonary congestion versus bilateral infiltrates in the appropriate clinical setting Nasogastric tube extends below left hemidiaphragm Workstation performed: [id number]     IR embolization (specify vessel or site)    Result Date: 9/13/2024  Narrative: PROCEDURE: Cystic artery embolization Procedural Personnel Attending physician(s): Dr. Cardona Resident physician(s): Dr. Guerin Pre-procedure diagnosis: Intraperitoneal hemorrhage Post-procedure diagnosis: Same Indication: Patient with history of pancreatic mass status post Whipple's noted to have downtrending hemoglobin. CTA showed findings concerning for active bleeding from cystic artery. PROCEDURE SUMMARY: - Arterial access with ultrasound guidance - Selective cannulization of celiac, common hepatic, and cystic arteries with angiograms - Coil embolization of of cystic artery PROCEDURE DETAILS: Pre-procedure Consent: Informed consent for the procedure including risks, benefits and alternatives was obtained and time-out was performed prior to the procedure. Preparation: The site was prepared and draped using maximal sterile barrier technique including cutaneous  antisepsis. Anesthesia/sedation Level of anesthesia/sedation: General anesthesia Anesthesia/sedation administered by: Anesthesiology Total intra-service sedation time (minutes): 60 Access Local anesthesia was administered. The vessel was sonographically evaluated and judged to be patent. Real time ultrasound was used to visualize needle entry into the vessel and a permanent image was stored. A 5 Jordanian sheath was placed. Vessel accessed: Right common femoral artery Access technique: 19 gauge access needle Mesenteric angiography and interventions 5 Jordanian VS2 catheter was used to selectively cannulate the celiac artery. Celiac artery angiogram was performed. 2.8 Jordanian Progreat microcatheter was used to selectively cannulate the common hepatic artery and angiogram was performed. The microcatheter  was used to selectively cannulate the cystic artery. There was active contrast extravasation from branches of the cystic artery. Cystic artery was coil embolized using 3 mm x 5 mm Ch[place] Soft coil. Post embolization angiogram showed cessation of flow into  the cystic artery as well as cessation of active contrast extravasation. Closure Right common femoral artery access was closed using Perclose closure device. Contrast Contrast agent: Visipaque Contrast volume (mL): 81 Radiation Dose Fluoroscopy time (minutes): 18.3 Reference air kerma (mGy): 1339 Additional Details Estimated blood loss (mL): 10 Complications: No immediate complications.     Impression: Active contrast extravasation visualized from branches of the cystic artery. Cystic artery was coil embolized with resolution of contrast extravasation. Plan: -Continue ICU cares. -Monitor hemoglobin. Workstation performed: [id number]     CTA abdomen pelvis w wo contrast    Addendum Date: 9/13/2024 Addendum:   ADDENDUM: The treating team is aware of these findings, and the patient is currently in interventional radiology for embolization.    Result Date:  9/13/2024  Narrative: CT ANGIOGRAM OF THE ABDOMEN AND PELVIS WITH AND WITHOUT IV CONTRAST INDICATION: s/p Whipple w bloody drain output want to rule out GDA bleed. COMPARISON: Preoperative CT dated 8/27/2024 TECHNIQUE: CT angiogram examination of the abdomen and pelvis was performed according to standard protocol. This examination, like all CT scans performed in the [facility], was performed utilizing techniques to minimize radiation dose exposure, including the use of iterative reconstruction and automated exposure control. Contrast as well as noncontrast images were obtained. Rad dose 2041.57 mGy-cm IV Contrast: 75 mL of iohexol (OMNIPAQUE) Enteric Contrast: Not administered. FINDINGS: VASCULAR STRUCTURES: There is active extravasation from the cystic artery with hematoma in the right upper quadrant.. The origin of the gastroduodenal artery is suspected on image 52 of series 330 and 69 of series 605. The remainder of the gastroduodenal  artery is not clearly identified and likely surgically ligated. There is no adjacent contrast extravasation. OTHER FINDINGS ABDOMEN LOWER CHEST: Small bilateral pleural effusions with adjacent, compressive atelectasis. LIVER/BILIARY TREE: Unremarkable. GALLBLADDER: Post cholecystectomy. There is extravasation of contrast on arterial phase images adjacent to a surgical clip, best seen on image 53 of series 303. This appears to originate from the cystic artery. There are adjacent, perihepatic mixed density blood products SPLEEN: Unremarkable. 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 There is distal pancreatic ductal dilation. ADRENAL GLANDS: Unremarkable. KIDNEYS/URETERS: Simple renal cyst(s). Otherwise unremarkable kidneys. No hydronephrosis. STOMACH AND BOWEL: Changes of a Whipple procedure are noted. There are prominent loops of small bowel with air-fluid levels which likely reflect a postoperative ileus. There is bowel  gas distally without findings suspicious for small bowel obstruction. APPENDIX: No findings to suggest appendicitis. ABDOMINOPELVIC CAVITY: There are heterogeneous blood products in the right upper quadrant along the inferior margin of the liver and adjacent to the cholecystectomy bed. There is also a predominantly simple appearing fluid in the left abdomen. There is predominantly simple appearing fluid in the pelvis with a small amount of layering, hyperdense blood products seen posteriorly on image 158 of series 303. There is a small amount of free air, expected in the recent postoperative setting. NG tube tip is in the upper stomach, just below the GE junction. There is an operative drain in the operative bed. PELVIS REPRODUCTIVE ORGANS: Unremarkable for patient's age. URINARY BLADDER: Decompressed by Carr catheter ABDOMINAL WALL/INGUINAL REGIONS: Unremarkable. BONES: No acute fracture or suspicious osseous lesion. Epidural catheter is partially imaged.     Impression: 1. Active extravasation from the cystic artery with adjacent hematoma in the right upper quadrant. 2. Small amount of layering hemoperitoneum in the pelvis. 3. 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 There are mildly prominent loops of small bowel without evidence of obstruction. 4. NG tube tip just below the GE junction. Consider slight advancement. The proximal port is above the GE junction. The study was marked in EPIC for immediate notification. Workstation performed: [id number]     I personally reviewed and interpreted the available laboratory and imaging data including labs, Dr. Colby's notes, hospital course, cross-sectional imaging from November, labs.

## 2025-02-05 NOTE — PROGRESS NOTES
Isaac Kramer  tolerated treatment well with no complications.      Isaac Kramer is aware of future appt on 2/11 at 1:30 pm.     AVS printed and given to Isaac Kramer.

## 2025-02-11 ENCOUNTER — HOSPITAL ENCOUNTER (OUTPATIENT)
Dept: INFUSION CENTER | Facility: HOSPITAL | Age: 65
Discharge: HOME | End: 2025-02-11
Attending: INTERNAL MEDICINE
Payer: MEDICARE

## 2025-02-11 DIAGNOSIS — Z45.2 ENCOUNTER FOR CENTRAL LINE CARE: Primary | ICD-10-CM

## 2025-02-11 DIAGNOSIS — D70.1 CHEMOTHERAPY-INDUCED NEUTROPENIA (HCC): ICD-10-CM

## 2025-02-11 DIAGNOSIS — T45.1X5A CHEMOTHERAPY-INDUCED NEUTROPENIA (HCC): ICD-10-CM

## 2025-02-11 DIAGNOSIS — C25.9 PANCREATIC ADENOCARCINOMA (HCC): ICD-10-CM

## 2025-02-11 LAB
ALBUMIN SERPL BCG-MCNC: 3.7 G/DL (ref 3.5–5)
ALP SERPL-CCNC: 88 U/L (ref 34–104)
ALT SERPL W P-5'-P-CCNC: 20 U/L (ref 7–52)
ANION GAP SERPL CALCULATED.3IONS-SCNC: 4 MMOL/L (ref 4–13)
AST SERPL W P-5'-P-CCNC: 22 U/L (ref 13–39)
BASOPHILS # BLD AUTO: 0.09 THOUSANDS/ÂΜL (ref 0–0.1)
BASOPHILS NFR BLD AUTO: 2 % (ref 0–1)
BILIRUB SERPL-MCNC: 0.37 MG/DL (ref 0.2–1)
BUN SERPL-MCNC: 16 MG/DL (ref 5–25)
CALCIUM SERPL-MCNC: 8.6 MG/DL (ref 8.4–10.2)
CHLORIDE SERPL-SCNC: 108 MMOL/L (ref 96–108)
CO2 SERPL-SCNC: 27 MMOL/L (ref 21–32)
CREAT SERPL-MCNC: 0.69 MG/DL (ref 0.6–1.3)
EOSINOPHIL # BLD AUTO: 0.09 THOUSAND/ÂΜL (ref 0–0.61)
EOSINOPHIL NFR BLD AUTO: 2 % (ref 0–6)
ERYTHROCYTE [DISTWIDTH] IN BLOOD BY AUTOMATED COUNT: 20.7 % (ref 11.6–15.1)
GFR SERPL CREATININE-BSD FRML MDRD: 98 ML/MIN/1.73SQ M
GLUCOSE SERPL-MCNC: 70 MG/DL (ref 65–140)
HCT VFR BLD AUTO: 31.5 % (ref 36.5–49.3)
HGB BLD-MCNC: 10.2 G/DL (ref 12–17)
IMM GRANULOCYTES # BLD AUTO: 0.06 THOUSAND/UL (ref 0–0.2)
IMM GRANULOCYTES NFR BLD AUTO: 1 % (ref 0–2)
LYMPHOCYTES # BLD AUTO: 1.12 THOUSANDS/ÂΜL (ref 0.6–4.47)
LYMPHOCYTES NFR BLD AUTO: 24 % (ref 14–44)
MAGNESIUM SERPL-MCNC: 2.2 MG/DL (ref 1.9–2.7)
MCH RBC QN AUTO: 29.5 PG (ref 26.8–34.3)
MCHC RBC AUTO-ENTMCNC: 32.4 G/DL (ref 31.4–37.4)
MCV RBC AUTO: 91 FL (ref 82–98)
MONOCYTES # BLD AUTO: 0.58 THOUSAND/ÂΜL (ref 0.17–1.22)
MONOCYTES NFR BLD AUTO: 13 % (ref 4–12)
NEUTROPHILS # BLD AUTO: 2.68 THOUSANDS/ÂΜL (ref 1.85–7.62)
NEUTS SEG NFR BLD AUTO: 58 % (ref 43–75)
NRBC BLD AUTO-RTO: 0 /100 WBCS
PLATELET # BLD AUTO: 591 THOUSANDS/UL (ref 149–390)
PMV BLD AUTO: 9.3 FL (ref 8.9–12.7)
POTASSIUM SERPL-SCNC: 4.4 MMOL/L (ref 3.5–5.3)
PROT SERPL-MCNC: 6.1 G/DL (ref 6.4–8.4)
RBC # BLD AUTO: 3.46 MILLION/UL (ref 3.88–5.62)
SODIUM SERPL-SCNC: 139 MMOL/L (ref 135–147)
WBC # BLD AUTO: 4.62 THOUSAND/UL (ref 4.31–10.16)

## 2025-02-11 PROCEDURE — 83735 ASSAY OF MAGNESIUM: CPT

## 2025-02-11 PROCEDURE — 80053 COMPREHEN METABOLIC PANEL: CPT

## 2025-02-11 PROCEDURE — 85025 COMPLETE CBC W/AUTO DIFF WBC: CPT | Performed by: INTERNAL MEDICINE

## 2025-02-11 NOTE — PROGRESS NOTES
Isaac Kramer  tolerated lab draw via port well with no complications.      Isaac Kramer is aware of future appt on 2/12 at 10am.     AVS Declined    Left unit in stable condition.

## 2025-02-12 ENCOUNTER — HOSPITAL ENCOUNTER (OUTPATIENT)
Dept: INFUSION CENTER | Facility: HOSPITAL | Age: 65
Discharge: HOME | End: 2025-02-12
Attending: INTERNAL MEDICINE
Payer: MEDICARE

## 2025-02-12 VITALS
SYSTOLIC BLOOD PRESSURE: 117 MMHG | RESPIRATION RATE: 16 BRPM | BODY MASS INDEX: 23.11 KG/M2 | HEIGHT: 64 IN | TEMPERATURE: 97.7 F | WEIGHT: 135.36 LBS | DIASTOLIC BLOOD PRESSURE: 63 MMHG | OXYGEN SATURATION: 98 % | HEART RATE: 97 BPM

## 2025-02-12 DIAGNOSIS — C25.9 PANCREATIC ADENOCARCINOMA (HCC): Primary | ICD-10-CM

## 2025-02-12 DIAGNOSIS — T45.1X5A CHEMOTHERAPY-INDUCED NEUTROPENIA (HCC): ICD-10-CM

## 2025-02-12 DIAGNOSIS — D70.1 CHEMOTHERAPY-INDUCED NEUTROPENIA (HCC): ICD-10-CM

## 2025-02-12 PROCEDURE — 96367 TX/PROPH/DG ADDL SEQ IV INF: CPT

## 2025-02-12 PROCEDURE — 96413 CHEMO IV INFUSION 1 HR: CPT

## 2025-02-12 RX ORDER — SODIUM CHLORIDE 9 MG/ML
20 INJECTION, SOLUTION INTRAVENOUS ONCE
Status: COMPLETED | OUTPATIENT
Start: 2025-02-12 | End: 2025-02-12

## 2025-02-12 RX ADMIN — GEMCITABINE 1600 MG: 38 INJECTION, SOLUTION INTRAVENOUS at 10:48

## 2025-02-12 RX ADMIN — DEXAMETHASONE SODIUM PHOSPHATE 10 MG: 100 INJECTION INTRAMUSCULAR; INTRAVENOUS at 09:55

## 2025-02-12 RX ADMIN — SODIUM CHLORIDE 20 ML/HR: 0.9 INJECTION, SOLUTION INTRAVENOUS at 09:54

## 2025-02-12 NOTE — PROGRESS NOTES
Isaac Kramer  tolerated Gemzar treatment well with no complications.      Isaac Kramer is aware of future appt on 2/18 at 12:30PM.     AVS printed and given to Isaac Kramer: No (Declined by Isaac Kramer).

## 2025-02-13 ENCOUNTER — TELEPHONE (OUTPATIENT)
Dept: SURGICAL ONCOLOGY | Facility: CLINIC | Age: 65
End: 2025-02-13

## 2025-02-13 NOTE — TELEPHONE ENCOUNTER
Oncology Finance Advocacy Intake and Intervention  Oncology Finance Counselor/Advocate placed call to patient. This writer informed patient that this writer is here to assist patient with billing questions, financial assistance, payment/payment plans, quotes, copayment assistance, insurance optimization, and insurance navigation.    This writer conducted a thorough benefit review of copayment, deductible, and out of pocket cost. This information is documented below and has been reviewed with patient.     Copayment:  Deductible:  Out of Pocket Cost:  Insurance optimization (Limited benefit vs self-pay):  Patient assistance status:Patient outreach   Free Drug Applications:N/A  Oral Chemo Application:N/A  BIN#:  PCN#:  GRP#:  Copay:$  Interventions:    Following up with patient who only had Medicare part A until the first of February. Now that he has Medicare A & B I would place a call to the patient to inquire if he was in the process of obtaining a secondary such as a supplement plan or a Medicare Advantage plan. The patient stated he would like to but he's been able as of yet. He is aware of the assistance he has with [facility] but that it is not additional insurance.   I will constanza it on my calendar to follow up on his efforts to look for additional insurance.    Information above was review thoroughly with patient and patient was advise of possible assistance programs/interventions. If any question arise patient can contact this writer at below information. This information was given to patient at time of contact.      Thomas Gonzalez  Phone:[phone]  Email: [email or web address]

## 2025-02-18 ENCOUNTER — HOSPITAL ENCOUNTER (OUTPATIENT)
Dept: INFUSION CENTER | Facility: HOSPITAL | Age: 65
Discharge: HOME | End: 2025-02-18
Attending: INTERNAL MEDICINE
Payer: MEDICARE

## 2025-02-18 DIAGNOSIS — C25.9 PANCREATIC ADENOCARCINOMA (HCC): ICD-10-CM

## 2025-02-18 DIAGNOSIS — Z45.2 ENCOUNTER FOR CENTRAL LINE CARE: Primary | ICD-10-CM

## 2025-02-18 LAB
ALBUMIN SERPL BCG-MCNC: 3.8 G/DL (ref 3.5–5)
ALP SERPL-CCNC: 83 U/L (ref 34–104)
ALT SERPL W P-5'-P-CCNC: 32 U/L (ref 7–52)
ANION GAP SERPL CALCULATED.3IONS-SCNC: 6 MMOL/L (ref 4–13)
AST SERPL W P-5'-P-CCNC: 26 U/L (ref 13–39)
BASOPHILS # BLD AUTO: 0.06 THOUSANDS/ΜL (ref 0–0.1)
BASOPHILS NFR BLD AUTO: 1 % (ref 0–1)
BILIRUB SERPL-MCNC: 0.46 MG/DL (ref 0.2–1)
BUN SERPL-MCNC: 19 MG/DL (ref 5–25)
CALCIUM SERPL-MCNC: 8.7 MG/DL (ref 8.4–10.2)
CHLORIDE SERPL-SCNC: 106 MMOL/L (ref 96–108)
CO2 SERPL-SCNC: 25 MMOL/L (ref 21–32)
CREAT SERPL-MCNC: 0.6 MG/DL (ref 0.6–1.3)
EOSINOPHIL # BLD AUTO: 0.08 THOUSAND/ΜL (ref 0–0.61)
EOSINOPHIL NFR BLD AUTO: 1 % (ref 0–6)
ERYTHROCYTE [DISTWIDTH] IN BLOOD BY AUTOMATED COUNT: 21.1 % (ref 11.6–15.1)
GFR SERPL CREATININE-BSD FRML MDRD: 104 ML/MIN/1.73SQ M
GLUCOSE SERPL-MCNC: 120 MG/DL (ref 65–140)
HCT VFR BLD AUTO: 31.5 % (ref 36.5–49.3)
HGB BLD-MCNC: 10 G/DL (ref 12–17)
IMM GRANULOCYTES # BLD AUTO: 0.04 THOUSAND/UL (ref 0–0.2)
IMM GRANULOCYTES NFR BLD AUTO: 1 % (ref 0–2)
LYMPHOCYTES # BLD AUTO: 1.09 THOUSANDS/ΜL (ref 0.6–4.47)
LYMPHOCYTES NFR BLD AUTO: 19 % (ref 14–44)
MAGNESIUM SERPL-MCNC: 2 MG/DL (ref 1.9–2.7)
MCH RBC QN AUTO: 29.5 PG (ref 26.8–34.3)
MCHC RBC AUTO-ENTMCNC: 31.7 G/DL (ref 31.4–37.4)
MCV RBC AUTO: 93 FL (ref 82–98)
MONOCYTES # BLD AUTO: 0.47 THOUSAND/ΜL (ref 0.17–1.22)
MONOCYTES NFR BLD AUTO: 8 % (ref 4–12)
NEUTROPHILS # BLD AUTO: 4.1 THOUSANDS/ΜL (ref 1.85–7.62)
NEUTS SEG NFR BLD AUTO: 70 % (ref 43–75)
NRBC BLD AUTO-RTO: 0 /100 WBCS
PLATELET # BLD AUTO: 229 THOUSANDS/UL (ref 149–390)
PMV BLD AUTO: 10.1 FL (ref 8.9–12.7)
POTASSIUM SERPL-SCNC: 3.9 MMOL/L (ref 3.5–5.3)
PROT SERPL-MCNC: 6.1 G/DL (ref 6.4–8.4)
RBC # BLD AUTO: 3.39 MILLION/UL (ref 3.88–5.62)
SODIUM SERPL-SCNC: 137 MMOL/L (ref 135–147)
WBC # BLD AUTO: 5.84 THOUSAND/UL (ref 4.31–10.16)

## 2025-02-18 PROCEDURE — 85025 COMPLETE CBC W/AUTO DIFF WBC: CPT

## 2025-02-18 PROCEDURE — 83735 ASSAY OF MAGNESIUM: CPT

## 2025-02-18 PROCEDURE — 80053 COMPREHEN METABOLIC PANEL: CPT

## 2025-02-18 NOTE — PROGRESS NOTES
Isaac Kramer  tolerated treatment well with no complications.  Labs obtained via port without difficulty.    Isaac Kramer is aware of future appt on 2/19 at 11 am.     AVS declined by Isaac Kramer.

## 2025-02-19 ENCOUNTER — HOSPITAL ENCOUNTER (OUTPATIENT)
Dept: INFUSION CENTER | Facility: HOSPITAL | Age: 65
Discharge: HOME | End: 2025-02-19
Attending: INTERNAL MEDICINE
Payer: MEDICARE

## 2025-02-19 VITALS
WEIGHT: 134.48 LBS | RESPIRATION RATE: 16 BRPM | OXYGEN SATURATION: 99 % | TEMPERATURE: 97.7 F | HEART RATE: 97 BPM | BODY MASS INDEX: 22.96 KG/M2 | DIASTOLIC BLOOD PRESSURE: 66 MMHG | HEIGHT: 64 IN | SYSTOLIC BLOOD PRESSURE: 126 MMHG

## 2025-02-19 DIAGNOSIS — D70.1 CHEMOTHERAPY-INDUCED NEUTROPENIA (HCC): ICD-10-CM

## 2025-02-19 DIAGNOSIS — C25.9 PANCREATIC ADENOCARCINOMA (HCC): Primary | ICD-10-CM

## 2025-02-19 DIAGNOSIS — T45.1X5A CHEMOTHERAPY-INDUCED NEUTROPENIA (HCC): ICD-10-CM

## 2025-02-19 PROCEDURE — 96367 TX/PROPH/DG ADDL SEQ IV INF: CPT

## 2025-02-19 PROCEDURE — 96413 CHEMO IV INFUSION 1 HR: CPT

## 2025-02-19 RX ORDER — SODIUM CHLORIDE 9 MG/ML
20 INJECTION, SOLUTION INTRAVENOUS ONCE
Status: COMPLETED | OUTPATIENT
Start: 2025-02-19 | End: 2025-02-19

## 2025-02-19 RX ADMIN — DEXAMETHASONE SODIUM PHOSPHATE 10 MG: 100 INJECTION INTRAMUSCULAR; INTRAVENOUS at 10:43

## 2025-02-19 RX ADMIN — GEMCITABINE 1600 MG: 38 INJECTION, SOLUTION INTRAVENOUS at 11:41

## 2025-02-19 RX ADMIN — SODIUM CHLORIDE 20 ML/HR: 0.9 INJECTION, SOLUTION INTRAVENOUS at 10:43

## 2025-02-19 NOTE — PROGRESS NOTES
Recent labs reviewed.  Patient tolerated Gemzar chemotherapy treatment without reaction or issues.       Isaac Kramer is aware of future appt on 3/5/25 at 1230.     AVS printed and given to Isaac Kramer:  Yes.      Patient ambulated off unit without incident.  All personal belongings taken with patient.

## 2025-02-19 NOTE — ANESTHESIA PREPROCEDURE EVALUATION
A referral has been placed to the following department(s):   Dermatology - this department will review the referral and follow up with you to discuss scheduling options.  If you do not hear from them after 3 business days, please call [phone] to be transferred to the dermatology scheduling desk    Medicare Wellness Visit  Plan for Preventive Care    A good way for you to stay healthy is to use preventive care.  Medicare covers many services that can help you stay healthy.* The goal of these services is to find any health problems as quickly as possible. Finding problems early can help make them easier to treat.  Your personal plan below lists the services you may need and when they are due.      Health Maintenance Summary       Respiratory Syncytial Virus (RSV) Vaccine 60+ (1 - Risk 60-74 years 1-dose series)  Never done    Shingles Vaccine (2 of 3)  Overdue since 3/4/2016    DTaP/Tdap/Td Vaccine (3 - Td or Tdap)  Overdue since 7/8/2020    COVID-19 Vaccine (4 - 2024-25 season)  Overdue since 9/1/2024    Microalbumin Ratio (Yearly)  Next due on 11/22/2025    GFR (Yearly)  Next due on 11/22/2025    Depression Screening (Yearly)  Next due on 2/18/2026    Traditional Medicare- Medicare Wellness Visit (Yearly)  Next due on 2/19/2026    Colorectal Cancer Screen (Colonoscopy - Every 10 Years)  Next due on 7/10/2033    Hepatitis C Screening   Completed    Pneumococcal Vaccine 50+   Completed    Influenza Vaccine   Completed    Hepatitis A Vaccine   Aged Out    Meningococcal Vaccine   Aged Out    Hepatitis B Vaccine (For Physician/APC Discussion)   Aged Out    Meningococcal Serogroup B Vaccine   Aged Out    HPV Vaccine   Aged Out    Colorectal Cancer Risk - Colonoscopy   Discontinued             Preventive Care for Women and Men    Heart Screenings (Cardiovascular):  Blood tests are used to check your cholesterol, lipid and triglyceride levels. High levels can increase your risk for heart disease and stroke. High levels  Procedure:  TRANSRECTAL MRI FUSION BIOPSY PROSTATE (Anus)    Relevant Problems   CARDIO   (+) Aortic valve stenosis   (+) Benign essential hypertension   (+) Chest pain   (+) Coronary artery disease involving native coronary artery of native heart without angina pectoris   (+) Hypercholesterolemia   (+) Peripheral vascular disease (HCC)      /RENAL   (+) BPH (benign prostatic hyperplasia)        Physical Exam    Airway    Mallampati score: III  TM Distance: >3 FB  Neck ROM: full     Dental       Cardiovascular      Pulmonary      Other Findings        Anesthesia Plan  ASA Score- 3     Anesthesia Type- IV sedation with anesthesia with ASA Monitors  Additional Monitors:   Airway Plan:     Comment: Phenylephrine and propofol vs Etomidate induction and propofol maintenance  Plan Factors-    Chart reviewed  EKG reviewed  Imaging results reviewed  Existing labs reviewed  Patient summary reviewed  Induction- intravenous  Postoperative Plan-     Informed Consent- Anesthetic plan and risks discussed with patient  I personally reviewed this patient with the CRNA  Discussed and agreed on the Anesthesia Plan with the CRNA  Nina Thrasher           VITALS  /65   Pulse 71   Temp 97 9 °F (36 6 °C) (Tympanic)   Resp 18   SpO2 97%   BP Readings from Last 3 Encounters:   02/28/23 124/65   01/10/23 112/62   12/13/22 120/62     LABS  Results from Last 12 Months   Lab Units 02/14/23  0717 07/28/22  0650 06/30/22  1137   SODIUM mmol/L 139 141 136   POTASSIUM mmol/L 3 8 3 9 4 2   CHLORIDE mmol/L 107 107 102   CO2 mmol/L 28 25 25   ANION GAP mmol/L 4 9 9   BUN mg/dL 19 16 14   CREATININE mg/dL 0 72 0 73 0 79   CALCIUM mg/dL 8 6 8 4 9 2   GLUCOSE RANDOM mg/dL  --  102 91   AST U/L 17  --  15   ALT U/L 30  --  13   ALK PHOS U/L 73  --  84   TOTAL BILIRUBIN mg/dL 0 38  --  0 70   ALBUMIN g/dL 3 7  --  4 4     Results from Last 12 Months   Lab Units 02/14/23  0717 06/30/22  1137   WBC Thousand/uL 9 13 9 78 HEMOGLOBIN g/dL 13 6 15 1   HEMATOCRIT % 42 9 45 6   PLATELETS Thousands/uL 303 316     Results from Last 12 Months   Lab Units 23  0710 22  0747   INR  0 90 0 97   PTT seconds 29  --        ANESTHESIA RISK-BENEFIT DISCUSSION  • BENEFITS INCLUDE (Horace Contreras [id number], PMID [id number]): (1) The anesthesia team reduces mortality for major surgeries, (2) The team provides as much sedation/amnesia as is reasonably possible, and (3) The team strives to reduce pain and discomfort  • RISKS INCLUDE THE FOLLOWING (PMID [id number]):  Neurologic Risks: IntraOp awareness (Risk is ~1:1,000 - 1:14,000; PMID [id number]), Stroke (Risk ~<0 1-2% for most cases; PMID [id number]), and POCD  Airway and Pulmonary Risks: Dental or mouth injury, throat pain, critical hypoxia, pneumothorax, prolonged intubation, post-op respiratory compromise  • Airway/Intubation factors: No prior advanced airway notes in [facility]  • Risk of pulmonary complications based on a simplified ARISCAT score (Major RFs: none): Score 0-2= Low, 1 6%  Cardiovascular Risks: Acacia-op cardiac injury (MACE)  If specialized vascular access is needed, risk of bleeding, infection, or injury to adjacent structures  If a bypass circuit is needed, risk of stroke, blood clot, vasoplegia  • EKG:   Encounter Date: 23   ECG 12 lead   Result Value    Ventricular Rate 64    Atrial Rate 64    PA Interval 194    QRSD Interval 86    QT Interval 388    QTC Interval 400    P Axis 61    QRS Axis -23    T Wave Axis 61    Narrative    Normal sinus rhythm  Normal ECG  When compared with ECG of 2022 09:26,  Criteria for Inferior infarct are no longer Present  Confirmed by Reji Jaramillo ([phone]) on 2023 11:07:38 AM     No results found for this or any previous visit  • Echo or other cardiac testin2022 Left Cath     • 1st Mrg lesion is 50% stenosed  • Prox LAD lesion is 50% stenosed  • 1st Diag lesion is 75% stenosed    • Ost LAD lesion is 30% can be treated with medications, diet and exercise. Lowering your levels can help keep your heart and blood vessels healthy.  Your provider will order these tests if they are needed.    An ultrasound is done to see if you have an abdominal aortic aneurysm (AAA).  This is an enlargement of one of the main blood vessels that delivers blood to the body.   In the [place], 9,000 deaths are caused by AAA.  You may not even know you have this problem and as many as 1 in 3 people will have a serious problem if it is not treated.  Early diagnosis allows for more effective treatment and cure.  If you have a family history of AAA or are a male age 65-75 who has smoked, you are at higher risk of an AAA.  Your provider can order this test, if needed.    Colorectal Screening:  There are many tests that are used to check for cancer of your colon and rectum. You and your provider should discuss what test is best for you and when to have it done.  Options include:  Screening Colonoscopy: exam of the entire colon, seen through a flexible lighted tube.  Flexible Sigmoidoscopy: exam of the last third (sigmoid portion) of the colon and rectum, seen through a flexible lighted tube.  Cologuard DNA stool test: a sample of your stool is used to screen for cancer and unseen blood in your stool.  Fecal Occult Blood Test: a sample of your stool is studied to find any unseen blood    Flu Shot:  An immunization that helps to prevent influenza (the flu). You should get this every year. The best time to get the shot is in the fall.    Pneumococcal Shot:  Vaccines help prevent pneumococcal disease, which is any type of illness caused by Streptococcus pneumoniae bacteria. There are two kinds of pneumococcal vaccines available in the [place]:   Pneumococcal conjugate vaccines (PCV20 or Wjzveuq97®)  Pneumococcal polysaccharide vaccine (PPSV23 or Scnsbscpl04®)  For those who have never received any pneumococcal conjugate vaccine, CDC  recommends PVC20 for adults 65 years or older and adults 19 through 64 years old with certain medical conditions or risk factors.   For those who have previously received PCV13, this should be followed by a dose of PPSV23.     Hepatitis B Shot:  An immunization that helps to protect people from getting Hepatitis B. Hepatitis B is a virus that spreads through contact with infected blood or body fluids. Many people with the virus do not have symptoms.  The virus can lead to serious problems, such as liver disease. Some people are at higher risk than others. Your doctor will tell you if you need this shot.     Diabetes Screening:  A test to measure sugar (glucose) in your blood is called a fasting blood sugar. Fasting means you cannot have food or drink for at least 8 hours before the test. This test can detect diabetes long before you may notice symptoms.    Glaucoma Screening:  Glaucoma screening is performed by your eye doctor. The test measures the fluid pressure inside your eyes to determine if you have glaucoma.     Hepatitis C Screening:  A blood test to see if you have the hepatitis C virus.  Hepatitis C attacks the liver and is a major cause of chronic liver disease.  Medicare will cover a single screening for all adults born between 1945 & 1965, or high risk patients (people who have injected illegal drugs or people who have had blood transfusions).  High risk patients who continue to inject illegal drugs can be screened for Hepatitis C every year.    Smoking and Tobacco-Use Cessation Counseling:  Tobacco is the single greatest cause of disease and early death in our country today. Medication and counseling together can increase a person’s chance of quitting for good.   Medicare covers two quitting attempts per year, with four counseling sessions per attempt (eight sessions in a 12 month period)    Preventive Screening tests for Women    Screening Mammograms and Breast Exams:  An x-ray of your breasts to  stenosed  • Prox Cx lesion is 40% stenosed  • Mid RCA lesion is 40% stenosed  • RPDA lesion is 40% stenosed  3v CAD  Moderate AS     6/30/2022 TTE  Interpretation Summary       •  Left Ventricle: Left ventricular cavity size is normal  Wall thickness is mildly increased  The left ventricular ejection fraction is 65%  Systolic function is normal  Wall motion is normal  Diastolic function is mildly abnormal, consistent with grade I (abnormal) relaxation  •  Aortic Valve: There is mild to moderate regurgitation  There is mild to moderate stenosis  The aortic valve velocity is increased in the setting of stenosis and increased flow  •  Mitral Valve: There is annular calcification  There is mild regurgitation  •  Pulmonic Valve: There is mild regurgitation  • Signs of severe cardiac instability: none  • Nikko's RCRI score and estimate risk of MACE (Major RFs: ICM): A score of 1= 0 6%  • Are adal-op or intra-op beta blockers indicated?: no   FEN/GI Risks: Aspiration (Approximately 0 5% risk per the IRIS trial) and PONV (10-80% depending on Apfel criteria)  • Patient meets ASA NPO guidelines: yes   Adverse drug reaction risks: Allergic reaction, overdose,risk of injury or accident if using machinery or performing physically or mentally hazardous tasks for 24 hrs after anesthesia    • Recent notable medications (including AC medications): lisinopril  • Personal or family history of anesthesia complications: no  • Pregnancy Status: Not applicable   Mortality risks associated with anesthesia based on ASA-PS (PMID [id number])  - ASA-PS III: Estimated risk 1:3,500 check for breast cancer before you or your doctor may be able to feel it.  If breast cancer is found early it can usually be treated with success.    Pelvic Exams and Pap Tests:  An exam to check for cervical and vaginal cancer. A Pap test is a lab test in which cells are taken from your cervix and sent to the lab to look for signs of cervical cancer. If cancer of the cervix is found early, chances for a cure are good. Testing can generally end at age 65, or if a woman has a hysterectomy for a benign condition. Your provider may recommend more frequent testing if certain abnormal results are found.    Bone Mass Measurements:  A painless x-ray of your bone density to see if you are at risk for a broken bone. Bone density refers to the thickness of bones or how tightly the bone tissue is packed.    Preventive Screening tests for Men    Prostate Screening:  Should you have a prostate cancer test (PSA)?  It is up to you to decide if you want a prostate cancer test. Talk to your clinician to find out if the test is right for you.  Things for you to consider and talk about should include:  Benefits and harms of the test  Your family history  How your race/ethnicity may influence the test  If the test may impact other medical conditions you have  Your values on screenings and treatments    *Medicare pays for many preventive services to keep you healthy. For some of these services, you might have to pay a deductible, coinsurance, and / or copayment.  The amounts vary depending on the type of services you need and the kind of Medicare health plan you have.    For further details on screenings offered by Medicare please visit: https://www.medicare.gov/coverage/preventive-screening-services

## 2025-02-26 RX ORDER — SODIUM CHLORIDE 9 MG/ML
20 INJECTION, SOLUTION INTRAVENOUS ONCE
OUTPATIENT
Start: 2025-03-12

## 2025-02-26 RX ORDER — SODIUM CHLORIDE 9 MG/ML
20 INJECTION, SOLUTION INTRAVENOUS ONCE
Status: CANCELLED | OUTPATIENT
Start: 2025-03-05

## 2025-02-26 RX ORDER — SODIUM CHLORIDE 9 MG/ML
20 INJECTION, SOLUTION INTRAVENOUS ONCE
OUTPATIENT
Start: 2025-03-19

## 2025-03-04 ENCOUNTER — HOSPITAL ENCOUNTER (OUTPATIENT)
Dept: INFUSION CENTER | Facility: HOSPITAL | Age: 65
Discharge: HOME | End: 2025-03-04
Payer: COMMERCIAL

## 2025-03-04 VITALS — TEMPERATURE: 97.2 F

## 2025-03-04 DIAGNOSIS — C25.9 PANCREATIC ADENOCARCINOMA (HCC): ICD-10-CM

## 2025-03-04 DIAGNOSIS — Z45.2 ENCOUNTER FOR CENTRAL LINE CARE: Primary | ICD-10-CM

## 2025-03-04 LAB
ALBUMIN SERPL BCG-MCNC: 3.6 G/DL (ref 3.5–5)
ALP SERPL-CCNC: 91 U/L (ref 34–104)
ALT SERPL W P-5'-P-CCNC: 13 U/L (ref 7–52)
ANION GAP SERPL CALCULATED.3IONS-SCNC: 5 MMOL/L (ref 4–13)
AST SERPL W P-5'-P-CCNC: 19 U/L (ref 13–39)
BASOPHILS # BLD AUTO: 0.05 THOUSANDS/ÂΜL (ref 0–0.1)
BASOPHILS NFR BLD AUTO: 1 % (ref 0–1)
BILIRUB SERPL-MCNC: 0.48 MG/DL (ref 0.2–1)
BUN SERPL-MCNC: 12 MG/DL (ref 5–25)
CALCIUM SERPL-MCNC: 8.2 MG/DL (ref 8.4–10.2)
CHLORIDE SERPL-SCNC: 108 MMOL/L (ref 96–108)
CO2 SERPL-SCNC: 26 MMOL/L (ref 21–32)
CREAT SERPL-MCNC: 0.74 MG/DL (ref 0.6–1.3)
EOSINOPHIL # BLD AUTO: 0.38 THOUSAND/ÂΜL (ref 0–0.61)
EOSINOPHIL NFR BLD AUTO: 6 % (ref 0–6)
ERYTHROCYTE [DISTWIDTH] IN BLOOD BY AUTOMATED COUNT: 22.4 % (ref 11.6–15.1)
GFR SERPL CREATININE-BSD FRML MDRD: 96 ML/MIN/1.73SQ M
GLUCOSE SERPL-MCNC: 114 MG/DL (ref 65–140)
HCT VFR BLD AUTO: 31.6 % (ref 36.5–49.3)
HGB BLD-MCNC: 10 G/DL (ref 12–17)
IMM GRANULOCYTES # BLD AUTO: 0.01 THOUSAND/UL (ref 0–0.2)
IMM GRANULOCYTES NFR BLD AUTO: 0 % (ref 0–2)
LYMPHOCYTES # BLD AUTO: 1 THOUSANDS/ÂΜL (ref 0.6–4.47)
LYMPHOCYTES NFR BLD AUTO: 17 % (ref 14–44)
MAGNESIUM SERPL-MCNC: 2 MG/DL (ref 1.9–2.7)
MCH RBC QN AUTO: 30 PG (ref 26.8–34.3)
MCHC RBC AUTO-ENTMCNC: 31.6 G/DL (ref 31.4–37.4)
MCV RBC AUTO: 95 FL (ref 82–98)
MONOCYTES # BLD AUTO: 1.06 THOUSAND/ÂΜL (ref 0.17–1.22)
MONOCYTES NFR BLD AUTO: 18 % (ref 4–12)
NEUTROPHILS # BLD AUTO: 3.41 THOUSANDS/ÂΜL (ref 1.85–7.62)
NEUTS SEG NFR BLD AUTO: 58 % (ref 43–75)
NRBC BLD AUTO-RTO: 0 /100 WBCS
PLATELET # BLD AUTO: 430 THOUSANDS/UL (ref 149–390)
PMV BLD AUTO: 10.3 FL (ref 8.9–12.7)
POTASSIUM SERPL-SCNC: 4.1 MMOL/L (ref 3.5–5.3)
PROT SERPL-MCNC: 5.8 G/DL (ref 6.4–8.4)
RBC # BLD AUTO: 3.33 MILLION/UL (ref 3.88–5.62)
SODIUM SERPL-SCNC: 139 MMOL/L (ref 135–147)
WBC # BLD AUTO: 5.91 THOUSAND/UL (ref 4.31–10.16)

## 2025-03-04 PROCEDURE — 80053 COMPREHEN METABOLIC PANEL: CPT

## 2025-03-04 PROCEDURE — 83735 ASSAY OF MAGNESIUM: CPT

## 2025-03-04 PROCEDURE — 85025 COMPLETE CBC W/AUTO DIFF WBC: CPT

## 2025-03-04 NOTE — PROGRESS NOTES
Pt at [facility] for labs. Port flushed smoothly and good blood return noted. Labs drawn. Pt has no further questions at this time. Call bell within reach.

## 2025-03-05 ENCOUNTER — HOSPITAL ENCOUNTER (OUTPATIENT)
Dept: INFUSION CENTER | Facility: HOSPITAL | Age: 65
Discharge: HOME | End: 2025-03-05
Attending: INTERNAL MEDICINE
Payer: COMMERCIAL

## 2025-03-05 VITALS
BODY MASS INDEX: 23.34 KG/M2 | SYSTOLIC BLOOD PRESSURE: 122 MMHG | OXYGEN SATURATION: 94 % | RESPIRATION RATE: 16 BRPM | DIASTOLIC BLOOD PRESSURE: 65 MMHG | HEIGHT: 64 IN | TEMPERATURE: 97.3 F | WEIGHT: 136.69 LBS | HEART RATE: 79 BPM

## 2025-03-05 DIAGNOSIS — D70.1 CHEMOTHERAPY-INDUCED NEUTROPENIA (HCC): ICD-10-CM

## 2025-03-05 DIAGNOSIS — T45.1X5A CHEMOTHERAPY-INDUCED NEUTROPENIA (HCC): ICD-10-CM

## 2025-03-05 DIAGNOSIS — C25.9 PANCREATIC ADENOCARCINOMA (HCC): Primary | ICD-10-CM

## 2025-03-05 PROCEDURE — 96413 CHEMO IV INFUSION 1 HR: CPT

## 2025-03-05 PROCEDURE — 96367 TX/PROPH/DG ADDL SEQ IV INF: CPT

## 2025-03-05 RX ORDER — SODIUM CHLORIDE 9 MG/ML
20 INJECTION, SOLUTION INTRAVENOUS ONCE
Status: COMPLETED | OUTPATIENT
Start: 2025-03-05 | End: 2025-03-05

## 2025-03-05 RX ADMIN — GEMCITABINE 1600 MG: 38 INJECTION, SOLUTION INTRAVENOUS at 13:06

## 2025-03-05 RX ADMIN — SODIUM CHLORIDE 20 ML/HR: 0.9 INJECTION, SOLUTION INTRAVENOUS at 12:30

## 2025-03-05 RX ADMIN — DEXAMETHASONE SODIUM PHOSPHATE 10 MG: 100 INJECTION INTRAMUSCULAR; INTRAVENOUS at 12:30

## 2025-03-05 NOTE — PROGRESS NOTES
Isaac Kramer  tolerated Gemzar treatment well with no complications.      Isaac Kramer is aware of future appt on 3/11 at 11:30AM.     AVS printed and given to Isaac Kramer.

## 2025-03-11 ENCOUNTER — HOSPITAL ENCOUNTER (OUTPATIENT)
Dept: INFUSION CENTER | Facility: HOSPITAL | Age: 65
Discharge: HOME | End: 2025-03-11
Payer: COMMERCIAL

## 2025-03-11 DIAGNOSIS — C25.9 PANCREATIC ADENOCARCINOMA (HCC): ICD-10-CM

## 2025-03-11 DIAGNOSIS — Z45.2 ENCOUNTER FOR CENTRAL LINE CARE: Primary | ICD-10-CM

## 2025-03-11 LAB
ALBUMIN SERPL BCG-MCNC: 3.9 G/DL (ref 3.5–5)
ALP SERPL-CCNC: 81 U/L (ref 34–104)
ALT SERPL W P-5'-P-CCNC: 23 U/L (ref 7–52)
ANION GAP SERPL CALCULATED.3IONS-SCNC: 5 MMOL/L (ref 4–13)
AST SERPL W P-5'-P-CCNC: 23 U/L (ref 13–39)
BASOPHILS # BLD AUTO: 0.05 THOUSANDS/ÂΜL (ref 0–0.1)
BASOPHILS NFR BLD AUTO: 1 % (ref 0–1)
BILIRUB SERPL-MCNC: 0.46 MG/DL (ref 0.2–1)
BUN SERPL-MCNC: 16 MG/DL (ref 5–25)
CALCIUM SERPL-MCNC: 8.6 MG/DL (ref 8.4–10.2)
CHLORIDE SERPL-SCNC: 106 MMOL/L (ref 96–108)
CO2 SERPL-SCNC: 26 MMOL/L (ref 21–32)
CREAT SERPL-MCNC: 0.8 MG/DL (ref 0.6–1.3)
EOSINOPHIL # BLD AUTO: 0.09 THOUSAND/ÂΜL (ref 0–0.61)
EOSINOPHIL NFR BLD AUTO: 2 % (ref 0–6)
ERYTHROCYTE [DISTWIDTH] IN BLOOD BY AUTOMATED COUNT: 22.1 % (ref 11.6–15.1)
GFR SERPL CREATININE-BSD FRML MDRD: 93 ML/MIN/1.73SQ M
GLUCOSE SERPL-MCNC: 86 MG/DL (ref 65–140)
HCT VFR BLD AUTO: 32.2 % (ref 36.5–49.3)
HGB BLD-MCNC: 10 G/DL (ref 12–17)
IMM GRANULOCYTES # BLD AUTO: 0.01 THOUSAND/UL (ref 0–0.2)
IMM GRANULOCYTES NFR BLD AUTO: 0 % (ref 0–2)
LYMPHOCYTES # BLD AUTO: 1.27 THOUSANDS/ÂΜL (ref 0.6–4.47)
LYMPHOCYTES NFR BLD AUTO: 24 % (ref 14–44)
MAGNESIUM SERPL-MCNC: 2.3 MG/DL (ref 1.9–2.7)
MCH RBC QN AUTO: 29.8 PG (ref 26.8–34.3)
MCHC RBC AUTO-ENTMCNC: 31.1 G/DL (ref 31.4–37.4)
MCV RBC AUTO: 96 FL (ref 82–98)
MONOCYTES # BLD AUTO: 0.34 THOUSAND/ÂΜL (ref 0.17–1.22)
MONOCYTES NFR BLD AUTO: 7 % (ref 4–12)
NEUTROPHILS # BLD AUTO: 3.47 THOUSANDS/ÂΜL (ref 1.85–7.62)
NEUTS SEG NFR BLD AUTO: 66 % (ref 43–75)
NRBC BLD AUTO-RTO: 0 /100 WBCS
PLATELET # BLD AUTO: 662 THOUSANDS/UL (ref 149–390)
PMV BLD AUTO: 9.7 FL (ref 8.9–12.7)
POTASSIUM SERPL-SCNC: 4.6 MMOL/L (ref 3.5–5.3)
PROT SERPL-MCNC: 6.2 G/DL (ref 6.4–8.4)
RBC # BLD AUTO: 3.36 MILLION/UL (ref 3.88–5.62)
SODIUM SERPL-SCNC: 137 MMOL/L (ref 135–147)
WBC # BLD AUTO: 5.23 THOUSAND/UL (ref 4.31–10.16)

## 2025-03-11 PROCEDURE — 83735 ASSAY OF MAGNESIUM: CPT

## 2025-03-11 PROCEDURE — 85025 COMPLETE CBC W/AUTO DIFF WBC: CPT

## 2025-03-11 PROCEDURE — 80053 COMPREHEN METABOLIC PANEL: CPT

## 2025-03-11 NOTE — PROGRESS NOTES
Isaac Kramer  tolerated treatment well with no complications.  Labs obtained via port without difficulty.    Isaac Kramer is aware of future appt on 3/12 at 12:30 pm.     AVS declined by Isaac Kramer.

## 2025-03-12 ENCOUNTER — HOSPITAL ENCOUNTER (OUTPATIENT)
Dept: INFUSION CENTER | Facility: HOSPITAL | Age: 65
Discharge: HOME | End: 2025-03-12
Attending: INTERNAL MEDICINE
Payer: COMMERCIAL

## 2025-03-12 VITALS
DIASTOLIC BLOOD PRESSURE: 66 MMHG | BODY MASS INDEX: 22.73 KG/M2 | RESPIRATION RATE: 16 BRPM | HEIGHT: 64 IN | TEMPERATURE: 96.9 F | HEART RATE: 68 BPM | OXYGEN SATURATION: 98 % | SYSTOLIC BLOOD PRESSURE: 115 MMHG | WEIGHT: 133.16 LBS

## 2025-03-12 DIAGNOSIS — D70.1 CHEMOTHERAPY-INDUCED NEUTROPENIA (HCC): ICD-10-CM

## 2025-03-12 DIAGNOSIS — C25.9 PANCREATIC ADENOCARCINOMA (HCC): Primary | ICD-10-CM

## 2025-03-12 DIAGNOSIS — T45.1X5A CHEMOTHERAPY-INDUCED NEUTROPENIA (HCC): ICD-10-CM

## 2025-03-12 PROCEDURE — 96367 TX/PROPH/DG ADDL SEQ IV INF: CPT

## 2025-03-12 PROCEDURE — 96413 CHEMO IV INFUSION 1 HR: CPT

## 2025-03-12 RX ORDER — SODIUM CHLORIDE 9 MG/ML
20 INJECTION, SOLUTION INTRAVENOUS ONCE
Status: COMPLETED | OUTPATIENT
Start: 2025-03-12 | End: 2025-03-12

## 2025-03-12 RX ADMIN — SODIUM CHLORIDE 20 ML/HR: 0.9 INJECTION, SOLUTION INTRAVENOUS at 12:35

## 2025-03-12 RX ADMIN — GEMCITABINE 1600 MG: 38 INJECTION, SOLUTION INTRAVENOUS at 13:16

## 2025-03-12 RX ADMIN — DEXAMETHASONE SODIUM PHOSPHATE 10 MG: 10 INJECTION, SOLUTION INTRAMUSCULAR; INTRAVENOUS at 12:37

## 2025-03-12 NOTE — PROGRESS NOTES
Recent labs reviewed.  Patient tolerated Gemzar chemotherapy treatment without reaction or issues.       Isaac Kramer is aware of future appt on 3/18/25 at 1100.     AVS -  No (Declined by Isaac Kramer) Patient has Mychart.    Patient ambulated off unit without incident.  All personal belongings taken with patient.

## 2025-03-18 ENCOUNTER — HOSPITAL ENCOUNTER (OUTPATIENT)
Dept: INFUSION CENTER | Facility: HOSPITAL | Age: 65
Discharge: HOME | End: 2025-03-18
Payer: COMMERCIAL

## 2025-03-18 DIAGNOSIS — C25.9 PANCREATIC ADENOCARCINOMA (HCC): ICD-10-CM

## 2025-03-18 DIAGNOSIS — T45.1X5A CHEMOTHERAPY-INDUCED NEUTROPENIA (HCC): ICD-10-CM

## 2025-03-18 DIAGNOSIS — D70.1 CHEMOTHERAPY-INDUCED NEUTROPENIA (HCC): ICD-10-CM

## 2025-03-18 DIAGNOSIS — Z45.2 ENCOUNTER FOR CENTRAL LINE CARE: Primary | ICD-10-CM

## 2025-03-18 DIAGNOSIS — C61 PROSTATE CANCER (HCC): ICD-10-CM

## 2025-03-18 LAB
BASOPHILS # BLD AUTO: 0.05 THOUSANDS/ÂΜL (ref 0–0.1)
BASOPHILS NFR BLD AUTO: 1 % (ref 0–1)
EOSINOPHIL # BLD AUTO: 0.05 THOUSAND/ÂΜL (ref 0–0.61)
EOSINOPHIL NFR BLD AUTO: 1 % (ref 0–6)
ERYTHROCYTE [DISTWIDTH] IN BLOOD BY AUTOMATED COUNT: 22.2 % (ref 11.6–15.1)
HCT VFR BLD AUTO: 31.9 % (ref 36.5–49.3)
HGB BLD-MCNC: 9.8 G/DL (ref 12–17)
IMM GRANULOCYTES # BLD AUTO: 0.04 THOUSAND/UL (ref 0–0.2)
IMM GRANULOCYTES NFR BLD AUTO: 1 % (ref 0–2)
LYMPHOCYTES # BLD AUTO: 1.34 THOUSANDS/ÂΜL (ref 0.6–4.47)
LYMPHOCYTES NFR BLD AUTO: 17 % (ref 14–44)
MCH RBC QN AUTO: 30.2 PG (ref 26.8–34.3)
MCHC RBC AUTO-ENTMCNC: 30.7 G/DL (ref 31.4–37.4)
MCV RBC AUTO: 98 FL (ref 82–98)
MONOCYTES # BLD AUTO: 0.3 THOUSAND/ÂΜL (ref 0.17–1.22)
MONOCYTES NFR BLD AUTO: 4 % (ref 4–12)
NEUTROPHILS # BLD AUTO: 6.17 THOUSANDS/ÂΜL (ref 1.85–7.62)
NEUTS SEG NFR BLD AUTO: 76 % (ref 43–75)
NRBC BLD AUTO-RTO: 0 /100 WBCS
PLATELET # BLD AUTO: 236 THOUSANDS/UL (ref 149–390)
PMV BLD AUTO: 10.1 FL (ref 8.9–12.7)
PSA SERPL-MCNC: 0.01 NG/ML (ref 0–4)
RBC # BLD AUTO: 3.25 MILLION/UL (ref 3.88–5.62)
WBC # BLD AUTO: 7.95 THOUSAND/UL (ref 4.31–10.16)

## 2025-03-18 PROCEDURE — 85025 COMPLETE CBC W/AUTO DIFF WBC: CPT | Performed by: INTERNAL MEDICINE

## 2025-03-18 PROCEDURE — 84153 ASSAY OF PSA TOTAL: CPT

## 2025-03-18 NOTE — PROGRESS NOTES
Isaac Kramer  tolerated lab draw well with no complications.      Isaac Kramer is aware of future appt on 3/19 at 1PM.     AVS printed and given to Isaac Kramer: No (Declined by Isaac Kramer).

## 2025-03-19 ENCOUNTER — HOSPITAL ENCOUNTER (OUTPATIENT)
Dept: INFUSION CENTER | Facility: HOSPITAL | Age: 65
Discharge: HOME | End: 2025-03-19
Attending: INTERNAL MEDICINE
Payer: COMMERCIAL

## 2025-03-19 ENCOUNTER — RESULTS FOLLOW-UP (OUTPATIENT)
Dept: UROLOGY | Facility: CLINIC | Age: 65
End: 2025-03-19

## 2025-03-19 VITALS
HEIGHT: 64 IN | TEMPERATURE: 97.3 F | DIASTOLIC BLOOD PRESSURE: 59 MMHG | SYSTOLIC BLOOD PRESSURE: 111 MMHG | OXYGEN SATURATION: 100 % | WEIGHT: 136.69 LBS | BODY MASS INDEX: 23.34 KG/M2 | HEART RATE: 71 BPM | RESPIRATION RATE: 16 BRPM

## 2025-03-19 DIAGNOSIS — C61 PROSTATE CANCER (HCC): Primary | ICD-10-CM

## 2025-03-19 DIAGNOSIS — C25.9 PANCREATIC ADENOCARCINOMA (HCC): Primary | ICD-10-CM

## 2025-03-19 DIAGNOSIS — D70.1 CHEMOTHERAPY-INDUCED NEUTROPENIA (HCC): ICD-10-CM

## 2025-03-19 DIAGNOSIS — T45.1X5A CHEMOTHERAPY-INDUCED NEUTROPENIA (HCC): ICD-10-CM

## 2025-03-19 PROCEDURE — 96413 CHEMO IV INFUSION 1 HR: CPT

## 2025-03-19 PROCEDURE — 96367 TX/PROPH/DG ADDL SEQ IV INF: CPT

## 2025-03-19 RX ORDER — SODIUM CHLORIDE 9 MG/ML
20 INJECTION, SOLUTION INTRAVENOUS ONCE
Status: COMPLETED | OUTPATIENT
Start: 2025-03-19 | End: 2025-03-19

## 2025-03-19 RX ADMIN — DEXAMETHASONE SODIUM PHOSPHATE 10 MG: 10 INJECTION, SOLUTION INTRAMUSCULAR; INTRAVENOUS at 12:58

## 2025-03-19 RX ADMIN — GEMCITABINE 1600 MG: 38 INJECTION, SOLUTION INTRAVENOUS at 13:32

## 2025-03-19 RX ADMIN — SODIUM CHLORIDE 20 ML/HR: 0.9 INJECTION, SOLUTION INTRAVENOUS at 12:56

## 2025-03-19 NOTE — PROGRESS NOTES
Isaac Kramer  tolerated chemotherapy infusion well with no complications.      Isaac Kramer is aware of future appt on 4/1 at 10:30AM.     AVS printed and given to Isaac Kramer.

## 2025-03-26 RX ORDER — SODIUM CHLORIDE 9 MG/ML
20 INJECTION, SOLUTION INTRAVENOUS ONCE
OUTPATIENT
Start: 2025-04-09

## 2025-03-26 RX ORDER — SODIUM CHLORIDE 9 MG/ML
20 INJECTION, SOLUTION INTRAVENOUS ONCE
OUTPATIENT
Start: 2025-04-02

## 2025-03-26 RX ORDER — SODIUM CHLORIDE 9 MG/ML
20 INJECTION, SOLUTION INTRAVENOUS ONCE
OUTPATIENT
Start: 2025-04-16

## 2025-04-01 ENCOUNTER — APPOINTMENT (OUTPATIENT)
Dept: LAB | Facility: HOSPITAL | Age: 65
End: 2025-04-01
Payer: COMMERCIAL

## 2025-04-01 ENCOUNTER — HOSPITAL ENCOUNTER (OUTPATIENT)
Dept: INFUSION CENTER | Facility: HOSPITAL | Age: 65
End: 2025-04-01
Attending: INTERNAL MEDICINE
Payer: COMMERCIAL

## 2025-04-01 DIAGNOSIS — C25.9 PANCREATIC ADENOCARCINOMA (HCC): ICD-10-CM

## 2025-04-01 DIAGNOSIS — Z45.2 ENCOUNTER FOR CENTRAL LINE CARE: Primary | ICD-10-CM

## 2025-04-01 LAB
ALBUMIN SERPL BCG-MCNC: 3.7 G/DL (ref 3.5–5)
ALP SERPL-CCNC: 72 U/L (ref 34–104)
ALT SERPL W P-5'-P-CCNC: 16 U/L (ref 7–52)
ANION GAP SERPL CALCULATED.3IONS-SCNC: 6 MMOL/L (ref 4–13)
AST SERPL W P-5'-P-CCNC: 17 U/L (ref 13–39)
BASOPHILS # BLD AUTO: 0.04 THOUSANDS/ÂΜL (ref 0–0.1)
BASOPHILS NFR BLD AUTO: 1 % (ref 0–1)
BILIRUB SERPL-MCNC: 0.49 MG/DL (ref 0.2–1)
BUN SERPL-MCNC: 14 MG/DL (ref 5–25)
CALCIUM SERPL-MCNC: 8.2 MG/DL (ref 8.4–10.2)
CHLORIDE SERPL-SCNC: 111 MMOL/L (ref 96–108)
CO2 SERPL-SCNC: 24 MMOL/L (ref 21–32)
CREAT SERPL-MCNC: 0.65 MG/DL (ref 0.6–1.3)
EOSINOPHIL # BLD AUTO: 0.25 THOUSAND/ÂΜL (ref 0–0.61)
EOSINOPHIL NFR BLD AUTO: 4 % (ref 0–6)
ERYTHROCYTE [DISTWIDTH] IN BLOOD BY AUTOMATED COUNT: 23.1 % (ref 11.6–15.1)
GFR SERPL CREATININE-BSD FRML MDRD: 101 ML/MIN/1.73SQ M
GLUCOSE SERPL-MCNC: 107 MG/DL (ref 65–140)
HCT VFR BLD AUTO: 32.5 % (ref 36.5–49.3)
HGB BLD-MCNC: 9.9 G/DL (ref 12–17)
IMM GRANULOCYTES # BLD AUTO: 0.02 THOUSAND/UL (ref 0–0.2)
IMM GRANULOCYTES NFR BLD AUTO: 0 % (ref 0–2)
LYMPHOCYTES # BLD AUTO: 1.05 THOUSANDS/ÂΜL (ref 0.6–4.47)
LYMPHOCYTES NFR BLD AUTO: 15 % (ref 14–44)
MAGNESIUM SERPL-MCNC: 2.2 MG/DL (ref 1.9–2.7)
MCH RBC QN AUTO: 29.7 PG (ref 26.8–34.3)
MCHC RBC AUTO-ENTMCNC: 30.5 G/DL (ref 31.4–37.4)
MCV RBC AUTO: 98 FL (ref 82–98)
MONOCYTES # BLD AUTO: 1.23 THOUSAND/ÂΜL (ref 0.17–1.22)
MONOCYTES NFR BLD AUTO: 17 % (ref 4–12)
NEUTROPHILS # BLD AUTO: 4.57 THOUSANDS/ÂΜL (ref 1.85–7.62)
NEUTS SEG NFR BLD AUTO: 63 % (ref 43–75)
NRBC BLD AUTO-RTO: 0 /100 WBCS
PLATELET # BLD AUTO: 436 THOUSANDS/UL (ref 149–390)
PMV BLD AUTO: 10.1 FL (ref 8.9–12.7)
POTASSIUM SERPL-SCNC: 3.9 MMOL/L (ref 3.5–5.3)
PROT SERPL-MCNC: 5.7 G/DL (ref 6.4–8.4)
RBC # BLD AUTO: 3.33 MILLION/UL (ref 3.88–5.62)
SODIUM SERPL-SCNC: 141 MMOL/L (ref 135–147)
WBC # BLD AUTO: 7.16 THOUSAND/UL (ref 4.31–10.16)

## 2025-04-01 PROCEDURE — 83735 ASSAY OF MAGNESIUM: CPT

## 2025-04-01 PROCEDURE — 85025 COMPLETE CBC W/AUTO DIFF WBC: CPT

## 2025-04-01 PROCEDURE — 80053 COMPREHEN METABOLIC PANEL: CPT

## 2025-04-01 NOTE — PROGRESS NOTES
Isaac Kramer  tolerated port flush well with no complications. Central labs drawn via port.    Isaac Kramer is aware of future appt tomorrow at 1:30PM.    AVS declined by Isaac Kramer.

## 2025-04-02 ENCOUNTER — HOSPITAL ENCOUNTER (OUTPATIENT)
Dept: INFUSION CENTER | Facility: HOSPITAL | Age: 65
Discharge: HOME | End: 2025-04-02
Attending: INTERNAL MEDICINE
Payer: COMMERCIAL

## 2025-04-02 ENCOUNTER — TELEPHONE (OUTPATIENT)
Dept: HEMATOLOGY ONCOLOGY | Facility: CLINIC | Age: 65
End: 2025-04-02

## 2025-04-02 ENCOUNTER — OFFICE VISIT (OUTPATIENT)
Dept: HEMATOLOGY ONCOLOGY | Facility: CLINIC | Age: 65
End: 2025-04-02
Payer: COMMERCIAL

## 2025-04-02 VITALS
TEMPERATURE: 98.3 F | OXYGEN SATURATION: 97 % | DIASTOLIC BLOOD PRESSURE: 55 MMHG | RESPIRATION RATE: 16 BRPM | BODY MASS INDEX: 24.16 KG/M2 | WEIGHT: 141.54 LBS | HEART RATE: 73 BPM | SYSTOLIC BLOOD PRESSURE: 113 MMHG | HEIGHT: 64 IN

## 2025-04-02 VITALS
TEMPERATURE: 98.3 F | BODY MASS INDEX: 24.24 KG/M2 | WEIGHT: 142 LBS | DIASTOLIC BLOOD PRESSURE: 66 MMHG | RESPIRATION RATE: 16 BRPM | HEIGHT: 64 IN | SYSTOLIC BLOOD PRESSURE: 118 MMHG | OXYGEN SATURATION: 99 % | HEART RATE: 83 BPM

## 2025-04-02 DIAGNOSIS — C25.9 PANCREATIC ADENOCARCINOMA (HCC): Primary | ICD-10-CM

## 2025-04-02 DIAGNOSIS — T45.1X5A CHEMOTHERAPY-INDUCED NEUTROPENIA (HCC): ICD-10-CM

## 2025-04-02 DIAGNOSIS — D70.1 CHEMOTHERAPY-INDUCED NEUTROPENIA (HCC): ICD-10-CM

## 2025-04-02 PROCEDURE — 96367 TX/PROPH/DG ADDL SEQ IV INF: CPT

## 2025-04-02 PROCEDURE — 99214 OFFICE O/P EST MOD 30 MIN: CPT | Performed by: INTERNAL MEDICINE

## 2025-04-02 PROCEDURE — 96413 CHEMO IV INFUSION 1 HR: CPT

## 2025-04-02 PROCEDURE — G2211 COMPLEX E/M VISIT ADD ON: HCPCS | Performed by: INTERNAL MEDICINE

## 2025-04-02 RX ORDER — SODIUM CHLORIDE 9 MG/ML
20 INJECTION, SOLUTION INTRAVENOUS ONCE
OUTPATIENT
Start: 2025-05-14

## 2025-04-02 RX ORDER — SODIUM CHLORIDE 9 MG/ML
20 INJECTION, SOLUTION INTRAVENOUS ONCE
OUTPATIENT
Start: 2025-04-30

## 2025-04-02 RX ORDER — SODIUM CHLORIDE 9 MG/ML
20 INJECTION, SOLUTION INTRAVENOUS ONCE
Status: COMPLETED | OUTPATIENT
Start: 2025-04-02 | End: 2025-04-02

## 2025-04-02 RX ORDER — SODIUM CHLORIDE 9 MG/ML
20 INJECTION, SOLUTION INTRAVENOUS ONCE
OUTPATIENT
Start: 2025-05-07

## 2025-04-02 RX ADMIN — DEXAMETHASONE SODIUM PHOSPHATE 10 MG: 10 INJECTION, SOLUTION INTRAMUSCULAR; INTRAVENOUS at 13:22

## 2025-04-02 RX ADMIN — SODIUM CHLORIDE 20 ML/HR: 0.9 INJECTION, SOLUTION INTRAVENOUS at 13:23

## 2025-04-02 RX ADMIN — GEMCITABINE 1600 MG: 38 INJECTION, SOLUTION INTRAVENOUS at 13:58

## 2025-04-02 NOTE — PROGRESS NOTES
Isaac Kramer  tolerated gemzar treatment well with no complications.      Isaac Kramer is aware of future appt on 4/8/25     AVS printed and given to Isaac Kramer:  Yes

## 2025-04-02 NOTE — ASSESSMENT & PLAN NOTE
There was a discussion with Dr. 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     His case was presented at the GI tumor board.  The recommendation was to pursue adjuvant modified FOLFIRINOX followed by concurrent chemoradiation if the patient continues to have good performance status.     Modified FOLFIRINOX cycle 1 was started on 11/4/2024.  He only was able to tolerate 2 cycles of modified FOLFIRINOX even though the second cycle was adjusted down by 20%.  Patient was then switched to gemcitabine and capecitabine regimen which was started on 12/11/2024.  Gemzar 1,000 mg/m2 IV on days 1,8,15 Q 28 days with Rx Capecitabine 1,300 mg po BID days 1-21 then 7 days rest.    The patient is about to start cycle 5 of the gemcitabine/capecitabine today in which he is tolerating much better than the modified FOLFIRINOX.  He will be completing 6 cycles worth of treatment around the middle of May.  Subsequently, he will be getting CT scan of the chest abdomen pelvis for close surveillance, then he will be sent to the radiation oncology team to discuss the potential benefit of adjuvant concurrent chemoradiation.  Orders:    CBC and differential; Future    Comprehensive metabolic panel; Future    Magnesium; Future    CT chest abdomen pelvis w contrast; Future

## 2025-04-02 NOTE — PROGRESS NOTES
Name: Isaac Kramer      : 1960      MRN: [id number]  Encounter Provider: Jacob Colby MD  Encounter Date: 2025   Encounter department: [facility] [place]  :  Assessment & Plan  Pancreatic adenocarcinoma (HCC)  There was a discussion with Dr. 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     His case was presented at the GI tumor board.  The recommendation was to pursue adjuvant modified FOLFIRINOX followed by concurrent chemoradiation if the patient continues to have good performance status.     Modified FOLFIRINOX cycle 1 was started on 2024.  He only was able to tolerate 2 cycles of modified FOLFIRINOX even though the second cycle was adjusted down by 20%.  Patient was then switched to gemcitabine and capecitabine regimen which was started on 2024.  Gemzar 1,000 mg/m2 IV on days 1,8,15 Q 28 days with Rx Capecitabine 1,300 mg po BID days 1-21 then 7 days rest.    The patient is about to start cycle 5 of the gemcitabine/capecitabine today in which he is tolerating much better than the modified FOLFIRINOX.  He will be completing 6 cycles worth of treatment around the middle of May.  Subsequently, he will be getting CT scan of the chest abdomen pelvis for close surveillance, then he will be sent to the radiation oncology team to discuss the potential benefit of adjuvant concurrent chemoradiation.  Orders:    CBC and differential; Future    Comprehensive metabolic panel; Future    Magnesium; Future    CT chest abdomen pelvis w contrast; Future        Return in about 8 weeks (around 2025) for Office Visit 20 min, Labs, Infusion.    History of Present Illness   Chief Complaint   Patient presents with    Follow-up   HPI:  This is a 63-year-old male with history of Jihan score 4+3= 7 adenocarcinoma of  the prostate status post radical prostatectomy on 5/22/2023.  He also seems to have history of coronary artery disease, hypertension, GERD, hyperlipidemia, etc.  He denied positive family history for malignancy.  The patient apparently had left flank pain which was evaluated with a CT scan of the abdomen pelvis with contrast on 8/27/2024 in the emergency room.  The CT scan showed mass in the lower pancreatic head region obstructing the CBD and pancreatic duct measuring 2.8 cm suspicious for malignant process.  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  CT scan of the chest without contrast on 8/28/2024 was negative for malignant process.  The patient then had Whipple procedure on 9/12/2024 which showed MSI stable adenocarcinoma of the pancreas arising in adenoma.  - Tumor invades into muscularis propria of the duodenum (pT2).  - Lymphovascular invasion is present.   - One of sixteen lymph nodes is positive for tumor (1/16, pN1).  - Pancreatic intraepithelial neoplasia (PanIN), low grade.   - All margins are negative for carcinoma or dysplasia.  His baseline CEA and CA 19-9 were within normal range prior to the Whipple procedure.        Interval history:  The patient came today for a follow-up visit accompanied by his son.  He stated he is tolerating the adjuvant treatment with gemcitabine and Capecitabine with minimal side effects.  Blood work from yesterday was reviewed with the patient.  Hemoglobin 9.9 with normal creatinine and liver enzymes.        Oncology History   Cancer Staging   Ampullary carcinoma (HCC)  Staging form: Ampulla of Vater, AJCC 8th Edition  - Pathologic: Stage IIIA (pT2, pN1, cM0) - Signed by Marika Ac MD on 10/2/2024    Pancreatic adenocarcinoma (HCC)  Staging form: Pancreas, AJCC 8th Edition  - Pathologic stage from 9/12/2024: Stage IIB (pT2, pN1, cM0) - Signed by Jacob Colby MD on 10/2/2024  Stage prefix: Initial  diagnosis  Histologic grade (G): G2  Histologic grading system: 3 grade system  Lymph-vascular invasion (LVI): LVI present/identified, NOS  Carbohydrate antigen 19-9 (CA 19-9) (U/mL): 14  Carcinoembryonic antigen (CEA) (ng/mL): 0.7  Oncology History Overview Note   Presents for discussion of RT for recently diagnosed Jihan 7 (4+3) prostate cancer.  Referred by Dr. Biggs    Lab Results   Component Value Date    PSA 5.2 (H) 11/08/2022    PSA 6.6 (H) 07/18/2022    PSA 3.9 06/24/2020      10/10/22  Urology  Seen in consult for elevated PSA  F/U PSA recommended    12/28/21  MRI Prostate  IMPRESSION:   1. PI-RADSv2.1 Category 4 -High (clinically significant cancer is likely to be present). 0.5 cm possible lesion in the posterior right peripheral zone at base.   2. No extraprostatic tumor, seminal vesicle invasion, pelvic lymphadenopathy, or pelvic osseous metastatic disease.   3. Moderate BPH with calculated prostate volume of 69 cc.    2/28/23  Tissue Exam  A.  Prostate, region of interest #1, needle biopsy:  - Prostatic adenocarcinoma, acinar, not otherwise specified; Jihan grade 4 +3= score of 7 (grade group 3) in 4 of 5 cores involving 20% of needle core tissue and measuring up to 5 mm in length (score 4=50-60%).  - Periprostatic fat invasion: Not identified.  - Seminal vesicle invasion: Not identified.  - Lymph-vascular invasion: Not identified.  - Perineural invasion: Not identified.  - Additional Pathologic Findings:  None.     B.  Prostate, right lateral and medial base, needle biopsy:  - Benign prostate tissue, negative for malignancy.     C.  Prostate, right mid lateral, needle biopsy:  - Benign prostate tissue, negative for malignancy.     D.  Prostate, right mid medial, needle biopsy:  - Benign prostate tissue, negative for malignancy.     E.  Prostate, right lateral apex, needle biopsy:   - Benign prostate tissue, negative for malignancy.     F. Prostate, right medial apex, needle biopsy:  - Benign  prostate tissue, negative for malignancy.     G.  Prostate, left lateral base, needle biopsy:  - Prostatic adenocarcinoma, acinar, not otherwise specified; [place] grade 3 +3= score of 6 (grade group 1) in 1 of 1 cores involving 10% of needle core tissue and measuring 2 mm in length.  - Periprostatic fat invasion: Not identified.  - Seminal vesicle invasion: Not identified.  - Lymph-vascular invasion: Not identified.  - Perineural invasion: Not identified.  - Additional Pathologic Findings:  None.     H.  Prostate, left medial base, needle biopsy:  - Prostatic adenocarcinoma, acinar, not otherwise specified; Jihan grade 3 +4= score of 7 (grade group 2) in 1 of 1 cores involving 50% of needle core tissue and measuring 8 mm in length (score 4 = 5-10%).  - Periprostatic fat invasion: Not identified.  - Seminal vesicle invasion: Not identified.  - Lymph-vascular invasion: Not identified.  - Perineural invasion: Present.  - Additional Pathologic Findings:  None.     I.  Prostate, left mid lateral, needle biopsy:  - Prostatic adenocarcinoma, acinar, not otherwise specified; [place] grade 3 +4= score of 7 (grade group 2) in 1 of 1 cores involving 40-50% of needle core tissue and measuring 5 mm in length (score 4 = 5-10%).  - Periprostatic fat invasion: Not identified.  - Seminal vesicle invasion: Not identified.  - Lymph-vascular invasion: Not identified.  - Perineural invasion: Not identified.  - Additional Pathologic Findings:  High-grade prostatic intraepithelial neoplasia (HGPIN).     J.  Prostate, left mid medial, needle biopsy:  - Prostatic adenocarcinoma, acinar, not otherwise specified; [place]han grade 3 +4= score of 7 (grade group 2) in 1 of 1 cores involving 40-50% of needle core tissue and measuring 7 mm in length (score 4 = 5-10%).  - Periprostatic fat invasion: Not identified.  - Seminal vesicle invasion: Not identified.  - Lymph-vascular invasion: Not identified.  - Perineural invasion: Not identified.  -  Additional Pathologic Findings:  High-grade prostatic intraepithelial neoplasia (HGPIN).     K.  Prostate, left lateral apex, needle biopsy:  -Focal atypical small acinar proliferation, cannot exclude limited low-grade carcinoma.     L.  Prostate, left medial apex, needle biopsy:  - Benign prostate tissue, negative for malignancy.    3/27/23  Bone Scan  IMPRESSION:   1.  No scintigraphic evidence of osseous metastasis    4/6/23  Dr. Biggs  Does not qualify for active surveillance.  RT and surgery discussed.  Leaning towards RT will arrange for consult    4/20/23  Dr. Harmon  Desires surgical intervention    Upcoming:       Prostate cancer (HCC) (Resolved)   2/28/2023 Biopsy    A.  Prostate, region of interest #1, needle biopsy:  - Prostatic adenocarcinoma, acinar, not otherwise specified; [place] grade 4 +3= score of 7 (grade group 3) in 4 of 5 cores involving 20% of needle core tissue and measuring up to 5 mm in length (score 4=50-60%).  - Periprostatic fat invasion: Not identified.  - Seminal vesicle invasion: Not identified.  - Lymph-vascular invasion: Not identified.  - Perineural invasion: Not identified.  - Additional Pathologic Findings:  None.     B.  Prostate, right lateral and medial base, needle biopsy:  - Benign prostate tissue, negative for malignancy.     C.  Prostate, right mid lateral, needle biopsy:  - Benign prostate tissue, negative for malignancy.     D.  Prostate, right mid medial, needle biopsy:  - Benign prostate tissue, negative for malignancy.     E.  Prostate, right lateral apex, needle biopsy:   - Benign prostate tissue, negative for malignancy.     F. Prostate, right medial apex, needle biopsy:  - Benign prostate tissue, negative for malignancy.     G.  Prostate, left lateral base, needle biopsy:  - Prostatic adenocarcinoma, acinar, not otherwise specified; [place] grade 3 +3= score of 6 (grade group 1) in 1 of 1 cores involving 10% of needle core tissue and measuring 2 mm in  length.  - Periprostatic fat invasion: Not identified.  - Seminal vesicle invasion: Not identified.  - Lymph-vascular invasion: Not identified.  - Perineural invasion: Not identified.  - Additional Pathologic Findings:  None.     H.  Prostate, left medial base, needle biopsy:  - Prostatic adenocarcinoma, acinar, not otherwise specified; [place] grade 3 +4= score of 7 (grade group 2) in 1 of 1 cores involving 50% of needle core tissue and measuring 8 mm in length (score 4 = 5-10%).  - Periprostatic fat invasion: Not identified.  - Seminal vesicle invasion: Not identified.  - Lymph-vascular invasion: Not identified.  - Perineural invasion: Present.  - Additional Pathologic Findings:  None.     I.  Prostate, left mid lateral, needle biopsy:  - Prostatic adenocarcinoma, acinar, not otherwise specified; [place] grade 3 +4= score of 7 (grade group 2) in 1 of 1 cores involving 40-50% of needle core tissue and measuring 5 mm in length (score 4 = 5-10%).  - Periprostatic fat invasion: Not identified.  - Seminal vesicle invasion: Not identified.  - Lymph-vascular invasion: Not identified.  - Perineural invasion: Not identified.  - Additional Pathologic Findings:  High-grade prostatic intraepithelial neoplasia (HGPIN).     J.  Prostate, left mid medial, needle biopsy:  - Prostatic adenocarcinoma, acinar, not otherwise specified; [place] grade 3 +4= score of 7 (grade group 2) in 1 of 1 cores involving 40-50% of needle core tissue and measuring 7 mm in length (score 4 = 5-10%).  - Periprostatic fat invasion: Not identified.  - Seminal vesicle invasion: Not identified.  - Lymph-vascular invasion: Not identified.  - Perineural invasion: Not identified.  - Additional Pathologic Findings:  High-grade prostatic intraepithelial neoplasia (HGPIN).     K.  Prostate, left lateral apex, needle biopsy:  -Focal atypical small acinar proliferation, cannot exclude limited low-grade carcinoma.     L.  Prostate, left medial apex, needle  biopsy:  - Benign prostate tissue, negative for malignancy.     2/28/2023 Initial Diagnosis    Prostate cancer (HCC)     Pancreatic adenocarcinoma (HCC)   8/28/2024 Initial Diagnosis    Pancreatic adenocarcinoma (HCC)     9/12/2024 Surgery    A. Gallbladder, cholecystectomy:  - Chronic cholecystitis.  - One lymph node, negative for malignancy (0/1).  - Negative for malignancy.     B. Lymph Node, Portal Lymph Node:  - One lymph node, negative for malignancy (0/1).     C. Pancreas, Whipple; SEE COMMENTS:  - Adenocarcinoma arising in adenoma.  - Tumor invades into muscularis propria of the duodenum (pT2).  - Lymphovascular invasion is present.   - One of sixteen lymph nodes is positive for tumor (1/16, pN1).  - Pancreatic intraepithelial neoplasia (PanIN), low grade.   - All margins are negative for carcinoma or dysplasia.     Block C11 is sent to [facility] for MI Profile Testing.   Immunohistochemistry (IHC) testing for Mismatch Repair (MMR) proteins has been requested on block C9, and the results will be issued in an addendum.     D. Lymph Node, Portacaval node:  - Eight lymph nodes, negative for malignancy (0/8).     E. Soft Tissue, Other, Additional Uncinate/Pancreas:  - Portion of pancreas with no pathologic abnormality  - Two lymph nodes, negative for malignancy (0/2).     9/12/2024 -  Cancer Staged    Staging form: Pancreas, AJCC 8th Edition  - Pathologic stage from 9/12/2024: Stage IIB (pT2, pN1, cM0) - Signed by Jacob Colby MD on 10/2/2024  Stage prefix: Initial diagnosis  Histologic grade (G): G2  Histologic grading system: 3 grade system  Lymph-vascular invasion (LVI): LVI present/identified, NOS  Carbohydrate antigen 19-9 (CA 19-9) (U/mL): 14  Carcinoembryonic antigen (CEA) (ng/mL): 0.7       11/4/2024 - 11/22/2024 Chemotherapy    alteplase (CATHFLO), 2 mg, Intracatheter, Every 1 Minute as needed, 2 of 12 cycles  pegfilgrastim (NEULASTA ONPRO), 6 mg, Subcutaneous, Once, 2 of 12 cycles  Dose  modification: 4 mg (original dose 6 mg, Cycle 3)  Administration: 6 mg (11/6/2024), 6 mg (11/22/2024)  fosaprepitant (EMEND) IVPB, 150 mg, Intravenous, Once, 2 of 12 cycles  Administration: 150 mg (11/4/2024), 150 mg (11/20/2024)  irinotecan (CAMPTOSAR) chemo infusion, 242 mg (83.3 % of original dose 180 mg/m2), Intravenous, Once, 2 of 12 cycles  Dose modification: 150 mg/m2 (original dose 180 mg/m2, Cycle 1, Reason: Dose modified as per discussion with consulting physician), 125 mg/m2 (original dose 180 mg/m2, Cycle 2, Reason: Dose modified as per discussion with consulting physician)  Administration: 240 mg (11/4/2024), 200 mg (11/20/2024)  leucovorin calcium IVPB, 644 mg, Intravenous, Once, 2 of 12 cycles  Administration: 650 mg (11/4/2024), 650 mg (11/20/2024)  oxaliplatin (ELOXATIN) chemo infusion, 85 mg/m2 = 136.85 mg, Intravenous, Once, 2 of 12 cycles  Dose modification: 65 mg/m2 (original dose 85 mg/m2, Cycle 2, Reason: Dose modified as per discussion with consulting physician)  Administration: 136.85 mg (11/4/2024), 100 mg (11/20/2024)  fluorouracil (ADRUCIL) ambulatory infusion Soln, 1,200 mg/m2/day = 3,865 mg, Intravenous, Over 46 hours, 2 of 12 cycles  Dose modification: 960 mg/m2/day (original dose 1,200 mg/m2/day, Cycle 3, Reason: Dose modified as per discussion with consulting physician), 1,000 mg/m2/day (original dose 1,200 mg/m2/day, Cycle 3, Reason: Dose modified as per discussion with consulting physician), 960 mg/m2/day (original dose 1,200 mg/m2/day, Cycle 2, Reason: Dose modified as per discussion with consulting physician)     12/11/2024 -  Chemotherapy    gemcitabine (GEMZAR) infusion, 1,000 mg/m2 = 1,600 mg, 4 of 6 cycles  Administration: 1,600 mg (12/11/2024), 1,600 mg (12/18/2024), 1,600 mg (1/8/2025), 1,600 mg (12/26/2024), 1,600 mg (1/15/2025), 1,600 mg (1/22/2025), 1,600 mg (2/5/2025), 1,600 mg (2/12/2025), 1,600 mg (2/19/2025), 1,600 mg (3/5/2025), 1,600 mg (3/12/2025), 1,600 mg  (3/19/2025)     Ampullary carcinoma (HCC)   9/12/2024 Surgery    A. Gallbladder, cholecystectomy:  - Chronic cholecystitis.  - One lymph node, negative for malignancy (0/1).  - Negative for malignancy.     B. Lymph Node, Portal Lymph Node:  - One lymph node, negative for malignancy (0/1).     C. Pancreas, Whipple; SEE COMMENTS:  - Adenocarcinoma arising in adenoma.  - Tumor invades into muscularis propria of the duodenum (pT2).  - Lymphovascular invasion is present.   - One of sixteen lymph nodes is positive for tumor (1/16, pN1).  - Pancreatic intraepithelial neoplasia (PanIN), low grade.   - All margins are negative for carcinoma or dysplasia.     Block C11 is sent to [facility] for MI Profile Testing.   Immunohistochemistry (IHC) testing for Mismatch Repair (MMR) proteins has been requested on block C9, and the results will be issued in an addendum.     D. Lymph Node, Portacaval node:  - Eight lymph nodes, negative for malignancy (0/8).     E. Soft Tissue, Other, Additional Uncinate/Pancreas:  - Portion of pancreas with no pathologic abnormality  - Two lymph nodes, negative for malignancy (0/2).     10/2/2024 Initial Diagnosis    Ampullary carcinoma (HCC)     10/2/2024 -  Cancer Staged    Staging form: Ampulla of Vater, AJCC 8th Edition  - Pathologic: Stage IIIA (pT2, pN1, cM0) - Signed by Marika Ac MD on 10/2/2024               Review of Systems   Constitutional:  Negative for chills and fever.   HENT:  Negative for ear pain and sore throat.    Eyes:  Negative for pain and visual disturbance.   Respiratory:  Negative for cough and shortness of breath.    Cardiovascular:  Negative for chest pain and palpitations.   Gastrointestinal:  Negative for abdominal pain and vomiting.   Genitourinary:  Negative for dysuria and hematuria.   Musculoskeletal:  Negative for arthralgias and back pain.   Skin:  Negative for color change and rash.   Neurological:  Positive for numbness. Negative for seizures and  syncope.   All other systems reviewed and are negative.    Medical History Reviewed by provider this encounter:  Tobacco  Allergies  Meds  Problems  Med Hx  Surg Hx  Fam Hx     .  Current Outpatient Medications on File Prior to Visit   Medication Sig Dispense Refill    albuterol (ProAir HFA) 90 mcg/act inhaler Inhale 2 puffs every 6 (six) hours as needed for wheezing 8.5 g 0    aspirin 81 mg chewable tablet Chew 81 mg daily      atorvastatin (LIPITOR) 20 mg tablet Take 1 tablet (20 mg total) by mouth daily 30 tablet 5    capecitabine (XELODA) 150 MG tablet Take 2 tablets (300 mg total) by mouth 2 (two) times a day 21 days on, followed by 7 days off 84 tablet 11    capecitabine (XELODA) 500 MG tablet Take 2 tablets (1,000 mg total) by mouth 2 (two) times a day 21 days on, followed by 7 days off 84 tablet 11    dicyclomine (BENTYL) 10 mg capsule Take 1 capsule (10 mg total) by mouth 3 (three) times a day before meals 90 capsule 0    lisinopril (ZESTRIL) 20 mg tablet Take 1 tablet (20 mg total) by mouth daily 30 tablet 3    menthol-cetylpyridinium (CEPACOL) 3 MG lozenge Take 1 lozenge (3 mg total) by mouth as needed for sore throat (Patient not taking: Reported on 2025) 18 lozenge 0    ondansetron (ZOFRAN) 4 mg tablet TAKE 1 TABLET BY MOUTH EVERY 8 HOURS AS NEEDED FOR NAUSEA FOR VOMITING 20 tablet 5     Current Facility-Administered Medications on File Prior to Visit   Medication Dose Route Frequency Provider Last Rate Last Admin    alteplase (CATHFLO) injection 2 mg  2 mg Intracatheter Q1MIN PRN Jacob Colby MD          Social History     Tobacco Use    Smoking status: Former     Current packs/day: 0.00     Types: Cigarettes     Quit date: 2022     Years since quittin.2    Smokeless tobacco: Never   Vaping Use    Vaping status: Never Used   Substance and Sexual Activity    Alcohol use: Not Currently    Drug use: Never    Sexual activity: Not on file         Objective   /66 (BP Location:  "Right arm, Patient Position: Sitting, Cuff Size: Adult)   Pulse 83   Temp 98.3 °F (36.8 °C) (Temporal)   Resp 16   Ht 5' 4\" (1.626 m)   Wt 64.4 kg (142 lb)   SpO2 99%   BMI 24.37 kg/m²     Pain Screening:  Pain Score:   2  ECOG   1  Physical Exam  Constitutional:       Appearance: He is well-developed.   HENT:      Head: Normocephalic and atraumatic.      Nose: Nose normal.   Eyes:      General: No scleral icterus.        Right eye: No discharge.         Left eye: No discharge.      Conjunctiva/sclera: Conjunctivae normal.      Pupils: Pupils are equal, round, and reactive to light.   Neck:      Thyroid: No thyromegaly.      Trachea: No tracheal deviation.   Cardiovascular:      Rate and Rhythm: Normal rate and regular rhythm.      Heart sounds: Normal heart sounds. No murmur heard.     No friction rub.   Pulmonary:      Effort: Pulmonary effort is normal. No respiratory distress.      Breath sounds: Normal breath sounds. No wheezing or rales.   Chest:      Chest wall: No tenderness.   Abdominal:      General: There is no distension.      Palpations: Abdomen is soft. There is no hepatomegaly or splenomegaly.      Tenderness: There is no abdominal tenderness. There is no guarding or rebound.   Musculoskeletal:         General: No tenderness or deformity. Normal range of motion.      Cervical back: Normal range of motion and neck supple.   Lymphadenopathy:      Cervical: No cervical adenopathy.   Skin:     General: Skin is warm and dry.      Coloration: Skin is not pale.      Findings: No erythema or rash.   Neurological:      Mental Status: He is alert and oriented to person, place, and time.      Cranial Nerves: No cranial nerve deficit.      Coordination: Coordination normal.      Deep Tendon Reflexes: Reflexes are normal and symmetric.   Psychiatric:         Behavior: Behavior normal.         Thought Content: Thought content normal.         Judgment: Judgment normal.         Labs: I have reviewed the " following labs:  Lab Results   Component Value Date/Time    WBC 7.16 04/01/2025 10:30 AM    RBC 3.33 (L) 04/01/2025 10:30 AM    Hemoglobin 9.9 (L) 04/01/2025 10:30 AM    Hematocrit 32.5 (L) 04/01/2025 10:30 AM    MCV 98 04/01/2025 10:30 AM    MCH 29.7 04/01/2025 10:30 AM    RDW 23.1 (H) 04/01/2025 10:30 AM    Platelets 436 (H) 04/01/2025 10:30 AM    Segmented % 63 04/01/2025 10:30 AM    Lymphocytes % 15 04/01/2025 10:30 AM    Monocytes % 17 (H) 04/01/2025 10:30 AM    Eosinophils Relative 4 04/01/2025 10:30 AM    Basophils Relative 1 04/01/2025 10:30 AM    Immature Grans % 0 04/01/2025 10:30 AM    Absolute Neutrophils 4.57 04/01/2025 10:30 AM     Lab Results   Component Value Date/Time    Potassium 3.9 04/01/2025 10:30 AM    Chloride 111 (H) 04/01/2025 10:30 AM    CO2 24 04/01/2025 10:30 AM    CO2, i-STAT 17 (L) 09/12/2024 02:27 PM    BUN 14 04/01/2025 10:30 AM    Creatinine 0.65 04/01/2025 10:30 AM    Glucose, i-STAT 153 (H) 09/12/2024 02:27 PM    Glucose, Fasting 91 11/25/2024 05:28 AM    Calcium 8.2 (L) 04/01/2025 10:30 AM    Corrected Calcium 7.5 (L) 09/13/2024 04:34 AM    AST 17 04/01/2025 10:30 AM    ALT 16 04/01/2025 10:30 AM    Alkaline Phosphatase 72 04/01/2025 10:30 AM    Total Protein 5.7 (L) 04/01/2025 10:30 AM    Albumin 3.7 04/01/2025 10:30 AM    Total Bilirubin 0.49 04/01/2025 10:30 AM    eGFR 101 04/01/2025 10:30 AM     Lab Results   Component Value Date/Time    WBC 7.16 04/01/2025 10:30 AM    RBC 3.33 (L) 04/01/2025 10:30 AM    Hemoglobin 9.9 (L) 04/01/2025 10:30 AM    Hematocrit 32.5 (L) 04/01/2025 10:30 AM    MCV 98 04/01/2025 10:30 AM    MCH 29.7 04/01/2025 10:30 AM    RDW 23.1 (H) 04/01/2025 10:30 AM    Platelets 436 (H) 04/01/2025 10:30 AM    Segmented % 63 04/01/2025 10:30 AM    Bands % 5 01/07/2025 10:32 AM    Lymphocytes % 15 04/01/2025 10:30 AM    Monocytes % 17 (H) 04/01/2025 10:30 AM    Eosinophils Relative 4 04/01/2025 10:30 AM    Basophils Relative 1 04/01/2025 10:30 AM    Immature Grans  % 0 04/01/2025 10:30 AM    Absolute Neutrophils 4.57 04/01/2025 10:30 AM      Lab Results   Component Value Date/Time    Sodium 141 04/01/2025 10:30 AM    Potassium 3.9 04/01/2025 10:30 AM    Chloride 111 (H) 04/01/2025 10:30 AM    CO2 24 04/01/2025 10:30 AM    CO2, i-STAT 17 (L) 09/12/2024 02:27 PM    ANION GAP 6 04/01/2025 10:30 AM    BUN 14 04/01/2025 10:30 AM    Creatinine 0.65 04/01/2025 10:30 AM    Glucose 107 04/01/2025 10:30 AM    Glucose, Fasting 91 11/25/2024 05:28 AM    Calcium 8.2 (L) 04/01/2025 10:30 AM    Corrected Calcium 7.5 (L) 09/13/2024 04:34 AM    AST 17 04/01/2025 10:30 AM    ALT 16 04/01/2025 10:30 AM    Alkaline Phosphatase 72 04/01/2025 10:30 AM    Total Protein 5.7 (L) 04/01/2025 10:30 AM    Albumin 3.7 04/01/2025 10:30 AM    Total Bilirubin 0.49 04/01/2025 10:30 AM    eGFR 101 04/01/2025 10:30 AM      Lab Results   Component Value Date/Time    CEA 0.7 08/28/2024 05:09 AM    CA 19-9 5 01/14/2025 11:07 AM      Lab Results   Component Value Date/Time    Iron 73 11/01/2024 10:34 AM    Iron Saturation 25 11/01/2024 10:34 AM    Ferritin 45 11/01/2024 10:34 AM    Vitamin B-12 214 11/01/2024 10:34 AM    Folate 12.4 11/01/2024 10:34 AM      Results for orders placed or performed during the hospital encounter of 04/01/25   CBC and differential   Result Value Ref Range    WBC 7.16 4.31 - 10.16 Thousand/uL    RBC 3.33 (L) 3.88 - 5.62 Million/uL    Hemoglobin 9.9 (L) 12.0 - 17.0 g/dL    Hematocrit 32.5 (L) 36.5 - 49.3 %    MCV 98 82 - 98 fL    MCH 29.7 26.8 - 34.3 pg    MCHC 30.5 (L) 31.4 - 37.4 g/dL    RDW 23.1 (H) 11.6 - 15.1 %    MPV 10.1 8.9 - 12.7 fL    Platelets 436 (H) 149 - 390 Thousands/uL    nRBC 0 /100 WBCs    Segmented % 63 43 - 75 %    Immature Grans % 0 0 - 2 %    Lymphocytes % 15 14 - 44 %    Monocytes % 17 (H) 4 - 12 %    Eosinophils Relative 4 0 - 6 %    Basophils Relative 1 0 - 1 %    Absolute Neutrophils 4.57 1.85 - 7.62 Thousands/µL    Absolute Immature Grans 0.02 0.00 - 0.20  Thousand/uL    Absolute Lymphocytes 1.05 0.60 - 4.47 Thousands/µL    Absolute Monocytes 1.23 (H) 0.17 - 1.22 Thousand/µL    Eosinophils Absolute 0.25 0.00 - 0.61 Thousand/µL    Basophils Absolute 0.04 0.00 - 0.10 Thousands/µL   Comprehensive metabolic panel   Result Value Ref Range    Sodium 141 135 - 147 mmol/L    Potassium 3.9 3.5 - 5.3 mmol/L    Chloride 111 (H) 96 - 108 mmol/L    CO2 24 21 - 32 mmol/L    ANION GAP 6 4 - 13 mmol/L    BUN 14 5 - 25 mg/dL    Creatinine 0.65 0.60 - 1.30 mg/dL    Glucose 107 65 - 140 mg/dL    Calcium 8.2 (L) 8.4 - 10.2 mg/dL    AST 17 13 - 39 U/L    ALT 16 7 - 52 U/L    Alkaline Phosphatase 72 34 - 104 U/L    Total Protein 5.7 (L) 6.4 - 8.4 g/dL    Albumin 3.7 3.5 - 5.0 g/dL    Total Bilirubin 0.49 0.20 - 1.00 mg/dL    eGFR 101 ml/min/1.73sq m   Result Value Ref Range    Magnesium 2.2 1.9 - 2.7 mg/dL

## 2025-04-02 NOTE — TELEPHONE ENCOUNTER
Let message to let patient know his ct scan os on 05/20/2025 @ 10:00 at [facility] and follow up with dr gómez on 05/27/2025 @ 1:40

## 2025-04-08 ENCOUNTER — HOSPITAL ENCOUNTER (OUTPATIENT)
Dept: INFUSION CENTER | Facility: HOSPITAL | Age: 65
Discharge: HOME | End: 2025-04-08
Attending: INTERNAL MEDICINE
Payer: COMMERCIAL

## 2025-04-08 DIAGNOSIS — Z45.2 ENCOUNTER FOR CENTRAL LINE CARE: Primary | ICD-10-CM

## 2025-04-08 DIAGNOSIS — C25.9 PANCREATIC ADENOCARCINOMA (HCC): ICD-10-CM

## 2025-04-08 DIAGNOSIS — T45.1X5A CHEMOTHERAPY-INDUCED NEUTROPENIA (HCC): ICD-10-CM

## 2025-04-08 DIAGNOSIS — D70.1 CHEMOTHERAPY-INDUCED NEUTROPENIA (HCC): ICD-10-CM

## 2025-04-08 LAB
BASOPHILS # BLD AUTO: 0.07 THOUSANDS/ÂΜL (ref 0–0.1)
BASOPHILS NFR BLD AUTO: 1 % (ref 0–1)
EOSINOPHIL # BLD AUTO: 0.13 THOUSAND/ÂΜL (ref 0–0.61)
EOSINOPHIL NFR BLD AUTO: 2 % (ref 0–6)
ERYTHROCYTE [DISTWIDTH] IN BLOOD BY AUTOMATED COUNT: 23.2 % (ref 11.6–15.1)
HCT VFR BLD AUTO: 33.7 % (ref 36.5–49.3)
HGB BLD-MCNC: 10.6 G/DL (ref 12–17)
IMM GRANULOCYTES # BLD AUTO: 0.03 THOUSAND/UL (ref 0–0.2)
IMM GRANULOCYTES NFR BLD AUTO: 1 % (ref 0–2)
LYMPHOCYTES # BLD AUTO: 1.48 THOUSANDS/ÂΜL (ref 0.6–4.47)
LYMPHOCYTES NFR BLD AUTO: 24 % (ref 14–44)
MCH RBC QN AUTO: 30.8 PG (ref 26.8–34.3)
MCHC RBC AUTO-ENTMCNC: 31.5 G/DL (ref 31.4–37.4)
MCV RBC AUTO: 98 FL (ref 82–98)
MONOCYTES # BLD AUTO: 0.35 THOUSAND/ÂΜL (ref 0.17–1.22)
MONOCYTES NFR BLD AUTO: 6 % (ref 4–12)
NEUTROPHILS # BLD AUTO: 4.01 THOUSANDS/ÂΜL (ref 1.85–7.62)
NEUTS SEG NFR BLD AUTO: 66 % (ref 43–75)
NRBC BLD AUTO-RTO: 0 /100 WBCS
PLATELET # BLD AUTO: 728 THOUSANDS/UL (ref 149–390)
PMV BLD AUTO: 9.8 FL (ref 8.9–12.7)
RBC # BLD AUTO: 3.44 MILLION/UL (ref 3.88–5.62)
WBC # BLD AUTO: 6.07 THOUSAND/UL (ref 4.31–10.16)

## 2025-04-08 PROCEDURE — 85025 COMPLETE CBC W/AUTO DIFF WBC: CPT | Performed by: INTERNAL MEDICINE

## 2025-04-08 NOTE — PROGRESS NOTES
Isaac Kramer  tolerated lab draw well with no complications.      Isaac Kramer is aware of future appt on 4/9 at 10:30AM.     AVS printed and given to Isaac Kramer: No (Declined by Isaac Kramer).

## 2025-04-09 ENCOUNTER — HOSPITAL ENCOUNTER (OUTPATIENT)
Dept: INFUSION CENTER | Facility: HOSPITAL | Age: 65
Discharge: HOME | End: 2025-04-09
Attending: INTERNAL MEDICINE
Payer: COMMERCIAL

## 2025-04-09 VITALS
DIASTOLIC BLOOD PRESSURE: 62 MMHG | OXYGEN SATURATION: 98 % | SYSTOLIC BLOOD PRESSURE: 133 MMHG | WEIGHT: 139.33 LBS | RESPIRATION RATE: 16 BRPM | BODY MASS INDEX: 23.79 KG/M2 | TEMPERATURE: 97.4 F | HEIGHT: 64 IN | HEART RATE: 81 BPM

## 2025-04-09 DIAGNOSIS — T45.1X5A CHEMOTHERAPY-INDUCED NEUTROPENIA (HCC): ICD-10-CM

## 2025-04-09 DIAGNOSIS — D70.1 CHEMOTHERAPY-INDUCED NEUTROPENIA (HCC): ICD-10-CM

## 2025-04-09 DIAGNOSIS — C25.9 PANCREATIC ADENOCARCINOMA (HCC): Primary | ICD-10-CM

## 2025-04-09 PROCEDURE — 96413 CHEMO IV INFUSION 1 HR: CPT

## 2025-04-09 PROCEDURE — 96367 TX/PROPH/DG ADDL SEQ IV INF: CPT

## 2025-04-09 RX ORDER — SODIUM CHLORIDE 9 MG/ML
20 INJECTION, SOLUTION INTRAVENOUS ONCE
Status: COMPLETED | OUTPATIENT
Start: 2025-04-09 | End: 2025-04-09

## 2025-04-09 RX ADMIN — SODIUM CHLORIDE 20 ML/HR: 0.9 INJECTION, SOLUTION INTRAVENOUS at 10:28

## 2025-04-09 RX ADMIN — DEXAMETHASONE SODIUM PHOSPHATE 10 MG: 10 INJECTION, SOLUTION INTRAMUSCULAR; INTRAVENOUS at 10:29

## 2025-04-09 RX ADMIN — GEMCITABINE 1600 MG: 38 INJECTION, SOLUTION INTRAVENOUS at 11:07

## 2025-04-09 NOTE — PROGRESS NOTES
Isaac Kramer  tolerated Gemzar treatment well with no complications.      Isaac Kramer is aware of future appt on 4/15 at 10AM.     AVS printed and given to Isaac Kramer.

## 2025-04-12 DIAGNOSIS — I10 BENIGN ESSENTIAL HYPERTENSION: ICD-10-CM

## 2025-04-14 RX ORDER — LISINOPRIL 20 MG/1
20 TABLET ORAL DAILY
Qty: 30 TABLET | Refills: 5 | Status: SHIPPED | OUTPATIENT
Start: 2025-04-14

## 2025-04-15 ENCOUNTER — HOSPITAL ENCOUNTER (OUTPATIENT)
Dept: INFUSION CENTER | Facility: HOSPITAL | Age: 65
Discharge: HOME | End: 2025-04-15
Attending: INTERNAL MEDICINE
Payer: COMMERCIAL

## 2025-04-15 VITALS — TEMPERATURE: 97.1 F

## 2025-04-15 DIAGNOSIS — Z45.2 ENCOUNTER FOR CENTRAL LINE CARE: Primary | ICD-10-CM

## 2025-04-15 DIAGNOSIS — D70.1 CHEMOTHERAPY-INDUCED NEUTROPENIA (HCC): ICD-10-CM

## 2025-04-15 DIAGNOSIS — C25.9 PANCREATIC ADENOCARCINOMA (HCC): ICD-10-CM

## 2025-04-15 DIAGNOSIS — T45.1X5A CHEMOTHERAPY-INDUCED NEUTROPENIA (HCC): ICD-10-CM

## 2025-04-15 LAB
BASOPHILS # BLD AUTO: 0.05 THOUSANDS/ÂΜL (ref 0–0.1)
BASOPHILS NFR BLD AUTO: 1 % (ref 0–1)
EOSINOPHIL # BLD AUTO: 0.05 THOUSAND/ÂΜL (ref 0–0.61)
EOSINOPHIL NFR BLD AUTO: 1 % (ref 0–6)
ERYTHROCYTE [DISTWIDTH] IN BLOOD BY AUTOMATED COUNT: 24.3 % (ref 11.6–15.1)
HCT VFR BLD AUTO: 30 % (ref 36.5–49.3)
HGB BLD-MCNC: 9.6 G/DL (ref 12–17)
IMM GRANULOCYTES # BLD AUTO: 0.02 THOUSAND/UL (ref 0–0.2)
IMM GRANULOCYTES NFR BLD AUTO: 0 % (ref 0–2)
LYMPHOCYTES # BLD AUTO: 1.28 THOUSANDS/ÂΜL (ref 0.6–4.47)
LYMPHOCYTES NFR BLD AUTO: 21 % (ref 14–44)
MCH RBC QN AUTO: 31.5 PG (ref 26.8–34.3)
MCHC RBC AUTO-ENTMCNC: 32 G/DL (ref 31.4–37.4)
MCV RBC AUTO: 98 FL (ref 82–98)
MONOCYTES # BLD AUTO: 0.27 THOUSAND/ÂΜL (ref 0.17–1.22)
MONOCYTES NFR BLD AUTO: 4 % (ref 4–12)
NEUTROPHILS # BLD AUTO: 4.56 THOUSANDS/ÂΜL (ref 1.85–7.62)
NEUTS SEG NFR BLD AUTO: 73 % (ref 43–75)
NRBC BLD AUTO-RTO: 0 /100 WBCS
PLATELET # BLD AUTO: 215 THOUSANDS/UL (ref 149–390)
PMV BLD AUTO: 9.5 FL (ref 8.9–12.7)
RBC # BLD AUTO: 3.05 MILLION/UL (ref 3.88–5.62)
WBC # BLD AUTO: 6.23 THOUSAND/UL (ref 4.31–10.16)

## 2025-04-15 PROCEDURE — 85025 COMPLETE CBC W/AUTO DIFF WBC: CPT | Performed by: INTERNAL MEDICINE

## 2025-04-15 NOTE — PROGRESS NOTES
Isaac Kramer  tolerated port flush well with no complications. Central labs drawn via port.    Isaac Kramer is aware of future appt on tomorrow at 10:30AM    AVS declined by Isaac Kramer.

## 2025-04-16 ENCOUNTER — HOSPITAL ENCOUNTER (OUTPATIENT)
Dept: INFUSION CENTER | Facility: HOSPITAL | Age: 65
Discharge: HOME | End: 2025-04-16
Attending: INTERNAL MEDICINE
Payer: COMMERCIAL

## 2025-04-16 VITALS
TEMPERATURE: 96.9 F | HEIGHT: 64 IN | BODY MASS INDEX: 24.16 KG/M2 | DIASTOLIC BLOOD PRESSURE: 58 MMHG | HEART RATE: 82 BPM | SYSTOLIC BLOOD PRESSURE: 115 MMHG | WEIGHT: 141.54 LBS | OXYGEN SATURATION: 97 % | RESPIRATION RATE: 16 BRPM

## 2025-04-16 DIAGNOSIS — T45.1X5A CHEMOTHERAPY-INDUCED NEUTROPENIA (HCC): ICD-10-CM

## 2025-04-16 DIAGNOSIS — C25.9 PANCREATIC ADENOCARCINOMA (HCC): Primary | ICD-10-CM

## 2025-04-16 DIAGNOSIS — D70.1 CHEMOTHERAPY-INDUCED NEUTROPENIA (HCC): ICD-10-CM

## 2025-04-16 PROCEDURE — 96367 TX/PROPH/DG ADDL SEQ IV INF: CPT

## 2025-04-16 PROCEDURE — 96413 CHEMO IV INFUSION 1 HR: CPT

## 2025-04-16 RX ORDER — SODIUM CHLORIDE 9 MG/ML
20 INJECTION, SOLUTION INTRAVENOUS ONCE
Status: COMPLETED | OUTPATIENT
Start: 2025-04-16 | End: 2025-04-16

## 2025-04-16 RX ADMIN — SODIUM CHLORIDE 20 ML/HR: 0.9 INJECTION, SOLUTION INTRAVENOUS at 10:50

## 2025-04-16 RX ADMIN — DEXAMETHASONE SODIUM PHOSPHATE 10 MG: 10 INJECTION, SOLUTION INTRAMUSCULAR; INTRAVENOUS at 10:54

## 2025-04-16 RX ADMIN — GEMCITABINE 1600 MG: 38 INJECTION, SOLUTION INTRAVENOUS at 12:00

## 2025-04-16 NOTE — PROGRESS NOTES
Isaac Kramer  tolerated gemzar well with no complications.      Isaac Kramer is aware of future appt on 4-29-25 at 1100    AVS declined

## 2025-04-18 ENCOUNTER — NURSE TRIAGE (OUTPATIENT)
Age: 65
End: 2025-04-18

## 2025-04-18 ENCOUNTER — HOSPITAL ENCOUNTER (EMERGENCY)
Facility: HOSPITAL | Age: 65
Discharge: HOME | End: 2025-04-18
Attending: EMERGENCY MEDICINE
Payer: COMMERCIAL

## 2025-04-18 VITALS
SYSTOLIC BLOOD PRESSURE: 135 MMHG | HEART RATE: 74 BPM | HEIGHT: 64 IN | WEIGHT: 137 LBS | DIASTOLIC BLOOD PRESSURE: 63 MMHG | BODY MASS INDEX: 23.39 KG/M2 | TEMPERATURE: 99 F | RESPIRATION RATE: 18 BRPM | OXYGEN SATURATION: 98 %

## 2025-04-18 DIAGNOSIS — L27.1 HAND FOOT SYNDROME: Primary | ICD-10-CM

## 2025-04-18 DIAGNOSIS — T88.7XXA SIDE EFFECT OF MEDICATION: ICD-10-CM

## 2025-04-18 LAB
ALBUMIN SERPL BCG-MCNC: 3.7 G/DL (ref 3.5–5)
ALP SERPL-CCNC: 66 U/L (ref 34–104)
ALT SERPL W P-5'-P-CCNC: 23 U/L (ref 7–52)
ANION GAP SERPL CALCULATED.3IONS-SCNC: 4 MMOL/L (ref 4–13)
AST SERPL W P-5'-P-CCNC: 20 U/L (ref 13–39)
BASOPHILS # BLD AUTO: 0.01 THOUSANDS/ÂΜL (ref 0–0.1)
BASOPHILS NFR BLD AUTO: 0 % (ref 0–1)
BILIRUB SERPL-MCNC: 0.55 MG/DL (ref 0.2–1)
BILIRUB UR QL STRIP: NEGATIVE
BNP SERPL-MCNC: 75 PG/ML (ref 0–100)
BUN SERPL-MCNC: 16 MG/DL (ref 5–25)
CALCIUM SERPL-MCNC: 8.3 MG/DL (ref 8.4–10.2)
CHLORIDE SERPL-SCNC: 111 MMOL/L (ref 96–108)
CLARITY UR: CLEAR
CO2 SERPL-SCNC: 24 MMOL/L (ref 21–32)
COLOR UR: YELLOW
CREAT SERPL-MCNC: 0.65 MG/DL (ref 0.6–1.3)
EOSINOPHIL # BLD AUTO: 0.31 THOUSAND/ÂΜL (ref 0–0.61)
EOSINOPHIL NFR BLD AUTO: 3 % (ref 0–6)
ERYTHROCYTE [DISTWIDTH] IN BLOOD BY AUTOMATED COUNT: 24.9 % (ref 11.6–15.1)
GFR SERPL CREATININE-BSD FRML MDRD: 101 ML/MIN/1.73SQ M
GLUCOSE SERPL-MCNC: 82 MG/DL (ref 65–140)
GLUCOSE UR STRIP-MCNC: NEGATIVE MG/DL
HCT VFR BLD AUTO: 30.9 % (ref 36.5–49.3)
HGB BLD-MCNC: 9.7 G/DL (ref 12–17)
HGB UR QL STRIP.AUTO: NEGATIVE
IMM GRANULOCYTES # BLD AUTO: 0.05 THOUSAND/UL (ref 0–0.2)
IMM GRANULOCYTES NFR BLD AUTO: 1 % (ref 0–2)
KETONES UR STRIP-MCNC: NEGATIVE MG/DL
LEUKOCYTE ESTERASE UR QL STRIP: NEGATIVE
LYMPHOCYTES # BLD AUTO: 1.63 THOUSANDS/ÂΜL (ref 0.6–4.47)
LYMPHOCYTES NFR BLD AUTO: 17 % (ref 14–44)
MCH RBC QN AUTO: 30.6 PG (ref 26.8–34.3)
MCHC RBC AUTO-ENTMCNC: 31.4 G/DL (ref 31.4–37.4)
MCV RBC AUTO: 98 FL (ref 82–98)
MONOCYTES # BLD AUTO: 0.32 THOUSAND/ÂΜL (ref 0.17–1.22)
MONOCYTES NFR BLD AUTO: 3 % (ref 4–12)
NEUTROPHILS # BLD AUTO: 7.55 THOUSANDS/ÂΜL (ref 1.85–7.62)
NEUTS SEG NFR BLD AUTO: 76 % (ref 43–75)
NITRITE UR QL STRIP: NEGATIVE
NRBC BLD AUTO-RTO: 0 /100 WBCS
PH UR STRIP.AUTO: 6 [PH]
PLATELET # BLD AUTO: 160 THOUSANDS/UL (ref 149–390)
PMV BLD AUTO: 9.4 FL (ref 8.9–12.7)
POTASSIUM SERPL-SCNC: 3.9 MMOL/L (ref 3.5–5.3)
PROT SERPL-MCNC: 5.7 G/DL (ref 6.4–8.4)
PROT UR STRIP-MCNC: NEGATIVE MG/DL
RBC # BLD AUTO: 3.17 MILLION/UL (ref 3.88–5.62)
SODIUM SERPL-SCNC: 139 MMOL/L (ref 135–147)
SP GR UR STRIP.AUTO: 1.02
UROBILINOGEN UR QL STRIP.AUTO: 0.2 E.U./DL
WBC # BLD AUTO: 9.87 THOUSAND/UL (ref 4.31–10.16)

## 2025-04-18 PROCEDURE — 80053 COMPREHEN METABOLIC PANEL: CPT

## 2025-04-18 PROCEDURE — 36415 COLL VENOUS BLD VENIPUNCTURE: CPT

## 2025-04-18 PROCEDURE — 83880 ASSAY OF NATRIURETIC PEPTIDE: CPT

## 2025-04-18 PROCEDURE — 99283 EMERGENCY DEPT VISIT LOW MDM: CPT

## 2025-04-18 PROCEDURE — 99284 EMERGENCY DEPT VISIT MOD MDM: CPT

## 2025-04-18 PROCEDURE — 85025 COMPLETE CBC W/AUTO DIFF WBC: CPT

## 2025-04-18 PROCEDURE — 81003 URINALYSIS AUTO W/O SCOPE: CPT

## 2025-04-18 RX ORDER — OXYCODONE HYDROCHLORIDE 5 MG/1
5 TABLET ORAL ONCE
Refills: 0 | Status: COMPLETED | OUTPATIENT
Start: 2025-04-18 | End: 2025-04-18

## 2025-04-18 RX ORDER — BETAMETHASONE DIPROPIONATE 0.5 MG/G
OINTMENT, AUGMENTED TOPICAL 2 TIMES DAILY
Qty: 45 G | Refills: 0 | Status: SHIPPED | OUTPATIENT
Start: 2025-04-18 | End: 2025-04-25

## 2025-04-18 RX ORDER — UREA 200 MG/G
CREAM TOPICAL AS NEEDED
Qty: 75 G | Refills: 0 | Status: SHIPPED | OUTPATIENT
Start: 2025-04-18

## 2025-04-18 RX ORDER — ACETAMINOPHEN 325 MG/1
975 TABLET ORAL ONCE
Status: COMPLETED | OUTPATIENT
Start: 2025-04-18 | End: 2025-04-18

## 2025-04-18 RX ORDER — OXYCODONE HYDROCHLORIDE 5 MG/1
5 TABLET ORAL EVERY 6 HOURS PRN
Qty: 8 TABLET | Refills: 0 | Status: SHIPPED | OUTPATIENT
Start: 2025-04-18

## 2025-04-18 RX ADMIN — ACETAMINOPHEN 975 MG: 325 TABLET ORAL at 12:35

## 2025-04-18 RX ADMIN — OXYCODONE HYDROCHLORIDE 5 MG: 5 TABLET ORAL at 16:06

## 2025-04-18 NOTE — TELEPHONE ENCOUNTER
"FOLLOW UP: Pt with c/o hands feet red swollen and difficult to use. Skin peeling  Stated has not had this before with his TX.    REASON FOR CONVERSATION: hands and feet hurt red swollen skin peeling    SYMPTOMS: hands feet red warm skin peeling.  [place] if able for further assessment    OTHER: Pt has next Tx C6 for 4/30    DISPOSITION: Callback by PCP Today    Reason for Disposition   Nursing judgment    Answer Assessment - Initial Assessment Questions  1. REASON FOR CALL: \"What is your main concern right now?\"      Pt calling stated his hands and feet are red swollen and skin peeling.Difficult to use hands due to discomfort. Pt stated he has never had this befroe with his Tx.  Next Tx for C6 4/30    2. ONSET: \"When did the issue start?\"      Few days ago    3. SEVERITY: \"How bad is the issue?\"      Feels like getting worse Not able to use hands well and feet hurt.    4. FEVER: \"Do you have a fever?\"      No    5. OTHER SYMPTOMS: \"Do you have any other new symptoms?\"      No    6. TREATMENTS AND RESPONSE: \"What have you done so far to try to make this better? What medicines have you used?\"      Pt on TX with Gemzar and Xeloda. This will be his off week and restart TX C6 4/30.    Protocols used: No Protocol Available-Adult-[place]    "

## 2025-04-18 NOTE — TELEPHONE ENCOUNTER
Phoned pt and left a message regarding his symptoms I showed Dr colby the pictures and per Dr Colby pt is to start cycle 6 on 4/20/25 and use urea cream Rx sent to pt's pharmacy instructed pt to not take hot showers and to pat himself dry then apply the urea creme.

## 2025-04-18 NOTE — ED PROVIDER NOTES
Time reflects when diagnosis was documented in both MDM as applicable and the Disposition within this note       Time User Action Codes Description Comment    4/18/2025  3:12 PM Genesis Hughes Add [T88.7XXA] Side effect of medication     4/18/2025  3:12 PM Genesis Hughes Add [L27.1] Hand foot syndrome     4/18/2025  3:12 PM JaGenesis monet Modify [T88.7XXA] Side effect of medication     4/18/2025  3:12 PM Genesis Hughes Modify [L27.1] Hand foot syndrome           ED Disposition       ED Disposition   Discharge    Condition   Stable    Date/Time   Fri Apr 18, 2025  3:12 PM    Comment   Isaac DUGAN Williams discharge to home/self care.                   Assessment & Plan       Medical Decision Making  Patient is a 64-year-old male with a PMH of CAD, HTN, HLD, pancreatic cancer presenting to the ER complaining of swelling/pain/skin peeling in bilateral hands and feet. Patient states symptoms started yesterday. Denies new meds/foods and denies allergies, states he is currently on xeloda for pancreatic cancer. VS stable, physical exam revealing erythema/swelling/tenderness to palpation of BL hands/feet. Pulses/sensation/ROM/strength intact. Superficial skin peeling noted to soles of feet, no subcutaneous tissue exposed. Skin is blanchable, no overlying signs of infection noted.     Will order basic labs/bnp, will give tylenol for pain. Will hold off on duplex US as swelling is only in hands/feet, low concerns for DVT at this time. Ddx including hand/foot syndrome, nephrotic syndrome, CHF, rheumatoid arthritis.    Labs are WNL, high suspicion for hand foot syndrome given patient's medication regimen. Will discuss case with network oncologist.     Discussed case with on call oncologist who agrees that symptoms are concerning for side effect from xeloda. He recommended topical steroids/moisturizers/analgesics and says patient will need dose adjustment by oncologist.  Discussed recommendations with patient who expressed verbal  understanding.  Will prescribe high-dose topical steroids to be used twice daily for 1 week until patient is reevaluated by oncologist.  Recommended patient keep area moisturized, discussed strict return precautions with patient who expressed verbal understanding.  Recommended patient follow-up with oncologist as soon as possible for dose adjustment.  Recommended patient return to the ER if symptoms worsen or change.  Patient is agreeable with plan and is stable at discharge.    Amount and/or Complexity of Data Reviewed  Labs: ordered.    Risk  OTC drugs.  Prescription drug management.             Medications   acetaminophen (TYLENOL) tablet 975 mg (975 mg Oral Given 4/18/25 1235)   oxyCODONE (ROXICODONE) IR tablet 5 mg (5 mg Oral Given 4/18/25 1606)       ED Risk Strat Scores                    No data recorded        SBIRT 22yo+      Flowsheet Row Most Recent Value   Initial Alcohol Screen: US AUDIT-C     1. How often do you have a drink containing alcohol? 0 Filed at: 04/18/2025 1147   2. How many drinks containing alcohol do you have on a typical day you are drinking?  0 Filed at: 04/18/2025 1147   3a. Male UNDER 65: How often do you have five or more drinks on one occasion? 0 Filed at: 04/18/2025 1147   Audit-C Score 0 Filed at: 04/18/2025 1147   SONG: How many times in the past year have you...    Used an illegal drug or used a prescription medication for non-medical reasons? Never Filed at: 04/18/2025 1147                            History of Present Illness       Chief Complaint   Patient presents with    Hand Swelling     According to the patient, he has had swelling in both hands and feet, and started having pain last night.       Past Medical History:   Diagnosis Date    Benign essential hypertension 05/08/2014    CAD (coronary artery disease)     s/p NAVA prox LAD and D1 7/28/2022    Cancer (HCC)     Prostate    Coronary artery disease involving native coronary artery of native heart without angina  pectoris 2022    Enteritis 2024    GERD (gastroesophageal reflux disease)     Hyperlipidemia     Hypertension     Other chest pain     Palpitations     Prostate cancer (HCC) 2023    Sleep apnea       Past Surgical History:   Procedure Laterality Date    CARDIAC CATHETERIZATION Left 2022    Procedure: Cardiac catheterization-left heart catheterization;  Surgeon: Sai Henry MD;  Location: [facility];  Service: Cardiology    CARDIAC CATHETERIZATION N/A 2022    Procedure: Cardiac pci;  Surgeon: Sai Henry MD;  Location: [facility];  Service: Cardiology    HERNIA REPAIR      IR EMBOLIZATION (SPECIFY VESSEL OR SITE)  2024    IR PORT PLACEMENT  10/28/2024    WA LAPS SURG [id number] RPBIC RAD W/NRV SPARING ROBOT N/A 2023    Procedure: PROSTATECTOMY RADICAL & PELVIC LYMPH NODE DISSECTION  W/ ROBOT;  Surgeon: Otoniel Harmon MD;  Location: [facility];  Service: Urology    WA PROSTATE NEEDLE BIOPSY ANY APPROACH N/A 2023    Procedure: TRANSRECTAL MRI FUSION BIOPSY PROSTATE;  Surgeon: Milan Arellano MD;  Location: [facility];  Service: Urology    WHIPPLE PROCEDURE/PANCREATICO-DUODENECTOMY N/A 2024    Procedure: WHIPPLE PROCEDURE/PANCREATICO-DUODENECTOMY;  Surgeon: Marika Ac MD;  Location: [facility];  Service: Surgical Oncology      Family History   Problem Relation Age of Onset    No Known Problems Mother     No Known Problems Father       Social History     Tobacco Use    Smoking status: Former     Current packs/day: 0.00     Types: Cigarettes     Quit date: 2022     Years since quittin.3    Smokeless tobacco: Never   Vaping Use    Vaping status: Never Used   Substance Use Topics    Alcohol use: Not Currently    Drug use: Never      E-Cigarette/Vaping    E-Cigarette Use Never User       E-Cigarette/Vaping Substances    Nicotine No     THC No     CBD No     Flavoring No     Other No     Unknown No       I have reviewed and agree  with the history as documented.     Patient is a 64-year-old male with a PMH of CAD, HTN, HLD, pancreatic cancer presenting to the ER complaining of bilateral bilateral hand and feet swelling and pain.  Patient states swelling initially started about a week ago but subsided after a day, reports swelling and pain restarted last night.  Patient states swelling came on suddenly and reports difficulty closing hands completely secondary to pain and swelling.  Patient also reporting skin peeling in hands and feet as well. Patient states he did not start any new medications and has been on lisinopril and xeloda for years.  Patient states pain is severe, states he did not try anything for pain at home. Patient states he did not try any new foods and has no reported allergies. Patient denies COHEN, chest pain, SOB, dizziness/syncope, N/V/D, calf swelling/pain, proximal upper extremity swelling, and rashes.           Review of Systems   Constitutional:  Negative for chills and fever.   HENT:  Negative for ear pain and sore throat.    Eyes:  Negative for pain and visual disturbance.   Respiratory:  Negative for cough and shortness of breath.    Cardiovascular:  Negative for chest pain, palpitations and leg swelling.   Gastrointestinal:  Negative for abdominal pain, diarrhea, nausea and vomiting.   Genitourinary:  Negative for decreased urine volume, dysuria and hematuria.   Musculoskeletal:  Negative for arthralgias and back pain.        Patient reporting BL hands/feet swelling/pain   Skin:  Negative for color change and rash.        Patient reporting skin peeling on soles of feet     Neurological:  Negative for seizures and syncope.   All other systems reviewed and are negative.          Objective       ED Triage Vitals [04/18/25 1145]   Temperature Pulse Blood Pressure Respirations SpO2 Patient Position - Orthostatic VS   99 °F (37.2 °C) 81 127/59 18 100 % Lying      Temp Source Heart Rate Source BP Location FiO2 (%) Pain  Score    Temporal -- Right arm -- 10 - Worst Possible Pain      Vitals      Date and Time Temp Pulse SpO2 Resp BP Pain Score FACES Pain Rating User   04/18/25 1616 -- -- -- -- 135/63 -- --    04/18/25 1615 -- 74 98 % -- -- -- -- [facility]   04/18/25 1606 -- -- -- -- -- 8 --    04/18/25 1600 99 °F (37.2 °C) 76 96 % 18 -- 5 --    04/18/25 1408 -- -- -- -- -- No Pain --    04/18/25 1235 -- -- -- -- -- 10 - Worst Possible Pain -- [facility]   04/18/25 1200 -- 85 98 % 18 144/61 -- --    04/18/25 1145 99 °F (37.2 °C) 81 100 % 18 127/59 10 - Worst Possible Pain -- [facility]            Physical Exam  Vitals and nursing note reviewed.   Constitutional:       General: He is not in acute distress.     Appearance: Normal appearance. He is well-developed. He is not ill-appearing.   HENT:      Head: Normocephalic and atraumatic.   Eyes:      Conjunctiva/sclera: Conjunctivae normal.   Cardiovascular:      Rate and Rhythm: Normal rate and regular rhythm.      Pulses: Normal pulses.      Heart sounds: Normal heart sounds. No murmur heard.     No friction rub. No gallop.   Pulmonary:      Effort: Pulmonary effort is normal. No respiratory distress.      Breath sounds: Normal breath sounds. No wheezing or rales.   Chest:      Chest wall: No tenderness.   Abdominal:      General: Abdomen is flat. There is no distension.      Palpations: Abdomen is soft.      Tenderness: There is no abdominal tenderness. There is no guarding.   Musculoskeletal:         General: No swelling.      Right hand: Swelling and tenderness present. Normal range of motion. Normal pulse.      Left hand: Swelling and tenderness present. Normal range of motion. Normal pulse.      Cervical back: Neck supple.      Right foot: Normal range of motion. Swelling and tenderness present. Normal pulse.      Left foot: Normal range of motion. Swelling and tenderness present. Normal pulse.      Comments: Erythema/swelling/tenderness to palpation of BL hands/feet.  Pulses/sensation/ROM/strength intact. Superficial skin peeling noted to soles of feet, no subcutaneous tissue exposed.    Skin:     General: Skin is warm and dry.      Capillary Refill: Capillary refill takes less than 2 seconds.      Comments: Skin peeling noted to soles of feet. No subcutaneous tissue exposed. Skin is blanchable, no drainage/overlying signs of infection noted.    Neurological:      Mental Status: He is alert.   Psychiatric:         Mood and Affect: Mood normal.         Results Reviewed       Procedure Component Value Units Date/Time    UA w Reflex to Microscopic w Reflex to Culture [[id number]]  (Normal) Collected: 04/18/25 1307    Lab Status: Final result Specimen: Urine, Clean Catch Updated: 04/18/25 1320     Color, UA Yellow     Clarity, UA Clear     Specific Gravity, UA 1.020     pH, UA 6.0     Leukocytes, UA Negative     Nitrite, UA Negative     Protein, UA Negative mg/dl      Glucose, UA Negative mg/dl      Ketones, UA Negative mg/dl      Urobilinogen, UA 0.2 E.U./dl      Bilirubin, UA Negative     Occult Blood, UA Negative    B-Type Natriuretic Peptide(BNP) [[id number]]  (Normal) Collected: 04/18/25 1230    Lab Status: Final result Specimen: Blood from Arm, Left Updated: 04/18/25 1311     BNP 75 pg/mL     Comprehensive metabolic panel [[id number]]  (Abnormal) Collected: 04/18/25 1230    Lab Status: Final result Specimen: Blood from Arm, Left Updated: 04/18/25 1257     Sodium 139 mmol/L      Potassium 3.9 mmol/L      Chloride 111 mmol/L      CO2 24 mmol/L      ANION GAP 4 mmol/L      BUN 16 mg/dL      Creatinine 0.65 mg/dL      Glucose 82 mg/dL      Calcium 8.3 mg/dL      AST 20 U/L      ALT 23 U/L      Alkaline Phosphatase 66 U/L      Total Protein 5.7 g/dL      Albumin 3.7 g/dL      Total Bilirubin 0.55 mg/dL      eGFR 101 ml/min/1.73sq m     Narrative:      National Kid[employer] Disease Foundation guidelines for Chronic Kidney Disease (CKD):     Stage 1 with normal or high GFR (GFR > 90  mL/min/1.73 square meters)    Stage 2 Mild CKD (GFR = 60-89 mL/min/1.73 square meters)    Stage 3A Moderate CKD (GFR = 45-59 mL/min/1.73 square meters)    Stage 3B Moderate CKD (GFR = 30-44 mL/min/1.73 square meters)    Stage 4 Severe CKD (GFR = 15-29 mL/min/1.73 square meters)    Stage 5 End Stage CKD (GFR <15 mL/min/1.73 square meters)  Note: GFR calculation is accurate only with a steady state creatinine    CBC and differential [[id number]]  (Abnormal) Collected: 04/18/25 1230    Lab Status: Final result Specimen: Blood from Arm, Left Updated: 04/18/25 1237     WBC 9.87 Thousand/uL      RBC 3.17 Million/uL      Hemoglobin 9.7 g/dL      Hematocrit 30.9 %      MCV 98 fL      MCH 30.6 pg      MCHC 31.4 g/dL      RDW 24.9 %      MPV 9.4 fL      Platelets 160 Thousands/uL      nRBC 0 /100 WBCs      Segmented % 76 %      Immature Grans % 1 %      Lymphocytes % 17 %      Monocytes % 3 %      Eosinophils Relative 3 %      Basophils Relative 0 %      Absolute Neutrophils 7.55 Thousands/µL      Absolute Immature Grans 0.05 Thousand/uL      Absolute Lymphocytes 1.63 Thousands/µL      Absolute Monocytes 0.32 Thousand/µL      Eosinophils Absolute 0.31 Thousand/µL      Basophils Absolute 0.01 Thousands/µL             No orders to display       Procedures    ED Medication and Procedure Management   Prior to Admission Medications   Prescriptions Last Dose Informant Patient Reported? Taking?   albuterol (ProAir HFA) 90 mcg/act inhaler  Self No No   Sig: Inhale 2 puffs every 6 (six) hours as needed for wheezing   aspirin 81 mg chewable tablet  Self Yes No   Sig: Chew 81 mg daily   atorvastatin (LIPITOR) 20 mg tablet  Self No No   Sig: Take 1 tablet (20 mg total) by mouth daily   capecitabine (XELODA) 150 MG tablet  Self No No   Sig: Take 2 tablets (300 mg total) by mouth 2 (two) times a day 21 days on, followed by 7 days off   capecitabine (XELODA) 500 MG tablet  Self No No   Sig: Take 2 tablets (1,000 mg total) by mouth 2 (two)  times a day 21 days on, followed by 7 days off   dicyclomine (BENTYL) 10 mg capsule  Self No No   Sig: Take 1 capsule (10 mg total) by mouth 3 (three) times a day before meals   lisinopril (ZESTRIL) 20 mg tablet   No No   Sig: Take 1 tablet by mouth once daily   menthol-cetylpyridinium (CEPACOL) 3 MG lozenge  Self No No   Sig: Take 1 lozenge (3 mg total) by mouth as needed for sore throat   Patient not taking: Reported on 4/2/2025   ondansetron (ZOFRAN) 4 mg tablet   No No   Sig: TAKE 1 TABLET BY MOUTH EVERY 8 HOURS AS NEEDED FOR NAUSEA FOR VOMITING   urea (CARMOL) 20 % cream   No No   Sig: Apply topically as needed for dry skin      Facility-Administered Medications: None     Discharge Medication List as of 4/18/2025  4:09 PM        START taking these medications    Details   betamethasone, augmented, (DIPROLENE) 0.05 % ointment Apply topically 2 (two) times a day for 7 days, Starting Fri 4/18/2025, Until Fri 4/25/2025, Normal      oxyCODONE (Roxicodone) 5 immediate release tablet Take 1 tablet (5 mg total) by mouth every 6 (six) hours as needed for moderate pain Max Daily Amount: 20 mg, Starting Fri 4/18/2025, Normal      urea (CARMOL) 20 % cream Apply topically as needed for dry skin, Starting Fri 4/18/2025, Normal           CONTINUE these medications which have NOT CHANGED    Details   albuterol (ProAir HFA) 90 mcg/act inhaler Inhale 2 puffs every 6 (six) hours as needed for wheezing, Starting Mon 1/6/2025, Normal      aspirin 81 mg chewable tablet Chew 81 mg daily, Historical Med      atorvastatin (LIPITOR) 20 mg tablet Take 1 tablet (20 mg total) by mouth daily, Starting Thu 7/25/2024, Normal      !! capecitabine (XELODA) 150 MG tablet Take 2 tablets (300 mg total) by mouth 2 (two) times a day 21 days on, followed by 7 days off, Starting Mon 12/2/2024, Normal      !! capecitabine (XELODA) 500 MG tablet Take 2 tablets (1,000 mg total) by mouth 2 (two) times a day 21 days on, followed by 7 days off, Starting Mon  12/2/2024, Normal      dicyclomine (BENTYL) 10 mg capsule Take 1 capsule (10 mg total) by mouth 3 (three) times a day before meals, Starting Mon 11/11/2024, Until Fri 1/17/2025, Normal      lisinopril (ZESTRIL) 20 mg tablet Take 1 tablet by mouth once daily, Starting Mon 4/14/2025, Normal      menthol-cetylpyridinium (CEPACOL) 3 MG lozenge Take 1 lozenge (3 mg total) by mouth as needed for sore throat, Starting Mon 1/6/2025, Normal      ondansetron (ZOFRAN) 4 mg tablet TAKE 1 TABLET BY MOUTH EVERY 8 HOURS AS NEEDED FOR NAUSEA FOR VOMITING, Normal       !! - Potential duplicate medications found. Please discuss with provider.        No discharge procedures on file.  ED SEPSIS DOCUMENTATION   Time reflects when diagnosis was documented in both MDM as applicable and the Disposition within this note       Time User Action Codes Description Comment    4/18/2025  3:12 PM Genesis Hughes Add [T88.7XXA] Side effect of medication     4/18/2025  3:12 PM Genesis Hughes Add [L27.1] Hand foot syndrome     4/18/2025  3:12 PM Genesis Hughes Modify [T88.7XXA] Side effect of medication     4/18/2025  3:12 PM Genesis Hughes Modify [L27.1] Hand foot syndrome                  Genesis Hughes PA-C  04/18/25 0088

## 2025-04-18 NOTE — DISCHARGE INSTRUCTIONS
Please use topical moisturizers/emollients daily on hands and feet, please apply topical steroids to affected areas as prescribed. Please take tylenol/oxycodone as needed for pain. Please follow up with oncologist as soon as possible for dose adjustment of medication. Please return to the ER if symptoms worsen or change.

## 2025-04-29 ENCOUNTER — HOSPITAL ENCOUNTER (OUTPATIENT)
Dept: INFUSION CENTER | Facility: HOSPITAL | Age: 65
Discharge: HOME | End: 2025-04-29
Attending: INTERNAL MEDICINE
Payer: COMMERCIAL

## 2025-04-29 DIAGNOSIS — C25.9 PANCREATIC ADENOCARCINOMA (HCC): ICD-10-CM

## 2025-04-29 DIAGNOSIS — D70.1 CHEMOTHERAPY-INDUCED NEUTROPENIA (HCC): ICD-10-CM

## 2025-04-29 DIAGNOSIS — Z45.2 ENCOUNTER FOR CENTRAL LINE CARE: Primary | ICD-10-CM

## 2025-04-29 DIAGNOSIS — T45.1X5A CHEMOTHERAPY-INDUCED NEUTROPENIA (HCC): ICD-10-CM

## 2025-04-29 LAB
ALBUMIN SERPL BCG-MCNC: 3.7 G/DL (ref 3.5–5)
ALP SERPL-CCNC: 90 U/L (ref 34–104)
ALT SERPL W P-5'-P-CCNC: 17 U/L (ref 7–52)
ANION GAP SERPL CALCULATED.3IONS-SCNC: 4 MMOL/L (ref 4–13)
ANISOCYTOSIS BLD QL SMEAR: PRESENT
AST SERPL W P-5'-P-CCNC: 19 U/L (ref 13–39)
BASOPHILS # BLD MANUAL: 0 THOUSAND/UL (ref 0–0.1)
BASOPHILS NFR MAR MANUAL: 0 % (ref 0–1)
BILIRUB SERPL-MCNC: 0.43 MG/DL (ref 0.2–1)
BUN SERPL-MCNC: 13 MG/DL (ref 5–25)
CALCIUM SERPL-MCNC: 8.4 MG/DL (ref 8.4–10.2)
CHLORIDE SERPL-SCNC: 109 MMOL/L (ref 96–108)
CO2 SERPL-SCNC: 26 MMOL/L (ref 21–32)
CREAT SERPL-MCNC: 0.69 MG/DL (ref 0.6–1.3)
EOSINOPHIL # BLD MANUAL: 0.58 THOUSAND/UL (ref 0–0.4)
EOSINOPHIL NFR BLD MANUAL: 7 % (ref 0–6)
ERYTHROCYTE [DISTWIDTH] IN BLOOD BY AUTOMATED COUNT: 23.9 % (ref 11.6–15.1)
GFR SERPL CREATININE-BSD FRML MDRD: 98 ML/MIN/1.73SQ M
GLUCOSE SERPL-MCNC: 64 MG/DL (ref 65–140)
HCT VFR BLD AUTO: 31.5 % (ref 36.5–49.3)
HGB BLD-MCNC: 9.8 G/DL (ref 12–17)
LG PLATELETS BLD QL SMEAR: PRESENT
LYMPHOCYTES # BLD AUTO: 1.58 THOUSAND/UL (ref 0.6–4.47)
LYMPHOCYTES # BLD AUTO: 19 % (ref 14–44)
MCH RBC QN AUTO: 30.7 PG (ref 26.8–34.3)
MCHC RBC AUTO-ENTMCNC: 31.1 G/DL (ref 31.4–37.4)
MCV RBC AUTO: 99 FL (ref 82–98)
MONOCYTES # BLD AUTO: 1.41 THOUSAND/UL (ref 0–1.22)
MONOCYTES NFR BLD: 17 % (ref 4–12)
NEUTROPHILS # BLD MANUAL: 4.73 THOUSAND/UL (ref 1.85–7.62)
NEUTS BAND NFR BLD MANUAL: 2 % (ref 0–8)
NEUTS SEG NFR BLD AUTO: 55 % (ref 43–75)
OVALOCYTES BLD QL SMEAR: PRESENT
PLATELET # BLD AUTO: 518 THOUSANDS/UL (ref 149–390)
PLATELET BLD QL SMEAR: ABNORMAL
PMV BLD AUTO: 10.2 FL (ref 8.9–12.7)
POIKILOCYTOSIS BLD QL SMEAR: PRESENT
POTASSIUM SERPL-SCNC: 4.3 MMOL/L (ref 3.5–5.3)
PROT SERPL-MCNC: 5.8 G/DL (ref 6.4–8.4)
RBC # BLD AUTO: 3.19 MILLION/UL (ref 3.88–5.62)
RBC MORPH BLD: PRESENT
SODIUM SERPL-SCNC: 139 MMOL/L (ref 135–147)
WBC # BLD AUTO: 8.29 THOUSAND/UL (ref 4.31–10.16)

## 2025-04-29 PROCEDURE — 80053 COMPREHEN METABOLIC PANEL: CPT | Performed by: INTERNAL MEDICINE

## 2025-04-29 PROCEDURE — 85027 COMPLETE CBC AUTOMATED: CPT | Performed by: INTERNAL MEDICINE

## 2025-04-29 PROCEDURE — 85007 BL SMEAR W/DIFF WBC COUNT: CPT | Performed by: INTERNAL MEDICINE

## 2025-04-29 NOTE — PROGRESS NOTES
Isaac Kramer  tolerated lab draw well with no complications.      Isaac Kramer is aware of future appt on 4/30 at 10:30AM.     AVS printed and given to Isaac Kramer: No (Declined by Isaac Kramer).

## 2025-04-30 ENCOUNTER — HOSPITAL ENCOUNTER (OUTPATIENT)
Dept: INFUSION CENTER | Facility: HOSPITAL | Age: 65
Discharge: HOME | End: 2025-04-30
Attending: INTERNAL MEDICINE
Payer: COMMERCIAL

## 2025-04-30 VITALS
DIASTOLIC BLOOD PRESSURE: 58 MMHG | HEIGHT: 64 IN | WEIGHT: 141.09 LBS | HEART RATE: 69 BPM | OXYGEN SATURATION: 97 % | SYSTOLIC BLOOD PRESSURE: 118 MMHG | RESPIRATION RATE: 16 BRPM | BODY MASS INDEX: 24.09 KG/M2 | TEMPERATURE: 97.4 F

## 2025-04-30 DIAGNOSIS — C25.9 PANCREATIC ADENOCARCINOMA (HCC): Primary | ICD-10-CM

## 2025-04-30 DIAGNOSIS — T45.1X5A CHEMOTHERAPY-INDUCED NEUTROPENIA (HCC): ICD-10-CM

## 2025-04-30 DIAGNOSIS — D70.1 CHEMOTHERAPY-INDUCED NEUTROPENIA (HCC): ICD-10-CM

## 2025-04-30 PROCEDURE — 96367 TX/PROPH/DG ADDL SEQ IV INF: CPT

## 2025-04-30 PROCEDURE — 96413 CHEMO IV INFUSION 1 HR: CPT

## 2025-04-30 RX ORDER — SODIUM CHLORIDE 9 MG/ML
20 INJECTION, SOLUTION INTRAVENOUS ONCE
Status: COMPLETED | OUTPATIENT
Start: 2025-04-30 | End: 2025-04-30

## 2025-04-30 RX ADMIN — GEMCITABINE 1600 MG: 38 INJECTION, SOLUTION INTRAVENOUS at 11:09

## 2025-04-30 RX ADMIN — DEXAMETHASONE SODIUM PHOSPHATE 10 MG: 10 INJECTION, SOLUTION INTRAMUSCULAR; INTRAVENOUS at 10:38

## 2025-04-30 RX ADMIN — SODIUM CHLORIDE 20 ML/HR: 0.9 INJECTION, SOLUTION INTRAVENOUS at 10:30

## 2025-04-30 NOTE — PROGRESS NOTES
Isaac Kramer  tolerated gemzar treatment well with no complications.      Isaac Kramer is aware of future appt on 5/6/25    AVS printed and given to Isaac Kramer:     No (Declined by Isaac Kramer)

## 2025-05-06 ENCOUNTER — HOSPITAL ENCOUNTER (OUTPATIENT)
Dept: INFUSION CENTER | Facility: HOSPITAL | Age: 65
Discharge: HOME | End: 2025-05-06
Payer: COMMERCIAL

## 2025-05-06 DIAGNOSIS — C25.9 PANCREATIC ADENOCARCINOMA (HCC): ICD-10-CM

## 2025-05-06 DIAGNOSIS — D70.1 CHEMOTHERAPY-INDUCED NEUTROPENIA (HCC): ICD-10-CM

## 2025-05-06 DIAGNOSIS — T45.1X5A CHEMOTHERAPY-INDUCED NEUTROPENIA (HCC): ICD-10-CM

## 2025-05-06 DIAGNOSIS — Z45.2 ENCOUNTER FOR CENTRAL LINE CARE: Primary | ICD-10-CM

## 2025-05-06 LAB
ALBUMIN SERPL BCG-MCNC: 3.7 G/DL (ref 3.5–5)
ALP SERPL-CCNC: 70 U/L (ref 34–104)
ALT SERPL W P-5'-P-CCNC: 13 U/L (ref 7–52)
ANION GAP SERPL CALCULATED.3IONS-SCNC: 7 MMOL/L (ref 4–13)
AST SERPL W P-5'-P-CCNC: 14 U/L (ref 13–39)
BASOPHILS # BLD AUTO: 0.05 THOUSANDS/ÂΜL (ref 0–0.1)
BASOPHILS NFR BLD AUTO: 1 % (ref 0–1)
BILIRUB SERPL-MCNC: 0.47 MG/DL (ref 0.2–1)
BUN SERPL-MCNC: 13 MG/DL (ref 5–25)
CALCIUM SERPL-MCNC: 8.4 MG/DL (ref 8.4–10.2)
CHLORIDE SERPL-SCNC: 110 MMOL/L (ref 96–108)
CO2 SERPL-SCNC: 23 MMOL/L (ref 21–32)
CREAT SERPL-MCNC: 0.72 MG/DL (ref 0.6–1.3)
EOSINOPHIL # BLD AUTO: 0.15 THOUSAND/ÂΜL (ref 0–0.61)
EOSINOPHIL NFR BLD AUTO: 2 % (ref 0–6)
ERYTHROCYTE [DISTWIDTH] IN BLOOD BY AUTOMATED COUNT: 23.6 % (ref 11.6–15.1)
GFR SERPL CREATININE-BSD FRML MDRD: 97 ML/MIN/1.73SQ M
GLUCOSE SERPL-MCNC: 186 MG/DL (ref 65–140)
HCT VFR BLD AUTO: 31.8 % (ref 36.5–49.3)
HGB BLD-MCNC: 10 G/DL (ref 12–17)
IMM GRANULOCYTES # BLD AUTO: 0.02 THOUSAND/UL (ref 0–0.2)
IMM GRANULOCYTES NFR BLD AUTO: 0 % (ref 0–2)
LYMPHOCYTES # BLD AUTO: 0.94 THOUSANDS/ÂΜL (ref 0.6–4.47)
LYMPHOCYTES NFR BLD AUTO: 12 % (ref 14–44)
MCH RBC QN AUTO: 31 PG (ref 26.8–34.3)
MCHC RBC AUTO-ENTMCNC: 31.4 G/DL (ref 31.4–37.4)
MCV RBC AUTO: 99 FL (ref 82–98)
MONOCYTES # BLD AUTO: 0.3 THOUSAND/ÂΜL (ref 0.17–1.22)
MONOCYTES NFR BLD AUTO: 4 % (ref 4–12)
NEUTROPHILS # BLD AUTO: 6.2 THOUSANDS/ÂΜL (ref 1.85–7.62)
NEUTS SEG NFR BLD AUTO: 81 % (ref 43–75)
NRBC BLD AUTO-RTO: 0 /100 WBCS
PLATELET # BLD AUTO: 660 THOUSANDS/UL (ref 149–390)
PMV BLD AUTO: 9.9 FL (ref 8.9–12.7)
POTASSIUM SERPL-SCNC: 3.8 MMOL/L (ref 3.5–5.3)
PROT SERPL-MCNC: 5.7 G/DL (ref 6.4–8.4)
RBC # BLD AUTO: 3.23 MILLION/UL (ref 3.88–5.62)
SODIUM SERPL-SCNC: 140 MMOL/L (ref 135–147)
WBC # BLD AUTO: 7.66 THOUSAND/UL (ref 4.31–10.16)

## 2025-05-06 PROCEDURE — 80053 COMPREHEN METABOLIC PANEL: CPT

## 2025-05-06 PROCEDURE — 85025 COMPLETE CBC W/AUTO DIFF WBC: CPT | Performed by: INTERNAL MEDICINE

## 2025-05-06 NOTE — PROGRESS NOTES
Isaac Kramer  tolerated lab draw well with no complications.      Isaac Kramer is aware of future appt on 5/7 at 11AM.     AVS printed and given to Isaac Kramer:No (Declined by Isaac Kramer).

## 2025-05-07 ENCOUNTER — HOSPITAL ENCOUNTER (OUTPATIENT)
Dept: INFUSION CENTER | Facility: HOSPITAL | Age: 65
Discharge: HOME | End: 2025-05-07
Attending: INTERNAL MEDICINE
Payer: COMMERCIAL

## 2025-05-07 VITALS
HEART RATE: 72 BPM | TEMPERATURE: 97.8 F | DIASTOLIC BLOOD PRESSURE: 61 MMHG | BODY MASS INDEX: 23.36 KG/M2 | HEIGHT: 65 IN | OXYGEN SATURATION: 100 % | WEIGHT: 140.21 LBS | SYSTOLIC BLOOD PRESSURE: 131 MMHG | RESPIRATION RATE: 16 BRPM

## 2025-05-07 DIAGNOSIS — T45.1X5A CHEMOTHERAPY-INDUCED NEUTROPENIA (HCC): ICD-10-CM

## 2025-05-07 DIAGNOSIS — C25.9 PANCREATIC ADENOCARCINOMA (HCC): Primary | ICD-10-CM

## 2025-05-07 DIAGNOSIS — D70.1 CHEMOTHERAPY-INDUCED NEUTROPENIA (HCC): ICD-10-CM

## 2025-05-07 PROCEDURE — 96413 CHEMO IV INFUSION 1 HR: CPT

## 2025-05-07 PROCEDURE — 96367 TX/PROPH/DG ADDL SEQ IV INF: CPT

## 2025-05-07 RX ORDER — SODIUM CHLORIDE 9 MG/ML
20 INJECTION, SOLUTION INTRAVENOUS ONCE
Status: COMPLETED | OUTPATIENT
Start: 2025-05-07 | End: 2025-05-07

## 2025-05-07 RX ADMIN — GEMCITABINE 1600 MG: 38 INJECTION, SOLUTION INTRAVENOUS at 12:09

## 2025-05-07 RX ADMIN — SODIUM CHLORIDE 20 ML/HR: 0.9 INJECTION, SOLUTION INTRAVENOUS at 11:30

## 2025-05-07 RX ADMIN — DEXAMETHASONE SODIUM PHOSPHATE 10 MG: 10 INJECTION, SOLUTION INTRAMUSCULAR; INTRAVENOUS at 11:30

## 2025-05-07 NOTE — PROGRESS NOTES
Isaac Kramer  tolerated gemzar treatment well with no complications.      Isaac Kramer is aware of future appt on next Tuesday for labs      AVS printed and given to Isaac Kramer: No (Declined by Isaac Kramer)

## 2025-05-09 ENCOUNTER — RA CDI HCC (OUTPATIENT)
Dept: OTHER | Facility: HOSPITAL | Age: 65
End: 2025-05-09

## 2025-05-13 ENCOUNTER — HOSPITAL ENCOUNTER (OUTPATIENT)
Dept: INFUSION CENTER | Facility: HOSPITAL | Age: 65
Discharge: HOME | End: 2025-05-13
Attending: INTERNAL MEDICINE
Payer: COMMERCIAL

## 2025-05-13 VITALS — TEMPERATURE: 97.8 F

## 2025-05-13 DIAGNOSIS — C25.9 PANCREATIC ADENOCARCINOMA (HCC): ICD-10-CM

## 2025-05-13 DIAGNOSIS — Z45.2 ENCOUNTER FOR CENTRAL LINE CARE: Primary | ICD-10-CM

## 2025-05-13 LAB
ALBUMIN SERPL BCG-MCNC: 3.7 G/DL (ref 3.5–5)
ALP SERPL-CCNC: 82 U/L (ref 34–104)
ALT SERPL W P-5'-P-CCNC: 12 U/L (ref 7–52)
ANION GAP SERPL CALCULATED.3IONS-SCNC: 6 MMOL/L (ref 4–13)
AST SERPL W P-5'-P-CCNC: 13 U/L (ref 13–39)
BASOPHILS # BLD AUTO: 0.06 THOUSANDS/ÂΜL (ref 0–0.1)
BASOPHILS NFR BLD AUTO: 1 % (ref 0–1)
BILIRUB SERPL-MCNC: 0.49 MG/DL (ref 0.2–1)
BUN SERPL-MCNC: 13 MG/DL (ref 5–25)
CALCIUM SERPL-MCNC: 8.2 MG/DL (ref 8.4–10.2)
CHLORIDE SERPL-SCNC: 108 MMOL/L (ref 96–108)
CO2 SERPL-SCNC: 25 MMOL/L (ref 21–32)
CREAT SERPL-MCNC: 0.68 MG/DL (ref 0.6–1.3)
EOSINOPHIL # BLD AUTO: 0.07 THOUSAND/ÂΜL (ref 0–0.61)
EOSINOPHIL NFR BLD AUTO: 1 % (ref 0–6)
ERYTHROCYTE [DISTWIDTH] IN BLOOD BY AUTOMATED COUNT: 23.7 % (ref 11.6–15.1)
GFR SERPL CREATININE-BSD FRML MDRD: 99 ML/MIN/1.73SQ M
GLUCOSE SERPL-MCNC: 101 MG/DL (ref 65–140)
HCT VFR BLD AUTO: 30 % (ref 36.5–49.3)
HGB BLD-MCNC: 9.5 G/DL (ref 12–17)
IMM GRANULOCYTES # BLD AUTO: 0.02 THOUSAND/UL (ref 0–0.2)
IMM GRANULOCYTES NFR BLD AUTO: 0 % (ref 0–2)
LYMPHOCYTES # BLD AUTO: 1.07 THOUSANDS/ÂΜL (ref 0.6–4.47)
LYMPHOCYTES NFR BLD AUTO: 13 % (ref 14–44)
MAGNESIUM SERPL-MCNC: 2 MG/DL (ref 1.9–2.7)
MCH RBC QN AUTO: 31.3 PG (ref 26.8–34.3)
MCHC RBC AUTO-ENTMCNC: 31.7 G/DL (ref 31.4–37.4)
MCV RBC AUTO: 99 FL (ref 82–98)
MONOCYTES # BLD AUTO: 0.38 THOUSAND/ÂΜL (ref 0.17–1.22)
MONOCYTES NFR BLD AUTO: 5 % (ref 4–12)
NEUTROPHILS # BLD AUTO: 6.4 THOUSANDS/ÂΜL (ref 1.85–7.62)
NEUTS SEG NFR BLD AUTO: 80 % (ref 43–75)
NRBC BLD AUTO-RTO: 0 /100 WBCS
PLATELET # BLD AUTO: 210 THOUSANDS/UL (ref 149–390)
PMV BLD AUTO: 9.5 FL (ref 8.9–12.7)
POTASSIUM SERPL-SCNC: 3.5 MMOL/L (ref 3.5–5.3)
PROT SERPL-MCNC: 5.6 G/DL (ref 6.4–8.4)
RBC # BLD AUTO: 3.04 MILLION/UL (ref 3.88–5.62)
SODIUM SERPL-SCNC: 139 MMOL/L (ref 135–147)
WBC # BLD AUTO: 8 THOUSAND/UL (ref 4.31–10.16)

## 2025-05-13 PROCEDURE — 85025 COMPLETE CBC W/AUTO DIFF WBC: CPT

## 2025-05-13 PROCEDURE — 80053 COMPREHEN METABOLIC PANEL: CPT

## 2025-05-13 PROCEDURE — 83735 ASSAY OF MAGNESIUM: CPT

## 2025-05-13 NOTE — PROGRESS NOTES
Central labs done. Port flushed per protocol. AVS declined. He is aware of his appt 5-14-25 at 1100.

## 2025-05-14 ENCOUNTER — HOSPITAL ENCOUNTER (OUTPATIENT)
Dept: INFUSION CENTER | Facility: HOSPITAL | Age: 65
Discharge: HOME | End: 2025-05-14
Attending: INTERNAL MEDICINE
Payer: COMMERCIAL

## 2025-05-14 VITALS
BODY MASS INDEX: 23.14 KG/M2 | WEIGHT: 138.89 LBS | RESPIRATION RATE: 18 BRPM | TEMPERATURE: 96.9 F | SYSTOLIC BLOOD PRESSURE: 150 MMHG | HEIGHT: 65 IN | HEART RATE: 75 BPM | OXYGEN SATURATION: 98 % | DIASTOLIC BLOOD PRESSURE: 75 MMHG

## 2025-05-14 DIAGNOSIS — T45.1X5A CHEMOTHERAPY-INDUCED NEUTROPENIA (HCC): ICD-10-CM

## 2025-05-14 DIAGNOSIS — C25.9 PANCREATIC ADENOCARCINOMA (HCC): Primary | ICD-10-CM

## 2025-05-14 DIAGNOSIS — D70.1 CHEMOTHERAPY-INDUCED NEUTROPENIA (HCC): ICD-10-CM

## 2025-05-14 RX ORDER — SODIUM CHLORIDE 9 MG/ML
20 INJECTION, SOLUTION INTRAVENOUS ONCE
Status: COMPLETED | OUTPATIENT
Start: 2025-05-14 | End: 2025-05-14

## 2025-05-14 RX ADMIN — SODIUM CHLORIDE 20 ML/HR: 0.9 INJECTION, SOLUTION INTRAVENOUS at 10:59

## 2025-05-14 RX ADMIN — DEXAMETHASONE SODIUM PHOSPHATE 10 MG: 10 INJECTION, SOLUTION INTRAMUSCULAR; INTRAVENOUS at 11:00

## 2025-05-14 RX ADMIN — GEMCITABINE 1600 MG: 38 INJECTION, SOLUTION INTRAVENOUS at 11:57

## 2025-05-14 NOTE — PROGRESS NOTES
Pt here for gemzar infusion. Pt offered no acute complaints today. Tolerated infusion well no adverse reactions noted.   This is cycle 6 per Dr Colby note complete 6 cycles worth. Pt scheduled for CT scan 5/20 and [facility] appt 5/27. AVS print out given of appts.

## 2025-05-16 ENCOUNTER — TELEPHONE (OUTPATIENT)
Age: 65
End: 2025-05-16

## 2025-05-16 DIAGNOSIS — L27.1 HAND FOOT SYNDROME: ICD-10-CM

## 2025-05-16 RX ORDER — OXYCODONE HYDROCHLORIDE 5 MG/1
5 TABLET ORAL EVERY 6 HOURS PRN
Qty: 20 TABLET | Refills: 0 | Status: ON HOLD | OUTPATIENT
Start: 2025-05-16 | End: 2025-05-19

## 2025-05-16 NOTE — TELEPHONE ENCOUNTER
Patient calling back asking to speak with Dr. Colby about his symptoms he called in and reported earlier today and has not heard back yet.  I warm transferred to care team and transferred care.

## 2025-05-16 NOTE — TELEPHONE ENCOUNTER
Received a phone call from patient.  Patient stated that the palms of his hands and soles of feet are reddened, peeling, and painful.  Patient was seen in the ED on 4/18 for this and had been using Urea cream.  Patient stated that the cream did help slightly, but now he is experiencing this again.  Please advise and call patient with any further recommendations.

## 2025-05-17 DIAGNOSIS — L27.1 HAND FOOT SYNDROME: ICD-10-CM

## 2025-05-18 ENCOUNTER — APPOINTMENT (EMERGENCY)
Dept: CT IMAGING | Facility: HOSPITAL | Age: 65
End: 2025-05-18
Payer: COMMERCIAL

## 2025-05-18 ENCOUNTER — APPOINTMENT (EMERGENCY)
Dept: RADIOLOGY | Facility: HOSPITAL | Age: 65
End: 2025-05-18
Payer: COMMERCIAL

## 2025-05-18 ENCOUNTER — HOSPITAL ENCOUNTER (EMERGENCY)
Facility: HOSPITAL | Age: 65
Discharge: OTHER INSTITUTION | End: 2025-05-18
Attending: EMERGENCY MEDICINE | Admitting: EMERGENCY MEDICINE
Payer: COMMERCIAL

## 2025-05-18 ENCOUNTER — HOSPITAL ENCOUNTER (INPATIENT)
Facility: HOSPITAL | Age: 65
LOS: 2 days | Discharge: HOME | DRG: 287 | End: 2025-05-20
Attending: FAMILY MEDICINE | Admitting: FAMILY MEDICINE
Payer: COMMERCIAL

## 2025-05-18 VITALS
DIASTOLIC BLOOD PRESSURE: 59 MMHG | OXYGEN SATURATION: 100 % | TEMPERATURE: 97.8 F | BODY MASS INDEX: 22.52 KG/M2 | WEIGHT: 135.14 LBS | SYSTOLIC BLOOD PRESSURE: 117 MMHG | HEART RATE: 76 BPM | HEIGHT: 65 IN | RESPIRATION RATE: 20 BRPM

## 2025-05-18 DIAGNOSIS — I24.9 ACS (ACUTE CORONARY SYNDROME) (HCC): ICD-10-CM

## 2025-05-18 DIAGNOSIS — I48.91 NEW ONSET A-FIB (HCC): ICD-10-CM

## 2025-05-18 DIAGNOSIS — I48.91 ATRIAL FIBRILLATION (HCC): ICD-10-CM

## 2025-05-18 DIAGNOSIS — R07.9 CHEST PAIN: Primary | ICD-10-CM

## 2025-05-18 DIAGNOSIS — I25.10 CORONARY ARTERY DISEASE INVOLVING NATIVE CORONARY ARTERY OF NATIVE HEART WITHOUT ANGINA PECTORIS: Primary | ICD-10-CM

## 2025-05-18 DIAGNOSIS — R07.89 OTHER CHEST PAIN: ICD-10-CM

## 2025-05-18 DIAGNOSIS — I48.0 PAROXYSMAL ATRIAL FIBRILLATION (HCC): ICD-10-CM

## 2025-05-18 PROBLEM — D63.8 ANEMIA IN OTHER CHRONIC DISEASES CLASSIFIED ELSEWHERE: Status: ACTIVE | Noted: 2025-05-18

## 2025-05-18 LAB
2HR DELTA HS TROPONIN: 50 NG/L
4HR DELTA HS TROPONIN: 1363 NG/L
ANION GAP SERPL CALCULATED.3IONS-SCNC: 9 MMOL/L (ref 4–13)
APTT PPP: 31 SECONDS (ref 23–34)
APTT PPP: 48 SECONDS (ref 23–34)
BASOPHILS # BLD AUTO: 0.02 THOUSANDS/ÂΜL (ref 0–0.1)
BASOPHILS NFR BLD AUTO: 0 % (ref 0–1)
BNP SERPL-MCNC: 88 PG/ML (ref 0–100)
BUN SERPL-MCNC: 19 MG/DL (ref 5–25)
CALCIUM SERPL-MCNC: 8.4 MG/DL (ref 8.4–10.2)
CARDIAC TROPONIN I PNL SERPL HS: 1366 NG/L (ref ?–50)
CARDIAC TROPONIN I PNL SERPL HS: 3 NG/L (ref ?–50)
CARDIAC TROPONIN I PNL SERPL HS: 53 NG/L (ref ?–50)
CHLORIDE SERPL-SCNC: 108 MMOL/L (ref 96–108)
CO2 SERPL-SCNC: 22 MMOL/L (ref 21–32)
CREAT SERPL-MCNC: 0.77 MG/DL (ref 0.6–1.3)
EOSINOPHIL # BLD AUTO: 0.19 THOUSAND/ÂΜL (ref 0–0.61)
EOSINOPHIL NFR BLD AUTO: 2 % (ref 0–6)
ERYTHROCYTE [DISTWIDTH] IN BLOOD BY AUTOMATED COUNT: 24.4 % (ref 11.6–15.1)
GFR SERPL CREATININE-BSD FRML MDRD: 94 ML/MIN/1.73SQ M
GLUCOSE SERPL-MCNC: 119 MG/DL (ref 65–140)
HCT VFR BLD AUTO: 29.8 % (ref 36.5–49.3)
HGB BLD-MCNC: 9.4 G/DL (ref 12–17)
IMM GRANULOCYTES # BLD AUTO: 0.01 THOUSAND/UL (ref 0–0.2)
IMM GRANULOCYTES NFR BLD AUTO: 0 % (ref 0–2)
INR PPP: 1.25 (ref 0.85–1.19)
LYMPHOCYTES # BLD AUTO: 1.52 THOUSANDS/ÂΜL (ref 0.6–4.47)
LYMPHOCYTES NFR BLD AUTO: 18 % (ref 14–44)
MCH RBC QN AUTO: 31 PG (ref 26.8–34.3)
MCHC RBC AUTO-ENTMCNC: 31.5 G/DL (ref 31.4–37.4)
MCV RBC AUTO: 98 FL (ref 82–98)
MONOCYTES # BLD AUTO: 0.23 THOUSAND/ÂΜL (ref 0.17–1.22)
MONOCYTES NFR BLD AUTO: 3 % (ref 4–12)
NEUTROPHILS # BLD AUTO: 6.5 THOUSANDS/ÂΜL (ref 1.85–7.62)
NEUTS SEG NFR BLD AUTO: 77 % (ref 43–75)
NRBC BLD AUTO-RTO: 0 /100 WBCS
PLATELET # BLD AUTO: 203 THOUSANDS/UL (ref 149–390)
PMV BLD AUTO: 9.5 FL (ref 8.9–12.7)
POTASSIUM SERPL-SCNC: 3.5 MMOL/L (ref 3.5–5.3)
PROTHROMBIN TIME: 16.2 SECONDS (ref 12.3–15)
RBC # BLD AUTO: 3.03 MILLION/UL (ref 3.88–5.62)
SODIUM SERPL-SCNC: 139 MMOL/L (ref 135–147)
WBC # BLD AUTO: 8.47 THOUSAND/UL (ref 4.31–10.16)

## 2025-05-18 PROCEDURE — 85610 PROTHROMBIN TIME: CPT | Performed by: PHYSICIAN ASSISTANT

## 2025-05-18 PROCEDURE — 96376 TX/PRO/DX INJ SAME DRUG ADON: CPT

## 2025-05-18 PROCEDURE — 85025 COMPLETE CBC W/AUTO DIFF WBC: CPT | Performed by: PHYSICIAN ASSISTANT

## 2025-05-18 PROCEDURE — 96365 THER/PROPH/DIAG IV INF INIT: CPT

## 2025-05-18 PROCEDURE — 93005 ELECTROCARDIOGRAM TRACING: CPT

## 2025-05-18 PROCEDURE — 85730 THROMBOPLASTIN TIME PARTIAL: CPT | Performed by: PHYSICIAN ASSISTANT

## 2025-05-18 PROCEDURE — 71275 CT ANGIOGRAPHY CHEST: CPT

## 2025-05-18 PROCEDURE — 96366 THER/PROPH/DIAG IV INF ADDON: CPT

## 2025-05-18 PROCEDURE — 83880 ASSAY OF NATRIURETIC PEPTIDE: CPT | Performed by: PHYSICIAN ASSISTANT

## 2025-05-18 PROCEDURE — 74174 CTA ABD&PLVS W/CONTRAST: CPT

## 2025-05-18 PROCEDURE — 80048 BASIC METABOLIC PNL TOTAL CA: CPT | Performed by: PHYSICIAN ASSISTANT

## 2025-05-18 PROCEDURE — 71045 X-RAY EXAM CHEST 1 VIEW: CPT

## 2025-05-18 PROCEDURE — 84484 ASSAY OF TROPONIN QUANT: CPT | Performed by: PHYSICIAN ASSISTANT

## 2025-05-18 PROCEDURE — 96375 TX/PRO/DX INJ NEW DRUG ADDON: CPT

## 2025-05-18 PROCEDURE — 36415 COLL VENOUS BLD VENIPUNCTURE: CPT | Performed by: PHYSICIAN ASSISTANT

## 2025-05-18 PROCEDURE — 99285 EMERGENCY DEPT VISIT HI MDM: CPT

## 2025-05-18 PROCEDURE — 96374 THER/PROPH/DIAG INJ IV PUSH: CPT

## 2025-05-18 PROCEDURE — 96361 HYDRATE IV INFUSION ADD-ON: CPT

## 2025-05-18 RX ORDER — CLOPIDOGREL 300 MG/1
600 TABLET, FILM COATED ORAL ONCE
Status: COMPLETED | OUTPATIENT
Start: 2025-05-18 | End: 2025-05-18

## 2025-05-18 RX ORDER — ASPIRIN 325 MG
325 TABLET ORAL ONCE
Status: DISCONTINUED | OUTPATIENT
Start: 2025-05-18 | End: 2025-05-18

## 2025-05-18 RX ORDER — NITROGLYCERIN 0.4 MG/1
0.4 TABLET SUBLINGUAL
Status: DISCONTINUED | OUTPATIENT
Start: 2025-05-18 | End: 2025-05-18

## 2025-05-18 RX ORDER — HEPARIN SODIUM 1000 [USP'U]/ML
1800 INJECTION, SOLUTION INTRAVENOUS; SUBCUTANEOUS EVERY 6 HOURS PRN
Status: DISCONTINUED | OUTPATIENT
Start: 2025-05-18 | End: 2025-05-18 | Stop reason: HOSPADM

## 2025-05-18 RX ORDER — METOPROLOL TARTRATE 1 MG/ML
5 INJECTION, SOLUTION INTRAVENOUS ONCE
Status: COMPLETED | OUTPATIENT
Start: 2025-05-18 | End: 2025-05-18

## 2025-05-18 RX ORDER — ASPIRIN 81 MG/1
324 TABLET, CHEWABLE ORAL ONCE
Status: DISCONTINUED | OUTPATIENT
Start: 2025-05-18 | End: 2025-05-18

## 2025-05-18 RX ORDER — METOPROLOL TARTRATE 1 MG/ML
5 INJECTION, SOLUTION INTRAVENOUS ONCE
Status: DISCONTINUED | OUTPATIENT
Start: 2025-05-18 | End: 2025-05-18

## 2025-05-18 RX ORDER — HEPARIN SODIUM 10000 [USP'U]/100ML
3-20 INJECTION, SOLUTION INTRAVENOUS
Status: DISCONTINUED | OUTPATIENT
Start: 2025-05-18 | End: 2025-05-18 | Stop reason: HOSPADM

## 2025-05-18 RX ORDER — MORPHINE SULFATE 4 MG/ML
4 INJECTION, SOLUTION INTRAMUSCULAR; INTRAVENOUS ONCE
Status: DISCONTINUED | OUTPATIENT
Start: 2025-05-18 | End: 2025-05-18

## 2025-05-18 RX ORDER — SODIUM CHLORIDE 9 MG/ML
3 INJECTION INTRAVENOUS
Status: DISCONTINUED | OUTPATIENT
Start: 2025-05-18 | End: 2025-05-18 | Stop reason: HOSPADM

## 2025-05-18 RX ORDER — DIGOXIN 0.25 MG/ML
250 INJECTION INTRAMUSCULAR; INTRAVENOUS ONCE
Status: COMPLETED | OUTPATIENT
Start: 2025-05-18 | End: 2025-05-18

## 2025-05-18 RX ORDER — HEPARIN SODIUM 1000 [USP'U]/ML
3600 INJECTION, SOLUTION INTRAVENOUS; SUBCUTANEOUS ONCE
Status: COMPLETED | OUTPATIENT
Start: 2025-05-18 | End: 2025-05-18

## 2025-05-18 RX ORDER — HEPARIN SODIUM 1000 [USP'U]/ML
3600 INJECTION, SOLUTION INTRAVENOUS; SUBCUTANEOUS EVERY 6 HOURS PRN
Status: DISCONTINUED | OUTPATIENT
Start: 2025-05-18 | End: 2025-05-18 | Stop reason: HOSPADM

## 2025-05-18 RX ADMIN — HEPARIN SODIUM 1800 UNITS: 1000 INJECTION, SOLUTION INTRAVENOUS; SUBCUTANEOUS at 22:18

## 2025-05-18 RX ADMIN — HEPARIN SODIUM 3600 UNITS: 1000 INJECTION, SOLUTION INTRAVENOUS; SUBCUTANEOUS at 15:00

## 2025-05-18 RX ADMIN — MORPHINE SULFATE 2 MG: 2 INJECTION, SOLUTION INTRAMUSCULAR; INTRAVENOUS at 13:07

## 2025-05-18 RX ADMIN — METOROPROLOL TARTRATE 5 MG: 5 INJECTION, SOLUTION INTRAVENOUS at 14:32

## 2025-05-18 RX ADMIN — SODIUM CHLORIDE 500 ML: 0.9 INJECTION, SOLUTION INTRAVENOUS at 16:12

## 2025-05-18 RX ADMIN — SODIUM CHLORIDE 1000 ML: 0.9 INJECTION, SOLUTION INTRAVENOUS at 12:58

## 2025-05-18 RX ADMIN — HEPARIN SODIUM 12 UNITS/KG/HR: 10000 INJECTION, SOLUTION INTRAVENOUS at 15:05

## 2025-05-18 RX ADMIN — CLOPIDOGREL BISULFATE 600 MG: 300 TABLET, FILM COATED ORAL at 14:36

## 2025-05-18 RX ADMIN — SODIUM CHLORIDE 500 ML: 0.9 INJECTION, SOLUTION INTRAVENOUS at 14:29

## 2025-05-18 RX ADMIN — DIGOXIN 250 MCG: 250 INJECTION, SOLUTION INTRAMUSCULAR; INTRAVENOUS; PARENTERAL at 16:16

## 2025-05-18 RX ADMIN — AMIODARONE HYDROCHLORIDE 150 MG: 50 INJECTION, SOLUTION INTRAVENOUS at 16:50

## 2025-05-18 NOTE — H&P
"    H&P - Family [facility] [place]    Isaac Kramer 1960, 64 y.o. male.  MRN: [id number]    Unit/Bed#: [facility] 421-01 Encounter: [id number]  Primary Care Provider: Pablo Perez DO      Admission Date: 5/18/2025 2329  Admitting Provider: Manjula Rodriges MD  Code Status:  Level 1 - Full Code  Diet: Diet NPO; Sips with meds  Consult: IP CONSULT TO CARDIOLOGY      ASSESSMENT & PLAN:  Discussed with [facility] team and finalization is pending attending physician attestation.    * Other chest pain  Assessment & Plan  - [facility] presented [facility] for left sided sharp crushing chest pain that started 30 minutes before presentation to the ED  - In ED EKG that showed inferior lead ST depressions along with elevated troponin 1K.  Subsequently patient loaded with aspirin and Plavix and started on heparin drip  - Patient also noted to be tachycardic with a regular rhythm concerning for A-fib/a flutter with RVR.  Patient treated with metoprolol but unfortunately became hypotensive.  Patient currently on amiodarone and digoxin for rate control  - Cardiology consulted who recommended patient be transferred to [facility] for evaluation and possible cardiac cath    Plan  - Cardiology consult   - keep NPO at midnight    - Digoxin 250 every 6 hours (loading dosage) x 4 dosages and than 250 digoxin daily   - Aspirin, Plavix and atorvastatin 40 mg daily  - Heparin GTT  - Telemetry  - Echo     Coronary artery disease involving native coronary artery of native heart without angina pectoris  Assessment & Plan  - Patient with history of CAD last cardiac cath done in 2022 showed \"[facility] 07/28/2022 1st Mrg lesion is 50% stenosed. Prox LAD lesion is 50% stenosed. 1st Diag lesion is 75% stenosed. Ost LAD lesion is 30% stenosed. Prox Cx lesion is 40% stenosed. Mid RCA lesion is 40% stenosed. RPDA lesion is 40% stenosed. 3v CAD Moderate AS\"   - Patient had drug-eluting stent placed previously      Plan  -Heparin " GTT  -Aspirin, Plavix and atorvastatin  -Cardiology consult    Pancreatic adenocarcinoma (HCC)  Assessment & Plan  Patient with known history of pancreatic cancer status post Whipple procedure 2024   Patient follows with Dr. Colby and is currently undergoing care chemotherapy  The patient is currently receiving chemotherapy with most recent treatment last week  Continue outpatient follow-up with oncology  Hold Xeloda as per patient currently on his off week           Benign essential hypertension  Assessment & Plan  - 24H SBP Systolic (24hrs), Av , Min:83 , Max:113     - Home regimen lisinopril 20 mg  - Will hold current lisinopril given soft blood pressure    Anemia in other chronic diseases classified elsewhere  Assessment & Plan    Lab Results   Component Value Date    HGB 9.4 (L) 2025    HCT 29.8 (L) 2025     2025    WBC 8.47 2025     The history of anemia with previous baseline of 11 with most recent baseline between 9 and 10  Monitor CBCs daily  Consider checking iron panel/folate/B12      Other hyperlipidemia  Assessment & Plan  Current medication: Lipitor 20 mg daily    Plan  -Will order lipid panel  -Will increase to atorvastatin 40 mg daily        Disposition: Admit to Inpatient under SD2      VTE Pharm PPX: Heparin  VTE Mech PPX: sequential compression device and/or foot pump applied unless contraindicated otherwise.    Problem List[1]      Advance Directive and Living Will:      Power of :    POLST:    Emergency contact:    Primary Emergency Contact: Jacque Kramer, Home Phone: [phone]   Extended Emergency Contact Information  Primary Emergency Contact: Jacque Kramer  Address: [address] [place] [address] of [facility]  Home Phone: [phone]  Mobile Phone: [phone]  Relation: Spouse      History of Present Illness      CC: Chest pain      HPI: 64 y.o. male with a past medical history of pancreatic  adenocarcinoma status post Whipple, hypertension and GERD who presented to [facility] with severe sharp left sided crushing chest pain.  Chest 30 minutes before arriving to the ED he began to experience the chest pain along with shortness of breath and sweating. In the ED  patient noted to have inferior lateral depression (on EKG) with troponins of 1366.  Given findings concerning for acute coronary syndrome patient started on aspirin and Plavix and placed on heparin drip.  In the ED patient was also noted to have A-fib with RVR so given metoprolol however patient became hypotensive.  Patient then switched to digoxin with amnio load.  Per chart review cardiology was consulted who recommended rate control below 100, maintaining MAPs; and recommended patient be transferred to [facility] for cardiac cath.    On arrival to [place] patient reports that chest pain has completely resolved.  States that he feels much better.  Reached out to cardiology who recommended continuation of aspirin, Plavix, heparin drip and statin.  They also recommending continuing loading patient with digoxin 250 mcg Q6 IV for 3 additional doses.      Review of systems (ROS):  Review of Systems   Constitutional:  Negative for diaphoresis, fatigue and unexpected weight change.   Respiratory:  Negative for chest tightness, shortness of breath and wheezing.    Cardiovascular:  Negative for chest pain and palpitations.   Gastrointestinal:  Negative for abdominal pain and diarrhea.   Musculoskeletal:  Negative for arthralgias and neck stiffness.   Neurological:  Negative for dizziness, weakness and numbness.       A 12-point, complete review of systems was reviewed and negative except as stated in above.    Historical Information   Past Medical History:   Diagnosis Date    Anemia in other chronic diseases classified elsewhere 5/18/2025    Benign essential hypertension 05/08/2014    CAD (coronary artery disease)     s/p NAVA  prox LAD and D1 7/28/2022    Cancer (HCC)     Prostate    Coronary artery disease involving native coronary artery of native heart without angina pectoris 08/09/2022    Enteritis 08/28/2024    GERD (gastroesophageal reflux disease)     Hyperlipidemia     Hypertension     Other chest pain     Palpitations     Prostate cancer (HCC) 04/18/2023    Sleep apnea      Past Surgical History:   Procedure Laterality Date    CARDIAC CATHETERIZATION Left 7/28/2022    Procedure: Cardiac catheterization-left heart catheterization;  Surgeon: Sai Henry MD;  Location: [facility]ATH [facility];  Service: Cardiology    CARDIAC CATHETERIZATION N/A 7/28/2022    Procedure: Cardiac pci;  Surgeon: Sai Henry MD;  Location: [facility] CARDIAC CATH [facility];  Service: Cardiology    HERNIA REPAIR      IR EMBOLIZATION (SPECIFY VESSEL OR SITE)  9/13/2024    IR PORT PLACEMENT  10/28/2024    TX LAPS SURG [id number] RPBIC RAD W/NRV SPARING ROBOT N/A 5/22/2023    Procedure: PROSTATECTOMY RADICAL & PELVIC LYMPH NODE DISSECTION  W/ ROBOT;  Surgeon: Otoniel Harmon MD;  Location: [facility];  Service: Urology    TX PROSTATE NEEDLE BIOPSY ANY APPROACH N/A 2/28/2023    Procedure: TRANSRECTAL MRI FUSION BIOPSY PROSTATE;  Surgeon: Milan Arellano MD;  Location: [facility];  Service: Urology    WHIPPLE PROCEDURE/PANCREATICO-DUODENECTOMY N/A 9/12/2024    Procedure: WHIPPLE PROCEDURE/PANCREATICO-DUODENECTOMY;  Surgeon: Marika Ac MD;  Location: [facility];  Service: Surgical Oncology       Social History   Social History     Substance and Sexual Activity   Alcohol Use Not Currently    Comment: n/a     Social History     Substance and Sexual Activity   Drug Use Never    Comment: n/a     Tobacco Use History[2]  Family History   Problem Relation Age of Onset    No Known Problems Mother     No Known Problems Father        Meds/Allergies   All medications and allergies reviewed  No Known Allergies    Medications Ordered Prior to Encounter[3]    Objective   ED  "Vitals & Management:  ED Triage Vitals   Temperature Pulse Respirations Blood Pressure SpO2   05/18/25 2343 05/18/25 2344 05/18/25 2344 05/18/25 2343 --   98.1 °F (36.7 °C) 73 18 (!) 84/64       Temp Source Heart Rate Source Patient Position - Orthostatic VS BP Location FiO2 (%)   05/18/25 2344 05/18/25 2344 05/18/25 2344 05/18/25 2344 --   Oral Monitor Lying Right arm       Pain Score       05/18/25 2344       No Pain         Medications   aspirin chewable tablet 81 mg (has no administration in time range)   lisinopril (ZESTRIL) tablet 20 mg ( Oral Held Dose 5/22/25 0900)   acetaminophen (TYLENOL) tablet 650 mg (has no administration in time range)   capecitabine (XELODA) tablet 300 mg ( Oral Held Dose 5/22/25 1800)   capecitabine (XELODA) tablet 1,000 mg ( Oral Held Dose 5/22/25 1800)   clopidogrel (PLAVIX) tablet 75 mg (has no administration in time range)   heparin (porcine) 25,000 units in 0.45% NaCl 250 mL infusion (premix) (12 Units/kg/hr × 60 kg (Order-Specific) Intravenous New Bag 5/19/25 0034)   heparin (porcine) injection 3,600 Units (has no administration in time range)   heparin (porcine) injection 1,800 Units (has no administration in time range)   digoxin (LANOXIN) injection 250 mcg (250 mcg Intravenous Given 5/19/25 0628)   atorvastatin (LIPITOR) tablet 80 mg (has no administration in time range)   heparin (porcine) injection 3,600 Units (3,600 Units Intravenous Given 5/19/25 0032)       Current Vitals:   Patient Vitals for the past 24 hrs:   BP Temp Temp src Pulse Resp Height Weight   05/19/25 0341 105/53 98.6 °F (37 °C) Oral 66 16 -- --   05/18/25 2344 96/61 98.1 °F (36.7 °C) Oral 73 18 5' 4\" (1.626 m) 63.2 kg (139 lb 5.3 oz)   05/18/25 2343 (!) 84/64 98.1 °F (36.7 °C) -- -- -- -- --         Intake/Output Summary (Last 24 hours) at 5/19/2025 0639  Last data filed at 5/19/2025 0401  Gross per 24 hour   Intake 24.84 ml   Output --   Net 24.84 ml       Invasive Devices       Central Venous Catheter " Line  Duration             Port A Cath 10/28/24 Left Internal jugular 202 days              Peripheral Intravenous Line  Duration             Peripheral IV 05/18/25 Left Antecubital <1 day    Peripheral IV 05/18/25 Right Antecubital <1 day                    Labs:   I have personally reviewed pertinent reports.    Recent Results (from the past 24 hours)   ECG 12 lead    Collection Time: 05/18/25 12:52 PM   Result Value Ref Range    Ventricular Rate 117 BPM    Atrial Rate 90 BPM    AZ Interval  ms    QRSD Interval 88 ms    QT Interval 344 ms    QTC Interval 479 ms    P Axis  degrees    QRS Axis -12 degrees    T Wave Axis 31 degrees   ECG 12 lead    Collection Time: 05/18/25 12:57 PM   Result Value Ref Range    Ventricular Rate 113 BPM    Atrial Rate 111 BPM    AZ Interval  ms    QRSD Interval 88 ms    QT Interval 344 ms    QTC Interval 471 ms    P Axis  degrees    QRS Axis -10 degrees    T Wave Axis 18 degrees   CBC and differential    Collection Time: 05/18/25 12:58 PM   Result Value Ref Range    WBC 8.47 4.31 - 10.16 Thousand/uL    RBC 3.03 (L) 3.88 - 5.62 Million/uL    Hemoglobin 9.4 (L) 12.0 - 17.0 g/dL    Hematocrit 29.8 (L) 36.5 - 49.3 %    MCV 98 82 - 98 fL    MCH 31.0 26.8 - 34.3 pg    MCHC 31.5 31.4 - 37.4 g/dL    RDW 24.4 (H) 11.6 - 15.1 %    MPV 9.5 8.9 - 12.7 fL    Platelets 203 149 - 390 Thousands/uL    nRBC 0 /100 WBCs    Segmented % 77 (H) 43 - 75 %    Immature Grans % 0 0 - 2 %    Lymphocytes % 18 14 - 44 %    Monocytes % 3 (L) 4 - 12 %    Eosinophils Relative 2 0 - 6 %    Basophils Relative 0 0 - 1 %    Absolute Neutrophils 6.50 1.85 - 7.62 Thousands/µL    Absolute Immature Grans 0.01 0.00 - 0.20 Thousand/uL    Absolute Lymphocytes 1.52 0.60 - 4.47 Thousands/µL    Absolute Monocytes 0.23 0.17 - 1.22 Thousand/µL    Eosinophils Absolute 0.19 0.00 - 0.61 Thousand/µL    Basophils Absolute 0.02 0.00 - 0.10 Thousands/µL   Basic metabolic panel    Collection Time: 05/18/25 12:58 PM   Result Value Ref Range  "   Sodium 139 135 - 147 mmol/L    Potassium 3.5 3.5 - 5.3 mmol/L    Chloride 108 96 - 108 mmol/L    CO2 22 21 - 32 mmol/L    ANION GAP 9 4 - 13 mmol/L    BUN 19 5 - 25 mg/dL    Creatinine 0.77 0.60 - 1.30 mg/dL    Glucose 119 65 - 140 mg/dL    Calcium 8.4 8.4 - 10.2 mg/dL    eGFR 94 ml/min/1.73sq m   HS Troponin 0hr (reflex protocol)    Collection Time: 05/18/25 12:58 PM   Result Value Ref Range    hs TnI 0hr 3 \"Refer to ACS Flowchart\"- see link ng/L   B-Type Natriuretic Peptide(BNP)    Collection Time: 05/18/25 12:58 PM   Result Value Ref Range    BNP 88 0 - 100 pg/mL   ECG 12 lead    Collection Time: 05/18/25 12:59 PM   Result Value Ref Range    Ventricular Rate 107 BPM    Atrial Rate 122 BPM    CO Interval  ms    QRSD Interval 86 ms    QT Interval 354 ms    QTC Interval 472 ms    P Axis  degrees    QRS Axis -19 degrees    T Wave Axis 39 degrees   ECG 12 lead    Collection Time: 05/18/25  1:42 PM   Result Value Ref Range    Ventricular Rate 122 BPM    Atrial Rate 136 BPM    CO Interval  ms    QRSD Interval 92 ms    QT Interval 346 ms    QTC Interval 493 ms    P Axis  degrees    QRS Axis -44 degrees    T Wave Axis 29 degrees   ECG 12 lead    Collection Time: 05/18/25  1:44 PM   Result Value Ref Range    Ventricular Rate 119 BPM    Atrial Rate  BPM    CO Interval  ms    QRSD Interval 66 ms    QT Interval 332 ms    QTC Interval 468 ms    P Axis  degrees    QRS Axis -56 degrees    T Wave Axis -71 degrees   ECG 12 lead    Collection Time: 05/18/25  2:38 PM   Result Value Ref Range    Ventricular Rate 107 BPM    Atrial Rate 131 BPM    CO Interval  ms    QRSD Interval 94 ms    QT Interval 368 ms    QTC Interval 491 ms    P Axis  degrees    QRS Axis -30 degrees    T Wave [place] 33 degrees   HS Troponin I 2hr    Collection Time: 05/18/25  2:46 PM   Result Value Ref Range    hs TnI 2hr 53 (H) \"Refer to ACS Flowchart\"- see link ng/L    Delta 2hr hsTnI 50 (H) <20 ng/L   APTT six (6) hours after Heparin bolus/drip initiation or " "dosing change    Collection Time: 05/18/25  2:46 PM   Result Value Ref Range    PTT 31 23 - 34 seconds   Protime-INR    Collection Time: 05/18/25  2:46 PM   Result Value Ref Range    Protime 16.2 (H) 12.3 - 15.0 seconds    INR 1.25 (H) 0.85 - 1.19   ECG 12 lead    Collection Time: 05/18/25  3:34 PM   Result Value Ref Range    Ventricular Rate 103 BPM    Atrial Rate 88 BPM    OR Interval  ms    QRSD Interval 88 ms    QT Interval 362 ms    QTC Interval 474 ms    P Axis  degrees    QRS Axis -22 degrees    T Wave [place] 48 degrees   HS Troponin I 4hr    Collection Time: 05/18/25  5:18 PM   Result Value Ref Range    hs TnI 4hr 1,366 (H) \"Refer to ACS Flowchart\"- see link ng/L    Delta 4hr hsTnI 1,363 (H) <20 ng/L   ECG 12 lead    Collection Time: 05/18/25  5:19 PM   Result Value Ref Range    Ventricular Rate 100 BPM    Atrial Rate 138 BPM    OR Interval  ms    QRSD Interval 90 ms    QT Interval 356 ms    QTC Interval 459 ms    P Axis  degrees    QRS Axis 2 degrees    T Wave Axis 50 degrees   APTT    Collection Time: 05/18/25  9:10 PM   Result Value Ref Range    PTT 48 (H) 23 - 34 seconds   APTT    Collection Time: 05/19/25 12:29 AM   Result Value Ref Range    PTT 57 (H) 23 - 34 seconds   Protime-INR    Collection Time: 05/19/25 12:29 AM   Result Value Ref Range    Protime 15.9 (H) 12.3 - 15.0 seconds    INR 1.24 (H) 0.85 - 1.19       Imaging:  I have personally reviewed pertinent reports.    X-ray chest 1 view portable  Result Date: 5/18/2025  Impression: No acute cardiopulmonary disease. Workstation performed: [id number]     CTA chest abdomen pelvis w wo contrast  Result Date: 5/18/2025  Impression: No acute aortic/arterial abnormality. Stable postsurgical appearance status post Whipple. Mild transverse colonic wall thickening can be seen with a nonspecific colitis. Otherwise no acute abnormality identified in the chest, abdomen or pelvis. The study was marked in EPIC for immediate notification. Workstation performed: " [id number]       EKG, Pathology, and Other Studies:   I have personally reviewed pertinent reports.      Physical Exam    Physical Exam  Vitals reviewed.   HENT:      Head: Normocephalic.      Mouth/Throat:      Mouth: Mucous membranes are moist.     Cardiovascular:      Rate and Rhythm: Normal rate and regular rhythm.      Heart sounds: Murmur heard.   Pulmonary:      Effort: Pulmonary effort is normal. No respiratory distress.      Breath sounds: No stridor. No wheezing or rales.   Abdominal:      General: There is no distension.      Palpations: Abdomen is soft.      Tenderness: There is no abdominal tenderness. There is no guarding.     Musculoskeletal:      Right lower leg: No edema.      Left lower leg: No edema.     Skin:     General: Skin is warm.     Neurological:      Mental Status: He is alert.     Psychiatric:         Mood and Affect: Mood normal.         Thought Content: Thought content normal.              Counseling / Coordination of Care  Total floor / unit time spent today 30 minutes.  Greater than 50% of total time was spent with the patient and / or family counseling and / or coordination of care.    Paradise Miranda MD  PGY-3, B Family Medicine  05/19/25  6:39 AM          [1]   Patient Active Problem List  Diagnosis    Benign essential hypertension    ED (erectile dysfunction) of non-organic origin    Other hyperlipidemia    Peripheral vascular disease (HCC)    Peripheral neuropathy    Overweight with body mass index (BMI) of 26 to 26.9 in adult    Aortic valve stenosis    Coronary artery disease involving native coronary artery of native heart without angina pectoris    Microscopic hematuria    BPH (benign prostatic hyperplasia)    Pancreatic adenocarcinoma (HCC)    Elevated LFTs    Acute obstructive cholangitis    Enteritis    GERD (gastroesophageal reflux disease)    MOOKIE (acute kidney injury) (HCC)    Ampullary carcinoma (HCC)    Encounter for central line care    Chemotherapy-induced  neutropenia (HCC)    Pancolitis (HCC)    Leukocytosis    Generalized abdominal pain    Severe protein-calorie malnutrition (HCC)    Other chest pain    Anemia in other chronic diseases classified elsewhere    ACS (acute coronary syndrome) (HCC)    Atrial fibrillation (HCC)   [2]   Social History  Tobacco Use   Smoking Status Former    Current packs/day: 0.00    Types: Cigarettes    Quit date: 2022    Years since quittin.3    Passive exposure: Past   Smokeless Tobacco Never   Tobacco Comments    N/a   [3]   Current Facility-Administered Medications on File Prior to Encounter   Medication Dose Route Frequency Provider Last Rate Last Admin    [MAR Hold] alteplase (CATHFLO) injection 2 mg  2 mg Intracatheter Q1MIN PRN Jacob Colby MD        [COMPLETED] amiodarone 150 mg in dextrose 5 % 100 mL IV bolus  150 mg Intravenous Once Ronak Espinosa PA-C   Stopped at 25 1700    [COMPLETED] clopidogrel (PLAVIX) tablet 600 mg  600 mg Oral Once Ronak Espinosa PA-C   600 mg at 25 1436    [COMPLETED] digoxin (LANOXIN) injection 250 mcg  250 mcg Intravenous Once Ronak Espinosa PA-C   250 mcg at 25 1616    [COMPLETED] heparin (porcine) injection 3,600 Units  3,600 Units Intravenous Once Ronak Espinosa PA-C   3,600 Units at 25 1500    [COMPLETED] iohexol (OMNIPAQUE) 350 MG/ML injection (MULTI-DOSE) 100 mL  100 mL Intravenous Once in imaging Ronak Espinosa PA-C   100 mL at 25 1320    [COMPLETED] metoprolol (LOPRESSOR) injection 5 mg  5 mg Intravenous Once Ronak Espinosa PA-C   5 mg at 25 1432    [COMPLETED] morphine injection 2 mg  2 mg Intravenous Once Ronak Espinosa PA-C   2 mg at 25 1307    [COMPLETED] sodium chloride 0.9 % bolus 1,000 mL  1,000 mL Intravenous Once Ronak Espinosa PA-C   Stopped at 25 1504    [COMPLETED] sodium chloride 0.9 % bolus 500 mL  500 mL Intravenous Once Ronak Espinosa PA-C    Stopped at 05/18/25 1621    [COMPLETED] sodium chloride 0.9 % bolus 500 mL  500 mL Intravenous Once Ronak Espinosa PA-C   Stopped at 05/18/25 1656    [DISCONTINUED] aspirin chewable tablet 324 mg  324 mg Oral Once Ronak Espinosa PA-C        [DISCONTINUED] aspirin tablet 325 mg  325 mg Oral Once Ronak Espinosa PA-C        [DISCONTINUED] heparin (ACS LOW)   Intravenous Once Ronak Espinosa PA-C        [DISCONTINUED] heparin (porcine) 25,000 units in 0.45% NaCl 250 mL infusion (premix)  3-20 Units/kg/hr (Order-Specific) Intravenous Titrated Ronak Espinosa PA-C 8.4 mL/hr at 05/18/25 2218 14 Units/kg/hr at 05/18/25 2218    [DISCONTINUED] heparin (porcine) injection 1,800 Units  1,800 Units Intravenous Q6H PRN Ronak Espinosa PA-C   1,800 Units at 05/18/25 2218    [DISCONTINUED] heparin (porcine) injection 3,600 Units  3,600 Units Intravenous Q6H PRN Ronak Espinosa PA-C        [DISCONTINUED] metoprolol (LOPRESSOR) injection 5 mg  5 mg Intravenous Once Ronak Espinosa PA-C        [DISCONTINUED] morphine injection 4 mg  4 mg Intravenous Once Ronak Espinosa PA-C        [DISCONTINUED] nitroglycerin (NITROSTAT) SL tablet 0.4 mg  0.4 mg Sublingual Q5 Min PRN Ronak Espinosa PA-C        [DISCONTINUED] sodium chloride (PF) 0.9 % injection 3 mL  3 mL Intravenous Q1H PRN Ronak Espinosa PA-C         Current Outpatient Medications on File Prior to Encounter   Medication Sig Dispense Refill    aspirin 81 mg chewable tablet Chew 81 mg in the morning.      atorvastatin (LIPITOR) 20 mg tablet Take 1 tablet (20 mg total) by mouth daily 30 tablet 5    capecitabine (XELODA) 150 MG tablet Take 2 tablets (300 mg total) by mouth 2 (two) times a day 21 days on, followed by 7 days off 84 tablet 11    capecitabine (XELODA) 500 MG tablet Take 2 tablets (1,000 mg total) by mouth 2 (two) times a day 21 days on, followed by 7 days off 84 tablet 11    lisinopril  (ZESTRIL) 20 mg tablet Take 1 tablet by mouth once daily 30 tablet 5    [DISCONTINUED] ondansetron (ZOFRAN) 4 mg tablet TAKE 1 TABLET BY MOUTH EVERY 8 HOURS AS NEEDED FOR NAUSEA FOR VOMITING 20 tablet 5    albuterol (ProAir HFA) 90 mcg/act inhaler Inhale 2 puffs every 6 (six) hours as needed for wheezing (Patient not taking: Reported on 5/18/2025) 8.5 g 0    betamethasone, augmented, (DIPROLENE) 0.05 % ointment Apply topically 2 (two) times a day for 7 days 45 g 0    dicyclomine (BENTYL) 10 mg capsule Take 1 capsule (10 mg total) by mouth 3 (three) times a day before meals 90 capsule 0    [DISCONTINUED] menthol-cetylpyridinium (CEPACOL) 3 MG lozenge Take 1 lozenge (3 mg total) by mouth as needed for sore throat (Patient not taking: Reported on 4/2/2025) 18 lozenge 0    [DISCONTINUED] oxyCODONE (Roxicodone) 5 immediate release tablet Take 1 tablet (5 mg total) by mouth every 6 (six) hours as needed for moderate pain Max Daily Amount: 20 mg 20 tablet 0    [DISCONTINUED] urea (CARMOL) 20 % cream Apply topically as needed for dry skin 75 g 0

## 2025-05-18 NOTE — EMTALA/ACUTE CARE TRANSFER
[facility] EMERGENCY DEPARTMENT  [address] [place] [address]  Dept: [phone]      EMTALA TRANSFER CONSENT    NAME Isaac DEJESUS 1960                              MRN [id number]    I have been informed of my rights regarding examination, treatment, and transfer   by Dr. Luis Grajeda Jr., *    Benefits: Specialized equipment and/or services available at the receiving facility (Include comment)________________________ (Plan for cardiac catheterization tomorrow, ACS)    Risks: Potential for delay in receiving treatment, Potential deterioration of medical condition, Loss of IV, Increased discomfort during transfer, Possible worsening of condition or death during transfer      Consent for Transfer:  I acknowledge that my medical condition has been evaluated and explained to me by the emergency department physician or other qualified medical person and/or my attending physician, who has recommended that I be transferred to the service of  Accepting Physician: Dr. Gabriel at Accepting Facility Name, City & State : \[facility]\"". The above potential benefits of such transfer, the potential risks associated with such transfer, and the probable risks of not being transferred have been explained to me, and I fully understand them.  The doctor has explained that, in my case, the benefits of transfer outweigh the risks.  I agree to be transferred.    I authorize the performance of emergency medical procedures and treatments upon me in both transit and upon arrival at the receiving facility.  Additionally, I authorize the release of any and all medical records to the receiving facility and request they be transported with me, if possible.  I understand that the safest mode of transportation during a medical emergency is an ambulance and that the Hospital advocates the use of this mode of transport. Risks of traveling to the receiving facility by  car, including absence of medical control, life sustaining equipment, such as oxygen, and medical personnel has been explained to me and I fully understand them.    (SKYLA CORRECT BOX BELOW)  [  ]  I consent to the stated transfer and to be transported by ambulance/helicopter.  [  ]  I consent to the stated transfer, but refuse transportation by ambulance and accept full responsibility for my transportation by car.  I understand the risks of non-ambulance transfers and I exonerate the Hospital and its staff from any deterioration in my condition that results from this refusal.    X___________________________________________    DATE  25  TIME________  Signature of patient or legally responsible individual signing on patient behalf           RELATIONSHIP TO PATIENT_________________________          Provider Certification    NAME Isaac Kramer                                        [facility] 1960                              MRN [id number]    A medical screening exam was performed on the above named patient.  Based on the examination:    Condition Necessitating Transfer The primary encounter diagnosis was Chest pain. Diagnoses of ACS (acute coronary syndrome) (HCC) and New onset a-fib (HCC) were also pertinent to this visit.    Patient Condition: The patient has been stabilized such that within reasonable medical probability, no material deterioration of the patient condition or the condition of the unborn child(belkis) is likely to result from the transfer    Reason for Transfer: Level of Care needed not available at this facility    Transfer Requirements: Facility B   Space available and qualified personnel available for treatment as acknowledged by    Agreed to accept transfer and to provide appropriate medical treatment as acknowledged by       Dr. Gabriel  Appropriate medical records of the examination and treatment of the patient are provided at the time of transfer   STAFF INITIAL WHEN COMPLETED  _______  Transfer will be performed by qualified personnel from    and appropriate transfer equipment as required, including the use of necessary and appropriate life support measures.    Provider Certification: I have examined the patient and explained the following risks and benefits of being transferred/refusing transfer to the patient/family:  General risk, such as traffic hazards, adverse weather conditions, rough terrain or turbulence, possible failure of equipment (including vehicle or aircraft), or consequences of actions of persons outside the control of the transport personnel, Unanticipated needs of medical equipment and personnel during transport, Risk of worsening condition, The possibility of a transport vehicle being unavailable      Based on these reasonable risks and benefits to the patient and/or the unborn child(belkis), and based upon the information available at the time of the patient’s examination, I certify that the medical benefits reasonably to be expected from the provision of appropriate medical treatments at another medical facility outweigh the increasing risks, if any, to the individual’s medical condition, and in the case of labor to the unborn child, from effecting the transfer.    X____________________________________________ DATE 05/18/25        TIME_______      ORIGINAL - SEND TO MEDICAL RECORDS   COPY - SEND WITH PATIENT DURING TRANSFER

## 2025-05-18 NOTE — ED ATTENDING ATTESTATION
5/18/2025  I, Luis Grajeda Jr, DO, saw and evaluated the patient. I have discussed the patient with the resident/non-physician practitioner and agree with the resident's/non-physician practitioner's findings, Plan of Care, and MDM as documented in the resident's/non-physician practitioner's note, except where noted. All available labs and Radiology studies were reviewed.  I was present for key portions of any procedure(s) performed by the resident/non-physician practitioner and I was immediately available to provide assistance.       At this point I agree with the current assessment done in the Emergency Department.  I have conducted an independent evaluation of this patient a history and physical is as follows:    Patient is a 64-year-old male with history of adenocarcinoma of the pancreas who presents emergency department complaining of left-sided chest pain that radiates to his shoulder which he described as sharp in nature and starting about an hour prior to arrival.  Patient denies any fever or chills but admits to diaphoresis and shortness of breath although the patient is not diaphoretic at the bedside.  The patient looks like he is in moderate distress at this time.  The patient notes no heavy physical activity at this time.  Patient does have a history of coronary artery disease with stenting and states that he has never had an MI in the past.  He sees Dr. Robledo.    On exam, the patient is in moderate distress.  Heart is tachycardic with a rate of 120, this is irregular.  EKG appears to be similar to atrial fibrillation at this time.  Lungs are coarse abdomen is soft and nontender there is a port noted in the chest wall.  Extremities show no clubbing cyanosis or edema.    ED Course         Critical Care Time  Procedures

## 2025-05-19 ENCOUNTER — APPOINTMENT (INPATIENT)
Dept: NON INVASIVE DIAGNOSTICS | Facility: HOSPITAL | Age: 65
DRG: 287 | End: 2025-05-19
Payer: COMMERCIAL

## 2025-05-19 ENCOUNTER — TELEPHONE (OUTPATIENT)
Age: 65
End: 2025-05-19

## 2025-05-19 PROBLEM — I24.9 ACS (ACUTE CORONARY SYNDROME) (HCC): Status: ACTIVE | Noted: 2025-05-19

## 2025-05-19 PROBLEM — I48.91 ATRIAL FIBRILLATION (HCC): Status: ACTIVE | Noted: 2025-05-19

## 2025-05-19 LAB
ALBUMIN SERPL BCG-MCNC: 3 G/DL (ref 3.5–5)
ALP SERPL-CCNC: 70 U/L (ref 34–104)
ALT SERPL W P-5'-P-CCNC: 18 U/L (ref 7–52)
ANION GAP SERPL CALCULATED.3IONS-SCNC: 4 MMOL/L (ref 4–13)
AORTIC ROOT: 3.4 CM
AORTIC VALVE MEAN VELOCITY: 32 M/S
APTT PPP: 51 SECONDS (ref 23–34)
APTT PPP: 57 SECONDS (ref 23–34)
ASCENDING AORTA: 3.4 CM
AST SERPL W P-5'-P-CCNC: 22 U/L (ref 13–39)
ATRIAL RATE: 111 BPM
ATRIAL RATE: 122 BPM
ATRIAL RATE: 131 BPM
ATRIAL RATE: 136 BPM
ATRIAL RATE: 138 BPM
ATRIAL RATE: 88 BPM
ATRIAL RATE: 90 BPM
AV AREA BY CONTINUOUS VTI: 1 CM2
AV AREA PEAK VELOCITY: 1 CM2
AV LVOT MEAN GRADIENT: 5 MMHG
AV LVOT PEAK GRADIENT: 7 MMHG
AV MEAN PRESS GRAD SYS DOP V1V2: 44 MMHG
AV ORIFICE AREA US: 1.01 CM2
AV PEAK GRADIENT: 68 MMHG
AV REGURGITATION PRESSURE HALF TIME: 413 MS
AV VELOCITY RATIO: 0.32
AV VMAX SYS DOP: 4.12 M/S
BASOPHILS # BLD AUTO: 0.02 THOUSANDS/ÂΜL (ref 0–0.1)
BASOPHILS NFR BLD AUTO: 0 % (ref 0–1)
BILIRUB SERPL-MCNC: 0.52 MG/DL (ref 0.2–1)
BSA FOR ECHO PROCEDURE: 1.68 M2
BSA FOR ECHO PROCEDURE: 1.68 M2
BUN SERPL-MCNC: 13 MG/DL (ref 5–25)
CALCIUM ALBUM COR SERPL-MCNC: 8.6 MG/DL (ref 8.3–10.1)
CALCIUM SERPL-MCNC: 7.8 MG/DL (ref 8.4–10.2)
CHLORIDE SERPL-SCNC: 112 MMOL/L (ref 96–108)
CHOLEST SERPL-MCNC: 79 MG/DL (ref ?–200)
CO2 SERPL-SCNC: 24 MMOL/L (ref 21–32)
CREAT SERPL-MCNC: 0.63 MG/DL (ref 0.6–1.3)
DIGOXIN SERPL-MCNC: 1.3 NG/ML (ref 0.8–2)
DOP CALC AO VTI: 99.89 CM
DOP CALC LVOT AREA: 3.14 CM2
DOP CALC LVOT CARDIAC INDEX: 4.43 L/MIN/M2
DOP CALC LVOT CARDIAC OUTPUT: 7.44 L/MIN
DOP CALC LVOT DIAMETER: 2 CM
DOP CALC LVOT PEAK VEL VTI: 32.25 CM
DOP CALC LVOT PEAK VEL: 1.32 M/S
DOP CALC LVOT STROKE INDEX: 61.9 ML/M2
DOP CALC LVOT STROKE VOLUME: 101.27
E WAVE DECELERATION TIME: 292 MS
E/A RATIO: 0.9
EOSINOPHIL # BLD AUTO: 0.09 THOUSAND/ÂΜL (ref 0–0.61)
EOSINOPHIL NFR BLD AUTO: 2 % (ref 0–6)
ERYTHROCYTE [DISTWIDTH] IN BLOOD BY AUTOMATED COUNT: 24 % (ref 11.6–15.1)
ERYTHROCYTE [DISTWIDTH] IN BLOOD BY AUTOMATED COUNT: 24.7 % (ref 11.6–15.1)
FRACTIONAL SHORTENING: 33 (ref 28–44)
GFR SERPL CREATININE-BSD FRML MDRD: 102 ML/MIN/1.73SQ M
GLUCOSE SERPL-MCNC: 84 MG/DL (ref 65–140)
HCT VFR BLD AUTO: 25.6 % (ref 36.5–49.3)
HCT VFR BLD AUTO: 26.4 % (ref 36.5–49.3)
HDLC SERPL-MCNC: 28 MG/DL
HGB BLD-MCNC: 8.2 G/DL (ref 12–17)
HGB BLD-MCNC: 8.6 G/DL (ref 12–17)
IMM GRANULOCYTES # BLD AUTO: 0.01 THOUSAND/UL (ref 0–0.2)
IMM GRANULOCYTES NFR BLD AUTO: 0 % (ref 0–2)
INR PPP: 1.24 (ref 0.85–1.19)
INTERVENTRICULAR SEPTUM IN DIASTOLE (PARASTERNAL SHORT AXIS VIEW): 1.3 CM
INTERVENTRICULAR SEPTUM: 1.3 CM (ref 0.6–1.1)
LAAS-AP2: 20.3 CM2
LAAS-AP4: 17.9 CM2
LDLC SERPL CALC-MCNC: 36 MG/DL (ref 0–100)
LEFT ATRIUM SIZE: 3.8 CM
LEFT ATRIUM VOLUME (MOD BIPLANE): 55 ML
LEFT ATRIUM VOLUME INDEX (MOD BIPLANE): 32.7 ML/M2
LEFT INTERNAL DIMENSION IN SYSTOLE: 2.7 CM (ref 2.1–4)
LEFT VENTRICULAR INTERNAL DIMENSION IN DIASTOLE: 4 CM (ref 3.5–6)
LEFT VENTRICULAR POSTERIOR WALL IN END DIASTOLE: 1.1 CM
LEFT VENTRICULAR STROKE VOLUME: 41 ML
LV EF US.2D.A4C+ESTIMATED: 68 %
LVSV (TEICH): 41 ML
LYMPHOCYTES # BLD AUTO: 1.22 THOUSANDS/ÂΜL (ref 0.6–4.47)
LYMPHOCYTES NFR BLD AUTO: 25 % (ref 14–44)
MCH RBC QN AUTO: 31.3 PG (ref 26.8–34.3)
MCH RBC QN AUTO: 32 PG (ref 26.8–34.3)
MCHC RBC AUTO-ENTMCNC: 32 G/DL (ref 31.4–37.4)
MCHC RBC AUTO-ENTMCNC: 32.6 G/DL (ref 31.4–37.4)
MCV RBC AUTO: 98 FL (ref 82–98)
MCV RBC AUTO: 98 FL (ref 82–98)
MONOCYTES # BLD AUTO: 0.13 THOUSAND/ÂΜL (ref 0.17–1.22)
MONOCYTES NFR BLD AUTO: 3 % (ref 4–12)
MV E'TISSUE VEL-LAT: 9 CM/S
MV E'TISSUE VEL-SEP: 8 CM/S
MV PEAK A VEL: 1.36 M/S
MV PEAK E VEL: 123 CM/S
MV STENOSIS PRESSURE HALF TIME: 85 MS
MV VALVE AREA P 1/2 METHOD: 2.59
NEUTROPHILS # BLD AUTO: 3.46 THOUSANDS/ÂΜL (ref 1.85–7.62)
NEUTS SEG NFR BLD AUTO: 70 % (ref 43–75)
NRBC BLD AUTO-RTO: 0 /100 WBCS
PLATELET # BLD AUTO: 154 THOUSANDS/UL (ref 149–390)
PLATELET # BLD AUTO: 161 THOUSANDS/UL (ref 149–390)
PMV BLD AUTO: 9.4 FL (ref 8.9–12.7)
PMV BLD AUTO: 9.8 FL (ref 8.9–12.7)
POTASSIUM SERPL-SCNC: 3.3 MMOL/L (ref 3.5–5.3)
PROT SERPL-MCNC: 4.7 G/DL (ref 6.4–8.4)
PROTHROMBIN TIME: 15.9 SECONDS (ref 12.3–15)
QRS AXIS: -10 DEGREES
QRS AXIS: -12 DEGREES
QRS AXIS: -19 DEGREES
QRS AXIS: -22 DEGREES
QRS AXIS: -30 DEGREES
QRS AXIS: -44 DEGREES
QRS AXIS: -56 DEGREES
QRS AXIS: 2 DEGREES
QRSD INTERVAL: 66 MS
QRSD INTERVAL: 86 MS
QRSD INTERVAL: 88 MS
QRSD INTERVAL: 90 MS
QRSD INTERVAL: 92 MS
QRSD INTERVAL: 94 MS
QT INTERVAL: 332 MS
QT INTERVAL: 344 MS
QT INTERVAL: 344 MS
QT INTERVAL: 346 MS
QT INTERVAL: 354 MS
QT INTERVAL: 356 MS
QT INTERVAL: 362 MS
QT INTERVAL: 368 MS
QTC INTERVAL: 459 MS
QTC INTERVAL: 468 MS
QTC INTERVAL: 471 MS
QTC INTERVAL: 472 MS
QTC INTERVAL: 474 MS
QTC INTERVAL: 479 MS
QTC INTERVAL: 491 MS
QTC INTERVAL: 493 MS
RBC # BLD AUTO: 2.62 MILLION/UL (ref 3.88–5.62)
RBC # BLD AUTO: 2.69 MILLION/UL (ref 3.88–5.62)
RIGHT ATRIUM AREA SYSTOLE A4C: 12.9 CM2
RIGHT VENTRICLE ID DIMENSION: 3.3 CM
SL CV AV DECELERATION TIME RETROGRADE: 1425 MS
SL CV AV PEAK GRADIENT RETROGRADE: 65 MMHG
SL CV LEFT ATRIUM LENGTH A2C: 5.2 CM
SL CV LV EF: 65
SL CV LV EF: 65
SL CV PED ECHO LEFT VENTRICLE DIASTOLIC VOLUME (MOD BIPLANE) 2D: 68 ML
SL CV PED ECHO LEFT VENTRICLE SYSTOLIC VOLUME (MOD BIPLANE) 2D: 27 ML
SODIUM SERPL-SCNC: 140 MMOL/L (ref 135–147)
T WAVE AXIS: -71 DEGREES
T WAVE AXIS: 18 DEGREES
T WAVE AXIS: 29 DEGREES
T WAVE AXIS: 31 DEGREES
T WAVE AXIS: 33 DEGREES
T WAVE AXIS: 39 DEGREES
T WAVE AXIS: 48 DEGREES
T WAVE AXIS: 50 DEGREES
TR MAX PG: 31 MMHG
TR PEAK VELOCITY: 2.8 M/S
TRICUSPID ANNULAR PLANE SYSTOLIC EXCURSION: 1.8 CM
TRICUSPID VALVE PEAK REGURGITATION VELOCITY: 2.8 M/S
TRIGL SERPL-MCNC: 77 MG/DL (ref ?–150)
TSH SERPL DL<=0.05 MIU/L-ACNC: 0.98 UIU/ML (ref 0.45–4.5)
VENTRICULAR RATE: 100 BPM
VENTRICULAR RATE: 103 BPM
VENTRICULAR RATE: 107 BPM
VENTRICULAR RATE: 107 BPM
VENTRICULAR RATE: 113 BPM
VENTRICULAR RATE: 117 BPM
VENTRICULAR RATE: 119 BPM
VENTRICULAR RATE: 122 BPM
WBC # BLD AUTO: 3.65 THOUSAND/UL (ref 4.31–10.16)
WBC # BLD AUTO: 4.93 THOUSAND/UL (ref 4.31–10.16)

## 2025-05-19 PROCEDURE — 85730 THROMBOPLASTIN TIME PARTIAL: CPT

## 2025-05-19 PROCEDURE — C1769 GUIDE WIRE: HCPCS | Performed by: INTERNAL MEDICINE

## 2025-05-19 PROCEDURE — 93308 TTE F-UP OR LMTD: CPT

## 2025-05-19 PROCEDURE — 93454 CORONARY ARTERY ANGIO S&I: CPT | Performed by: INTERNAL MEDICINE

## 2025-05-19 PROCEDURE — 84443 ASSAY THYROID STIM HORMONE: CPT

## 2025-05-19 PROCEDURE — 85610 PROTHROMBIN TIME: CPT

## 2025-05-19 PROCEDURE — 80053 COMPREHEN METABOLIC PANEL: CPT

## 2025-05-19 PROCEDURE — 93010 ELECTROCARDIOGRAM REPORT: CPT | Performed by: INTERNAL MEDICINE

## 2025-05-19 PROCEDURE — 93325 DOPPLER ECHO COLOR FLOW MAPG: CPT

## 2025-05-19 PROCEDURE — 99152 MOD SED SAME PHYS/QHP 5/>YRS: CPT | Performed by: INTERNAL MEDICINE

## 2025-05-19 PROCEDURE — 99222 1ST HOSP IP/OBS MODERATE 55: CPT | Performed by: STUDENT IN AN ORGANIZED HEALTH CARE EDUCATION/TRAINING PROGRAM

## 2025-05-19 PROCEDURE — 93325 DOPPLER ECHO COLOR FLOW MAPG: CPT | Performed by: INTERNAL MEDICINE

## 2025-05-19 PROCEDURE — 85025 COMPLETE CBC W/AUTO DIFF WBC: CPT

## 2025-05-19 PROCEDURE — 99223 1ST HOSP IP/OBS HIGH 75: CPT | Performed by: INTERNAL MEDICINE

## 2025-05-19 PROCEDURE — 93308 TTE F-UP OR LMTD: CPT | Performed by: INTERNAL MEDICINE

## 2025-05-19 PROCEDURE — 99153 MOD SED SAME PHYS/QHP EA: CPT | Performed by: INTERNAL MEDICINE

## 2025-05-19 PROCEDURE — C1894 INTRO/SHEATH, NON-LASER: HCPCS | Performed by: INTERNAL MEDICINE

## 2025-05-19 PROCEDURE — 93306 TTE W/DOPPLER COMPLETE: CPT | Performed by: INTERNAL MEDICINE

## 2025-05-19 PROCEDURE — 80061 LIPID PANEL: CPT

## 2025-05-19 PROCEDURE — 85027 COMPLETE CBC AUTOMATED: CPT

## 2025-05-19 PROCEDURE — 93306 TTE W/DOPPLER COMPLETE: CPT

## 2025-05-19 PROCEDURE — B2111ZZ FLUOROSCOPY OF MULTIPLE CORONARY ARTERIES USING LOW OSMOLAR CONTRAST: ICD-10-PCS | Performed by: INTERNAL MEDICINE

## 2025-05-19 PROCEDURE — 80162 ASSAY OF DIGOXIN TOTAL: CPT

## 2025-05-19 RX ORDER — FENTANYL CITRATE 50 UG/ML
INJECTION, SOLUTION INTRAMUSCULAR; INTRAVENOUS CODE/TRAUMA/SEDATION MEDICATION
Status: DISCONTINUED | OUTPATIENT
Start: 2025-05-19 | End: 2025-05-19 | Stop reason: HOSPADM

## 2025-05-19 RX ORDER — DIGOXIN 250 MCG
250 TABLET ORAL EVERY 6 HOURS
Status: DISCONTINUED | OUTPATIENT
Start: 2025-05-19 | End: 2025-05-19

## 2025-05-19 RX ORDER — DIGOXIN 0.25 MG/ML
250 INJECTION INTRAMUSCULAR; INTRAVENOUS EVERY 6 HOURS
Status: COMPLETED | OUTPATIENT
Start: 2025-05-19 | End: 2025-05-19

## 2025-05-19 RX ORDER — NITROGLYCERIN 20 MG/100ML
INJECTION INTRAVENOUS CODE/TRAUMA/SEDATION MEDICATION
Status: DISCONTINUED | OUTPATIENT
Start: 2025-05-19 | End: 2025-05-19 | Stop reason: HOSPADM

## 2025-05-19 RX ORDER — UREA 20 %
CREAM (GRAM) TOPICAL
Qty: 85 G | Refills: 0 | Status: SHIPPED | OUTPATIENT
Start: 2025-05-19

## 2025-05-19 RX ORDER — ACETAMINOPHEN 325 MG/1
650 TABLET ORAL EVERY 6 HOURS PRN
Status: DISCONTINUED | OUTPATIENT
Start: 2025-05-19 | End: 2025-05-20 | Stop reason: HOSPADM

## 2025-05-19 RX ORDER — HEPARIN SODIUM 10000 [USP'U]/100ML
3-20 INJECTION, SOLUTION INTRAVENOUS
Status: DISCONTINUED | OUTPATIENT
Start: 2025-05-19 | End: 2025-05-19

## 2025-05-19 RX ORDER — SODIUM CHLORIDE 9 MG/ML
75 INJECTION, SOLUTION INTRAVENOUS CONTINUOUS
Status: DISPENSED | OUTPATIENT
Start: 2025-05-19 | End: 2025-05-19

## 2025-05-19 RX ORDER — HEPARIN SODIUM 1000 [USP'U]/ML
3600 INJECTION, SOLUTION INTRAVENOUS; SUBCUTANEOUS EVERY 6 HOURS PRN
Status: DISCONTINUED | OUTPATIENT
Start: 2025-05-19 | End: 2025-05-19

## 2025-05-19 RX ORDER — METOPROLOL SUCCINATE 25 MG/1
25 TABLET, EXTENDED RELEASE ORAL 2 TIMES DAILY
Status: DISCONTINUED | OUTPATIENT
Start: 2025-05-19 | End: 2025-05-20

## 2025-05-19 RX ORDER — SODIUM CHLORIDE 9 MG/ML
125 INJECTION, SOLUTION INTRAVENOUS CONTINUOUS
Status: DISCONTINUED | OUTPATIENT
Start: 2025-05-19 | End: 2025-05-19

## 2025-05-19 RX ORDER — UREA 200 MG/G
CREAM TOPICAL AS NEEDED
Qty: 75 G | Refills: 1 | Status: SHIPPED | OUTPATIENT
Start: 2025-05-19

## 2025-05-19 RX ORDER — CAPECITABINE 500 MG/1
1000 TABLET, FILM COATED ORAL 2 TIMES DAILY
Status: DISCONTINUED | OUTPATIENT
Start: 2025-05-19 | End: 2025-05-20 | Stop reason: HOSPADM

## 2025-05-19 RX ORDER — LISINOPRIL 20 MG/1
20 TABLET ORAL DAILY
Status: DISCONTINUED | OUTPATIENT
Start: 2025-05-20 | End: 2025-05-20 | Stop reason: HOSPADM

## 2025-05-19 RX ORDER — CAPECITABINE 150 MG/1
300 TABLET, FILM COATED ORAL 2 TIMES DAILY
Status: DISCONTINUED | OUTPATIENT
Start: 2025-05-19 | End: 2025-05-20 | Stop reason: HOSPADM

## 2025-05-19 RX ORDER — LISINOPRIL 20 MG/1
20 TABLET ORAL DAILY
Status: DISCONTINUED | OUTPATIENT
Start: 2025-05-19 | End: 2025-05-19

## 2025-05-19 RX ORDER — MIDAZOLAM HYDROCHLORIDE 2 MG/2ML
INJECTION, SOLUTION INTRAMUSCULAR; INTRAVENOUS CODE/TRAUMA/SEDATION MEDICATION
Status: DISCONTINUED | OUTPATIENT
Start: 2025-05-19 | End: 2025-05-19 | Stop reason: HOSPADM

## 2025-05-19 RX ORDER — HEPARIN SODIUM 1000 [USP'U]/ML
1800 INJECTION, SOLUTION INTRAVENOUS; SUBCUTANEOUS EVERY 6 HOURS PRN
Status: DISCONTINUED | OUTPATIENT
Start: 2025-05-19 | End: 2025-05-19

## 2025-05-19 RX ORDER — SODIUM CHLORIDE 9 MG/ML
100 INJECTION, SOLUTION INTRAVENOUS CONTINUOUS
OUTPATIENT
Start: 2025-05-19 | End: 2025-05-19

## 2025-05-19 RX ORDER — HEPARIN SODIUM 1000 [USP'U]/ML
INJECTION, SOLUTION INTRAVENOUS; SUBCUTANEOUS CODE/TRAUMA/SEDATION MEDICATION
Status: DISCONTINUED | OUTPATIENT
Start: 2025-05-19 | End: 2025-05-19 | Stop reason: HOSPADM

## 2025-05-19 RX ORDER — LIDOCAINE HYDROCHLORIDE 10 MG/ML
INJECTION, SOLUTION EPIDURAL; INFILTRATION; INTRACAUDAL; PERINEURAL CODE/TRAUMA/SEDATION MEDICATION
Status: DISCONTINUED | OUTPATIENT
Start: 2025-05-19 | End: 2025-05-19 | Stop reason: HOSPADM

## 2025-05-19 RX ORDER — POTASSIUM CHLORIDE 1500 MG/1
40 TABLET, EXTENDED RELEASE ORAL ONCE
Status: COMPLETED | OUTPATIENT
Start: 2025-05-19 | End: 2025-05-19

## 2025-05-19 RX ORDER — VERAPAMIL HYDROCHLORIDE 2.5 MG/ML
INJECTION INTRAVENOUS CODE/TRAUMA/SEDATION MEDICATION
Status: DISCONTINUED | OUTPATIENT
Start: 2025-05-19 | End: 2025-05-19 | Stop reason: HOSPADM

## 2025-05-19 RX ORDER — POTASSIUM CHLORIDE 1500 MG/1
20 TABLET, EXTENDED RELEASE ORAL
Status: ACTIVE | OUTPATIENT
Start: 2025-05-19 | End: 2025-05-20

## 2025-05-19 RX ORDER — HEPARIN SODIUM 1000 [USP'U]/ML
3600 INJECTION, SOLUTION INTRAVENOUS; SUBCUTANEOUS ONCE
Status: COMPLETED | OUTPATIENT
Start: 2025-05-19 | End: 2025-05-19

## 2025-05-19 RX ORDER — ATORVASTATIN CALCIUM 40 MG/1
40 TABLET, FILM COATED ORAL DAILY
Status: DISCONTINUED | OUTPATIENT
Start: 2025-05-19 | End: 2025-05-19

## 2025-05-19 RX ORDER — ASPIRIN 81 MG/1
81 TABLET, CHEWABLE ORAL DAILY
Status: DISCONTINUED | OUTPATIENT
Start: 2025-05-19 | End: 2025-05-20 | Stop reason: HOSPADM

## 2025-05-19 RX ORDER — ATORVASTATIN CALCIUM 20 MG/1
20 TABLET, FILM COATED ORAL DAILY
Status: DISCONTINUED | OUTPATIENT
Start: 2025-05-19 | End: 2025-05-19

## 2025-05-19 RX ORDER — ATORVASTATIN CALCIUM 80 MG/1
80 TABLET, FILM COATED ORAL DAILY
Status: DISCONTINUED | OUTPATIENT
Start: 2025-05-19 | End: 2025-05-20 | Stop reason: HOSPADM

## 2025-05-19 RX ORDER — CLOPIDOGREL BISULFATE 75 MG/1
75 TABLET ORAL DAILY
Status: DISCONTINUED | OUTPATIENT
Start: 2025-05-19 | End: 2025-05-19

## 2025-05-19 RX ADMIN — SODIUM CHLORIDE 125 ML/HR: 0.9 INJECTION, SOLUTION INTRAVENOUS at 09:49

## 2025-05-19 RX ADMIN — DIGOXIN 250 MCG: 250 INJECTION, SOLUTION INTRAMUSCULAR; INTRAVENOUS; PARENTERAL at 01:08

## 2025-05-19 RX ADMIN — DIGOXIN 250 MCG: 250 INJECTION, SOLUTION INTRAMUSCULAR; INTRAVENOUS; PARENTERAL at 13:17

## 2025-05-19 RX ADMIN — APIXABAN 5 MG: 5 TABLET, FILM COATED ORAL at 17:22

## 2025-05-19 RX ADMIN — CLOPIDOGREL BISULFATE 75 MG: 75 TABLET, FILM COATED ORAL at 08:25

## 2025-05-19 RX ADMIN — HEPARIN SODIUM 3600 UNITS: 1000 INJECTION INTRAVENOUS; SUBCUTANEOUS at 00:32

## 2025-05-19 RX ADMIN — Medication 12.5 MG: at 09:33

## 2025-05-19 RX ADMIN — POTASSIUM CHLORIDE 40 MEQ: 1500 TABLET, EXTENDED RELEASE ORAL at 08:24

## 2025-05-19 RX ADMIN — HEPARIN SODIUM 1800 UNITS: 1000 INJECTION INTRAVENOUS; SUBCUTANEOUS at 06:49

## 2025-05-19 RX ADMIN — HEPARIN SODIUM 12 UNITS/KG/HR: 10000 INJECTION, SOLUTION INTRAVENOUS at 00:34

## 2025-05-19 RX ADMIN — ASPIRIN 81 MG CHEWABLE TABLET 81 MG: 81 TABLET CHEWABLE at 08:24

## 2025-05-19 RX ADMIN — POTASSIUM CHLORIDE 40 MEQ: 1500 TABLET, EXTENDED RELEASE ORAL at 17:23

## 2025-05-19 RX ADMIN — SODIUM CHLORIDE 75 ML/HR: 0.9 INJECTION, SOLUTION INTRAVENOUS at 12:29

## 2025-05-19 RX ADMIN — ATORVASTATIN CALCIUM 80 MG: 80 TABLET, FILM COATED ORAL at 08:24

## 2025-05-19 RX ADMIN — DIGOXIN 250 MCG: 250 INJECTION, SOLUTION INTRAMUSCULAR; INTRAVENOUS; PARENTERAL at 06:28

## 2025-05-19 NOTE — ASSESSMENT & PLAN NOTE
Cholesterol 79, triglycerides 77, HDL 20, LDL 36    Plan:  Patient takes atorvastatin 20 mg daily at home  Continue statin

## 2025-05-19 NOTE — ASSESSMENT & PLAN NOTE
Home lisinopril 20 mg daily held in the setting of hypotension and cardiac catheterization.  Restart when able.

## 2025-05-19 NOTE — PLAN OF CARE
Problem: PAIN - ADULT  Goal: Verbalizes/displays adequate comfort level or baseline comfort level  Description: Interventions:  - Encourage patient to monitor pain and request assistance  - Assess pain using appropriate pain scale  - Administer analgesics as ordered based on type and severity of pain and evaluate response  - Implement non-pharmacological measures as appropriate and evaluate response  - Consider cultural and social influences on pain and pain management  - Notify physician/advanced practitioner if interventions unsuccessful or patient reports new pain  - Educate patient/family on pain management process including their role and importance of  reporting pain   - Provide non-pharmacologic/complimentary pain relief interventions  Outcome: Progressing     Problem: INFECTION - ADULT  Goal: Absence or prevention of progression during hospitalization  Description: INTERVENTIONS:  - Assess and monitor for signs and symptoms of infection  - Monitor lab/diagnostic results  - Monitor all insertion sites, i.e. indwelling lines, tubes, and drains  - Monitor endotracheal if appropriate and nasal secretions for changes in amount and color  - [place] appropriate cooling/warming therapies per order  - Administer medications as ordered  - Instruct and encourage patient and family to use good hand hygiene technique  - Identify and instruct in appropriate isolation precautions for identified infection/condition  Outcome: Progressing     Problem: SAFETY ADULT  Goal: Patient will remain free of falls  Description: INTERVENTIONS:  - Educate patient/family on patient safety including physical limitations  - Instruct patient to call for assistance with activity   - Consider consulting OT/PT to assist with strengthening/mobility based on AM PAC & JH-HLM score  - Consult OT/PT to assist with strengthening/mobility   - Keep Call bell within reach  - Keep bed low and locked with side rails adjusted as appropriate  - Keep  care items and personal belongings within reach  - Initiate and maintain comfort rounds  - Make Fall Risk Sign visible to staff  - Offer Toileting every  Hours, in advance of need  - Initiate/Maintain alarm  - Obtain necessary fall risk management equipment:   - Apply yellow socks and bracelet for high fall risk patients  - Consider moving patient to room near nurses station  Outcome: Progressing     Problem: DISCHARGE PLANNING  Goal: Discharge to home or other facility with appropriate resources  Description: INTERVENTIONS:  - Identify barriers to discharge w/patient and caregiver  - Arrange for needed discharge resources and transportation as appropriate  - Identify discharge learning needs (meds, wound care, etc.)  - Arrange for interpretive services to assist at discharge as needed  - Refer to Case Management Department for coordinating discharge planning if the patient needs post-hospital services based on physician/advanced practitioner order or complex needs related to functional status, cognitive ability, or social support system  Outcome: Progressing

## 2025-05-19 NOTE — UTILIZATION REVIEW
Initial Clinical Review    The patient initially presented to [facility], patient was then transferred to [facility] for a higher level of care.       Admission: Date/Time/Statement:   Admission Orders (From admission, onward)       Ordered        05/19/25 0010  Inpatient Admission  Once                          Orders Placed This Encounter   Procedures    Inpatient Admission     Standing Status:   Standing     Number of Occurrences:   1     Level of Care:   Level 2 Stepdown / HOT [14]     Estimated length of stay:   Not Applicable        Initial Presentation: 64 y.o. male who presented from [facility] by EMS to [facility] as a direct admission, care began on 5/18 @ 1255 at [facility] when CXR was obtained. The patient has already surpassed 1 midnight with active ongoing care.  Presented w/ left sided sharp crushing chest pain that started 30 minutes before presentation to the ED. EKG showed inferior lead ST depressions along with elevated troponin 1,366. ED noted afib w/ RVR - given metoprolol and then became hypotensive -- changed to digoxin w/ amio load. Transferred for cardiac cath.     Date: 05/19/25   Day 2: Pertinent PMHx: CAD, GERD, HLD, HTN, cancer (last chemo 1 week ago), sleep apnea. Reports chest pain is now resolved. EXAM: murmur. Admitted as Inpatient for evaluation and treatment of ACS, new onset afib w/ RVR. PLAN: NPO, digoxin q6h for loading then daily, ASA, Plavix, statin increased to 40 mg (was taking 20), IV heparin gtt, telemetry, echo; hold Xeloda and lisinopril, check iron panel/B12/folate/lipid panel. Cardiology consulted.     Cardiology: ASA and Plavix. Heparin gtt. Statin -- increase to 80 mg. Continue digoxin. Telemetry. Echo. EKG if chest pain returns. Cardiac cath today. [id number] score of 3. Will require PO AC. Not able to r/o chemo-induced afib; but pt now in sinus rhythm. Post-cardiac cath, start  metoprolol succinate 25 mg BID. Will need 30 day Zio patch.        Date: 05/20/25  Day 3: Has surpassed a 2nd midnight with active treatments and services. PCI did not reveal lesions requiring intervention. Exam: murmur, hand swelling, R wrist access site bandaged with no hematoma. Hgb stable. Remains chest pain free. Plan: transition to Eliquis off heparin gtt, resume lisinopril. Can discontinue Plavix & digoxin. Continue other current meds. Telemetry monitoring. Cardiac diet, I&O, q4h neurovascular checks, q4h vitals.       Scheduled Medications:  apixaban, 5 mg, Oral, BID  aspirin, 81 mg, Oral, Daily  atorvastatin, 80 mg, Oral, Daily  [Held by provider] capecitabine, 1,000 mg, Oral, BID  [Held by provider] capecitabine, 300 mg, Oral, BID  lisinopril, 20 mg, Oral, Daily  metoprolol succinate, 25 mg, Oral, BID  potassium chloride, 20 mEq, Oral, Daily Before Lunch      digoxin, 250 mcg, Intravenous, Q6H  [Held by provider] lisinopril, 20 mg, Oral, Daily  metoprolol tartrate, 12.5 mg, Oral, Q12H BETITO  potassium chloride, 20 mEq, Oral, Daily Before Lunch    Continuous IV Infusions:  heparin (porcine), 3-20 Units/kg/hr (Order-Specific), Intravenous, Titrated; Start: 05/19/25 0030 End: 05/19/25 1204   sodium chloride, 125 mL/hr, Intravenous, Continuous; Start: 05/19/25 0945 End: 05/19/25 1204   sodium chloride, 75 mL/hr, Intravenous, Continuous; Start: 05/19/25 1200 End: 05/19/25 1628     PRN Meds:  acetaminophen, 650 mg, Oral, Q6H PRN  potassium chloride, 40 mEq, Oral; 5/19 x2    clopidogrel (PLAVIX) tablet 75 mg  Dose: 75 mg  Freq: Daily Route: PO  Start: 05/19/25 0013 End: 05/19/25 1158  digoxin (LANOXIN) injection 250 mcg  Dose: 250 mcg  Freq: Every 6 hours Route: IV  Start: 05/19/25 0045 End: 05/19/25 1317  heparin (porcine) injection 3,600 Units  Dose: 3,600 Units  Freq: Once Route: IV  Start: 05/19/25 0030 End: 05/19/25 0032  oxyCODONE (ROXICODONE) IR tablet 5 mg  Dose: 5 mg  Freq: Once Route: PO  Start: 05/20/25  0630 End: 05/20/25 0640        Weight (last 2 days)       Date/Time Weight    05/19/25 0721 63 (139)    05/19/25 0050 63 (139)    05/18/25 23:44:33 63.2 (139.33)            Vital Signs (last 3 days)       Date/Time Temp Pulse Resp BP MAP (mmHg) SpO2 Calculated FIO2 (%) - Nasal Cannula Nasal Cannula O2 Flow Rate (L/min) O2 Device Patient Position - Orthostatic VS Ghada Coma Scale Score Pain    05/20/25 0800 98 °F (36.7 °C) 88 18 141/73 -- 100 % -- -- -- -- -- --    05/20/25 07:32:59 98 °F (36.7 °C) 88 18 141/73 96 100 % -- -- None (Room air) Lying -- --    05/20/25 0640 -- -- -- -- -- -- -- -- -- -- -- 9    05/20/25 02:12:17 97.7 °F (36.5 °C) 96 20 121/66 84 -- -- -- -- Lying -- --    05/20/25 00:32:59 98.8 °F (37.1 °C) 88 20 117/70 86 -- -- -- -- Lying -- --    05/20/25 0000 98.8 °F (37.1 °C) 88 20 117/70 -- 100 % -- -- -- -- -- --    05/19/25 2300 98.7 °F (37.1 °C) 87 20 -- -- 100 % -- -- -- Lying -- --    05/19/25 22:52:12 98.6 °F (37 °C) -- -- 116/64 81 -- -- -- -- -- -- --    05/19/25 2000 97.8 °F (36.6 °C) 70 18 124/66 -- 100 % -- -- None (Room air) -- 15 No Pain    05/19/25 19:24:05 97.8 °F (36.6 °C) -- 18 124/66 85 -- -- -- -- -- -- --    05/19/25 1700 -- -- -- 118/54 70 -- -- -- -- -- -- --    05/19/25 15:47:27 97.7 °F (36.5 °C) -- -- 107/50 69 -- -- -- -- -- -- --    05/19/25 15:45:26 97.7 °F (36.5 °C) 70 17 107/50 69 100 % -- -- None (Room air) Lying -- --    05/19/25 1412 -- -- -- 109/50 -- -- -- -- -- -- -- --    05/19/25 1300 98.1 °F (36.7 °C) -- -- 106/50 -- 95 % 28 2 L/min None (Room air) -- 15 --    05/19/25 11:59:05 -- -- -- -- -- -- -- -- -- -- -- No Pain    05/19/25 11:41:24 -- -- -- -- -- -- -- -- -- -- -- No Pain    05/19/25 11:41:10 -- -- -- -- -- 96 % 28 2 L/min Nasal cannula -- -- --    05/19/25 1133 -- -- -- -- -- -- -- -- -- -- -- No Pain    05/19/25 08:58:15 97.9 °F (36.6 °C) -- 18 132/65 87 -- -- -- -- -- -- --    05/19/25 0721 -- 69 -- 105/53 -- -- -- -- -- -- -- --    05/19/25 0400 --  -- -- -- -- -- -- -- -- -- 15 No Pain    05/19/25 03:41:40 98.6 °F (37 °C) 66 16 105/53 70 -- -- -- -- Lying -- --    05/19/25 0050 -- 79 -- 96/61 -- -- -- -- -- -- -- --    05/18/25 2352 -- -- -- -- -- -- -- -- -- -- 15 --    05/18/25 23:44:33 98.1 °F (36.7 °C) 73 18 96/61 73 -- -- -- -- Lying -- No Pain    05/18/25 23:43:48 98.1 °F (36.7 °C) -- -- 84/64 71 -- -- -- -- -- -- --              Pertinent Labs/Diagnostic Test Results:   Cardiology:  Cardiac catheterization   Preliminary Result by Jose Roberto Weeks DO (05/19 8741)        No significant obstructive epicardial CAD         Echo complete w/ contrast if indicated   Final Result by Ishmael Milner MD (05/19 9052)        Left Ventricle: Left ventricular cavity size is normal. Wall thickness    is mildly increased. There is concentric remodeling. The left ventricular    ejection fraction is 65%. Systolic function is normal. Wall motion is    normal. Diastolic function is mildly abnormal, consistent with grade I    (abnormal) relaxation.     Right Ventricle: Right ventricular cavity size is normal. Systolic    function is normal.     Left Atrium: The atrium is normal in size.     Right Atrium: The atrium is normal in size.     Aortic Valve: The aortic valve is trileaflet. The leaflets are mildly    thickened. The leaflets are moderately calcified. There is moderately    reduced mobility. There is mild to moderate regurgitation. There is    moderate stenosis. The aortic valve peak velocity is 4.12 m/s. The aortic    valve mean gradient is 44 mmHg. The dimensionless velocity index is 0.32.    The aortic valve area is 1.01 cm2. The stroke volume index is 61.90 ml/m2.    The aortic valve velocity is increased in the setting of stenosis and    increased flow.     Mitral Valve: There is mild regurgitation.     Tricuspid Valve: There is mild regurgitation.     Prior TTE study available for comparison. Prior study date: 8/16/2024.    No significant changes noted  compared to the prior study.         Echo follow up/limited w/ contrast if indicated   Final Result by Ishmael Milner MD (05/19 1200)        Left Ventricle: Left ventricular cavity size is normal. Wall thickness    is mildly increased. The left ventricular ejection fraction is 65-70%.    Systolic function is vigorous. Although no diagnostic regional wall motion    abnormality was identified, this possibility cannot be completely excluded    on the basis of this study.     Aortic Valve: There is mild to moderate regurgitation.               Results from last 7 days   Lab Units 05/20/25  0611 05/19/25  1542 05/19/25  0622 05/18/25  1258   WBC Thousand/uL 6.08 3.65* 4.93 8.47   HEMOGLOBIN g/dL 9.1* 8.6* 8.2* 9.4*   HEMATOCRIT % 29.0* 26.4* 25.6* 29.8*   PLATELETS Thousands/uL 175 154 161 203   TOTAL NEUT ABS Thousands/µL  --   --  3.46 6.50         Results from last 7 days   Lab Units 05/20/25  0611 05/19/25  0622 05/18/25  1258   SODIUM mmol/L 140 140 139   POTASSIUM mmol/L 3.9 3.3* 3.5   CHLORIDE mmol/L 110* 112* 108   CO2 mmol/L 25 24 22   ANION GAP mmol/L 5 4 9   BUN mg/dL 9 13 19   CREATININE mg/dL 0.73 0.63 0.77   EGFR ml/min/1.73sq m 96 102 94   CALCIUM mg/dL 8.4 7.8* 8.4   MAGNESIUM mg/dL 1.9  --   --    PHOSPHORUS mg/dL 2.8  --   --      Results from last 7 days   Lab Units 05/19/25  0622   AST U/L 22   ALT U/L 18   ALK PHOS U/L 70   TOTAL PROTEIN g/dL 4.7*   ALBUMIN g/dL 3.0*   TOTAL BILIRUBIN mg/dL 0.52         Results from last 7 days   Lab Units 05/20/25  0611 05/19/25  0622 05/18/25  1258   GLUCOSE RANDOM mg/dL 96 84 119     Results from last 7 days   Lab Units 05/18/25  1718 05/18/25  1446 05/18/25  1258   HS TNI 0HR ng/L  --   --  3   HS TNI 2HR ng/L  --  53*  --    HSTNI D2 ng/L  --  50*  --    HS TNI 4HR ng/L 1,366*  --   --    HSTNI D4 ng/L 1,363*  --   --          Results from last 7 days   Lab Units 05/20/25  0611 05/19/25  0622 05/19/25  0029 05/18/25  2110 05/18/25  1446   PROTIME seconds  16.6*  --  15.9*  --  16.2*   INR  1.31*  --  1.24*  --  1.25*   PTT seconds  --  51* 57* 48* 31     Results from last 7 days   Lab Units 05/19/25  0622   TSH 3RD GENERATON uIU/mL 0.978     Results from last 7 days   Lab Units 05/19/25  0622   DIGOXIN LVL ng/mL 1.3     Results from last 7 days   Lab Units 05/18/25  1258   BNP pg/mL 88         Past Medical History:   Diagnosis Date    Anemia in other chronic diseases classified elsewhere 5/18/2025    Benign essential hypertension 05/08/2014    CAD (coronary artery disease)     s/p NAVA prox LAD and D1 7/28/2022    Cancer (HCC)     Prostate    Coronary artery disease involving native coronary artery of native heart without angina pectoris 08/09/2022    Enteritis 08/28/2024    GERD (gastroesophageal reflux disease)     Hyperlipidemia     Hypertension     Other chest pain     Palpitations     Prostate cancer (HCC) 04/18/2023    Sleep apnea      Present on Admission:   Benign essential hypertension   Pancreatic adenocarcinoma (HCC)   Other hyperlipidemia   Coronary artery disease involving native coronary artery of native heart without angina pectoris   Aortic valve stenosis      Admitting Diagnosis: Chest pain [R07.9]  Age/Sex: 64 y.o. male    Network Utilization Review Department  ATTENTION: Please call with any questions or concerns to [phone] and carefully listen to the prompts so that you are directed to the right person. All voicemails are confidential.   For Discharge needs, contact Care Management DC Support Team at [phone] opt. [phone]  Send all requests for admission clinical reviews, approved or denied determinations and any other requests to dedicated fax number below belonging to the campus where the patient is receiving treatment. List of dedicated fax numbers for the Facilities:  FACILITY NAME UR FAX NUMBER   ADMISSION DENIALS (Administrative/Medical Necessity) [phone]   DISCHARGE SUPPORT TEAM (NETWORK) [phone]   PARENT CHILD HEALTH  (Maternity/NICU/Pediatrics) [phone]   [facility] [phone]   [facility] [phone]   [facility] [phone]   [facility] [phone]   [facility] [phone]   [facility] [phone]   [facility] [phone]   [facility] [phone]   [facility] [phone]   [facility] [phone]   [facility] [phone]   [facility] [phone]

## 2025-05-19 NOTE — ASSESSMENT & PLAN NOTE
Moderate aortic stenosis noted on prior TTE from July 2024 with a mean gradient of 31 and a peak velocity of 3.47.  Follow-up official TTE.

## 2025-05-19 NOTE — ED PROVIDER NOTES
Time reflects when diagnosis was documented in both MDM as applicable and the Disposition within this note       Time User Action Codes Description Comment    5/18/2025  6:25 PM Ronak Espinosa Add [R07.9] Chest pain     5/18/2025  6:25 PM Ronak Espinosa Add [I24.9] ACS (acute coronary syndrome) (HCC)     5/18/2025  6:25 PM Ronak Espinosa Add [I48.91] New onset a-fib (HCC)           ED Disposition       ED Disposition   Transfer to Another Facility-In Network    Condition   --    Date/Time   Sun May 18, 2025  6:25 PM    Comment   Isaac Kramer should be transferred out to [facility].               Assessment & Plan       Medical Decision Making  64-year-old male with new onset left-sided chest pain radiating to the left shoulder and back and new onset A-fib as identified on EKG.  Sudden onset today no known inciting factors.  Started approximately 30 minutes prior to presentation.  Patient was worked up to rule out aortic dissection.  Most likely diagnosis is ACS with newly identified depressions of the inferior lateral leads.  His pain improved and very nearly completely resolved with treatment in the emergency department including morphine trial of Lopressor digoxin and Amio at the direction of interventional cardiology and critical care physicians.  Patient to be transferred to [facility] for planned cardiac cath tomorrow for ACS.  His inferolateral depressions identified on EKG spontaneously resolved while he was in the emergency department.  Goal for heart rate control to be below 100 bpm and goal to maintain adequate maps and systolic blood pressure.  Patient did have labile blood pressures initially when being treated to lower his tachycardia however this appears to have now resolved.  Currently there is a plan for patient transfer at approximately 10 PM.    Amount and/or Complexity of Data Reviewed  Labs: ordered.  Radiology: ordered and independent interpretation performed.    Risk  Prescription  drug management.        ED Course as of 05/18/25 2035   Sun May 18, 2025   1255 Crushing chest pain onset 30 minutes ago.  Prior history of CAD and cardiac stenting.   1508 Discussed with Dr. Connors of interventional cardiology, goal for rate control heart rate target 70s to 80s.  Will need intervention however not emergently anticipated within the next 24 to 48 hours.  Will treat with typical ACS medications.   1548 Ongoing discussion and review of case with critical care at [facility].  It is the recommendation of Dr. Mcmillan that the patient be considered for a stepdown 1 bed at [facility] due to need to control tachycardia and hypotension.   1602 Dr. Garcia, give 250 Digoxin, give amio 150mg     1757 Much discussion and planning regarding patient disposition admission status.  Current plan is to admit to [facility] until beds become available at [place] stepdown 2.  Patient appears well-controlled for rate and blood pressure after digoxin and amiodarone.  Patient to be transferred to [place] first available bed at appropriate status level for cath likely tomorrow.       Medications   sodium chloride (PF) 0.9 % injection 3 mL (has no administration in time range)   heparin (porcine) 25,000 units in 0.45% NaCl 250 mL infusion (premix) (12 Units/kg/hr × 60 kg (Order-Specific) Intravenous New Bag 5/18/25 1505)   heparin (porcine) injection 3,600 Units (has no administration in time range)   heparin (porcine) injection 1,800 Units (has no administration in time range)   sodium chloride 0.9 % bolus 1,000 mL (0 mL Intravenous Stopped 5/18/25 1504)   morphine injection 2 mg (2 mg Intravenous Given 5/18/25 1307)   iohexol (OMNIPAQUE) 350 MG/ML injection (MULTI-DOSE) 100 mL (100 mL Intravenous Given 5/18/25 1320)   sodium chloride 0.9 % bolus 500 mL (0 mL Intravenous Stopped 5/18/25 1621)   clopidogrel (PLAVIX) tablet 600 mg (600 mg Oral Given 5/18/25 1436)   metoprolol (LOPRESSOR) injection 5 mg (5 mg Intravenous  Given 5/18/25 1432)   heparin (porcine) injection 3,600 Units (3,600 Units Intravenous Given 5/18/25 1500)   digoxin (LANOXIN) injection 250 mcg (250 mcg Intravenous Given 5/18/25 1616)   sodium chloride 0.9 % bolus 500 mL (0 mL Intravenous Stopped 5/18/25 1656)   amiodarone 150 mg in dextrose 5 % 100 mL IV bolus (0 mg Intravenous Stopped 5/18/25 1700)       ED Risk Strat Scores                    No data recorded        SBIRT 20yo+      Flowsheet Row Most Recent Value   Initial Alcohol Screen: US AUDIT-C     1. How often do you have a drink containing alcohol? 0 Filed at: 05/18/2025 1252   2. How many drinks containing alcohol do you have on a typical day you are drinking?  0 Filed at: 05/18/2025 1252   3a. Male UNDER 65: How often do you have five or more drinks on one occasion? 0 Filed at: 05/18/2025 1252   3b. FEMALE Any Age, or MALE 65+: How often do you have 4 or more drinks on one occassion? 0 Filed at: 05/18/2025 1252   Audit-C Score 0 Filed at: 05/18/2025 1252   SONG: How many times in the past year have you...    Used an illegal drug or used a prescription medication for non-medical reasons? Never Filed at: 05/18/2025 1252                            History of Present Illness       Chief Complaint   Patient presents with    Chest Pain     Sudden onset approx 30 mins ago       Past Medical History:   Diagnosis Date    Benign essential hypertension 05/08/2014    CAD (coronary artery disease)     s/p NAVA prox LAD and D1 7/28/2022    Cancer (HCC)     Prostate    Coronary artery disease involving native coronary artery of native heart without angina pectoris 08/09/2022    Enteritis 08/28/2024    GERD (gastroesophageal reflux disease)     Hyperlipidemia     Hypertension     Other chest pain     Palpitations     Prostate cancer (HCC) 04/18/2023    Sleep apnea       Past Surgical History:   Procedure Laterality Date    CARDIAC CATHETERIZATION Left 7/28/2022    Procedure: Cardiac catheterization-left heart  catheterization;  Surgeon: Sai Henry MD;  Location: [facility] C[facility]AC [facility];  Service: Cardiology    CARDIAC CATHETERIZATION N/A 7/28/2022    Procedure: Cardiac pci;  Surgeon: Sai Henry MD;  Location: [facility] CARDIAC CATH [facility];  Service: Cardiology    HERNIA REPAIR      IR EMBOLIZATION (SPECIFY VESSEL OR SITE)  9/13/2024    IR PORT PLACEMENT  10/28/2024    DE LAPS SURG EDIF0ALR RPBIC RAD W/NRV SPARING ROBOT N/A 5/22/2023    Procedure: PROSTATECTOMY RADICAL & PELVIC LYMPH NODE DISSECTION  W/ ROBOT;  Surgeon: Otoniel Harmon MD;  Location: [facility];  Service: Urology    DE PROSTATE NEEDLE BIOPSY ANY APPROACH N/A 2/28/2023    Procedure: TRANSRECTAL MRI FUSION BIOPSY PROSTATE;  Surgeon: Milan Arellano MD;  Location: [facility];  Service: Urology    WHIPPLE PROCEDURE/PANCREATICO-DUODENECTOMY N/A 9/12/2024    Procedure: WHIPPLE PROCEDURE/PANCREATICO-DUODENECTOMY;  Surgeon: Marika Ac MD;  Location: [facility];  Service: Surgical Oncology      Family History   Problem Relation Age of Onset    No Known Problems Mother     No Known Problems Father       Social History[1]   E-Cigarette/Vaping    E-Cigarette Use Never User       E-Cigarette/Vaping Substances    Nicotine No     THC No     CBD No     Flavoring No     Other No     Unknown No       I have reviewed and agree with the history as documented.     64-year-old male presents to the emergency department for evaluation of new sharp crushing chest pain on the left rating to the left shoulder and back.  He has a history of coronary artery disease and prior cardiac stenting.  Symptoms began approximately 30 minutes prior to arrival.  No nausea no vomiting.  He is not diaphoretic.  He did take a full dose aspirin today.  Attending physician also evaluated the patient at bedside shortly after arrival.  No known inciting factors.  Patient was not exerting himself or doing any heavy lifting etc. when the pain began.  Patient states that he has not had a heart  attack before but if he were to imagine what one felt like, this would be it.  He does appear to be acutely distressed upon arrival.        Review of Systems   Constitutional:  Negative for fatigue and fever.   Respiratory:  Positive for chest tightness and shortness of breath.    Cardiovascular:  Positive for chest pain. Negative for leg swelling.   Gastrointestinal:  Negative for abdominal pain.   Genitourinary:  Negative for flank pain.   Neurological:  Negative for weakness.   All other systems reviewed and are negative.          Objective       ED Triage Vitals   Temperature Pulse Blood Pressure Respirations SpO2 Patient Position - Orthostatic VS   05/18/25 1253 05/18/25 1252 05/18/25 1254 05/18/25 1252 05/18/25 1252 05/18/25 1252   97.8 °F (36.6 °C) (!) 125 (!) 83/53 (!) 32 96 % Sitting      Temp Source Heart Rate Source BP Location FiO2 (%) Pain Score    05/18/25 1253 05/18/25 1300 05/18/25 1252 -- 05/18/25 1252    Temporal Monitor Left arm  10 - Worst Possible Pain      Vitals      Date and Time Temp Pulse SpO2 Resp BP Pain Score FACES Pain Rating User   05/18/25 1830 -- 96 99 % 21 109/69 [place] [facility]   05/18/25 1815 -- 97 99 % 14 105/71 -- [place] [facility]   05/18/25 1800 -- 89 99 % 26 95/63 -- [place] [facility]   05/18/25 1745 -- 93 99 % 18 98/58 -- [place] [facility]   05/18/25 1730 -- 95 100 % 23 89/53 -- [place] [facility]   05/18/25 1715 -- 100 100 % 17 97/59 -- [place] [facility]   05/18/25 1700 -- 112 100 % 23 96/54 -- [place] [facility]   05/18/25 1645 -- 111 100 % 21 113/69 -- [place] [facility]   05/18/25 1630 -- 109 100 % 21 98/59 -- [place] [facility]   05/18/25 1615 -- 115 100 % 22 88/56 -- [place] [facility]   05/18/25 1600 -- 113 99 % 11 99/64 -- [place] [facility]   05/18/25 1545 -- 116 100 % 18 103/62 -- -- [facility]   05/18/25 1500 -- 100 100 % 14 91/69 -- -- [facility]   05/18/25 1430 -- 124 98 % 23 99/59 PA made aware prior to metoprolol admin -- -- [facility]   05/18/25 1400 -- 125 99 % 21 99/63 -- [place] [facility]   05/18/25 1330 -- 115 100 % 20 96/59 -- [place] [facility]   05/18/25 1307 -- 123 -- 20 96/52 10 - Worst Possible Pain -- [facility]    05/18/25 1300 -- 120 94 % 32  90/50 -- -- TAB   05/18/25 1258 -- -- 97 % 32 -- -- -- TAB   05/18/25 1254 -- -- -- -- 83/53 -- -- TAB   05/18/25 1253 97.8 °F (36.6 °C) -- -- -- -- 10 - Worst Possible Pain -- TAB   05/18/25 1252 -- 125 96 % 32 -- 10 - Worst Possible Pain -- TAB            Physical Exam  Vitals and nursing note reviewed.   Constitutional:       General: He is in acute distress.      Appearance: Normal appearance. He is well-developed and well-groomed. He is ill-appearing.   HENT:      Head: Normocephalic and atraumatic.      Right Ear: External ear normal.      Left Ear: External ear normal.      Nose: Nose normal.      Mouth/Throat:      Lips: Pink.     Eyes:      General: Lids are normal. Gaze aligned appropriately.       Cardiovascular:      Rate and Rhythm: Normal rate.      Pulses: Normal pulses.   Pulmonary:      Effort: Pulmonary effort is normal. No respiratory distress.   Abdominal:      Palpations: Abdomen is soft.      Tenderness: There is no abdominal tenderness.     Skin:     General: Skin is warm.      Capillary Refill: Capillary refill takes less than 2 seconds.     Neurological:      General: No focal deficit present.      Mental Status: He is alert and oriented to person, place, and time. Mental status is at baseline.     Psychiatric:         Behavior: Behavior is cooperative.         Results Reviewed       Procedure Component Value Units Date/Time    HS Troponin I 4hr [[id number]]  (Abnormal) Collected: 05/18/25 1718    Lab Status: Final result Specimen: Blood from Arm, Right Updated: 05/18/25 1804     hs TnI 4hr 1,366 ng/L      Delta 4hr hsTnI 1,363 ng/L     APTT six (6) hours after Heparin bolus/drip initiation or dosing change [[id number]]  (Normal) Collected: 05/18/25 1446    Lab Status: Final result Specimen: Blood from Arm, Right Updated: 05/18/25 1519     PTT 31 seconds     Protime-INR [[id number]]  (Abnormal) Collected: 05/18/25 1446    Lab Status: Final result Specimen: Blood  from Arm, Right Updated: 05/18/25 1519     Protime 16.2 seconds      INR 1.25    Narrative:      INR Therapeutic Range    Indication                                             INR Range      Atrial Fibrillation                                               2.0-3.0  Hypercoagulable State                                    2.0.2.3  Left Ventricular Asist Device                            2.0-3.0  Mechanical Heart Valve                                  -    Aortic(with afib, MI, embolism, HF, LA enlargement,    and/or coagulopathy)                                     2.0-3.0 (2.5-3.5)     Mitral                                                             2.5-3.5  Prosthetic/Bioprosthetic Heart Valve               2.0-3.0  Venous thromboembolism (VTE: VT, PE        2.0-3.0    HS Troponin I 2hr [[id number]]  (Abnormal) Collected: 05/18/25 1446    Lab Status: Final result Specimen: Blood from Arm, Right Updated: 05/18/25 1516     hs TnI 2hr 53 ng/L      Delta 2hr hsTnI 50 ng/L     HS Troponin 0hr (reflex protocol) [[id number]]  (Normal) Collected: 05/18/25 1258    Lab Status: Final result Specimen: Blood from Arm, Right Updated: 05/18/25 1327     hs TnI 0hr 3 ng/L     B-Type Natriuretic Peptide(BNP) [[id number]]  (Normal) Collected: 05/18/25 1258    Lab Status: Final result Specimen: Blood from Arm, Right Updated: 05/18/25 1325     BNP 88 pg/mL     Basic metabolic panel [[id number]] Collected: 05/18/25 1258    Lab Status: Final result Specimen: Blood from Arm, Right Updated: 05/18/25 1318     Sodium 139 mmol/L      Potassium 3.5 mmol/L      Chloride 108 mmol/L      CO2 22 mmol/L      ANION GAP 9 mmol/L      BUN 19 mg/dL      Creatinine 0.77 mg/dL      Glucose 119 mg/dL      Calcium 8.4 mg/dL      eGFR 94 ml/min/1.73sq m     Narrative:      National Kid[employer] Disease Foundation guidelines for Chronic Kidney Disease (CKD):     Stage 1 with normal or high GFR (GFR > 90 mL/min/1.73 square meters)    Stage 2 Mild CKD (GFR = 60-89  mL/min/1.73 square meters)    Stage 3A Moderate CKD (GFR = 45-59 mL/min/1.73 square meters)    Stage 3B Moderate CKD (GFR = 30-44 mL/min/1.73 square meters)    Stage 4 Severe CKD (GFR = 15-29 mL/min/1.73 square meters)    Stage 5 End Stage CKD (GFR <15 mL/min/1.73 square meters)  Note: GFR calculation is accurate only with a steady state creatinine    CBC and differential [[id number]]  (Abnormal) Collected: 05/18/25 1258    Lab Status: Final result Specimen: Blood from Arm, Right Updated: 05/18/25 1304     WBC 8.47 Thousand/uL      RBC 3.03 Million/uL      Hemoglobin 9.4 g/dL      Hematocrit 29.8 %      MCV 98 fL      MCH 31.0 pg      MCHC 31.5 g/dL      RDW 24.4 %      MPV 9.5 fL      Platelets 203 Thousands/uL      nRBC 0 /100 WBCs      Segmented % 77 %      Immature Grans % 0 %      Lymphocytes % 18 %      Monocytes % 3 %      Eosinophils Relative 2 %      Basophils Relative 0 %      Absolute Neutrophils 6.50 Thousands/µL      Absolute Immature Grans 0.01 Thousand/uL      Absolute Lymphocytes 1.52 Thousands/µL      Absolute Monocytes 0.23 Thousand/µL      Eosinophils Absolute 0.19 Thousand/µL      Basophils Absolute 0.02 Thousands/µL             CTA chest abdomen pelvis w wo contrast   Final Interpretation by Rahul Rahman MD (05/18 6656)      No acute aortic/arterial abnormality.      Stable postsurgical appearance status post Whipple.      Mild transverse colonic wall thickening can be seen with a nonspecific colitis.      Otherwise no acute abnormality identified in the chest, abdomen or pelvis.         The study was marked in EPIC for immediate notification.               Workstation performed: [id number]         X-ray chest 1 view portable   ED Interpretation by Luis Grajeda Jr., DO (05/18 1306)   Right hilar fullness      Final Interpretation by Emily Smith MD (05/18 0591)      No acute cardiopulmonary disease.            Workstation performed: [id number]             Procedures    ED  Medication and Procedure Management   Prior to Admission Medications   Prescriptions Last Dose Informant Patient Reported? Taking?   albuterol (ProAir HFA) 90 mcg/act inhaler  Self No No   Sig: Inhale 2 puffs every 6 (six) hours as needed for wheezing   aspirin 81 mg chewable tablet  Self Yes No   Sig: Chew 81 mg daily   atorvastatin (LIPITOR) 20 mg tablet  Self No No   Sig: Take 1 tablet (20 mg total) by mouth daily   betamethasone, augmented, (DIPROLENE) 0.05 % ointment   No No   Sig: Apply topically 2 (two) times a day for 7 days   capecitabine (XELODA) 150 MG tablet  Self No No   Sig: Take 2 tablets (300 mg total) by mouth 2 (two) times a day 21 days on, followed by 7 days off   capecitabine (XELODA) 500 MG tablet  Self No No   Sig: Take 2 tablets (1,000 mg total) by mouth 2 (two) times a day 21 days on, followed by 7 days off   dicyclomine (BENTYL) 10 mg capsule  Self No No   Sig: Take 1 capsule (10 mg total) by mouth 3 (three) times a day before meals   lisinopril (ZESTRIL) 20 mg tablet   No No   Sig: Take 1 tablet by mouth once daily   menthol-cetylpyridinium (CEPACOL) 3 MG lozenge  Self No No   Sig: Take 1 lozenge (3 mg total) by mouth as needed for sore throat   Patient not taking: Reported on 4/2/2025   ondansetron (ZOFRAN) 4 mg tablet   No No   Sig: TAKE 1 TABLET BY MOUTH EVERY 8 HOURS AS NEEDED FOR NAUSEA FOR VOMITING   oxyCODONE (Roxicodone) 5 immediate release tablet   No No   Sig: Take 1 tablet (5 mg total) by mouth every 6 (six) hours as needed for moderate pain Max Daily Amount: 20 mg   urea (CARMOL) 20 % cream   No No   Sig: Apply topically as needed for dry skin      Facility-Administered Medications: None     Patient's Medications   Discharge Prescriptions    No medications on file     No discharge procedures on file.  ED SEPSIS DOCUMENTATION   Time reflects when diagnosis was documented in both MDM as applicable and the Disposition within this note       Time User Action Codes Description  Comment    2025  6:25 PM Ronak Espinosa Add [R07.9] Chest pain     2025  6:25 PM Ronak Espinosa [I24.9] ACS (acute coronary syndrome) (HCC)     2025  6:25 PM Ronka Espinosa Add [I48.91] New onset a-fib (HCC)                    [1]   Social History  Tobacco Use    Smoking status: Former     Current packs/day: 0.00     Types: Cigarettes     Quit date: 2022     Years since quittin.3    Smokeless tobacco: Never   Vaping Use    Vaping status: Never Used   Substance Use Topics    Alcohol use: Not Currently    Drug use: Never        Ronak Espinosa PA-C  25

## 2025-05-19 NOTE — ASSESSMENT & PLAN NOTE
Patient with known history of pancreatic cancer status post Whipple procedure 09/12/2024   Patient follows with Dr. Cobly and is currently undergoing care chemotherapy  The patient is currently receiving chemotherapy with most recent treatment last week  Continue outpatient follow-up with oncology  Hold Xeloda as per patient currently on his off week     - supposed to have CT CAP w con on 5/20.   - will hold off as pt received contrast with cath  - if staying for next couple days, may consider obtaining CT CAP w con

## 2025-05-19 NOTE — ASSESSMENT & PLAN NOTE
Lab Results   Component Value Date    HGB 9.1 (L) 05/20/2025    HCT 29.0 (L) 05/20/2025     05/20/2025    WBC 6.08 05/20/2025     The history of anemia with previous baseline of 11 with most recent baseline between 9 and 10  Monitor CBCs daily  Consider checking iron panel/folate/B12

## 2025-05-19 NOTE — ASSESSMENT & PLAN NOTE
Patient reported severe left sided chest pain with palpations, diaphoresis, and shortness of breath, troponin spill and ST depressions. No significant epicardial disease on cardiac cath this admission.     Plan:  Pain correlates with afib with RVR, potentially demand ischemia in the setting of AS and anemia

## 2025-05-19 NOTE — ASSESSMENT & PLAN NOTE
S/p Whipple procedure in September 2024.  He follows with Dr. Colby and is currently undergoing chemotherapy.  Continue outpatient oncology follow-up.

## 2025-05-19 NOTE — ASSESSMENT & PLAN NOTE
Left sided chest pain with elevated troponins to 1366 with inferiorlateral ST depressions in the setting of Afib with RVR. Cardiac cath with no significant epicardial disease.     Elevated troponins are likely demand ischemia in the setting of patients elevated rates, anemia, and moderate AS vs vasospasm in the setting of chemotherapy.     Plan:  Currently on ASA, Eliquis, metoprolol tartrate 12.5mg bid  General cardiology following

## 2025-05-19 NOTE — ASSESSMENT & PLAN NOTE
The patient presented to the ED at [facility] on 5/18 complaining of acute onset sharp chest pain.  ECG noted inferior lateral ST depressions and troponin was elevated at 1366.  He was also noted to be in new onset A-fib/flutter with RVR.  Case was discussed with interventional cardiology who recommended transfer to [facility] for cardiac catheterization.    Plan:  S/p aspirin and Plavix load.  Continue aspirin 81 mg daily and Plavix 75 mg daily.  Continue heparin drip.  Continue high intensity atorvastatin 80 mg daily.  Beta-blocker held due to hypotension following administration of metoprolol.  Continue digoxin.  Follow-up official echocardiogram.  Telemetry monitoring.  Repeat ECG if chest pain returns.  Plan for cardiac catheterization today 5/19.

## 2025-05-19 NOTE — DISCHARGE INSTR - AVS FIRST PAGE
1. Please see the post cardiac catheterization dishcarge instructions.   No heavy lifting, greater than 10 lbs. or strenuous  activity for 48 hrs.    2.Remove band aid tomorrow.  Shower and wash area- wrist gently with soap and water- beginning tomorrow. Rinse and pat dry.  Apply new water seal band aid.  Repeat this process for 5 days. No powders, creams lotions or antibiotic ointments  for 5 days.  No tub baths, hot tubs or swimming for 5 days.     3. Please call our office ([phone]) if you have any fever, redness, swelling, discharge from your wrist access site.    4.No driving for 1 day      Please take the following medications, they have been sent to your preferred pharmacy [facility] on [address]:  Toprol XL 25 twice a day  Eliquis 5 twice a day  Aspirin 81 daily  Lipitor 80 daily  Lisinopril 20 daily  5/20 evening start amiodarone 200mg twice a day for 14 days followed by 200mg daily (on 6/4) thereafter.    Please do the following:  Follow-up appointments with EP and cardio-oncology.  Follow-up with PCP.  Consider checking iron panel, folate, B12 levels.  Please schedule an updated colonoscopy   Reschedule CT CAP w contrast with your heme-onc doctor

## 2025-05-19 NOTE — CONSULTS
Cardiology Consult H&P  Isaac Kramer 64 y.o. male MRN: [id number]  Unit/Bed#: [facility] 421-01 Encounter: [id number]  Physician Requesting Consult: Manjula Rodriges MD  Reason for Consult: ACS    HPI  64 y.o. male with a history of multivessel CAD s/p NAVA x 2 to proximal LAD and first diagonal in 2022, moderate aortic stenosis, pancreatic adenocarcinoma s/p Whipple procedure in September 2024 undergoing chemotherapy, hypertension and hyperlipidemia who presented to the ED at [facility] on 5/18 complaining of severe sharp left-sided chest pain that began 30 minutes prior to arrival.  The chest pain was associated with shortness of breath and diaphoresis.  ECG showed inferior lateral ST depressions and troponin was elevated at 1366.  He was started on aspirin, Plavix and a heparin drip due to concern for ACS.  While in the ED, he was also noted to have new onset A-fib/flutter with RVR for which he initially received metoprolol but later became hypotensive and was switched to digoxin and amiodarone.  Case was discussed with cardiology/interventional cardiology and the patient was transferred to [facility] for cardiac catheterization.    The patient was seen and examined at bedside upon arrival.  Overall he reported feeling well.  His chest pain had completely resolved from his initial presentation.  A limited bedside echo was obtained which showed normal LV function with no obvious regional wall motion abnormalities. Telemetry shows sinus rhythm with heart rates in the 70's. He remained hemodynamically stable.    Prior Cardiac Imaging  -TTE 8/16/24:    Left Ventricle: Left ventricular cavity size is normal. There is mild concentric hypertrophy. The left ventricular ejection fraction is 65%. Systolic function is normal. Wall motion is normal.    Aortic Valve: The aortic valve is trileaflet. The leaflets are mildly calcified. There is moderately reduced mobility. There is mild regurgitation. There is  moderate stenosis. The aortic valve peak velocity is 3.47 m/s. The aortic valve mean gradient is 31 mmHg.    Mitral Valve: There is mild annular calcification. There is mild regurgitation.    Prior study date: 6/30/2022. Mild progression of aortic valve stenosis.    -[facility] 7/28/22:  1st Mrg lesion is 50% stenosed.  Prox LAD lesion is 50% stenosed.  1st Diag lesion is 75% stenosed.  Ost LAD lesion is 30% stenosed.  Prox Cx lesion is 40% stenosed.  Mid RCA lesion is 40% stenosed.  RPDA lesion is 40% stenosed.  3v CAD  Moderate AS  Assessment & Plan  ACS (acute coronary syndrome) ([facility])  The patient presented to the ED at [facility] on 5/18 complaining of acute onset sharp chest pain.  ECG noted inferior lateral ST depressions and troponin was elevated at 1366.  He was also noted to be in new onset A-fib/flutter with RVR.  Case was discussed with interventional cardiology who recommended transfer to [facility] for cardiac catheterization.    Plan:  S/p aspirin and Plavix load.  Continue aspirin 81 mg daily and Plavix 75 mg daily.  Continue heparin drip.  Continue high intensity atorvastatin 80 mg daily.  Beta-blocker held due to hypotension following administration of metoprolol.  Continue digoxin.  Follow-up official echocardiogram.  Telemetry monitoring.  Repeat ECG if chest pain returns.  Plan for cardiac catheterization today 5/19.  Other chest pain  See plan above.  Coronary artery disease involving native coronary artery of native heart without angina pectoris  Prior [facility] in July 2022 showing three-vessel CAD s/p NAVA x 2 placed to LAD and first diagonal.  Continue DAPT and statin.  Plan for cardiac cath as above.  Atrial fibrillation (HCC)  New onset A-fib/flutter with RVR noted in the ED in the setting of ACS.  He initially received 5 mg IV Lopressor but became hypotensive and was switched to digoxin.  He also received x 1 Amio bolus.  He now remains in sinus rhythm.  [id number] score of 3 (age,  "hypertension, vascular disease)  Continue IV digoxin 250 mcg every 6 hours x 4 doses followed by 250 mcg daily.  Can consider retrialing beta-blocker following cardiac catheterization.  S/p 150 mg bolus of amiodarone on 5/18.  Consider restarting if heart rates remain uncontrolled.  Continue heparin drip.  Recommend eventual transition to Eliquis.  Monitor electrolytes and replete as needed.  Continue telemetry monitoring.  Benign essential hypertension  Home lisinopril 20 mg daily held in the setting of hypotension and cardiac catheterization.  Restart when able.  Other hyperlipidemia  Patient was on atorvastatin 20 mg daily at home.  Continue high intensity atorvastatin 80 mg daily.  Lipid panel from 6 months ago showing an LDL of 91 and a total cholesterol 147.  Aortic valve stenosis  Moderate aortic stenosis noted on prior TTE from July 2024 with a mean gradient of 31 and a peak velocity of 3.47.  Follow-up official TTE.  Pancreatic adenocarcinoma (HCC)  S/p Whipple procedure in September 2024.  He follows with Dr. Colby and is currently undergoing chemotherapy.  Continue outpatient oncology follow-up.  Anemia in other chronic diseases classified elsewhere  Previous baseline hemoglobin around 11.  Current hemoglobin 8.2.  Monitor CBC.          Intake/Output Summary (Last 24 hours) at 5/19/2025 0724  Last data filed at 5/19/2025 0601  Gross per 24 hour   Intake 24.84 ml   Output 200 ml   Net -175.16 ml         Jonathan Pham MD  -PGY-5 Cardiology Fellow  -[place] text enabled      ======================================================    Physical exam  Vitals: Blood pressure 105/53, pulse 66, temperature 98.6 °F (37 °C), temperature source Oral, resp. rate 16, height 5' 4\" (1.626 m), weight 63.2 kg (139 lb 5.3 oz).  Gen: Well appearing  Psych: AOx3  Skin: Intact  Neck: Supple  Cardiac: S1, S2, regular rate, no S3 or S4 appreciated. No murmurs. +2 PT, radial pulses. No peripheral edema. No carotid bruits.  Resp: " CTABL. No crackles  Abd: Nontender, nondistended  Rheum: No joint deformities in UE or LE    ======================================================    TREADMILL STRESS  Results for orders placed during the hospital encounter of 22    Stress test only, exercise    Interpretation Summary    Stress ECG: Arrhythmias during recovery: rare PVCs. The stress ECG is negative for ischemia after maximal exercise, without reproduction of symptoms.    Normal study.    Results reviewed with  patient.     ----------------------------------------------------------------------------------------------  NUCLEAR STRESS TEST: No results found for this or any previous visit.    No results found for this or any previous visit.      --------------------------------------------------------------------------------  CATH:  No results found for this or any previous visit.    --------------------------------------------------------------------------------  ECHO:   Results for orders placed during the hospital encounter of 20    Echo complete with contrast if indicated    Narrative  [address] [address]    Transthoracic Echocardiogram  2D, M-mode, Doppler, and Color Doppler    Study date:  2020    Patient: ANNE MARIE CASEY  MR number: [id number]  Account number: [id number]  : 1960  Age: 59 years  Gender: Male  Status: Outpatient  Location: [facility]  Height: 62 in  Weight: 144.8 lb  BP: 139/ 76 mmHg    Indications: Murmur    Diagnoses: R01.1 - Cardiac murmur, unspecified    Sonographer:  Peyton Loaiza RDCS  Primary Physician:  Pablo Perez DO  Referring Physician:  Pablo Perez DO  Group:  [facility]  Interpreting Physician:  Rick Robledo MD    SUMMARY    LEFT VENTRICLE:  Systolic function was normal. Ejection fraction was estimated to be 60 %.  There were no regional wall motion abnormalities.    MITRAL  VALVE:  There was mild regurgitation.    AORTIC VALVE:  The valve was trileaflet. Leaflets exhibited normal thickness, moderate calcification, and mildly reduced cuspal separation.  There was mild stenosis.  There was mild regurgitation.    HISTORY: PRIOR HISTORY: Hypertension, Smoker    PROCEDURE: The study was performed in the [facility]. This was a routine study. The transthoracic approach was used. The study included complete 2D imaging, M-mode, complete spectral Doppler, and color Doppler. The  heart rate was 68 bpm, at the start of the study. Images were obtained from the parasternal, apical, subcostal, and suprasternal notch acoustic windows. Image quality was adequate.    LEFT VENTRICLE: Size was normal. Systolic function was normal. Ejection fraction was estimated to be 60 %. There were no regional wall motion abnormalities. Wall thickness was normal. No evidence of apical thrombus. DOPPLER: Left  ventricular diastolic function parameters were normal.    RIGHT VENTRICLE: The size was normal. Systolic function was normal. Wall thickness was normal.    LEFT ATRIUM: Size was normal.    RIGHT ATRIUM: Size was normal.    MITRAL VALVE: Valve structure was normal. There was normal leaflet separation. DOPPLER: The transmitral velocity was within the normal range. There was no evidence for stenosis. There was mild regurgitation.    AORTIC VALVE: The valve was trileaflet. Leaflets exhibited normal thickness, moderate calcification, and mildly reduced cuspal separation. DOPPLER: Transaortic velocity was within the normal range. There was mild stenosis. There was mild  regurgitation.    TRICUSPID VALVE: The valve structure was normal. There was normal leaflet separation. DOPPLER: The transtricuspid velocity was within the normal range. There was no evidence for stenosis. There was no significant regurgitation.    PULMONIC VALVE: Leaflets exhibited normal thickness, no calcification, and normal  cuspal separation. DOPPLER: The transpulmonic velocity was within the normal range. There was mild regurgitation.    PERICARDIUM: There was no pericardial effusion. The pericardium was normal in appearance.    AORTA: The root exhibited normal size.    SYSTEMIC VEINS: IVC: The inferior vena cava was normal in size.    SYSTEM MEASUREMENT TABLES    2D  %FS: 39.65 %  AV Diam: 3.09 cm  Ao asc: 2.88 cm  EDV(Teich): 54.15 ml  EF(Teich): 71.16 %  ESV(Teich): 15.62 ml  IVSd: 1.07 cm  LA Area: 15.16 cm2  LA Diam: 3.18 cm  LVEDV MOD A4C: 90.19 ml  LVEF MOD A4C: 71.92 %  LVESV MOD A4C: 25.32 ml  LVIDd: 3.59 cm  LVIDs: 2.17 cm  LVLd A4C: 7.96 cm  LVLs A4C: 6.09 cm  LVOT Diam: 1.97 cm  LVPWd: 1.14 cm  RA Area: 15.32 cm2  RV Diam.: 2.87 cm  SI(Teich): 23.08 ml/m2  SV MOD A4C: 64.87 ml  SV(Cube): 36.17 ml  SV(Teich): 38.54 ml    CW  AR Dec Nance: 2.63 m/s2  AR Dec Time: 1840.8 ms  AR PHT: 533.83 ms  AR Vmax: 4.85 m/s  AR maxP.06 mmHg  AV Env.Ti: 336.41 ms  AV VTI: 56.98 cm  AV Vmax: 2.37 m/s  AV Vmean: 1.69 m/s  AV maxP.47 mmHg  AV meanP.85 mmHg  PV Vmax: 1.07 m/s  PV maxP.59 mmHg    MM  TAPSE: 2.23 cm    PW  ROXANN (VTI): 1.38 cm2  ROXANN Vmax: 1.47 cm2  E': 0.09 m/s  E/E': 10.37  LVOT Env.Ti: 332.72 ms  LVOT VTI: 25.88 cm  LVOT Vmax: 1.15 m/s  LVOT Vmean: 0.78 m/s  LVOT maxP.31 mmHg  LVOT meanP.85 mmHg  MV A Kevin: 1.01 m/s  MV Dec Nance: 3.71 m/s2  MV DecT: 257.12 ms  MV E Kevin: 0.95 m/s  MV E/A Ratio: 0.95  RVOT Vmax: 0.8 m/s  RVOT maxP.56 mmHg    [facility]    Prepared and electronically signed by    Rick Robledo MD  Signed 2020 09:12:39    No results found for this or any previous visit.    --------------------------------------------------------------------------------  HOLTER  No results found for this or any previous visit.    --------------------------------------------------------------------------------  CAROTIDS  No results found for  this or any previous visit.       [unfilled]   ======================================================      Review of Systems   Constitutional:  Negative for activity change, appetite change, chills, fatigue and fever.   HENT:  Negative for congestion, ear discharge, ear pain, hearing loss, sinus pain, sore throat, tinnitus and trouble swallowing.    Eyes:  Negative for pain and visual disturbance.   Respiratory:  Negative for cough, chest tightness, shortness of breath and wheezing.    Cardiovascular:  Negative for chest pain and leg swelling.   Gastrointestinal:  Negative for abdominal distention, abdominal pain, anal bleeding and blood in stool.   Endocrine: Negative for cold intolerance and heat intolerance.   Genitourinary:  Negative for difficulty urinating, dysuria and frequency.   Musculoskeletal:  Negative for arthralgias and myalgias.   Skin:  Negative for rash.   Allergic/Immunologic: Negative for environmental allergies and food allergies.   Neurological:  Negative for dizziness, light-headedness, numbness and headaches.   Hematological:  Negative for adenopathy.   Psychiatric/Behavioral:  Negative for agitation, behavioral problems and confusion.      ROS as noted above, otherwise 12 point review of systems was performed and is negative.     Historical Information   Past Medical History:   Diagnosis Date    Anemia in other chronic diseases classified elsewhere 5/18/2025    Benign essential hypertension 05/08/2014    CAD (coronary artery disease)     s/p NAVA prox LAD and D1 7/28/2022    Cancer (HCC)     Prostate    Coronary artery disease involving native coronary artery of native heart without angina pectoris 08/09/2022    Enteritis 08/28/2024    GERD (gastroesophageal reflux disease)     Hyperlipidemia     Hypertension     Other chest pain     Palpitations     Prostate cancer (HCC) 04/18/2023    Sleep apnea      Past Surgical History:   Procedure Laterality Date    CARDIAC CATHETERIZATION Left  7/28/2022    Procedure: Cardiac catheterization-left heart catheterization;  Surgeon: Sai Henry MD;  Location: [facility];  Service: Cardiology    CARDIAC CATHETERIZATION N/A 7/28/2022    Procedure: Cardiac pci;  Surgeon: Sai Henry MD;  Location: [facility];  Service: Cardiology    HERNIA REPAIR      IR EMBOLIZATION (SPECIFY VESSEL OR SITE)  9/13/2024    IR PORT PLACEMENT  10/28/2024    NJ LAPS SURG [id number] RPBIC RAD W/NRV SPARING ROBOT N/A 5/22/2023    Procedure: PROSTATECTOMY RADICAL & PELVIC LYMPH NODE DISSECTION  W/ ROBOT;  Surgeon: Otoniel Harmon MD;  Location: [facility];  Service: Urology    NJ PROSTATE NEEDLE BIOPSY ANY APPROACH N/A 2/28/2023    Procedure: TRANSRECTAL MRI FUSION BIOPSY PROSTATE;  Surgeon: Milan Arellano MD;  Location: [facility];  Service: Urology    WHIPPLE PROCEDURE/PANCREATICO-DUODENECTOMY N/A 9/12/2024    Procedure: WHIPPLE PROCEDURE/PANCREATICO-DUODENECTOMY;  Surgeon: Marika Ac MD;  Location: [facility];  Service: Surgical Oncology     Social History     Substance and Sexual Activity   Alcohol Use Not Currently    Comment: n/a     Social History     Substance and Sexual Activity   Drug Use Never    Comment: n/a     Tobacco Use History[1]  Family History:   Family History   Problem Relation Age of Onset    No Known Problems Mother     No Known Problems Father        Meds/Allergies   Hospital Medications:   Current Facility-Administered Medications:     acetaminophen (TYLENOL) tablet 650 mg, Q6H PRN    aspirin chewable tablet 81 mg, Daily    atorvastatin (LIPITOR) tablet 80 mg, Daily    [Held by provider] capecitabine (XELODA) tablet 1,000 mg, BID    [Held by provider] capecitabine (XELODA) tablet 300 mg, BID    clopidogrel (PLAVIX) tablet 75 mg, Daily    digoxin (LANOXIN) injection 250 mcg, Q6H    heparin (porcine) 25,000 units in 0.45% NaCl 250 mL infusion (premix), Titrated, Last Rate: 14 Units/kg/hr (05/19/25 0650)    heparin (porcine)  "injection 1,800 Units, Q6H PRN    heparin (porcine) injection 3,600 Units, Q6H PRN    [Held by provider] lisinopril (ZESTRIL) tablet 20 mg, Daily    Facility-Administered Medications Ordered in Other Encounters:     [MAR Hold] alteplase (CATHFLO) injection 2 mg, Q1MIN PRN  Home Medications:   Medications Prior to Admission:     aspirin 81 mg chewable tablet    atorvastatin (LIPITOR) 20 mg tablet    capecitabine (XELODA) 150 MG tablet    capecitabine (XELODA) 500 MG tablet    lisinopril (ZESTRIL) 20 mg tablet    albuterol (ProAir HFA) 90 mcg/act inhaler    betamethasone, augmented, (DIPROLENE) 0.05 % ointment    dicyclomine (BENTYL) 10 mg capsule    Allergies[2]    Objective   Vitals: Blood pressure 105/53, pulse 66, temperature 98.6 °F (37 °C), temperature source Oral, resp. rate 16, height 5' 4\" (1.626 m), weight 63.2 kg (139 lb 5.3 oz).  Orthostatic Blood Pressures      Flowsheet Row Most Recent Value   Blood Pressure 105/53 filed at 05/19/2025 0341   Patient Position - Orthostatic VS Lying filed at 05/19/2025 0341              Intake/Output Summary (Last 24 hours) at 5/19/2025 0724  Last data filed at 5/19/2025 0601  Gross per 24 hour   Intake 24.84 ml   Output 200 ml   Net -175.16 ml       Invasive Devices       Central Venous Catheter Line  Duration             Port A Cath 10/28/24 Left Internal jugular 202 days              Peripheral Intravenous Line  Duration             Peripheral IV 05/18/25 Left Antecubital <1 day    Peripheral IV 05/18/25 Right Antecubital <1 day                      Lab Results: I have personally reviewed pertinent lab results.    Results from last 7 days   Lab Units 05/19/25  0622 05/18/25  1258 05/13/25  1004   WBC Thousand/uL 4.93 8.47 8.00   HEMOGLOBIN g/dL 8.2* 9.4* 9.5*   HEMATOCRIT % 25.6* 29.8* 30.0*   PLATELETS Thousands/uL 161 203 210     Results from last 7 days   Lab Units 05/19/25  0622 05/18/25  1258 05/13/25  1004   POTASSIUM mmol/L 3.3* 3.5 3.5   CHLORIDE mmol/L 112* 108 " 108   CO2 mmol/L    BUN mg/dL    CREATININE mg/dL 0.63 0.77 0.68   CALCIUM mg/dL 7.8* 8.4 8.2*     Results from last 7 days   Lab Units 25  0622 25  0029 25  2110 25  1446   INR   --  1.24*  --  1.25*   PTT seconds 51* 57* 48* 31     Results from last 7 days   Lab Units 25  1004   MAGNESIUM mg/dL 2.0             [1]   Social History  Tobacco Use   Smoking Status Former    Current packs/day: 0.00    Types: Cigarettes    Quit date: 2022    Years since quittin.3    Passive exposure: Past   Smokeless Tobacco Never   Tobacco Comments    N/a   [2] No Known Allergies

## 2025-05-19 NOTE — CONSULTS
Consultation - Electrophysiology  Isaac Kramer 64 y.o. male MRN: [id number]  Unit/Bed#: [facility] 421-01 Encounter: [id number]      Inpatient consult to Electrophysiology  Consult performed by: Delano Garrett MD  Consult ordered by: Rahul Gabriel MD      PCP: Pablo Perez DO   Outpatient Cardiologist: Dr. Robledo    History of Present Illness   Physician Requesting Consult: Manjula Rodriges MD  Reason for Consult / Principal Problem: Afib with RVR    Assessment and Plan        Assessment & Plan  Atrial fibrillation (HCC)  No prior history of A-fib/flutter.  New diagnosis.  [id number] of 3     Rate: metoprolol tartrate 12.5mg bid (received digoxin 250ug x4)  Rhythm: none (s/p amio bolus)  AC: Eliquis 5mg BID    5/19 TTE:  LVEF 65% G1DD, no WMA  Normal RV  Normal LA/RA  AV mild to mod regurg, moderate stenosis  MV mild regurg  TV mild regurg    QTC peak 491    Plan:  Cannot rule out chemo-induced afib  Currently sinus rhythm and chest pain free  BP looks like it has rebounded, will increase bblocker to metoprolol succinate 25mg bid, first dose this afternoon  Please keep overnight for monitoring  Continue tele  Will need 30day zio patch on discharge with potential for loop  Continue AC  Other chest pain  Patient reported severe left sided chest pain with palpations, diaphoresis, and shortness of breath, troponin spill and ST depressions. No significant epicardial disease on cardiac cath this admission.     Plan:  Pain correlates with afib with RVR, potentially demand ischemia in the setting of AS and anemia   ACS (acute coronary syndrome) (HCC)  Left sided chest pain with elevated troponins to 1366 with inferiorlateral ST depressions in the setting of Afib with RVR. Cardiac cath with no significant epicardial disease.     Elevated troponins are likely demand ischemia in the setting of patients elevated rates, anemia, and moderate AS vs vasospasm in the setting of chemotherapy.      Plan:  Currently on ASA, Eliquis, metoprolol tartrate 12.5mg bid  General cardiology following    Benign essential hypertension  PTA lisinopril 20mg qd  Other hyperlipidemia  Cholesterol 79, triglycerides 77, HDL 20, LDL 36    Plan:  Patient takes atorvastatin 20 mg daily at home  Continue statin  Aortic valve stenosis  Moderate AV stenosis:  Peak velocity 4.12m/s  mPG 44mmHg  DVI 0.32  ROXANN 1.01cm2  Coronary artery disease involving native coronary artery of native heart without angina pectoris  Hx of multivessel CAD status post 2X NAVA (proximal LAD, first diagonal; 2022)    5/19 Cardiac cath: no significant epicardial disease    Plan:  Continue ASA and statin  Pancreatic adenocarcinoma (HCC)  Status post Whipple.    Was previously treated with modified FOLFIRINOX but did not tolerate. Subsequently transitioned to gemcitabine and capecitabine, completed 6 of 6 cycles last week    He he will be getting CT scan of the chest abdomen pelvis for close surveillance, and then will follow with radiation oncology team to discuss the potential benefit of adjuvant concurrent chemoradiation.     Was to undergo CT study tomorrow for [facility] planning, will reach out to the primary team to see if these can be ordered for him while inpatient.   Anemia in other chronic diseases classified elsewhere  Hgb 9.4 on arrival  Last colonoscopy done 2011 with recommendations for follow up every 5 years.       Thank you for involving us in the care of your patient.    Subjective     HPI:     Mr. Isaac Kramer is a 65yo M PMH of multivessel CAD status post 2X NAVA (proximal LAD, first diagonal; 2022), moderate aortic stenosis, history of pancreatic adenocarcinoma status post Whipple and currently on chemotherapy (gemcitabine, capecitabine), hypertension, hyperlipidemia, Raynaud's peripheral vascular disease.    Patient initially presented 5/18 to [facility] after experiencing severe left-sided chest pain that started while he was  sitting on the couch.  He felt diaphoretic with palpitations and short of breath.  No radiation.  Troponins 3/53/1366.  EKG showed A-fib with RVR with inferior lateral ST depressions.  He was loaded with aspirin, Plavix and started on heparin.  For his A-fib he was given 5 mg of IV metoprolol but reportedly became hypotensive due to this, and thus was given an amiodarone bolus and loaded with digoxin  250 mcg X4.  Metoprolol tartrate 12.5 mg twice daily was started on admission patient is currently normal sinus on telemetry since it start at minute. He was admitted for ACS and transferred SL for cardiac cath.    Cath today showed no significant epicardial disease.  There is a concern that his chest pain and elevated troponins may be due to A-fib, especially in the setting of his moderate aortic stenosis.  EP was consulted for assistance with management of this.    Patient seen bedside. He denies any current chest pain shortness of breath or palpitations. He has never felt the symptoms prior to this.  He is unable to pinpoint exactly when his chest pain resolved, but he believes it was the medications given to him on initial arrival to the ED.    He has a history of tobacco use, quit 1 year ago (1 pack/day).  Rarely drinks alcohol.  Denies drug use.  He is currently on gemcitabine infusions and capecitabine for regimen for his history of pancreatic adenocarcinoma.      Review of Systems  CONSTITUTIONAL: Denies any fever, chills, rigors, and weight loss  HEENT: No earache or tinnitus, denies hearing loss or visual disturbances  CARDIOVASCULAR: As noted in HPI  RESPIRATORY: Denies any cough, hemoptysis, +SOB  GASTROINTESTINAL: Denies any diarrhea or constipation  GENITOURINARY: No problems with urination, denies any hematuria or dysuria  NEUROLOGIC: No dizziness or vertigo, denies headaches   MUSCULOSKELETAL: Denies any muscle or joint pain   SKIN: Denies skin rashes or itching   ENDOCRINE: Denies excessive thirst,  denies intolerance to heat or cold      Objective     Physical Exam  Vitals and nursing note reviewed.   Constitutional:       General: He is not in acute distress.     Appearance: He is well-developed. He is not toxic-appearing.   HENT:      Head: Normocephalic and atraumatic.     Eyes:      Conjunctiva/sclera: Conjunctivae normal.       Cardiovascular:      Rate and Rhythm: Normal rate and regular rhythm.      Heart sounds: Murmur heard.      No friction rub. No gallop.   Pulmonary:      Effort: Pulmonary effort is normal. No respiratory distress.      Breath sounds: Normal breath sounds. No wheezing or rales.   Abdominal:      Palpations: Abdomen is soft.      Tenderness: There is no abdominal tenderness.     Musculoskeletal:         General: No swelling.      Cervical back: Neck supple.     Skin:     General: Skin is warm and dry.      Capillary Refill: Capillary refill takes less than 2 seconds.     Neurological:      Mental Status: He is alert.     Psychiatric:         Mood and Affect: Mood normal.       Vitals:  Temp:  [97.9 °F (36.6 °C)-98.6 °F (37 °C)] 97.9 °F (36.6 °C)  HR:  [] 69  BP: ()/(53-71) 132/65  Resp:  [11-26] 18  SpO2:  [96 %-100 %] 96 %  O2 Device: Nasal cannula  Nasal Cannula O2 Flow Rate (L/min):  [2 L/min] 2 L/min  Orthostatic Blood Pressures      Flowsheet Row Most Recent Value   Blood Pressure 132/65 filed at 05/19/2025 0858   Patient Position - Orthostatic VS Lying filed at 05/19/2025 0341            Intake and Outputs:  I/O         05/17 0701  05/18 0700 05/18 0701  05/19 0700 05/19 0701  05/20 0700    P.O.  0     I.V. (mL/kg)  45.1 (0.7)     Total Intake(mL/kg)  45.1 (0.7)     Urine (mL/kg/hr)  200     Stool  0     Total Output  200     Net  -154.9            Unmeasured Urine Occurrence  1 x     Unmeasured Stool Occurrence  0 x             Labs & Results:          Results from last 7 days   Lab Units 05/19/25  0622 05/18/25  1258 05/13/25  1004   POTASSIUM mmol/L 3.3* 3.5 3.5    CO2 mmol/L 24 22 25   CHLORIDE mmol/L 112* 108 108   BUN mg/dL 13 19 13   CREATININE mg/dL 0.63 0.77 0.68   EGFR ml/min/1.73sq m 102 94 99     Results from last 7 days   Lab Units 05/19/25  0622 05/13/25  1004   AST U/L 22 13   ALT U/L 18 12   ALK PHOS U/L 70 82   TOTAL PROTEIN g/dL 4.7* 5.6*   ALBUMIN g/dL 3.0* 3.7   TOTAL BILIRUBIN mg/dL 0.52 0.49     Results from last 7 days   Lab Units 05/19/25  0029 05/18/25  1446   PROTIME seconds 15.9* 16.2*   INR  1.24* 1.25*     Results from last 7 days   Lab Units 05/19/25  0622 05/18/25  1258 05/13/25  1004   HEMOGLOBIN g/dL 8.2* 9.4* 9.5*   HEMATOCRIT % 25.6* 29.8* 30.0*   PLATELETS Thousands/uL 161 203 210     Results from last 7 days   Lab Units 05/19/25  0622   TRIGLYCERIDES mg/dL 77   HDL mg/dL 28*   LDL CALC mg/dL 36       Cardiac Imaging/Monitoring     Telemetry:   Personally reviewed by Delano Garrett MD: sinus rates 60-70s    Previous Cath/PCI:  No results found for this or any previous visit.      Prior Echo:   Results for orders placed during the hospital encounter of 05/18/25    Echo complete w/ contrast if indicated    Interpretation Summary    Left Ventricle: Left ventricular cavity size is normal. Wall thickness is mildly increased. There is concentric remodeling. The left ventricular ejection fraction is 65%. Systolic function is normal. Wall motion is normal. Diastolic function is mildly abnormal, consistent with grade I (abnormal) relaxation.    Right Ventricle: Right ventricular cavity size is normal. Systolic function is normal.    Left Atrium: The atrium is normal in size.    Right Atrium: The atrium is normal in size.    Aortic Valve: The aortic valve is trileaflet. The leaflets are mildly thickened. The leaflets are moderately calcified. There is moderately reduced mobility. There is mild to moderate regurgitation. There is moderate stenosis. The aortic valve peak velocity is 4.12 m/s. The aortic valve mean gradient is 44 mmHg. The  dimensionless velocity index is 0.32. The aortic valve area is 1.01 cm2. The stroke volume index is 61.90 ml/m2. The aortic valve velocity is increased in the setting of stenosis and increased flow.    Mitral Valve: There is mild regurgitation.    Tricuspid Valve: There is mild regurgitation.    Prior TTE study available for comparison. Prior study date: 8/16/2024. No significant changes noted compared to the prior study.      Results for orders placed during the hospital encounter of 08/16/24    Echo complete w/ contrast if indicated    Interpretation Summary    Left Ventricle: Left ventricular cavity size is normal. There is mild concentric hypertrophy. The left ventricular ejection fraction is 65%. Systolic function is normal. Wall motion is normal.    Aortic Valve: The aortic valve is trileaflet. The leaflets are mildly calcified. There is moderately reduced mobility. There is mild regurgitation. There is moderate stenosis. The aortic valve peak velocity is 3.47 m/s. The aortic valve mean gradient is 31 mmHg.    Mitral Valve: There is mild annular calcification. There is mild regurgitation.    Prior study date: 6/30/2022. Mild progression of aortic valve stenosis.      Prior Stress Test:  Results for orders placed during the hospital encounter of 06/20/22    Stress test only, exercise    Interpretation Summary    Stress ECG: Arrhythmias during recovery: rare PVCs. The stress ECG is negative for ischemia after maximal exercise, without reproduction of symptoms.    Normal study.    Results reviewed with  patient.       Holter:       Recent Device Check:  No results found for any visits on 05/18/25.     EKG:    Encounter Date: 05/18/25   ECG 12 lead   Result Value    Ventricular Rate 119    Atrial Rate     VA Interval     QRSD Interval 66    QT Interval 332    QTC Interval 468    P Axis     QRS Axis -56    T Wave Axis -71    Narrative    Atrial fibrillation with rapid ventricular response with premature ventricular  or aberrantly conducted complexes  Left axis deviation  Low voltage QRS  Septal infarct , age undetermined  Marked ST abnormality, possible inferior subendocardial injury  Abnormal ECG  When compared with ECG of 18-May-2025 13:42, (unconfirmed)  QRS duration has decreased  T wave inversion now evident in Inferior leads  Nonspecific T wave abnormality now evident in Anterolateral leads  Confirmed by Imer Soares ([id number]) on 5/19/2025 5:06:52 AM       Delano Garrett MD  Cardiovascular Disease Fellow, FY-1  [facility]

## 2025-05-19 NOTE — ASSESSMENT & PLAN NOTE
Current medication: Lipitor 20 mg daily    Lipid       Lab Results   Component Value Date    CHOLESTEROL 79 05/19/2025    TRIG 77 05/19/2025    HDL 28 (L) 05/19/2025    LDLCALC 36 05/19/2025        Plan  -atorvastatin 80 mg daily

## 2025-05-19 NOTE — PLAN OF CARE
Problem: PAIN - ADULT  Goal: Verbalizes/displays adequate comfort level or baseline comfort level  Description: Interventions:  - Encourage patient to monitor pain and request assistance  - Assess pain using appropriate pain scale  - Administer analgesics as ordered based on type and severity of pain and evaluate response  - Implement non-pharmacological measures as appropriate and evaluate response  - Consider cultural and social influences on pain and pain management  - Notify physician/advanced practitioner if interventions unsuccessful or patient reports new pain  - Educate patient/family on pain management process including their role and importance of  reporting pain   - Provide non-pharmacologic/complimentary pain relief interventions  Outcome: Progressing     Problem: INFECTION - ADULT  Goal: Absence or prevention of progression during hospitalization  Description: INTERVENTIONS:  - Assess and monitor for signs and symptoms of infection  - Monitor lab/diagnostic results  - Monitor all insertion sites, i.e. indwelling lines, tubes, and drains  - Monitor endotracheal if appropriate and nasal secretions for changes in amount and color  - [place] appropriate cooling/warming therapies per order  - Administer medications as ordered  - Instruct and encourage patient and family to use good hand hygiene technique  - Identify and instruct in appropriate isolation precautions for identified infection/condition  Outcome: Progressing  Goal: Absence of fever/infection during neutropenic period  Description: INTERVENTIONS:  - Monitor WBC  - Perform strict hand hygiene  - Limit to healthy visitors only  - No plants, dried, fresh or silk flowers with garcia in patient room  Outcome: Progressing     Problem: SAFETY ADULT  Goal: Patient will remain free of falls  Description: INTERVENTIONS:  - Educate patient/family on patient safety including physical limitations  - Instruct patient to call for assistance with activity   -  Consider consulting OT/PT to assist with strengthening/mobility based on AM PAC & JH-HLM score  - Consult OT/PT to assist with strengthening/mobility   - Keep Call bell within reach  - Keep bed low and locked with side rails adjusted as appropriate  - Keep care items and personal belongings within reach  - Initiate and maintain comfort rounds  - Make Fall Risk Sign visible to staff  - Offer Toileting every 2 Hours, in advance of need  - Initiate/Maintain bed/chair alarm  - Obtain necessary fall risk management equipment:   - Apply yellow socks and bracelet for high fall risk patients  - Consider moving patient to room near nurses station  Outcome: Progressing  Goal: Maintain or return to baseline ADL function  Description: INTERVENTIONS:  -  Assess patient's ability to carry out ADLs; assess patient's baseline for ADL function and identify physical deficits which impact ability to perform ADLs (bathing, care of mouth/teeth, toileting, grooming, dressing, etc.)  - Assess/evaluate cause of self-care deficits   - Assess range of motion  - Assess patient's mobility; develop plan if impaired  - Assess patient's need for assistive devices and provide as appropriate  - Encourage maximum independence but intervene and supervise when necessary  - Involve family in performance of ADLs  - Assess for home care needs following discharge   - Consider OT consult to assist with ADL evaluation and planning for discharge  - Provide patient education as appropriate  - Monitor functional capacity and physical performance, use of AM PAC & JH-HLM   - Monitor gait, balance and fatigue with ambulation    Outcome: Progressing  Goal: Maintains/Returns to pre admission functional level  Description: INTERVENTIONS:  - Perform AM-PAC 6 Click Basic Mobility/ Daily Activity assessment daily.  - Set and communicate daily mobility goal to care team and patient/family/caregiver.   - Collaborate with rehabilitation services on mobility goals if  consulted  - Perform Range of Motion 3 times a day.  - Reposition patient every 2 hours.  - Dangle patient 3 times a day  - Stand patient 3 times a day  - Ambulate patient 3 times a day  - Out of bed to chair 3 times a day   - Out of bed for meals 3 times a day  - Out of bed for toileting  - Record patient progress and toleration of activity level   Outcome: Progressing     Problem: DISCHARGE PLANNING  Goal: Discharge to home or other facility with appropriate resources  Description: INTERVENTIONS:  - Identify barriers to discharge w/patient and caregiver  - Arrange for needed discharge resources and transportation as appropriate  - Identify discharge learning needs (meds, wound care, etc.)  - Arrange for interpretive services to assist at discharge as needed  - Refer to Case Management Department for coordinating discharge planning if the patient needs post-hospital services based on physician/advanced practitioner order or complex needs related to functional status, cognitive ability, or social support system  Outcome: Progressing     Problem: Knowledge Deficit  Goal: Patient/family/caregiver demonstrates understanding of disease process, treatment plan, medications, and discharge instructions  Description: Complete learning assessment and assess knowledge base.  Interventions:  - Provide teaching at level of understanding  - Provide teaching via preferred learning methods  Outcome: Progressing     Problem: Prexisting or High Potential for Compromised Skin Integrity  Goal: Skin integrity is maintained or improved  Description: INTERVENTIONS:  - Identify patients at risk for skin breakdown  - Assess and monitor skin integrity including under and around medical devices   - Assess and monitor nutrition and hydration status  - Monitor labs  - Assess for incontinence   - Turn and reposition patient  - Assist with mobility/ambulation  - Relieve pressure over evangelist prominences   - Avoid friction and shearing  - Provide  appropriate hygiene as needed including keeping skin clean and dry  - Evaluate need for skin moisturizer/barrier cream  - Collaborate with interdisciplinary team  - Patient/family teaching  - Consider wound care consult    Assess:  - Review Sriram scale daily  - Clean and moisturize skin every shift  - Inspect skin when repositioning, toileting, and assisting with ADLS  - Assess under medical devices such as every   - Assess extremities for adequate circulation and sensation     Bed Management:  - Have minimal linens on bed & keep smooth, unwrinkled  - Change linens as needed when moist or perspiring  - Avoid sitting or lying in one position for more than 2 hours while in bed?Keep HOB at 30 degrees   - Toileting:  - Offer bedside commode  - Assess for incontinence every 2 hours  - Use incontinent care products after each incontinent episode such as     Activity:  - Mobilize patient 3 times a day  - Encourage activity and walks on unit  - Encourage or provide ROM exercises   - Turn and reposition patient every 2 Hours  - Use appropriate equipment to lift or move patient in bed  - Instruct/ Assist with weight shifting every 2 hours when out of bed in chair  - Consider limitation of chair time 2 hour intervals    Skin Care:  - Avoid use of baby powder, tape, friction and shearing, hot water or constrictive clothing  - Relieve pressure over bony prominences using   - Do not massage red bony areas    Next Steps:  - Teach patient strategies to minimize risks such as   - Consider consults to  interdisciplinary teams such as   Outcome: Progressing     Problem: Nutrition/Hydration-ADULT  Goal: Nutrient/Hydration intake appropriate for improving, restoring or maintaining nutritional needs  Description: Monitor and assess patient's nutrition/hydration status for malnutrition. Collaborate with interdisciplinary team and initiate plan and interventions as ordered.  Monitor patient's weight and dietary intake as ordered or per  policy. Utilize nutrition screening tool and intervene as necessary. Determine patient's food preferences and provide high-protein, high-caloric foods as appropriate.     INTERVENTIONS:  - Monitor oral intake, urinary output, labs, and treatment plans  - Assess nutrition and hydration status and recommend course of action  - Evaluate amount of meals eaten  - Assist patient with eating if necessary   - Allow adequate time for meals  - Recommend/ encourage appropriate diets, oral nutritional supplements, and vitamin/mineral supplements  - Order, calculate, and assess calorie counts as needed  - Recommend, monitor, and adjust tube feedings and TPN/PPN based on assessed needs  - Assess need for intravenous fluids  - Provide specific nutrition/hydration education as appropriate  - Include patient/family/caregiver in decisions related to nutrition  Outcome: Progressing

## 2025-05-19 NOTE — ASSESSMENT & PLAN NOTE
Prior C in July 2022 showing three-vessel CAD s/p NAVA x 2 placed to LAD and first diagonal.  Continue DAPT and statin.  Plan for cardiac cath as above.

## 2025-05-19 NOTE — ASSESSMENT & PLAN NOTE
No prior history of A-fib/flutter.  New diagnosis.  [id number] of 3     Rate: metoprolol tartrate 12.5mg bid (received digoxin 250ug x4)  Rhythm: none (s/p amio bolus)  AC: Eliquis 5mg BID    5/19 TTE:  LVEF 65% G1DD, no WMA  Normal RV  Normal LA/RA  AV mild to mod regurg, moderate stenosis  MV mild regurg  TV mild regurg    QTC peak 491    Plan:  Cannot rule out chemo-induced afib  Currently sinus rhythm and chest pain free  BP looks like it has rebounded, will increase bblocker to metoprolol succinate 25mg bid, first dose this afternoon  Please keep overnight for monitoring  Continue tele  Will need 30day zio patch on discharge with potential for loop  Continue AC

## 2025-05-19 NOTE — ASSESSMENT & PLAN NOTE
Hx of multivessel CAD status post 2X NAVA (proximal LAD, first diagonal; 2022)    5/19 Cardiac cath: no significant epicardial disease    Plan:  Continue ASA and statin   Patient's son called in regards to patient's upcoming appt with VINICIO Mcgowan on 3/19, he would like to know if she needs to be seen. Patient stated she has no concerns/no pain. Patient's son did state that the steri strips did fall off. Updated photo was sent to VINICIO Mcgowan for review. Will call patient's son back with VINICIO Mcgowan response.

## 2025-05-19 NOTE — ASSESSMENT & PLAN NOTE
Patient was on atorvastatin 20 mg daily at home.  Continue high intensity atorvastatin 80 mg daily.  Lipid panel from 6 months ago showing an LDL of 91 and a total cholesterol 147.

## 2025-05-19 NOTE — ASSESSMENT & PLAN NOTE
Status post Whipple.    Was previously treated with modified FOLFIRINOX but did not tolerate. Subsequently transitioned to gemcitabine and capecitabine, completed 6 of 6 cycles last week    He he will be getting CT scan of the chest abdomen pelvis for close surveillance, and then will follow with radiation oncology team to discuss the potential benefit of adjuvant concurrent chemoradiation.     Was to undergo CT study tomorrow for radonc planning, will reach out to the primary team to see if these can be ordered for him while inpatient.

## 2025-05-19 NOTE — ASSESSMENT & PLAN NOTE
- Patient presented to [place] ED for left sided sharp crushing chest pain that started 30 minutes before presentation to the ED  - In ED EKG that showed inferior lead ST depressions along with elevated troponin 1K.  Subsequently patient loaded with aspirin and Plavix and started on heparin drip  - Patient also noted to be tachycardic with a regular rhythm concerning for A-fib/a flutter with RVR.  Patient treated with metoprolol but unfortunately became hypotensive.  Patient currently on amiodarone and digoxin for rate control  - Cardiology consulted who recommended patient be transferred to [facility] for evaluation and possible cardiac cath    S/p ASA and plavix load  S/p  digoxin and amiodarone load    5/19 echo: EF65%, mildly increased wall thickness, diastolic grade 1 relaxation, moderate AR and AS  5/19 heart cath: no significant obstructive CAD    Plan  - Cardiology consult, appreciate recs  - EP consult, appreciate recs   - increased Toprol XL to 25 BID   - restarted eliquis 5 BID   - ASA 81, Lipitor 80   - Lisinopril 20   - d/ladonna plavix   - cannot r/o chemo induced Afib   - suspect troponin in setting of afib   - outpt zio patch  - Telemetry

## 2025-05-19 NOTE — TELEPHONE ENCOUNTER
Wife called to verify patient's appointment was cancelled for today due to him being admitted to the hospital.      Wife aware appointment had been cancelled.

## 2025-05-19 NOTE — ASSESSMENT & PLAN NOTE
"- Patient with history of CAD last cardiac cath done in 2022 showed \"[facility] 07/28/2022 1st Mrg lesion is 50% stenosed. Prox LAD lesion is 50% stenosed. 1st Diag lesion is 75% stenosed. Ost LAD lesion is 30% stenosed. Prox Cx lesion is 40% stenosed. Mid RCA lesion is 40% stenosed. RPDA lesion is 40% stenosed. 3v CAD Moderate AS\"   - Patient had drug-eluting stent placed previously      Plan  -Heparin GTT  -Aspirin, Plavix and atorvastatin  -Cardiology consult  " Breast imaging completed

## 2025-05-19 NOTE — QUICK NOTE
Limited bedside echo showing normal LV function and no obvious regional wall motion abnormalities.  The patient denied any chest pain during the exam.  Will obtain complete echocardiogram later today. Cardiac catheterization was ordered.

## 2025-05-20 VITALS
WEIGHT: 139 LBS | DIASTOLIC BLOOD PRESSURE: 66 MMHG | TEMPERATURE: 98 F | BODY MASS INDEX: 23.73 KG/M2 | RESPIRATION RATE: 18 BRPM | SYSTOLIC BLOOD PRESSURE: 121 MMHG | HEIGHT: 64 IN | OXYGEN SATURATION: 100 % | HEART RATE: 88 BPM

## 2025-05-20 LAB
ANION GAP SERPL CALCULATED.3IONS-SCNC: 5 MMOL/L (ref 4–13)
BUN SERPL-MCNC: 9 MG/DL (ref 5–25)
CALCIUM SERPL-MCNC: 8.4 MG/DL (ref 8.4–10.2)
CHLORIDE SERPL-SCNC: 110 MMOL/L (ref 96–108)
CO2 SERPL-SCNC: 25 MMOL/L (ref 21–32)
CREAT SERPL-MCNC: 0.73 MG/DL (ref 0.6–1.3)
ERYTHROCYTE [DISTWIDTH] IN BLOOD BY AUTOMATED COUNT: 24.5 % (ref 11.6–15.1)
GFR SERPL CREATININE-BSD FRML MDRD: 96 ML/MIN/1.73SQ M
GLUCOSE SERPL-MCNC: 96 MG/DL (ref 65–140)
HCT VFR BLD AUTO: 29 % (ref 36.5–49.3)
HGB BLD-MCNC: 9.1 G/DL (ref 12–17)
INR PPP: 1.31 (ref 0.85–1.19)
MAGNESIUM SERPL-MCNC: 1.9 MG/DL (ref 1.9–2.7)
MCH RBC QN AUTO: 31.2 PG (ref 26.8–34.3)
MCHC RBC AUTO-ENTMCNC: 31.4 G/DL (ref 31.4–37.4)
MCV RBC AUTO: 99 FL (ref 82–98)
PHOSPHATE SERPL-MCNC: 2.8 MG/DL (ref 2.3–4.1)
PLATELET # BLD AUTO: 175 THOUSANDS/UL (ref 149–390)
PMV BLD AUTO: 10.3 FL (ref 8.9–12.7)
POTASSIUM SERPL-SCNC: 3.9 MMOL/L (ref 3.5–5.3)
PROTHROMBIN TIME: 16.6 SECONDS (ref 12.3–15)
RBC # BLD AUTO: 2.92 MILLION/UL (ref 3.88–5.62)
SODIUM SERPL-SCNC: 140 MMOL/L (ref 135–147)
WBC # BLD AUTO: 6.08 THOUSAND/UL (ref 4.31–10.16)

## 2025-05-20 PROCEDURE — 85610 PROTHROMBIN TIME: CPT

## 2025-05-20 PROCEDURE — 99232 SBSQ HOSP IP/OBS MODERATE 35: CPT | Performed by: INTERNAL MEDICINE

## 2025-05-20 PROCEDURE — 85027 COMPLETE CBC AUTOMATED: CPT

## 2025-05-20 PROCEDURE — 84100 ASSAY OF PHOSPHORUS: CPT

## 2025-05-20 PROCEDURE — 80048 BASIC METABOLIC PNL TOTAL CA: CPT

## 2025-05-20 PROCEDURE — 83735 ASSAY OF MAGNESIUM: CPT

## 2025-05-20 RX ORDER — AMIODARONE HYDROCHLORIDE 200 MG/1
200 TABLET ORAL 2 TIMES DAILY WITH MEALS
Status: DISCONTINUED | OUTPATIENT
Start: 2025-05-20 | End: 2025-05-20 | Stop reason: HOSPADM

## 2025-05-20 RX ORDER — ATORVASTATIN CALCIUM 80 MG/1
80 TABLET, FILM COATED ORAL DAILY
Qty: 30 TABLET | Refills: 0 | Status: SHIPPED | OUTPATIENT
Start: 2025-05-21

## 2025-05-20 RX ORDER — METOPROLOL SUCCINATE 25 MG/1
25 TABLET, EXTENDED RELEASE ORAL 2 TIMES DAILY
Status: DISCONTINUED | OUTPATIENT
Start: 2025-05-20 | End: 2025-05-20 | Stop reason: HOSPADM

## 2025-05-20 RX ORDER — AMIODARONE HYDROCHLORIDE 200 MG/1
TABLET ORAL
Qty: 58 TABLET | Refills: 0 | Status: SHIPPED | OUTPATIENT
Start: 2025-05-20 | End: 2025-05-20

## 2025-05-20 RX ORDER — METOPROLOL SUCCINATE 25 MG/1
25 TABLET, EXTENDED RELEASE ORAL 2 TIMES DAILY
Qty: 60 TABLET | Refills: 0 | Status: SHIPPED | OUTPATIENT
Start: 2025-05-20

## 2025-05-20 RX ORDER — AMIODARONE HYDROCHLORIDE 200 MG/1
TABLET ORAL
Qty: 58 TABLET | Refills: 0 | Status: SHIPPED | OUTPATIENT
Start: 2025-05-20 | End: 2025-07-03

## 2025-05-20 RX ORDER — OXYCODONE HYDROCHLORIDE 5 MG/1
5 TABLET ORAL ONCE
Refills: 0 | Status: COMPLETED | OUTPATIENT
Start: 2025-05-20 | End: 2025-05-20

## 2025-05-20 RX ORDER — AMIODARONE HYDROCHLORIDE 200 MG/1
200 TABLET ORAL
Status: DISCONTINUED | OUTPATIENT
Start: 2025-06-04 | End: 2025-05-20 | Stop reason: HOSPADM

## 2025-05-20 RX ADMIN — OXYCODONE HYDROCHLORIDE 5 MG: 5 TABLET ORAL at 06:40

## 2025-05-20 RX ADMIN — LISINOPRIL 20 MG: 20 TABLET ORAL at 08:40

## 2025-05-20 RX ADMIN — ATORVASTATIN CALCIUM 80 MG: 80 TABLET, FILM COATED ORAL at 08:40

## 2025-05-20 RX ADMIN — ASPIRIN 81 MG CHEWABLE TABLET 81 MG: 81 TABLET CHEWABLE at 08:40

## 2025-05-20 RX ADMIN — METOPROLOL SUCCINATE 25 MG: 25 TABLET, EXTENDED RELEASE ORAL at 08:40

## 2025-05-20 RX ADMIN — APIXABAN 5 MG: 5 TABLET, FILM COATED ORAL at 08:40

## 2025-05-20 NOTE — PROGRESS NOTES
"Cardiology Progress Note - Isaac Kramer 64 y.o. male MRN: [id number]    Unit/Bed#: [facility] 421-01 Encounter: [id number]      Assessment:  Principal Problem:    Other chest pain  Active Problems:    Benign essential hypertension    Other hyperlipidemia    Aortic valve stenosis    Coronary artery disease involving native coronary artery of native heart without angina pectoris    Pancreatic adenocarcinoma (HCC)    Anemia in other chronic diseases classified elsewhere    ACS (acute coronary syndrome) (HCC)    Atrial fibrillation (HCC)      Plan:  Patient with no issues overnight.  He has no chest pain or significant dyspnea.  He is in sinus rhythm on telemetry.  No further atrial fibrillation.  EP consult appreciated.  Patient on increased dose of metoprolol to tartrate.  Blood pressure improved today.  Echocardiogram with preserved LV systolic function and mild LVH.  There is moderate AS and mild to moderate AI.  Mild MR/TR noted.  Atrial fibrillation likely trigger for acute event.  Cardiac catheterization revealed no severe CAD.  Patient on Eliquis and ASA.  BMP today with potassium of 3.9 and creatinine of 0.73.  Hemoglobin 9.1.  Patient with ongoing chemotherapy in reference to pancreatic cancer.  CTA 5/18/2025 demonstrated stable postsurgical appearance status post Whipple.    Subjective:   Patient seen and examined.  No significant events overnight.  negative.    Objective:     Vitals: Blood pressure 141/73, pulse 96, temperature 98 °F (36.7 °C), resp. rate 18, height 5' 4\" (1.626 m), weight 63 kg (139 lb), SpO2 100%., Body mass index is 23.86 kg/m².,   Orthostatic Blood Pressures      Flowsheet Row Most Recent Value   Blood Pressure 141/73 filed at 05/20/2025 0732   Patient Position - Orthostatic VS Lying filed at 05/20/2025 0212        ,      Intake/Output Summary (Last 24 hours) at 5/20/2025 0856  Last data filed at 5/20/2025 0601  Gross per 24 hour   Intake 563 ml   Output 0 ml   Net 563 ml       No " significant arrhythmias seen on telemetry review.       Physical Exam:    GEN: Isaac Kramer appears well, alert and oriented x 3, pleasant and cooperative   NECK: supple, no JVD or HJR  HEART: normal rate, regular rhythm, normal S1 and S2, systolic murmur  LUNGS: clear to auscultation bilaterally; no wheezes, rales, or rhonchi   ABDOMEN: no distention  EXTREMITIES: peripheral pulses normal; no clubbing, cyanosis, or edema  SKIN: warm and well perfused, no suspicious lesions on exposed skin    Labs & Results:    Admission on 05/18/2025   Component Date Value    PTT 05/19/2025 57 (H)     Protime 05/19/2025 15.9 (H)     INR 05/19/2025 1.24 (H)     AV peak gradient 05/19/2025 65     Triscuspid Valve Regurgi* 05/19/2025 31.0     RAA A4C 05/19/2025 12.9     LA Volume Index (BP) 05/19/2025 32.7     MV Peak A Kevin 05/19/2025 1.36     MV stenosis pressure 1/2* 05/19/2025 85     MV Peak E Kevin 05/19/2025 123     AV peak gradient 05/19/2025 68     LVOT stroke volume 05/19/2025 101.27     Ao VTI 05/19/2025 99.89     Aortic valve peak veloci* 05/19/2025 4.12     LVOT peak VTI 05/19/2025 32.25     LVOT peak kevin 05/19/2025 1.32     LVOT diameter 05/19/2025 2.0     E wave deceleration time 05/19/2025 292     E/A ratio 05/19/2025 0.90     MV valve area p 1/2 meth* 05/19/2025 2.59     AV LVOT peak gradient 05/19/2025 7     AV mean gradient 05/19/2025 44     AV regurgitation pressur* 05/19/2025 413     TR Peak Kevin 05/19/2025 2.8     AV area peak kevin 05/19/2025 1.0     AV area by cont VTI 05/19/2025 1.0     LVOT mn grad 05/19/2025 5.0     RVID d 05/19/2025 3.3     A4C EF 05/19/2025 68     Aortic valve mean veloci* 05/19/2025 32.00     Tricuspid valve peak reg* 05/19/2025 2.80     Left ventricular stroke * 05/19/2025 41.00     IVSd 05/19/2025 1.30     Tricuspid annular plane * 05/19/2025 1.80     Ao root 05/19/2025 3.40     LVPWd 05/19/2025 1.10     LA size 05/19/2025 3.8     Asc Ao 05/19/2025 3.4     LA volume (BP) 05/19/2025 55      FS 05/19/2025 33     LVIDS 05/19/2025 2.70     IVS 05/19/2025 1.3     LVIDd 05/19/2025 4.00     LA length (A2C) 05/19/2025 5.20     AV Deceleration Time 05/19/2025 1,425     LEFT VENTRICLE SYSTOLIC * 05/19/2025 27     LV DIASTOLIC VOLUME (MOD* 05/19/2025 68     LVOT Cardiac Index 05/19/2025 4.43     LVOT stroke volume index 05/19/2025 61.90     LVOT Cardiac Output 05/19/2025 7.44     Left Atrium Area-systoli* 05/19/2025 17.9     Left Atrium Area-systoli* 05/19/2025 20.3     MV E' Tissue Velocity La* 05/19/2025 9     MV E' Tissue Velocity Se* 05/19/2025 8     LVSV, 2D 05/19/2025 41     LVOT area 05/19/2025 3.14     DVI 05/19/2025 0.32     AV valve area 05/19/2025 1.01     BSA 05/19/2025 1.68     LV EF 05/19/2025 65     Sodium 05/19/2025 140     Potassium 05/19/2025 3.3 (L)     Chloride 05/19/2025 112 (H)     CO2 05/19/2025 24     ANION GAP 05/19/2025 4     BUN 05/19/2025 13     Creatinine 05/19/2025 0.63     Glucose 05/19/2025 84     Calcium 05/19/2025 7.8 (L)     Corrected Calcium 05/19/2025 8.6     AST 05/19/2025 22     ALT 05/19/2025 18     Alkaline Phosphatase 05/19/2025 70     Total Protein 05/19/2025 4.7 (L)     Albumin 05/19/2025 3.0 (L)     Total Bilirubin 05/19/2025 0.52     eGFR 05/19/2025 102     WBC 05/19/2025 4.93     RBC 05/19/2025 2.62 (L)     Hemoglobin 05/19/2025 8.2 (L)     Hematocrit 05/19/2025 25.6 (L)     MCV 05/19/2025 98     MCH 05/19/2025 31.3     MCHC 05/19/2025 32.0     RDW 05/19/2025 24.7 (H)     MPV 05/19/2025 9.8     Platelets 05/19/2025 161     nRBC 05/19/2025 0     Segmented % 05/19/2025 70     Immature Grans % 05/19/2025 0     Lymphocytes % 05/19/2025 25     Monocytes % 05/19/2025 3 (L)     Eosinophils Relative 05/19/2025 2     Basophils Relative 05/19/2025 0     Absolute Neutrophils 05/19/2025 3.46     Absolute Immature Grans 05/19/2025 0.01     Absolute Lymphocytes 05/19/2025 1.22     Absolute Monocytes 05/19/2025 0.13 (L)     Eosinophils Absolute 05/19/2025 0.09     Basophils  Absolute 05/19/2025 0.02     Digoxin Lvl 05/19/2025 1.3     PTT 05/19/2025 51 (H)     Cholesterol 05/19/2025 79     Triglycerides 05/19/2025 77     HDL, Direct 05/19/2025 28 (L)     LDL Calculated 05/19/2025 36     BSA 05/19/2025 1.68     LV EF 05/19/2025 65     TSH 3RD GENERATON 05/19/2025 0.978     WBC 05/19/2025 3.65 (L)     RBC 05/19/2025 2.69 (L)     Hemoglobin 05/19/2025 8.6 (L)     Hematocrit 05/19/2025 26.4 (L)     MCV 05/19/2025 98     MCH 05/19/2025 32.0     MCHC 05/19/2025 32.6     RDW 05/19/2025 24.0 (H)     Platelets 05/19/2025 154     MPV 05/19/2025 9.4     Protime 05/20/2025 16.6 (H)     INR 05/20/2025 1.31 (H)     WBC 05/20/2025 6.08     RBC 05/20/2025 2.92 (L)     Hemoglobin 05/20/2025 9.1 (L)     Hematocrit 05/20/2025 29.0 (L)     MCV 05/20/2025 99 (H)     MCH 05/20/2025 31.2     MCHC 05/20/2025 31.4     RDW 05/20/2025 24.5 (H)     Platelets 05/20/2025 175     MPV 05/20/2025 10.3     Sodium 05/20/2025 140     Potassium 05/20/2025 3.9     Chloride 05/20/2025 110 (H)     CO2 05/20/2025 25     ANION GAP 05/20/2025 5     BUN 05/20/2025 9     Creatinine 05/20/2025 0.73     Glucose 05/20/2025 96     Calcium 05/20/2025 8.4     eGFR 05/20/2025 96     Magnesium 05/20/2025 1.9     Phosphorus 05/20/2025 2.8        Cardiac catheterization  Result Date: 5/19/2025  Narrative:   No significant obstructive epicardial CAD     Echo follow up/limited w/ contrast if indicated  Result Date: 5/19/2025  Narrative:   Left Ventricle: Left ventricular cavity size is normal. Wall thickness is mildly increased. The left ventricular ejection fraction is 65-70%. Systolic function is vigorous. Although no diagnostic regional wall motion abnormality was identified, this possibility cannot be completely excluded on the basis of this study.   Aortic Valve: There is mild to moderate regurgitation.     Echo complete w/ contrast if indicated  Result Date: 5/19/2025  Narrative:   Left Ventricle: Left ventricular cavity size is normal.  Wall thickness is mildly increased. There is concentric remodeling. The left ventricular ejection fraction is 65%. Systolic function is normal. Wall motion is normal. Diastolic function is mildly abnormal, consistent with grade I (abnormal) relaxation.   Right Ventricle: Right ventricular cavity size is normal. Systolic function is normal.   Left Atrium: The atrium is normal in size.   Right Atrium: The atrium is normal in size.   Aortic Valve: The aortic valve is trileaflet. The leaflets are mildly thickened. The leaflets are moderately calcified. There is moderately reduced mobility. There is mild to moderate regurgitation. There is moderate stenosis. The aortic valve peak velocity is 4.12 m/s. The aortic valve mean gradient is 44 mmHg. The dimensionless velocity index is 0.32. The aortic valve area is 1.01 cm2. The stroke volume index is 61.90 ml/m2. The aortic valve velocity is increased in the setting of stenosis and increased flow.   Mitral Valve: There is mild regurgitation.   Tricuspid Valve: There is mild regurgitation.   Prior TTE study available for comparison. Prior study date: 8/16/2024. No significant changes noted compared to the prior study.     X-ray chest 1 view portable  Result Date: 5/18/2025  Narrative: XR CHEST PORTABLE INDICATION: chest pain. COMPARISON: CXR 1/10/2025, subsequent chest CT 5/18/2025. FINDINGS: Left port lower SVC. Clear lungs. No pneumothorax or pleural effusion. Normal cardiomediastinal silhouette. Bones are unremarkable for age. Normal upper abdomen.     Impression: No acute cardiopulmonary disease. Workstation performed: [id number]     CTA chest abdomen pelvis w wo contrast  Result Date: 5/18/2025  Narrative: CTA - CHEST, ABDOMEN AND PELVIS - WITHOUT AND WITH IV CONTRAST INDICATION: Sharp chest pain, hypotension. COMPARISON: CT abdomen pelvis with contrast 11/24/2024 CT chest without contrast 8/28/2024 TECHNIQUE: CT examination of the chest, abdomen and pelvis was performed  both prior to and after the administration of intravenous contrast. The noncontrast portion of this examination was performed utilizing low radiation dose technique. Thin section angiographic arterial phase post contrast technique was used in order to evaluate for aortic dissection. 3D reformatted images and volume rendering were performed on an independent workstation. Additionally, axial, sagittal, and coronal 2D reformatted images were created from the source data and submitted for interpretation. Radiation dose length product (DLP) for this visit: 1422.45 mGy-cm. . This examination, like all CT scans performed in the [facility], was performed utilizing techniques to minimize radiation dose exposure, including the use of iterative  reconstruction and automated exposure control. IV Contrast: 100 mL of iohexol (OMNIPAQUE) Enteric Contrast: Not administered. FINDINGS: AORTA: No aortic dissection or intramural hematoma. No aortic aneurysm. No significant atherosclerotic disease. Specifically, no flow limiting atherosclerotic stenosis of aorta or major aortic branch vessel in the chest, abdomen or pelvis. CHEST LUNGS: 3 mm right upper lobe nodule (4:75). 2 mm right lower lobe nodule (4:97). These nodules were stable in comparison to in August 2024 prior. Most likely benign. Otherwise lungs clear. No tracheal or endobronchial lesion. PLEURA: Unremarkable. HEART/PULMONARY ARTERIAL TREE: Coronary stent. No pulmonary embolism MEDIASTINUM AND BOBBI: Stable prominent but not pathologically enlarged right hilar nodes. CHEST WALL AND LOWER NECK: Left IJ chest port with tip at the superior cavoatrial junction. ABDOMEN LIVER/BILIARY TREE: Mild central intrahepatic and CBD pneumobilia similar to prior and unremarkable status post Whipple. No obvious lesions identified on this arterial exam. GALLBLADDER: Unremarkable in setting of decompression. SPLEEN:Stable probable splenule abutting the tail the pancreas.  PANCREAS: Postsurgical change status post Whipple, with unchanged gas within the main pancreatic duct in the proximal body. ADRENAL GLANDS: Unremarkable. KIDNEYS/URETERS: Simple renal cyst(s). Otherwise unremarkable kidneys. No hydronephrosis. STOMACH AND BOWEL: Postsurgical change status post Whipple. No obstruction. Mild transverse colonic wall thickening (3:125) Small bowel unremarkable. APPENDIX: No findings to suggest appendicitis. ABDOMINOPELVIC CAVITY: No ascites. No pneumoperitoneum. No lymphadenopathy. PELVIS REPRODUCTIVE ORGANS: Post prostatectomy. URINARY BLADDER: Unremarkable in setting of decompression. ABDOMINAL WALL/INGUINAL REGIONS: Subcutaneous lipoma lateral to the left hip. Small fat-containing umbilical hernia. Unremarkable postsurgical changes in the ventral midline abdominal wall. BONES: No acute fracture or suspicious osseous lesion.     Impression: No acute aortic/arterial abnormality. Stable postsurgical appearance status post Whipple. Mild transverse colonic wall thickening can be seen with a nonspecific colitis. Otherwise no acute abnormality identified in the chest, abdomen or pelvis. The study was marked in EPIC for immediate notification. Workstation performed: [id number]       EKG personally reviewed by Rahul Gabriel MD.     Counseling / Coordination of Care  Total floor / unit time spent today 30 minutes.  Greater than 50% of total time was spent with the patient and / or family counseling and / or coordination of care.

## 2025-05-20 NOTE — DISCHARGE SUMMARY
Discharge Summary - Family Medicine   Name: Isaac Kramer 64 y.o. male I MRN: [id number]  Unit/Bed#: [facility] 421-01 I Date of Admission: 2025   Date of Service: 2025 I Hospital Day: 2     Assessment & Plan  Other chest pain  - Patient presented to [place] ED for left sided sharp crushing chest pain that started 30 minutes before presentation to the ED  - In ED EKG that showed inferior lead ST depressions along with elevated troponin 1K.  Subsequently patient loaded with aspirin and Plavix and started on heparin drip  - Patient also noted to be tachycardic with a regular rhythm concerning for A-fib/a flutter with RVR.  Patient treated with metoprolol but unfortunately became hypotensive.  Patient currently on amiodarone and digoxin for rate control  - Cardiology consulted who recommended patient be transferred to [facility] for evaluation and possible cardiac cath    S/p ASA and plavix load  S/p  digoxin and amiodarone load     echo: EF65%, mildly increased wall thickness, diastolic grade 1 relaxation, moderate AR and AS   heart cath: no significant obstructive CAD    Plan  - Cardiology consult, appreciate recs  - EP consult, appreciate recs   - increased Toprol XL to 25 BID   - restarted eliquis 5 BID, Lisinopril 20   - ASA 81, Lipitor 80   - start amiodarone 200mg bid for 14 days followed by 200mg daily thereafter.    - suspect troponin in setting of afib, possibly chemo induced, vasospasm   - outpt EP and cardio-oncology f/u   - Telemetry  Benign essential hypertension  - 24H SBP Systolic (24hrs), Av , Min:107 , Max:141      - Home regimen lisinopril 20 mg  Other hyperlipidemia  Current medication: Lipitor 20 mg daily    Lipid       Lab Results   Component Value Date    CHOLESTEROL 79 2025    TRIG 77 2025    HDL 28 (L) 2025    LDLCALC 36 2025        Plan  -atorvastatin 80 mg daily  Coronary artery disease involving native coronary artery of native heart  "without angina pectoris  - Patient with history of CAD last cardiac cath done in 2022 showed \"[facility] 07/28/2022 1st Mrg lesion is 50% stenosed. Prox LAD lesion is 50% stenosed. 1st Diag lesion is 75% stenosed. Ost LAD lesion is 30% stenosed. Prox Cx lesion is 40% stenosed. Mid RCA lesion is 40% stenosed. RPDA lesion is 40% stenosed. 3v CAD Moderate AS\"   - Patient had drug-eluting stent placed previously      Plan  -Aspirin, Plavix and atorvastatin  -Cardiology consult  Pancreatic adenocarcinoma (HCC)  Patient with known history of pancreatic cancer status post Whipple procedure 09/12/2024   Patient follows with Dr. Colby and is currently undergoing care chemotherapy  The patient is currently receiving chemotherapy with most recent treatment last week  Continue outpatient follow-up with oncology  Hold Xeloda as per patient currently on his off week     - supposed to have CT CAP w con on 5/20.   - will hold off as pt received contrast with cath  - pt will need to reschedule CT CAP with outpt heme-onc    Anemia in other chronic diseases classified elsewhere    Lab Results   Component Value Date    HGB 9.1 (L) 05/20/2025    HCT 29.0 (L) 05/20/2025     05/20/2025    WBC 6.08 05/20/2025     The history of anemia with previous baseline of 11 with most recent baseline between 9 and 10  Monitor CBCs daily    Plan:  - outpt iron panel/folate/B12  - Last colonoscopy done 2011 with recommendations for follow up every 5 years.      Medical Problems       Resolved Problems  Date Reviewed: 4/18/2025   None       Discharging Physician / Practitioner: Madison Way MD  PCP: Pablo Perez DO  Admission Date:   Admission Orders (From admission, onward)       Ordered        05/19/25 0010  Inpatient Admission  Once                          Discharge Date: 05/20/25    Next Steps for Physician/AP Assuming Care:  Medication review and compliance  F/u appointments with EP and cardio-oncology  Rescheduling CT CAP w " contrast  Colonoscopy   Iron panel, folate, and b12    Test Results Pending at Discharge (will require follow up):  none    Medication Changes for Discharge & Rationale:   Toprol XL to 25 BID  Eliquis 5 BID  ASA 81  Lipitor 80  Lisinopril 20  5/20 PM start amiodarone 200mg bid for 14 days followed by 200mg daily (6/4) thereafter.   See after visit summary for reconciled discharge medications provided to patient and/or family.     Consultations During Hospital Stay:  Cardiology  EP    Procedures Performed:   5/19 heart cath    Significant Findings / Test Results:   5/19 echo: EF65%, mildly increased wall thickness, diastolic grade 1 relaxation, moderate AR and AS  5/19 heart cath: no significant obstructive epicardial CAD.    Incidental Findings:   none     Hospital Course:   Isaac Kramer is a 64 y.o. male patient who originally presented to the hospital on 5/18/2025 due to chest pain in setting of new onset Afib.    Initially presented to [place] [facility] on 5/18, for CP.   In ED, EKG showed inferior lead ST depressions with troponin of 1366.   He was loaded with ASA and Plavix, started on heparin gtt ACS.   Pt started to develop Afib/Aflutter with RVR which was initially managed with IV metoprolol but became hypotensive. Pt was then loaded with digoxin and amiodarone. Advised under cardiology and interventional cardio to be transferred to [place] for cardiac cath.     On arrival to [place], pt's chest pain had resolved and bedside echo performed showing normal LV function. Pt was HD stable and NSR on telemetry. Pt continued on ASA, Plavix, atorvastatin, digoxin, and hep gtt. Beta blocker and acei was initially held due to hypotension.     Pt was taken for heart cath on 5/19 which showed No significant obstructive epicardial CAD. EP was consulted for further management. Pt was restarted on Eliquis 5 BID, metoprolol succinate 25 BID, lisinopril 20. Plavix, digoxin, and hep gtt was d/ladonna.    On 5/20, EP continued to  adjust medications and recommended to continue metoprolol succinate 25mg bid, start amiodarone 200mg bid for 14 days followed by 200mg daily thereafter.     Of note, patient was supposed to have CT CAP w contrast from outpt heme-onc on 5/20 but this was decided to be rescheduled in setting of dye administration.     On 5/20, EP and cardio cleared pt for discharge and placed f/u.      Please see above list of diagnoses and related plan for additional information.     Discharge Day Visit / Exam:   * Please refer to separate progress note for these details *    Discussion with Family: Patient declined call to .     Discharge instructions/Information to patient and family:   See after visit summary for information provided to patient and family.      Provisions for Follow-Up Care:  See after visit summary for information related to follow-up care and any pertinent home health orders.      Mobility at time of Discharge:   Basic Mobility Inpatient Raw Score: 18  JH-HLM Goal: 6: Walk 10 steps or more  JH-HLM Achieved: 6: Walk 10 steps or more  HLM Goal achieved. Continue to encourage appropriate mobility.     Disposition:   Home    Planned Readmission: no    Administrative Statements   Discharge Statement:

## 2025-05-20 NOTE — ASSESSMENT & PLAN NOTE
- Patient presented to [place] ED for left sided sharp crushing chest pain that started 30 minutes before presentation to the ED  - In ED EKG that showed inferior lead ST depressions along with elevated troponin 1K.  Subsequently patient loaded with aspirin and Plavix and started on heparin drip  - Patient also noted to be tachycardic with a regular rhythm concerning for A-fib/a flutter with RVR.  Patient treated with metoprolol but unfortunately became hypotensive.  Patient currently on amiodarone and digoxin for rate control  - Cardiology consulted who recommended patient be transferred to [facility] for evaluation and possible cardiac cath    S/p ASA and plavix load  S/p  digoxin and amiodarone load    5/19 echo: EF65%, mildly increased wall thickness, diastolic grade 1 relaxation, moderate AR and AS  5/19 heart cath: no significant obstructive CAD    Plan  - Cardiology consult, appreciate recs  - EP consult, appreciate recs   - increased Toprol XL to 25 BID   - restarted eliquis 5 BID, Lisinopril 20   - ASA 81, Lipitor 80   - start amiodarone 200mg bid for 14 days followed by 200mg daily thereafter.    - suspect troponin in setting of afib, possibly chemo induced, vasospasm   - outpt EP and cardio-oncology f/u   - Telemetry

## 2025-05-20 NOTE — ASSESSMENT & PLAN NOTE
"- Patient with history of CAD last cardiac cath done in 2022 showed \"[facility] 07/28/2022 1st Mrg lesion is 50% stenosed. Prox LAD lesion is 50% stenosed. 1st Diag lesion is 75% stenosed. Ost LAD lesion is 30% stenosed. Prox Cx lesion is 40% stenosed. Mid RCA lesion is 40% stenosed. RPDA lesion is 40% stenosed. 3v CAD Moderate AS\"   - Patient had drug-eluting stent placed previously      Plan  -Aspirin, Plavix and atorvastatin  -Cardiology consult  "

## 2025-05-20 NOTE — ASSESSMENT & PLAN NOTE
Patient reported severe left sided chest pain with palpations, diaphoresis, and shortness of breath, troponin spill and ST depressions. No significant epicardial disease on cardiac cath this admission.     Plan:  Pain correlates with afib with RVR, potentially demand ischemia in the setting of AS and anemia  Technically vasospasm is on the differential, will add cardio-oncology (Dr. Villatoro) follow up  instructions to discharge

## 2025-05-20 NOTE — ASSESSMENT & PLAN NOTE
Patient with known history of pancreatic cancer status post Whipple procedure 09/12/2024   Patient follows with Dr. Colby and is currently undergoing care chemotherapy  The patient is currently receiving chemotherapy with most recent treatment last week  Continue outpatient follow-up with oncology  Hold Xeloda as per patient currently on his off week     - supposed to have CT CAP w con on 5/20.   - will hold off as pt received contrast with cath  - pt will need to reschedule CT CAP with outpt heme-onc

## 2025-05-20 NOTE — ASSESSMENT & PLAN NOTE
No prior history of A-fib/flutter.  New diagnosis.  [id number] of 3 (age, HTN, vascular disease)    Rate: metoprolol succinate 25mg bid  Rhythm: amiodarone 200mg PO bid for 14 days, followed by 200mg PO qd thereafter  AC: Eliquis 5mg BID    5/19 TTE:  LVEF 65% G1DD, no WMA  Normal RV  Normal LA/RA  AV mild to mod regurg, moderate stenosis  MV mild regurg  TV mild regurg    QTC peak 491    Plan:  Potentially chemo-induced afib  Currently sinus rhythm and chest pain free  Continue metoprolol succinate 25mg bid, start amiodarone 200mg bid for 14 days followed by 200mg daily thereafter.   Will have the patient follow up in the EP clinic, can consider prolonged zio monitoring at that time  Continue AC

## 2025-05-20 NOTE — ASSESSMENT & PLAN NOTE
Status post Whipple.    Was previously treated with modified FOLFIRINOX but did not tolerate. Subsequently transitioned to gemcitabine and capecitabine, completed 6 of 6 cycles last week    Plans for follow up with [facility] for potential chemoradiation

## 2025-05-20 NOTE — PLAN OF CARE
Problem: PAIN - ADULT  Goal: Verbalizes/displays adequate comfort level or baseline comfort level  Description: Interventions:  - Encourage patient to monitor pain and request assistance  - Assess pain using appropriate pain scale  - Administer analgesics as ordered based on type and severity of pain and evaluate response  - Implement non-pharmacological measures as appropriate and evaluate response  - Consider cultural and social influences on pain and pain management  - Notify physician/advanced practitioner if interventions unsuccessful or patient reports new pain  - Educate patient/family on pain management process including their role and importance of  reporting pain   - Provide non-pharmacologic/complimentary pain relief interventions  Outcome: Progressing     Problem: INFECTION - ADULT  Goal: Absence or prevention of progression during hospitalization  Description: INTERVENTIONS:  - Assess and monitor for signs and symptoms of infection  - Monitor lab/diagnostic results  - Monitor all insertion sites, i.e. indwelling lines, tubes, and drains  - Monitor endotracheal if appropriate and nasal secretions for changes in amount and color  - Aristes appropriate cooling/warming therapies per order  - Administer medications as ordered  - Instruct and encourage patient and family to use good hand hygiene technique  - Identify and instruct in appropriate isolation precautions for identified infection/condition  Outcome: Progressing  Goal: Absence of fever/infection during neutropenic period  Description: INTERVENTIONS:  - Monitor WBC  - Perform strict hand hygiene  - Limit to healthy visitors only  - No plants, dried, fresh or silk flowers with garcia in patient room  Outcome: Progressing     Problem: SAFETY ADULT  Goal: Patient will remain free of falls  Description: INTERVENTIONS:  - Educate patient/family on patient safety including physical limitations  - Instruct patient to call for assistance with activity   -  Consider consulting OT/PT to assist with strengthening/mobility based on AM PAC & JH-HLM score  - Consult OT/PT to assist with strengthening/mobility   - Keep Call bell within reach  - Keep bed low and locked with side rails adjusted as appropriate  - Keep care items and personal belongings within reach  - Initiate and maintain comfort rounds  - Make Fall Risk Sign visible to staff  - Offer Toileting every 2 Hours, in advance of need  - Initiate/Maintain alarm  - Obtain necessary fall risk management equipment:   - Apply yellow socks and bracelet for high fall risk patients  - Consider moving patient to room near nurses station  Outcome: Progressing  Goal: Maintain or return to baseline ADL function  Description: INTERVENTIONS:  -  Assess patient's ability to carry out ADLs; assess patient's baseline for ADL function and identify physical deficits which impact ability to perform ADLs (bathing, care of mouth/teeth, toileting, grooming, dressing, etc.)  - Assess/evaluate cause of self-care deficits   - Assess range of motion  - Assess patient's mobility; develop plan if impaired  - Assess patient's need for assistive devices and provide as appropriate  - Encourage maximum independence but intervene and supervise when necessary  - Involve family in performance of ADLs  - Assess for home care needs following discharge   - Consider OT consult to assist with ADL evaluation and planning for discharge  - Provide patient education as appropriate  - Monitor functional capacity and physical performance, use of AM PAC & JH-HLM   - Monitor gait, balance and fatigue with ambulation    Outcome: Progressing  Goal: Maintains/Returns to pre admission functional level  Description: INTERVENTIONS:  - Perform AM-PAC 6 Click Basic Mobility/ Daily Activity assessment daily.  - Set and communicate daily mobility goal to care team and patient/family/caregiver.   - Collaborate with rehabilitation services on mobility goals if consulted  -  Perform Range of Motion 3 times a day.  - Reposition patient every 2 hours.  - Dangle patient 3 times a day  - Stand patient 3 times a day  - Ambulate patient 3 times a day  - Out of bed to chair 3 times a day   - Out of bed for meals 3 times a day  - Out of bed for toileting  - Record patient progress and toleration of activity level   Outcome: Progressing     Problem: DISCHARGE PLANNING  Goal: Discharge to home or other facility with appropriate resources  Description: INTERVENTIONS:  - Identify barriers to discharge w/patient and caregiver  - Arrange for needed discharge resources and transportation as appropriate  - Identify discharge learning needs (meds, wound care, etc.)  - Arrange for interpretive services to assist at discharge as needed  - Refer to Case Management Department for coordinating discharge planning if the patient needs post-hospital services based on physician/advanced practitioner order or complex needs related to functional status, cognitive ability, or social support system  Outcome: Progressing     Problem: Knowledge Deficit  Goal: Patient/family/caregiver demonstrates understanding of disease process, treatment plan, medications, and discharge instructions  Description: Complete learning assessment and assess knowledge base.  Interventions:  - Provide teaching at level of understanding  - Provide teaching via preferred learning methods  Outcome: Progressing     Problem: Prexisting or High Potential for Compromised Skin Integrity  Goal: Skin integrity is maintained or improved  Description: INTERVENTIONS:  - Identify patients at risk for skin breakdown  - Assess and monitor skin integrity including under and around medical devices   - Assess and monitor nutrition and hydration status  - Monitor labs  - Assess for incontinence   - Turn and reposition patient  - Assist with mobility/ambulation  - Relieve pressure over evangelist prominences   - Avoid friction and shearing  - Provide appropriate  hygiene as needed including keeping skin clean and dry  - Evaluate need for skin moisturizer/barrier cream  - Collaborate with interdisciplinary team  - Patient/family teaching  - Consider wound care consult    Assess:  - Review Sriram scale daily  - Clean and moisturize skin every   - Inspect skin when repositioning, toileting, and assisting with ADLS  - Assess under medical devices such as  every   - Assess extremities for adequate circulation and sensation     Bed Management:  - Have minimal linens on bed & keep smooth, unwrinkled  - Change linens as needed when moist or perspiring  - Avoid sitting or lying in one position for more than  hours while in bed?Keep HOB atdegrees   - Toileting:  - Offer bedside commode  - Assess for incontinence every   - Use incontinent care products after each incontinent episode such as     Activity:  - Mobilize patient  times a day  - Encourage activity and walks on unit  - Encourage or provide ROM exercises   - Turn and reposition patient every Hours  - Use appropriate equipment to lift or move patient in bed  - Instruct/ Assist with weight shifting every  when out of bed in chair  - Consider limitation of chair time  hour intervals    Skin Care:  - Avoid use of baby powder, tape, friction and shearing, hot water or constrictive clothing  - Relieve pressure over bony prominences using   - Do not massage red bony areas    Next Steps:  - Teach patient strategies to minimize risks such as   - Consider consults to  interdisciplinary teams such as   Outcome: Progressing     Problem: Nutrition/Hydration-ADULT  Goal: Nutrient/Hydration intake appropriate for improving, restoring or maintaining nutritional needs  Description: Monitor and assess patient's nutrition/hydration status for malnutrition. Collaborate with interdisciplinary team and initiate plan and interventions as ordered.  Monitor patient's weight and dietary intake as ordered or per policy. Utilize nutrition screening tool  and intervene as necessary. Determine patient's food preferences and provide high-protein, high-caloric foods as appropriate.     INTERVENTIONS:  - Monitor oral intake, urinary output, labs, and treatment plans  - Assess nutrition and hydration status and recommend course of action  - Evaluate amount of meals eaten  - Assist patient with eating if necessary   - Allow adequate time for meals  - Recommend/ encourage appropriate diets, oral nutritional supplements, and vitamin/mineral supplements  - Order, calculate, and assess calorie counts as needed  - Recommend, monitor, and adjust tube feedings and TPN/PPN based on assessed needs  - Assess need for intravenous fluids  - Provide specific nutrition/hydration education as appropriate  - Include patient/family/caregiver in decisions related to nutrition  Outcome: Progressing

## 2025-05-20 NOTE — ASSESSMENT & PLAN NOTE
Left sided chest pain with elevated troponins to 1366 with inferiorlateral ST depressions in the setting of Afib with RVR. Cardiac cath with no significant epicardial disease.     Elevated troponins are likely demand ischemia in the setting of patients elevated rates, anemia, and moderate AS vs vasospasm in the setting of chemotherapy.     Plan:  Currently on ASA, Eliquis, metoprolol succinate 25mg bid  General cardiology following  Recommend that patient follow up with cardio-oncology, Dr. Villatoro.

## 2025-05-20 NOTE — PROGRESS NOTES
Progress Note - Electrophysiology   Name: Isaac Kramer 64 y.o. male I MRN: [id number]  Unit/Bed#: [facility] 421-01 I Date of Admission: 5/18/2025   Date of Service: 5/20/2025 I Hospital Day: 2     Assessment & Plan  Atrial fibrillation (HCC)  No prior history of A-fib/flutter.  New diagnosis.  [id number] of 3 (age, HTN, vascular disease)    Rate: metoprolol succinate 25mg bid  Rhythm: amiodarone 200mg PO bid for 14 days, followed by 200mg PO qd thereafter  AC: Eliquis 5mg BID    5/19 TTE:  LVEF 65% G1DD, no WMA  Normal RV  Normal LA/RA  AV mild to mod regurg, moderate stenosis  MV mild regurg  TV mild regurg    QTC peak 491    Plan:  Potentially chemo-induced afib  Currently sinus rhythm and chest pain free  Continue metoprolol succinate 25mg bid, start amiodarone 200mg bid for 14 days followed by 200mg daily thereafter.   Will have the patient follow up in the EP clinic, can consider prolonged zio monitoring at that time  Continue AC  Other chest pain  Patient reported severe left sided chest pain with palpations, diaphoresis, and shortness of breath, troponin spill and ST depressions. No significant epicardial disease on cardiac cath this admission.     Plan:  Pain correlates with afib with RVR, potentially demand ischemia in the setting of AS and anemia  Technically vasospasm is on the differential, will add cardio-oncology (Dr. Villatoro) follow up  instructions to discharge  ACS (acute coronary syndrome) (HCC)  Left sided chest pain with elevated troponins to 1366 with inferiorlateral ST depressions in the setting of Afib with RVR. Cardiac cath with no significant epicardial disease.     Elevated troponins are likely demand ischemia in the setting of patients elevated rates, anemia, and moderate AS vs vasospasm in the setting of chemotherapy.     Plan:  Currently on ASA, Eliquis, metoprolol succinate 25mg bid  General cardiology following  Recommend that patient follow up with cardio-oncology, Dr. Villatoro.      Benign essential hypertension  PTA lisinopril 20mg qd  Other hyperlipidemia  Cholesterol 79, triglycerides 77, HDL 20, LDL 36    Plan:  Patient takes atorvastatin 20 mg daily at home  Continue statin  Aortic valve stenosis  Moderate AV stenosis:  Peak velocity 4.12m/s  mPG 44mmHg  DVI 0.32  ROXANN 1.01cm2  Coronary artery disease involving native coronary artery of native heart without angina pectoris  Hx of multivessel CAD status post 2X NAVA (proximal LAD, first diagonal; 2022)    5/19 Cardiac cath: no significant epicardial disease    Plan:  Continue ASA and statin  Pancreatic adenocarcinoma (HCC)  Status post Whipple.    Was previously treated with modified FOLFIRINOX but did not tolerate. Subsequently transitioned to gemcitabine and capecitabine, completed 6 of 6 cycles last week    Plans for follow up with [facility] for potential chemoradiation  Anemia in other chronic diseases classified elsewhere  Hgb 9.4 on arrival  Last colonoscopy done 2011 with recommendations for follow up every 5 years.     Subjective   Chief Complaint:     Patient feels good, no concerns. Denies chest pain, palpitations, shortness of breath.     Telemetry shows normal sinus HR 60-90s, no afib.     Objective :  Temp:  [97.7 °F (36.5 °C)-98.8 °F (37.1 °C)] 98 °F (36.7 °C)  HR:  [70-96] 96  BP: (106-141)/(50-73) 141/73  Resp:  [17-20] 18  SpO2:  [95 %-100 %] 100 %  O2 Device: None (Room air)  Nasal Cannula O2 Flow Rate (L/min):  [2 L/min] 2 L/min  Orthostatic Blood Pressures      Flowsheet Row Most Recent Value   Blood Pressure 141/73 filed at 05/20/2025 0732   Patient Position - Orthostatic VS Lying filed at 05/20/2025 0212          First Weight: Weight - Scale: 63.2 kg (139 lb 5.3 oz) (05/18/25 2344)  Vitals:    05/19/25 0050 05/19/25 0721   Weight: 63 kg (139 lb) 63 kg (139 lb)     Physical Exam  Vitals and nursing note reviewed.   Constitutional:       Appearance: He is well-developed.   HENT:      Head: Normocephalic and atraumatic.       Right Ear: External ear normal.      Left Ear: External ear normal.      Mouth/Throat:      Mouth: Mucous membranes are moist.     Eyes:      Extraocular Movements: Extraocular movements intact.      Conjunctiva/sclera: Conjunctivae normal.      Pupils: Pupils are equal, round, and reactive to light.       Cardiovascular:      Rate and Rhythm: Normal rate and regular rhythm.      Pulses: Normal pulses.      Heart sounds: Murmur heard.      No friction rub. No gallop.   Pulmonary:      Effort: Pulmonary effort is normal. No respiratory distress.      Breath sounds: Normal breath sounds. No wheezing, rhonchi or rales.   Abdominal:      General: Bowel sounds are normal. There is no distension.      Palpations: Abdomen is soft. There is no mass.      Tenderness: There is no abdominal tenderness. There is no guarding.     Musculoskeletal:         General: No swelling or deformity.      Cervical back: Neck supple.      Right lower leg: No edema.      Left lower leg: No edema.     Skin:     General: Skin is warm and dry.      Capillary Refill: Capillary refill takes less than 2 seconds.      Coloration: Skin is not jaundiced.      Findings: No bruising, lesion or rash.     Neurological:      General: No focal deficit present.      Mental Status: He is alert and oriented to person, place, and time.           Lab Results: I have reviewed the following results:  Results from last 7 days   Lab Units 05/20/25  0611 05/19/25  1542 05/19/25  0622   WBC Thousand/uL 6.08 3.65* 4.93   HEMOGLOBIN g/dL 9.1* 8.6* 8.2*   HEMATOCRIT % 29.0* 26.4* 25.6*   PLATELETS Thousands/uL 175 154 161     Results from last 7 days   Lab Units 05/20/25  0611 05/19/25  0622 05/18/25  1258   POTASSIUM mmol/L 3.9 3.3* 3.5   CHLORIDE mmol/L 110* 112* 108   CO2 mmol/L 25 24 22   BUN mg/dL 9 13 19   CREATININE mg/dL 0.73 0.63 0.77   CALCIUM mg/dL 8.4 7.8* 8.4     Results from last 7 days   Lab Units 05/20/25  0611 05/19/25  0622 05/19/25  0029  05/18/25  2110 05/18/25  1446   INR  1.31*  --  1.24*  --  1.25*   PTT seconds  --  51* 57* 48* 31     Lab Results   Component Value Date    HGBA1C 5.7 (H) 09/10/2024     Lab Results   Component Value Date    CKTOTAL 42 06/16/2022             VTE Pharmacologic Prophylaxis: Eliquis  VTE Mechanical Prophylaxis: sequential compression device

## 2025-05-20 NOTE — PROGRESS NOTES
Progress Note - Family Medicine   Name: Isaac Kramer 64 y.o. male I MRN: [id number]  Unit/Bed#: [facility] 421-01 I Date of Admission: 2025   Date of Service: 2025 I Hospital Day: 2    Assessment & Plan  Other chest pain  - Patient presented to [facility] for left sided sharp crushing chest pain that started 30 minutes before presentation to the ED  - In ED EKG that showed inferior lead ST depressions along with elevated troponin 1K.  Subsequently patient loaded with aspirin and Plavix and started on heparin drip  - Patient also noted to be tachycardic with a regular rhythm concerning for A-fib/a flutter with RVR.  Patient treated with metoprolol but unfortunately became hypotensive.  Patient currently on amiodarone and digoxin for rate control  - Cardiology consulted who recommended patient be transferred to [facility] for evaluation and possible cardiac cath    S/p ASA and plavix load  S/p  digoxin and amiodarone load     echo: EF65%, mildly increased wall thickness, diastolic grade 1 relaxation, moderate AR and AS   heart cath: no significant obstructive CAD    Plan  - Cardiology consult, appreciate recs  - EP consult, appreciate recs   - increased Toprol XL to 25 BID   - restarted eliquis 5 BID   - ASA 81, Lipitor 80   - Lisinopril 20   - d/ladonna plavix   - cannot r/o chemo induced Afib   - suspect troponin in setting of afib   - outpt zio patch  - Telemetry  Benign essential hypertension  - 24H SBP Systolic (24hrs), Av , Min:106 , Max:141      - Home regimen lisinopril 20 mg  Other hyperlipidemia  Current medication: Lipitor 20 mg daily    Lipid       Lab Results   Component Value Date    CHOLESTEROL 79 2025    TRIG 77 2025    HDL 28 (L) 2025    LDLCALC 36 2025        Plan  -atorvastatin 80 mg daily  Coronary artery disease involving native coronary artery of native heart without angina pectoris  - Patient with history of CAD last cardiac cath done in   "showed \"[facility] 07/28/2022 1st Mrg lesion is 50% stenosed. Prox LAD lesion is 50% stenosed. 1st Diag lesion is 75% stenosed. Ost LAD lesion is 30% stenosed. Prox Cx lesion is 40% stenosed. Mid RCA lesion is 40% stenosed. RPDA lesion is 40% stenosed. 3v CAD Moderate AS\"   - Patient had drug-eluting stent placed previously      Plan  -Heparin GTT  -Aspirin, Plavix and atorvastatin  -Cardiology consult  Pancreatic adenocarcinoma (HCC)  Patient with known history of pancreatic cancer status post Whipple procedure 09/12/2024   Patient follows with Dr. Colby and is currently undergoing care chemotherapy  The patient is currently receiving chemotherapy with most recent treatment last week  Continue outpatient follow-up with oncology  Hold Xeloda as per patient currently on his off week     - supposed to have CT CAP w con on 5/20.   - will hold off as pt received contrast with cath  - if staying for next couple days, may consider obtaining CT CAP w con    Anemia in other chronic diseases classified elsewhere    Lab Results   Component Value Date    HGB 9.1 (L) 05/20/2025    HCT 29.0 (L) 05/20/2025     05/20/2025    WBC 6.08 05/20/2025     The history of anemia with previous baseline of 11 with most recent baseline between 9 and 10  Monitor CBCs daily  Consider checking iron panel/folate/B12      VTE Pharmacologic Prophylaxis:   Moderate Risk (Score 3-4) - Pharmacological DVT Prophylaxis Ordered: apixaban (Eliquis).    Mobility:   Basic Mobility Inpatient Raw Score: 18  JH-HLM Goal: 6: Walk 10 steps or more  JH-HLM Achieved: 6: Walk 10 steps or more  JH-HLM Goal achieved. Continue to encourage appropriate mobility.    Patient Centered Rounds: I performed bedside rounds with nursing staff today.   Discussions with Specialists or Other Care Team Provider: no    Education and Discussions with Family / Patient: update as appropriate.     Current Length of Stay: 2 day(s)  Current Patient Status: Inpatient   Certification " Statement: The patient will continue to require additional inpatient hospital stay due to ongoing care  Discharge Plan: Anticipate discharge in 48 hrs to home.    Code Status: Level 1 - Full Code    Subjective   No complaints  Eating and drinking  Voiding.   Last BM Sunday but baseline in setting of chemo    Objective :  Temp:  [97.7 °F (36.5 °C)-98.8 °F (37.1 °C)] 98 °F (36.7 °C)  HR:  [70-96] 96  BP: (106-141)/(50-73) 141/73  Resp:  [17-20] 18  SpO2:  [95 %-100 %] 100 %  O2 Device: None (Room air)  Nasal Cannula O2 Flow Rate (L/min):  [2 L/min] 2 L/min    Body mass index is 23.86 kg/m².     Input and Output Summary (last 24 hours):     Intake/Output Summary (Last 24 hours) at 5/20/2025 0746  Last data filed at 5/20/2025 0601  Gross per 24 hour   Intake 563 ml   Output 0 ml   Net 563 ml       Physical Exam  Vitals reviewed.   Constitutional:       General: He is not in acute distress.     Appearance: He is not ill-appearing, toxic-appearing or diaphoretic.     Cardiovascular:      Rate and Rhythm: Normal rate and regular rhythm.      Pulses: Normal pulses.      Heart sounds: Murmur heard.   Pulmonary:      Effort: Pulmonary effort is normal. No respiratory distress.      Breath sounds: Normal breath sounds. No wheezing, rhonchi or rales.   Abdominal:      General: Abdomen is flat. There is no distension.      Palpations: Abdomen is soft.      Tenderness: There is no abdominal tenderness. There is no guarding.     Musculoskeletal:         General: Swelling (hands) present.      Right lower leg: No edema.      Left lower leg: No edema.     Skin:     General: Skin is warm and dry.      Comments: R wrist access site bandaged, no hematoma     Neurological:      Mental Status: He is alert.     Psychiatric:         Mood and Affect: Mood normal.         Behavior: Behavior normal.         Lines/Drains:      Central Line:  Goal for removal: port a cath for chemo         Telemetry:  Telemetry Orders (From admission, onward)                24 Hour Telemetry Monitoring  Continuous x 24 Hours (Telem)        Expiring   Question:  Reason for 24 Hour Telemetry  Answer:  PCI/EP study (including pacer and ICD implementation), Cardiac surgery, MI, abnormal cardiac cath, and chest pain- rule out MI                     Telemetry Reviewed: Normal Sinus Rhythm  Indication for Continued Telemetry Use: Awaiting PCI/EP Study/CABG               Lab Results: I have reviewed the following results:   Results from last 7 days   Lab Units 05/20/25  0611 05/19/25  1542 05/19/25  0622   WBC Thousand/uL 6.08   < > 4.93   HEMOGLOBIN g/dL 9.1*   < > 8.2*   HEMATOCRIT % 29.0*   < > 25.6*   PLATELETS Thousands/uL 175   < > 161   SEGS PCT %  --   --  70   LYMPHO PCT %  --   --  25   MONO PCT %  --   --  3*   EOS PCT %  --   --  2    < > = values in this interval not displayed.     Results from last 7 days   Lab Units 05/20/25  0611 05/19/25  0622   SODIUM mmol/L 140 140   POTASSIUM mmol/L 3.9 3.3*   CHLORIDE mmol/L 110* 112*   CO2 mmol/L 25 24   BUN mg/dL 9 13   CREATININE mg/dL 0.73 0.63   ANION GAP mmol/L 5 4   CALCIUM mg/dL 8.4 7.8*   ALBUMIN g/dL  --  3.0*   TOTAL BILIRUBIN mg/dL  --  0.52   ALK PHOS U/L  --  70   ALT U/L  --  18   AST U/L  --  22   GLUCOSE RANDOM mg/dL 96 84     Results from last 7 days   Lab Units 05/20/25  0611   INR  1.31*                   Recent Cultures (last 7 days):               Last 24 Hours Medication List:     Current Facility-Administered Medications:     acetaminophen (TYLENOL) tablet 650 mg, Q6H PRN    apixaban (ELIQUIS) tablet 5 mg, BID    aspirin chewable tablet 81 mg, Daily    atorvastatin (LIPITOR) tablet 80 mg, Daily    [Held by provider] capecitabine (XELODA) tablet 1,000 mg, BID    [Held by provider] capecitabine (XELODA) tablet 300 mg, BID    lisinopril (ZESTRIL) tablet 20 mg, Daily    metoprolol succinate (TOPROL-XL) 24 hr tablet 25 mg, BID    potassium chloride (Klor-Con M20) CR tablet 20 mEq, Daily Before  Lunch    Facility-Administered Medications Ordered in Other Encounters:     [MAR Hold] alteplase (CATHFLO) injection 2 mg, Q1MIN PRN    Administrative Statements   Today, Patient Was Seen By: Madison Way MD      **Please Note: This note may have been constructed using a voice recognition system.**

## 2025-05-20 NOTE — ASSESSMENT & PLAN NOTE
Hx of multivessel CAD status post 2X NAVA (proximal LAD, first diagonal; 2022)    5/19 Cardiac cath: no significant epicardial disease    Plan:  Continue ASA and statin

## 2025-05-20 NOTE — ASSESSMENT & PLAN NOTE
Lab Results   Component Value Date    HGB 9.1 (L) 05/20/2025    HCT 29.0 (L) 05/20/2025     05/20/2025    WBC 6.08 05/20/2025     The history of anemia with previous baseline of 11 with most recent baseline between 9 and 10  Monitor CBCs daily    Plan:  - outpt iron panel/folate/B12  - Last colonoscopy done 2011 with recommendations for follow up every 5 years.

## 2025-05-20 NOTE — QUICK NOTE
"Patient evaluated at bedside during evening rounds. Patient laying comfortably in bed. He is currently not experiencing any chest pain. PCI today did not reveal lesions requiring intervention.     /66   Pulse 70   Temp 97.8 °F (36.6 °C) (Oral)   Resp 18   Ht 5' 4\" (1.626 m)   Wt 63 kg (139 lb)   SpO2 100%   BMI 23.86 kg/m²     Gen.: Well-appearing, no acute distress  HEENT: Normocephalic, no conjunctival injection, jaundice. Mucous membranes moist, no lesions  Heart: Regular rate and rhythm, systolic murmur best auscultated at right 2nd intercostal space  Lungs: Clear to auscultation bilaterally, no wheezing, rales, or rhonchi, no accessory muscle use  Abdomen: Soft, nontender, nondistended, bowel sounds positive  Extremities: Warm and well perfused  Skin: No rashes  Neuro: Alert, awake, oriented.    A/P: Currently stable. Continue current management.  Will continue monitoring on tele and EP will follow up tomorrow. Continue eliquis BID, metoprolol succinate BID.   "

## 2025-05-21 ENCOUNTER — TELEPHONE (OUTPATIENT)
Age: 65
End: 2025-05-21

## 2025-05-21 ENCOUNTER — TRANSITIONAL CARE MANAGEMENT (OUTPATIENT)
Dept: FAMILY MEDICINE CLINIC | Facility: CLINIC | Age: 65
End: 2025-05-21

## 2025-05-21 NOTE — TELEPHONE ENCOUNTER
Call received by Isaac,        Patient was recently in ED, per patient he was scheduled to have CT scan done on 5/20 but wasn't discharged yet. Patient states he had CT scan done in ED on 5/18. Patient will like to know if CT scan ordered by  will need to be rescheduled.   .  Patient is scheduled to see  on 5/27.     Please call patient with update.     Thanks!

## 2025-05-21 NOTE — UTILIZATION REVIEW
NOTIFICATION OF ADMISSION DISCHARGE   This is a Notification of Discharge from [facility]. Please be advised that this patient has been discharge from our facility. Below you will find the admission and discharge date and time including the patient’s disposition.   UTILIZATION REVIEW CONTACT:  Utilization Review Assistants  Network Utilization Review Department  Phone: [phone] x carefully listen to the prompts. All voicemails are confidential.  Email: [email or web address]     ADMISSION INFORMATION  PRESENTATION DATE: 5/18/2025 11:29 PM  OBERVATION ADMISSION DATE: N/A  INPATIENT ADMISSION DATE: 5/18/25 11:29 PM   DISCHARGE DATE: 5/20/2025  4:50 PM   DISPOSITION:Home/Self Care    Network Utilization Review Department  ATTENTION: Please call with any questions or concerns to [phone] and carefully listen to the prompts so that you are directed to the right person. All voicemails are confidential.   For Discharge needs, contact Care Management DC Support Team at [phone] opt. [phone]  Send all requests for admission clinical reviews, approved or denied determinations and any other requests to dedicated fax number below belonging to the campus where the patient is receiving treatment. List of dedicated fax numbers for the Facilities:  FACILITY NAME UR FAX NUMBER   ADMISSION DENIALS (Administrative/Medical Necessity) [phone]   DISCHARGE SUPPORT TEAM ([facility]) [phone]   PARENT CHILD HEALTH (Maternity/NICU/Pediatrics) [phone]   [facility] [phone]   [facility] [phone]   Pending sale to [facility] [phone]   [facility] [phone]   On license of [facility] [phone]   [facility] [phone]   [facility] [phone]   [facility] [phone]   [facility]  [facility] [phone]   [facility] [phone]   [facility] [phone]   [facility] [phone]

## 2025-05-21 NOTE — TELEPHONE ENCOUNTER
Reviewed CT in ED acceptable for appt on 5/27.  Phoned patient.  Aware no additional CT needed at this time

## 2025-05-27 ENCOUNTER — OFFICE VISIT (OUTPATIENT)
Dept: HEMATOLOGY ONCOLOGY | Facility: CLINIC | Age: 65
End: 2025-05-27
Payer: COMMERCIAL

## 2025-05-27 VITALS
WEIGHT: 145 LBS | RESPIRATION RATE: 18 BRPM | SYSTOLIC BLOOD PRESSURE: 118 MMHG | TEMPERATURE: 98.4 F | OXYGEN SATURATION: 98 % | HEART RATE: 64 BPM | DIASTOLIC BLOOD PRESSURE: 66 MMHG | BODY MASS INDEX: 24.75 KG/M2 | HEIGHT: 64 IN

## 2025-05-27 DIAGNOSIS — C25.9 PANCREATIC ADENOCARCINOMA (HCC): Primary | ICD-10-CM

## 2025-05-27 PROCEDURE — G2211 COMPLEX E/M VISIT ADD ON: HCPCS | Performed by: INTERNAL MEDICINE

## 2025-05-27 PROCEDURE — 99214 OFFICE O/P EST MOD 30 MIN: CPT | Performed by: INTERNAL MEDICINE

## 2025-05-27 NOTE — PROGRESS NOTES
Name: Isaac Kramer      : 1960      MRN: [id number]  Encounter Provider: Jacob Colby MD  Encounter Date: 2025   Encounter department: [facility] [place]  :  Assessment & Plan  Pancreatic adenocarcinoma (HCC)  There was a discussion with Dr. 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     His case was presented at the GI tumor board.  The recommendation was to pursue adjuvant modified FOLFIRINOX followed by concurrent chemoradiation if the patient continues to have good performance status.     Modified FOLFIRINOX cycle 1 was started on 2024.  He only was able to tolerate 2 cycles of modified FOLFIRINOX even though the second cycle was adjusted down by 20%.  Patient was then switched to gemcitabine and capecitabine regimen which was started on 2024.  Gemzar 1,000 mg/m2 IV on days 1,8,15 Q 28 days with Rx Capecitabine 1,300 mg po BID days 1-21 then 7 days rest.     The patient is about to start cycle 5 of the gemcitabine/capecitabine today in which he is tolerating much better than the modified FOLFIRINOX.  He completed 6 cycles worth of adjuvant chemotherapy with gemcitabine/capecitabine on 2025.    I did discuss with him the result of the recent CT scan of the chest abdomen pelvis from 2025 which was negative for any obvious hint of recurrence of his malignancy.    We did discuss the potential benefit of pursuing adjuvant concurrent chemoradiation.  The patient stated that he would not be interested in pursuing any further treatment at least for the time being due to the significant side effects he went through during the adjuvant treatment.    We did then discuss pursuing surveillance imaging frequently.  His next imaging will be around the beginning of October for close  monitoring.    Orders:    CBC and differential; Future    Comprehensive metabolic panel; Future    Magnesium; Future    Cancer antigen 19-9; Future    CT chest abdomen pelvis w contrast; Future        Return in about 5 months (around 10/15/2025) for Office Visit 20 min, Labs, Imaging.    History of Present Illness   Chief Complaint   Patient presents with    Follow-up   HPI:  This is a 63-year-old male with history of Jihan score 4+3= 7 adenocarcinoma of the prostate status post radical prostatectomy on 5/22/2023.  He also seems to have history of coronary artery disease, hypertension, GERD, hyperlipidemia, etc.  He denied positive family history for malignancy.  The patient apparently had left flank pain which was evaluated with a CT scan of the abdomen pelvis with contrast on 8/27/2024 in the emergency room.  The CT scan showed mass in the lower pancreatic head region obstructing the CBD and pancreatic duct measuring 2.8 cm suspicious for malignant process.  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  CT scan of the chest without contrast on 8/28/2024 was negative for malignant process.  The patient then had Whipple procedure on 9/12/2024 which showed MSI stable adenocarcinoma of the pancreas arising in adenoma.  - Tumor invades into muscularis propria of the duodenum (pT2).  - Lymphovascular invasion is present.   - One of sixteen lymph nodes is positive for tumor (1/16, pN1).  - Pancreatic intraepithelial neoplasia (PanIN), low grade.   - All margins are negative for carcinoma or dysplasia.  His baseline CEA and CA 19-9 were within normal range prior to the Whipple procedure.        Interval history:    The patient came today for a follow-up visit accompanied by his son.  He had significant hand-and-foot syndrome due to the capecitabine which responded nicely to the urea cream.    CT of the chest and pelvis with contrast on 5/18/2025 showed  IMPRESSION:      No acute aortic/arterial abnormality.     Stable postsurgical appearance status post Whipple.     Mild transverse colonic wall thickening can be seen with a nonspecific colitis.     Otherwise no acute abnormality identified in the chest, abdomen or pelvis.    Blood work from 5/20/2025 was reviewed with the patient.  He seems to have a hemoglobin of 9.1.      Oncology History   Cancer Staging   Ampullary carcinoma (HCC)  Staging form: Ampulla of Vater, AJCC 8th Edition  - Pathologic: Stage IIIA (pT2, pN1, cM0) - Signed by Marika Ac MD on 10/2/2024    Pancreatic adenocarcinoma (HCC)  Staging form: Pancreas, AJCC 8th Edition  - Pathologic stage from 9/12/2024: Stage IIB (pT2, pN1, cM0) - Signed by Jacob Colby MD on 10/2/2024  Stage prefix: Initial diagnosis  Histologic grade (G): G2  Histologic grading system: 3 grade system  Lymph-vascular invasion (LVI): LVI present/identified, NOS  Carbohydrate antigen 19-9 (CA 19-9) (U/mL): 14  Carcinoembryonic antigen (CEA) (ng/mL): 0.7  Oncology History Overview Note   Presents for discussion of RT for recently diagnosed Totz 7 (4+3) prostate cancer.  Referred by Dr. Biggs    Lab Results   Component Value Date    PSA 5.2 (H) 11/08/2022    PSA 6.6 (H) 07/18/2022    PSA 3.9 06/24/2020      10/10/22  Urology  Seen in consult for elevated PSA  F/U PSA recommended    12/28/21  MRI Prostate  IMPRESSION:   1. PI-RADSv2.1 Category 4 -High (clinically significant cancer is likely to be present). 0.5 cm possible lesion in the posterior right peripheral zone at base.   2. No extraprostatic tumor, seminal vesicle invasion, pelvic lymphadenopathy, or pelvic osseous metastatic disease.   3. Moderate BPH with calculated prostate volume of 69 cc.    2/28/23  Tissue Exam  A.  Prostate, region of interest #1, needle biopsy:  - Prostatic adenocarcinoma, acinar, not otherwise specified; Jihan grade 4 +3= score of 7 (grade group 3) in 4 of 5 cores involving 20% of needle core tissue  and measuring up to 5 mm in length (score 4=50-60%).  - Periprostatic fat invasion: Not identified.  - Seminal vesicle invasion: Not identified.  - Lymph-vascular invasion: Not identified.  - Perineural invasion: Not identified.  - Additional Pathologic Findings:  None.     B.  Prostate, right lateral and medial base, needle biopsy:  - Benign prostate tissue, negative for malignancy.     C.  Prostate, right mid lateral, needle biopsy:  - Benign prostate tissue, negative for malignancy.     D.  Prostate, right mid medial, needle biopsy:  - Benign prostate tissue, negative for malignancy.     E.  Prostate, right lateral apex, needle biopsy:   - Benign prostate tissue, negative for malignancy.     F. Prostate, right medial apex, needle biopsy:  - Benign prostate tissue, negative for malignancy.     G.  Prostate, left lateral base, needle biopsy:  - Prostatic adenocarcinoma, acinar, not otherwise specified; [place] grade 3 +3= score of 6 (grade group 1) in 1 of 1 cores involving 10% of needle core tissue and measuring 2 mm in length.  - Periprostatic fat invasion: Not identified.  - Seminal vesicle invasion: Not identified.  - Lymph-vascular invasion: Not identified.  - Perineural invasion: Not identified.  - Additional Pathologic Findings:  None.     H.  Prostate, left medial base, needle biopsy:  - Prostatic adenocarcinoma, acinar, not otherwise specified; Jihan grade 3 +4= score of 7 (grade group 2) in 1 of 1 cores involving 50% of needle core tissue and measuring 8 mm in length (score 4 = 5-10%).  - Periprostatic fat invasion: Not identified.  - Seminal vesicle invasion: Not identified.  - Lymph-vascular invasion: Not identified.  - Perineural invasion: Present.  - Additional Pathologic Findings:  None.     I.  Prostate, left mid lateral, needle biopsy:  - Prostatic adenocarcinoma, acinar, not otherwise specified; [place] grade 3 +4= score of 7 (grade group 2) in 1 of 1 cores involving 40-50% of needle core tissue  and measuring 5 mm in length (score 4 = 5-10%).  - Periprostatic fat invasion: Not identified.  - Seminal vesicle invasion: Not identified.  - Lymph-vascular invasion: Not identified.  - Perineural invasion: Not identified.  - Additional Pathologic Findings:  High-grade prostatic intraepithelial neoplasia (HGPIN).     J.  Prostate, left mid medial, needle biopsy:  - Prostatic adenocarcinoma, acinar, not otherwise specified; Jihan grade 3 +4= score of 7 (grade group 2) in 1 of 1 cores involving 40-50% of needle core tissue and measuring 7 mm in length (score 4 = 5-10%).  - Periprostatic fat invasion: Not identified.  - Seminal vesicle invasion: Not identified.  - Lymph-vascular invasion: Not identified.  - Perineural invasion: Not identified.  - Additional Pathologic Findings:  High-grade prostatic intraepithelial neoplasia (HGPIN).     K.  Prostate, left lateral apex, needle biopsy:  -Focal atypical small acinar proliferation, cannot exclude limited low-grade carcinoma.     L.  Prostate, left medial apex, needle biopsy:  - Benign prostate tissue, negative for malignancy.    3/27/23  Bone Scan  IMPRESSION:   1.  No scintigraphic evidence of osseous metastasis    4/6/23  Dr. Biggs  Does not qualify for active surveillance.  RT and surgery discussed.  Leaning towards RT will arrange for consult    4/20/23  Dr. Harmon  Desires surgical intervention    Upcoming:       Prostate cancer (HCC) (Resolved)   2/28/2023 Biopsy    A.  Prostate, region of interest #1, needle biopsy:  - Prostatic adenocarcinoma, acinar, not otherwise specified; [place] grade 4 +3= score of 7 (grade group 3) in 4 of 5 cores involving 20% of needle core tissue and measuring up to 5 mm in length (score 4=50-60%).  - Periprostatic fat invasion: Not identified.  - Seminal vesicle invasion: Not identified.  - Lymph-vascular invasion: Not identified.  - Perineural invasion: Not identified.  - Additional Pathologic Findings:  None.     B.  Prostate,  right lateral and medial base, needle biopsy:  - Benign prostate tissue, negative for malignancy.     C.  Prostate, right mid lateral, needle biopsy:  - Benign prostate tissue, negative for malignancy.     D.  Prostate, right mid medial, needle biopsy:  - Benign prostate tissue, negative for malignancy.     E.  Prostate, right lateral apex, needle biopsy:   - Benign prostate tissue, negative for malignancy.     F. Prostate, right medial apex, needle biopsy:  - Benign prostate tissue, negative for malignancy.     G.  Prostate, left lateral base, needle biopsy:  - Prostatic adenocarcinoma, acinar, not otherwise specified; Jihan grade 3 +3= score of 6 (grade group 1) in 1 of 1 cores involving 10% of needle core tissue and measuring 2 mm in length.  - Periprostatic fat invasion: Not identified.  - Seminal vesicle invasion: Not identified.  - Lymph-vascular invasion: Not identified.  - Perineural invasion: Not identified.  - Additional Pathologic Findings:  None.     H.  Prostate, left medial base, needle biopsy:  - Prostatic adenocarcinoma, acinar, not otherwise specified; [place] grade 3 +4= score of 7 (grade group 2) in 1 of 1 cores involving 50% of needle core tissue and measuring 8 mm in length (score 4 = 5-10%).  - Periprostatic fat invasion: Not identified.  - Seminal vesicle invasion: Not identified.  - Lymph-vascular invasion: Not identified.  - Perineural invasion: Present.  - Additional Pathologic Findings:  None.     I.  Prostate, left mid lateral, needle biopsy:  - Prostatic adenocarcinoma, acinar, not otherwise specified; Jihan grade 3 +4= score of 7 (grade group 2) in 1 of 1 cores involving 40-50% of needle core tissue and measuring 5 mm in length (score 4 = 5-10%).  - Periprostatic fat invasion: Not identified.  - Seminal vesicle invasion: Not identified.  - Lymph-vascular invasion: Not identified.  - Perineural invasion: Not identified.  - Additional Pathologic Findings:  High-grade prostatic  intraepithelial neoplasia (HGPIN).     J.  Prostate, left mid medial, needle biopsy:  - Prostatic adenocarcinoma, acinar, not otherwise specified; [place] grade 3 +4= score of 7 (grade group 2) in 1 of 1 cores involving 40-50% of needle core tissue and measuring 7 mm in length (score 4 = 5-10%).  - Periprostatic fat invasion: Not identified.  - Seminal vesicle invasion: Not identified.  - Lymph-vascular invasion: Not identified.  - Perineural invasion: Not identified.  - Additional Pathologic Findings:  High-grade prostatic intraepithelial neoplasia (HGPIN).     K.  Prostate, left lateral apex, needle biopsy:  -Focal atypical small acinar proliferation, cannot exclude limited low-grade carcinoma.     L.  Prostate, left medial apex, needle biopsy:  - Benign prostate tissue, negative for malignancy.     2/28/2023 Initial Diagnosis    Prostate cancer (HCC)     Pancreatic adenocarcinoma (HCC)   8/28/2024 Initial Diagnosis    Pancreatic adenocarcinoma (HCC)     9/12/2024 Surgery    A. Gallbladder, cholecystectomy:  - Chronic cholecystitis.  - One lymph node, negative for malignancy (0/1).  - Negative for malignancy.     B. Lymph Node, Portal Lymph Node:  - One lymph node, negative for malignancy (0/1).     C. Pancreas, Whipple; SEE COMMENTS:  - Adenocarcinoma arising in adenoma.  - Tumor invades into muscularis propria of the duodenum (pT2).  - Lymphovascular invasion is present.   - One of sixteen lymph nodes is positive for tumor (1/16, pN1).  - Pancreatic intraepithelial neoplasia (PanIN), low grade.   - All margins are negative for carcinoma or dysplasia.     Block C11 is sent to [facility] for MI Profile Testing.   Immunohistochemistry (IHC) testing for Mismatch Repair (MMR) proteins has been requested on block C9, and the results will be issued in an addendum.     D. Lymph Node, Portacaval node:  - Eight lymph nodes, negative for malignancy (0/8).     E. Soft Tissue, Other, Additional Uncinate/Pancreas:  -  Portion of pancreas with no pathologic abnormality  - Two lymph nodes, negative for malignancy (0/2).     9/12/2024 -  Cancer Staged    Staging form: Pancreas, AJCC 8th Edition  - Pathologic stage from 9/12/2024: Stage IIB (pT2, pN1, cM0) - Signed by Jacob Colby MD on 10/2/2024  Stage prefix: Initial diagnosis  Histologic grade (G): G2  Histologic grading system: 3 grade system  Lymph-vascular invasion (LVI): LVI present/identified, NOS  Carbohydrate antigen 19-9 (CA 19-9) (U/mL): 14  Carcinoembryonic antigen (CEA) (ng/mL): 0.7       11/4/2024 - 11/22/2024 Chemotherapy    alteplase (CATHFLO), 2 mg, Intracatheter, Every 1 Minute as needed, 2 of 12 cycles  pegfilgrastim (NEULASTA ONPRO), 6 mg, Subcutaneous, Once, 2 of 12 cycles  Dose modification: 4 mg (original dose 6 mg, Cycle 3)  Administration: 6 mg (11/6/2024), 6 mg (11/22/2024)  fosaprepitant (EMEND) IVPB, 150 mg, Intravenous, Once, 2 of 12 cycles  Administration: 150 mg (11/4/2024), 150 mg (11/20/2024)  irinotecan (CAMPTOSAR) chemo infusion, 242 mg (83.3 % of original dose 180 mg/m2), Intravenous, Once, 2 of 12 cycles  Dose modification: 150 mg/m2 (original dose 180 mg/m2, Cycle 1, Reason: Dose modified as per discussion with consulting physician), 125 mg/m2 (original dose 180 mg/m2, Cycle 2, Reason: Dose modified as per discussion with consulting physician)  Administration: 240 mg (11/4/2024), 200 mg (11/20/2024)  leucovorin calcium IVPB, 644 mg, Intravenous, Once, 2 of 12 cycles  Administration: 650 mg (11/4/2024), 650 mg (11/20/2024)  oxaliplatin (ELOXATIN) chemo infusion, 85 mg/m2 = 136.85 mg, Intravenous, Once, 2 of 12 cycles  Dose modification: 65 mg/m2 (original dose 85 mg/m2, Cycle 2, Reason: Dose modified as per discussion with consulting physician)  Administration: 136.85 mg (11/4/2024), 100 mg (11/20/2024)  fluorouracil (ADRUCIL) ambulatory infusion Soln, 1,200 mg/m2/day = 3,865 mg, Intravenous, Over 46 hours, 2 of 12 cycles  Dose modification: 960  mg/m2/day (original dose 1,200 mg/m2/day, Cycle 3, Reason: Dose modified as per discussion with consulting physician), 1,000 mg/m2/day (original dose 1,200 mg/m2/day, Cycle 3, Reason: Dose modified as per discussion with consulting physician), 960 mg/m2/day (original dose 1,200 mg/m2/day, Cycle 2, Reason: Dose modified as per discussion with consulting physician)     12/11/2024 -  Chemotherapy    gemcitabine (GEMZAR) infusion, 1,000 mg/m2 = 1,600 mg, 6 of 6 cycles  Administration: 1,600 mg (12/11/2024), 1,600 mg (12/18/2024), 1,600 mg (1/8/2025), 1,600 mg (12/26/2024), 1,600 mg (1/15/2025), 1,600 mg (1/22/2025), 1,600 mg (2/5/2025), 1,600 mg (2/12/2025), 1,600 mg (2/19/2025), 1,600 mg (3/5/2025), 1,600 mg (3/12/2025), 1,600 mg (3/19/2025), 1,600 mg (4/2/2025), 1,600 mg (4/9/2025), 1,600 mg (4/16/2025), 1,600 mg (4/30/2025), 1,600 mg (5/7/2025), 1,600 mg (5/14/2025)     Ampullary carcinoma (HCC)   9/12/2024 Surgery    A. Gallbladder, cholecystectomy:  - Chronic cholecystitis.  - One lymph node, negative for malignancy (0/1).  - Negative for malignancy.     B. Lymph Node, Portal Lymph Node:  - One lymph node, negative for malignancy (0/1).     C. Pancreas, Whipple; SEE COMMENTS:  - Adenocarcinoma arising in adenoma.  - Tumor invades into muscularis propria of the duodenum (pT2).  - Lymphovascular invasion is present.   - One of sixteen lymph nodes is positive for tumor (1/16, pN1).  - Pancreatic intraepithelial neoplasia (PanIN), low grade.   - All margins are negative for carcinoma or dysplasia.     Block C11 is sent to [facility] for MI Profile Testing.   Immunohistochemistry (IHC) testing for Mismatch Repair (MMR) proteins has been requested on block C9, and the results will be issued in an addendum.     D. Lymph Node, Portacaval node:  - Eight lymph nodes, negative for malignancy (0/8).     E. Soft Tissue, Other, Additional Uncinate/Pancreas:  - Portion of pancreas with no pathologic abnormality  - Two  "lymph nodes, negative for malignancy (0/2).     10/2/2024 Initial Diagnosis    Ampullary carcinoma (HCC)     10/2/2024 -  Cancer Staged    Staging form: Ampulla of Vater, AJCC 8th Edition  - Pathologic: Stage IIIA (pT2, pN1, cM0) - Signed by Marika Ac MD on 10/2/2024               Review of Systems   Constitutional:  Negative for chills and fever.   HENT:  Negative for ear pain and sore throat.    Eyes:  Negative for pain and visual disturbance.   Respiratory:  Negative for cough and shortness of breath.    Cardiovascular:  Negative for chest pain and palpitations.   Gastrointestinal:  Negative for abdominal pain and vomiting.   Genitourinary:  Negative for dysuria and hematuria.   Musculoskeletal:  Negative for arthralgias and back pain.   Skin:  Positive for rash. Negative for color change.   Neurological:  Negative for seizures and syncope.   All other systems reviewed and are negative.    Medical History Reviewed by provider this encounter:     .  Medications Ordered Prior to Encounter[1]   Social History[2]      Objective   /66 (BP Location: Right arm, Patient Position: Sitting, Cuff Size: Adult)   Pulse 64   Temp 98.4 °F (36.9 °C)   Resp 18   Ht 5' 4\" (1.626 m)   Wt 65.8 kg (145 lb)   SpO2 98%   BMI 24.89 kg/m²     Pain Screening:  Pain Score: 0-No pain  ECOG   2  Physical Exam  Constitutional:       Appearance: He is well-developed.   HENT:      Head: Normocephalic and atraumatic.      Nose: Nose normal.     Eyes:      General: No scleral icterus.        Right eye: No discharge.         Left eye: No discharge.      Conjunctiva/sclera: Conjunctivae normal.      Pupils: Pupils are equal, round, and reactive to light.     Neck:      Thyroid: No thyromegaly.      Trachea: No tracheal deviation.     Cardiovascular:      Rate and Rhythm: Normal rate and regular rhythm.      Heart sounds: Normal heart sounds. No murmur heard.     No friction rub.   Pulmonary:      Effort: Pulmonary effort is " normal. No respiratory distress.      Breath sounds: Normal breath sounds. No wheezing or rales.   Chest:      Chest wall: No tenderness.   Abdominal:      General: Bowel sounds are normal. There is no distension.      Palpations: Abdomen is soft. There is no hepatomegaly or splenomegaly.      Tenderness: There is no abdominal tenderness. There is no guarding or rebound.     Musculoskeletal:         General: No tenderness or deformity. Normal range of motion.      Cervical back: Normal range of motion and neck supple.   Lymphadenopathy:      Cervical: No cervical adenopathy.     Skin:     General: Skin is warm and dry.      Coloration: Skin is not pale.      Findings: No erythema or rash.     Neurological:      Mental Status: He is alert and oriented to person, place, and time.      Cranial Nerves: No cranial nerve deficit.      Coordination: Coordination normal.      Deep Tendon Reflexes: Reflexes are normal and symmetric.     Psychiatric:         Behavior: Behavior normal.         Thought Content: Thought content normal.         Judgment: Judgment normal.         Labs: I have reviewed the following labs:  Lab Results   Component Value Date/Time    WBC 6.08 05/20/2025 06:11 AM    RBC 2.92 (L) 05/20/2025 06:11 AM    Hemoglobin 9.1 (L) 05/20/2025 06:11 AM    Hematocrit 29.0 (L) 05/20/2025 06:11 AM    MCV 99 (H) 05/20/2025 06:11 AM    MCH 31.2 05/20/2025 06:11 AM    RDW 24.5 (H) 05/20/2025 06:11 AM    Platelets 175 05/20/2025 06:11 AM    Segmented % 70 05/19/2025 06:22 AM    Lymphocytes % 25 05/19/2025 06:22 AM    Monocytes % 3 (L) 05/19/2025 06:22 AM    Eosinophils Relative 2 05/19/2025 06:22 AM    Basophils Relative 0 05/19/2025 06:22 AM    Immature Grans % 0 05/19/2025 06:22 AM    Absolute Neutrophils 3.46 05/19/2025 06:22 AM     Lab Results   Component Value Date/Time    Potassium 3.9 05/20/2025 06:11 AM    Chloride 110 (H) 05/20/2025 06:11 AM    CO2 25 05/20/2025 06:11 AM    CO2, i-STAT 17 (L) 09/12/2024 02:27 PM     BUN 9 05/20/2025 06:11 AM    Creatinine 0.73 05/20/2025 06:11 AM    Glucose, i-STAT 153 (H) 09/12/2024 02:27 PM    Glucose, Fasting 91 11/25/2024 05:28 AM    Calcium 8.4 05/20/2025 06:11 AM    Corrected Calcium 8.6 05/19/2025 06:22 AM    AST 22 05/19/2025 06:22 AM    ALT 18 05/19/2025 06:22 AM    Alkaline Phosphatase 70 05/19/2025 06:22 AM    Total Protein 4.7 (L) 05/19/2025 06:22 AM    Albumin 3.0 (L) 05/19/2025 06:22 AM    Total Bilirubin 0.52 05/19/2025 06:22 AM    eGFR 96 05/20/2025 06:11 AM     Lab Results   Component Value Date/Time    WBC 6.08 05/20/2025 06:11 AM    RBC 2.92 (L) 05/20/2025 06:11 AM    Hemoglobin 9.1 (L) 05/20/2025 06:11 AM    Hematocrit 29.0 (L) 05/20/2025 06:11 AM    MCV 99 (H) 05/20/2025 06:11 AM    MCH 31.2 05/20/2025 06:11 AM    RDW 24.5 (H) 05/20/2025 06:11 AM    Platelets 175 05/20/2025 06:11 AM    Segmented % 70 05/19/2025 06:22 AM    Bands % 2 04/29/2025 11:32 AM    Lymphocytes % 25 05/19/2025 06:22 AM    Monocytes % 3 (L) 05/19/2025 06:22 AM    Eosinophils Relative 2 05/19/2025 06:22 AM    Basophils Relative 0 05/19/2025 06:22 AM    Immature Grans % 0 05/19/2025 06:22 AM    Absolute Neutrophils 3.46 05/19/2025 06:22 AM      Lab Results   Component Value Date/Time    Sodium 140 05/20/2025 06:11 AM    Potassium 3.9 05/20/2025 06:11 AM    Chloride 110 (H) 05/20/2025 06:11 AM    CO2 25 05/20/2025 06:11 AM    CO2, i-STAT 17 (L) 09/12/2024 02:27 PM    ANION GAP 5 05/20/2025 06:11 AM    BUN 9 05/20/2025 06:11 AM    Creatinine 0.73 05/20/2025 06:11 AM    Glucose 96 05/20/2025 06:11 AM    Glucose, Fasting 91 11/25/2024 05:28 AM    Calcium 8.4 05/20/2025 06:11 AM    Corrected Calcium 8.6 05/19/2025 06:22 AM    AST 22 05/19/2025 06:22 AM    ALT 18 05/19/2025 06:22 AM    Alkaline Phosphatase 70 05/19/2025 06:22 AM    Total Protein 4.7 (L) 05/19/2025 06:22 AM    Albumin 3.0 (L) 05/19/2025 06:22 AM    Total Bilirubin 0.52 05/19/2025 06:22 AM    eGFR 96 05/20/2025 06:11 AM      Lab Results    Component Value Date/Time    Iron 73 11/01/2024 10:34 AM    Iron Saturation 25 11/01/2024 10:34 AM    Ferritin 45 11/01/2024 10:34 AM    Vitamin B-12 214 11/01/2024 10:34 AM    Folate 12.4 11/01/2024 10:34 AM      Results for orders placed or performed during the hospital encounter of 05/18/25   APTT   Result Value Ref Range    PTT 57 (H) 23 - 34 seconds   Protime-INR   Result Value Ref Range    Protime 15.9 (H) 12.3 - 15.0 seconds    INR 1.24 (H) 0.85 - 1.19   Comprehensive metabolic panel   Result Value Ref Range    Sodium 140 135 - 147 mmol/L    Potassium 3.3 (L) 3.5 - 5.3 mmol/L    Chloride 112 (H) 96 - 108 mmol/L    CO2 24 21 - 32 mmol/L    ANION GAP 4 4 - 13 mmol/L    BUN 13 5 - 25 mg/dL    Creatinine 0.63 0.60 - 1.30 mg/dL    Glucose 84 65 - 140 mg/dL    Calcium 7.8 (L) 8.4 - 10.2 mg/dL    Corrected Calcium 8.6 8.3 - 10.1 mg/dL    AST 22 13 - 39 U/L    ALT 18 7 - 52 U/L    Alkaline Phosphatase 70 34 - 104 U/L    Total Protein 4.7 (L) 6.4 - 8.4 g/dL    Albumin 3.0 (L) 3.5 - 5.0 g/dL    Total Bilirubin 0.52 0.20 - 1.00 mg/dL    eGFR 102 ml/min/1.73sq m   CBC and differential   Result Value Ref Range    WBC 4.93 4.31 - 10.16 Thousand/uL    RBC 2.62 (L) 3.88 - 5.62 Million/uL    Hemoglobin 8.2 (L) 12.0 - 17.0 g/dL    Hematocrit 25.6 (L) 36.5 - 49.3 %    MCV 98 82 - 98 fL    MCH 31.3 26.8 - 34.3 pg    MCHC 32.0 31.4 - 37.4 g/dL    RDW 24.7 (H) 11.6 - 15.1 %    MPV 9.8 8.9 - 12.7 fL    Platelets 161 149 - 390 Thousands/uL    nRBC 0 /100 WBCs    Segmented % 70 43 - 75 %    Immature Grans % 0 0 - 2 %    Lymphocytes % 25 14 - 44 %    Monocytes % 3 (L) 4 - 12 %    Eosinophils Relative 2 0 - 6 %    Basophils Relative 0 0 - 1 %    Absolute Neutrophils 3.46 1.85 - 7.62 Thousands/µL    Absolute Immature Grans 0.01 0.00 - 0.20 Thousand/uL    Absolute Lymphocytes 1.22 0.60 - 4.47 Thousands/µL    Absolute Monocytes 0.13 (L) 0.17 - 1.22 Thousand/µL    Eosinophils Absolute 0.09 0.00 - 0.61 Thousand/µL    Basophils Absolute  0.02 0.00 - 0.10 Thousands/µL   Result Value Ref Range    Digoxin Lvl 1.3 0.8 - 2.0 ng/mL   APTT   Result Value Ref Range    PTT 51 (H) 23 - 34 seconds   Lipid Panel with Direct LDL reflex   Result Value Ref Range    Cholesterol 79 See Comment mg/dL    Triglycerides 77 See Comment mg/dL    HDL, Direct 28 (L) >=40 mg/dL    LDL Calculated 36 0 - 100 mg/dL   TSH, 3rd generation with Free T4 reflex   Result Value Ref Range    TSH 3RD GENERATON 0.978 0.450 - 4.500 uIU/mL   CBC   Result Value Ref Range    WBC 3.65 (L) 4.31 - 10.16 Thousand/uL    RBC 2.69 (L) 3.88 - 5.62 Million/uL    Hemoglobin 8.6 (L) 12.0 - 17.0 g/dL    Hematocrit 26.4 (L) 36.5 - 49.3 %    MCV 98 82 - 98 fL    MCH 32.0 26.8 - 34.3 pg    MCHC 32.6 31.4 - 37.4 g/dL    RDW 24.0 (H) 11.6 - 15.1 %    Platelets 154 149 - 390 Thousands/uL    MPV 9.4 8.9 - 12.7 fL   Protime-INR   Result Value Ref Range    Protime 16.6 (H) 12.3 - 15.0 seconds    INR 1.31 (H) 0.85 - 1.19   CBC   Result Value Ref Range    WBC 6.08 4.31 - 10.16 Thousand/uL    RBC 2.92 (L) 3.88 - 5.62 Million/uL    Hemoglobin 9.1 (L) 12.0 - 17.0 g/dL    Hematocrit 29.0 (L) 36.5 - 49.3 %    MCV 99 (H) 82 - 98 fL    MCH 31.2 26.8 - 34.3 pg    MCHC 31.4 31.4 - 37.4 g/dL    RDW 24.5 (H) 11.6 - 15.1 %    Platelets 175 149 - 390 Thousands/uL    MPV 10.3 8.9 - 12.7 fL   Basic metabolic panel   Result Value Ref Range    Sodium 140 135 - 147 mmol/L    Potassium 3.9 3.5 - 5.3 mmol/L    Chloride 110 (H) 96 - 108 mmol/L    CO2 25 21 - 32 mmol/L    ANION GAP 5 4 - 13 mmol/L    BUN 9 5 - 25 mg/dL    Creatinine 0.73 0.60 - 1.30 mg/dL    Glucose 96 65 - 140 mg/dL    Calcium 8.4 8.4 - 10.2 mg/dL    eGFR 96 ml/min/1.73sq m   Result Value Ref Range    Magnesium 1.9 1.9 - 2.7 mg/dL   Result Value Ref Range    Phosphorus 2.8 2.3 - 4.1 mg/dL                   [1]   Current Outpatient Medications on File Prior to Visit   Medication Sig Dispense Refill    amiodarone 200 mg tablet Take 1 tablet (200 mg total) by mouth 2  (two) times a day with meals for 14 days, THEN 1 tablet (200 mg total) daily with breakfast. 58 tablet 0    apixaban (ELIQUIS) 5 mg Take 1 tablet (5 mg total) by mouth 2 (two) times a day 60 tablet 0    aspirin 81 mg chewable tablet Chew 81 mg in the morning.      atorvastatin (LIPITOR) 80 mg tablet Take 1 tablet (80 mg total) by mouth daily 30 tablet 0    capecitabine (XELODA) 150 MG tablet Take 2 tablets (300 mg total) by mouth 2 (two) times a day 21 days on, followed by 7 days off 84 tablet 11    capecitabine (XELODA) 500 MG tablet Take 2 tablets (1,000 mg total) by mouth 2 (two) times a day 21 days on, followed by 7 days off 84 tablet 11    lisinopril (ZESTRIL) 20 mg tablet Take 1 tablet by mouth once daily 30 tablet 5    metoprolol succinate (TOPROL-XL) 25 mg 24 hr tablet Take 1 tablet (25 mg total) by mouth 2 (two) times a day 60 tablet 0    urea (CARMOL) 20 % cream Apply topically as needed for dry skin 75 g 1    Urea 20 Intensive Hydrating 20 % cream APPLY  CREAM TOPICALLY AS NEEDED FOR  DRY  SKIN 85 g 0    albuterol (ProAir HFA) 90 mcg/act inhaler Inhale 2 puffs every 6 (six) hours as needed for wheezing (Patient not taking: Reported on 2025) 8.5 g 0    betamethasone, augmented, (DIPROLENE) 0.05 % ointment Apply topically 2 (two) times a day for 7 days 45 g 0    dicyclomine (BENTYL) 10 mg capsule Take 1 capsule (10 mg total) by mouth 3 (three) times a day before meals 90 capsule 0     Current Facility-Administered Medications on File Prior to Visit   Medication Dose Route Frequency Provider Last Rate Last Admin    [MAR Hold] alteplase (CATHFLO) injection 2 mg  2 mg Intracatheter Q1MIN PRN Jacob Colby MD       [2]   Social History  Tobacco Use    Smoking status: Former     Current packs/day: 0.00     Types: Cigarettes     Quit date: 2022     Years since quittin.4     Passive exposure: Past    Smokeless tobacco: Never    Tobacco comments:     N/a   Vaping Use    Vaping status: Never Used    Substance and Sexual Activity    Alcohol use: Not Currently     Comment: n/a    Drug use: Never     Comment: n/a    Sexual activity: Not Currently     Comment: n/a

## 2025-05-27 NOTE — ASSESSMENT & PLAN NOTE
There was a discussion with Dr. 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     His case was presented at the GI tumor board.  The recommendation was to pursue adjuvant modified FOLFIRINOX followed by concurrent chemoradiation if the patient continues to have good performance status.     Modified FOLFIRINOX cycle 1 was started on 11/4/2024.  He only was able to tolerate 2 cycles of modified FOLFIRINOX even though the second cycle was adjusted down by 20%.  Patient was then switched to gemcitabine and capecitabine regimen which was started on 12/11/2024.  Gemzar 1,000 mg/m2 IV on days 1,8,15 Q 28 days with Rx Capecitabine 1,300 mg po BID days 1-21 then 7 days rest.     The patient is about to start cycle 5 of the gemcitabine/capecitabine today in which he is tolerating much better than the modified FOLFIRINOX.  He completed 6 cycles worth of adjuvant chemotherapy with gemcitabine/capecitabine on 5/14/2025.    I did discuss with him the result of the recent CT scan of the chest abdomen pelvis from 5/18/2025 which was negative for any obvious hint of recurrence of his malignancy.    We did discuss the potential benefit of pursuing adjuvant concurrent chemoradiation.  The patient stated that he would not be interested in pursuing any further treatment at least for the time being due to the significant side effects he went through during the adjuvant treatment.    We did then discuss pursuing surveillance imaging frequently.  His next imaging will be around the beginning of October for close monitoring.    Orders:    CBC and differential; Future    Comprehensive metabolic panel; Future    Magnesium; Future    Cancer antigen 19-9; Future    CT chest abdomen pelvis w contrast; Future

## 2025-05-29 ENCOUNTER — OFFICE VISIT (OUTPATIENT)
Dept: FAMILY MEDICINE CLINIC | Facility: CLINIC | Age: 65
End: 2025-05-29
Payer: COMMERCIAL

## 2025-05-29 ENCOUNTER — OFFICE VISIT (OUTPATIENT)
Dept: SURGICAL ONCOLOGY | Facility: CLINIC | Age: 65
End: 2025-05-29
Payer: COMMERCIAL

## 2025-05-29 VITALS
DIASTOLIC BLOOD PRESSURE: 68 MMHG | OXYGEN SATURATION: 98 % | HEART RATE: 66 BPM | WEIGHT: 145 LBS | SYSTOLIC BLOOD PRESSURE: 124 MMHG | BODY MASS INDEX: 24.75 KG/M2 | TEMPERATURE: 97.5 F | RESPIRATION RATE: 18 BRPM | HEIGHT: 64 IN

## 2025-05-29 VITALS
SYSTOLIC BLOOD PRESSURE: 124 MMHG | DIASTOLIC BLOOD PRESSURE: 68 MMHG | TEMPERATURE: 96.2 F | OXYGEN SATURATION: 98 % | HEIGHT: 64 IN | WEIGHT: 145.4 LBS | HEART RATE: 66 BPM | BODY MASS INDEX: 24.82 KG/M2

## 2025-05-29 DIAGNOSIS — I48.91 ATRIAL FIBRILLATION, UNSPECIFIED TYPE (HCC): ICD-10-CM

## 2025-05-29 DIAGNOSIS — C24.1 AMPULLARY CARCINOMA (HCC): Primary | ICD-10-CM

## 2025-05-29 DIAGNOSIS — R07.89 OTHER CHEST PAIN: Primary | ICD-10-CM

## 2025-05-29 PROBLEM — K51.00 PANCOLITIS (HCC): Status: RESOLVED | Noted: 2024-11-08 | Resolved: 2025-05-29

## 2025-05-29 PROCEDURE — 99495 TRANSJ CARE MGMT MOD F2F 14D: CPT | Performed by: FAMILY MEDICINE

## 2025-05-29 PROCEDURE — 99215 OFFICE O/P EST HI 40 MIN: CPT | Performed by: STUDENT IN AN ORGANIZED HEALTH CARE EDUCATION/TRAINING PROGRAM

## 2025-05-29 NOTE — ASSESSMENT & PLAN NOTE
Isaac has completed adjuvant therapy 5/20205 and is now on a surveillance program.  Recent imaging obtained while inpatient demonstrates no evidence of disease.  Dr. Colby has arranged for him to have repeat imaging in November and I will see him at that time as well.  All questions answered today of the patient and his son.

## 2025-05-29 NOTE — PROGRESS NOTES
"Assessment/Plan:    No problem-specific Assessment & Plan notes found for this encounter.       Diagnoses and all orders for this visit:    Other chest pain  Comments:  resolved    Atrial fibrillation, unspecified type (HCC)  Comments:  controlled          PHQ-2/9 Depression Screening    Little interest or pleasure in doing things: 0 - not at all  Feeling down, depressed, or hopeless: 0 - not at all  PHQ-2 Score: 0  PHQ-2 Interpretation: Negative depression screen        TCM Call (since 5/15/2025)       Date and time call was made  5/21/2025  7:27 AM    Hospital care reviewed  Records reviewed    Patient was hospitialized at  [facility]    Date of Admission  05/18/25    Date of discharge  05/20/25    Diagnosis  Other chest pain    Disposition  Home          TCM Call (since 5/15/2025)       Scheduled for follow up?  Yes    I have advised the patient to call PCP with any new or worsening symptoms  wendi wood             Subjective:      Patient ID: Isaac Kramer is a 64 y.o. male.    Hospital follow up for chest pain pt had a negative cath, pt went into a-fib secondary to chemo, pt was placed on blood thinners and an anti-arrthymic. Pt states that he feels well today        The following portions of the patient's history were reviewed and updated as appropriate: allergies, current medications, past family history, past medical history, past social history, past surgical history, and problem list.    Review of Systems   Constitutional:  Negative for fatigue.   Respiratory:  Negative for shortness of breath and wheezing.    Cardiovascular:  Negative for chest pain and palpitations.         Objective:    /68   Pulse 66   Temp (!) 96.2 °F (35.7 °C)   Ht 5' 4\" (1.626 m)   Wt 66 kg (145 lb 6.4 oz)   SpO2 98%   BMI 24.96 kg/m²      Physical Exam  Vitals and nursing note reviewed.   Constitutional:       General: He is not in acute distress.     Appearance: Normal appearance. He is not " ill-appearing, toxic-appearing or diaphoretic.   HENT:      Head: Normocephalic and atraumatic.     Eyes:      Conjunctiva/sclera: Conjunctivae normal.       Cardiovascular:      Rate and Rhythm: Normal rate and regular rhythm.      Heart sounds: Murmur heard.   Pulmonary:      Effort: Pulmonary effort is normal. No respiratory distress.      Breath sounds: Normal breath sounds. No wheezing, rhonchi or rales.   Abdominal:      General: There is no distension.      Palpations: Abdomen is soft. There is no mass.      Tenderness: There is no abdominal tenderness. There is no guarding or rebound.     Musculoskeletal:      Cervical back: Normal range of motion and neck supple. No rigidity.      Right lower leg: No edema.      Left lower leg: No edema.   Lymphadenopathy:      Cervical: No cervical adenopathy.     Neurological:      Mental Status: He is alert and oriented to person, place, and time.     Psychiatric:         Mood and Affect: Mood normal.         Behavior: Behavior normal.         Thought Content: Thought content normal.         Judgment: Judgment normal.

## 2025-05-29 NOTE — PROGRESS NOTES
Surgical Oncology Follow Up           Isaac Kramer  1960  [id number]  [facility] [address] [address] [phone]      ASSESSMENT AND PLAN    1. Ampullary carcinoma (HCC)  Assessment & Plan:  Isaac has completed adjuvant therapy 5/20205 and is now on a surveillance program.  Recent imaging obtained while inpatient demonstrates no evidence of disease.  Dr. Colby has arranged for him to have repeat imaging in November and I will see him at that time as well.  All questions answered today of the patient and his son.         Chief Complaint   Patient presents with    Office Visit       Return in about 6 months (around 11/19/2025) for Office Visit 15 min.      Oncology History Overview Note   Presents for discussion of RT for recently diagnosed Jihan 7 (4+3) prostate cancer.  Referred by Dr. Biggs    Lab Results   Component Value Date    PSA 5.2 (H) 11/08/2022    PSA 6.6 (H) 07/18/2022    PSA 3.9 06/24/2020      10/10/22  Urology  Seen in consult for elevated PSA  F/U PSA recommended    12/28/21  MRI Prostate  IMPRESSION:   1. PI-RADSv2.1 Category 4 -High (clinically significant cancer is likely to be present). 0.5 cm possible lesion in the posterior right peripheral zone at base.   2. No extraprostatic tumor, seminal vesicle invasion, pelvic lymphadenopathy, or pelvic osseous metastatic disease.   3. Moderate BPH with calculated prostate volume of 69 cc.    2/28/23  Tissue Exam  A.  Prostate, region of interest #1, needle biopsy:  - Prostatic adenocarcinoma, acinar, not otherwise specified; Jihan grade 4 +3= score of 7 (grade group 3) in 4 of 5 cores involving 20% of needle core tissue and measuring up to 5 mm in length (score 4=50-60%).  - Periprostatic fat invasion: Not identified.  - Seminal vesicle invasion: Not identified.  - Lymph-vascular invasion: Not identified.  - Perineural invasion: Not identified.  -  Additional Pathologic Findings:  None.     B.  Prostate, right lateral and medial base, needle biopsy:  - Benign prostate tissue, negative for malignancy.     C.  Prostate, right mid lateral, needle biopsy:  - Benign prostate tissue, negative for malignancy.     D.  Prostate, right mid medial, needle biopsy:  - Benign prostate tissue, negative for malignancy.     E.  Prostate, right lateral apex, needle biopsy:   - Benign prostate tissue, negative for malignancy.     F. Prostate, right medial apex, needle biopsy:  - Benign prostate tissue, negative for malignancy.     G.  Prostate, left lateral base, needle biopsy:  - Prostatic adenocarcinoma, acinar, not otherwise specified; Calliham grade 3 +3= score of 6 (grade group 1) in 1 of 1 cores involving 10% of needle core tissue and measuring 2 mm in length.  - Periprostatic fat invasion: Not identified.  - Seminal vesicle invasion: Not identified.  - Lymph-vascular invasion: Not identified.  - Perineural invasion: Not identified.  - Additional Pathologic Findings:  None.     H.  Prostate, left medial base, needle biopsy:  - Prostatic adenocarcinoma, acinar, not otherwise specified; Calliham grade 3 +4= score of 7 (grade group 2) in 1 of 1 cores involving 50% of needle core tissue and measuring 8 mm in length (score 4 = 5-10%).  - Periprostatic fat invasion: Not identified.  - Seminal vesicle invasion: Not identified.  - Lymph-vascular invasion: Not identified.  - Perineural invasion: Present.  - Additional Pathologic Findings:  None.     I.  Prostate, left mid lateral, needle biopsy:  - Prostatic adenocarcinoma, acinar, not otherwise specified; Calliham grade 3 +4= score of 7 (grade group 2) in 1 of 1 cores involving 40-50% of needle core tissue and measuring 5 mm in length (score 4 = 5-10%).  - Periprostatic fat invasion: Not identified.  - Seminal vesicle invasion: Not identified.  - Lymph-vascular invasion: Not identified.  - Perineural invasion: Not identified.  -  Additional Pathologic Findings:  High-grade prostatic intraepithelial neoplasia (HGPIN).     J.  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  - Periprostatic fat invasion: Not identified.  - Seminal vesicle invasion: Not identified.  - Lymph-vascular invasion: Not identified.  - Perineural invasion: Not identified.  - Additional Pathologic Findings:  High-grade prostatic intraepithelial neoplasia (HGPIN).     K.  Prostate, left lateral apex, needle biopsy:  -Focal atypical small acinar proliferation, cannot exclude limited low-grade carcinoma.     L.  Prostate, left medial apex, needle biopsy:  - Benign prostate tissue, negative for malignancy.    3/27/23  Bone Scan  IMPRESSION:   1.  No scintigraphic evidence of osseous metastasis    4/6/23  Dr. Biggs  Does not qualify for active surveillance.  RT and surgery discussed.  Leaning towards RT will arrange for consult    4/20/23  Dr. Harmon  Desires surgical intervention    Upcoming:       Prostate cancer (HCC) (Resolved)   2/28/2023 Biopsy    A.  Prostate, region of interest #1, needle biopsy:  - Prostatic adenocarcinoma, acinar, not otherwise specified; Rankin grade 4 +3= score of 7 (grade group 3) in 4 of 5 cores involving 20% of needle core tissue and measuring up to 5 mm in length (score 4=50-60%).  - Periprostatic fat invasion: Not identified.  - Seminal vesicle invasion: Not identified.  - Lymph-vascular invasion: Not identified.  - Perineural invasion: Not identified.  - Additional Pathologic Findings:  None.     B.  Prostate, right lateral and medial base, needle biopsy:  - Benign prostate tissue, negative for malignancy.     C.  Prostate, right mid lateral, needle biopsy:  - Benign prostate tissue, negative for malignancy.     D.  Prostate, right  mid medial, needle biopsy:  - Benign prostate tissue, negative for malignancy.     E.  Prostate, right lateral apex, needle biopsy:   - Benign prostate tissue, negative for malignancy.     F. Prostate, right medial apex, needle biopsy:  - Benign prostate tissue, negative for malignancy.     G.  Prostate, left lateral base, needle biopsy:  - Prostatic adenocarcinoma, acinar, not otherwise specified; Jihan grade 3 +3= score of 6 (grade group 1) in 1 of 1 cores involving 10% of needle core tissue and measuring 2 mm in length.  - Periprostatic fat invasion: Not identified.  - Seminal vesicle invasion: Not identified.  - Lymph-vascular invasion: Not identified.  - Perineural invasion: Not identified.  - Additional Pathologic Findings:  None.     H.  Prostate, left medial base, needle biopsy:  - Prostatic adenocarcinoma, acinar, not otherwise specified; Jihan grade 3 +4= score of 7 (grade group 2) in 1 of 1 cores involving 50% of needle core tissue and measuring 8 mm in length (score 4 = 5-10%).  - Periprostatic fat invasion: Not identified.  - Seminal vesicle invasion: Not identified.  - Lymph-vascular invasion: Not identified.  - Perineural invasion: Present.  - Additional Pathologic Findings:  None.     I.  Prostate, left mid lateral, needle biopsy:  - Prostatic adenocarcinoma, acinar, not otherwise specified; [place] grade 3 +4= score of 7 (grade group 2) in 1 of 1 cores involving 40-50% of needle core tissue and measuring 5 mm in length (score 4 = 5-10%).  - Periprostatic fat invasion: Not identified.  - Seminal vesicle invasion: Not identified.  - Lymph-vascular invasion: Not identified.  - Perineural invasion: Not identified.  - Additional Pathologic Findings:  High-grade prostatic intraepithelial neoplasia (HGPIN).     J.  Prostate, left mid medial, needle biopsy:  - Prostatic adenocarcinoma, acinar, not otherwise specified; Jihan grade 3 +4= score of 7 (grade group 2) in 1 of 1 cores involving 40-50% of  needle core tissue and measuring 7 mm in length (score 4 = 5-10%).  - Periprostatic fat invasion: Not identified.  - Seminal vesicle invasion: Not identified.  - Lymph-vascular invasion: Not identified.  - Perineural invasion: Not identified.  - Additional Pathologic Findings:  High-grade prostatic intraepithelial neoplasia (HGPIN).     K.  Prostate, left lateral apex, needle biopsy:  -Focal atypical small acinar proliferation, cannot exclude limited low-grade carcinoma.     L.  Prostate, left medial apex, needle biopsy:  - Benign prostate tissue, negative for malignancy.     2/28/2023 Initial Diagnosis    Prostate cancer (HCC)     Pancreatic adenocarcinoma (HCC)   8/28/2024 Initial Diagnosis    Pancreatic adenocarcinoma (HCC)     9/12/2024 Surgery    A. Gallbladder, cholecystectomy:  - Chronic cholecystitis.  - One lymph node, negative for malignancy (0/1).  - Negative for malignancy.     B. Lymph Node, Portal Lymph Node:  - One lymph node, negative for malignancy (0/1).     C. Pancreas, Whipple; SEE COMMENTS:  - Adenocarcinoma arising in adenoma.  - Tumor invades into muscularis propria of the duodenum (pT2).  - Lymphovascular invasion is present.   - One of sixteen lymph nodes is positive for tumor (1/16, pN1).  - Pancreatic intraepithelial neoplasia (PanIN), low grade.   - All margins are negative for carcinoma or dysplasia.     Block C11 is sent to [facility] for MI Profile Testing.   Immunohistochemistry (IHC) testing for Mismatch Repair (MMR) proteins has been requested on block C9, and the results will be issued in an addendum.     D. Lymph Node, Portacaval node:  - Eight lymph nodes, negative for malignancy (0/8).     E. Soft Tissue, Other, Additional Uncinate/Pancreas:  - Portion of pancreas with no pathologic abnormality  - Two lymph nodes, negative for malignancy (0/2).     9/12/2024 -  Cancer Staged    Staging form: Pancreas, AJCC 8th Edition  - Pathologic stage from 9/12/2024: Stage IIB (pT2,  pN1, cM0) - Signed by Jacob Colby MD on 10/2/2024  Stage prefix: Initial diagnosis  Histologic grade (G): G2  Histologic grading system: 3 grade system  Lymph-vascular invasion (LVI): LVI present/identified, NOS  Carbohydrate antigen 19-9 (CA 19-9) (U/mL): 14  Carcinoembryonic antigen (CEA) (ng/mL): 0.7       11/4/2024 - 11/22/2024 Chemotherapy    alteplase (CATHFLO), 2 mg, Intracatheter, Every 1 Minute as needed, 2 of 12 cycles  pegfilgrastim (NEULASTA ONPRO), 6 mg, Subcutaneous, Once, 2 of 12 cycles  Dose modification: 4 mg (original dose 6 mg, Cycle 3)  Administration: 6 mg (11/6/2024), 6 mg (11/22/2024)  fosaprepitant (EMEND) IVPB, 150 mg, Intravenous, Once, 2 of 12 cycles  Administration: 150 mg (11/4/2024), 150 mg (11/20/2024)  irinotecan (CAMPTOSAR) chemo infusion, 242 mg (83.3 % of original dose 180 mg/m2), Intravenous, Once, 2 of 12 cycles  Dose modification: 150 mg/m2 (original dose 180 mg/m2, Cycle 1, Reason: Dose modified as per discussion with consulting physician), 125 mg/m2 (original dose 180 mg/m2, Cycle 2, Reason: Dose modified as per discussion with consulting physician)  Administration: 240 mg (11/4/2024), 200 mg (11/20/2024)  leucovorin calcium IVPB, 644 mg, Intravenous, Once, 2 of 12 cycles  Administration: 650 mg (11/4/2024), 650 mg (11/20/2024)  oxaliplatin (ELOXATIN) chemo infusion, 85 mg/m2 = 136.85 mg, Intravenous, Once, 2 of 12 cycles  Dose modification: 65 mg/m2 (original dose 85 mg/m2, Cycle 2, Reason: Dose modified as per discussion with consulting physician)  Administration: 136.85 mg (11/4/2024), 100 mg (11/20/2024)  fluorouracil (ADRUCIL) ambulatory infusion Soln, 1,200 mg/m2/day = 3,865 mg, Intravenous, Over 46 hours, 2 of 12 cycles  Dose modification: 960 mg/m2/day (original dose 1,200 mg/m2/day, Cycle 3, Reason: Dose modified as per discussion with consulting physician), 1,000 mg/m2/day (original dose 1,200 mg/m2/day, Cycle 3, Reason: Dose modified as per discussion with  consulting physician), 960 mg/m2/day (original dose 1,200 mg/m2/day, Cycle 2, Reason: Dose modified as per discussion with consulting physician)     12/11/2024 -  Chemotherapy    gemcitabine (GEMZAR) infusion, 1,000 mg/m2 = 1,600 mg, 6 of 6 cycles  Administration: 1,600 mg (12/11/2024), 1,600 mg (12/18/2024), 1,600 mg (1/8/2025), 1,600 mg (12/26/2024), 1,600 mg (1/15/2025), 1,600 mg (1/22/2025), 1,600 mg (2/5/2025), 1,600 mg (2/12/2025), 1,600 mg (2/19/2025), 1,600 mg (3/5/2025), 1,600 mg (3/12/2025), 1,600 mg (3/19/2025), 1,600 mg (4/2/2025), 1,600 mg (4/9/2025), 1,600 mg (4/16/2025), 1,600 mg (4/30/2025), 1,600 mg (5/7/2025), 1,600 mg (5/14/2025)     Ampullary carcinoma (HCC)   9/12/2024 Surgery    A. Gallbladder, cholecystectomy:  - Chronic cholecystitis.  - One lymph node, negative for malignancy (0/1).  - Negative for malignancy.     B. Lymph Node, Portal Lymph Node:  - One lymph node, negative for malignancy (0/1).     C. Pancreas, Whipple; SEE COMMENTS:  - Adenocarcinoma arising in adenoma.  - Tumor invades into muscularis propria of the duodenum (pT2).  - Lymphovascular invasion is present.   - One of sixteen lymph nodes is positive for tumor (1/16, pN1).  - Pancreatic intraepithelial neoplasia (PanIN), low grade.   - All margins are negative for carcinoma or dysplasia.     Block C11 is sent to [facility] for MI Profile Testing.   Immunohistochemistry (IHC) testing for Mismatch Repair (MMR) proteins has been requested on block C9, and the results will be issued in an addendum.     D. Lymph Node, Portacaval node:  - Eight lymph nodes, negative for malignancy (0/8).     E. Soft Tissue, Other, Additional Uncinate/Pancreas:  - Portion of pancreas with no pathologic abnormality  - Two lymph nodes, negative for malignancy (0/2).     10/2/2024 Initial Diagnosis    Ampullary carcinoma (HCC)     10/2/2024 -  Cancer Staged    Staging form: Ampulla of Vater, AJCC 8th Edition  - Pathologic: Stage IIIA (pT2,  pN1, cM0) - Signed by Marika Ac MD on 10/2/2024             FOLLOW UP HISTORY    Staging: T2N1 intestinal type ampullary adenocarcinoma  Treatment history: whipple 9/12/24  Current treatment: adj therapy completed this month  Disease status: MICHELA    History of Present Illness: Isaac is seen in follow-up for ampullary adenocarcinoma.  He has not completed adjuvant therapy.  This was switched from FOLFIRINOX to gem Abraxane which he completed well.  He was hospitalized recently with chest pain thought to be secondary to his chemotherapy.  He has recovered well from this and is increasing his activity, working in the garden regularly.  He has no current complaints today.  He is gaining weight and eating well.    Review of Systems  Complete ROS Surg Onc:   Complete ROS Surg Onc:   Constitutional: The patient denies new or recent history of general fatigue, no recent weight loss, no change in appetite.   Eyes: No complaints of visual problems, no scleral icterus.   ENT: no complaints of ear pain, no hoarseness, no difficulty swallowing,  no tinnitus and no new masses in head, oral cavity, or neck.   Cardiovascular: No complaints of chest pain, no palpitations, no ankle edema.   Respiratory: No complaints of shortness of breath, no cough.   Gastrointestinal: No complaints of jaundice, no bloody stools, no pale stools.   Genitourinary: No complaints of dysuria, no hematuria, no nocturia, no frequent urination, no urethral discharge.   Musculoskeletal: No complaints of weakness, paralysis, joint stiffness or arthralgias.  Integumentary: No complaints of rash, no new lesions.   Neurological: No complaints of convulsions, no seizures, no dizziness.   Hematologic/Lymphatic: No complaints of easy bruising.   Endocrine:  No hot or cold intolerance.  No polydipsia, polyphagia, or polyuria.  Allergy/immunology:  No environmental allergies.  No food allergies.  Not immunocompromised.  Skin:  No pallor or rash.  No  wound.        Patient Active Problem List   Diagnosis    Benign essential hypertension    ED (erectile dysfunction) of non-organic origin    Other hyperlipidemia    Peripheral vascular disease (HCC)    Peripheral neuropathy    Overweight with body mass index (BMI) of 26 to 26.9 in adult    Aortic valve stenosis    Coronary artery disease involving native coronary artery of native heart without angina pectoris    Microscopic hematuria    BPH (benign prostatic hyperplasia)    Pancreatic adenocarcinoma (HCC)    Elevated LFTs    Acute obstructive cholangitis    Enteritis    GERD (gastroesophageal reflux disease)    MOOKIE (acute kidney injury) (HCC)    Ampullary carcinoma (HCC)    Encounter for central line care    Chemotherapy-induced neutropenia (HCC)    Leukocytosis    Generalized abdominal pain    Severe protein-calorie malnutrition (HCC)    Other chest pain    Anemia in other chronic diseases classified elsewhere    ACS (acute coronary syndrome) (HCC)    Atrial fibrillation (HCC)     Past Medical History:   Diagnosis Date    Anemia in other chronic diseases classified elsewhere 05/18/2025    Benign essential hypertension 05/08/2014    CAD (coronary artery disease)     s/p NAVA prox LAD and D1 7/28/2022    Cancer (HCC)     Prostate    Coronary artery disease involving native coronary artery of native heart without angina pectoris 08/09/2022    Elevated PSA     Enteritis 08/28/2024    Erectile dysfunction     GERD (gastroesophageal reflux disease)     Hyperlipidemia     Hypertension     Other chest pain     Palpitations     Prostate cancer (HCC) 04/18/2023    Sleep apnea      Past Surgical History:   Procedure Laterality Date    CARDIAC CATHETERIZATION Left 7/28/2022    Procedure: Cardiac catheterization-left heart catheterization;  Surgeon: Sai Henry MD;  Location: [facility];  Service: Cardiology    CARDIAC CATHETERIZATION N/A 7/28/2022    Procedure: Cardiac pci;  Surgeon: Sai Henry MD;  Location: [facility]  CARDIAC CATH [facility];  Service: Cardiology    CARDIAC CATHETERIZATION Left 2025    Procedure: Cardiac Left Heart Cath;  Surgeon: Jose Roberto Weeks DO;  Location: [facility];  Service: Cardiology    HERNIA REPAIR      IR EMBOLIZATION (SPECIFY VESSEL OR SITE)  2024    IR PORT PLACEMENT  10/28/2024    KS LAPS SURG [id number] RPBIC RAD W/NRV SPARING ROBOT N/A 2023    Procedure: PROSTATECTOMY RADICAL & PELVIC LYMPH NODE DISSECTION  W/ ROBOT;  Surgeon: Otoniel Harmon MD;  Location: [facility];  Service: Urology    KS PROSTATE NEEDLE BIOPSY ANY APPROACH N/A 2023    Procedure: TRANSRECTAL MRI FUSION BIOPSY PROSTATE;  Surgeon: Milan Arellano MD;  Location: [facility];  Service: Urology    WHIPPLE PROCEDURE/PANCREATICO-DUODENECTOMY N/A 2024    Procedure: WHIPPLE PROCEDURE/PANCREATICO-DUODENECTOMY;  Surgeon: Marika Ac MD;  Location: [facility];  Service: Surgical Oncology     Family History   Problem Relation Name Age of Onset    No Known Problems Mother      No Known Problems Father       Social History     Socioeconomic History    Marital status: /Civil Union     Spouse name: Not on file    Number of children: Not on file    Years of education: Not on file    Highest education level: Not on file   Occupational History    Not on file   Tobacco Use    Smoking status: Former     Current packs/day: 0.00     Types: Cigarettes     Quit date: 2022     Years since quittin.4     Passive exposure: Past    Smokeless tobacco: Never    Tobacco comments:     N/a   Vaping Use    Vaping status: Never Used   Substance and Sexual Activity    Alcohol use: Not Currently     Comment: n/a    Drug use: Never     Comment: n/a    Sexual activity: Not Currently     Comment: n/a   Other Topics Concern    Not on file   Social History Narrative    EMPLOYED         Social Drivers of Health     Financial Resource Strain: Not on file   Food Insecurity: No Food Insecurity (2025)     Nursing - Inadequate Food Risk Classification     Worried About Running Out of Food in the Last Year: Never true     Ran Out of Food in the Last Year: Never true     Ran Out of Food in the Last Year: Never true   Transportation Needs: No Transportation Needs (2025)    Nursing - Transportation Risk Classification     Lack of Transportation: Not on file     Lack of Transportation: No   Physical Activity: Not on file   Stress: Not on file   Social Connections: Unknown (2024)    Received from [employer]    Social [employer]     How often do you feel lonely or isolated from those around you? (Adult - for ages 18 years and over): Not on file   Intimate Partner Violence: Unknown (2025)    Nursing IPS     Feels Physically and Emotionally Safe: Not on file     Physically Hurt by Someone: Not on file     Humiliated or Emotionally Abused by Someone: Not on file     Physically Hurt by Someone: No     Hurt or Threatened by Someone: No   Housing Stability: Unknown (2025)    Nursing: Inadequate Housing Risk Classification     Has Housing: Not on file     Worried About Losing Housing: Not on file     Unable to Get Utilities: Not on file     Unable to Pay for Housing in the Last Year: No     Has Housin       Current Outpatient Medications:     amiodarone 200 mg tablet, Take 1 tablet (200 mg total) by mouth 2 (two) times a day with meals for 14 days, THEN 1 tablet (200 mg total) daily with breakfast., Disp: 58 tablet, Rfl: 0    apixaban (ELIQUIS) 5 mg, Take 1 tablet (5 mg total) by mouth 2 (two) times a day, Disp: 60 tablet, Rfl: 0    aspirin 81 mg chewable tablet, Chew 81 mg in the morning., Disp: , Rfl:     atorvastatin (LIPITOR) 80 mg tablet, Take 1 tablet (80 mg total) by mouth daily, Disp: 30 tablet, Rfl: 0    betamethasone, augmented, (DIPROLENE) 0.05 % ointment, Apply topically 2 (two) times a day for 7 days, Disp: 45 g, Rfl: 0    capecitabine (XELODA) 150 MG tablet, Take 2 tablets (300 mg total) by  mouth 2 (two) times a day 21 days on, followed by 7 days off, Disp: 84 tablet, Rfl: 11    capecitabine (XELODA) 500 MG tablet, Take 2 tablets (1,000 mg total) by mouth 2 (two) times a day 21 days on, followed by 7 days off, Disp: 84 tablet, Rfl: 11    dicyclomine (BENTYL) 10 mg capsule, Take 1 capsule (10 mg total) by mouth 3 (three) times a day before meals, Disp: 90 capsule, Rfl: 0    lisinopril (ZESTRIL) 20 mg tablet, Take 1 tablet by mouth once daily, Disp: 30 tablet, Rfl: 5    metoprolol succinate (TOPROL-XL) 25 mg 24 hr tablet, Take 1 tablet (25 mg total) by mouth 2 (two) times a day, Disp: 60 tablet, Rfl: 0    urea (CARMOL) 20 % cream, Apply topically as needed for dry skin, Disp: 75 g, Rfl: 1  No current facility-administered medications for this visit.    Facility-Administered Medications Ordered in Other Visits:     [MAR Hold] alteplase (CATHFLO) injection 2 mg, 2 mg, Intracatheter, Q1MIN PRN, Jacob Colby MD  No Known Allergies  Vitals:    05/29/25 1421   BP: 124/68   Pulse: 66   Resp: 18   Temp: 97.5 °F (36.4 °C)   SpO2: 98%         Physical Exam  Constitutional: General appearance: The Patient is well-developed and well-nourished who appears the stated age in no acute distress. Patient is pleasant and talkative.     HEENT:  Normocephalic.  Sclerae are anicteric. Mucous membranes are moist. Neck is supple without adenopathy. No JVD.     Chest: Easy WOB.     Cardiac: Heart is regular rate.     Abdomen: Abdomen is soft, non-tender, non-distended and without masses.     Extremities: There is no clubbing or cyanosis. There is no edema.  Symmetric.  Neuro: Grossly nonfocal. Gait is normal.     Lymphatic: No evidence of cervical adenopathy bilaterally.   No evidence of axillary adenopathy bilaterally. No evidence of inguinal adenopathy bilaterally.     Skin: Warm, anicteric.  Incision healing well  Psych:  Patient is pleasant and talkative.    Imaging  XR chest portable    Result Date: 9/17/2024  Narrative: XR  CHEST PORTABLE INDICATION: r/o pleural effusion. COMPARISON: CXR 9/16/2024, abdomen CT 9/13/2024, chest CT 8/28/2024. FINDINGS: Epidural catheter. Moderate right pleural effusion and small left pleural effusion. Right greater than left bibasilar atelectasis. No pneumothorax. Normal cardiomediastinal silhouette. Bones are unremarkable for age. Embolization coil right upper quadrant.     Impression: Moderate right and small left pleural effusion and bibasilar atelectasis. Workstation performed: [id number]     XR chest portable    Result Date: 9/17/2024  Narrative: XR CHEST PORTABLE INDICATION: left sided chest pain and shortness of breath. COMPARISON: Chest x-ray 9/13/2024 FINDINGS: Nasogastric tube has been removed. Partial improved aeration of the bilateral lung fields. Probable residual pulmonary vascular congestion and small bilateral pleural effusions with right basilar atelectasis or infiltrate not excluded. Normal cardiomediastinal silhouette. Bones are unremarkable for age.     Impression: Partially improved aeration of the lung fields. Probable residual pulmonary vascular congestion and small bilateral pleural effusions with right basilar atelectasis or infiltrate not excluded. Workstation performed: [id number]     XR chest portable ICU    Result Date: 9/13/2024  Narrative: XR CHEST PORTABLE ICU INDICATION: hypoxia. COMPARISON: 8/27/2024 FINDINGS: Bilateral patchy/hazy airspace opacities are noted. No pneumothorax. Nasogastric tube is seen extending below the left hemidiaphragm. A wire is seen projecting over the right shoulder region and mid chest, possibly related to the described thoracic epidural, correlate clinically Bones are unremarkable for age. Normal upper abdomen.     Impression: Bilateral patchy/hazy airspace opacities, which may represent pulmonary congestion versus bilateral infiltrates in the appropriate clinical setting Nasogastric tube extends below left hemidiaphragm Workstation performed:  [id number]     IR embolization (specify vessel or site)    Result Date: 9/13/2024  Narrative: PROCEDURE: Cystic artery embolization Procedural Personnel Attending physician(s): Dr. Cardona Resident physician(s): Dr. Guerin Pre-procedure diagnosis: Intraperitoneal hemorrhage Post-procedure diagnosis: Same Indication: Patient with history of pancreatic mass status post Whipple's noted to have downtrending hemoglobin. CTA showed findings concerning for active bleeding from cystic artery. PROCEDURE SUMMARY: - Arterial access with ultrasound guidance - Selective cannulization of celiac, common hepatic, and cystic arteries with angiograms - Coil embolization of of cystic artery PROCEDURE DETAILS: Pre-procedure Consent: Informed consent for the procedure including risks, benefits and alternatives was obtained and time-out was performed prior to the procedure. Preparation: The site was prepared and draped using maximal sterile barrier technique including cutaneous antisepsis. Anesthesia/sedation Level of anesthesia/sedation: General anesthesia Anesthesia/sedation administered by: Anesthesiology Total intra-service sedation time (minutes): 60 Access Local anesthesia was administered. The vessel was sonographically evaluated and judged to be patent. Real time ultrasound was used to visualize needle entry into the vessel and a permanent image was stored. A 5 Spanish sheath was placed. Vessel accessed: Right common femoral artery Access technique: 19 gauge access needle Mesenteric angiography and interventions 5 Spanish VS2 catheter was used to selectively cannulate the celiac artery. Celiac artery angiogram was performed. 2.8 Spanish Progreat microcatheter was used to selectively cannulate the common hepatic artery and angiogram was performed. The microcatheter  was used to selectively cannulate the cystic artery. There was active contrast extravasation from branches of the cystic artery. Cystic artery was coil embolized using 3 mm  x 5 mm [place] Soft coil. Post embolization angiogram showed cessation of flow into  the cystic artery as well as cessation of active contrast extravasation. Closure Right common femoral artery access was closed using Perclose closure device. Contrast Contrast agent: Visipaque Contrast volume (mL): 81 Radiation Dose Fluoroscopy time (minutes): 18.3 Reference air kerma (mGy): 1339 Additional Details Estimated blood loss (mL): 10 Complications: No immediate complications.     Impression: Active contrast extravasation visualized from branches of the cystic artery. Cystic artery was coil embolized with resolution of contrast extravasation. Plan: -Continue ICU cares. -Monitor hemoglobin. Workstation performed: [id number]     CTA abdomen pelvis w wo contrast    Addendum Date: 9/13/2024 Addendum:   ADDENDUM: The treating team is aware of these findings, and the patient is currently in interventional radiology for embolization.    Result Date: 9/13/2024  Narrative: CT ANGIOGRAM OF THE ABDOMEN AND PELVIS WITH AND WITHOUT IV CONTRAST INDICATION: s/p Whipple w bloody drain output want to rule out GDA bleed. COMPARISON: Preoperative CT dated 8/27/2024 TECHNIQUE: CT angiogram examination of the abdomen and pelvis was performed according to standard protocol. This examination, like all CT scans performed in the [facility], was performed utilizing techniques to minimize radiation dose exposure, including the use of iterative reconstruction and automated exposure control. Contrast as well as noncontrast images were obtained. Rad dose 2041.57 mGy-cm IV Contrast: 75 mL of iohexol (OMNIPAQUE) Enteric Contrast: Not administered. FINDINGS: VASCULAR STRUCTURES: There is active extravasation from the cystic artery with hematoma in the right upper quadrant.. The origin of the gastroduodenal artery is suspected on image 52 of series 330 and 69 of series 605. The remainder of the gastroduodenal  artery is not clearly  identified and likely surgically ligated. There is no adjacent contrast extravasation. OTHER FINDINGS ABDOMEN LOWER CHEST: Small bilateral pleural effusions with adjacent, compressive atelectasis. LIVER/BILIARY TREE: Unremarkable. GALLBLADDER: Post cholecystectomy. There is extravasation of contrast on arterial phase images adjacent to a surgical clip, best seen on image 53 of series 303. This appears to originate from the cystic artery. There are adjacent, perihepatic mixed density blood products SPLEEN: Unremarkable. 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 There is distal pancreatic ductal dilation. ADRENAL GLANDS: Unremarkable. KIDNEYS/URETERS: Simple renal cyst(s). Otherwise unremarkable kidneys. No hydronephrosis. STOMACH AND BOWEL: Changes of a Whipple procedure are noted. There are prominent loops of small bowel with air-fluid levels which likely reflect a postoperative ileus. There is bowel gas distally without findings suspicious for small bowel obstruction. APPENDIX: No findings to suggest appendicitis. ABDOMINOPELVIC CAVITY: There are heterogeneous blood products in the right upper quadrant along the inferior margin of the liver and adjacent to the cholecystectomy bed. There is also a predominantly simple appearing fluid in the left abdomen. There is predominantly simple appearing fluid in the pelvis with a small amount of layering, hyperdense blood products seen posteriorly on image 158 of series 303. There is a small amount of free air, expected in the recent postoperative setting. NG tube tip is in the upper stomach, just below the GE junction. There is an operative drain in the operative bed. PELVIS REPRODUCTIVE ORGANS: Unremarkable for patient's age. URINARY BLADDER: Decompressed by Carr catheter ABDOMINAL WALL/INGUINAL REGIONS: Unremarkable. BONES: No acute fracture or suspicious osseous lesion. Epidural catheter is partially imaged.     Impression: 1.  Active extravasation from the cystic artery with adjacent hematoma in the right upper quadrant. 2. Small amount of layering hemoperitoneum in the pelvis. 3. 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 There are mildly prominent loops of small bowel without evidence of obstruction. 4. NG tube tip just below the GE junction. Consider slight advancement. The proximal port is above the GE junction. The study was marked in EPIC for immediate notification. Workstation performed: [id number]     I personally reviewed and interpreted the available laboratory and imaging data including labs, Dr. Colby's notes, hospital course, cross-sectional imaging x 3, labs.

## 2025-06-12 ENCOUNTER — OFFICE VISIT (OUTPATIENT)
Dept: CARDIOLOGY CLINIC | Facility: CLINIC | Age: 65
End: 2025-06-12
Payer: COMMERCIAL

## 2025-06-12 VITALS
HEART RATE: 68 BPM | BODY MASS INDEX: 24.59 KG/M2 | DIASTOLIC BLOOD PRESSURE: 60 MMHG | HEIGHT: 64 IN | SYSTOLIC BLOOD PRESSURE: 128 MMHG | WEIGHT: 144 LBS

## 2025-06-12 DIAGNOSIS — I25.10 CORONARY ARTERY DISEASE INVOLVING NATIVE CORONARY ARTERY OF NATIVE HEART WITHOUT ANGINA PECTORIS: ICD-10-CM

## 2025-06-12 DIAGNOSIS — I10 BENIGN ESSENTIAL HYPERTENSION: ICD-10-CM

## 2025-06-12 DIAGNOSIS — E78.49 OTHER HYPERLIPIDEMIA: ICD-10-CM

## 2025-06-12 DIAGNOSIS — C25.9 PANCREATIC ADENOCARCINOMA (HCC): ICD-10-CM

## 2025-06-12 DIAGNOSIS — I35.0 NONRHEUMATIC AORTIC VALVE STENOSIS: Primary | ICD-10-CM

## 2025-06-12 DIAGNOSIS — I48.91 ATRIAL FIBRILLATION (HCC): ICD-10-CM

## 2025-06-12 PROCEDURE — 99214 OFFICE O/P EST MOD 30 MIN: CPT | Performed by: PHYSICIAN ASSISTANT

## 2025-06-12 RX ORDER — LISINOPRIL 20 MG/1
20 TABLET ORAL DAILY
Qty: 30 TABLET | Refills: 5 | Status: SHIPPED | OUTPATIENT
Start: 2025-06-12

## 2025-06-12 RX ORDER — METOPROLOL SUCCINATE 25 MG/1
25 TABLET, EXTENDED RELEASE ORAL 2 TIMES DAILY
Qty: 60 TABLET | Refills: 5 | Status: SHIPPED | OUTPATIENT
Start: 2025-06-12

## 2025-06-12 RX ORDER — ATORVASTATIN CALCIUM 80 MG/1
80 TABLET, FILM COATED ORAL DAILY
Qty: 30 TABLET | Refills: 5 | Status: SHIPPED | OUTPATIENT
Start: 2025-06-12

## 2025-06-12 RX ORDER — AMIODARONE HYDROCHLORIDE 200 MG/1
TABLET ORAL
Qty: 30 TABLET | Refills: 5 | Status: SHIPPED | OUTPATIENT
Start: 2025-06-12 | End: 2025-08-11

## 2025-06-12 NOTE — PROGRESS NOTES
[facility]   Outpatient Note  Isaac Kramer  1960  [id number]  [facility] [address] [address]  [phone]    Isaac Kramer is a 64 y.o. male    Assessment and Plan:   Coronary artery disease involving native coronary artery of native heart without angina pectoris  S/p NAVA x2 in 2022: LAD and D1   Cath 5/19/25 shows no significant obstructive epicardial CAD   On medical management     Continue metoprolol lisinopril atorvastatin and ASA  Also on Eliquis     Atrial fibrillation (HCC)  Maintaining sinus rhythm   On amiodarone     On Eliquis for stroke risk reduction     Aortic valve stenosis  Moderate per exam     Other hyperlipidemia  Tolerating statin therapy  Continue atorvastatin 80 mg daily    Benign essential hypertension  Controlled on current medical regimen  Continue metoprolol and lisinopril    Pancreatic adenocarcinoma (HCC)  Completed chemotherapy        Additional Plan:   No Medication changes made or testing ordered today.     Available lab and test results are reviewed with the patient as noted.    Return visit will be in six months or earlier if there are problems.     The patient is encouraged to call in the meantime if there are questions or concerns.      Subjective:   The patient is seen in the office today for a follow-up regarding recent hospitalization for chest pain.  He has a PMH of CAD with prior NAVA x 2 to the LAD.  He underwent cardiac cath that showed no nonobstructive CAD.  He has moderate aortic stenosis pancreatic adenocarcinoma status post Whipple procedure HTN and HLD.  He has completed chemotherapy and is feeling much improved.  He is tolerating his medications well and maintaining sinus rhythm on amiodarone.    From cardiac perspective he has no chest pain shortness of breath palpitations dizziness lightheadedness or syncope.  He denies any edema orthopnea or PND.  He  "has no TIA or CVA symptoms.        Social History  Tobacco Use History[1],   Social History     Substance and Sexual Activity   Alcohol Use Not Currently    Comment: n/a   ,   Social History     Substance and Sexual Activity   Drug Use Never    Comment: n/a     Family History[2]    Medical and Surgical History  Past Medical History[3]  Past Surgical History[4]    Current Medications[5]  No Known Allergies    Review of Systems   Constitutional: Negative.   HENT: Negative.     Eyes: Negative.    Cardiovascular: Negative.  Negative for chest pain, claudication, cyanosis, dyspnea on exertion, irregular heartbeat, leg swelling, near-syncope, orthopnea, palpitations, paroxysmal nocturnal dyspnea and syncope.   Respiratory: Negative.  Negative for cough, hemoptysis, shortness of breath, sleep disturbances due to breathing, snoring, sputum production and wheezing.    Endocrine: Negative.    Hematologic/Lymphatic: Negative.    Skin: Negative.    Musculoskeletal: Negative.    Gastrointestinal: Negative.    Genitourinary: Negative.    Neurological: Negative.    Psychiatric/Behavioral: Negative.     Allergic/Immunologic: Negative.        Objective:   /60 (BP Location: Left arm, Patient Position: Sitting)   Pulse 68   Ht 5' 4\" (1.626 m)   Wt 65.3 kg (144 lb)   BMI 24.72 kg/m²   Physical Exam  Vitals and nursing note reviewed.   Constitutional:       Appearance: He is well-developed.   HENT:      Head: Normocephalic and atraumatic.      Mouth/Throat:      Mouth: Mucous membranes are moist.     Eyes:      General: No scleral icterus.     Conjunctiva/sclera: Conjunctivae normal.     Neck:      Thyroid: No thyromegaly.      Vascular: No JVD.     Cardiovascular:      Rate and Rhythm: Normal rate and regular rhythm.      Heart sounds: Normal heart sounds, S1 normal and S2 normal. No murmur heard.     No friction rub. No gallop.   Pulmonary:      Effort: No respiratory distress.      Breath sounds: No wheezing or rales. " "  Abdominal:      General: Bowel sounds are normal. There is no distension.      Palpations: Abdomen is soft.      Tenderness: There is no abdominal tenderness.      Comments: Aorta not palpable     Musculoskeletal:         General: No tenderness or deformity. Normal range of motion.      Cervical back: Normal range of motion and neck supple.      Right lower leg: No edema.      Left lower leg: No edema.     Skin:     General: Skin is warm and dry.     Neurological:      General: No focal deficit present.      Mental Status: He is alert and oriented to person, place, and time.     Psychiatric:         Mood and Affect: Mood normal.         Behavior: Behavior normal.         Judgment: Judgment normal.         Lab Review:   No results found for: \"CHOL\"  Lab Results   Component Value Date    HDL 28 (L) 2025     Lab Results   Component Value Date    LDLCALC 36 2025     Lab Results   Component Value Date    TRIG 77 2025     Results Reviewed       None          Results Reviewed       None          Results Reviewed       None            Recent Cardiovascular Testing:   Cardiac cath 2025: No significant obstructive epicardial CAD    Echo 2025: Normal LVEF 65% G1 DD moderate AS mild MR    ECG Review:   2025 Atrial fibrillation  Nonspecific ST and T wave abnormality                 [1]   Social History  Tobacco Use   Smoking Status Former    Current packs/day: 0.00    Types: Cigarettes    Quit date: 2022    Years since quittin.4    Passive exposure: Past   Smokeless Tobacco Never   Tobacco Comments    N/a   [2]   Family History  Problem Relation Name Age of Onset    No Known Problems Mother      No Known Problems Father     [3]   Past Medical History:  Diagnosis Date    Anemia in other chronic diseases classified elsewhere 2025    Benign essential hypertension 2014    CAD (coronary artery disease)     s/p NAVA prox LAD and D1 2022    Cancer (HCC)     Prostate    " Coronary artery disease involving native coronary artery of native heart without angina pectoris 08/09/2022    Elevated PSA     Enteritis 08/28/2024    Erectile dysfunction     GERD (gastroesophageal reflux disease)     Hyperlipidemia     Hypertension     Other chest pain     Palpitations     Prostate cancer (HCC) 04/18/2023    Sleep apnea    [4]   Past Surgical History:  Procedure Laterality Date    CARDIAC CATHETERIZATION Left 7/28/2022    Procedure: Cardiac catheterization-left heart catheterization;  Surgeon: Sai Henry MD;  Location: [facility];  Service: Cardiology    CARDIAC CATHETERIZATION N/A 7/28/2022    Procedure: Cardiac pci;  Surgeon: Sai Henry MD;  Location: [facility];  Service: Cardiology    CARDIAC CATHETERIZATION Left 5/19/2025    Procedure: Cardiac Left Heart Cath;  Surgeon: Jose Roberto Weeks DO;  Location: [facility];  Service: Cardiology    HERNIA REPAIR      IR EMBOLIZATION (SPECIFY VESSEL OR SITE)  9/13/2024    IR PORT PLACEMENT  10/28/2024    OH LAPS SURG [id number] RPBIC RAD W/NRV SPARING ROBOT N/A 5/22/2023    Procedure: PROSTATECTOMY RADICAL & PELVIC LYMPH NODE DISSECTION  W/ ROBOT;  Surgeon: Otoniel Harmon MD;  Location: [facility];  Service: Urology    OH PROSTATE NEEDLE BIOPSY ANY APPROACH N/A 2/28/2023    Procedure: TRANSRECTAL MRI FUSION BIOPSY PROSTATE;  Surgeon: Milan Arellano MD;  Location: [facility];  Service: Urology    WHIPPLE PROCEDURE/PANCREATICO-DUODENECTOMY N/A 9/12/2024    Procedure: WHIPPLE PROCEDURE/PANCREATICO-DUODENECTOMY;  Surgeon: Marika Ac MD;  Location: [facility];  Service: Surgical Oncology   [5]   Current Outpatient Medications:     amiodarone 200 mg tablet, Take 1 tablet (200 mg total) by mouth daily with breakfast for 30 days, THEN 1 tablet (200 mg total) daily with breakfast., Disp: 30 tablet, Rfl: 5    apixaban (ELIQUIS) 5 mg, Take 1 tablet (5 mg total) by mouth 2 (two) times a day, Disp: 60 tablet, Rfl:  5    aspirin 81 mg chewable tablet, Chew 81 mg in the morning., Disp: , Rfl:     atorvastatin (LIPITOR) 80 mg tablet, Take 1 tablet (80 mg total) by mouth daily, Disp: 30 tablet, Rfl: 5    lisinopril (ZESTRIL) 20 mg tablet, Take 1 tablet (20 mg total) by mouth daily, Disp: 30 tablet, Rfl: 5    metoprolol succinate (TOPROL-XL) 25 mg 24 hr tablet, Take 1 tablet (25 mg total) by mouth 2 (two) times a day, Disp: 60 tablet, Rfl: 5    urea (CARMOL) 20 % cream, Apply topically as needed for dry skin, Disp: 75 g, Rfl: 1    betamethasone, augmented, (DIPROLENE) 0.05 % ointment, Apply topically 2 (two) times a day for 7 days, Disp: 45 g, Rfl: 0    capecitabine (XELODA) 150 MG tablet, Take 2 tablets (300 mg total) by mouth 2 (two) times a day 21 days on, followed by 7 days off (Patient not taking: Reported on 6/12/2025), Disp: 84 tablet, Rfl: 11    capecitabine (XELODA) 500 MG tablet, Take 2 tablets (1,000 mg total) by mouth 2 (two) times a day 21 days on, followed by 7 days off (Patient not taking: Reported on 6/12/2025), Disp: 84 tablet, Rfl: 11    dicyclomine (BENTYL) 10 mg capsule, Take 1 capsule (10 mg total) by mouth 3 (three) times a day before meals, Disp: 90 capsule, Rfl: 0  No current facility-administered medications for this visit.    Facility-Administered Medications Ordered in Other Visits:     [MAR Hold] alteplase (CATHFLO) injection 2 mg, 2 mg, Intracatheter, Q1MIN PRN, Jacob Colby MD

## 2025-06-12 NOTE — PATIENT INSTRUCTIONS
No changes in medication     Return on next scheduled appointment     Call if you have any concerns

## 2025-06-12 NOTE — ASSESSMENT & PLAN NOTE
S/p NAVA x2 in 2022: LAD and D1   Cath 5/19/25 shows no significant obstructive epicardial CAD   On medical management     Continue metoprolol lisinopril atorvastatin and ASA  Also on Eliquis

## 2025-06-25 ENCOUNTER — HOSPITAL ENCOUNTER (OUTPATIENT)
Dept: INFUSION CENTER | Facility: HOSPITAL | Age: 65
Discharge: HOME | End: 2025-06-25
Attending: INTERNAL MEDICINE
Payer: COMMERCIAL

## 2025-06-25 DIAGNOSIS — Z45.2 ENCOUNTER FOR CENTRAL LINE CARE: Primary | ICD-10-CM

## 2025-06-25 PROCEDURE — 96523 IRRIG DRUG DELIVERY DEVICE: CPT

## 2025-06-25 NOTE — PROGRESS NOTES
Isaac Kramer  tolerated port flush well with no complications.    Isaac Kramer is aware of future appt on 8/20 at 10AM.     AVS declined by Isaac Kramer. Appointment card given to pt.

## 2025-06-30 ENCOUNTER — TELEPHONE (OUTPATIENT)
Age: 65
End: 2025-06-30

## 2025-06-30 NOTE — TELEPHONE ENCOUNTER
Received call from pt.  He cannot afford Eliquis, costs $600.  He's asking for an alternative.  Please advise.

## 2025-07-14 ENCOUNTER — RA CDI HCC (OUTPATIENT)
Dept: OTHER | Facility: HOSPITAL | Age: 65
End: 2025-07-14

## 2025-07-22 ENCOUNTER — TELEPHONE (OUTPATIENT)
Age: 65
End: 2025-07-22

## 2025-07-22 NOTE — TELEPHONE ENCOUNTER
Discussed with dr Colby and pt was instructed to stop the Capecitabine for the last two days, continue using Urea Cream. Pt requested pain meds and his hands and feet really hurt. Sent in Rx for Oxycodone 5/325 mg to his local pharmacy after checking the PMED    Preventive Care Visit  [facility]  April DHARMESH Nieto MD, Family Medicine  Jul 22, 2025      Assessment & Plan     Encounter for Medicare annual wellness exam  Exam completed today, routine health maintenance items updated as able.  Labs ordered.  Follow up one year or sooner as needed.  Recommend Shingles, Hepatitis A, and Pneumonia vaccines at pharmacy.    Severe episode of recurrent major depressive disorder, without psychotic features (H)  Stable overall, dealing with some ups and downs.  Continue Bupropion at current dose, continue fluoxetine at current dose.  Follow up in 3 months or sooner as needed.  - buPROPion (WELLBUTRIN XL) 150 MG 24 hr tablet; Take 1 tablet (150 mg) by mouth every morning.  - FLUoxetine (PROZAC) 20 MG capsule; Take one 20 mg capsule with a 40 mg capsule for a total of 60 mg daily.  - FLUoxetine (PROZAC) 40 MG capsule; Take 1 capsule (40 mg) by mouth daily. Along with a 20 mg capsule for a total of 60 mg daily    PTSD (post-traumatic stress disorder)  Hoarding disorder  Anxiety  As above  - FLUoxetine (PROZAC) 40 MG capsule; Take 1 capsule (40 mg) by mouth daily. Along with a 20 mg capsule for a total of 60 mg daily  - FLUoxetine (PROZAC) 20 MG capsule; Take one 20 mg capsule with a 40 mg capsule for a total of 60 mg daily.    Hiatal hernia  Opioid dependence, uncomplicated (H)  Patient has possible surgery coming up, did receive message from Dr. Sylvester's team regarding postoperative pain management.  Will work with them regarding pain management when the time comes.  Continue current regime in the interim.    Encounter for therapeutic drug monitoring  - Drug Confirmation Panel Urine with Creat - lab collect; Future  - Drug Confirmation Panel Urine with Creat; Future    History of compression fracture of spine  Refill for PRN use.  Recommend getting DEXA since she has not had one yet.  - methocarbamol (ROBAXIN) 750 MG tablet; Take 1 tablet (750 mg) by mouth  3 times daily as needed for muscle spasms.  - DX Bone Density; Future    T12 compression fracture, sequela  Check labs, DEXA as above.  - Vitamin D Deficiency; Future  - Phosphorus; Future  - Vitamin D Deficiency  - Phosphorus    Recurrent genital herpes simplex  Controlled, continue daily use.  - valACYclovir (VALTREX) 500 MG tablet; Take 1 tablet (500 mg) by mouth daily.    Post-menopausal  - DX Bone Density; Future    Vitamin D deficiency  Check labs, continue supplementation.  - Vitamin D Deficiency; Future  - Vitamin D Deficiency    Prediabetes  Due for labs, recommendations pending results.  - CBC with platelets; Future  - Comprehensive metabolic panel (BMP + Alb, Alk Phos, ALT, AST, Total. Bili, TP); Future  - Hemoglobin A1c; Future  - CBC with platelets  - Comprehensive metabolic panel (BMP + Alb, Alk Phos, ALT, AST, Total. Bili, TP)  - Hemoglobin A1c    Current use of proton pump inhibitor  Check labs, recommendations pending results.  - Magnesium; Future  - Magnesium    Lipid screening  - Lipid panel reflex to direct LDL Fasting; Future    Need for COVID-19 vaccine  - COVID-19 12+ (PFIZER)    Screening for thyroid disorder  - TSH with free T4 reflex; Future  - TSH with free T4 reflex      The longitudinal plan of care for the diagnosis(es)/condition(s) as documented were addressed during this visit. Due to the added complexity in care, I will continue to support Nina in the subsequent management and with ongoing continuity of care.    Counseling  Appropriate preventive services were addressed with this patient via screening, questionnaire, or discussion as appropriate for fall prevention, nutrition, physical activity, Tobacco-use cessation, social engagement, weight loss and cognition.  Checklist reviewing preventive services available has been given to the patient.  Reviewed patient's diet, addressing concerns and/or questions.   The patient was instructed to see the dentist every 6 months.   She is at  risk for psychosocial distress and has been provided with information to reduce risk.   Discussed possible causes of fatigue. Updated plan of care.  Patient reported difficulty with activities of daily living were addressed today.The patient was provided with written information regarding signs of hearing loss.   Information on urinary incontinence and treatment options given to patient.   I have reviewed Opioid Use Disorder and Substance Use Disorder risk factors and made any needed referrals.   Reviewed preventive health counseling, as reflected in patient instructions        Subjective   Nina is a 64 year old, presenting for the following:  Pain, MH Follow Up, and Annual Visit        7/22/2025     2:52 PM   Additional Questions   Roomed by Bonnie Angelo CMA          Musculoskeletal Problem    History of Present Illness       Back Pain:  She presents for follow up of back pain. Patient's back pain is a chronic problem.  Location of back pain:  Right lower back, left lower back, right middle of back, left middle of back, right shoulder, right buttock, left buttock and right side of waist  Description of back pain: burning, sharp, shooting and stabbing  Back pain spreads: right buttocks, left buttocks and left side of neck    Since patient first noticed back pain, pain is: gradually worsening  Does back pain interfere with her job:  Not applicable       Mental Health Follow-up:  Patient presents to follow-up on Depression & Anxiety.Patient's depression since last visit has been:  Medium  The patient is not having other symptoms associated with depression.  Patient's anxiety since last visit has been:  Medium  The patient is not having other symptoms associated with anxiety.  Any significant life events: grief or loss and health concerns  Patient is feeling anxious or having panic attacks.  Patient has no concerns about alcohol or drug use.    Reason for visit:  Also Med checkShe exercises with enough effort to  increase her heart rate 9 or less minutes per day.  She exercises with enough effort to increase her heart rate 3 or less days per week.   She is taking medications regularly.    Quit buspirone - was having foot & ankle swelling - stopped after discontinued buspirone.  She is trying to use music to help relax her, deep breathing, meditation, etc.     She is tired of vomiting and regurgitating.  She is tired of urinary incontinence and wearing a pad.  Her bowels are all messed up, she is having some fecal incontinence as well.      She was supposed to go to [place] yesterday, but overslept. They are going to get her in next week Monday for a visit, then will schedule her afterwards for Laparoscopic repair of hiatal hernia, nissen fundoplication, chest tube placement, gastropexy and possible gastrostomy tube placement.  Dr. Sylvester's team reached out about pain management in the post-op period.    She has reportedly broken more vertebrae.  She is to be seeing a specialist for her bone density issues, but has not scheduled this yet.  She does not recall having a DEXA done.      Advance Care Planning    Discussed advance care planning with patient; however, patient declined at this time.        7/22/2025   General Health   How would you rate your overall physical health? (!) POOR   Feel stress (tense, anxious, or unable to sleep) Rather much   (!) STRESS CONCERN      7/22/2025   Nutrition   Diet: Low salt    Low fat/cholesterol       Multiple values from one day are sorted in reverse-chronological order         7/22/2025   Exercise   Days per week of moderate/strenous exercise 0 days   Average minutes spent exercising at this level 0 min   (!) EXERCISE CONCERN      7/22/2025   Social Factors   Frequency of gathering with friends or relatives More than three times a week   Worry food won't last until get money to buy more No   Food not last or not have enough money for food? No   Do you have housing? (Housing is  defined as stable permanent housing and does not include staying outside in a car, in a tent, in an abandoned building, in an overnight shelter, or couch-surfing.) Yes   Are you worried about losing your housing? Yes   Lack of transportation? No   Unable to get utilities (heat,electricity)? Patient declined   Want help with housing or utility concern? No   (!) HOUSING CONCERN PRESENT      7/22/2025   Fall Risk   Fallen 2 or more times in the past year? Yes   Trouble with walking or balance? Yes   Reason Gait Speed Test Not Completed Patient declines          7/22/2025   Activities of Daily Living- Home Safety   Needs help with the following daily activites Preparing meals    Housework    Laundry   Do you have the help that you need? (!) NO   Safety concerns in the home None of the above       Multiple values from one day are sorted in reverse-chronological order         7/22/2025   Dental   Dentist two times every year? (!) NO         7/22/2025   Hearing Screening   Hearing concerns? (!) IT'S HARD TO FOLLOW A CONVERSATION IN A NOISY RESTAURANT OR CROWDED ROOM.    (!) TROUBLE UNDERSTANDING SOFT OR WHISPERED SPEECH.   Would you like a referral for hearing testing? (!) YES       Multiple values from one day are sorted in reverse-chronological order         7/22/2025   Driving Risk Screening   Patient/family members have concerns about driving No         7/22/2025   General Alertness/Fatigue Screening   Have you been more tired than usual lately? (!) YES         7/22/2025   Urinary Incontinence Screening   Bothered by leaking urine in past 6 months Yes       Today's PHQ-9 Score:       7/22/2025     3:03 PM   PHQ-9 SCORE   PHQ-9 Total Score 21         7/22/2025   Substance Use   Alcohol more than 3/day or more than 7/wk Not Applicable   Do you have a current opioid prescription? (!) YES   How severe/bad is pain from 1 to 10? 8/10   Do you use any other substances recreationally? No       Social History     Tobacco Use     Smoking status: Never     Passive exposure: Never    Smokeless tobacco: Never   Vaping Use    Vaping status: Never Used   Substance Use Topics    Alcohol use: Not Currently     Comment: SOBRIETY since July 13, 2009    Drug use: Never           9/5/2024   LAST FHS-7 RESULTS   1st degree relative breast or ovarian cancer Yes   Any relative bilateral breast cancer No   Any male have breast cancer No   Any ONE woman have BOTH breast AND ovarian cancer No   Any woman with breast cancer before 50yrs No   2 or more relatives with breast AND/OR ovarian cancer No   2 or more relatives with breast AND/OR bowel cancer Yes        Mammogram Screening - Mammogram every 1-2 years updated in Health Maintenance based on mutual decision making      History of abnormal Pap smear: YES - reflected in Problem List and Health Maintenance accordingly        Latest Ref Rng & Units 7/8/2024     3:05 PM 2/11/2020     4:20 PM 7/28/2006    12:00 AM   PAP / HPV   PAP  Negative for Intraepithelial Lesion or Malignancy (NILM)      PAP (Historical)    NIL    HPV 16 DNA Negative Negative      HPV 18 DNA Negative Negative      Other HR HPV Negative Positive      PAP-ABSTRACT   See Scanned Document       ASCVD Risk   The 10-year ASCVD risk score (Luis Fernando DK, et al., 2019) is: 4.7%    Values used to calculate the score:      Age: 64 years      Sex: Female      Is Non- : No      Diabetic: No      Tobacco smoker: No      Systolic Blood Pressure: 119 mmHg      Is BP treated: No      HDL Cholesterol: 92 mg/dL      Total Cholesterol: 220 mg/dL          Reviewed and updated as needed this visit by Provider                    Past Medical History:   Diagnosis Date    Arthritis 1999    Broken clavicle 10/1/2022    Cancer (H) 2016    Cervical cancer- 2 Leep procedures    Depressive disorder 2001    When Mom passed away...    Golfer's elbow 05/17/2012    Migraine, unspecified, without mention of intractable migraine without  mention of status migrainosus     Unspecified musculoskeletal disorders and symptoms referable to neck     djd neck    Ureteral stone with hydronephrosis 03/14/2018     Past Surgical History:   Procedure Laterality Date    BACK SURGERY  [id number]    Spinal tap, multiple injections    BIOPSY  2015    Breast biopsy    CHOLECYSTECTOMY  2020    COLONOSCOPY  2018    COSMETIC SURGERY  1987    Sinus surgery/nose job    ENT SURGERY  1987    Rhinoplasty    GENITOURINARY SURGERY  Multiple    Surgery for kidney stones    GI SURGERY  2018    Esophagus stretched 3x    GYN SURGERY  1980    Hymenectomy    HERNIA REPAIR  1959    I was 2 mos old as a baby    ORTHOPEDIC SURGERY  Multiple    Have broken feet/ankles 8x- multiple surgeries    ZZC NONSPECIFIC PROCEDURE      sinus 1986,  tonsils age 28      Lab work is in process  Labs reviewed in EPIC  BP Readings from Last 3 Encounters:   07/22/25 119/79   04/22/25 131/85   11/14/24 117/80    Wt Readings from Last 3 Encounters:   07/22/25 64.4 kg (142 lb)   05/19/25 64 kg (141 lb)   04/22/25 65.6 kg (144 lb 11.2 oz)                  Patient Active Problem List   Diagnosis    Neck pain    Severe episode of recurrent major depressive disorder, without psychotic features (H)    Reflux esophagitis    Recurrent genital herpes simplex    Personal history of cervical dysplasia    Osteoporosis with fracture    OCD (obsessive compulsive disorder)    Moderate dysplasia of cervix    Migraine    High risk of cervical or uterine cancer    History of compression fracture of spine    History of fibromyalgia    Hoarding disorder    Family history of malignant neoplasm of breast    Eosinophilic esophagitis    Osteoarthritis of pelvic region    Degeneration of lumbar or lumbosacral intervertebral disc    DDD (degenerative disc disease), lumbar    Bipolar disorder (H)    Long term (current) use of opiate analgesic    PTSD (post-traumatic stress disorder)    Dysphagia, unspecified type    Panic disorder     Attention deficit hyperactivity disorder (ADHD), unspecified ADHD type    Chronic pain syndrome    Chronic, continuous use of opioids    Opioid dependence, uncomplicated (H)    Rhinitis medicamentosa    Prediabetes    T12 compression fracture, sequela    Hiatal hernia     Past Surgical History:   Procedure Laterality Date    BACK SURGERY  [id number]    Spinal tap, multiple injections    BIOPSY  2015    Breast biopsy    CHOLECYSTECTOMY  2020    COLONOSCOPY  2018    COSMETIC SURGERY      Sinus surgery/nose job    ENT SURGERY      Rhinoplasty    GENITOURINARY SURGERY  Multiple    Surgery for kidney stones    GI SURGERY      Esophagus stretched 3x    GYN SURGERY      Hymenectomy    HERNIA REPAIR      I was 2 mos old as a baby    ORTHOPEDIC SURGERY  Multiple    Have broken feet/ankles 8x- multiple surgeries    ZZC NONSPECIFIC PROCEDURE      sinus ,  tonsils age 28        Social History     Tobacco Use    Smoking status: Never     Passive exposure: Never    Smokeless tobacco: Never   Substance Use Topics    Alcohol use: Not Currently     Comment: SOBRIETY since 2009     Family History   Problem Relation Age of Onset    Breast Cancer Mother         Breast cancer in age mid 50s    Other Cancer Mother          of Leukemia    Heart Failure Father     Mitral Valve Replacement Father     Diabetes Sister     Depression Sister         Sister depression since Mom     Coronary Artery Disease Sister         Had 3 heart procedures incl Pacemaker,  then got bed sores that went to Stage 4 Pressure Wound-  of Sepsis and MRSA...    Obesity Brother         Weighs 400 lbs    Other Cancer Maternal Grandfather          of Stomach Cancer    Depression Niece         Severe depression    Anxiety Disorder Niece         Severe anxiety    Obesity Niece         Sandy weighs 500 lbs         Current Outpatient Medications   Medication Sig Dispense Refill    ALPRAZolam (XANAX) 1 MG tablet Take 1 tablet  (1 mg) by mouth 3 times daily as needed for anxiety (must last 30 days). 60 tablet 2    buPROPion (WELLBUTRIN XL) 150 MG 24 hr tablet Take 1 tablet (150 mg) by mouth every morning. 90 tablet 1    famotidine (PEPCID) 40 MG tablet Take 1 tablet (40 mg) by mouth 2 times daily. 180 tablet 1    FLUoxetine (PROZAC) 20 MG capsule Take one 20 mg capsule with a 40 mg capsule for a total of 60 mg daily. 90 capsule 1    FLUoxetine (PROZAC) 40 MG capsule Take 1 capsule (40 mg) by mouth daily. Along with a 20 mg capsule for a total of 60 mg daily 90 capsule 1    fluticasone (FLONASE) 50 MCG/ACT nasal spray Spray 1-2 sprays into both nostrils daily as needed for allergies 16 g 0    HYDROcodone-acetaminophen (NORCO) 5-325 MG tablet Take 1 tablet by mouth every 4 hours as needed for severe pain (Must last 30 days). 140 tablet 0    ipratropium (ATROVENT) 0.03 % nasal spray Spray 2 sprays into both nostrils every 12 hours. 30 mL 1    methocarbamol (ROBAXIN) 750 MG tablet Take 1 tablet (750 mg) by mouth 3 times daily as needed for muscle spasms. 90 tablet 2    methylphenidate (RITALIN) 20 MG tablet Take 0.5 tablets (10 mg) by mouth 2 times daily. 30 tablet 0    naproxen (NAPROSYN) 375 MG tablet Take 1 tablet (375 mg) by mouth 2 times daily as needed for moderate pain. 60 tablet 3    omeprazole (PRILOSEC) 40 MG DR capsule Take 1 capsule (40 mg) by mouth 2 times daily. Take 30-60 minutes before eating. 180 capsule 3    topiramate (TOPAMAX) 100 MG tablet Take 1 tablet (100 mg) by mouth 2 times daily. 180 tablet 3    valACYclovir (VALTREX) 500 MG tablet Take 1 tablet (500 mg) by mouth daily. 90 tablet 3    Vitamin D3 (VITAMIN D, CHOLECALCIFEROL,) 25 mcg (1000 units) tablet Take 1 tablet (25 mcg) by mouth daily. 90 tablet 1    naloxone (NARCAN) 4 MG/0.1ML nasal spray ADMINISTER A SINGLE SPRAY IN ONE NOSTRIL UPON SIGNS OF OPIOID OVERDOSE. CALL 911. REPEAT AFTER 3 MINUTES IF NO RESPONSE. (Patient not taking: Reported on 7/22/2025)        Allergies   Allergen Reactions    Ciprofloxacin      Tears her ligaments    Erythromycin     Food Itching     crabmeat    Sulfa Antibiotics     Ultram [Tramadol Hcl] Nausea and Vomiting     Recent Labs   Lab Test 03/19/24  1446 03/22/23  1446 04/20/22  1406   A1C 5.8* 6.0* 5.7*   LDL  --   --  116*   HDL  --   --  92   TRIG  --   --  60   ALT  --  17 28   CR 1.08* 1.00* 0.97   GFRESTIMATED 57* 63 66   POTASSIUM 4.3 4.4 3.9   TSH  --  0.34 0.74      Current providers sharing in care for this patient include:  Patient Care Team:  Nimo Huerta MD as PCP - General (Family Medicine)  Nimo Huerta MD as Assigned PCP  Helene Rider as Personal Advocate & Liaison (PAL) (Family Medicine)  Nimo Huerta MD as Assigned Pain Medication Provider  Scott Cedeño MD as Physician (OB/Gyn)  Scott Cedeño MD as Assigned OBGYN Provider  Evy Brown APRN CNS as Clinical Nurse Specialist (Cardiovascular & Thoracic Surgery)    The following health maintenance items are reviewed in Epic and correct as of today:  Health Maintenance   Topic Date Due    DEXA  Never done    HEPATITIS A VACCINE (1 of 2 - Risk 2-dose series) Never done    PNEUMOCOCCAL VACCINE 50+ YEARS (1 of 1 - PCV) Never done    ZOSTER VACCINE (2 of 2) 11/03/2022    COVID-19 VACCINE (6 - 2024-25 season) 03/10/2025    URINE DRUG SCREEN  08/16/2025    CONTROLLED SUBSTANCE AGREEMENT FOR CHRONIC PAIN MANAGEMENT  08/16/2025    PAP FOLLOW-UP  09/18/2025    HPV FOLLOW-UP  09/18/2025    DEPRESSION 12 MO INDEX REPEAT PHQ-9  08/05/2025    INFLUENZA VACCINE (1) 09/01/2025    MAMMO SCREENING  09/05/2025    ANNUAL REVIEW OF HM ORDERS  04/22/2026    MEDICARE ANNUAL WELLNESS VISIT  07/22/2026    NYASIA ASSESSMENT  07/22/2026    FALL RISK ASSESSMENT  07/22/2026    PHQ-9  07/22/2026    DIABETES SCREENING  03/19/2027    LIPID  04/20/2027    COLORECTAL CANCER SCREENING  11/07/2027    ADVANCE CARE PLANNING  07/22/2030     "DTAP/TDAP/TD VACCINE (3 - Td or Tdap) 04/20/2032    RSV VACCINE (1 - 1-dose 75+ series) 06/05/2034    HEPATITIS C SCREENING  Completed    HPV VACCINE  Aged Out    MENINGITIS VACCINE  Aged Out    PAP  Discontinued        Objective    Exam  /79 (BP Location: Right arm, Cuff Size: Adult Regular)   Pulse 82   Temp 98.2  F (36.8  C) (Oral)   Resp 18   Ht 1.613 m (5' 3.5\")   Wt 64.4 kg (142 lb)   LMP  (LMP Unknown)   SpO2 96%   BMI 24.76 kg/m     Estimated body mass index is 24.76 kg/m  as calculated from the following:    Height as of this encounter: 1.613 m (5' 3.5\").    Weight as of this encounter: 64.4 kg (142 lb).    Physical Exam  GENERAL: alert and no distress  EYES: Eyes grossly normal to inspection, PERRL and conjunctivae and sclerae normal  HENT: ear canals and TM's normal, nose and mouth without ulcers or lesions  NECK: no adenopathy, no asymmetry, masses, or scars  RESP: lungs clear to auscultation - no rales, rhonchi or wheezes  CV: regular rates and rhythm, normal S1 S2, no S3 or S4, no murmur, click or rub, and trace lower extremity non-pitting edema to ankles    ABDOMEN: bowel sounds normal  MS: extremities normal, no deformities noted.  Back brace present.  SKIN: no suspicious lesions or rashes  PSYCH: mentation appears normal, affect normal/bright  Gait and balance assessed per Gait Speed Test.  Result as above.         No data to display                       Signed Electronically by: Nimo Nieto MD    "

## 2025-07-22 NOTE — TELEPHONE ENCOUNTER
Pt under care of: Wisam  Office Location: [place]    Insurance   Current Insurance? [employer]    Insurance E-verified? Yes    History  Last Seen (include Follow Up recommendations of last visit- see Office Visit - Instructions): 1/17/25 - Follow-up in 6 months.     Pt called and stated he was looking at his calendar and realized he missed his 7/21/25 follow up appointment, he stated he does not wish to reschedule at this time and will call back to reschedule.     Pt can be reached at: [phone]

## 2025-07-24 ENCOUNTER — OFFICE VISIT (OUTPATIENT)
Dept: FAMILY MEDICINE CLINIC | Facility: CLINIC | Age: 65
End: 2025-07-24
Payer: COMMERCIAL

## 2025-07-24 VITALS
OXYGEN SATURATION: 98 % | HEIGHT: 64 IN | DIASTOLIC BLOOD PRESSURE: 72 MMHG | WEIGHT: 146.4 LBS | HEART RATE: 62 BPM | SYSTOLIC BLOOD PRESSURE: 130 MMHG | BODY MASS INDEX: 25 KG/M2 | TEMPERATURE: 98.1 F

## 2025-07-24 DIAGNOSIS — C24.1 AMPULLARY CARCINOMA (HCC): ICD-10-CM

## 2025-07-24 DIAGNOSIS — I73.9 PERIPHERAL VASCULAR DISEASE (HCC): ICD-10-CM

## 2025-07-24 DIAGNOSIS — I48.91 ATRIAL FIBRILLATION, UNSPECIFIED TYPE (HCC): ICD-10-CM

## 2025-07-24 DIAGNOSIS — Z00.00 MEDICARE ANNUAL WELLNESS VISIT, SUBSEQUENT: Primary | ICD-10-CM

## 2025-07-24 DIAGNOSIS — I10 BENIGN ESSENTIAL HYPERTENSION: ICD-10-CM

## 2025-07-24 DIAGNOSIS — C25.9 PANCREATIC ADENOCARCINOMA (HCC): ICD-10-CM

## 2025-07-24 PROBLEM — N17.9 AKI (ACUTE KIDNEY INJURY) (HCC): Status: RESOLVED | Noted: 2024-08-30 | Resolved: 2025-07-24

## 2025-07-24 PROCEDURE — 99213 OFFICE O/P EST LOW 20 MIN: CPT | Performed by: FAMILY MEDICINE

## 2025-07-24 PROCEDURE — G0439 PPPS, SUBSEQ VISIT: HCPCS | Performed by: FAMILY MEDICINE

## 2025-07-24 PROCEDURE — G2211 COMPLEX E/M VISIT ADD ON: HCPCS | Performed by: FAMILY MEDICINE

## 2025-07-24 NOTE — PROGRESS NOTES
Name: Isaac Kramer      : 1960      MRN: [id number]  Encounter Provider: Pablo Posadas DO  Encounter Date: 2025   Encounter department: [facility]  :  Assessment & Plan  Medicare annual wellness visit, subsequent         Benign essential hypertension         Peripheral vascular disease (HCC)         Atrial fibrillation, unspecified type (HCC)         Pancreatic adenocarcinoma (HCC)         Ampullary carcinoma (HCC)            Preventive health issues were discussed with patient, and age appropriate screening tests were ordered as noted in patient's After Visit Summary. Personalized health advice and appropriate referrals for health education or preventive services given if needed, as noted in patient's After Visit Summary.    History of Present Illness     Hypertension  This is a chronic problem. The current episode started more than 1 year ago. The problem is unchanged. The problem is controlled. Pertinent negatives include no chest pain, palpitations or shortness of breath.      Patient Care Team:  Pablo Posadas DO as PCP - General (Family Medicine)  Frederick Montes DO as PCP - PCP-[facility] ([facility])  Frederick Montes DO as PCP - PCP-[facility] ([facility])  MD Delano Mcwilliams MD (Urology)  Ronak Solis MD (Radiation Oncology)  DULCE Gimenez as Nurse Practitioner (Urology)  Leticia Allen MA as Care Coordinator (Oncology)  Marika Ac MD (Surgical Oncology)  Jacob Colby MD as Medical Oncologist (Hematology and Oncology)    Review of Systems   Constitutional: Negative.  Negative for chills, fatigue, fever and unexpected weight change.   HENT: Negative.  Negative for hearing loss.    Eyes: Negative.  Negative for visual disturbance.   Respiratory:  Negative for shortness of breath and wheezing.    Cardiovascular:  Negative for chest pain and palpitations.   Gastrointestinal:  Negative for abdominal pain, blood in  stool, constipation, diarrhea, nausea and vomiting.   Endocrine: Negative.    Genitourinary:  Negative for difficulty urinating and dysuria.   Musculoskeletal:  Negative for arthralgias and myalgias.   Skin: Negative.    Allergic/Immunologic: Negative.    Neurological:  Negative for seizures and syncope.   Hematological:  Negative for adenopathy.   Psychiatric/Behavioral: Negative.       Medical History Reviewed by provider this encounter:       Annual Wellness Visit Questionnaire   Isaac is here for his Subsequent Wellness visit.     Health Risk Assessment:   Patient rates overall health as fair. Patient feels that their physical health rating is same. Patient is satisfied with their life. Eyesight was rated as same. Hearing was rated as same. Patient feels that their emotional and mental health rating is same. Patients states they are never, rarely angry. Patient states they are never, rarely unusually tired/fatigued. Pain experienced in the last 7 days has been none. Patient states that he has experienced no weight loss or gain in last 6 months.     Fall Risk Screening:   In the past year, patient has experienced: no history of falling in past year      Home Safety:  Patient does not have trouble with stairs inside or outside of their home. Patient has working smoke alarms and has working carbon monoxide detector. Home safety hazards include: none.     Nutrition:   Current diet is Regular.     Medications:   Patient is not currently taking any over-the-counter supplements. Patient is able to manage medications.     Activities of Daily Living (ADLs)/Instrumental Activities of Daily Living (IADLs):   Walk and transfer into and out of bed and chair?: Yes  Dress and groom yourself?: Yes    Bathe or shower yourself?: Yes    Feed yourself? Yes  Do your laundry/housekeeping?: Yes  Manage your money, pay your bills and track your expenses?: Yes  Make your own meals?: Yes    Do your own shopping?: Yes    Previous  Hospitalizations:   Any hospitalizations or ED visits within the last 12 months?: Yes    How many hospitalizations have you had in the last year?: 1-2    Advance Care Planning:   Living will: Yes    Durable POA for healthcare: Yes    Advanced directive: Yes    Advanced directive counseling given: Yes    ACP document given: Yes    Patient declined ACP directive: No    End of Life Decisions reviewed with patient: Yes    Provider agrees with end of life decisions: Yes      Cognitive Screening:   Provider or family/friend/caregiver concerned regarding cognition?: No    Preventive Screenings      Cardiovascular Screening:    General: Screening Not Indicated and History Lipid Disorder      Diabetes Screening:     General: Screening Current      Colorectal Cancer Screening:     General: Screening Current      Prostate Cancer Screening:    General: History Prostate Cancer      Osteoporosis Screening:    General: Screening Not Indicated      Abdominal Aortic Aneurysm (AAA) Screening:    Risk factors include: age between 65-74 yo and tobacco use        Lung Cancer Screening:     General: Screening Not Indicated      Hepatitis C Screening:    General: Screening Current    Immunizations:  - Immunizations due: Zoster (Shingrix)    Screening, Brief Intervention, and Referral to Treatment (SBIRT)     Screening  Typical number of drinks in a day: 0  Typical number of drinks in a week: 0  Interpretation: Low risk drinking behavior.    Single Item Drug Screening:  How often have you used an illegal drug (including marijuana) or a prescription medication for non-medical reasons in the past year? never    Single Item Drug Screen Score: 0  Interpretation: Negative screen for possible drug use disorder    Social Drivers of Health     Food Insecurity: No Food Insecurity (7/24/2025)    Nursing - Inadequate Food Risk Classification     Worried About Running Out of Food in the Last Year: Never true     Ran Out of Food in the Last Year: Never  "true     Ran Out of Food in the Last Year: Never true   Transportation Needs: No Transportation Needs (7/24/2025)    PRAPARE - Transportation     Lack of Transportation (Medical): No     Lack of Transportation (Non-Medical): No   Housing Stability: Low Risk  (7/24/2025)    Housing Stability Vital Sign     Unable to Pay for Housing in the Last Year: No     Number of Times Moved in the Last Year: 0     Homeless in the Last Year: No   Utilities: Not At Risk (7/24/2025)    [facility] Utilities     Threatened with loss of utilities: No     No results found.    Objective   /72   Pulse 62   Temp 98.1 °F (36.7 °C)   Ht 5' 4\" (1.626 m)   Wt 66.4 kg (146 lb 6.4 oz)   SpO2 98%   BMI 25.13 kg/m²     Physical Exam  Vitals and nursing note reviewed.   Constitutional:       General: He is not in acute distress.     Appearance: Normal appearance. He is well-developed. He is not ill-appearing, toxic-appearing or diaphoretic.   HENT:      Head: Normocephalic and atraumatic.      Right Ear: Tympanic membrane, ear canal and external ear normal. There is no impacted cerumen.      Left Ear: Tympanic membrane, ear canal and external ear normal. There is no impacted cerumen.      Nose: Nose normal. No congestion or rhinorrhea.      Mouth/Throat:      Mouth: Mucous membranes are moist.      Pharynx: No oropharyngeal exudate or posterior oropharyngeal erythema.     Eyes:      General: No scleral icterus.        Right eye: No discharge.         Left eye: No discharge.      Conjunctiva/sclera: Conjunctivae normal.      Pupils: Pupils are equal, round, and reactive to light.     Neck:      Thyroid: No thyromegaly.      Trachea: No tracheal deviation.     Cardiovascular:      Rate and Rhythm: Normal rate and regular rhythm.      Pulses: Normal pulses.      Heart sounds: Murmur heard.   Pulmonary:      Effort: Pulmonary effort is normal. No respiratory distress.      Breath sounds: Normal breath sounds. No stridor. No wheezing or rales. "   Abdominal:      General: There is no distension.      Palpations: Abdomen is soft. There is no mass.      Tenderness: There is no abdominal tenderness. There is no guarding or rebound.      Hernia: No hernia is present.     Musculoskeletal:         General: No tenderness or deformity. Normal range of motion.      Cervical back: Normal range of motion and neck supple. No rigidity.      Right lower leg: No edema.      Left lower leg: No edema.   Lymphadenopathy:      Cervical: No cervical adenopathy.     Skin:     General: Skin is warm.      Findings: No erythema or rash.     Neurological:      Mental Status: He is alert and oriented to person, place, and time.      Sensory: No sensory deficit.      Motor: No abnormal muscle tone.      Deep Tendon Reflexes: Reflexes normal.     Psychiatric:         Mood and Affect: Mood normal.         Behavior: Behavior normal.         Thought Content: Thought content normal.         Judgment: Judgment normal.

## 2025-07-24 NOTE — PATIENT INSTRUCTIONS
Medicare Preventive Visit Patient Instructions  Thank you for completing your Welcome to Medicare Visit or Medicare Annual Wellness Visit today. Your next wellness visit will be due in one year (7/25/2026).  The screening/preventive services that you may require over the next 5-10 years are detailed below. Some tests may not apply to you based off risk factors and/or age. Screening tests ordered at today's visit but not completed yet may show as past due. Also, please note that scanned in results may not display below.  Preventive Screenings:  Service Recommendations Previous Testing/Comments   Colorectal Cancer Screening  Colonoscopy    Fecal Occult Blood Test (FOBT)/Fecal Immunochemical Test (FIT)  Fecal DNA/Cologuard Test  Flexible Sigmoidoscopy Age: 45-75 years old   Colonoscopy: every 10 years (May be performed more frequently if at higher risk)  OR  FOBT/FIT: every 1 year  OR  Cologuard: every 3 years  OR  Sigmoidoscopy: every 5 years  Screening may be recommended earlier than age 45 if at higher risk for colorectal cancer. Also, an individualized decision between you and your healthcare provider will decide whether screening between the ages of 76-85 would be appropriate. Colonoscopy: 11/29/2011  FOBT/FIT: Not on file  Cologuard: 07/27/2022  Sigmoidoscopy: Not on file    Screening Current     Prostate Cancer Screening Individualized decision between patient and health care provider in men between ages of 55-69   Medicare will cover every 12 months beginning on the day after your 50th birthday PSA: 0.008 ng/mL     History Prostate Cancer     Hepatitis C Screening Once for adults born between 1945 and 1965  More frequently in patients at high risk for Hepatitis C Hep C Antibody: 06/24/2020    Screening Current   Diabetes Screening 1-2 times per year if you're at risk for diabetes or have pre-diabetes Fasting glucose: 91 mg/dL (11/25/2024)  A1C: 5.7 % (9/10/2024)  Screening Current   Cholesterol Screening Once  every 5 years if you don't have a lipid disorder. May order more often based on risk factors. Lipid panel: 05/19/2025  Screening Not Indicated  History Lipid Disorder      Other Preventive Screenings Covered by Medicare:  Abdominal Aortic Aneurysm (AAA) Screening: covered once if your at risk. You're considered to be at risk if you have a family history of AAA or a male between the age of 65-75 who smoking at least 100 cigarettes in your lifetime.  Lung Cancer Screening: covers low dose CT scan once per year if you meet all of the following conditions: (1) Age 55-77; (2) No signs or symptoms of lung cancer; (3) Current smoker or have quit smoking within the last 15 years; (4) You have a tobacco smoking history of at least 20 pack years (packs per day x number of years you smoked); (5) You get a written order from a healthcare provider.  Glaucoma Screening: covered annually if you're considered high risk: (1) You have diabetes OR (2) Family history of glaucoma OR (3)  aged 50 and older OR (4)  American aged 65 and older  Osteoporosis Screening: covered every 2 years if you meet one of the following conditions: (1) Have a vertebral abnormality; (2) On glucocorticoid therapy for more than 3 months; (3) Have primary hyperparathyroidism; (4) On osteoporosis medications and need to assess response to drug therapy.  HIV Screening: covered annually if you're between the age of 15-65. Also covered annually if you are younger than 15 and older than 65 with risk factors for HIV infection. For pregnant patients, it is covered up to 3 times per pregnancy.    Immunizations:  Immunization Recommendations   Influenza Vaccine Annual influenza vaccination during flu season is recommended for all persons aged >= 6 months who do not have contraindications   Pneumococcal Vaccine   * Pneumococcal conjugate vaccine = PCV13 (Prevnar 13), PCV15 (Vaxneuvance), PCV20 (Prevnar 20)  * Pneumococcal polysaccharide vaccine  = PPSV23 (Pneumovax) Adults 19-63 yo with certain risk factors or if 65+ yo  If never received any pneumonia vaccine: recommend Prevnar 20 (PCV20)  Give PCV20 if previously received 1 dose of PCV13 or PPSV23   Hepatitis B Vaccine 3 dose series if at intermediate or high risk (ex: diabetes, end stage renal disease, liver disease)   Respiratory syncytial virus (RSV) Vaccine - COVERED BY MEDICARE PART D  * RSVPreF3 (Arexvy) CDC recommends that adults 60 years of age and older may receive a single dose of RSV vaccine using shared clinical decision-making (SCDM)   Tetanus (Td) Vaccine - COST NOT COVERED BY MEDICARE PART B Following completion of primary series, a booster dose should be given every 10 years to maintain immunity against tetanus. Td may also be given as tetanus wound prophylaxis.   Tdap Vaccine - COST NOT COVERED BY MEDICARE PART B Recommended at least once for all adults. For pregnant patients, recommended with each pregnancy.   Shingles Vaccine (Shingrix) - COST NOT COVERED BY MEDICARE PART B  2 shot series recommended in those 19 years and older who have or will have weakened immune systems or those 50 years and older     Health Maintenance Due:      Topic Date Due   • Colorectal Cancer Screening  07/27/2025   • Hepatitis C Screening  Completed     Immunizations Due:      Topic Date Due   • COVID-19 Vaccine (4 - 2024-25 season) 09/01/2024   • Influenza Vaccine (1) 09/01/2025     Advance Directives   What are advance directives?  Advance directives are legal documents that state your wishes and plans for medical care. These plans are made ahead of time in case you lose your ability to make decisions for yourself. Advance directives can apply to any medical decision, such as the treatments you want, and if you want to donate organs.   What are the types of advance directives?  There are many types of advance directives, and each state has rules about how to use them. You may choose a combination of any of  the following:  Living will:  This is a written record of the treatment you want. You can also choose which treatments you do not want, which to limit, and which to stop at a certain time. This includes surgery, medicine, IV fluid, and tube feedings.   Durable power of  for healthcare (DPAHC):  This is a written record that states who you want to make healthcare choices for you when you are unable to make them for yourself. This person, called a proxy, is usually a family member or a friend. You may choose more than 1 proxy.  Do not resuscitate (DNR) order:  A DNR order is used in case your heart stops beating or you stop breathing. It is a request not to have certain forms of treatment, such as CPR. A DNR order may be included in other types of advance directives.  Medical directive:  This covers the care that you want if you are in a coma, near death, or unable to make decisions for yourself. You can list the treatments you want for each condition. Treatment may include pain medicine, surgery, blood transfusions, dialysis, IV or tube feedings, and a ventilator (breathing machine).  Values history:  This document has questions about your views, beliefs, and how you feel and think about life. This information can help others choose the care that you would choose.  Why are advance directives important?  An advance directive helps you control your care. Although spoken wishes may be used, it is better to have your wishes written down. Spoken wishes can be misunderstood, or not followed. Treatments may be given even if you do not want them. An advance directive may make it easier for your family to make difficult choices about your care.   Weight Management   Why it is important to manage your weight:  Being overweight increases your risk of health conditions such as heart disease, high blood pressure, type 2 diabetes, and certain types of cancer. It can also increase your risk for osteoarthritis, sleep apnea,  and other respiratory problems. Aim for a slow, steady weight loss. Even a small amount of weight loss can lower your risk of health problems.  How to lose weight safely:  A safe and healthy way to lose weight is to eat fewer calories and get regular exercise. You can lose up about 1 pound a week by decreasing the number of calories you eat by 500 calories each day.   Healthy meal plan for weight management:  A healthy meal plan includes a variety of foods, contains fewer calories, and helps you stay healthy. A healthy meal plan includes the following:  Eat whole-grain foods more often.  A healthy meal plan should contain fiber. Fiber is the part of grains, fruits, and vegetables that is not broken down by your body. Whole-grain foods are healthy and provide extra fiber in your diet. Some examples of whole-grain foods are whole-wheat breads and pastas, oatmeal, brown rice, and bulgur.  Eat a variety of vegetables every day.  Include dark, leafy greens such as spinach, kale, zully greens, and mustard greens. Eat yellow and orange vegetables such as carrots, sweet potatoes, and winter squash.   Eat a variety of fruits every day.  Choose fresh or canned fruit (canned in its own juice or light syrup) instead of juice. Fruit juice has very little or no fiber.  Eat low-fat dairy foods.  Drink fat-free (skim) milk or 1% milk. Eat fat-free yogurt and low-fat cottage cheese. Try low-fat cheeses such as mozzarella and other reduced-fat cheeses.  Choose meat and other protein foods that are low in fat.  Choose beans or other legumes such as split peas or lentils. Choose fish, skinless poultry (chicken or turkey), or lean cuts of red meat (beef or pork). Before you cook meat or poultry, cut off any visible fat.   Use less fat and oil.  Try baking foods instead of frying them. Add less fat, such as margarine, sour cream, regular salad dressing and mayonnaise to foods. Eat fewer high-fat foods. Some examples of high-fat foods  include french fries, doughnuts, ice cream, and cakes.  Eat fewer sweets.  Limit foods and drinks that are high in sugar. This includes candy, cookies, regular soda, and sweetened drinks.  Exercise:  Exercise at least 30 minutes per day on most days of the week. Some examples of exercise include walking, biking, dancing, and swimming. You can also fit in more physical activity by taking the stairs instead of the elevator or parking farther away from stores. Ask your healthcare provider about the best exercise plan for you.    © Copyright [employer] 2018 Information is for End User's use only and may not be sold, redistributed or otherwise used for commercial purposes. All illustrations and images included in CareNotes® are the copyrighted property of [employer] or [employer]

## 2025-07-29 ENCOUNTER — TELEPHONE (OUTPATIENT)
Age: 65
End: 2025-07-29

## 2025-08-15 ENCOUNTER — TELEPHONE (OUTPATIENT)
Dept: CARDIOLOGY CLINIC | Facility: CLINIC | Age: 65
End: 2025-08-15

## 2025-08-19 ENCOUNTER — VBI (OUTPATIENT)
Dept: ADMINISTRATIVE | Facility: OTHER | Age: 65
End: 2025-08-19

## 2025-08-23 PROBLEM — Z00.00 MEDICARE ANNUAL WELLNESS VISIT, SUBSEQUENT: Status: RESOLVED | Noted: 2025-07-24 | Resolved: 2025-08-23

## (undated) DEVICE — TISSUE RETRIEVAL SYSTEM: Brand: INZII RETRIEVAL SYSTEM

## (undated) DEVICE — UNDYED BRAIDED (POLYGLACTIN 910), SYNTHETIC ABSORBABLE SUTURE: Brand: COATED VICRYL

## (undated) DEVICE — ABSORBABLE WOUND CLOSURE DEVICE: Brand: V-LOC 90

## (undated) DEVICE — ENDOPATH PNEUMONEEDLE INSUFFLATION NEEDLES WITH LUER LOCK CONNECTORS 120MM: Brand: ENDOPATH

## (undated) DEVICE — BETHLEHEM MAJOR GENERAL PACK: Brand: CARDINAL HEALTH

## (undated) DEVICE — ARM DRAPE

## (undated) DEVICE — SUTURE BOOTS YELLOW

## (undated) DEVICE — ADHESIVE SKIN HIGH VISCOSITY EXOFIN 1ML

## (undated) DEVICE — PROXIMATE RELOADABLE LINEAR STAPLER, 60MM: Brand: PROXIMATE

## (undated) DEVICE — PROXIMATE SKIN STAPLERS (35 WIDE) CONTAINS 35 STAINLESS STEEL STAPLES (FIXED HEAD): Brand: PROXIMATE

## (undated) DEVICE — JP CHANNEL DRAIN 19FR, FULL FLUTES: Brand: JACKSON-PRATT

## (undated) DEVICE — FOGARTY SPRING CLIPS 6MM: Brand: FOGARTY SOFTJAW

## (undated) DEVICE — LIGACLIP MCA MULTIPLE CLIP APPLIERS, 20 MEDIUM CLIPS: Brand: LIGACLIP

## (undated) DEVICE — CANNULA SEAL

## (undated) DEVICE — GLIDESHEATH BASIC HYDROPHILIC COATED INTRODUCER SHEATH: Brand: GLIDESHEATH

## (undated) DEVICE — TIP COVER ACCESSORY

## (undated) DEVICE — SURGIFLO ENDOSCOPIC APPICATOR: Brand: ETHICON

## (undated) DEVICE — JACKSON-PRATT 100CC BULB RESERVOIR: Brand: CARDINAL HEALTH

## (undated) DEVICE — 40601 PROLONGED POSITIONING SYSTEM: Brand: 40601 PROLONGED POSITIONING SYSTEM

## (undated) DEVICE — LARGE NEEDLE DRIVER: Brand: ENDOWRIST

## (undated) DEVICE — 3M™ STERI-STRIP™ REINFORCED ADHESIVE SKIN CLOSURES, R1547, 1/2 IN X 4 IN (12 MM X 100 MM), 6 STRIPS/ENVELOPE: Brand: 3M™ STERI-STRIP™

## (undated) DEVICE — SURGICEL 4 X 8

## (undated) DEVICE — RADIFOCUS OPTITORQUE ANGIOGRAPHIC CATHETER: Brand: OPTITORQUE

## (undated) DEVICE — ECHELON FLEX POWERED PLUS COMPACT ARTICULATING ENDOSCOPIC LINEAR CUTTER, 60MM: Brand: ECHELON FLEX

## (undated) DEVICE — SUT MONOCRYL 4-0 PS-2 27 IN Y426H

## (undated) DEVICE — CATH FOLEY 18FR 5ML 2WAY LUBRICATH

## (undated) DEVICE — 40595 XL TRENDELENBURG POSITIONING KIT: Brand: 40595 XL TRENDELENBURG POSITIONING KIT

## (undated) DEVICE — 3M™ DURAPORE™ SURGICAL TAPE 1538-3, 3 INCH X 10 YARD (7,5CM X 9,1M), 4 ROLLS/BOX: Brand: 3M™ DURAPORE™

## (undated) DEVICE — ASTOUND STANDARD SURGICAL GOWN, XL: Brand: CONVERTORS

## (undated) DEVICE — INTENDED FOR TISSUE SEPARATION, AND OTHER PROCEDURES THAT REQUIRE A SHARP SURGICAL BLADE TO PUNCTURE OR CUT.: Brand: BARD-PARKER SAFETY BLADES SIZE 11, STERILE

## (undated) DEVICE — SUT STRATAFIX SPIRAL MONOCRYL PLUS 3-0 SH 20CM SXMP1B427

## (undated) DEVICE — SUT SILK 0 30 IN A306H

## (undated) DEVICE — AIRSEAL TUBE SMOKE EVAC LUMENX3 FILTERED

## (undated) DEVICE — TR BAND RADIAL ARTERY COMPRESSION DEVICE: Brand: TR BAND

## (undated) DEVICE — SURGICEL 4 X 8IN

## (undated) DEVICE — SUT SILK 3-0 SH 30 IN K832H

## (undated) DEVICE — ENSEAL X1 TISSUE SEALER, CURVED JAW, 25 CM SHAFT LENGTH: Brand: ENSEAL

## (undated) DEVICE — GUIDEWIRE FFR VERRATA PLUS 185CM STR

## (undated) DEVICE — DRAPE EQUIPMENT RF WAND

## (undated) DEVICE — MONOPOLAR CURVED SCISSORS: Brand: ENDOWRIST

## (undated) DEVICE — GLOVE SRG BIOGEL 8

## (undated) DEVICE — POOLE SUCTION HANDLE: Brand: CARDINAL HEALTH

## (undated) DEVICE — SUT VICRYL 0 CT-1 27 IN J260H

## (undated) DEVICE — SUT SILK 3-0 SH CR/8 18 IN C013D

## (undated) DEVICE — SUT POLYESTER TAPE D-G 8618-00

## (undated) DEVICE — INTENDED FOR TISSUE SEPARATION, AND OTHER PROCEDURES THAT REQUIRE A SHARP SURGICAL BLADE TO PUNCTURE OR CUT.: Brand: BARD-PARKER SAFETY BLADES SIZE 15, STERILE

## (undated) DEVICE — TROCAR: Brand: KII FIOS FIRST ENTRY

## (undated) DEVICE — STERILE SURGICAL LUBRICANT,  TUBE: Brand: SURGILUBE

## (undated) DEVICE — ELECTRODE BLADE MOD E-Z CLEAN 2.5IN 6.4CM -0012M

## (undated) DEVICE — EXOFIN PRECISION PEN HIGH VISCOSITY TOPICAL SKIN ADHESIVE: Brand: EXOFIN PRECISION PEN, 1G

## (undated) DEVICE — DGW .035 FC J3MM 260CM TEF: Brand: EMERALD

## (undated) DEVICE — TROCAR PORT ACCESS 12 X120MM W/BLDLS OPTICAL TIP AIRSEAL

## (undated) DEVICE — GUIDEWIRE WHOLEY HI TORQUE INTERM MOD J .035 145CM

## (undated) DEVICE — PENCIL ELECTROSURG E-Z CLEAN -0035H

## (undated) DEVICE — VIOLET BRAIDED (POLYGLACTIN 910), SYNTHETIC ABSORBABLE SUTURE: Brand: COATED VICRYL

## (undated) DEVICE — SUT PROLENE 3-0 SH 36 IN 8522H

## (undated) DEVICE — ENDOPATH ECHELON ENDOSCOPIC LINEAR CUTTER RELOADS, BLUE, 60MM: Brand: ECHELON ENDOPATH

## (undated) DEVICE — SUT PDS PLUS 1 CTX 36IN PDP371T

## (undated) DEVICE — TRAY FOLEY 16FR SURESTEP TEMP SENS URIMETER STAT LOK

## (undated) DEVICE — RUNTHROUGH NS EXTRA FLOPPY PTCA GUIDEWIRE: Brand: RUNTHROUGH

## (undated) DEVICE — SUT SILK 3-0 18 IN A184H

## (undated) DEVICE — CHLORAPREP HI-LITE 26ML ORANGE

## (undated) DEVICE — MARYLAND BIPOLAR FORCEPS: Brand: ENDOWRIST

## (undated) DEVICE — TOWEL SET X-RAY

## (undated) DEVICE — SUT PDS II 0 CT-1 27 IN Z340H

## (undated) DEVICE — STANDARD SURGICAL GOWN, L: Brand: CONVERTORS

## (undated) DEVICE — DRAPE SHEET X-LG

## (undated) DEVICE — HEM-O-LOK CLIP CARTRIDGE LARGE DA VINCI SI/XI

## (undated) DEVICE — GLIDESHEATH SLENDER STAINLESS STEEL KIT: Brand: GLIDESHEATH SLENDER

## (undated) DEVICE — VESSEL LOOPS X-RAY DETECTABLE: Brand: DEROYAL

## (undated) DEVICE — KIT, BETHLEHEM ROBOTIC PROST: Brand: CARDINAL HEALTH

## (undated) DEVICE — SUT SILK 2-0 18 IN A185H

## (undated) DEVICE — SCD SEQUENTIAL COMPRESSION COMFORT SLEEVE MEDIUM KNEE LENGTH: Brand: KENDALL SCD

## (undated) DEVICE — DRAPE FLUID WARMER (BIRD BATH)

## (undated) DEVICE — SUT VICRYL 0 UR-6 27 IN J603H

## (undated) DEVICE — CATH GUIDE LAUNCHER 6FR EBU 3.5

## (undated) DEVICE — INTENDED FOR TISSUE SEPARATION, AND OTHER PROCEDURES THAT REQUIRE A SHARP SURGICAL BLADE TO PUNCTURE OR CUT.: Brand: BARD-PARKER SAFETY BLADES SIZE 10, STERILE

## (undated) DEVICE — ELECTRO LUBE IS A SINGLE PATIENT USE DEVICE THAT IS INTENDED TO BE USED ON ELECTROSURGICAL ELECTRODES TO REDUCE STICKING.: Brand: KEY SURGICAL ELECTRO LUBE

## (undated) DEVICE — HEMOSTATIC MATRIX SURGIFLO 8ML W/THROMBIN

## (undated) DEVICE — CATH FOLEY 20FR 5ML 2WAY LUBRICATH

## (undated) DEVICE — NEEDLE 25G X 1 1/2

## (undated) DEVICE — GLOVE INDICATOR PI UNDERGLOVE SZ 8 BLUE

## (undated) DEVICE — CATH DIAG 5FR IMPULSE 100CM MPA-1

## (undated) DEVICE — BLADELESS OBTURATOR: Brand: WECK VISTA

## (undated) DEVICE — GLOVE SRG BIOGEL 6.5

## (undated) DEVICE — SUT ETHILON 3-0 FS-1 18 IN 663G

## (undated) DEVICE — BAG URINE DRAINAGE URIMETER 350ML LF

## (undated) DEVICE — VISUALIZATION SYSTEM: Brand: CLEARIFY

## (undated) DEVICE — SUT SILK 2-0 SH CR/8 18 IN C012D

## (undated) DEVICE — PROXIMATE LINEAR STAPLER RELOADS: Brand: PROXIMATE

## (undated) DEVICE — SUT VICRYL 3-0 SH 27 IN J416H

## (undated) DEVICE — COLUMN DRAPE

## (undated) DEVICE — DRAIN SPONGES,6 PLY: Brand: EXCILON

## (undated) DEVICE — SUT PROLENE 4-0 RB-1/RB-1 36 IN 8557H

## (undated) DEVICE — SUT SILK 2-0 SH 30 IN K833H